# Patient Record
Sex: FEMALE | Race: WHITE | NOT HISPANIC OR LATINO | Employment: OTHER | URBAN - METROPOLITAN AREA
[De-identification: names, ages, dates, MRNs, and addresses within clinical notes are randomized per-mention and may not be internally consistent; named-entity substitution may affect disease eponyms.]

---

## 2017-01-16 DIAGNOSIS — G89.29 CHRONIC PAIN OF BOTH KNEES: Primary | ICD-10-CM

## 2017-01-16 DIAGNOSIS — M25.562 CHRONIC PAIN OF BOTH KNEES: Primary | ICD-10-CM

## 2017-01-16 DIAGNOSIS — M25.561 CHRONIC PAIN OF BOTH KNEES: Primary | ICD-10-CM

## 2017-01-16 RX ORDER — TRAMADOL HYDROCHLORIDE 50 MG/1
50 TABLET ORAL 2 TIMES DAILY PRN
Qty: 120 TABLET | Refills: 0 | Status: SHIPPED | OUTPATIENT
Start: 2017-01-16 | End: 2017-05-21

## 2017-01-16 NOTE — TELEPHONE ENCOUNTER
tramadol      Last Written Prescription Date:  9/6/16  Last Fill Quantity: 120,   # refills: 0  Last Office Visit with [facility] prescribing provider: 12/29/16  Future Office visit:       Routing refill request to provider for review/approval because:  Drug not on the [facility] refill protocol or controlled substance

## 2017-01-24 ENCOUNTER — TRANSFERRED RECORDS (OUTPATIENT)
Dept: HEALTH INFORMATION MANAGEMENT | Facility: CLINIC | Age: 82
End: 2017-01-24

## 2017-02-27 DIAGNOSIS — R05.9 COUGH: ICD-10-CM

## 2017-02-27 DIAGNOSIS — J45.30 MILD PERSISTENT ASTHMA WITHOUT COMPLICATION: Chronic | ICD-10-CM

## 2017-03-01 RX ORDER — IPRATROPIUM BROMIDE AND ALBUTEROL SULFATE 2.5; .5 MG/3ML; MG/3ML
1 SOLUTION RESPIRATORY (INHALATION) EVERY 6 HOURS PRN
Qty: 30 VIAL | Refills: 1 | Status: SHIPPED | OUTPATIENT
Start: 2017-03-01 | End: 2017-09-05

## 2017-03-01 NOTE — TELEPHONE ENCOUNTER
Routing refill request to provider for review/approval because:  ACT <20    Please advise, thanks.

## 2017-04-19 ENCOUNTER — DOCUMENTATION ONLY (OUTPATIENT)
Dept: LAB | Facility: CLINIC | Age: 82
End: 2017-04-19

## 2017-04-19 DIAGNOSIS — I10 HTN, GOAL BELOW 140/90: ICD-10-CM

## 2017-04-19 DIAGNOSIS — E21.3 HYPERPARATHYROIDISM (H): Chronic | ICD-10-CM

## 2017-04-19 DIAGNOSIS — E78.5 HYPERLIPIDEMIA LDL GOAL <130: Primary | Chronic | ICD-10-CM

## 2017-04-20 DIAGNOSIS — R05.9 COUGH: ICD-10-CM

## 2017-04-20 DIAGNOSIS — J45.30 MILD PERSISTENT ASTHMA WITHOUT COMPLICATION: Chronic | ICD-10-CM

## 2017-04-20 DIAGNOSIS — I10 ESSENTIAL HYPERTENSION WITH GOAL BLOOD PRESSURE LESS THAN 140/90: ICD-10-CM

## 2017-04-20 RX ORDER — IPRATROPIUM BROMIDE AND ALBUTEROL SULFATE 2.5; .5 MG/3ML; MG/3ML
SOLUTION RESPIRATORY (INHALATION)
Qty: 90 ML | Refills: 0 | Status: SHIPPED | OUTPATIENT
Start: 2017-04-20 | End: 2017-07-25

## 2017-04-20 RX ORDER — LOSARTAN POTASSIUM 100 MG/1
TABLET ORAL
Qty: 90 TABLET | Refills: 0 | Status: SHIPPED | OUTPATIENT
Start: 2017-04-20 | End: 2017-05-02

## 2017-04-25 DIAGNOSIS — I10 HTN, GOAL BELOW 140/90: ICD-10-CM

## 2017-04-25 DIAGNOSIS — E78.5 HYPERLIPIDEMIA LDL GOAL <130: Chronic | ICD-10-CM

## 2017-04-25 DIAGNOSIS — E21.3 HYPERPARATHYROIDISM (H): Chronic | ICD-10-CM

## 2017-04-25 LAB — PTH-INTACT SERPL-MCNC: 77 PG/ML (ref 12–72)

## 2017-04-25 PROCEDURE — 36415 COLL VENOUS BLD VENIPUNCTURE: CPT | Performed by: INTERNAL MEDICINE

## 2017-04-25 PROCEDURE — 83970 ASSAY OF PARATHORMONE: CPT | Performed by: INTERNAL MEDICINE

## 2017-04-25 PROCEDURE — 82043 UR ALBUMIN QUANTITATIVE: CPT | Performed by: INTERNAL MEDICINE

## 2017-04-25 PROCEDURE — 80053 COMPREHEN METABOLIC PANEL: CPT | Performed by: INTERNAL MEDICINE

## 2017-04-26 LAB
ALBUMIN SERPL-MCNC: 4.2 G/DL (ref 3.4–5)
ALP SERPL-CCNC: 82 U/L (ref 40–150)
ALT SERPL W P-5'-P-CCNC: 27 U/L (ref 0–50)
ANION GAP SERPL CALCULATED.3IONS-SCNC: 11 MMOL/L (ref 3–14)
AST SERPL W P-5'-P-CCNC: 22 U/L (ref 0–45)
BILIRUB SERPL-MCNC: 0.5 MG/DL (ref 0.2–1.3)
BUN SERPL-MCNC: 19 MG/DL (ref 7–30)
CALCIUM SERPL-MCNC: 8.9 MG/DL (ref 8.5–10.1)
CHLORIDE SERPL-SCNC: 107 MMOL/L (ref 94–109)
CO2 SERPL-SCNC: 24 MMOL/L (ref 20–32)
CREAT SERPL-MCNC: 0.74 MG/DL (ref 0.52–1.04)
CREAT UR-MCNC: 125 MG/DL
GFR SERPL CREATININE-BSD FRML MDRD: 76 ML/MIN/1.7M2
GLUCOSE SERPL-MCNC: 90 MG/DL (ref 70–99)
MICROALBUMIN UR-MCNC: 6 MG/L
MICROALBUMIN/CREAT UR: 5.19 MG/G CR (ref 0–25)
POTASSIUM SERPL-SCNC: 4.2 MMOL/L (ref 3.4–5.3)
PROT SERPL-MCNC: 7.6 G/DL (ref 6.8–8.8)
SODIUM SERPL-SCNC: 142 MMOL/L (ref 133–144)

## 2017-05-02 ENCOUNTER — OFFICE VISIT (OUTPATIENT)
Dept: INTERNAL MEDICINE | Facility: CLINIC | Age: 82
End: 2017-05-02
Payer: COMMERCIAL

## 2017-05-02 VITALS
TEMPERATURE: 97.2 F | WEIGHT: 235.9 LBS | DIASTOLIC BLOOD PRESSURE: 68 MMHG | HEART RATE: 85 BPM | HEIGHT: 66 IN | SYSTOLIC BLOOD PRESSURE: 138 MMHG | OXYGEN SATURATION: 93 % | BODY MASS INDEX: 37.91 KG/M2

## 2017-05-02 DIAGNOSIS — I10 HTN, GOAL BELOW 140/90: ICD-10-CM

## 2017-05-02 DIAGNOSIS — D12.6 ADENOMATOUS COLON POLYP: ICD-10-CM

## 2017-05-02 DIAGNOSIS — E78.5 HYPERLIPIDEMIA LDL GOAL <130: Chronic | ICD-10-CM

## 2017-05-02 DIAGNOSIS — Z00.00 ROUTINE GENERAL MEDICAL EXAMINATION AT A HEALTH CARE FACILITY: Primary | ICD-10-CM

## 2017-05-02 DIAGNOSIS — R60.0 BILATERAL LEG EDEMA: ICD-10-CM

## 2017-05-02 PROCEDURE — 99397 PER PM REEVAL EST PAT 65+ YR: CPT | Performed by: INTERNAL MEDICINE

## 2017-05-02 RX ORDER — LOSARTAN POTASSIUM 100 MG/1
100 TABLET ORAL DAILY
Qty: 90 TABLET | Refills: 0 | Status: SHIPPED | OUTPATIENT
Start: 2017-05-02 | End: 2017-10-17

## 2017-05-02 RX ORDER — PRAVASTATIN SODIUM 20 MG
20 TABLET ORAL DAILY
Qty: 90 TABLET | Refills: 1 | Status: SHIPPED | OUTPATIENT
Start: 2017-05-02 | End: 2017-12-01

## 2017-05-02 RX ORDER — FUROSEMIDE 40 MG
TABLET ORAL
Qty: 90 TABLET | Refills: 1 | Status: SHIPPED | OUTPATIENT
Start: 2017-05-02 | End: 2017-12-01

## 2017-05-02 RX ORDER — ATENOLOL 100 MG/1
100 TABLET ORAL DAILY
Qty: 90 TABLET | Refills: 1 | Status: SHIPPED | OUTPATIENT
Start: 2017-05-02 | End: 2017-12-01

## 2017-05-02 RX ORDER — TERAZOSIN 2 MG/1
2 CAPSULE ORAL AT BEDTIME
Qty: 30 CAPSULE | Refills: 3 | Status: SHIPPED | OUTPATIENT
Start: 2017-05-02 | End: 2017-05-24

## 2017-05-02 NOTE — MR AVS SNAPSHOT
After Visit Summary   5/2/2017    Marino Prajapati    MRN: [id number]           Patient Information     Date Of Birth          1935        Visit Information        Provider Department      5/2/2017 3:20 PM Vivian Blue MD [facility]        Today's Diagnoses     Adenomatous colon polyp    -  1    HTN, goal below 140/90        Essential hypertension with goal blood pressure less than 140/90        Hyperlipidemia LDL goal <130        Bilateral leg edema          Care Instructions    Plan:  1. Mammogram ( please call [phone] to schedule it)   2. Bone density scan   3. We need the pathology report of the colon polyps from 2012  4. Read and complete the [facility]   5. Continue same meds, same doses for now   6. Please make a lab appointment for fasting labs in 6 months  7. Please make an appointment few days after the labs to discuss about the results.       Plan:  1. Mammogram ( please call [phone] to schedule it)   2. Bone density scan   3. We need the pathology report of the colon polyps from 2012  4. Read and complete the [facility]   5. Continue same meds, same doses for now       Preventive Health Recommendations    Female Ages 65 +    Yearly exam:     See your health care provider every year in order to  o Review health changes.   o Discuss preventive care.    o Review your medicines if your doctor has prescribed any.      You no longer need a yearly Pap test unless you've had an abnormal Pap test in the past 10 years. If you have vaginal symptoms, such as bleeding or discharge, be sure to talk with your provider about a Pap test.      Every 1 to 2 years, have a mammogram.  If you are over 69, talk with your health care provider about whether or not you want to continue having screening mammograms.      Every 10 years, have a colonoscopy. Or, have a yearly FIT test (stool test). These exams will check for colon cancer.       Have a  cholesterol test every 5 years, or more often if your doctor advises it.       Have a diabetes test (fasting glucose) every three years. If you are at risk for diabetes, you should have this test more often.       At age 65, have a bone density scan (DEXA) to check for osteoporosis (brittle bone disease).    Shots:    Get a flu shot each year.    Get a tetanus shot every 10 years.    Talk to your doctor about your pneumonia vaccines. There are now two you should receive - Pneumovax (PPSV 23) and Prevnar (PCV 13).    Talk to your doctor about the shingles vaccine.    Talk to your doctor about the hepatitis B vaccine.    Nutrition:     Eat at least 5 servings of fruits and vegetables each day.      Eat whole-grain bread, whole-wheat pasta and brown rice instead of white grains and rice.      Talk to your provider about Calcium and Vitamin D.     Lifestyle    Exercise at least 150 minutes a week (30 minutes a day, 5 days a week). This will help you control your weight and prevent disease.      Limit alcohol to one drink per day.      No smoking.       Wear sunscreen to prevent skin cancer.       See your dentist twice a year for an exam and cleaning.      See your eye doctor every 1 to 2 years to screen for conditions such as glaucoma, macular degeneration and cataracts.        Follow-ups after your visit        Future tests that were ordered for you today     Open Future Orders        Priority Expected Expires Ordered    CBC with platelets Routine  5/2/2018 5/2/2017    Comprehensive metabolic panel Routine  5/2/2018 5/2/2017    Lipid panel reflex to direct LDL Routine  5/2/2018 5/2/2017    TSH with free T4 reflex Routine  5/2/2018 5/2/2017            Who to contact     If you have questions or need follow up information about today's clinic visit or your schedule please contact [facility] directly at [phone].  Normal or non-critical lab and imaging results will be communicated to you by Donnie  "letter or phone within 4 business days after the clinic has received the results. If you do not hear from us within 7 days, please contact the clinic through [employer] or phone. If you have a critical or abnormal lab result, we will notify you by phone as soon as possible.  Submit refill requests through [employer] or call your pharmacy and they will forward the refill request to us. Please allow 3 business days for your refill to be completed.          Additional Information About Your Visit        [employer] Information     [employer] lets you send messages to your doctor, view your test results, renew your prescriptions, schedule appointments and more. To sign up, go to [email or web address] . Click on \"Log in\" on the left side of the screen, which will take you to the Welcome page. Then click on \"Sign up Now\" on the right side of the page.     You will be asked to enter the access code listed below, as well as some personal information. Please follow the directions to create your username and password.     Your access code is: [email or web address]  Expires: 2017 12:24 PM     Your access code will  in 90 days. If you need help or a new code, please call your [facility] clinic or [phone].        Care EveryWhere ID     This is your Care EveryWhere ID. This could be used by other organizations to access your [facility] medical records  [id number]        Your Vitals Were     Pulse Temperature Height Pulse Oximetry Breastfeeding? BMI (Body Mass Index)    85 97.2  F (36.2  C) (Oral) 5' 6\" (1.676 m) 93% No 38.08 kg/m2       Blood Pressure from Last 3 Encounters:   17 138/68   16 130/60   16 112/54    Weight from Last 3 Encounters:   17 235 lb 14.4 oz (107 kg)   16 238 lb (108 kg)   16 229 lb 4.8 oz (104 kg)                 Today's Medication Changes          These changes are accurate as of: 17  3:53 PM.  If you have any questions, ask your nurse or doctor.               These " medicines have changed or have updated prescriptions.        Dose/Directions    losartan 100 MG tablet   Commonly known as:  COZAAR   This may have changed:  See the new instructions.   Used for:  Essential hypertension with goal blood pressure less than 140/90, HTN, goal below 140/90, Adenomatous colon polyp, Hyperlipidemia LDL goal <130, Bilateral leg edema   Changed by:  Vivian Blue MD        Dose:  100 mg   Take 1 tablet (100 mg) by mouth daily   Quantity:  90 tablet   Refills:  0            Where to get your medicines      These medications were sent to [email or web address] [address] [place] [address] - [address] AT [place] [address] & [address]  [address], [facility] [address]     Phone:  [phone]     atenolol 100 MG tablet    furosemide 40 MG tablet    losartan 100 MG tablet    pravastatin 20 MG tablet    terazosin 2 MG capsule                Primary Care Provider Office Phone # Fax #    Vivian Blue [phone]       [facility] [address] [place] [address]        Thank you!     Thank you for choosing [facility]  for your care. Our goal is always to provide you with excellent care. Hearing back from our patients is one way we can continue to improve our services. Please take a few minutes to complete the written survey that you may receive in the mail after your visit with us. Thank you!             Your Updated Medication List - Protect others around you: Learn how to safely use, store and throw away your medicines at www.disposemymeds.org.          This list is accurate as of: 5/2/17  3:53 PM.  Always use your most recent med list.                   Brand Name Dispense Instructions for use    albuterol 108 (90 BASE) MCG/ACT Inhaler    PROAIR HFA/PROVENTIL HFA/VENTOLIN HFA    1 Inhaler    Inhale 2 puffs into the lungs every 6 hours as needed for shortness of breath / dyspnea or wheezing        amLODIPine 10 MG tablet    NORVASC    90 tablet    Take 0.5 tablets (5 mg) by mouth daily       ascorbic acid 500 MG tablet    VITAMIN C    100 tablet    Take 500 mg by mouth daily       aspirin 81 MG tablet     30 tablet    Take 81 mg by mouth daily       atenolol 100 MG tablet    TENORMIN    90 tablet    Take 1 tablet (100 mg) by mouth daily       B-12 1000 MCG Tbcr     100 tablet    Take 1,000 mcg by mouth daily       Calcium-Magnesium-Zinc Tabs          cholecalciferol 1000 UNIT tablet    vitamin D    100 tablet    Take 2 tablets (2,000 Units) by mouth daily       clobetasol 0.05 % cream    TEMOVATE    30 g    Apply sparingly to affected area.  Do not apply to face.       CoQ10 30 MG Caps     30 capsule        Cranberry Extract 250 MG Tabs          desonide 0.05 % cream    DESOWEN    30 g    Apply  topically 2 times daily.       fluticasone-salmeterol 250-50 MCG/DOSE diskus inhaler    ADVAIR    3 Inhaler    Inhale 1 puff into the lungs 2 times daily       furosemide 40 MG tablet    LASIX    90 tablet    0.5 - 1 tab daily as instructed       GARLIC 1500 1500 MG Caps   Generic drug:  Garlic          * ipratropium - albuterol 0.5 mg/2.5 mg/3 mL 0.5-2.5 (3) MG/3ML neb solution    DUONEB    30 vial    Take 1 vial (3 mLs) by nebulization every 6 hours as needed for shortness of breath / dyspnea or wheezing       * ipratropium - albuterol 0.5 mg/2.5 mg/3 mL 0.5-2.5 (3) MG/3ML neb solution    DUONEB    90 mL    USE 1 VIAL IN NEBULIZER EVERY 6 HOURS AS NEEDED FOR SHORTNESS OF BREATH, DYSPNEA, OR WHEEZING       losartan 100 MG tablet    COZAAR    90 tablet    Take 1 tablet (100 mg) by mouth daily       MULTIVITAMIN & MINERAL PO      Take 1 tablet by mouth daily.       omeprazole 20 MG tablet     90 tablet    Take 1 tablet (20 mg) by mouth daily Take 30-60 minutes before a meal.       order for DME     1 each    Equipment being ordered: walker with wheels       * order for DME     1 Device    Equipment being ordered:  Nebulizer and neb supplies (tubing, etc)       * order for DME     1 each    Equipment being ordered: 1: Gradient Compression Wraps; 2: BLE knee high 20-30 mm Hg compression stockings; 3: BLE thigh high 20-30 mm Hg compression stockings; 4: Cast Boots       * order for DME     1 each    Equipment being ordered: 1: Custom/alternative BLE full leg velco/buckling compression garment       pravastatin 20 MG tablet    PRAVACHOL    90 tablet    Take 1 tablet (20 mg) by mouth daily       [place] Oil Caps          terazosin 2 MG capsule    HYTRIN    30 capsule    Take 1 capsule (2 mg) by mouth At Bedtime       traMADol 50 MG tablet    ULTRAM    120 tablet    Take 1 tablet (50 mg) by mouth 2 times daily as needed for pain       TYLENOL 325 MG tablet   Generic drug:  acetaminophen     250 tablet    Take 1-2 tablets (325-650 mg) by mouth every 6 hours as needed for mild pain       vitamin B complex with vitamin C Tabs tablet      Take 1 tablet by mouth daily       * Notice:  This list has 5 medication(s) that are the same as other medications prescribed for you. Read the directions carefully, and ask your doctor or other care provider to review them with you.

## 2017-05-02 NOTE — PATIENT INSTRUCTIONS
Plan:  1. Mammogram ( please call [phone] to schedule it)   2. Bone density scan   3. We need the pathology report of the colon polyps from 2012  4. Read and complete the [facility]   5. Continue same meds, same doses for now   6. Please make a lab appointment for fasting labs in 6 months  7. Please make an appointment few days after the labs to discuss about the results.       Plan:  1. Mammogram ( please call [phone] to schedule it)   2. Bone density scan   3. We need the pathology report of the colon polyps from 2012  4. Read and complete the [facility] CHoices   5. Continue same meds, same doses for now       Preventive Health Recommendations    Female Ages 65 +    Yearly exam:     See your health care provider every year in order to  o Review health changes.   o Discuss preventive care.    o Review your medicines if your doctor has prescribed any.      You no longer need a yearly Pap test unless you've had an abnormal Pap test in the past 10 years. If you have vaginal symptoms, such as bleeding or discharge, be sure to talk with your provider about a Pap test.      Every 1 to 2 years, have a mammogram.  If you are over 69, talk with your health care provider about whether or not you want to continue having screening mammograms.      Every 10 years, have a colonoscopy. Or, have a yearly FIT test (stool test). These exams will check for colon cancer.       Have a cholesterol test every 5 years, or more often if your doctor advises it.       Have a diabetes test (fasting glucose) every three years. If you are at risk for diabetes, you should have this test more often.       At age 65, have a bone density scan (DEXA) to check for osteoporosis (brittle bone disease).    Shots:    Get a flu shot each year.    Get a tetanus shot every 10 years.    Talk to your doctor about your pneumonia vaccines. There are now two you should receive - Pneumovax (PPSV 23) and Prevnar (PCV 13).    Talk to your doctor  about the shingles vaccine.    Talk to your doctor about the hepatitis B vaccine.    Nutrition:     Eat at least 5 servings of fruits and vegetables each day.      Eat whole-grain bread, whole-wheat pasta and brown rice instead of white grains and rice.      Talk to your provider about Calcium and Vitamin D.     Lifestyle    Exercise at least 150 minutes a week (30 minutes a day, 5 days a week). This will help you control your weight and prevent disease.      Limit alcohol to one drink per day.      No smoking.       Wear sunscreen to prevent skin cancer.       See your dentist twice a year for an exam and cleaning.      See your eye doctor every 1 to 2 years to screen for conditions such as glaucoma, macular degeneration and cataracts.

## 2017-05-02 NOTE — NURSING NOTE
"Chief Complaint   Patient presents with     Medicare Visit       Initial /68 (BP Location: Right arm, Patient Position: Chair, Cuff Size: Adult Large)  Pulse 85  Temp 97.2  F (36.2  C) (Oral)  Ht 5' 6\" (1.676 m)  Wt 235 lb 14.4 oz (107 kg)  SpO2 93%  Breastfeeding? No  BMI 38.08 kg/m2 Estimated body mass index is 38.08 kg/(m^2) as calculated from the following:    Height as of this encounter: 5' 6\" (1.676 m).    Weight as of this encounter: 235 lb 14.4 oz (107 kg).  Medication Reconciliation: complete   Ernesto Valencia MA    "

## 2017-05-02 NOTE — PROGRESS NOTES
Dr Mendes's note    Patient's instructions / PLAN:                                                        Plan:  1. Mammogram ( please call [phone] to schedule it)   2. Bone density scan   3. We need the pathology report of the colon polyps from 2012  4. Read and complete the [facility]oring [employer]   5. Continue same meds, same doses for now   6. Please make a lab appointment for fasting labs in 6 months  7. Please make an appointment few days after the labs to discuss about the results.       ASSESSMENT & PLAN:                                                      (Z00.00) Routine general medical examination at a health care facility  (primary encounter diagnosis)  Comment:   Plan:     (I10) HTN, goal below 140/90  Comment: Controlled    Plan: terazosin (HYTRIN) 2 MG capsule, atenolol         (TENORMIN) 100 MG tablet, losartan (COZAAR) 100        MG tablet, pravastatin (PRAVACHOL) 20 MG         tablet, furosemide (LASIX) 40 MG tablet, CBC         with platelets, Comprehensive metabolic panel,         Lipid panel reflex to direct LDL, TSH with free        T4 reflex            (D12.6) Adenomatous colon polyp  Comment:   Plan: terazosin (HYTRIN) 2 MG capsule, atenolol         (TENORMIN) 100 MG tablet, losartan (COZAAR) 100        MG tablet, pravastatin (PRAVACHOL) 20 MG         tablet, furosemide (LASIX) 40 MG tablet, CBC         with platelets, Comprehensive metabolic panel,         Lipid panel reflex to direct LDL, TSH with free        T4 reflex            (E78.5) Hyperlipidemia LDL goal <130  Comment: Controlled    Plan: terazosin (HYTRIN) 2 MG capsule, atenolol         (TENORMIN) 100 MG tablet, losartan (COZAAR) 100        MG tablet, pravastatin (PRAVACHOL) 20 MG         tablet, furosemide (LASIX) 40 MG tablet, CBC         with platelets, Comprehensive metabolic panel,         Lipid panel reflex to direct LDL, TSH with free        T4 reflex            (R60.0) Bilateral leg edema  Comment: Controlled    Plan:  terazosin (HYTRIN) 2 MG capsule, atenolol         (TENORMIN) 100 MG tablet, losartan (COZAAR) 100        MG tablet, pravastatin (PRAVACHOL) 20 MG         tablet, furosemide (LASIX) 40 MG tablet, CBC         with platelets, Comprehensive metabolic panel,         Lipid panel reflex to direct LDL, TSH with free        T4 reflex               Chief Complaint:                                                        Annual exam    SUBJECTIVE:                                                    History of present illness     Labs April 25 in acceptable range     Polyps in 2012, she doesn't know if she was told to repeat in 5 or 10 years.     We discussed at length today about health directives    ROS:   General: Negative for fever, chills, major weight changes, fatigue  Skin: Negative for rashes, abnormal spots  Eyes: Negative for blurred or double vision  ENT/mouth: Negative for sinuses discomfort, earache, sore throat  Respiratory: Negative for cough, wheezes, chronic lung disease  Cardiovascular: Negative for rest or exertional chest pain, shortness of breath, palpitations, leg edema,   Gastrointestinal: Negative for vomiting, abdominal pain, heartburn, blood in stool, diarrhea, constipation  Genitourinary: Negative for urinary frequency, blood in urine, history of kidney stones  Female: Negative for abnormal vaginal bleeding, vaginal discharge  Neuro: Negative for headaches, numbness, tingling, weakness in arms or legs, history of seizure, recent syncope  Psychiatry: Negative for depression, anxiety, suicidal thoughts  Endo: Negative for known thyroid disease, diabetes.  Hemato/Lymph: Negative for nodes, easy bleeding, history of DVT, blood transfusion  Musculoskeletal: Negative for joint swelling, back pain      PMHx: - reviewed  Past Medical History:   Diagnosis Date     Anemia      CARDIOVASCULAR SCREENING; LDL GOAL LESS THAN 160      Colon polyps 2010    needs colonoscopy 2013     DVT (deep venous thrombosis) (H)  2007    remote history of DVT while she was on BCP ,coumadin stopped after retroperitoneal/buttock hematoma in 10/11 . On ASA only     Gastro-oesophageal reflux disease      HTN, goal below 140/90      Hyperlipidemia LDL goal <130 1/22/2016     Kidney stone      Osteoarthritis     knees and hip     Osteoporosis      Postnasal drip      S/P total knee arthroplasty      Thrombosis of leg        PSHx: reviewed  Past Surgical History:   Procedure Laterality Date     ARTHROPLASTY HIP  8/1/2013    Procedure: ARTHROPLASTY HIP;  RIGHT TOTAL HIP ARTHROPLASTY;  Surgeon: Rufino Tong MD;  Location: [facility]     ARTHROPLASTY HIP Left 12/12/2014    Procedure: ARTHROPLASTY HIP;  Surgeon: Rufino Tong MD;  Location: [facility] OR     ARTHROPLASTY KNEE  10/22/2012    Procedure: ARTHROPLASTY KNEE;  Right Total knee Arthroplasty  ;  Surgeon: Jagdeep Virgen MD;  Location: [facility] OR     ARTHROPLASTY KNEE  5/23/2013    Procedure: ARTHROPLASTY KNEE;  LEFT TOTAL KNEE ARTHROPLASTY (SMITH & NEPHEW)^;  Surgeon: Rufino Tong MD;  Location: [facility] OR     C PREP FACE/ORAL PROST PALATAL LIFT       CHOLECYSTECTOMY       CYSTOSCOPY, RETROGRADES, INSERT STENT URETER(S), COMBINED  7/16/2012    Procedure: COMBINED CYSTOSCOPY, RETROGRADES, INSERT STENT URETER(S);  Cystoscopy, Right retrogrades, Right Stent Placement;  Surgeon: Mauricio Velazquez MD;  Location:  OR     LASER HOLMIUM LITHOTRIPSY URETER(S), INSERT STENT, COMBINED  8/8/2012    Procedure: COMBINED CYSTOSCOPY, URETEROSCOPY, LASER HOLMIUM LITHOTRIPSY URETER(S), INSERT STENT;  Cystoscopy, Stent Removal, Right Ureteroscopy with Holmium Laser, Stone removal ;  Surgeon: Mauricio Velazquez MD;  Location: [facility] OR     LIPOSUCTION (LOCATION)      tummy     STRIP VEIN          Soc Hx: No daily alcohol, no smoking  Social History     Social History     Marital status:      Spouse name: N/A     Number of children: N/A     Years of education: N/A     Occupational History     Not on  file.     Social History Main Topics     Smoking status: Former Smoker     Packs/day: 1.00     Years: 29.00     Types: Cigarettes     Quit date: 10/17/1966     Smokeless tobacco: Never Used     Alcohol use Yes      Comment: 4 drinks yearly     Drug use: No     Sexual activity: Yes     Partners: Male     Other Topics Concern     Not on file     Social History Narrative        Fam Hx: reviewed  Family History   Problem Relation Age of Onset     Breast Cancer Mother      Circulatory Father      Blood clots         Screening: reviewed      All: reviewed    Meds: reviewed  Current Outpatient Prescriptions   Medication Sig Dispense Refill     losartan (COZAAR) 100 MG tablet TAKE 1 TABLET(100 MG) BY MOUTH DAILY 90 tablet 0     ipratropium - albuterol 0.5 mg/2.5 mg/3 mL (DUONEB) 0.5-2.5 (3) MG/3ML neb solution USE 1 VIAL IN NEBULIZER EVERY 6 HOURS AS NEEDED FOR SHORTNESS OF BREATH, DYSPNEA, OR WHEEZING 90 mL 0     ipratropium - albuterol 0.5 mg/2.5 mg/3 mL (DUONEB) 0.5-2.5 (3) MG/3ML neb solution Take 1 vial (3 mLs) by nebulization every 6 hours as needed for shortness of breath / dyspnea or wheezing 30 vial 1     amLODIPine (NORVASC) 10 MG tablet Take 0.5 tablets (5 mg) by mouth daily 90 tablet 1     traMADol (ULTRAM) 50 MG tablet Take 1 tablet (50 mg) by mouth 2 times daily as needed for pain 120 tablet 0     terazosin (HYTRIN) 2 MG capsule Take 1 capsule (2 mg) by mouth At Bedtime 30 capsule 3     fluticasone-salmeterol (ADVAIR) 250-50 MCG/DOSE diskus inhaler Inhale 1 puff into the lungs 2 times daily 3 Inhaler 0     atenolol (TENORMIN) 100 MG tablet Take 1 tablet (100 mg) by mouth daily 90 tablet 1     pravastatin (PRAVACHOL) 20 MG tablet Take 1 tablet (20 mg) by mouth daily 90 tablet 1     furosemide (LASIX) 40 MG tablet 0.5 - 1 tab daily as instructed 90 tablet 1     albuterol (PROAIR HFA, PROVENTIL HFA, VENTOLIN HFA) 108 (90 BASE) MCG/ACT inhaler Inhale 2 puffs into the lungs every 6 hours as needed for shortness of  breath / dyspnea or wheezing 1 Inhaler 1     omeprazole 20 MG tablet Take 1 tablet (20 mg) by mouth daily Take 30-60 minutes before a meal. 90 tablet 0     order for DME Equipment being ordered: Nebulizer and neb supplies (tubing, etc) 1 Device 0     cholecalciferol (VITAMIN D) 1000 UNIT tablet Take 2 tablets (2,000 Units) by mouth daily 100 tablet 3     vitamin  B complex with vitamin C (VITAMIN  B COMPLEX) TABS Take 1 tablet by mouth daily  0     Cranberry Extract 250 MG TABS        Garlic (GARLIC 1500) 1500 MG CAPS        Nutritional Supplements (SALMON OIL) CAPS        Cyanocobalamin (B-12) 1000 MCG TBCR Take 1,000 mcg by mouth daily 100 tablet 1     Coenzyme Q10 (COQ10) 30 MG CAPS  30 capsule      Multiple Minerals (CALCIUM-MAGNESIUM-ZINC) TABS        acetaminophen (TYLENOL) 325 MG tablet Take 1-2 tablets (325-650 mg) by mouth every 6 hours as needed for mild pain 250 tablet 11     aspirin 81 MG tablet Take 81 mg by mouth daily  30 tablet      clobetasol (TEMOVATE) 0.05 % cream Apply sparingly to affected area.  Do not apply to face. 30 g 1     ascorbic acid (VITAMIN C) 500 MG tablet Take 500 mg by mouth daily  100 tablet 12     ORDER FOR DME Equipment being ordered: walker with wheels 1 each 0     Multiple Vitamins-Minerals (MULTIVITAMIN & MINERAL PO) Take 1 tablet by mouth daily.       order for DME Equipment being ordered: 1: Custom/alternative BLE full leg velco/buckling compression garment (Patient not taking: Reported on 5/2/2017) 1 each 0     order for DME Equipment being ordered: 1: Gradient Compression Wraps; 2: BLE knee high 20-30 mm Hg compression stockings; 3: BLE thigh high 20-30 mm Hg compression stockings; 4: Cast Boots (Patient not taking: Reported on 5/2/2017) 1 each 0     desonide (DESOWEN) 0.05 % cream Apply  topically 2 times daily. (Patient not taking: Reported on 5/2/2017) 30 g 0       OBJECTIVE:                                                    Physical Exam :  Blood pressure 138/68,  "pulse 85, temperature 97.2  F (36.2  C), temperature source Oral, height 5' 6\" (1.676 m), weight 235 lb 14.4 oz (107 kg), SpO2 93 %, not currently breastfeeding.     NAD, appears comfortable  Skin clear, no rashes  Neck: supple, no JVD, no thyroidmegaly  Lymph nodes non palpable in the cervical, supraclavicular axillaries, inguinal areas  Chest: clear to auscultation with good respiratory effort  Cardiac: S1S2, RRR, no mgr appreciated  Abdomen: soft, not tender, not distended, audible bowel sound, no hepatosplenomegaly, no palpable masses, no abdominal bruits  Extremities: no cyanosis, clubbing or edema.   Neuro: A, Ox3, no focal signs.  Breast exam in supine and erect position: they are symmetrical, no skin changes, no tenderness or nodes on palpation. Nipples are erect, no skin lesions, no discharge on pressure.    Pelvic exam: deferred, s/p menopause, no symptoms, no hx of abnormal pap         Vivian Mendes MD  Internal Medicine       SUBJECTIVE:                                                            Marino Prajapati is a 81 year old female who presents for Preventive Visit.      Are you in the first 12 months of your Medicare Part B coverage?  No    Healthy Habits:    Do you get at least three servings of calcium containing foods daily (dairy, green leafy vegetables, etc.)? yes    Amount of exercise or daily activities, outside of work: no    Problems taking medications regularly No    Medication side effects: No    Have you had an eye exam in the past two years? no    Do you see a dentist twice per year? yes    Do you have sleep apnea, excessive snoring or daytime drowsiness?no    COGNITIVE SCREEN  1) Repeat 3 items (Banana, Sunrise, Chair)    2) Clock draw: NORMAL  3) 3 item recall: Recalls 3 objects  Results: 3 items recalled: COGNITIVE IMPAIRMENT LESS LIKELY    Mini-CogTM Copyright S [employer]. Licensed by the author for use in [facility]; reprinted with permission (ivan@.[facility]). All rights " reserved.          Reviewed and updated as needed this visit by clinical staff         Reviewed and updated as needed this visit by Provider        Social History   Substance Use Topics     Smoking status: Former Smoker     Packs/day: 1.00     Years: 29.00     Types: Cigarettes     Quit date: 10/17/1966     Smokeless tobacco: Never Used     Alcohol use Yes      Comment: 4 drinks yearly       The patient does not drink >3 drinks per day nor >7 drinks per week.    Today's PHQ-2 Score:   PHQ-2 ( 1999 Pfizer) 9/27/2016 6/14/2016   Q1: Little interest or pleasure in doing things 0 0   Q2: Feeling down, depressed or hopeless 0 0   PHQ-2 Score 0 0       Do you feel safe in your environment - Yes    Do you have a Health Care Directive?: Yes: Patient states has Advance Directive and will bring in a copy to clinic.    Current providers sharing in care for this patient include:   Patient Care Team:  Vivian Blue MD as PCP - General (Internal Medicine)      Hearing impairment: Yes, Feel that people are mumbling or not speaking clearly.    Ability to successfully perform activities of daily living: Yes, no assistance needed     Fall risk:  Fallen 2 or more times in the past year?: No  Any fall with injury in the past year?: No    Home safety:  none identified      The following health maintenance items are reviewed in Epic and correct as of today:  Health Maintenance   Topic Date Due     PNEUMOCOCCAL (2 of 2 - PCV13) 05/10/2014     ASTHMA ACTION PLAN Q1 YR (NO INBASKET)  05/10/2017     ASTHMA CONTROL TEST Q6 MOS (NO INBASKET)  06/29/2017     ADVANCE DIRECTIVE PLANNING Q5 YRS (NO INBASKET)  08/03/2017     INFLUENZA VACCINE (SYSTEM ASSIGNED)  09/01/2017     FALL RISK ASSESSMENT  09/27/2017     JASON QUESTIONNAIRE 1 YEAR  09/27/2017     PHQ-9 Q1YR (NO INBASKET)  09/27/2017     MICROALBUMIN Q1 YEAR( NO INBASKET)  04/25/2018     TETANUS IMMUNIZATION (SYSTEM ASSIGNED)  01/29/2019     LIPID SCREEN Q5 YR FEMALE (SYSTEM  "ASSIGNED)  11/15/2021     DEXA SCAN SCREENING (SYSTEM ASSIGNED)  Completed         End of Life Planning:  Patient currently has an advanced directive: Yes.  Practitioner is supportive of decision.    COUNSELING:  Reviewed preventive health counseling, as reflected in patient instructions       Regular exercise       Healthy diet/nutrition        Estimated body mass index is 38.41 kg/(m^2) as calculated from the following:    Height as of 12/29/16: 5' 6\" (1.676 m).    Weight as of 12/29/16: 238 lb (108 kg).  Weight management plan: Discussed healthy diet and exercise guidelines and patient will follow up in 12 months in clinic to re-evaluate.   reports that she quit smoking about 50 years ago. Her smoking use included Cigarettes. She has a 29.00 pack-year smoking history. She has never used smokeless tobacco.      Appropriate preventive services were discussed with this patient, including applicable screening as appropriate for cardiovascular disease, diabetes, osteopenia/osteoporosis, and glaucoma.  As appropriate for age/gender, discussed screening for colorectal cancer, prostate cancer, breast cancer, and cervical cancer. Checklist reviewing preventive services available has been given to the patient.    Reviewed patients plan of care and provided an AVS. The Basic Care Plan (routine screening as documented in Health Maintenance) for Marino meets the Care Plan requirement. This Care Plan has been established and reviewed with the Patient.    Counseling Resources:  ATP IV Guidelines  Pooled Cohorts Equation Calculator  Breast Cancer Risk Calculator  FRAX Risk Assessment  ICSI Preventive Guidelines  Dietary Guidelines for Americans, 2010  USDA's MyPlate  ASA Prophylaxis  Lung CA Screening    Vivian Blue MD  [facility]  "

## 2017-05-03 ASSESSMENT — ASTHMA QUESTIONNAIRES: ACT_TOTALSCORE: 16

## 2017-05-21 DIAGNOSIS — G89.29 CHRONIC PAIN OF BOTH KNEES: ICD-10-CM

## 2017-05-21 DIAGNOSIS — M25.561 CHRONIC PAIN OF BOTH KNEES: ICD-10-CM

## 2017-05-21 DIAGNOSIS — I10 HTN, GOAL BELOW 140/90: ICD-10-CM

## 2017-05-21 DIAGNOSIS — R60.0 BILATERAL LEG EDEMA: ICD-10-CM

## 2017-05-21 DIAGNOSIS — E78.5 HYPERLIPIDEMIA LDL GOAL <130: Chronic | ICD-10-CM

## 2017-05-21 DIAGNOSIS — D12.6 ADENOMATOUS COLON POLYP: ICD-10-CM

## 2017-05-21 DIAGNOSIS — M25.562 CHRONIC PAIN OF BOTH KNEES: ICD-10-CM

## 2017-05-22 RX ORDER — TRAMADOL HYDROCHLORIDE 50 MG/1
TABLET ORAL
Qty: 120 TABLET | Refills: 0 | Status: SHIPPED | OUTPATIENT
Start: 2017-05-22 | End: 2017-09-05

## 2017-05-22 NOTE — TELEPHONE ENCOUNTER
Ultram      Last Written Prescription Date: 1/16/17  Last Fill Quantity: 120,  # refills: 0   Last Office Visit with G, [facility] or [facility] prescribing provider: 5/2/17    CSA on file

## 2017-05-24 RX ORDER — TERAZOSIN 2 MG/1
2 CAPSULE ORAL AT BEDTIME
Qty: 90 CAPSULE | Refills: 0 | Status: SHIPPED | OUTPATIENT
Start: 2017-05-24 | End: 2017-08-17

## 2017-05-25 ENCOUNTER — HOSPITAL ENCOUNTER (OUTPATIENT)
Dept: MAMMOGRAPHY | Facility: CLINIC | Age: 82
Discharge: HOME | End: 2017-05-25
Attending: INTERNAL MEDICINE | Admitting: INTERNAL MEDICINE
Payer: COMMERCIAL

## 2017-05-25 DIAGNOSIS — Z12.31 VISIT FOR SCREENING MAMMOGRAM: ICD-10-CM

## 2017-05-25 DIAGNOSIS — M81.0 OSTEOPOROSIS: Primary | ICD-10-CM

## 2017-05-25 PROCEDURE — 77063 BREAST TOMOSYNTHESIS BI: CPT

## 2017-05-25 PROCEDURE — G0202 SCR MAMMO BI INCL CAD: HCPCS

## 2017-05-31 ENCOUNTER — TELEPHONE (OUTPATIENT)
Dept: BONE DENSITY | Facility: CLINIC | Age: 82
End: 2017-05-31

## 2017-06-08 ENCOUNTER — TELEPHONE (OUTPATIENT)
Dept: INTERNAL MEDICINE | Facility: CLINIC | Age: 82
End: 2017-06-08

## 2017-06-08 DIAGNOSIS — J45.30 MILD PERSISTENT ASTHMA WITHOUT COMPLICATION: Chronic | ICD-10-CM

## 2017-06-08 DIAGNOSIS — R05.9 COUGH: ICD-10-CM

## 2017-06-08 RX ORDER — BUDESONIDE AND FORMOTEROL FUMARATE DIHYDRATE 160; 4.5 UG/1; UG/1
2 AEROSOL RESPIRATORY (INHALATION) 2 TIMES DAILY
Qty: 3 INHALER | Refills: 1 | Status: SHIPPED | OUTPATIENT
Start: 2017-06-08 | End: 2018-10-17

## 2017-06-08 NOTE — TELEPHONE ENCOUNTER
1.  Patient states her portion of Advair cost is $80 a month.  Asking if PCP would consider prescribing generic Symbicort as her insurance will cover it.   2.  Patient asking if PCP ever received information about colon polyp pathology.  We have record of colonoscopy but not pathology report.  States Dr. Mendes's nurse was going to try to track it down.  .BRANDEE Chowdary R.N.

## 2017-06-09 NOTE — TELEPHONE ENCOUNTER
Dr. Mendes - Did you ever receive the colon polyp pathology results from 2010 colonoscopy? Or have an update on where we are on getting records?    Please advise, thanks.

## 2017-06-09 NOTE — TELEPHONE ENCOUNTER
i don't see any records but it is in care everywhere not sure if what you need is there. Julia did send an JULIO to get records faxed on 5/4/17 also

## 2017-06-13 NOTE — TELEPHONE ENCOUNTER
Patient advised we do not have any pathology results from colon polyp removed during colonoscopy done at [facility].  BRANDEE Chowdary R.N.

## 2017-06-22 ENCOUNTER — RADIANT APPOINTMENT (OUTPATIENT)
Dept: BONE DENSITY | Facility: CLINIC | Age: 82
End: 2017-06-22
Payer: COMMERCIAL

## 2017-06-22 DIAGNOSIS — M81.0 OSTEOPOROSIS: ICD-10-CM

## 2017-06-22 PROCEDURE — 77081 DXA BONE DENSITY APPENDICULR: CPT | Performed by: INTERNAL MEDICINE

## 2017-06-22 PROCEDURE — 77080 DXA BONE DENSITY AXIAL: CPT | Mod: XU | Performed by: INTERNAL MEDICINE

## 2017-07-25 DIAGNOSIS — R05.9 COUGH: ICD-10-CM

## 2017-07-25 DIAGNOSIS — J45.30 MILD PERSISTENT ASTHMA WITHOUT COMPLICATION: Chronic | ICD-10-CM

## 2017-07-25 NOTE — TELEPHONE ENCOUNTER
Ipratripium/Albuterol       Last Written Prescription Date: 4/20/17  Last Fill Quantity: 90 mL, # refills: 0    Last Office Visit with G, P or [facility] prescribing provider:  5/2/17   Future Office Visit: none      Date of Last Asthma Action Plan Letter:   Asthma Action Plan Q1 Year    Topic Date Due     Asthma Action Plan - yearly  05/10/2017      Asthma Control Test:   ACT Total Scores 5/2/2017   ACT TOTAL SCORE -   ASTHMA ER VISITS -   ASTHMA HOSPITALIZATIONS -   ACT TOTAL SCORE (Goal Greater than or Equal to 20) 16   In the past 12 months, how many times did you visit the emergency room for your asthma without being admitted to the hospital? 0   In the past 12 months, how many times were you hospitalized overnight because of your asthma? 0       Date of Last Spirometry Test:   No results found for this or any previous visit.          2

## 2017-07-26 RX ORDER — IPRATROPIUM BROMIDE AND ALBUTEROL SULFATE 2.5; .5 MG/3ML; MG/3ML
SOLUTION RESPIRATORY (INHALATION)
Qty: 90 ML | Refills: 0 | Status: SHIPPED | OUTPATIENT
Start: 2017-07-26 | End: 2017-12-01

## 2017-07-26 NOTE — TELEPHONE ENCOUNTER
Prescription approved per [facility] Refill Protocol. BRANDEE Chowdary R.N.

## 2017-08-17 DIAGNOSIS — D12.6 ADENOMATOUS COLON POLYP: ICD-10-CM

## 2017-08-17 DIAGNOSIS — E78.5 HYPERLIPIDEMIA LDL GOAL <130: Chronic | ICD-10-CM

## 2017-08-17 DIAGNOSIS — R60.0 BILATERAL LEG EDEMA: ICD-10-CM

## 2017-08-17 DIAGNOSIS — I10 HTN, GOAL BELOW 140/90: ICD-10-CM

## 2017-08-17 NOTE — TELEPHONE ENCOUNTER
terazosin      Last Written Prescription Date: 5/24/17  Last Fill Quantity: 90, # refills: 0    Last Office Visit with G, P or [facility] prescribing provider:  5/2/17   Future Office Visit:        BP Readings from Last 3 Encounters:   05/02/17 138/68   12/29/16 130/60   09/27/16 112/54

## 2017-08-18 RX ORDER — TERAZOSIN 2 MG/1
2 CAPSULE ORAL AT BEDTIME
Qty: 90 CAPSULE | Refills: 0 | Status: SHIPPED | OUTPATIENT
Start: 2017-08-18 | End: 2017-12-13

## 2017-08-27 ENCOUNTER — HEALTH MAINTENANCE LETTER (OUTPATIENT)
Age: 82
End: 2017-08-27

## 2017-08-29 ENCOUNTER — TELEPHONE (OUTPATIENT)
Dept: INTERNAL MEDICINE | Facility: CLINIC | Age: 82
End: 2017-08-29

## 2017-08-29 DIAGNOSIS — I10 HTN, GOAL BELOW 140/90: ICD-10-CM

## 2017-08-29 NOTE — TELEPHONE ENCOUNTER
Received fax from [email or web address] on [address] requesting alternate med for Atenolol as it is still on long term backorder.  BRANDEE Chowdary R.N.

## 2017-08-30 RX ORDER — METOPROLOL SUCCINATE 100 MG/1
100 TABLET, EXTENDED RELEASE ORAL DAILY
Qty: 90 TABLET | Refills: 0 | Status: SHIPPED | OUTPATIENT
Start: 2017-08-30 | End: 2017-11-24

## 2017-09-05 DIAGNOSIS — J45.30 MILD PERSISTENT ASTHMA WITHOUT COMPLICATION: Chronic | ICD-10-CM

## 2017-09-05 DIAGNOSIS — G89.29 CHRONIC PAIN OF BOTH KNEES: ICD-10-CM

## 2017-09-05 DIAGNOSIS — R05.9 COUGH: ICD-10-CM

## 2017-09-05 DIAGNOSIS — M25.561 CHRONIC PAIN OF BOTH KNEES: ICD-10-CM

## 2017-09-05 DIAGNOSIS — M25.562 CHRONIC PAIN OF BOTH KNEES: ICD-10-CM

## 2017-09-05 RX ORDER — IPRATROPIUM BROMIDE AND ALBUTEROL SULFATE 2.5; .5 MG/3ML; MG/3ML
1 SOLUTION RESPIRATORY (INHALATION) EVERY 6 HOURS PRN
Qty: 30 VIAL | Refills: 1 | Status: SHIPPED | OUTPATIENT
Start: 2017-09-05 | End: 2017-10-24

## 2017-09-05 RX ORDER — TRAMADOL HYDROCHLORIDE 50 MG/1
TABLET ORAL
Qty: 120 TABLET | Refills: 0 | Status: SHIPPED | OUTPATIENT
Start: 2017-09-05 | End: 2017-12-31

## 2017-09-05 NOTE — TELEPHONE ENCOUNTER
Tramadol Rx Faxed to [facility] in [place].  Attempted to contact patient, no answer, left voice message to call back.

## 2017-09-05 NOTE — TELEPHONE ENCOUNTER
Ipratropi/Alb       Last Written Prescription Date: 7/26/16  Last Fill Quantity: 90ml, # refills: 0    Last Office Visit with G, P or [facility] prescribing provider:  5/2/17   Future Office Visit:       Date of Last Asthma Action Plan Letter:   Asthma Action Plan Q1 Year    Topic Date Due     Asthma Action Plan - yearly  05/10/2017      Asthma Control Test:   ACT Total Scores 5/2/2017   ACT TOTAL SCORE -   ASTHMA ER VISITS -   ASTHMA HOSPITALIZATIONS -   ACT TOTAL SCORE (Goal Greater than or Equal to 20) 16   In the past 12 months, how many times did you visit the emergency room for your asthma without being admitted to the hospital? 0   In the past 12 months, how many times were you hospitalized overnight because of your asthma? 0       Date of Last Spirometry Test:   No results found for this or any previous visit.

## 2017-10-17 DIAGNOSIS — D12.6 ADENOMATOUS COLON POLYP: ICD-10-CM

## 2017-10-17 DIAGNOSIS — E78.5 HYPERLIPIDEMIA LDL GOAL <130: Chronic | ICD-10-CM

## 2017-10-17 DIAGNOSIS — R60.0 BILATERAL LEG EDEMA: ICD-10-CM

## 2017-10-17 DIAGNOSIS — I10 HTN, GOAL BELOW 140/90: ICD-10-CM

## 2017-10-17 NOTE — TELEPHONE ENCOUNTER
Losartan      Last Written Prescription Date: 5/2/17  Last Fill Quantity: 90, # refills: 0  Last Office Visit with G, P or [facility] prescribing provider: 5/2/17  Next 5 appointments (look out 90 days)     Oct 19, 2017  3:00 PM CDT   SHORT with Vivian Blue MD   [facility] ([facility]    [address]  [facility] [id number] [phone]                   Potassium   Date Value Ref Range Status   04/25/2017 4.2 3.4 - 5.3 mmol/L Final     Creatinine   Date Value Ref Range Status   04/25/2017 0.74 0.52 - 1.04 mg/dL Final     BP Readings from Last 3 Encounters:   05/02/17 138/68   12/29/16 130/60   09/27/16 112/54

## 2017-10-19 ENCOUNTER — RADIANT APPOINTMENT (OUTPATIENT)
Dept: GENERAL RADIOLOGY | Facility: CLINIC | Age: 82
End: 2017-10-19
Attending: INTERNAL MEDICINE
Payer: COMMERCIAL

## 2017-10-19 ENCOUNTER — OFFICE VISIT (OUTPATIENT)
Dept: INTERNAL MEDICINE | Facility: CLINIC | Age: 82
End: 2017-10-19
Payer: COMMERCIAL

## 2017-10-19 VITALS
BODY MASS INDEX: 38.54 KG/M2 | SYSTOLIC BLOOD PRESSURE: 118 MMHG | HEART RATE: 95 BPM | OXYGEN SATURATION: 98 % | DIASTOLIC BLOOD PRESSURE: 56 MMHG | TEMPERATURE: 97.9 F | WEIGHT: 238.8 LBS

## 2017-10-19 DIAGNOSIS — J44.9 CHRONIC OBSTRUCTIVE PULMONARY DISEASE, UNSPECIFIED COPD TYPE (H): Primary | ICD-10-CM

## 2017-10-19 DIAGNOSIS — E78.5 HYPERLIPIDEMIA LDL GOAL <130: Chronic | ICD-10-CM

## 2017-10-19 DIAGNOSIS — J44.9 CHRONIC OBSTRUCTIVE PULMONARY DISEASE, UNSPECIFIED COPD TYPE (H): ICD-10-CM

## 2017-10-19 DIAGNOSIS — J45.30 MILD PERSISTENT ASTHMA WITHOUT COMPLICATION: Chronic | ICD-10-CM

## 2017-10-19 DIAGNOSIS — E21.3 HYPERPARATHYROIDISM (H): Chronic | ICD-10-CM

## 2017-10-19 DIAGNOSIS — I10 HTN, GOAL BELOW 140/90: ICD-10-CM

## 2017-10-19 DIAGNOSIS — Z12.11 SPECIAL SCREENING FOR MALIGNANT NEOPLASMS, COLON: ICD-10-CM

## 2017-10-19 DIAGNOSIS — Z23 NEED FOR PROPHYLACTIC VACCINATION AND INOCULATION AGAINST INFLUENZA: ICD-10-CM

## 2017-10-19 DIAGNOSIS — M81.0 OSTEOPOROSIS, UNSPECIFIED OSTEOPOROSIS TYPE, UNSPECIFIED PATHOLOGICAL FRACTURE PRESENCE: ICD-10-CM

## 2017-10-19 PROCEDURE — G0008 ADMIN INFLUENZA VIRUS VAC: HCPCS | Performed by: INTERNAL MEDICINE

## 2017-10-19 PROCEDURE — 99207 C PAF COMPLETED  NO CHARGE: CPT | Performed by: INTERNAL MEDICINE

## 2017-10-19 PROCEDURE — 99214 OFFICE O/P EST MOD 30 MIN: CPT | Mod: 25 | Performed by: INTERNAL MEDICINE

## 2017-10-19 PROCEDURE — 90662 IIV NO PRSV INCREASED AG IM: CPT | Performed by: INTERNAL MEDICINE

## 2017-10-19 PROCEDURE — 71020 XR CHEST 2 VW: CPT

## 2017-10-19 RX ORDER — DOXYCYCLINE HYCLATE 100 MG
100 TABLET ORAL 2 TIMES DAILY
Qty: 20 TABLET | Refills: 0 | Status: SHIPPED | OUTPATIENT
Start: 2017-10-19 | End: 2017-12-01

## 2017-10-19 RX ORDER — LOSARTAN POTASSIUM 100 MG/1
100 TABLET ORAL DAILY
Qty: 90 TABLET | Refills: 0 | Status: SHIPPED | OUTPATIENT
Start: 2017-10-19 | End: 2018-01-17

## 2017-10-19 ASSESSMENT — ANXIETY QUESTIONNAIRES
5. BEING SO RESTLESS THAT IT IS HARD TO SIT STILL: NOT AT ALL
7. FEELING AFRAID AS IF SOMETHING AWFUL MIGHT HAPPEN: NOT AT ALL
IF YOU CHECKED OFF ANY PROBLEMS ON THIS QUESTIONNAIRE, HOW DIFFICULT HAVE THESE PROBLEMS MADE IT FOR YOU TO DO YOUR WORK, TAKE CARE OF THINGS AT HOME, OR GET ALONG WITH OTHER PEOPLE: NOT DIFFICULT AT ALL
6. BECOMING EASILY ANNOYED OR IRRITABLE: SEVERAL DAYS
GAD7 TOTAL SCORE: 1
2. NOT BEING ABLE TO STOP OR CONTROL WORRYING: NOT AT ALL
3. WORRYING TOO MUCH ABOUT DIFFERENT THINGS: NOT AT ALL
1. FEELING NERVOUS, ANXIOUS, OR ON EDGE: NOT AT ALL

## 2017-10-19 ASSESSMENT — PATIENT HEALTH QUESTIONNAIRE - PHQ9
5. POOR APPETITE OR OVEREATING: NOT AT ALL
SUM OF ALL RESPONSES TO PHQ QUESTIONS 1-9: 7

## 2017-10-19 NOTE — MR AVS SNAPSHOT
After Visit Summary   10/19/2017    Marino Prajapati    MRN: [id number]           Patient Information     Date Of Birth          1935        Visit Information        Provider Department      10/19/2017 3:00 PM [place] Blue MD [facility]        Today's Diagnoses     COPD, (H)    -  1      Care Instructions    Plan:  1. Chest XRay   2. Doxycycline 100 mg twice a day   3. Resume the Symbicort  4. Make an appointment with the lung specialist - [facility] [phone]   5. Fasting labs and appointment in November          Follow-ups after your visit        Additional Services     PULMONARY MEDICINE REFERRAL       Your provider has referred you to: N: [facility] [phone]   [email or web address]    Please be aware that coverage of these services is subject to the terms and limitations of your health insurance plan.  Call member services at your health plan with any benefit or coverage questions.      Please bring the following with you to your appointment:    (1) Any X-Rays, CTs or MRIs which have been performed.  Contact the facility where they were done to arrange for  prior to your scheduled appointment.    (2) List of current medications   (3) This referral request   (4) Any documents/labs given to you for this referral                  Future tests that were ordered for you today     Open Future Orders        Priority Expected Expires Ordered    XR Chest 2 Views Routine 10/19/2017 10/19/2018 10/19/2017            Who to contact     If you have questions or need follow up information about today's clinic visit or your schedule please contact [facility] directly at [phone].  Normal or non-critical lab and imaging results will be communicated to you by MyChart, letter or phone within 4 business days after the clinic has received the results. If you do not hear from us within 7 days,  "please contact the clinic through iRx Reminder or phone. If you have a critical or abnormal lab result, we will notify you by phone as soon as possible.  Submit refill requests through iRx Reminder or call your pharmacy and they will forward the refill request to us. Please allow 3 business days for your refill to be completed.          Additional Information About Your Visit        [employer] Information     iRx Reminder lets you send messages to your doctor, view your test results, renew your prescriptions, schedule appointments and more. To sign up, go to [email or web address] . Click on \"Log in\" on the left side of the screen, which will take you to the Welcome page. Then click on \"Sign up Now\" on the right side of the page.     You will be asked to enter the access code listed below, as well as some personal information. Please follow the directions to create your username and password.     Your access code is: [email or web address]  Expires: 2018  3:46 PM     Your access code will  in 90 days. If you need help or a new code, please call your [facility] clinic or [phone].        Care EveryWhere ID     This is your Care EveryWhere ID. This could be used by other organizations to access your [facility] medical records  [id number]        Your Vitals Were     Pulse Temperature Pulse Oximetry Breastfeeding? BMI (Body Mass Index)       95 97.9  F (36.6  C) (Oral) 98% No 38.54 kg/m2        Blood Pressure from Last 3 Encounters:   10/19/17 118/56   17 138/68   16 130/60    Weight from Last 3 Encounters:   10/19/17 238 lb 12.8 oz (108.3 kg)   17 235 lb 14.4 oz (107 kg)   16 238 lb (108 kg)              We Performed the Following     PAF COMPLETED     PULMONARY MEDICINE REFERRAL          Today's Medication Changes          These changes are accurate as of: 10/19/17  3:46 PM.  If you have any questions, ask your nurse or doctor.               Start taking these medicines.        Dose/Directions    doxycycline " 100 MG tablet   Commonly known as:  VIBRA-TABS   Used for:  Chronic obstructive pulmonary disease, unspecified COPD type (H)   Started by:  [place] Blue MD        Dose:  100 mg   Take 1 tablet (100 mg) by mouth 2 times daily   Quantity:  20 tablet   Refills:  0            Where to get your medicines      These medications were sent to [facility] [id number] [place] [address] - [address] AT [facility] [address] [facility] [address]  [address], [facility] [address]     Phone:  [phone]     doxycycline 100 MG tablet                Primary Care Provider Office Phone # Fax #    [place] Blue [phone]       [address]  [facility] [address]        Equal Access to Services     [facility] HospitalDAYNA : Hadii godfrey tom hadasho Sochiquita, waaxda luqadaha, qaybta kaalmada adeegyada, clovis alfaro . So [facility] [phone].    ATENCIÓN: Si habla español, tiene a hare disposición servicios gratuitos de asistencia lingüística. Llame al [phone].    We comply with applicable federal civil rights laws and [place] laws. We do not discriminate on the basis of race, color, national origin, age, disability, sex, sexual orientation, or gender identity.            Thank you!     Thank you for choosing [facility]  for your care. Our goal is always to provide you with excellent care. Hearing back from our patients is one way we can continue to improve our services. Please take a few minutes to complete the written survey that you may receive in the mail after your visit with us. Thank you!             Your Updated Medication List - Protect others around you: Learn how to safely use, store and throw away your medicines at www.disposemymeds.org.          This list is accurate as of: 10/19/17  3:46 PM.  Always use your most recent med list.                   Brand Name Dispense Instructions for use Diagnosis    albuterol 108 (90  BASE) MCG/ACT Inhaler    PROAIR HFA/PROVENTIL HFA/VENTOLIN HFA    1 Inhaler    Inhale 2 puffs into the lungs every 6 hours as needed for shortness of breath / dyspnea or wheezing        amLODIPine 10 MG tablet    NORVASC    90 tablet    Take 0.5 tablets (5 mg) by mouth daily    Essential hypertension with goal blood pressure less than 140/90       ascorbic acid 500 MG tablet    VITAMIN C    100 tablet    Take 500 mg by mouth daily        aspirin 81 MG tablet     30 tablet    Take 81 mg by mouth daily        atenolol 100 MG tablet    TENORMIN    90 tablet    Take 1 tablet (100 mg) by mouth daily    HTN, goal below 140/90, Adenomatous colon polyp, Hyperlipidemia LDL goal <130, Bilateral leg edema       B-12 1000 MCG Tbcr     100 tablet    Take 1,000 mcg by mouth daily        budesonide-formoterol 160-4.5 MCG/ACT Inhaler    SYMBICORT    3 Inhaler    Inhale 2 puffs into the lungs 2 times daily    Cough, Mild persistent asthma without complication       Calcium-Magnesium-Zinc Tabs           cholecalciferol 1000 UNIT tablet    vitamin D    100 tablet    Take 2 tablets (2,000 Units) by mouth daily    Cough, Mild persistent asthma without complication       clobetasol 0.05 % cream    TEMOVATE    30 g    Apply sparingly to affected area.  Do not apply to face.    Itching       CoQ10 30 MG Caps     30 capsule         Cranberry Extract 250 MG Tabs           desonide 0.05 % cream    DESOWEN    30 g    Apply  topically 2 times daily.    Eczema       doxycycline 100 MG tablet    VIBRA-TABS    20 tablet    Take 1 tablet (100 mg) by mouth 2 times daily    Chronic obstructive pulmonary disease, unspecified COPD type (H)       fluticasone-salmeterol 250-50 MCG/DOSE diskus inhaler    ADVAIR    3 Inhaler    Inhale 1 puff into the lungs 2 times daily    Cough, Mild persistent asthma without complication       furosemide 40 MG tablet    LASIX    90 tablet    0.5 - 1 tab daily as instructed    Bilateral leg edema, HTN, goal below 140/90,  Adenomatous colon polyp, Hyperlipidemia LDL goal <130       GARLIC 1500 1500 MG Caps   Generic drug:  Garlic           * ipratropium - albuterol 0.5 mg/2.5 mg/3 mL 0.5-2.5 (3) MG/3ML neb solution    DUONEB    90 mL    USE 1 VIAL IN NEBULIZER EVERY 6 HOURS AS NEEDED FOR SHORTNESS OF BREATH, DYSPNEA, OR WHEEZING    Cough, Mild persistent asthma without complication       * ipratropium - albuterol 0.5 mg/2.5 mg/3 mL 0.5-2.5 (3) MG/3ML neb solution    DUONEB    30 vial    Take 1 vial (3 mLs) by nebulization every 6 hours as needed for shortness of breath / dyspnea or wheezing    Cough, Mild persistent asthma without complication       losartan 100 MG tablet    COZAAR    90 tablet    Take 1 tablet (100 mg) by mouth daily    HTN, goal below 140/90, Adenomatous colon polyp, Hyperlipidemia LDL goal <130, Bilateral leg edema       metoprolol 100 MG 24 hr tablet    TOPROL-XL    90 tablet    Take 1 tablet (100 mg) by mouth daily    HTN, goal below 140/90       MULTIVITAMIN & MINERAL PO      Take 1 tablet by mouth daily.        omeprazole 20 MG tablet     90 tablet    Take 1 tablet (20 mg) by mouth daily Take 30-60 minutes before a meal.    Nausea       order for DME     1 each    Equipment being ordered: walker with wheels    Osteoarthritis       * order for DME     1 Device    Equipment being ordered: Nebulizer and neb supplies (tubing, etc)    Cough, Mild persistent asthma without complication       * order for DME     1 each    Equipment being ordered: 1: Gradient Compression Wraps; 2: BLE knee high 20-30 mm Hg compression stockings; 3: BLE thigh high 20-30 mm Hg compression stockings; 4: Cast Boots    Bilateral leg edema       * order for DME     1 each    Equipment being ordered: 1: Custom/alternative BLE full leg velco/buckling compression garment    Lymphedema       pravastatin 20 MG tablet    PRAVACHOL    90 tablet    Take 1 tablet (20 mg) by mouth daily    Hyperlipidemia LDL goal <130, HTN, goal below 140/90,  Adenomatous colon polyp, Bilateral leg edema       [place] Oil Caps           terazosin 2 MG capsule    HYTRIN    90 capsule    Take 1 capsule (2 mg) by mouth At Bedtime    HTN, goal below 140/90, Adenomatous colon polyp, Hyperlipidemia LDL goal <130, Bilateral leg edema       traMADol 50 MG tablet    ULTRAM    120 tablet    TAKE 1 TABLET BY MOUTH TWICE DAILY AS NEEDED FOR PAIN    Chronic pain of both knees       TYLENOL 325 MG tablet   Generic drug:  acetaminophen     250 tablet    Take 1-2 tablets (325-650 mg) by mouth every 6 hours as needed for mild pain        vitamin B complex with vitamin C Tabs tablet      Take 1 tablet by mouth daily        * Notice:  This list has 5 medication(s) that are the same as other medications prescribed for you. Read the directions carefully, and ask your doctor or other care provider to review them with you.

## 2017-10-19 NOTE — PROGRESS NOTES
Dr Mendes's note      Patient's instructions / PLAN:                                                        Plan:  1. Chest XRay   2. Doxycycline 100 mg twice a day   3. Resume the Symbicort  4. Make an appointment with the lung specialist - [place] [facility] [phone]   5. Fasting labs and appointment in November      ASSESSMENT & PLAN:                                                    (J44.9) COPD, (H)  (primary encounter diagnosis)  Comment:   Plan: PAF COMPLETED, PULMONARY MEDICINE REFERRAL, XR         Chest 2 Views, doxycycline (VIBRA-TABS) 100 MG         tablet            (J45.30) Asthma,   Comment:   Plan: Asthma Action Plan (AAP)            (Z23) Need for prophylactic vaccination and inoculation against influenza  Comment:   Plan: FLU VACCINE, INCREASED ANTIGEN, PRESV FREE, AGE        65+ [16158], Vaccine Administration, Initial         [11423]            (Z12.11) Special screening for malignant neoplasms, colon  Comment:   Plan: Fecal colorectal cancer screen FIT              (E78.5) Hyperlipidemia LDL goal <130  Comment:   Plan: Continue same meds, same doses for now     (M81.0) Osteoporosis, unspecified osteoporosis type, unspecified pathological fracture presence  Comment: better numbers   Plan: Continue same meds, same doses for now     (I10) HTN, goal below 140/90  Comment: Controlled    Plan: Continue same meds, same doses for now     (E21.3) Hyperparathyroidism (H)  Comment:   Plan:         Chief complaint:                                                      Cough  Follow up chronic medical problems        SUBJECTIVE:   Marino Prajapati is a 82 year old female who presents to clinic today for the following health issues:      Cough  -- uses nebs twice a day  -- coughs for months. With sputum - white  -- ex smoker  Quit 1996  -- she has been seen at [place] few years ago for the cough few years ago. She was told she has remnants of asthma. She used to work at [employer] as well with  smoking around.   -- a lot of mucus - from post throat and chest   -- sometimes wheezes  -- nebs help but make her shaky   -- she stopped taking the SYmbicort because she felt over medicated She used to take Advair before the Symbicort   -- nasal and chest congestion, some phlegm.           Review of Systems:                                                      ROS: negative for fever, chills, cough, wheezes, chest pain, shortness of breath, vomiting, abdominal pain, leg swelling pos for cough, as above     A 10-point review of systems was obtained.  Those pertinent are above and in the in the Subjective section.  The rest of the systems are negative.           OBJECTIVE:             Physical exam:  Blood pressure 118/56, pulse 95, temperature 97.9  F (36.6  C), temperature source Oral, weight 238 lb 12.8 oz (108.3 kg), SpO2 98 %, not currently breastfeeding.   NAD, appears comfortable  Skin: no rashes   HEENT: PERRLA, EOMI, pink conjunctiva, anicteric sclerae, bilateral tympanic membranes are clinically normal, oropharynx is normal color  Neck: supple, no JVD, No thyroidmegaly. Lymph nodes nonpalpable cervical and supraclavicular.  Chest: clear to auscultation bilaterally, good respiratory effort  Heart: S1 S2, RRR, no mgr appreciated  Abdomen: soft, not tender,   Extremities: +1 chr edema,  Neurologic: A, Ox3, no focal signs appreciated    PMHx: reviewed  Past Medical History:   Diagnosis Date     Anemia      CARDIOVASCULAR SCREENING; LDL GOAL LESS THAN 160      Colon polyps 2010    needs colonoscopy 2013     DVT (deep venous thrombosis) (H) 2007    remote history of DVT while she was on BCP ,coumadin stopped after retroperitoneal/buttock hematoma in 10/11 . On ASA only     Gastro-oesophageal reflux disease      HTN, goal below 140/90      Hyperlipidemia LDL goal <130 1/22/2016     Kidney stone      Osteoarthritis     knees and hip     Osteoporosis      Postnasal drip      S/P total knee arthroplasty       Thrombosis of leg       PSHx: reviewed  Past Surgical History:   Procedure Laterality Date     ARTHROPLASTY HIP  8/1/2013    Procedure: ARTHROPLASTY HIP;  RIGHT TOTAL HIP ARTHROPLASTY;  Surgeon: Rufino Tong MD;  Location: [facility] OR     ARTHROPLASTY HIP Left 12/12/2014    Procedure: ARTHROPLASTY HIP;  Surgeon: Rufino Tong MD;  Location: [facility] OR     ARTHROPLASTY KNEE  10/22/2012    Procedure: ARTHROPLASTY KNEE;  Right Total knee Arthroplasty  ;  Surgeon: Jagdeep Virgen MD;  Location: [facility] OR     ARTHROPLASTY KNEE  5/23/2013    Procedure: ARTHROPLASTY KNEE;  LEFT TOTAL KNEE ARTHROPLASTY (SMITH & NEPHEW)^;  Surgeon: Rufino Tong MD;  Location:  OR     C PREP FACE/ORAL PROST PALATAL LIFT       CHOLECYSTECTOMY       CYSTOSCOPY, RETROGRADES, INSERT STENT URETER(S), COMBINED  7/16/2012    Procedure: COMBINED CYSTOSCOPY, RETROGRADES, INSERT STENT URETER(S);  Cystoscopy, Right retrogrades, Right Stent Placement;  Surgeon: Mauricio Velazquez MD;  Location:  OR     LASER HOLMIUM LITHOTRIPSY URETER(S), INSERT STENT, COMBINED  8/8/2012    Procedure: COMBINED CYSTOSCOPY, URETEROSCOPY, LASER HOLMIUM LITHOTRIPSY URETER(S), INSERT STENT;  Cystoscopy, Stent Removal, Right Ureteroscopy with Holmium Laser, Stone removal ;  Surgeon: Mauricio Velazquez MD;  Location: [facility] OR     LIPOSUCTION (LOCATION)      tummy     STRIP VEIN          Meds: reviewed  Current Outpatient Prescriptions   Medication Sig Dispense Refill     losartan (COZAAR) 100 MG tablet Take 1 tablet (100 mg) by mouth daily 90 tablet 0     traMADol (ULTRAM) 50 MG tablet TAKE 1 TABLET BY MOUTH TWICE DAILY AS NEEDED FOR PAIN 120 tablet 0     ipratropium - albuterol 0.5 mg/2.5 mg/3 mL (DUONEB) 0.5-2.5 (3) MG/3ML neb solution Take 1 vial (3 mLs) by nebulization every 6 hours as needed for shortness of breath / dyspnea or wheezing 30 vial 1     metoprolol (TOPROL-XL) 100 MG 24 hr tablet Take 1 tablet (100 mg) by mouth daily 90 tablet 0     terazosin  (HYTRIN) 2 MG capsule Take 1 capsule (2 mg) by mouth At Bedtime 90 capsule 0     ipratropium - albuterol 0.5 mg/2.5 mg/3 mL (DUONEB) 0.5-2.5 (3) MG/3ML neb solution USE 1 VIAL IN NEBULIZER EVERY 6 HOURS AS NEEDED FOR SHORTNESS OF BREATH, DYSPNEA, OR WHEEZING 90 mL 0     atenolol (TENORMIN) 100 MG tablet Take 1 tablet (100 mg) by mouth daily 90 tablet 1     pravastatin (PRAVACHOL) 20 MG tablet Take 1 tablet (20 mg) by mouth daily 90 tablet 1     furosemide (LASIX) 40 MG tablet 0.5 - 1 tab daily as instructed 90 tablet 1     amLODIPine (NORVASC) 10 MG tablet Take 0.5 tablets (5 mg) by mouth daily 90 tablet 1     albuterol (PROAIR HFA, PROVENTIL HFA, VENTOLIN HFA) 108 (90 BASE) MCG/ACT inhaler Inhale 2 puffs into the lungs every 6 hours as needed for shortness of breath / dyspnea or wheezing 1 Inhaler 1     order for DME Equipment being ordered: 1: Custom/alternative BLE full leg velco/buckling compression garment 1 each 0     omeprazole 20 MG tablet Take 1 tablet (20 mg) by mouth daily Take 30-60 minutes before a meal. 90 tablet 0     order for DME Equipment being ordered: 1: Gradient Compression Wraps; 2: BLE knee high 20-30 mm Hg compression stockings; 3: BLE thigh high 20-30 mm Hg compression stockings; 4: Cast Boots 1 each 0     order for DME Equipment being ordered: Nebulizer and neb supplies (tubing, etc) 1 Device 0     cholecalciferol (VITAMIN D) 1000 UNIT tablet Take 2 tablets (2,000 Units) by mouth daily 100 tablet 3     vitamin  B complex with vitamin C (VITAMIN  B COMPLEX) TABS Take 1 tablet by mouth daily  0     Cranberry Extract 250 MG TABS        Garlic (GARLIC 1500) 1500 MG CAPS        Nutritional Supplements (SALMON OIL) CAPS        Cyanocobalamin (B-12) 1000 MCG TBCR Take 1,000 mcg by mouth daily 100 tablet 1     Coenzyme Q10 (COQ10) 30 MG CAPS  30 capsule      Multiple Minerals (CALCIUM-MAGNESIUM-ZINC) TABS        aspirin 81 MG tablet Take 81 mg by mouth daily  30 tablet      clobetasol (TEMOVATE)  0.05 % cream Apply sparingly to affected area.  Do not apply to face. 30 g 1     ascorbic acid (VITAMIN C) 500 MG tablet Take 500 mg by mouth daily  100 tablet 12     ORDER FOR DME Equipment being ordered: walker with wheels 1 each 0     Multiple Vitamins-Minerals (MULTIVITAMIN & MINERAL PO) Take 1 tablet by mouth daily.       budesonide-formoterol (SYMBICORT) 160-4.5 MCG/ACT Inhaler Inhale 2 puffs into the lungs 2 times daily (Patient not taking: Reported on 10/19/2017) 3 Inhaler 1     fluticasone-salmeterol (ADVAIR) 250-50 MCG/DOSE diskus inhaler Inhale 1 puff into the lungs 2 times daily (Patient not taking: Reported on 10/19/2017) 3 Inhaler 0     acetaminophen (TYLENOL) 325 MG tablet Take 1-2 tablets (325-650 mg) by mouth every 6 hours as needed for mild pain 250 tablet 11     desonide (DESOWEN) 0.05 % cream Apply  topically 2 times daily. (Patient not taking: Reported on 10/19/2017) 30 g 0       Soc Hx: reviewed  Fam Hx: reviewed          [place] Mendes MD  Internal Medicine

## 2017-10-19 NOTE — NURSING NOTE
"Chief Complaint   Patient presents with     URI     nasal and chest congestion, some phlegm. Talk about bladder control-leaks       Initial /56 (BP Location: Right arm, Patient Position: Sitting, Cuff Size: Adult Large)  Pulse 95  Temp 97.9  F (36.6  C) (Oral)  Wt 238 lb 12.8 oz (108.3 kg)  SpO2 98%  Breastfeeding? No  BMI 38.54 kg/m2 Estimated body mass index is 38.54 kg/(m^2) as calculated from the following:    Height as of 5/2/17: 5' 6\" (1.676 m).    Weight as of this encounter: 238 lb 12.8 oz (108.3 kg).  Medication Reconciliation: complete     Melanie Meade CMA      "

## 2017-10-19 NOTE — PATIENT INSTRUCTIONS
Plan:  1. Chest XRay   2. Doxycycline 100 mg twice a day   3. Resume the Symbicort  4. Make an appointment with the lung specialist - [place] [facility] [phone]   5. Fasting labs and appointment in November

## 2017-10-19 NOTE — LETTER
My Asthma Action Plan  Name: Marino Prajapati   YOB: 1935  Date: 10/19/2017   My doctor: Vivian Blue MD   My clinic: [facility]        My Control Medicine: Fluticasone + salmeterol (Advair) -  Diskus 250/50 mcg daily  My Rescue Medicine: Albuterol (Proair/Ventolin/Proventil) inhaler PRN  Albuterol/ipratroprium  (Duoneb) nebulizer solution Symbicort 160-4.5   My Asthma Severity: intermittent  Avoid your asthma triggers: Patient is unaware of triggers               GREEN ZONE   Good Control    I feel good    No cough or wheeze    Can work, sleep and play without asthma symptoms       Take your asthma control medicine every day.     1. If exercise triggers your asthma, take your rescue medication    15 minutes before exercise or sports, and    During exercise if you have asthma symptoms  2. Spacer to use with inhaler: If you have a spacer, make sure to use it with your inhaler             YELLOW ZONE Getting Worse  I have ANY of these:    I do not feel good    Cough or wheeze    Chest feels tight    Wake up at night   1. Keep taking your Green Zone medications  2. Start taking your rescue medicine:    every 20 minutes for up to 1 hour. Then every 4 hours for 24-48 hours.  3. If you stay in the Yellow Zone for more than 12-24 hours, contact your doctor.  4. If you do not return to the Green Zone in 12-24 hours or you get worse, start taking your oral steroid medicine if prescribed by your provider.           RED ZONE Medical Alert - Get Help  I have ANY of these:    I feel awful    Medicine is not helping    Breathing getting harder    Trouble walking or talking    Nose opens wide to breathe       1. Take your rescue medicine NOW  2. If your provider has prescribed an oral steroid medicine, start taking it NOW  3. Call your doctor NOW  4. If you are still in the Red Zone after 20 minutes and you have not reached your doctor:    Take your rescue medicine again  and    Call 911 or go to the emergency room right away    See your regular doctor within 2 weeks of an Emergency Room or Urgent Care visit for follow-up treatment.        Electronically signed by: Julia Mayfield, October 19, 2017    Annual Reminders:  Meet with Asthma Educator,  Flu Shot in the Fall, consider Pneumonia Vaccination for patients with asthma (aged 19 and older).    Pharmacy: [facility] [address] [place] [address] [id number] AT [facility] [address]                    Asthma Triggers  How To Control Things That Make Your Asthma Worse    Triggers are things that make your asthma worse.  Look at the list below to help you find your triggers and what you can do about them.  You can help prevent asthma flare-ups by staying away from your triggers.      Trigger                                                          What you can do   Cigarette Smoke  Tobacco smoke can make asthma worse. Do not allow smoking in your home, car or around you.  Be sure no one smokes at a child s day care or school.  If you smoke, ask your health care provider for ways to help you quit.  Ask family members to quit too.  Ask your health care provider for a referral to Quit Plan to help you quit smoking, or call 6-532-048-PLAN.     Colds, Flu, Bronchitis  These are common triggers of asthma. Wash your hands often.  Don t touch your eyes, nose or mouth.  Get a flu shot every year.     Dust Mites  These are tiny bugs that live in cloth or carpet. They are too small to see. Wash sheets and blankets in hot water every week.   Encase pillows and mattress in dust mite proof covers.  Avoid having carpet if you can. If you have carpet, vacuum weekly.   Use a dust mask and HEPA vacuum.   Pollen and Outdoor Mold  Some people are allergic to trees, grass, or weed pollen, or molds. Try to keep your windows closed.  Limit time out doors when pollen count is high.   Ask you health care provider about taking  medicine during allergy season.     Animal Dander  Some people are allergic to skin flakes, urine or saliva from pets with fur or feathers. Keep pets with fur or feathers out of your home.    If you can t keep the pet outdoors, then keep the pet out of your bedroom.  Keep the bedroom door closed.  Keep pets off cloth furniture and away from stuffed toys.     Mice, Rats, and Cockroaches  Some people are allergic to the waste from these pests.   Cover food and garbage.  Clean up spills and food crumbs.  Store grease in the refrigerator.   Keep food out of the bedroom.   Indoor Mold  This can be a trigger if your home has high moisture. Fix leaking faucets, pipes, or other sources of water.   Clean moldy surfaces.  Dehumidify basement if it is damp and smelly.   Smoke, Strong Odors, and Sprays  These can reduce air quality. Stay away from strong odors and sprays, such as perfume, powder, hair spray, paints, smoke incense, paint, cleaning products, candles and new carpet.   Exercise or Sports  Some people with asthma have this trigger. Be active!  Ask your doctor about taking medicine before sports or exercise to prevent symptoms.    Warm up for 5-10 minutes before and after sports or exercise.     Other Triggers of Asthma  Cold air:  Cover your nose and mouth with a scarf.  Sometimes laughing or crying can be a trigger.  Some medicines and food can trigger asthma.

## 2017-10-19 NOTE — NURSING NOTE
"FIT ordered today. Colonoscopy was discussed with pt, but she does not feel like she is healthy enough for that right now. She would like to wait, but she is not sure if she will even do this in the future. States she feels \"like the prep will kill me\". She is ok with completing FIT.   "

## 2017-10-19 NOTE — PROGRESS NOTES

## 2017-10-20 ASSESSMENT — ASTHMA QUESTIONNAIRES: ACT_TOTALSCORE: 11

## 2017-10-20 ASSESSMENT — ANXIETY QUESTIONNAIRES: GAD7 TOTAL SCORE: 1

## 2017-10-24 DIAGNOSIS — R05.9 COUGH: ICD-10-CM

## 2017-10-24 DIAGNOSIS — J45.30 MILD PERSISTENT ASTHMA WITHOUT COMPLICATION: Chronic | ICD-10-CM

## 2017-10-26 RX ORDER — IPRATROPIUM BROMIDE AND ALBUTEROL SULFATE 2.5; .5 MG/3ML; MG/3ML
1 SOLUTION RESPIRATORY (INHALATION) EVERY 6 HOURS PRN
Qty: 30 VIAL | Refills: 0 | Status: SHIPPED | OUTPATIENT
Start: 2017-10-26 | End: 2017-11-16

## 2017-10-31 ENCOUNTER — DOCUMENTATION ONLY (OUTPATIENT)
Dept: INTERNAL MEDICINE | Facility: CLINIC | Age: 82
End: 2017-10-31

## 2017-11-06 ENCOUNTER — TELEPHONE (OUTPATIENT)
Dept: INTERNAL MEDICINE | Facility: CLINIC | Age: 82
End: 2017-11-06

## 2017-11-06 DIAGNOSIS — M17.0 PRIMARY OSTEOARTHRITIS OF BOTH KNEES: Primary | ICD-10-CM

## 2017-11-06 NOTE — TELEPHONE ENCOUNTER
Patient needing new walker, asking that order be mailed out to her.  Loaded order to sign.  BRANDEE Chowdary R.N.

## 2017-11-09 PROCEDURE — 82274 ASSAY TEST FOR BLOOD FECAL: CPT | Performed by: INTERNAL MEDICINE

## 2017-11-11 LAB — HEMOCCULT STL QL IA: NEGATIVE

## 2017-11-13 DIAGNOSIS — Z12.11 SPECIAL SCREENING FOR MALIGNANT NEOPLASMS, COLON: ICD-10-CM

## 2017-11-16 DIAGNOSIS — R05.9 COUGH: ICD-10-CM

## 2017-11-16 DIAGNOSIS — J45.30 MILD PERSISTENT ASTHMA WITHOUT COMPLICATION: Chronic | ICD-10-CM

## 2017-11-16 RX ORDER — IPRATROPIUM BROMIDE AND ALBUTEROL SULFATE 2.5; .5 MG/3ML; MG/3ML
1 SOLUTION RESPIRATORY (INHALATION) EVERY 6 HOURS PRN
Qty: 100 VIAL | Refills: 1 | Status: SHIPPED | OUTPATIENT
Start: 2017-11-16 | End: 2018-12-21

## 2017-11-16 NOTE — TELEPHONE ENCOUNTER
Pt calls asking for refill of neb solution.  She is asking for more than 30 because she says this is only lasting a week.    She was advised that she is due for an appt, but we could ask Dr Mendes for more after she makes an appt.    She scheduled for Dec 1, and is having labs on 11/28/17, so needs to have these ordered.

## 2017-11-24 DIAGNOSIS — I10 HTN, GOAL BELOW 140/90: ICD-10-CM

## 2017-11-24 RX ORDER — METOPROLOL SUCCINATE 100 MG/1
100 TABLET, EXTENDED RELEASE ORAL DAILY
Qty: 90 TABLET | Refills: 0 | Status: SHIPPED | OUTPATIENT
Start: 2017-11-24 | End: 2018-02-28

## 2017-11-24 NOTE — TELEPHONE ENCOUNTER
Metoprolol 100 mg      Last Written Prescription Date: 8/30/17  Last Fill Quantity: 90, # refills: 0    Last Office Visit with [facility], [facility] or [facility] prescribing provider:  10/19/17   Future Office Visit:    Next 5 appointments (look out 90 days)     Dec 01, 2017 12:20 PM CST   SHORT with Vivian Blue MD   [facility] ([facility])    [address]  [facility] [id number] [phone]                    BP Readings from Last 3 Encounters:   10/19/17 118/56   05/02/17 138/68   12/29/16 130/60

## 2017-11-28 DIAGNOSIS — R60.0 BILATERAL LEG EDEMA: ICD-10-CM

## 2017-11-28 DIAGNOSIS — D12.6 ADENOMATOUS COLON POLYP: ICD-10-CM

## 2017-11-28 DIAGNOSIS — I10 HTN, GOAL BELOW 140/90: ICD-10-CM

## 2017-11-28 DIAGNOSIS — E78.5 HYPERLIPIDEMIA LDL GOAL <130: Chronic | ICD-10-CM

## 2017-11-28 LAB
ERYTHROCYTE [DISTWIDTH] IN BLOOD BY AUTOMATED COUNT: 15.2 % (ref 10–15)
HCT VFR BLD AUTO: 40.4 % (ref 35–47)
HGB BLD-MCNC: 13.6 G/DL (ref 11.7–15.7)
MCH RBC QN AUTO: 31.5 PG (ref 26.5–33)
MCHC RBC AUTO-ENTMCNC: 33.7 G/DL (ref 31.5–36.5)
MCV RBC AUTO: 94 FL (ref 78–100)
PLATELET # BLD AUTO: 208 10E9/L (ref 150–450)
RBC # BLD AUTO: 4.32 10E12/L (ref 3.8–5.2)
WBC # BLD AUTO: 6.2 10E9/L (ref 4–11)

## 2017-11-28 PROCEDURE — 36415 COLL VENOUS BLD VENIPUNCTURE: CPT | Performed by: INTERNAL MEDICINE

## 2017-11-28 PROCEDURE — 80053 COMPREHEN METABOLIC PANEL: CPT | Performed by: INTERNAL MEDICINE

## 2017-11-28 PROCEDURE — 84439 ASSAY OF FREE THYROXINE: CPT | Performed by: INTERNAL MEDICINE

## 2017-11-28 PROCEDURE — 85027 COMPLETE CBC AUTOMATED: CPT | Performed by: INTERNAL MEDICINE

## 2017-11-28 PROCEDURE — 80061 LIPID PANEL: CPT | Performed by: INTERNAL MEDICINE

## 2017-11-28 PROCEDURE — 84443 ASSAY THYROID STIM HORMONE: CPT | Performed by: INTERNAL MEDICINE

## 2017-11-29 LAB
ALBUMIN SERPL-MCNC: 3.8 G/DL (ref 3.4–5)
ALP SERPL-CCNC: 78 U/L (ref 40–150)
ALT SERPL W P-5'-P-CCNC: 29 U/L (ref 0–50)
ANION GAP SERPL CALCULATED.3IONS-SCNC: 8 MMOL/L (ref 3–14)
AST SERPL W P-5'-P-CCNC: 33 U/L (ref 0–45)
BILIRUB SERPL-MCNC: 0.6 MG/DL (ref 0.2–1.3)
BUN SERPL-MCNC: 18 MG/DL (ref 7–30)
CALCIUM SERPL-MCNC: 8.8 MG/DL (ref 8.5–10.1)
CHLORIDE SERPL-SCNC: 108 MMOL/L (ref 94–109)
CHOLEST SERPL-MCNC: 173 MG/DL
CO2 SERPL-SCNC: 26 MMOL/L (ref 20–32)
CREAT SERPL-MCNC: 0.73 MG/DL (ref 0.52–1.04)
GFR SERPL CREATININE-BSD FRML MDRD: 77 ML/MIN/1.7M2
GLUCOSE SERPL-MCNC: 87 MG/DL (ref 70–99)
HDLC SERPL-MCNC: 72 MG/DL
LDLC SERPL CALC-MCNC: 88 MG/DL
NONHDLC SERPL-MCNC: 101 MG/DL
POTASSIUM SERPL-SCNC: 4 MMOL/L (ref 3.4–5.3)
PROT SERPL-MCNC: 7.2 G/DL (ref 6.8–8.8)
SODIUM SERPL-SCNC: 142 MMOL/L (ref 133–144)
T4 FREE SERPL-MCNC: 1.09 NG/DL (ref 0.76–1.46)
TRIGL SERPL-MCNC: 67 MG/DL
TSH SERPL DL<=0.005 MIU/L-ACNC: 4.08 MU/L (ref 0.4–4)

## 2017-12-01 ENCOUNTER — OFFICE VISIT (OUTPATIENT)
Dept: INTERNAL MEDICINE | Facility: CLINIC | Age: 82
End: 2017-12-01
Payer: COMMERCIAL

## 2017-12-01 VITALS
SYSTOLIC BLOOD PRESSURE: 132 MMHG | TEMPERATURE: 98.2 F | WEIGHT: 239 LBS | BODY MASS INDEX: 38.41 KG/M2 | HEIGHT: 66 IN | HEART RATE: 81 BPM | DIASTOLIC BLOOD PRESSURE: 60 MMHG | OXYGEN SATURATION: 92 %

## 2017-12-01 DIAGNOSIS — E78.5 HYPERLIPIDEMIA LDL GOAL <130: Chronic | ICD-10-CM

## 2017-12-01 DIAGNOSIS — R09.82 POSTNASAL DRIP: ICD-10-CM

## 2017-12-01 DIAGNOSIS — I10 HTN, GOAL BELOW 140/90: ICD-10-CM

## 2017-12-01 DIAGNOSIS — R60.0 BILATERAL LEG EDEMA: ICD-10-CM

## 2017-12-01 DIAGNOSIS — J45.30 MILD PERSISTENT ASTHMA WITHOUT COMPLICATION: Primary | Chronic | ICD-10-CM

## 2017-12-01 PROCEDURE — 99214 OFFICE O/P EST MOD 30 MIN: CPT | Performed by: INTERNAL MEDICINE

## 2017-12-01 RX ORDER — FUROSEMIDE 40 MG
TABLET ORAL
Qty: 90 TABLET | Refills: 1 | Status: SHIPPED | OUTPATIENT
Start: 2017-12-01 | End: 2018-07-03

## 2017-12-01 RX ORDER — FLUTICASONE PROPIONATE 50 MCG
1-2 SPRAY, SUSPENSION (ML) NASAL DAILY
Qty: 1 BOTTLE | Refills: 11 | Status: SHIPPED | OUTPATIENT
Start: 2017-12-01 | End: 2018-12-26

## 2017-12-01 RX ORDER — TIOTROPIUM BROMIDE 18 UG/1
CAPSULE ORAL; RESPIRATORY (INHALATION)
Qty: 30 CAPSULE | Refills: 1 | Status: SHIPPED | OUTPATIENT
Start: 2017-12-01 | End: 2018-03-12

## 2017-12-01 RX ORDER — AMLODIPINE BESYLATE 10 MG/1
5 TABLET ORAL DAILY
Qty: 90 TABLET | Refills: 1 | Status: SHIPPED | OUTPATIENT
Start: 2017-12-01 | End: 2018-11-09

## 2017-12-01 RX ORDER — ATENOLOL 100 MG/1
100 TABLET ORAL DAILY
Qty: 90 TABLET | Refills: 1 | Status: SHIPPED | OUTPATIENT
Start: 2017-12-01 | End: 2018-07-19

## 2017-12-01 RX ORDER — PRAVASTATIN SODIUM 20 MG
20 TABLET ORAL DAILY
Qty: 90 TABLET | Refills: 1 | Status: SHIPPED | OUTPATIENT
Start: 2017-12-01 | End: 2018-06-28

## 2017-12-01 NOTE — PROGRESS NOTES
Dr Mendes's note      Patient's instructions / PLAN:                                                        Plan:  1. Spiriva daily   2. Flonase nasal spray daily   3. Make an appointment with the lung specialist - [facility] [phone]   4. Continue the other meds, same doses for now.  5. Please make a lab appointment for non fasting labs in 6 months  6.Please make an appointment few days after the labs to discuss about the results.       ASSESSMENT & PLAN:                                                      (J45.30) Asthma,   (primary encounter diagnosis)  Comment: Not controlled   Plan: tiotropium (SPIRIVA HANDIHALER) 18 MCG capsule        as above     (I10) HTN, goal below 140/90  Comment: Controlled    Plan: atenolol (TENORMIN) 100 MG tablet, pravastatin         (PRAVACHOL) 20 MG tablet, amLODIPine (NORVASC)         10 MG tablet, furosemide (LASIX) 40 MG tablet,         Comprehensive metabolic panel            (E78.5) Hyperlipidemia LDL goal <130  Comment: Controlled   LDL Cholesterol Calculated   Date Value Ref Range Status   11/28/2017 88 <100 mg/dL Final     Comment:     Desirable:       <100 mg/dl   ]   Plan: atenolol (TENORMIN) 100 MG tablet, pravastatin         (PRAVACHOL) 20 MG tablet, furosemide (LASIX) 40        MG tablet, Comprehensive metabolic panel            (R60.0) Bilateral leg edema  Comment: stable   Plan: atenolol (TENORMIN) 100 MG tablet, pravastatin         (PRAVACHOL) 20 MG tablet, furosemide (LASIX) 40        MG tablet            (R09.82) Postnasal drip  Comment:   Plan: fluticasone (FLONASE) 50 MCG/ACT spray               Chief complaint:                                                      Follow up chronic medical problems      SUBJECTIVE:   Marino Prajapati is a 82 year old female who presents to clinic today for the following health issues:      COPD  -- She felt the better with the Doxy. She still has a lingering cough. She thinks it is sec thick,  "sticky postnasal drainage.   -- She didn't call for appointment with [facility].   -- CXR: IMPRESSION: Mild streaky bibasilar lung opacities, right greater than  left, likely reflect atelectasis or scarring. No pleural effusions or  pneumothorax. Stable heart and mediastinum. Lateral view demonstrates  wedging of a midthoracic vertebral body, unchanged.    Labs 11/28 - reviewed     HTN  BP at home 118           LOV: Plan:  1. Chest XRay   2. Doxycycline 100 mg twice a day   3. Resume the Symbicort  4. Make an appointment with the lung specialist - [facility] [phone]   5. Fasting labs and appointment in November       Review of Systems:                                                      ROS: negative for fever, chills, cough, wheezes, chest pain, shortness of breath, vomiting, abdominal pain,  pos for cough, as above Pos for chr leg edema    A 10-point review of systems was obtained.  Those pertinent are above and in the in the Subjective section.  The rest of the systems are negative.           OBJECTIVE:             Physical exam:  Blood pressure 132/60, pulse 81, temperature 98.2  F (36.8  C), temperature source Oral, height 5' 6\" (1.676 m), weight 239 lb (108.4 kg), SpO2 92 %, not currently breastfeeding.   NAD, appears comfortable  Skin: no rashes   HEENT: PERRLA, EOMI, pink conjunctiva, anicteric sclerae, bilateral tympanic membranes are clinically normal, oropharynx is normal color  Neck: supple, no JVD,  No thyroidmegaly. Lymph nodes nonpalpable cervical and supraclavicular.  Chest: clear to auscultation bilaterally, good respiratory effort  Heart: S1 S2, RRR, no mgr appreciated  Abdomen: soft, not tender,   Extremities: chr feet ankle edema with thick skin  Neurologic: A, Ox3, no focal signs appreciated    PMHx: reviewed  Past Medical History:   Diagnosis Date     Anemia      CARDIOVASCULAR SCREENING; LDL GOAL LESS THAN 160      Colon polyps 2010    needs colonoscopy 2013     " DVT (deep venous thrombosis) (H) 2007    remote history of DVT while she was on BCP ,coumadin stopped after retroperitoneal/buttock hematoma in 10/11 . On ASA only     Gastro-oesophageal reflux disease      HTN, goal below 140/90      Hyperlipidemia LDL goal <130 1/22/2016     Kidney stone      Osteoarthritis     knees and hip     Osteoporosis      Postnasal drip      S/P total knee arthroplasty      Thrombosis of leg       PSHx: reviewed  Past Surgical History:   Procedure Laterality Date     ARTHROPLASTY HIP  8/1/2013    Procedure: ARTHROPLASTY HIP;  RIGHT TOTAL HIP ARTHROPLASTY;  Surgeon: Rufino Tong MD;  Location: [facility] OR     ARTHROPLASTY HIP Left 12/12/2014    Procedure: ARTHROPLASTY HIP;  Surgeon: Rufino Tong MD;  Location: [facility] OR     ARTHROPLASTY KNEE  10/22/2012    Procedure: ARTHROPLASTY KNEE;  Right Total knee Arthroplasty  ;  Surgeon: Jagdeep Virgen MD;  Location: [facility] OR     ARTHROPLASTY KNEE  5/23/2013    Procedure: ARTHROPLASTY KNEE;  LEFT TOTAL KNEE ARTHROPLASTY (SMITH & NEPHEW)^;  Surgeon: Rufino Tong MD;  Location:  OR     C PREP FACE/ORAL PROST PALATAL LIFT       CHOLECYSTECTOMY       CYSTOSCOPY, RETROGRADES, INSERT STENT URETER(S), COMBINED  7/16/2012    Procedure: COMBINED CYSTOSCOPY, RETROGRADES, INSERT STENT URETER(S);  Cystoscopy, Right retrogrades, Right Stent Placement;  Surgeon: Mauricio Velazquez MD;  Location:  OR     LASER HOLMIUM LITHOTRIPSY URETER(S), INSERT STENT, COMBINED  8/8/2012    Procedure: COMBINED CYSTOSCOPY, URETEROSCOPY, LASER HOLMIUM LITHOTRIPSY URETER(S), INSERT STENT;  Cystoscopy, Stent Removal, Right Ureteroscopy with Holmium Laser, Stone removal ;  Surgeon: Mauricio Velazquez MD;  Location:  OR     LIPOSUCTION (LOCATION)      tummy     STRIP VEIN          Meds: reviewed  Current Outpatient Prescriptions   Medication Sig Dispense Refill     metoprolol (TOPROL-XL) 100 MG 24 hr tablet Take 1 tablet (100 mg) by mouth daily 90 tablet 0      ipratropium - albuterol 0.5 mg/2.5 mg/3 mL (DUONEB) 0.5-2.5 (3) MG/3ML neb solution Take 1 vial (3 mLs) by nebulization every 6 hours as needed for shortness of breath / dyspnea or wheezing 100 vial 1     order for DME Equipment being ordered: 4 wheeled walker with hand brakes and seat 1 each 0     losartan (COZAAR) 100 MG tablet Take 1 tablet (100 mg) by mouth daily 90 tablet 0     traMADol (ULTRAM) 50 MG tablet TAKE 1 TABLET BY MOUTH TWICE DAILY AS NEEDED FOR PAIN 120 tablet 0     terazosin (HYTRIN) 2 MG capsule Take 1 capsule (2 mg) by mouth At Bedtime 90 capsule 0     [DISCONTINUED] ipratropium - albuterol 0.5 mg/2.5 mg/3 mL (DUONEB) 0.5-2.5 (3) MG/3ML neb solution USE 1 VIAL IN NEBULIZER EVERY 6 HOURS AS NEEDED FOR SHORTNESS OF BREATH, DYSPNEA, OR WHEEZING 90 mL 0     budesonide-formoterol (SYMBICORT) 160-4.5 MCG/ACT Inhaler Inhale 2 puffs into the lungs 2 times daily 3 Inhaler 1     atenolol (TENORMIN) 100 MG tablet Take 1 tablet (100 mg) by mouth daily 90 tablet 1     pravastatin (PRAVACHOL) 20 MG tablet Take 1 tablet (20 mg) by mouth daily 90 tablet 1     furosemide (LASIX) 40 MG tablet 0.5 - 1 tab daily as instructed 90 tablet 1     amLODIPine (NORVASC) 10 MG tablet Take 0.5 tablets (5 mg) by mouth daily 90 tablet 1     fluticasone-salmeterol (ADVAIR) 250-50 MCG/DOSE diskus inhaler Inhale 1 puff into the lungs 2 times daily 3 Inhaler 0     albuterol (PROAIR HFA, PROVENTIL HFA, VENTOLIN HFA) 108 (90 BASE) MCG/ACT inhaler Inhale 2 puffs into the lungs every 6 hours as needed for shortness of breath / dyspnea or wheezing 1 Inhaler 1     order for DME Equipment being ordered: 1: Custom/alternative BLE full leg velco/buckling compression garment 1 each 0     omeprazole 20 MG tablet Take 1 tablet (20 mg) by mouth daily Take 30-60 minutes before a meal. 90 tablet 0     order for DME Equipment being ordered: 1: Gradient Compression Wraps; 2: BLE knee high 20-30 mm Hg compression stockings; 3: BLE thigh high  20-30 mm Hg compression stockings; 4: Cast Boots 1 each 0     order for DME Equipment being ordered: Nebulizer and neb supplies (tubing, etc) 1 Device 0     cholecalciferol (VITAMIN D) 1000 UNIT tablet Take 2 tablets (2,000 Units) by mouth daily 100 tablet 3     vitamin  B complex with vitamin C (VITAMIN  B COMPLEX) TABS Take 1 tablet by mouth daily  0     Cranberry Extract 250 MG TABS        Garlic (GARLIC 1500) 1500 MG CAPS        Nutritional Supplements (SALMON OIL) CAPS        Cyanocobalamin (B-12) 1000 MCG TBCR Take 1,000 mcg by mouth daily 100 tablet 1     Coenzyme Q10 (COQ10) 30 MG CAPS  30 capsule      Multiple Minerals (CALCIUM-MAGNESIUM-ZINC) TABS        acetaminophen (TYLENOL) 325 MG tablet Take 1-2 tablets (325-650 mg) by mouth every 6 hours as needed for mild pain 250 tablet 11     aspirin 81 MG tablet Take 81 mg by mouth daily  30 tablet      desonide (DESOWEN) 0.05 % cream Apply  topically 2 times daily. 30 g 0     clobetasol (TEMOVATE) 0.05 % cream Apply sparingly to affected area.  Do not apply to face. 30 g 1     ascorbic acid (VITAMIN C) 500 MG tablet Take 500 mg by mouth daily  100 tablet 12     Multiple Vitamins-Minerals (MULTIVITAMIN & MINERAL PO) Take 1 tablet by mouth daily.         Soc Hx: reviewed  Fam Hx: reviewed          Vivian Mendes MD  Internal Medicine

## 2017-12-01 NOTE — NURSING NOTE
"Chief Complaint   Patient presents with     RECHECK     Results       Initial /60 (BP Location: Right arm, Patient Position: Chair, Cuff Size: Adult Large)  Pulse 81  Temp 98.2  F (36.8  C) (Oral)  Ht 5' 6\" (1.676 m)  Wt 239 lb (108.4 kg)  SpO2 92%  Breastfeeding? No  BMI 38.58 kg/m2 Estimated body mass index is 38.58 kg/(m^2) as calculated from the following:    Height as of this encounter: 5' 6\" (1.676 m).    Weight as of this encounter: 239 lb (108.4 kg).  Medication Reconciliation: complete   Julia Mayfield CMA      "

## 2017-12-01 NOTE — MR AVS SNAPSHOT
"              After Visit Summary   12/1/2017    Marino Prajapati    MRN: [id number]           Patient Information     Date Of Birth          1935        Visit Information        Provider Department      12/1/2017 12:20 PM Vivian Blue MD [facility]        Today's Diagnoses     Asthma,     -  1    HTN, goal below 140/90        Hyperlipidemia LDL goal <130        Bilateral leg edema        Essential hypertension with goal blood pressure less than 140/90        Postnasal drip          Care Instructions      Plan:  1. Spiriva daily   2. Flonase nasal spray daily   3. Make an appointment with the lung specialist - [place] [facility] [phone]   4. Continue the other meds, same doses for now.  5. Please make a lab appointment for non fasting labs    6.Please make an appointment few days after the labs to discuss about the results.           Follow-ups after your visit        Who to contact     If you have questions or need follow up information about today's clinic visit or your schedule please contact [facility] directly at [phone].  Normal or non-critical lab and imaging results will be communicated to you by MyChart, letter or phone within 4 business days after the clinic has received the results. If you do not hear from us within 7 days, please contact the clinic through KnowNowhart or phone. If you have a critical or abnormal lab result, we will notify you by phone as soon as possible.  Submit refill requests through Dinetouch or call your pharmacy and they will forward the refill request to us. Please allow 3 business days for your refill to be completed.          Additional Information About Your Visit        KnowNowhart Information     Dinetouch lets you send messages to your doctor, view your test results, renew your prescriptions, schedule appointments and more. To sign up, go to [email or web address] . Click on \"Log in\" on the left side " "of the screen, which will take you to the Welcome page. Then click on \"Sign up Now\" on the right side of the page.     You will be asked to enter the access code listed below, as well as some personal information. Please follow the directions to create your username and password.     Your access code is: [email or web address]  Expires: 2018  2:46 PM     Your access code will  in 90 days. If you need help or a new code, please call your [place] clinic or [phone].        Care EveryWhere ID     This is your Care EveryWhere ID. This could be used by other organizations to access your [place] medical records  [id number]        Your Vitals Were     Pulse Temperature Height Pulse Oximetry Breastfeeding? BMI (Body Mass Index)    81 98.2  F (36.8  C) (Oral) 5' 6\" (1.676 m) 92% No 38.58 kg/m2       Blood Pressure from Last 3 Encounters:   17 132/60   10/19/17 118/56   17 138/68    Weight from Last 3 Encounters:   17 239 lb (108.4 kg)   10/19/17 238 lb 12.8 oz (108.3 kg)   17 235 lb 14.4 oz (107 kg)              Today, you had the following     No orders found for display         Today's Medication Changes          These changes are accurate as of: 17  1:08 PM.  If you have any questions, ask your nurse or doctor.               Start taking these medicines.        Dose/Directions    fluticasone 50 MCG/ACT spray   Commonly known as:  FLONASE   Used for:  Postnasal drip   Started by:  Vivian Blue MD        Dose:  1-2 spray   Spray 1-2 sprays into both nostrils daily   Quantity:  1 Bottle   Refills:  11       tiotropium 18 MCG capsule   Commonly known as:  SPIRIVA HANDIHALER   Used for:  Mild persistent asthma without complication   Started by:  Vivian Blue MD        Inhale contents of one capsule daily.   Quantity:  30 capsule   Refills:  1         Stop taking these medicines if you haven't already. Please contact your care team if you have questions. "     fluticasone-salmeterol 250-50 MCG/DOSE diskus inhaler   Commonly known as:  ADVAIR   Stopped by:  Vivian Blue MD                Where to get your medicines      These medications were sent to [id number] - [place], [place] - [address] AT [facility] [address]  [address], [facility] [address]     Phone:  [phone]     amLODIPine 10 MG tablet    atenolol 100 MG tablet    fluticasone 50 MCG/ACT spray    furosemide 40 MG tablet    pravastatin 20 MG tablet    tiotropium 18 MCG capsule                Primary Care Provider Office Phone # Fax #    Vivian Blue [phone]       [address]  [facility] [address]        Equal Access to Services     [facility] Center[facility] : Hadii godfrey tom hadasho Soomaali, waaxda luqadaha, qaybta kaalmada adeegyada, clovis alfaro . So [facility] [phone].    ATENCIÓN: Si habla español, tiene a hare disposición servicios gratuitos de asistencia lingüística. [facility] [phone].    We comply with applicable federal civil rights laws and [place] laws. We do not discriminate on the basis of race, color, national origin, age, disability, sex, sexual orientation, or gender identity.            Thank you!     Thank you for choosing [facility]  for your care. Our goal is always to provide you with excellent care. Hearing back from our patients is one way we can continue to improve our services. Please take a few minutes to complete the written survey that you may receive in the mail after your visit with us. Thank you!             Your Updated Medication List - Protect others around you: Learn how to safely use, store and throw away your medicines at www.disposemymeds.org.          This list is accurate as of: 12/1/17  1:08 PM.  Always use your most recent med list.                   Brand Name Dispense Instructions for use Diagnosis    albuterol 108 (90  BASE) MCG/ACT Inhaler    PROAIR HFA/PROVENTIL HFA/VENTOLIN HFA    1 Inhaler    Inhale 2 puffs into the lungs every 6 hours as needed for shortness of breath / dyspnea or wheezing        amLODIPine 10 MG tablet    NORVASC    90 tablet    Take 0.5 tablets (5 mg) by mouth daily    Essential hypertension with goal blood pressure less than 140/90       ascorbic acid 500 MG tablet    VITAMIN C    100 tablet    Take 500 mg by mouth daily        aspirin 81 MG tablet     30 tablet    Take 81 mg by mouth daily        atenolol 100 MG tablet    TENORMIN    90 tablet    Take 1 tablet (100 mg) by mouth daily    HTN, goal below 140/90, Hyperlipidemia LDL goal <130, Bilateral leg edema       B-12 1000 MCG Tbcr     100 tablet    Take 1,000 mcg by mouth daily        budesonide-formoterol 160-4.5 MCG/ACT Inhaler    SYMBICORT    3 Inhaler    Inhale 2 puffs into the lungs 2 times daily    Cough, Mild persistent asthma without complication       Calcium-Magnesium-Zinc Tabs           cholecalciferol 1000 UNIT tablet    vitamin D3    100 tablet    Take 2 tablets (2,000 Units) by mouth daily    Cough, Mild persistent asthma without complication       clobetasol 0.05 % cream    TEMOVATE    30 g    Apply sparingly to affected area.  Do not apply to face.    Itching       CoQ10 30 MG Caps     30 capsule         Cranberry Extract 250 MG Tabs           desonide 0.05 % cream    DESOWEN    30 g    Apply  topically 2 times daily.    Eczema       fluticasone 50 MCG/ACT spray    FLONASE    1 Bottle    Spray 1-2 sprays into both nostrils daily    Postnasal drip       furosemide 40 MG tablet    LASIX    90 tablet    0.5 - 1 tab daily as instructed    Bilateral leg edema, HTN, goal below 140/90, Hyperlipidemia LDL goal <130       GARLIC 1500 1500 MG Caps   Generic drug:  Garlic           ipratropium - albuterol 0.5 mg/2.5 mg/3 mL 0.5-2.5 (3) MG/3ML neb solution    DUONEB    100 vial    Take 1 vial (3 mLs) by nebulization every 6 hours as needed for  shortness of breath / dyspnea or wheezing    Cough, Mild persistent asthma without complication       losartan 100 MG tablet    COZAAR    90 tablet    Take 1 tablet (100 mg) by mouth daily    HTN, goal below 140/90, Adenomatous colon polyp, Hyperlipidemia LDL goal <130, Bilateral leg edema       metoprolol 100 MG 24 hr tablet    TOPROL-XL    90 tablet    Take 1 tablet (100 mg) by mouth daily    HTN, goal below 140/90       MULTIVITAMIN & MINERAL PO      Take 1 tablet by mouth daily.        omeprazole 20 MG tablet     90 tablet    Take 1 tablet (20 mg) by mouth daily Take 30-60 minutes before a meal.    Nausea       * order for DME     1 Device    Equipment being ordered: Nebulizer and neb supplies (tubing, etc)    Cough, Mild persistent asthma without complication       * order for DME     1 each    Equipment being ordered: 1: Gradient Compression Wraps; 2: BLE knee high 20-30 mm Hg compression stockings; 3: BLE thigh high 20-30 mm Hg compression stockings; 4: Cast Boots    Bilateral leg edema       * order for DME     1 each    Equipment being ordered: 1: Custom/alternative BLE full leg velco/buckling compression garment    Lymphedema       * order for DME     1 each    Equipment being ordered: 4 wheeled walker with hand brakes and seat    Primary osteoarthritis of both knees       pravastatin 20 MG tablet    PRAVACHOL    90 tablet    Take 1 tablet (20 mg) by mouth daily    Hyperlipidemia LDL goal <130, HTN, goal below 140/90, Bilateral leg edema       Stanley Oil Caps           terazosin 2 MG capsule    HYTRIN    90 capsule    Take 1 capsule (2 mg) by mouth At Bedtime    HTN, goal below 140/90, Adenomatous colon polyp, Hyperlipidemia LDL goal <130, Bilateral leg edema       tiotropium 18 MCG capsule    SPIRIVA HANDIHALER    30 capsule    Inhale contents of one capsule daily.    Mild persistent asthma without complication       traMADol 50 MG tablet    ULTRAM    120 tablet    TAKE 1 TABLET BY MOUTH TWICE DAILY AS  NEEDED FOR PAIN    Chronic pain of both knees       TYLENOL 325 MG tablet   Generic drug:  acetaminophen     250 tablet    Take 1-2 tablets (325-650 mg) by mouth every 6 hours as needed for mild pain        vitamin B complex with vitamin C Tabs tablet      Take 1 tablet by mouth daily        * Notice:  This list has 4 medication(s) that are the same as other medications prescribed for you. Read the directions carefully, and ask your doctor or other care provider to review them with you.

## 2017-12-01 NOTE — PATIENT INSTRUCTIONS
Plan:  1. Spiriva daily   2. Flonase nasal spray daily   3. Make an appointment with the lung specialist - [facility] [phone]   4. Continue the other meds, same doses for now.  5. Please make a lab appointment for non fasting labs    6.Please make an appointment few days after the labs to discuss about the results.

## 2017-12-13 DIAGNOSIS — R60.0 BILATERAL LEG EDEMA: ICD-10-CM

## 2017-12-13 DIAGNOSIS — I10 HTN, GOAL BELOW 140/90: ICD-10-CM

## 2017-12-13 DIAGNOSIS — D12.6 ADENOMATOUS COLON POLYP: ICD-10-CM

## 2017-12-13 DIAGNOSIS — E78.5 HYPERLIPIDEMIA LDL GOAL <130: Chronic | ICD-10-CM

## 2017-12-15 ENCOUNTER — TELEPHONE (OUTPATIENT)
Dept: INTERNAL MEDICINE | Facility: CLINIC | Age: 82
End: 2017-12-15

## 2017-12-15 RX ORDER — TERAZOSIN 2 MG/1
2 CAPSULE ORAL AT BEDTIME
Qty: 90 CAPSULE | Refills: 1 | Status: SHIPPED | OUTPATIENT
Start: 2017-12-15 | End: 2018-06-04

## 2017-12-15 NOTE — TELEPHONE ENCOUNTER
Pt calling.  Has an appt in Jan 2018 with pulmonologist and they are asking for most recent copy of CXR results.    Fax # [phone].    Faxed and pt informed.  
- resolved
- tramadol for pain  - pt states she has outpatient rheumatologist who was trying to put her on biologics  - outpatient follow up  - will decrease to home prednisone dose 10mg
- resolved
- tramadol for pain  - pt states she has outpatient rheumatologist who was trying to put her on biologics  - outpatient follow up  - will decrease to home prednisone dose 10mg
- resolved
- tramadol for pain  - pt states she has outpatient rheumatologist who was trying to put her on biologics  - outpatient follow up  - will decrease to home prednisone dose 10mg  - IVELISSE recommended
- tramadol for pain  - pt states she has outpatient rheumatologist who was trying to put her on biologics  - outpatient follow up  - will decrease to home prednisone dose 10mg

## 2017-12-31 DIAGNOSIS — M25.562 CHRONIC PAIN OF BOTH KNEES: ICD-10-CM

## 2017-12-31 DIAGNOSIS — M25.561 CHRONIC PAIN OF BOTH KNEES: ICD-10-CM

## 2017-12-31 DIAGNOSIS — G89.29 CHRONIC PAIN OF BOTH KNEES: ICD-10-CM

## 2018-01-04 RX ORDER — TRAMADOL HYDROCHLORIDE 50 MG/1
TABLET ORAL
Qty: 120 TABLET | Refills: 0 | Status: SHIPPED | OUTPATIENT
Start: 2018-01-04 | End: 2018-05-01

## 2018-01-04 NOTE — TELEPHONE ENCOUNTER
Tramadol      Last Written Prescription Date:  9-5-17  Last Fill Quantity: 120,   # refills: 0  Last Office Visit: 12-1-17  Future Office visit:       Routing refill request to provider for review/approval because:  Drug not on the [facility], P or  Health refill protocol or controlled substance    Signed CSA form in chart.    [place]:  reviewed- no concerns    [place]  [email or web address]    Please advise, thanks.

## 2018-01-05 DIAGNOSIS — M25.562 CHRONIC PAIN OF BOTH KNEES: ICD-10-CM

## 2018-01-05 DIAGNOSIS — M25.561 CHRONIC PAIN OF BOTH KNEES: ICD-10-CM

## 2018-01-05 DIAGNOSIS — G89.29 CHRONIC PAIN OF BOTH KNEES: ICD-10-CM

## 2018-01-09 RX ORDER — TRAMADOL HYDROCHLORIDE 50 MG/1
TABLET ORAL
Qty: 120 TABLET | Refills: 0 | OUTPATIENT
Start: 2018-01-09

## 2018-01-09 NOTE — TELEPHONE ENCOUNTER
Patient called stating pharmacy had never received Tramadol rx faxed to them on 1/4/18.  Called [facility] and gave verbal approval for Tramadol.  This request will be removed as it is no longer needed.  BRANDEE Chowdary R.N.

## 2018-01-16 ENCOUNTER — TRANSFERRED RECORDS (OUTPATIENT)
Dept: HEALTH INFORMATION MANAGEMENT | Facility: CLINIC | Age: 83
End: 2018-01-16

## 2018-01-17 DIAGNOSIS — D12.6 ADENOMATOUS COLON POLYP: ICD-10-CM

## 2018-01-17 DIAGNOSIS — R60.0 BILATERAL LEG EDEMA: ICD-10-CM

## 2018-01-17 DIAGNOSIS — E78.5 HYPERLIPIDEMIA LDL GOAL <130: Chronic | ICD-10-CM

## 2018-01-17 DIAGNOSIS — I10 HTN, GOAL BELOW 140/90: ICD-10-CM

## 2018-01-24 RX ORDER — LOSARTAN POTASSIUM 100 MG/1
100 TABLET ORAL DAILY
Qty: 90 TABLET | Refills: 2 | Status: SHIPPED | OUTPATIENT
Start: 2018-01-24 | End: 2018-11-09

## 2018-01-24 NOTE — TELEPHONE ENCOUNTER
"Requested Prescriptions   Pending Prescriptions Disp Refills     losartan (COZAAR) 100 MG tablet 90 tablet 0     Sig: Take 1 tablet (100 mg) by mouth daily    Angiotensin-II Receptors Passed    1/17/2018  9:16 AM       Passed - Blood pressure under 140/90 in past 12 months.    BP Readings from Last 3 Encounters:   12/01/17 132/60   10/19/17 118/56   05/02/17 138/68                Passed - Recent or future visit with authorizing provider's specialty    Patient had office visit in the last year or has a visit in the next 30 days with authorizing provider.  See \"Patient Info\" tab in inbasket, or \"Choose Columns\" in Meds & Orders section of the refill encounter.            Passed - Patient is age 18 or older       Passed - No active pregnancy on record       Passed - Normal serum creatinine on file in past 12 months    Recent Labs   Lab Test  11/28/17   1231   CR  0.73            Passed - Normal serum potassium on file in past 12 months    Recent Labs   Lab Test  11/28/17   1231   POTASSIUM  4.0                   Passed - No positive pregnancy test in past 12 months        Prescription approved per [facility] Refill Protocol. BRANDEE Chowdary R.N.        "

## 2018-02-28 DIAGNOSIS — I10 HTN, GOAL BELOW 140/90: ICD-10-CM

## 2018-03-02 RX ORDER — METOPROLOL SUCCINATE 100 MG/1
100 TABLET, EXTENDED RELEASE ORAL DAILY
Qty: 90 TABLET | Refills: 1 | Status: SHIPPED | OUTPATIENT
Start: 2018-03-02 | End: 2018-11-09

## 2018-03-02 NOTE — TELEPHONE ENCOUNTER
"Requested Prescriptions   Pending Prescriptions Disp Refills     metoprolol succinate (TOPROL-XL) 100 MG 24 hr tablet 90 tablet 0     Sig: Take 1 tablet (100 mg) by mouth daily    Beta-Blockers Protocol Passed    2/28/2018 11:11 AM       Passed - Blood pressure under 140/90 in past 12 months    BP Readings from Last 3 Encounters:   12/01/17 132/60   10/19/17 118/56   05/02/17 138/68                Passed - Patient is age 6 or older       Passed - Recent or future visit with authorizing provider's specialty    Patient had office visit in the last year or has a visit in the next 30 days with authorizing provider.  See \"Patient Info\" tab in inbasket, or \"Choose Columns\" in Meds & Orders section of the refill encounter.             Prescription approved per [facility] Refill Protocol.    "

## 2018-03-12 DIAGNOSIS — J45.30 MILD PERSISTENT ASTHMA WITHOUT COMPLICATION: Chronic | ICD-10-CM

## 2018-03-13 RX ORDER — TIOTROPIUM BROMIDE 18 UG/1
CAPSULE ORAL; RESPIRATORY (INHALATION)
Qty: 30 CAPSULE | Refills: 1 | Status: SHIPPED | OUTPATIENT
Start: 2018-03-13 | End: 2018-07-03

## 2018-03-13 NOTE — TELEPHONE ENCOUNTER
"Requested Prescriptions   Pending Prescriptions Disp Refills     tiotropium (SPIRIVA HANDIHALER) 18 MCG capsule 30 capsule 1     Sig: Inhale contents of one capsule daily.    Asthma Maintenance Inhalers - Anticholinergics Failed    3/12/2018  1:46 PM       Failed - Asthma control assessment score within normal limits in last 6 months    Please review ACT score.          Passed - Patient is age 12 years or older       Passed - Recent (6 mo) or future (30 days) visit within the authorizing provider's specialty    Patient had office visit in the last 6 months or has a visit in the next 30 days with authorizing provider or within the authorizing provider's specialty.  See \"Patient Info\" tab in inbasket, or \"Choose Columns\" in Meds & Orders section of the refill encounter.              "

## 2018-05-01 DIAGNOSIS — M25.562 CHRONIC PAIN OF BOTH KNEES: ICD-10-CM

## 2018-05-01 DIAGNOSIS — G89.29 CHRONIC PAIN OF BOTH KNEES: ICD-10-CM

## 2018-05-01 DIAGNOSIS — M25.561 CHRONIC PAIN OF BOTH KNEES: ICD-10-CM

## 2018-05-01 NOTE — TELEPHONE ENCOUNTER
tramadol      Last Written Prescription Date:  1/4/18  Last Fill Quantity: 120,   # refills: 0  Last Office Visit: 12/1/17  Future Office visit:       Routing refill request to provider for review/approval because:  Drug not on the [facility], P or [facility] refill protocol or controlled substance

## 2018-05-04 RX ORDER — TRAMADOL HYDROCHLORIDE 50 MG/1
TABLET ORAL
Qty: 120 TABLET | Refills: 0 | Status: SHIPPED | OUTPATIENT
Start: 2018-05-04 | End: 2018-09-06

## 2018-06-04 DIAGNOSIS — I10 HTN, GOAL BELOW 140/90: ICD-10-CM

## 2018-06-04 DIAGNOSIS — D12.6 ADENOMATOUS COLON POLYP: ICD-10-CM

## 2018-06-04 DIAGNOSIS — R60.0 BILATERAL LEG EDEMA: ICD-10-CM

## 2018-06-04 DIAGNOSIS — E78.5 HYPERLIPIDEMIA LDL GOAL <130: Chronic | ICD-10-CM

## 2018-06-08 RX ORDER — TERAZOSIN 2 MG/1
2 CAPSULE ORAL AT BEDTIME
Qty: 90 CAPSULE | Refills: 0 | Status: SHIPPED | OUTPATIENT
Start: 2018-06-08 | End: 2018-09-11

## 2018-06-08 NOTE — TELEPHONE ENCOUNTER
"Pt due for June appt       Requested Prescriptions   Pending Prescriptions Disp Refills     terazosin (HYTRIN) 2 MG capsule 90 capsule 1     Sig: Take 1 capsule (2 mg) by mouth At Bedtime    Alpha Blockers Passed    6/4/2018 11:17 AM       Passed - Blood pressure under 140/90 in past 12 months    BP Readings from Last 3 Encounters:   12/01/17 132/60   10/19/17 118/56   05/02/17 138/68                Passed - Recent (12 mo) or future (30 days) visit within the authorizing provider's specialty    Patient had office visit in the last 12 months or has a visit in the next 30 days with authorizing provider or within the authorizing provider's specialty.  See \"Patient Info\" tab in inbasket, or \"Choose Columns\" in Meds & Orders section of the refill encounter.           Passed - Patient does not have Tadalafil, Vardenafil, or Sildenafil on their medication list       Passed - Patient is 18 years of age or older       Passed - No active pregnancy on record       Passed - No positive pregnancy test in past 12 months          "

## 2018-06-28 DIAGNOSIS — I10 HTN, GOAL BELOW 140/90: ICD-10-CM

## 2018-06-28 DIAGNOSIS — E78.5 HYPERLIPIDEMIA LDL GOAL <130: Chronic | ICD-10-CM

## 2018-06-29 NOTE — TELEPHONE ENCOUNTER
"Requested Prescriptions   Pending Prescriptions Disp Refills     pravastatin (PRAVACHOL) 20 MG tablet  Last Written Prescription Date:  12/1/17  Last Fill Quantity: 90,  # refills: 1   Last office visit: 12/1/2017 with prescribing provider:  Cinthya   Future Office Visit:     90 tablet 1     Sig: Take 1 tablet (20 mg) by mouth daily    Statins Protocol Passed    6/28/2018  7:20 PM       Passed - LDL on file in past 12 months    Recent Labs   Lab Test  11/28/17   1231   LDL  88            Passed - No abnormal creatine kinase in past 12 months    No lab results found.            Passed - Recent (12 mo) or future (30 days) visit within the authorizing provider's specialty    Patient had office visit in the last 12 months or has a visit in the next 30 days with authorizing provider or within the authorizing provider's specialty.  See \"Patient Info\" tab in inbasket, or \"Choose Columns\" in Meds & Orders section of the refill encounter.           Passed - Patient is age 18 or older       Passed - No active pregnancy on record       Passed - No positive pregnancy test in past 12 months          "

## 2018-07-03 DIAGNOSIS — J45.30 MILD PERSISTENT ASTHMA WITHOUT COMPLICATION: Chronic | ICD-10-CM

## 2018-07-03 DIAGNOSIS — I10 HTN, GOAL BELOW 140/90: ICD-10-CM

## 2018-07-03 DIAGNOSIS — R60.0 BILATERAL LEG EDEMA: ICD-10-CM

## 2018-07-03 NOTE — TELEPHONE ENCOUNTER
"Requested Prescriptions   Pending Prescriptions Disp Refills     furosemide (LASIX) 40 MG tablet  Last Written Prescription Date:  17  Last Fill Quantity: 90,  # refills: 1   Last office visit: 2017 with prescribing provider:  Cinthya   Future Office Visit: none     90 tablet 1     Si.5 - 1 tab daily as instructed    Diuretics (Including Combos) Protocol Passed    7/3/2018 10:36 AM       Passed - Blood pressure under 140/90 in past 12 months    BP Readings from Last 3 Encounters:   17 132/60   10/19/17 118/56   17 138/68                Passed - Recent (12 mo) or future (30 days) visit within the authorizing provider's specialty    Patient had office visit in the last 12 months or has a visit in the next 30 days with authorizing provider or within the authorizing provider's specialty.  See \"Patient Info\" tab in inbasket, or \"Choose Columns\" in Meds & Orders section of the refill encounter.           Passed - Patient is age 18 or older       Passed - No active pregancy on record       Passed - Normal serum creatinine on file in past 12 months    Recent Labs   Lab Test  17   1231   CR  0.73             Passed - Normal serum potassium on file in past 12 months    Recent Labs   Lab Test  17   1231   POTASSIUM  4.0                   Passed - Normal serum sodium on file in past 12 months    Recent Labs   Lab Test  17   1231   NA  142             Passed - No positive pregnancy test in past 12 months        tiotropium (SPIRIVA HANDIHALER) 18 MCG capsule  Last Written Prescription Date:  3/13/18  Last Fill Quantity: 30,  # refills: 1   Last office visit: 2017 with prescribing provider:  17   Future Office Visit: none     30 capsule 1     Sig: Inhale contents of one capsule daily.    Asthma Maintenance Inhalers - Anticholinergics Failed    7/3/2018 10:36 AM       Failed - Asthma control assessment score within normal limits in last 6 months    Please review ACT score.       " "   Failed - Recent (6 mo) or future (30 days) visit within the authorizing provider's specialty    Patient had office visit in the last 6 months or has a visit in the next 30 days with authorizing provider or within the authorizing provider's specialty.  See \"Patient Info\" tab in inbasket, or \"Choose Columns\" in Meds & Orders section of the refill encounter.           Passed - Patient is age 12 years or older          "

## 2018-07-05 RX ORDER — PRAVASTATIN SODIUM 20 MG
20 TABLET ORAL DAILY
Qty: 90 TABLET | Refills: 0 | Status: SHIPPED | OUTPATIENT
Start: 2018-07-05 | End: 2018-07-19

## 2018-07-05 NOTE — TELEPHONE ENCOUNTER
Pt was due for labs and OV in June 2018.    Will refill x 1 after appts scheduled.    Pt informed and med refilled x 1.

## 2018-07-07 RX ORDER — FUROSEMIDE 40 MG
TABLET ORAL
Qty: 90 TABLET | Refills: 0 | Status: SHIPPED | OUTPATIENT
Start: 2018-07-07 | End: 2018-10-16

## 2018-07-07 RX ORDER — TIOTROPIUM BROMIDE 18 UG/1
CAPSULE ORAL; RESPIRATORY (INHALATION)
Qty: 90 CAPSULE | Refills: 0 | Status: SHIPPED | OUTPATIENT
Start: 2018-07-07 | End: 2018-12-21

## 2018-07-16 DIAGNOSIS — I10 HTN, GOAL BELOW 140/90: ICD-10-CM

## 2018-07-16 DIAGNOSIS — E78.5 HYPERLIPIDEMIA LDL GOAL <130: Chronic | ICD-10-CM

## 2018-07-16 PROCEDURE — 36415 COLL VENOUS BLD VENIPUNCTURE: CPT | Performed by: INTERNAL MEDICINE

## 2018-07-16 PROCEDURE — 80053 COMPREHEN METABOLIC PANEL: CPT | Performed by: INTERNAL MEDICINE

## 2018-07-17 LAB
ALBUMIN SERPL-MCNC: 4 G/DL (ref 3.4–5)
ALP SERPL-CCNC: 89 U/L (ref 40–150)
ALT SERPL W P-5'-P-CCNC: 27 U/L (ref 0–50)
ANION GAP SERPL CALCULATED.3IONS-SCNC: 9 MMOL/L (ref 3–14)
AST SERPL W P-5'-P-CCNC: 25 U/L (ref 0–45)
BILIRUB SERPL-MCNC: 0.8 MG/DL (ref 0.2–1.3)
BUN SERPL-MCNC: 15 MG/DL (ref 7–30)
CALCIUM SERPL-MCNC: 8.7 MG/DL (ref 8.5–10.1)
CHLORIDE SERPL-SCNC: 108 MMOL/L (ref 94–109)
CO2 SERPL-SCNC: 25 MMOL/L (ref 20–32)
CREAT SERPL-MCNC: 0.73 MG/DL (ref 0.52–1.04)
GFR SERPL CREATININE-BSD FRML MDRD: 76 ML/MIN/1.7M2
GLUCOSE SERPL-MCNC: 87 MG/DL (ref 70–99)
POTASSIUM SERPL-SCNC: 4 MMOL/L (ref 3.4–5.3)
PROT SERPL-MCNC: 7.4 G/DL (ref 6.8–8.8)
SODIUM SERPL-SCNC: 142 MMOL/L (ref 133–144)

## 2018-07-19 ENCOUNTER — OFFICE VISIT (OUTPATIENT)
Dept: INTERNAL MEDICINE | Facility: CLINIC | Age: 83
End: 2018-07-19
Payer: COMMERCIAL

## 2018-07-19 VITALS
DIASTOLIC BLOOD PRESSURE: 71 MMHG | HEART RATE: 73 BPM | HEIGHT: 66 IN | SYSTOLIC BLOOD PRESSURE: 140 MMHG | WEIGHT: 242.4 LBS | TEMPERATURE: 99 F | BODY MASS INDEX: 38.96 KG/M2 | OXYGEN SATURATION: 93 %

## 2018-07-19 DIAGNOSIS — I10 HTN, GOAL BELOW 140/90: ICD-10-CM

## 2018-07-19 DIAGNOSIS — I89.0 LYMPHEDEMA OF BOTH LOWER EXTREMITIES: ICD-10-CM

## 2018-07-19 DIAGNOSIS — E21.3 HYPERPARATHYROIDISM (H): Chronic | ICD-10-CM

## 2018-07-19 DIAGNOSIS — Z96.60 HISTORY OF ARTHROPLASTY: ICD-10-CM

## 2018-07-19 DIAGNOSIS — E78.5 HYPERLIPIDEMIA LDL GOAL <130: Chronic | ICD-10-CM

## 2018-07-19 DIAGNOSIS — J45.30 MILD PERSISTENT ASTHMA WITHOUT COMPLICATION: Primary | Chronic | ICD-10-CM

## 2018-07-19 PROCEDURE — 99214 OFFICE O/P EST MOD 30 MIN: CPT | Performed by: INTERNAL MEDICINE

## 2018-07-19 RX ORDER — PRAVASTATIN SODIUM 20 MG
20 TABLET ORAL DAILY
Qty: 90 TABLET | Refills: 0 | Status: SHIPPED | OUTPATIENT
Start: 2018-07-19 | End: 2018-11-09

## 2018-07-19 RX ORDER — ALBUTEROL SULFATE 90 UG/1
2 AEROSOL, METERED RESPIRATORY (INHALATION) EVERY 6 HOURS PRN
Qty: 1 INHALER | Refills: 3 | Status: SHIPPED | OUTPATIENT
Start: 2018-07-19 | End: 2019-10-18

## 2018-07-19 RX ORDER — GUAIFENESIN 600 MG/1
600 TABLET, EXTENDED RELEASE ORAL 2 TIMES DAILY PRN
Qty: 60 TABLET | Refills: 1 | Status: SHIPPED | OUTPATIENT
Start: 2018-07-19 | End: 2019-04-09

## 2018-07-19 RX ORDER — ATENOLOL 100 MG/1
100 TABLET ORAL DAILY
Qty: 90 TABLET | Refills: 1 | Status: SHIPPED | OUTPATIENT
Start: 2018-07-19 | End: 2018-11-09

## 2018-07-19 NOTE — MR AVS SNAPSHOT
After Visit Summary   7/19/2018    Marino Prajapati    MRN: [id number]           Patient Information     Date Of Birth          1935        Visit Information        Provider Department      7/19/2018 4:00 PM Vivian Blue MD [facility]        Today's Diagnoses     Asthma,     -  1    HTN, goal below 140/90        Hyperlipidemia LDL goal <130          Care Instructions    Plan:  1. Mucinex 600 mg twice a day   2. Boil water and breath the warm vapors 2-3 times a day to try to open up the sinuses.   3. Continue same meds, same doses for now   4. Please make a lab appointment for fasting labs in November 5. Please make an appointment few days after the labs to discuss about the results.   6.The following vaccines are recommended for you.   Medicare covers better if you have these vaccines at the pharmacy:  -- Prevnar 13 ( pneumonia vaccine)  -- Tetanus vaccine with whooping cough  -- Shingerix vaccine - the newest vaccine for shingles           Follow-ups after your visit        Future tests that were ordered for you today     Open Future Orders        Priority Expected Expires Ordered    Albumin Random Urine Quantitative with Creat Ratio Routine  7/9/2019 7/19/2018    Lipid panel reflex to direct LDL Fasting Routine  7/19/2019 7/19/2018    Comprehensive metabolic panel Routine  7/19/2019 7/19/2018    TSH with free T4 reflex Routine  7/19/2019 7/19/2018    CBC with platelets differential Routine  7/19/2019 7/19/2018            Who to contact     If you have questions or need follow up information about today's clinic visit or your schedule please contact [facility] directly at [phone].  Normal or non-critical lab and imaging results will be communicated to you by MyChart, letter or phone within 4 business days after the clinic has received the results. If you do not hear from us within 7 days, please contact the clinic through [employer] or  "phone. If you have a critical or abnormal lab result, we will notify you by phone as soon as possible.  Submit refill requests through [employer] or call your pharmacy and they will forward the refill request to us. Please allow 3 business days for your refill to be completed.          Additional Information About Your Visit        Care EveryWhere ID     This is your Care EveryWhere ID. This could be used by other organizations to access your [facility] medical records  [id number]        Your Vitals Were     Pulse Temperature Height Pulse Oximetry Breastfeeding? BMI (Body Mass Index)    73 99  F (37.2  C) (Oral) 5' 6\" (1.676 m) 93% No 39.12 kg/m2       Blood Pressure from Last 3 Encounters:   07/19/18 140/71   12/01/17 132/60   10/19/17 118/56    Weight from Last 3 Encounters:   07/19/18 242 lb 6.4 oz (110 kg)   12/01/17 239 lb (108.4 kg)   10/19/17 238 lb 12.8 oz (108.3 kg)                 Today's Medication Changes          These changes are accurate as of 7/19/18  4:49 PM.  If you have any questions, ask your nurse or doctor.               Start taking these medicines.        Dose/Directions    guaiFENesin 600 MG 12 hr tablet   Commonly known as:  MUCINEX   Used for:  Mild persistent asthma without complication   Started by:  Vivian Blue MD        Dose:  600 mg   Take 1 tablet (600 mg) by mouth 2 times daily as needed for congestion   Quantity:  60 tablet   Refills:  1         These medicines have changed or have updated prescriptions.        Dose/Directions    * albuterol 108 (90 Base) MCG/ACT Inhaler   Commonly known as:  PROAIR HFA/PROVENTIL HFA/VENTOLIN HFA   This may have changed:  Another medication with the same name was added. Make sure you understand how and when to take each.   Changed by:  Vivian Blue MD        Dose:  2 puff   Inhale 2 puffs into the lungs every 6 hours as needed for shortness of breath / dyspnea or wheezing   Quantity:  1 Inhaler   Refills:  1    "    * albuterol 108 (90 Base) MCG/ACT Inhaler   Commonly known as:  PROAIR HFA/PROVENTIL HFA/VENTOLIN HFA   This may have changed:  You were already taking a medication with the same name, and this prescription was added. Make sure you understand how and when to take each.   Used for:  Mild persistent asthma without complication   Changed by:  Vivian Blue MD        Dose:  2 puff   Inhale 2 puffs into the lungs every 6 hours as needed for shortness of breath / dyspnea or wheezing   Quantity:  1 Inhaler   Refills:  3       * Notice:  This list has 2 medication(s) that are the same as other medications prescribed for you. Read the directions carefully, and ask your doctor or other care provider to review them with you.         Where to get your medicines      These medications were sent to [facility] [address] [place] [address] [address] AT [place] [address] [address]  [address], [facility] [address]     Phone:  [phone]     albuterol 108 (90 Base) MCG/ACT Inhaler    atenolol 100 MG tablet    guaiFENesin 600 MG 12 hr tablet    pravastatin 20 MG tablet                Primary Care Provider Office Phone # Fax #    Vivian Blue [phone]       [address] [facility] [address]        Equal Access to Services     KEVIN DEMARCO : Hadii aad ku hadasho Soomaali, waaxda luqadaha, qaybta kaalmada adeegyada, waxay thiernoin hayender valverde. So [facility] [phone].    ATENCIÓN: Si habla español, tiene a hare disposición servicios gratuitos de asistencia lingüística. Pantera al [phone].    We comply with applicable federal civil rights laws and [place] laws. We do not discriminate on the basis of race, color, national origin, age, disability, sex, sexual orientation, or gender identity.            Thank you!     Thank you for choosing [facility]  for your care. Our goal is always to provide you with  excellent care. Hearing back from our patients is one way we can continue to improve our services. Please take a few minutes to complete the written survey that you may receive in the mail after your visit with us. Thank you!             Your Updated Medication List - Protect others around you: Learn how to safely use, store and throw away your medicines at www.disposemymeds.org.          This list is accurate as of 7/19/18  4:49 PM.  Always use your most recent med list.                   Brand Name Dispense Instructions for use Diagnosis    * albuterol 108 (90 Base) MCG/ACT Inhaler    PROAIR HFA/PROVENTIL HFA/VENTOLIN HFA    1 Inhaler    Inhale 2 puffs into the lungs every 6 hours as needed for shortness of breath / dyspnea or wheezing        * albuterol 108 (90 Base) MCG/ACT Inhaler    PROAIR HFA/PROVENTIL HFA/VENTOLIN HFA    1 Inhaler    Inhale 2 puffs into the lungs every 6 hours as needed for shortness of breath / dyspnea or wheezing    Mild persistent asthma without complication       amLODIPine 10 MG tablet    NORVASC    90 tablet    Take 0.5 tablets (5 mg) by mouth daily    HTN, goal below 140/90       ascorbic acid 500 MG tablet    VITAMIN C    100 tablet    Take 500 mg by mouth daily        aspirin 81 MG tablet     30 tablet    Take 81 mg by mouth daily        atenolol 100 MG tablet    TENORMIN    90 tablet    Take 1 tablet (100 mg) by mouth daily    HTN, goal below 140/90       B-12 1000 MCG Tbcr     100 tablet    Take 1,000 mcg by mouth daily        budesonide-formoterol 160-4.5 MCG/ACT Inhaler    SYMBICORT    3 Inhaler    Inhale 2 puffs into the lungs 2 times daily    Cough, Mild persistent asthma without complication       Calcium-Magnesium-Zinc Tabs           cholecalciferol 1000 UNIT tablet    vitamin D3    100 tablet    Take 2 tablets (2,000 Units) by mouth daily    Cough, Mild persistent asthma without complication       clobetasol 0.05 % cream    TEMOVATE    30 g    Apply sparingly to affected  area.  Do not apply to face.    Itching       CoQ10 30 MG Caps     30 capsule         Cranberry Extract 250 MG Tabs           desonide 0.05 % cream    DESOWEN    30 g    Apply  topically 2 times daily.    Eczema       fluticasone 50 MCG/ACT spray    FLONASE    1 Bottle    Spray 1-2 sprays into both nostrils daily    Postnasal drip       furosemide 40 MG tablet    LASIX    90 tablet    0.5 - 1 tab daily as instructed    Bilateral leg edema, HTN, goal below 140/90       GARLIC 1500 1500 MG Caps   Generic drug:  Garlic           guaiFENesin 600 MG 12 hr tablet    MUCINEX    60 tablet    Take 1 tablet (600 mg) by mouth 2 times daily as needed for congestion    Mild persistent asthma without complication       ipratropium - albuterol 0.5 mg/2.5 mg/3 mL 0.5-2.5 (3) MG/3ML neb solution    DUONEB    100 vial    Take 1 vial (3 mLs) by nebulization every 6 hours as needed for shortness of breath / dyspnea or wheezing    Cough, Mild persistent asthma without complication       losartan 100 MG tablet    COZAAR    90 tablet    Take 1 tablet (100 mg) by mouth daily    HTN, goal below 140/90, Adenomatous colon polyp, Hyperlipidemia LDL goal <130, Bilateral leg edema       metoprolol succinate 100 MG 24 hr tablet    TOPROL-XL    90 tablet    Take 1 tablet (100 mg) by mouth daily    HTN, goal below 140/90       MULTIVITAMIN & MINERAL PO      Take 1 tablet by mouth daily.        omeprazole 20 MG tablet     90 tablet    Take 1 tablet (20 mg) by mouth daily Take 30-60 minutes before a meal.    Nausea       * order for DME     1 Device    Equipment being ordered: Nebulizer and neb supplies (tubing, etc)    Cough, Mild persistent asthma without complication       * order for DME     1 each    Equipment being ordered: 1: Gradient Compression Wraps; 2: BLE knee high 20-30 mm Hg compression stockings; 3: BLE thigh high 20-30 mm Hg compression stockings; 4: Cast Boots    Bilateral leg edema       * order for DME     1 each    Equipment being  ordered: 1: Custom/alternative BLE full leg velco/buckling compression garment    Lymphedema       * order for DME     1 each    Equipment being ordered: 4 wheeled walker with hand brakes and seat    Primary osteoarthritis of both knees       pravastatin 20 MG tablet    PRAVACHOL    90 tablet    Take 1 tablet (20 mg) by mouth daily    Hyperlipidemia LDL goal <130, HTN, goal below 140/90       Wingina Oil Caps           terazosin 2 MG capsule    HYTRIN    90 capsule    Take 1 capsule (2 mg) by mouth At Bedtime    HTN, goal below 140/90, Adenomatous colon polyp, Hyperlipidemia LDL goal <130, Bilateral leg edema       tiotropium 18 MCG capsule    SPIRIVA HANDIHALER    90 capsule    Inhale contents of one capsule daily.    Mild persistent asthma without complication       traMADol 50 MG tablet    ULTRAM    120 tablet    TAKE 1 TABLET BY MOUTH TWICE DAILY AS NEEDED FOR PAIN    Chronic pain of both knees       TYLENOL 325 MG tablet   Generic drug:  acetaminophen     250 tablet    Take 1-2 tablets (325-650 mg) by mouth every 6 hours as needed for mild pain        vitamin B complex with vitamin C Tabs tablet      Take 1 tablet by mouth daily        * Notice:  This list has 6 medication(s) that are the same as other medications prescribed for you. Read the directions carefully, and ask your doctor or other care provider to review them with you.

## 2018-07-19 NOTE — PATIENT INSTRUCTIONS
Plan:  1. Mucinex 600 mg twice a day   2. Boil water and breath the warm vapors 2-3 times a day to try to open up the sinuses.   3. Continue same meds, same doses for now   4. Please make a lab appointment for fasting labs in November  5. Please make an appointment few days after the labs to discuss about the results.   6.The following vaccines are recommended for you.   Medicare covers better if you have these vaccines at the pharmacy:  -- Prevnar 13 ( pneumonia vaccine)  -- Tetanus vaccine with whooping cough  -- Shingerix vaccine - the newest vaccine for shingles

## 2018-07-20 ASSESSMENT — ASTHMA QUESTIONNAIRES: ACT_TOTALSCORE: 15

## 2018-07-22 PROBLEM — I89.0 LYMPHEDEMA OF BOTH LOWER EXTREMITIES: Status: ACTIVE | Noted: 2018-07-22

## 2018-07-22 PROBLEM — Z96.60 HISTORY OF ARTHROPLASTY: Status: ACTIVE | Noted: 2018-07-22

## 2018-08-10 ENCOUNTER — TELEPHONE (OUTPATIENT)
Dept: INTERNAL MEDICINE | Facility: CLINIC | Age: 83
End: 2018-08-10

## 2018-08-10 NOTE — TELEPHONE ENCOUNTER
Call received from patient stating that when she was in for last office visit she did not receive any information about the labs she had done prior to appointment. Reviewed labs with patient and explained that the majority of her labs are not due until November so only minimal labs were done before last appointment.

## 2018-09-06 DIAGNOSIS — M25.561 CHRONIC PAIN OF BOTH KNEES: ICD-10-CM

## 2018-09-06 DIAGNOSIS — G89.29 CHRONIC PAIN OF BOTH KNEES: ICD-10-CM

## 2018-09-06 DIAGNOSIS — M25.562 CHRONIC PAIN OF BOTH KNEES: ICD-10-CM

## 2018-09-07 NOTE — TELEPHONE ENCOUNTER
Requested Prescriptions   Pending Prescriptions Disp Refills     traMADol (ULTRAM) 50 MG tablet [Pharmacy Med Name: TRAMADOL 50MG TABLETS] 120 tablet 0     Sig: TAKE 1 TABLET BY MOUTH TWICE DAILY AS NEEDED FOR PAIN    There is no refill protocol information for this order      Last Written Prescription Date:  05/04/2018  Last Fill Quantity: 120,  # refills: 0   Last office visit: 7/19/2018 with prescribing provider:     Future Office Visit:

## 2018-09-08 RX ORDER — TRAMADOL HYDROCHLORIDE 50 MG/1
TABLET ORAL
Qty: 120 TABLET | Refills: 0 | Status: SHIPPED | OUTPATIENT
Start: 2018-09-08 | End: 2018-12-12

## 2018-09-08 NOTE — TELEPHONE ENCOUNTER
Routing refill request to provider for review/approval because:  Drug not on the [facility] refill protocol   CSA on file dated 5/16/16  [place]  - unable to verify, provider has not granted access to this user

## 2018-09-11 DIAGNOSIS — E78.5 HYPERLIPIDEMIA LDL GOAL <130: Chronic | ICD-10-CM

## 2018-09-11 DIAGNOSIS — I10 HTN, GOAL BELOW 140/90: ICD-10-CM

## 2018-09-11 DIAGNOSIS — R60.0 BILATERAL LEG EDEMA: ICD-10-CM

## 2018-09-11 DIAGNOSIS — D12.6 ADENOMATOUS COLON POLYP: ICD-10-CM

## 2018-09-11 NOTE — LETTER
[facility]  [address], [address]  [place], [place]  [address]                                            TEL:[phone]  FAX:[phone]      Marino Prajapati  [address] [address]      September 13, 2018    Dear Marino     We have received a refill request from your pharmacy, however, we were only able to provide a one time fill because you will be due for a fasting lab appointment and an appointment to see Dr. Mendes in November 2018. Please call [phone] to schedule an appointment before you are due for your next refill.      Thank you,     GABRIELA Lyles

## 2018-09-11 NOTE — TELEPHONE ENCOUNTER
"Requested Prescriptions   Pending Prescriptions Disp Refills     terazosin (HYTRIN) 2 MG capsule  Last Written Prescription Date:  6/8/18  Last Fill Quantity: 90,  # refills: 0   Last office visit: 7/19/2018 with prescribing provider: Cinthya   Future Office Visit:     90 capsule 0     Sig: Take 1 capsule (2 mg) by mouth At Bedtime    Alpha Blockers Failed    9/11/2018  5:30 PM       Failed - Blood pressure under 140/90 in past 12 months    BP Readings from Last 3 Encounters:   07/19/18 140/71   12/01/17 132/60   10/19/17 118/56                Passed - Recent (12 mo) or future (30 days) visit within the authorizing provider's specialty    Patient had office visit in the last 12 months or has a visit in the next 30 days with authorizing provider or within the authorizing provider's specialty.  See \"Patient Info\" tab in inbasket, or \"Choose Columns\" in Meds & Orders section of the refill encounter.           Passed - Patient does not have Tadalafil, Vardenafil, or Sildenafil on their medication list       Passed - Patient is 18 years of age or older       Passed - No active pregnancy on record       Passed - No positive pregnancy test in past 12 months          "

## 2018-09-13 RX ORDER — TERAZOSIN 2 MG/1
2 CAPSULE ORAL AT BEDTIME
Qty: 90 CAPSULE | Refills: 0 | Status: SHIPPED | OUTPATIENT
Start: 2018-09-13 | End: 2018-11-09

## 2018-09-13 NOTE — TELEPHONE ENCOUNTER
Medication is being filled for 1 time refill only due to:  due for labs and appt in Nov 2018.     Letter mailed.

## 2018-10-15 ENCOUNTER — TELEPHONE (OUTPATIENT)
Dept: INTERNAL MEDICINE | Facility: CLINIC | Age: 83
End: 2018-10-15

## 2018-10-15 DIAGNOSIS — J45.30 MILD PERSISTENT ASTHMA WITHOUT COMPLICATION: Primary | Chronic | ICD-10-CM

## 2018-10-15 NOTE — LETTER
My Asthma Action Plan  Name: Marino Prajapati   YOB: 1935  Date: 10/15/2018   My doctor: Vivian Blue MD   My clinic: [facility]        My Control Medicine: Budesonide + formoterol (Symbicort) -  160/4.5 mcg two puffs two times per day  Tiotropium (Spiriva) -  Handihaler 18 mcg one time daily  My Rescue Medicine: Albuterol (Proair/Ventolin/Proventil) inhaler two puffs every 6 hours as needed  Albuterol/ipratroprium  (Duoneb) nebulizer solution every 4 hours as needed   My Asthma Severity: intermittent  Avoid your asthma triggers: Patient is unaware of triggers  None            GREEN ZONE   Good Control    I feel good    No cough or wheeze    Can work, sleep and play without asthma symptoms       Take your asthma control medicine every day.     1. If exercise triggers your asthma, take your rescue medication    15 minutes before exercise or sports, and    During exercise if you have asthma symptoms  2. Spacer to use with inhaler: If you have a spacer, make sure to use it with your inhaler             YELLOW ZONE Getting Worse  I have ANY of these:    I do not feel good    Cough or wheeze    Chest feels tight    Wake up at night   1. Keep taking your Green Zone medications  2. Start taking your rescue medicine:    every 20 minutes for up to 1 hour. Then every 4 hours for 24-48 hours.  3. If you stay in the Yellow Zone for more than 12-24 hours, contact your doctor.  4. If you do not return to the Green Zone in 12-24 hours or you get worse, start taking your oral steroid medicine if prescribed by your provider.           RED ZONE Medical Alert - Get Help  I have ANY of these:    I feel awful    Medicine is not helping    Breathing getting harder    Trouble walking or talking    Nose opens wide to breathe       1. Take your rescue medicine NOW  2. If your provider has prescribed an oral steroid medicine, start taking it NOW  3. Call your doctor NOW  4. If you are still  in the Red Zone after 20 minutes and you have not reached your doctor:    Take your rescue medicine again and    Call 911 or go to the emergency room right away    See your regular doctor within 2 weeks of an Emergency Room or Urgent Care visit for follow-up treatment.          Annual Reminders:  Meet with Asthma Educator,  Flu Shot in the Fall, consider Pneumonia Vaccination for patients with asthma (aged 19 and older).    Pharmacy: [facility] [address] [place] [address] [address] AT Replaced by [facility] [address]                      Asthma Triggers  How To Control Things That Make Your Asthma Worse    Triggers are things that make your asthma worse.  Look at the list below to help you find your triggers and what you can do about them.  You can help prevent asthma flare-ups by staying away from your triggers.      Trigger                                                          What you can do   Cigarette Smoke  Tobacco smoke can make asthma worse. Do not allow smoking in your home, car or around you.  Be sure no one smokes at a child s day care or school.  If you smoke, ask your health care provider for ways to help you quit.  Ask family members to quit too.  Ask your health care provider for a referral to Quit Plan to help you quit smoking, or call 3-750-937-PLAN.     Colds, Flu, Bronchitis  These are common triggers of asthma. Wash your hands often.  Don t touch your eyes, nose or mouth.  Get a flu shot every year.     Dust Mites  These are tiny bugs that live in cloth or carpet. They are too small to see. Wash sheets and blankets in hot water every week.   Encase pillows and mattress in dust mite proof covers.  Avoid having carpet if you can. If you have carpet, vacuum weekly.   Use a dust mask and HEPA vacuum.   Pollen and Outdoor Mold  Some people are allergic to trees, grass, or weed pollen, or molds. Try to keep your windows closed.  Limit time out doors when pollen count is high.   Ask  you health care provider about taking medicine during allergy season.     Animal Dander  Some people are allergic to skin flakes, urine or saliva from pets with fur or feathers. Keep pets with fur or feathers out of your home.    If you can t keep the pet outdoors, then keep the pet out of your bedroom.  Keep the bedroom door closed.  Keep pets off cloth furniture and away from stuffed toys.     Mice, Rats, and Cockroaches  Some people are allergic to the waste from these pests.   Cover food and garbage.  Clean up spills and food crumbs.  Store grease in the refrigerator.   Keep food out of the bedroom.   Indoor Mold  This can be a trigger if your home has high moisture. Fix leaking faucets, pipes, or other sources of water.   Clean moldy surfaces.  Dehumidify basement if it is damp and smelly.   Smoke, Strong Odors, and Sprays  These can reduce air quality. Stay away from strong odors and sprays, such as perfume, powder, hair spray, paints, smoke incense, paint, cleaning products, candles and new carpet.   Exercise or Sports  Some people with asthma have this trigger. Be active!  Ask your doctor about taking medicine before sports or exercise to prevent symptoms.    Warm up for 5-10 minutes before and after sports or exercise.     Other Triggers of Asthma  Cold air:  Cover your nose and mouth with a scarf.  Sometimes laughing or crying can be a trigger.  Some medicines and food can trigger asthma.

## 2018-10-15 NOTE — TELEPHONE ENCOUNTER
Panel Management Review      Patient has the following on her problem list:     Asthma review     ACT Total Scores 7/19/2018   ACT TOTAL SCORE -   ASTHMA ER VISITS -   ASTHMA HOSPITALIZATIONS -   ACT TOTAL SCORE (Goal Greater than or Equal to 20) 15   In the past 12 months, how many times did you visit the emergency room for your asthma without being admitted to the hospital? 0   In the past 12 months, how many times were you hospitalized overnight because of your asthma? 0      1. Is Asthma diagnosis on the Problem List? Yes    2. Is Asthma listed on Health Maintenance? Yes    3. Patient is due for:  SIMEON    Hypertension   Last three blood pressure readings:  BP Readings from Last 3 Encounters:   07/19/18 140/71   12/01/17 132/60   10/19/17 118/56     Blood pressure: Passed    HTN Guidelines:  Age 18-59 BP range:  Less than 140/90  Age 60-85 with Diabetes:  Less than 140/90  Age 60-85 without Diabetes:  less than 150/90      Composite cancer screening  Chart review shows that this patient is due/due soon for the following None  Summary:    Patient is due/failing the following:   Failing for asthma action plan. She also needs to have PHQ and JASON, but she is failing these as part of the chronic pain care package. She already has future appointment scheduled for 11/9/18, she can do those forms at that time.     Action needed:   Needs to have AAP completed.     Type of outreach:    AAP completed.     Questions for provider review:    None                                                                                                                                    Julia Mayfield [facility]       Chart not routed.

## 2018-10-16 DIAGNOSIS — I10 HTN, GOAL BELOW 140/90: ICD-10-CM

## 2018-10-16 DIAGNOSIS — R60.0 BILATERAL LEG EDEMA: ICD-10-CM

## 2018-10-17 DIAGNOSIS — J45.30 MILD PERSISTENT ASTHMA WITHOUT COMPLICATION: Chronic | ICD-10-CM

## 2018-10-17 DIAGNOSIS — R05.9 COUGH: ICD-10-CM

## 2018-10-17 NOTE — TELEPHONE ENCOUNTER
"Requested Prescriptions   Pending Prescriptions Disp Refills     budesonide-formoterol (SYMBICORT) 160-4.5 MCG/ACT Inhaler  Last Written Prescription Date:  06/08/17  Last Fill Quantity: 3,  # refills: 1   Last office visit: 7/19/2018 with prescribing provider:  07/19/18   Future Office Visit:   Next 5 appointments (look out 90 days)     Nov 09, 2018  2:40 PM CST   SHORT with Vivian Blue MD   [facility]    [address]  [facility] [id number] [phone]                  3 Inhaler 1     Sig: Inhale 2 puffs into the lungs 2 times daily    Inhaled Steroids Protocol Failed    10/17/2018  3:37 PM       Failed - Asthma control assessment score within normal limits in last 6 months    Please review ACT score.          Passed - Patient is age 12 or older       Passed - Recent (6 mo) or future (30 days) visit within the authorizing provider's specialty    Patient had office visit in the last 6 months or has a visit in the next 30 days with authorizing provider or within the authorizing provider's specialty.  See \"Patient Info\" tab in inbasket, or \"Choose Columns\" in Meds & Orders section of the refill encounter.              "

## 2018-10-19 RX ORDER — BUDESONIDE AND FORMOTEROL FUMARATE DIHYDRATE 160; 4.5 UG/1; UG/1
2 AEROSOL RESPIRATORY (INHALATION) 2 TIMES DAILY
Qty: 3 INHALER | Refills: 1 | Status: SHIPPED | OUTPATIENT
Start: 2018-10-19 | End: 2018-12-27

## 2018-10-19 NOTE — TELEPHONE ENCOUNTER
ACT Total Scores 5/2/2017 10/19/2017 7/19/2018   ACT TOTAL SCORE - - -   ASTHMA ER VISITS - - -   ASTHMA HOSPITALIZATIONS - - -   ACT TOTAL SCORE (Goal Greater than or Equal to 20) 16 11 15   In the past 12 months, how many times did you visit the emergency room for your asthma without being admitted to the hospital? 0 0 0   In the past 12 months, how many times were you hospitalized overnight because of your asthma? 0 0 0     Prescription approved per [facility] Refill Protocol.

## 2018-10-25 RX ORDER — FUROSEMIDE 40 MG
TABLET ORAL
Qty: 90 TABLET | Refills: 0 | Status: SHIPPED | OUTPATIENT
Start: 2018-10-25 | End: 2019-01-25

## 2018-10-25 NOTE — TELEPHONE ENCOUNTER
"Requested Prescriptions   Pending Prescriptions Disp Refills     furosemide (LASIX) 40 MG tablet 90 tablet 0     Si.5 - 1 tab daily as instructed    Diuretics (Including Combos) Protocol Failed    10/16/2018  5:06 PM       Failed - Blood pressure under 140/90 in past 12 months    BP Readings from Last 3 Encounters:   18 140/71   17 132/60   10/19/17 118/56                Passed - Recent (12 mo) or future (30 days) visit within the authorizing provider's specialty    Patient had office visit in the last 12 months or has a visit in the next 30 days with authorizing provider or within the authorizing provider's specialty.  See \"Patient Info\" tab in inbasket, or \"Choose Columns\" in Meds & Orders section of the refill encounter.             Passed - Patient is age 18 or older       Passed - No active pregancy on record       Passed - Normal serum creatinine on file in past 12 months    Recent Labs   Lab Test  18   1209   CR  0.73             Passed - Normal serum potassium on file in past 12 months    Recent Labs   Lab Test  18   1209   POTASSIUM  4.0                   Passed - Normal serum sodium on file in past 12 months    Recent Labs   Lab Test  18   1209   NA  142             Passed - No positive pregnancy test in past 12 months        Next 5 appointments (look out 90 days)     2018  2:40 PM CST   SHORT with Vivian Blue MD   [facility] ([facility])    [address]  [facility] [id number] [phone]                Last office visit 18.  Prescription approved per G Refill Protocol.  Franca Watters RN    "

## 2018-11-02 DIAGNOSIS — I10 HTN, GOAL BELOW 140/90: ICD-10-CM

## 2018-11-02 DIAGNOSIS — E78.5 HYPERLIPIDEMIA LDL GOAL <130: Chronic | ICD-10-CM

## 2018-11-02 LAB
BASOPHILS # BLD AUTO: 0 10E9/L (ref 0–0.2)
BASOPHILS NFR BLD AUTO: 0.3 %
DIFFERENTIAL METHOD BLD: ABNORMAL
EOSINOPHIL # BLD AUTO: 0.2 10E9/L (ref 0–0.7)
EOSINOPHIL NFR BLD AUTO: 2.3 %
ERYTHROCYTE [DISTWIDTH] IN BLOOD BY AUTOMATED COUNT: 16.4 % (ref 10–15)
HCT VFR BLD AUTO: 39.2 % (ref 35–47)
HGB BLD-MCNC: 14.3 G/DL (ref 11.7–15.7)
LYMPHOCYTES # BLD AUTO: 2.8 10E9/L (ref 0.8–5.3)
LYMPHOCYTES NFR BLD AUTO: 40.3 %
MCH RBC QN AUTO: 35 PG (ref 26.5–33)
MCHC RBC AUTO-ENTMCNC: 36.5 G/DL (ref 31.5–36.5)
MCV RBC AUTO: 96 FL (ref 78–100)
MONOCYTES # BLD AUTO: 0.7 10E9/L (ref 0–1.3)
MONOCYTES NFR BLD AUTO: 10.2 %
NEUTROPHILS # BLD AUTO: 3.3 10E9/L (ref 1.6–8.3)
NEUTROPHILS NFR BLD AUTO: 46.9 %
PLATELET # BLD AUTO: 244 10E9/L (ref 150–450)
RBC # BLD AUTO: 4.09 10E12/L (ref 3.8–5.2)
WBC # BLD AUTO: 7 10E9/L (ref 4–11)

## 2018-11-02 PROCEDURE — 80061 LIPID PANEL: CPT | Performed by: INTERNAL MEDICINE

## 2018-11-02 PROCEDURE — 80053 COMPREHEN METABOLIC PANEL: CPT | Performed by: INTERNAL MEDICINE

## 2018-11-02 PROCEDURE — 82043 UR ALBUMIN QUANTITATIVE: CPT | Performed by: INTERNAL MEDICINE

## 2018-11-02 PROCEDURE — 85025 COMPLETE CBC W/AUTO DIFF WBC: CPT | Performed by: INTERNAL MEDICINE

## 2018-11-02 PROCEDURE — 84443 ASSAY THYROID STIM HORMONE: CPT | Performed by: INTERNAL MEDICINE

## 2018-11-02 PROCEDURE — 84439 ASSAY OF FREE THYROXINE: CPT | Performed by: INTERNAL MEDICINE

## 2018-11-02 PROCEDURE — 36415 COLL VENOUS BLD VENIPUNCTURE: CPT | Performed by: INTERNAL MEDICINE

## 2018-11-03 LAB
ALBUMIN SERPL-MCNC: 4.2 G/DL (ref 3.4–5)
ALP SERPL-CCNC: 82 U/L (ref 40–150)
ALT SERPL W P-5'-P-CCNC: 29 U/L (ref 0–50)
ANION GAP SERPL CALCULATED.3IONS-SCNC: 13 MMOL/L (ref 3–14)
AST SERPL W P-5'-P-CCNC: 28 U/L (ref 0–45)
BILIRUB SERPL-MCNC: 0.6 MG/DL (ref 0.2–1.3)
BUN SERPL-MCNC: 20 MG/DL (ref 7–30)
CALCIUM SERPL-MCNC: 9.5 MG/DL (ref 8.5–10.1)
CHLORIDE SERPL-SCNC: 105 MMOL/L (ref 94–109)
CHOLEST SERPL-MCNC: 184 MG/DL
CO2 SERPL-SCNC: 23 MMOL/L (ref 20–32)
CREAT SERPL-MCNC: 0.9 MG/DL (ref 0.52–1.04)
CREAT UR-MCNC: 115 MG/DL
GFR SERPL CREATININE-BSD FRML MDRD: 60 ML/MIN/1.7M2
GLUCOSE SERPL-MCNC: 79 MG/DL (ref 70–99)
HDLC SERPL-MCNC: 71 MG/DL
LDLC SERPL CALC-MCNC: 99 MG/DL
MICROALBUMIN UR-MCNC: 37 MG/L
MICROALBUMIN/CREAT UR: 31.91 MG/G CR (ref 0–25)
NONHDLC SERPL-MCNC: 113 MG/DL
POTASSIUM SERPL-SCNC: 4.1 MMOL/L (ref 3.4–5.3)
PROT SERPL-MCNC: 7.9 G/DL (ref 6.8–8.8)
SODIUM SERPL-SCNC: 141 MMOL/L (ref 133–144)
T4 FREE SERPL-MCNC: 1.12 NG/DL (ref 0.76–1.46)
TRIGL SERPL-MCNC: 72 MG/DL
TSH SERPL DL<=0.005 MIU/L-ACNC: 4.72 MU/L (ref 0.4–4)

## 2018-11-09 ENCOUNTER — OFFICE VISIT (OUTPATIENT)
Dept: INTERNAL MEDICINE | Facility: CLINIC | Age: 83
End: 2018-11-09
Payer: COMMERCIAL

## 2018-11-09 VITALS
WEIGHT: 246 LBS | DIASTOLIC BLOOD PRESSURE: 64 MMHG | SYSTOLIC BLOOD PRESSURE: 114 MMHG | OXYGEN SATURATION: 95 % | TEMPERATURE: 97.8 F | HEART RATE: 66 BPM | RESPIRATION RATE: 20 BRPM | BODY MASS INDEX: 39.53 KG/M2 | HEIGHT: 66 IN

## 2018-11-09 DIAGNOSIS — E78.5 HYPERLIPIDEMIA LDL GOAL <130: Chronic | ICD-10-CM

## 2018-11-09 DIAGNOSIS — E21.3 HYPERPARATHYROIDISM (H): Primary | Chronic | ICD-10-CM

## 2018-11-09 DIAGNOSIS — R60.0 BILATERAL LEG EDEMA: ICD-10-CM

## 2018-11-09 DIAGNOSIS — E03.8 SUBCLINICAL HYPOTHYROIDISM: ICD-10-CM

## 2018-11-09 DIAGNOSIS — J44.9 CHRONIC OBSTRUCTIVE PULMONARY DISEASE, UNSPECIFIED COPD TYPE (H): ICD-10-CM

## 2018-11-09 DIAGNOSIS — I10 HTN, GOAL BELOW 140/90: ICD-10-CM

## 2018-11-09 PROCEDURE — 99214 OFFICE O/P EST MOD 30 MIN: CPT | Performed by: INTERNAL MEDICINE

## 2018-11-09 RX ORDER — ATENOLOL 100 MG/1
100 TABLET ORAL DAILY
Qty: 90 TABLET | Refills: 1 | Status: SHIPPED | OUTPATIENT
Start: 2018-11-09 | End: 2019-02-25

## 2018-11-09 RX ORDER — TERAZOSIN 2 MG/1
2 CAPSULE ORAL AT BEDTIME
Qty: 90 CAPSULE | Refills: 1 | Status: SHIPPED | OUTPATIENT
Start: 2018-11-09 | End: 2019-03-21

## 2018-11-09 RX ORDER — LOSARTAN POTASSIUM 100 MG/1
100 TABLET ORAL DAILY
Qty: 90 TABLET | Refills: 1 | Status: SHIPPED | OUTPATIENT
Start: 2018-11-09 | End: 2019-01-25

## 2018-11-09 RX ORDER — PRAVASTATIN SODIUM 20 MG
20 TABLET ORAL DAILY
Qty: 90 TABLET | Refills: 1 | Status: SHIPPED | OUTPATIENT
Start: 2018-11-09 | End: 2019-01-25

## 2018-11-09 RX ORDER — AMLODIPINE BESYLATE 10 MG/1
5 TABLET ORAL DAILY
Qty: 90 TABLET | Refills: 1 | Status: SHIPPED | OUTPATIENT
Start: 2018-11-09 | End: 2019-03-25

## 2018-11-09 ASSESSMENT — ANXIETY QUESTIONNAIRES
5. BEING SO RESTLESS THAT IT IS HARD TO SIT STILL: NOT AT ALL
3. WORRYING TOO MUCH ABOUT DIFFERENT THINGS: NOT AT ALL
1. FEELING NERVOUS, ANXIOUS, OR ON EDGE: SEVERAL DAYS
6. BECOMING EASILY ANNOYED OR IRRITABLE: SEVERAL DAYS
7. FEELING AFRAID AS IF SOMETHING AWFUL MIGHT HAPPEN: NOT AT ALL
2. NOT BEING ABLE TO STOP OR CONTROL WORRYING: NOT AT ALL
GAD7 TOTAL SCORE: 2
IF YOU CHECKED OFF ANY PROBLEMS ON THIS QUESTIONNAIRE, HOW DIFFICULT HAVE THESE PROBLEMS MADE IT FOR YOU TO DO YOUR WORK, TAKE CARE OF THINGS AT HOME, OR GET ALONG WITH OTHER PEOPLE: NOT DIFFICULT AT ALL

## 2018-11-09 ASSESSMENT — PATIENT HEALTH QUESTIONNAIRE - PHQ9
SUM OF ALL RESPONSES TO PHQ QUESTIONS 1-9: 5
5. POOR APPETITE OR OVEREATING: NOT AT ALL

## 2018-11-09 NOTE — PROGRESS NOTES
Dr Mendes's note      Patient's instructions / PLAN:                                                        Plan:  1. Continue same meds, same doses for now   2. Please make a lab appointment for non fasting labs  May  3. Please make an appointment few days after the labs to discuss about the results.   4. I took away the Metoprolol from the med list      ASSESSMENT & PLAN:                                                      (E21.3) Hyperparathyroidism (H)  (primary encounter diagnosis)  Comment:   Plan: Parathyroid Hormone Intact, Vitamin D         Deficiency            (J44.9) Chronic obstructive pulmonary disease, unspecified COPD type (H)  Comment: stable   Plan: Continue same meds, same doses for now     (I10) HTN, goal below 140/90  Comment: Controlled    Plan: amLODIPine (NORVASC) 10 MG tablet, losartan         (COZAAR) 100 MG tablet, pravastatin (PRAVACHOL)        20 MG tablet, terazosin (HYTRIN) 2 MG capsule,         atenolol (TENORMIN) 100 MG tablet,         Comprehensive metabolic panel            (E78.5) Hyperlipidemia LDL goal <130  Comment:   Plan: amLODIPine (NORVASC) 10 MG tablet, losartan         (COZAAR) 100 MG tablet, pravastatin (PRAVACHOL)        20 MG tablet, terazosin (HYTRIN) 2 MG capsule,         atenolol (TENORMIN) 100 MG tablet,         Comprehensive metabolic panel            (R60.0) Bilateral leg edema  Comment:   Plan: amLODIPine (NORVASC) 10 MG tablet, losartan         (COZAAR) 100 MG tablet, pravastatin (PRAVACHOL)        20 MG tablet, terazosin (HYTRIN) 2 MG capsule,         atenolol (TENORMIN) 100 MG tablet,         Comprehensive metabolic panel            (E03.9) Subclinical hypothyroidism  Comment:   Plan: TSH with free T4 reflex               Chief complaint:                                                      Follow up chronic medical problems      SUBJECTIVE:   Marino Prajapati is a 83 year old female who presents to clinic today for the following health  "issues:    Subclinical Hypothyroidism   -- talked about and we will not start meds    COPD  -- she sees dr Moreau and dr Oquendo   -- chr cough  --Compliant with inhalers and nebulizer    HTN  -- Meds: she doesn't know exactly what she takes: she thinks she might take Atenolol and Metoprolol.  I prescribed metoprolol when there was a shortage of atenolol.     Hyperlipidemia Follow-Up      Rate your low fat/cholesterol diet?: fair    Taking statin?  Yes, no muscle aches from statin    Other lipid medications/supplements?:  [place] caps    Hypertension Follow-up      Outpatient blood pressures are being checked at home.  Results are good.    Low Salt Diet: not monitoring      Amount of exercise or physical activity: None    Problems taking medications regularly: No    Medication side effects: none    Diet: regular (no restrictions)    LOV: Plan:  1. Mucinex 600 mg twice a day   2. Boil water and breath the warm vapors 2-3 times a day to try to open up the sinuses.   3. Continue same meds, same doses for now   4. Please make a lab appointment for fasting labs in November 5. Please make an appointment few days after the labs to discuss about the results.   6.The following vaccines are recommended for you.   Medicare covers better if you have these vaccines at the pharmacy:  -- Prevnar 13 ( pneumonia vaccine)  -- Tetanus vaccine   -- Shingerix vaccine - the newest vaccine for shingles       Review of Systems:                                                      ROS: negative for fever, chills, cough, wheezes, chest pain,  vomiting, abdominal pain, leg swelling  pos for chr SOB sec COPD        OBJECTIVE:           Blood pressure 114/64, pulse 66, temperature 97.8  F (36.6  C), temperature source Oral, resp. rate 20, height 5' 6\" (1.676 m), weight 246 lb (111.6 kg), SpO2 95 %, not currently breastfeeding.       NAD, appears comfortable  Skin: no rashes   HEENT: PERRLA, EOMI, pink conjunctiva, anicteric sclerae, bilateral " tympanic membranes are clinically normal, oropharynx is normal color  Neck: supple, no JVD,  No thyroidmegaly. Lymph nodes nonpalpable cervical and supraclavicular.  Chest: clear to auscultation bilaterally, good respiratory effort  Heart: S1 S2, RRR, no mgr appreciated  Abdomen: soft, not tender,   Extremities: chr lymphedema   Neurologic: A, Ox3, no focal signs appreciated    PMHx: reviewed  Past Medical History:   Diagnosis Date     Anemia      CARDIOVASCULAR SCREENING; LDL GOAL LESS THAN 160      Colon polyps 2010    needs colonoscopy 2013     DVT (deep venous thrombosis) (H) 2007    remote history of DVT while she was on BCP ,coumadin stopped after retroperitoneal/buttock hematoma in 10/11 . On ASA only     Gastro-oesophageal reflux disease      HTN, goal below 140/90      Hyperlipidemia LDL goal <130 1/22/2016     Kidney stone      Osteoarthritis     knees and hip     Osteoporosis      Postnasal drip      S/P total knee arthroplasty      Thrombosis of leg       PSHx: reviewed  Past Surgical History:   Procedure Laterality Date     ARTHROPLASTY HIP  8/1/2013    Procedure: ARTHROPLASTY HIP;  RIGHT TOTAL HIP ARTHROPLASTY;  Surgeon: Rufino Tong MD;  Location:  OR     ARTHROPLASTY HIP Left 12/12/2014    Procedure: ARTHROPLASTY HIP;  Surgeon: Rufino Tong MD;  Location:  OR     ARTHROPLASTY KNEE  10/22/2012    Procedure: ARTHROPLASTY KNEE;  Right Total knee Arthroplasty  ;  Surgeon: Jagdeep Virgen MD;  Location:  OR     ARTHROPLASTY KNEE  5/23/2013    Procedure: ARTHROPLASTY KNEE;  LEFT TOTAL KNEE ARTHROPLASTY (SMITH & NEPHEW)^;  Surgeon: Rufino Tong MD;  Location:  OR     C PREP FACE/ORAL PROST PALATAL LIFT       CHOLECYSTECTOMY       CYSTOSCOPY, RETROGRADES, INSERT STENT URETER(S), COMBINED  7/16/2012    Procedure: COMBINED CYSTOSCOPY, RETROGRADES, INSERT STENT URETER(S);  Cystoscopy, Right retrogrades, Right Stent Placement;  Surgeon: Mauricio Velazquez MD;  Location:  OR      LASER HOLMIUM LITHOTRIPSY URETER(S), INSERT STENT, COMBINED  8/8/2012    Procedure: COMBINED CYSTOSCOPY, URETEROSCOPY, LASER HOLMIUM LITHOTRIPSY URETER(S), INSERT STENT;  Cystoscopy, Stent Removal, Right Ureteroscopy with Holmium Laser, Stone removal ;  Surgeon: Mauricio Velazquez MD;  Location: [facility] OR     LIPOSUCTION (LOCATION)      tummy     STRIP VEIN          Meds: reviewed  Current Outpatient Prescriptions   Medication Sig Dispense Refill     acetaminophen (TYLENOL) 325 MG tablet Take 1-2 tablets (325-650 mg) by mouth every 6 hours as needed for mild pain 250 tablet 11     albuterol (PROAIR HFA/PROVENTIL HFA/VENTOLIN HFA) 108 (90 Base) MCG/ACT Inhaler Inhale 2 puffs into the lungs every 6 hours as needed for shortness of breath / dyspnea or wheezing 1 Inhaler 3     amLODIPine (NORVASC) 10 MG tablet Take 0.5 tablets (5 mg) by mouth daily 90 tablet 1     aspirin 81 MG tablet Take 81 mg by mouth daily  30 tablet      atenolol (TENORMIN) 100 MG tablet Take 1 tablet (100 mg) by mouth daily 90 tablet 1     budesonide-formoterol (SYMBICORT) 160-4.5 MCG/ACT Inhaler Inhale 2 puffs into the lungs 2 times daily 3 Inhaler 1     cholecalciferol (VITAMIN D) 1000 UNIT tablet Take 2 tablets (2,000 Units) by mouth daily 100 tablet 3     Coenzyme Q10 (COQ10) 30 MG CAPS  30 capsule      Cranberry Extract 250 MG TABS        Cyanocobalamin (B-12) 1000 MCG TBCR Take 1,000 mcg by mouth daily 100 tablet 1     fluticasone (FLONASE) 50 MCG/ACT spray Spray 1-2 sprays into both nostrils daily 1 Bottle 11     furosemide (LASIX) 40 MG tablet 0.5 - 1 tab daily as instructed 90 tablet 0     Garlic (GARLIC 1500) 1500 MG CAPS        ipratropium - albuterol 0.5 mg/2.5 mg/3 mL (DUONEB) 0.5-2.5 (3) MG/3ML neb solution Take 1 vial (3 mLs) by nebulization every 6 hours as needed for shortness of breath / dyspnea or wheezing 100 vial 1     losartan (COZAAR) 100 MG tablet Take 1 tablet (100 mg) by mouth daily 90 tablet 2     metoprolol  succinate (TOPROL-XL) 100 MG 24 hr tablet Take 1 tablet (100 mg) by mouth daily 90 tablet 1     Multiple Minerals (CALCIUM-MAGNESIUM-ZINC) TABS        Multiple Vitamins-Minerals (MULTIVITAMIN & MINERAL PO) Take 1 tablet by mouth daily.       Nutritional Supplements ([place] OIL) CAPS        pravastatin (PRAVACHOL) 20 MG tablet Take 1 tablet (20 mg) by mouth daily 90 tablet 0     terazosin (HYTRIN) 2 MG capsule Take 1 capsule (2 mg) by mouth At Bedtime 90 capsule 0     tiotropium (SPIRIVA HANDIHALER) 18 MCG capsule Inhale contents of one capsule daily. 90 capsule 0     traMADol (ULTRAM) 50 MG tablet TAKE 1 TABLET BY MOUTH TWICE DAILY AS NEEDED FOR PAIN 120 tablet 0     vitamin  B complex with vitamin C (VITAMIN  B COMPLEX) TABS Take 1 tablet by mouth daily  0     ascorbic acid (VITAMIN C) 500 MG tablet Take 500 mg by mouth daily  100 tablet 12     desonide (DESOWEN) 0.05 % cream Apply  topically 2 times daily. 30 g 0     guaiFENesin (MUCINEX) 600 MG 12 hr tablet Take 1 tablet (600 mg) by mouth 2 times daily as needed for congestion 60 tablet 1     omeprazole 20 MG tablet Take 1 tablet (20 mg) by mouth daily Take 30-60 minutes before a meal. 90 tablet 0     order for DME Equipment being ordered: 4 wheeled walker with hand brakes and seat 1 each 0     order for DME Equipment being ordered: 1: Custom/alternative BLE full leg velco/buckling compression garment 1 each 0     order for DME Equipment being ordered: 1: Gradient Compression Wraps; 2: BLE knee high 20-30 mm Hg compression stockings; 3: BLE thigh high 20-30 mm Hg compression stockings; 4: Cast Boots 1 each 0     order for DME Equipment being ordered: Nebulizer and neb supplies (tubing, etc) 1 Device 0     [DISCONTINUED] albuterol (PROAIR HFA, PROVENTIL HFA, VENTOLIN HFA) 108 (90 BASE) MCG/ACT inhaler Inhale 2 puffs into the lungs every 6 hours as needed for shortness of breath / dyspnea or wheezing 1 Inhaler 1       Soc Hx: reviewed  Fam Hx:  reviewed          Vivian Mendes MD  Internal Medicine

## 2018-11-09 NOTE — PATIENT INSTRUCTIONS
Plan:  1. Continue same meds, same doses for now   2. Please make a lab appointment for non fasting labs  May  3. Please make an appointment few days after the labs to discuss about the results.   4. I took away the Metoprolol from the med list

## 2018-11-09 NOTE — MR AVS SNAPSHOT
After Visit Summary   11/9/2018    Marino Prajapati    MRN: [id number]           Patient Information     Date Of Birth          1935        Visit Information        Provider Department      11/9/2018 2:40 PM Vivian Blue MD [facility]        Today's Diagnoses     Hyperparathyroidism (H)    -  1    HTN, goal below 140/90        Hyperlipidemia LDL goal <130        Bilateral leg edema        Chronic obstructive pulmonary disease, unspecified COPD type (H)          Care Instructions    Plan:  1. Continue same meds, same doses for now   2. Please make a lab appointment for non fasting labs    3. Please make an appointment few days after the labs to discuss about the results.   4. I took away the Metoprolol from the med list          Follow-ups after your visit        Future tests that were ordered for you today     Open Future Orders        Priority Expected Expires Ordered    Comprehensive metabolic panel Routine  11/9/2019 11/9/2018            Who to contact     If you have questions or need follow up information about today's clinic visit or your schedule please contact [facility] directly at [phone].  Normal or non-critical lab and imaging results will be communicated to you by MyChart, letter or phone within 4 business days after the clinic has received the results. If you do not hear from us within 7 days, please contact the clinic through MyChart or phone. If you have a critical or abnormal lab result, we will notify you by phone as soon as possible.  Submit refill requests through [employer] or call your pharmacy and they will forward the refill request to us. Please allow 3 business days for your refill to be completed.          Additional Information About Your Visit        Care EveryWhere ID     This is your Care EveryWhere ID. This could be used by other organizations to access your [place] medical records  [id number]        Your  "Vitals Were     Pulse Temperature Respirations Height Pulse Oximetry BMI (Body Mass Index)    66 97.8  F (36.6  C) (Oral) 20 5' 6\" (1.676 m) 95% 39.71 kg/m2       Blood Pressure from Last 3 Encounters:   11/09/18 114/64   07/19/18 140/71   12/01/17 132/60    Weight from Last 3 Encounters:   11/09/18 246 lb (111.6 kg)   07/19/18 242 lb 6.4 oz (110 kg)   12/01/17 239 lb (108.4 kg)                 Where to get your medicines      These medications were sent to [facility] [id number] [place] [address] - [address] AT [place] [address] [address]  [address], [facility] [address]     Phone:  [phone]     amLODIPine 10 MG tablet    atenolol 100 MG tablet    losartan 100 MG tablet    pravastatin 20 MG tablet    terazosin 2 MG capsule          Primary Care Provider Office Phone # Fax #    Vivian Blue [phone]       [address] [facility] [address]        Equal Access to Services     YNES DEMARCO AH: Hadii aad ku hadasho Soomaali, waaxda luqadaha, qaybta kaalmada adeegyada, waxay idiin hayaan adeeg kharash la'aan ah. So [facility] [phone].    ATENCIÓN: Si habla español, tiene a hare disposición servicios gratuitos de asistencia lingüística. Pantera al [phone].    We comply with applicable federal civil rights laws and [place] laws. We do not discriminate on the basis of race, color, national origin, age, disability, sex, sexual orientation, or gender identity.            Thank you!     Thank you for choosing [facility]  for your care. Our goal is always to provide you with excellent care. Hearing back from our patients is one way we can continue to improve our services. Please take a few minutes to complete the written survey that you may receive in the mail after your visit with us. Thank you!             Your Updated Medication List - Protect others around you: Learn how to safely use, store and throw away your medicines at " www.disposemymeds.org.          This list is accurate as of 11/9/18  4:06 PM.  Always use your most recent med list.                   Brand Name Dispense Instructions for use Diagnosis    albuterol 108 (90 Base) MCG/ACT inhaler    PROAIR HFA/PROVENTIL HFA/VENTOLIN HFA    1 Inhaler    Inhale 2 puffs into the lungs every 6 hours as needed for shortness of breath / dyspnea or wheezing    Mild persistent asthma without complication       amLODIPine 10 MG tablet    NORVASC    90 tablet    Take 0.5 tablets (5 mg) by mouth daily    HTN, goal below 140/90, Hyperlipidemia LDL goal <130, Bilateral leg edema       ascorbic acid 500 MG tablet    VITAMIN C    100 tablet    Take 500 mg by mouth daily        aspirin 81 MG tablet     30 tablet    Take 81 mg by mouth daily        atenolol 100 MG tablet    TENORMIN    90 tablet    Take 1 tablet (100 mg) by mouth daily    HTN, goal below 140/90, Hyperlipidemia LDL goal <130, Bilateral leg edema       B-12 1000 MCG Tbcr     100 tablet    Take 1,000 mcg by mouth daily        budesonide-formoterol 160-4.5 MCG/ACT Inhaler    SYMBICORT    3 Inhaler    Inhale 2 puffs into the lungs 2 times daily    Cough, Mild persistent asthma without complication       Calcium-Magnesium-Zinc Tabs           cholecalciferol 1000 UNIT tablet    vitamin D3    100 tablet    Take 2 tablets (2,000 Units) by mouth daily    Cough, Mild persistent asthma without complication       CoQ10 30 MG Caps     30 capsule         Cranberry Extract 250 MG Tabs           desonide 0.05 % cream    DESOWEN    30 g    Apply  topically 2 times daily.    Eczema       fluticasone 50 MCG/ACT spray    FLONASE    1 Bottle    Spray 1-2 sprays into both nostrils daily    Postnasal drip       furosemide 40 MG tablet    LASIX    90 tablet    0.5 - 1 tab daily as instructed    Bilateral leg edema, HTN, goal below 140/90       GARLIC 1500 1500 MG Caps   Generic drug:  Garlic           guaiFENesin 600 MG 12 hr tablet    MUCINEX    60 tablet     Take 1 tablet (600 mg) by mouth 2 times daily as needed for congestion    Mild persistent asthma without complication       ipratropium - albuterol 0.5 mg/2.5 mg/3 mL 0.5-2.5 (3) MG/3ML neb solution    DUONEB    100 vial    Take 1 vial (3 mLs) by nebulization every 6 hours as needed for shortness of breath / dyspnea or wheezing    Cough, Mild persistent asthma without complication       losartan 100 MG tablet    COZAAR    90 tablet    Take 1 tablet (100 mg) by mouth daily    HTN, goal below 140/90, Hyperlipidemia LDL goal <130, Bilateral leg edema       MULTIVITAMIN & MINERAL PO      Take 1 tablet by mouth daily.        omeprazole 20 MG tablet     90 tablet    Take 1 tablet (20 mg) by mouth daily Take 30-60 minutes before a meal.    Nausea       * order for DME     1 Device    Equipment being ordered: Nebulizer and neb supplies (tubing, etc)    Cough, Mild persistent asthma without complication       * order for DME     1 each    Equipment being ordered: 1: Gradient Compression Wraps; 2: BLE knee high 20-30 mm Hg compression stockings; 3: BLE thigh high 20-30 mm Hg compression stockings; 4: Cast Boots    Bilateral leg edema       * order for DME     1 each    Equipment being ordered: 1: Custom/alternative BLE full leg velco/buckling compression garment    Lymphedema       * order for DME     1 each    Equipment being ordered: 4 wheeled walker with hand brakes and seat    Primary osteoarthritis of both knees       pravastatin 20 MG tablet    PRAVACHOL    90 tablet    Take 1 tablet (20 mg) by mouth daily    Hyperlipidemia LDL goal <130, HTN, goal below 140/90, Bilateral leg edema       [place] Oil Caps           terazosin 2 MG capsule    HYTRIN    90 capsule    Take 1 capsule (2 mg) by mouth At Bedtime    HTN, goal below 140/90, Hyperlipidemia LDL goal <130, Bilateral leg edema       tiotropium 18 MCG capsule    SPIRIVA HANDIHALER    90 capsule    Inhale contents of one capsule daily.    Mild persistent asthma  without complication       traMADol 50 MG tablet    ULTRAM    120 tablet    TAKE 1 TABLET BY MOUTH TWICE DAILY AS NEEDED FOR PAIN    Chronic pain of both knees       TYLENOL 325 MG tablet   Generic drug:  acetaminophen     250 tablet    Take 1-2 tablets (325-650 mg) by mouth every 6 hours as needed for mild pain        vitamin B complex with vitamin C Tabs tablet      Take 1 tablet by mouth daily        * Notice:  This list has 4 medication(s) that are the same as other medications prescribed for you. Read the directions carefully, and ask your doctor or other care provider to review them with you.

## 2018-11-10 ASSESSMENT — ASTHMA QUESTIONNAIRES: ACT_TOTALSCORE: 19

## 2018-11-10 ASSESSMENT — ANXIETY QUESTIONNAIRES: GAD7 TOTAL SCORE: 2

## 2018-11-15 ENCOUNTER — TELEPHONE (OUTPATIENT)
Dept: INTERNAL MEDICINE | Facility: CLINIC | Age: 83
End: 2018-11-15

## 2018-11-15 DIAGNOSIS — E03.9 HYPOTHYROIDISM, UNSPECIFIED TYPE: Primary | ICD-10-CM

## 2018-11-15 RX ORDER — LEVOTHYROXINE SODIUM 50 UG/1
50 TABLET ORAL DAILY
Qty: 90 TABLET | Refills: 0 | Status: SHIPPED | OUTPATIENT
Start: 2018-11-15 | End: 2019-04-09

## 2018-11-15 NOTE — TELEPHONE ENCOUNTER
Pt calls stating she would like to start Thyroid medication. This was discussed at last OV on 11/9.   She tires easily and is chilly all the time.     TSH   Date Value Ref Range Status   11/02/2018 4.72 (H) 0.40 - 4.00 mU/L Final   11/28/2017 4.08 (H) 0.40 - 4.00 mU/L Final   01/21/2016 3.74 0.40 - 4.00 mU/L Final   11/10/2011 3.48 mcU/mL Final     T4 Free   Date Value Ref Range Status   11/02/2018 1.12 0.76 - 1.46 ng/dL Final   11/28/2017 1.09 0.76 - 1.46 ng/dL Final

## 2018-11-16 NOTE — TELEPHONE ENCOUNTER
Rx sent  She needs nonfasting labs in January and a few days later an appointment with  Dr. Mendes

## 2018-11-28 NOTE — TELEPHONE ENCOUNTER
Patient calls to report she doesn't want to take medication for her thyroid and wants it reflected in her chart States she originally was going to but decided against it. States she already takes so much and with different instructions. States she's 83 years old and wants to just see what happens and will discuss with provider at visit in January.    Routing to provider for review.

## 2018-12-05 ENCOUNTER — TELEPHONE (OUTPATIENT)
Dept: INTERNAL MEDICINE | Facility: CLINIC | Age: 83
End: 2018-12-05

## 2018-12-05 NOTE — TELEPHONE ENCOUNTER
Yes, not watching her sodium alone could have made her swelling much worse. So she should lower her salt/sodium, elevate her legs for 7 days she should take her Furosemide twice a day. Does she have compression stockings?

## 2018-12-05 NOTE — TELEPHONE ENCOUNTER
Patient advised.  States she has been eating more salty foods recently, like crackers.  She will really try to watch her intake of salt, will work harder on elevating her feet and will take furosemide twice daily for the next 7 days.    She doesn't know what happened to her compression stockings but states they were the wrong size anyway.  Said not to bother ordering any more because she cannot even put on a regular stocking and her S.O. Has a bad back.  There is no one to help her put them on and off.  BRANDEE Chowdary R.N.

## 2018-12-05 NOTE — TELEPHONE ENCOUNTER
Pt states she always has Edema, but it is getting worse. Left leg is more than Right. Swelling is over the knee up to hip on left side.   No pain. Just tightness.    Has COPD hx, always is short of breath, not worse than her usual.     Takes Furosemide 40 mg daily.     Has Diagnosis of Lymphedema.     Has done Lymphedema therapy before, in 2016, but she states she has no one at home, to help wrap.    Advised to elevated a few times daily. She states she will start.     She doesn't watch her Salt/Sodium in her diet. She doesn't think this will help. Advised her that not watching her Sodium intake could make her swelling worse. She states she will try to do this.     Please advise.

## 2018-12-12 DIAGNOSIS — G89.29 CHRONIC PAIN OF BOTH KNEES: ICD-10-CM

## 2018-12-12 DIAGNOSIS — M25.562 CHRONIC PAIN OF BOTH KNEES: ICD-10-CM

## 2018-12-12 DIAGNOSIS — M25.561 CHRONIC PAIN OF BOTH KNEES: ICD-10-CM

## 2018-12-12 NOTE — TELEPHONE ENCOUNTER
Requested Prescriptions   Pending Prescriptions Disp Refills     traMADol (ULTRAM) 50 MG tablet [Pharmacy Med Name: TRAMADOL 50MG TABLETS] 120 tablet 0     Sig: TAKE 1 TABLET BY MOUTH TWICE DAILY AS NEEDED FOR PAIN    There is no refill protocol information for this order      Last Written Prescription Date:  09/08/2018  Last Fill Quantity: 120,  # refills: 0   Last office visit: 11/9/2018 with prescribing provider:     Future Office Visit:

## 2018-12-13 RX ORDER — TRAMADOL HYDROCHLORIDE 50 MG/1
TABLET ORAL
Qty: 120 TABLET | Refills: 0 | Status: SHIPPED | OUTPATIENT
Start: 2018-12-13 | End: 2019-02-25

## 2018-12-19 DIAGNOSIS — R05.9 COUGH: ICD-10-CM

## 2018-12-19 DIAGNOSIS — J45.30 MILD PERSISTENT ASTHMA WITHOUT COMPLICATION: Chronic | ICD-10-CM

## 2018-12-19 NOTE — TELEPHONE ENCOUNTER
"Requested Prescriptions   Pending Prescriptions Disp Refills     tiotropium (SPIRIVA HANDIHALER) 18 MCG inhaled capsule  Last Written Prescription Date:  7/7/18  Last Fill Quantity: 90,  # refills: 0   Last office visit: 11/9/2018 with prescribing provider:  Cinthya   Future Office Visit:     90 capsule 0     Sig: Inhale contents of one capsule daily.    Asthma Maintenance Inhalers - Anticholinergics Failed - 12/19/2018  2:36 PM       Failed - Asthma control assessment score within normal limits in last 6 months    Please review ACT score.          Passed - Patient is age 12 years or older       Passed - Recent (6 mo) or future (30 days) visit within the authorizing provider's specialty    Patient had office visit in the last 6 months or has a visit in the next 30 days with authorizing provider or within the authorizing provider's specialty.  See \"Patient Info\" tab in inbasket, or \"Choose Columns\" in Meds & Orders section of the refill encounter.              "

## 2018-12-19 NOTE — TELEPHONE ENCOUNTER
"Requested Prescriptions   Pending Prescriptions Disp Refills     ipratropium - albuterol 0.5 mg/2.5 mg/3 mL (DUONEB) 0.5-2.5 (3) MG/3ML neb solution  Last Written Prescription Date:  11/16/17  Last Fill Quantity: 100 vial,  # refills: 1   Last office visit: 11/9/2018 with prescribing provider:  Cinthya   Future Office Visit:     100 vial 1     Sig: Take 1 vial (3 mLs) by nebulization every 6 hours as needed for shortness of breath / dyspnea or wheezing    Short-Acting Beta Agonist Inhalers Protocol  Failed - 12/19/2018  1:16 PM       Failed - Asthma control assessment score within normal limits in last 6 months    Please review ACT score.          Passed - Patient is age 12 or older       Passed - Recent (6 mo) or future (30 days) visit within the authorizing provider's specialty    Patient had office visit in the last 6 months or has a visit in the next 30 days with authorizing provider or within the authorizing provider's specialty.  See \"Patient Info\" tab in inbasket, or \"Choose Columns\" in Meds & Orders section of the refill encounter.              "

## 2018-12-21 DIAGNOSIS — R09.82 POSTNASAL DRIP: ICD-10-CM

## 2018-12-21 RX ORDER — TIOTROPIUM BROMIDE 18 UG/1
CAPSULE ORAL; RESPIRATORY (INHALATION)
Qty: 90 CAPSULE | Refills: 0 | Status: SHIPPED | OUTPATIENT
Start: 2018-12-21 | End: 2018-12-27

## 2018-12-21 RX ORDER — IPRATROPIUM BROMIDE AND ALBUTEROL SULFATE 2.5; .5 MG/3ML; MG/3ML
1 SOLUTION RESPIRATORY (INHALATION) EVERY 6 HOURS PRN
Qty: 100 VIAL | Refills: 1 | Status: SHIPPED | OUTPATIENT
Start: 2018-12-21 | End: 2020-03-16

## 2018-12-24 ENCOUNTER — TELEPHONE (OUTPATIENT)
Dept: INTERNAL MEDICINE | Facility: CLINIC | Age: 83
End: 2018-12-24

## 2018-12-24 DIAGNOSIS — J45.30 MILD PERSISTENT ASTHMA WITHOUT COMPLICATION: Chronic | ICD-10-CM

## 2018-12-24 DIAGNOSIS — R05.9 COUGH: ICD-10-CM

## 2018-12-24 NOTE — TELEPHONE ENCOUNTER
Reason for Call:  Other prescription    Detailed comments: Patient says that there were a total of three prescriptions that were sent to [facility]. They picked up the Albuterol, but would like the Spiriva and the other medication sent to [facility] since it will be cheaper. [facility]. Could not identify the 3rd prescription. Would you be able to resend these prescriptions to [facility]. Please contact patient for the 3rd prescription. Thank you.    Phone Number Patient can be reached at: Home number on file [phone] (home)    Best Time: Anytime.    Can we leave a detailed message on this number? YES    Call taken on 12/24/2018 at 3:18 PM by Davide Andino

## 2018-12-26 RX ORDER — FLUTICASONE PROPIONATE 50 MCG
1-2 SPRAY, SUSPENSION (ML) NASAL DAILY
Qty: 1 BOTTLE | Refills: 3 | Status: SHIPPED | OUTPATIENT
Start: 2018-12-26 | End: 2019-01-10

## 2018-12-26 NOTE — TELEPHONE ENCOUNTER
"Requested Prescriptions   Pending Prescriptions Disp Refills     fluticasone (FLONASE) 50 MCG/ACT nasal spray  Last Written Prescription Date:  12/1/17  Last Fill Quantity: 1,  # refills: 11   Last office visit: 11/9/2018 with prescribing provider:  Cinthya   Future Office Visit:     1 Bottle 11     Sig: Spray 1-2 sprays into both nostrils daily    Inhaled Steroids Protocol Failed - 12/21/2018  7:48 AM       Failed - Asthma control assessment score within normal limits in last 6 months    Please review ACT score.          Passed - Patient is age 12 or older       Passed - Recent (6 mo) or future (30 days) visit within the authorizing provider's specialty    Patient had office visit in the last 6 months or has a visit in the next 30 days with authorizing provider or within the authorizing provider's specialty.  See \"Patient Info\" tab in inbasket, or \"Choose Columns\" in Meds & Orders section of the refill encounter.              "
Prescription approved per [facility] Refill Protocol.  Anum Mccarthy RN    
-continue protonix 40 units daily   - please resume po dose if patient able to swallow

## 2018-12-27 RX ORDER — BUDESONIDE AND FORMOTEROL FUMARATE DIHYDRATE 160; 4.5 UG/1; UG/1
2 AEROSOL RESPIRATORY (INHALATION) 2 TIMES DAILY
Qty: 3 INHALER | Refills: 1 | Status: SHIPPED | OUTPATIENT
Start: 2018-12-27 | End: 2019-12-05

## 2018-12-27 RX ORDER — TIOTROPIUM BROMIDE 18 UG/1
CAPSULE ORAL; RESPIRATORY (INHALATION)
Qty: 90 CAPSULE | Refills: 0 | Status: SHIPPED | OUTPATIENT
Start: 2018-12-27 | End: 2019-12-05

## 2019-01-10 ENCOUNTER — TELEPHONE (OUTPATIENT)
Dept: INTERNAL MEDICINE | Facility: CLINIC | Age: 84
End: 2019-01-10

## 2019-01-10 DIAGNOSIS — R09.82 POSTNASAL DRIP: ICD-10-CM

## 2019-01-10 RX ORDER — FLUTICASONE PROPIONATE 50 MCG
1-2 SPRAY, SUSPENSION (ML) NASAL DAILY
Qty: 3 BOTTLE | Refills: 0 | Status: SHIPPED | OUTPATIENT
Start: 2019-01-10 | End: 2020-03-06

## 2019-01-10 NOTE — TELEPHONE ENCOUNTER
"Requested Prescriptions   Pending Prescriptions Disp Refills     fluticasone (FLONASE) 50 MCG/ACT nasal spray 1 Bottle 3     Sig: Spray 1-2 sprays into both nostrils daily    Inhaled Steroids Protocol Failed - 1/10/2019  5:38 PM       Failed - Asthma control assessment score within normal limits in last 6 months    Please review ACT score.          Passed - Patient is age 12 or older       Passed - Medication is active on med list       Passed - Recent (6 mo) or future (30 days) visit within the authorizing provider's specialty    Patient had office visit in the last 6 months or has a visit in the next 30 days with authorizing provider or within the authorizing provider's specialty.  See \"Patient Info\" tab in inbasket, or \"Choose Columns\" in Meds & Orders section of the refill encounter.            Prescription approved per [facility] Refill Protocol. BRANDEE Chowdary R.N.        "

## 2019-01-10 NOTE — TELEPHONE ENCOUNTER
Marino would like [facility] for her FLUTICASONE (Flonase) on last time before she switches pharmacies. If there are any questions or problems, please call her back at [phone]. A detailed message can be left.

## 2019-01-23 ENCOUNTER — TELEPHONE (OUTPATIENT)
Dept: INTERNAL MEDICINE | Facility: CLINIC | Age: 84
End: 2019-01-23

## 2019-01-23 DIAGNOSIS — E78.5 HYPERLIPIDEMIA LDL GOAL <130: Chronic | ICD-10-CM

## 2019-01-23 DIAGNOSIS — I10 HTN, GOAL BELOW 140/90: ICD-10-CM

## 2019-01-23 DIAGNOSIS — R60.0 BILATERAL LEG EDEMA: ICD-10-CM

## 2019-01-23 NOTE — TELEPHONE ENCOUNTER
Reason for Call:  Medication or medication refill:    Do you use a [facility]?  Name of the pharmacy and phone number for the current request:  [facility] [phone]    Name of the medication requested: pravastatin 20mg, Furosimide 40mg and Losartin 100mg    Other request: This is a new pharmacy change    Can we leave a detailed message on this number? YES    Phone number patient can be reached at: Home number on file [phone] (home)    Best Time: any    Call taken on 1/23/2019 at 12:53 PM by Viridiana Anderson

## 2019-01-25 RX ORDER — FUROSEMIDE 40 MG
TABLET ORAL
Qty: 90 TABLET | Refills: 0 | Status: SHIPPED | OUTPATIENT
Start: 2019-01-25 | End: 2019-04-09

## 2019-01-25 RX ORDER — LOSARTAN POTASSIUM 100 MG/1
100 TABLET ORAL DAILY
Qty: 90 TABLET | Refills: 1 | Status: SHIPPED | OUTPATIENT
Start: 2019-01-25 | End: 2019-09-12

## 2019-01-25 RX ORDER — PRAVASTATIN SODIUM 20 MG
20 TABLET ORAL DAILY
Qty: 90 TABLET | Refills: 1 | Status: SHIPPED | OUTPATIENT
Start: 2019-01-25 | End: 2019-08-08

## 2019-01-25 NOTE — TELEPHONE ENCOUNTER
"Pharmacy Change    Requested Prescriptions   Pending Prescriptions Disp Refills     pravastatin (PRAVACHOL) 20 MG tablet 90 tablet 1     Sig: Take 1 tablet (20 mg) by mouth daily    Statins Protocol Passed - 1/25/2019 10:39 AM       Passed - LDL on file in past 12 months    Recent Labs   Lab Test 11/02/18  1215   LDL 99            Passed - No abnormal creatine kinase in past 12 months    No lab results found.            Passed - Recent (12 mo) or future (30 days) visit within the authorizing provider's specialty    Patient had office visit in the last 12 months or has a visit in the next 30 days with authorizing provider or within the authorizing provider's specialty.  See \"Patient Info\" tab in inbasket, or \"Choose Columns\" in Meds & Orders section of the refill encounter.             Passed - Medication is active on med list       Passed - Patient is age 18 or older       Passed - No active pregnancy on record       Passed - No positive pregnancy test in past 12 months        losartan (COZAAR) 100 MG tablet 90 tablet 1     Sig: Take 1 tablet (100 mg) by mouth daily    Angiotensin-II Receptors Passed - 1/25/2019 10:39 AM       Passed - Blood pressure under 140/90 in past 12 months    BP Readings from Last 3 Encounters:   11/09/18 114/64   07/19/18 140/71   12/01/17 132/60                Passed - Recent (12 mo) or future (30 days) visit within the authorizing provider's specialty    Patient had office visit in the last 12 months or has a visit in the next 30 days with authorizing provider or within the authorizing provider's specialty.  See \"Patient Info\" tab in inbasket, or \"Choose Columns\" in Meds & Orders section of the refill encounter.             Passed - Medication is active on med list       Passed - Patient is age 18 or older       Passed - No active pregnancy on record       Passed - Normal serum creatinine on file in past 12 months    Recent Labs   Lab Test 11/02/18  1215   CR 0.90            Passed - " "Normal serum potassium on file in past 12 months    Recent Labs   Lab Test 18  1215   POTASSIUM 4.1                   Passed - No positive pregnancy test in past 12 months        furosemide (LASIX) 40 MG tablet 90 tablet 0     Si.5 - 1 tab daily as instructed    Diuretics (Including Combos) Protocol Passed - 2019 10:39 AM       Passed - Blood pressure under 140/90 in past 12 months    BP Readings from Last 3 Encounters:   18 114/64   18 140/71   17 132/60                Passed - Recent (12 mo) or future (30 days) visit within the authorizing provider's specialty    Patient had office visit in the last 12 months or has a visit in the next 30 days with authorizing provider or within the authorizing provider's specialty.  See \"Patient Info\" tab in inbasket, or \"Choose Columns\" in Meds & Orders section of the refill encounter.             Passed - Medication is active on med list       Passed - Patient is age 18 or older       Passed - No active pregancy on record       Passed - Normal serum creatinine on file in past 12 months    Recent Labs   Lab Test 18  1215   CR 0.90             Passed - Normal serum potassium on file in past 12 months    Recent Labs   Lab Test 18  1215   POTASSIUM 4.1                   Passed - Normal serum sodium on file in past 12 months    Recent Labs   Lab Test 18  1215                Passed - No positive pregnancy test in past 12 months          "

## 2019-02-22 ENCOUNTER — TELEPHONE (OUTPATIENT)
Dept: INTERNAL MEDICINE | Facility: CLINIC | Age: 84
End: 2019-02-22

## 2019-02-22 DIAGNOSIS — E78.5 HYPERLIPIDEMIA LDL GOAL <130: Chronic | ICD-10-CM

## 2019-02-22 DIAGNOSIS — R60.0 BILATERAL LEG EDEMA: ICD-10-CM

## 2019-02-22 DIAGNOSIS — I10 HTN, GOAL BELOW 140/90: ICD-10-CM

## 2019-02-22 NOTE — TELEPHONE ENCOUNTER
Reason for Call:  Other prescription    Detailed comments: Pt calling to inform [facility] their fax is [phone]    Phone Number Patient can be reached at: Home number on file [phone] (home)    Best Time: anytime    Can we leave a detailed message on this number? YES    Call taken on 2/22/2019 at 3:43 PM by Kirsten Madrigal

## 2019-02-25 ENCOUNTER — TELEPHONE (OUTPATIENT)
Dept: INTERNAL MEDICINE | Facility: CLINIC | Age: 84
End: 2019-02-25

## 2019-02-25 DIAGNOSIS — G89.29 CHRONIC PAIN OF BOTH KNEES: ICD-10-CM

## 2019-02-25 DIAGNOSIS — M25.562 CHRONIC PAIN OF BOTH KNEES: ICD-10-CM

## 2019-02-25 DIAGNOSIS — M25.561 CHRONIC PAIN OF BOTH KNEES: ICD-10-CM

## 2019-02-25 RX ORDER — ATENOLOL 100 MG/1
100 TABLET ORAL DAILY
Qty: 90 TABLET | Refills: 1 | Status: SHIPPED | OUTPATIENT
Start: 2019-02-25 | End: 2019-09-11

## 2019-02-25 NOTE — TELEPHONE ENCOUNTER
"Requested Prescriptions   Pending Prescriptions Disp Refills     atenolol (TENORMIN) 100 MG tablet 90 tablet 1     Sig: Take 1 tablet (100 mg) by mouth daily    Beta-Blockers Protocol Passed - 2/25/2019  2:48 PM       Passed - Blood pressure under 140/90 in past 12 months    BP Readings from Last 3 Encounters:   11/09/18 114/64   07/19/18 140/71   12/01/17 132/60                Passed - Patient is age 6 or older       Passed - Recent (12 mo) or future (30 days) visit within the authorizing provider's specialty    Patient had office visit in the last 12 months or has a visit in the next 30 days with authorizing provider or within the authorizing provider's specialty.  See \"Patient Info\" tab in inbasket, or \"Choose Columns\" in Meds & Orders section of the refill encounter.             Passed - Medication is active on med list        Prescription approved per [facility] Refill Protocol. BRANDEE Chowdary R.N.        "

## 2019-02-25 NOTE — TELEPHONE ENCOUNTER
Reason for Call:  Medication or medication refill:    Do you use a [facility]?  Name of the pharmacy and phone number for the current request:  [facility] [phone]    Name of the medication requested: Tramadol 50MG tablet    Other request: none    Can we leave a detailed message on this number? YES    Phone number patient can be reached at: Home number on file [phone] (home)    Best Time: any    Call taken on 2/25/2019 at 11:40 AM by Julianne Epps

## 2019-02-25 NOTE — TELEPHONE ENCOUNTER
Rx goes to [facility]    Controlled Substance Refill Request for Tramadol 50 mg  Problem List Complete:  No     PROVIDER TO CONSIDER COMPLETION OF PROBLEM LIST AND OVERVIEW/CONTROLLED SUBSTANCE AGREEMENT    Last Written Prescription Date:  12/13/18  Last Fill Quantity: 120,   # refills: 0    THE MOST RECENT OFFICE VISIT MUST BE WITHIN THE PAST 3 MONTHS. AT LEAST ONE FACE TO FACE VISIT MUST OCCUR EVERY 6 MONTHS. ADDITIONAL VISITS CAN BE VIRTUAL.  (THIS STATEMENT SHOULD BE DELETED.)    Last Office Visit with [facility] primary care provider: 11/9/18    Future Office visit:     Controlled substance agreement:   Encounter-Level CSA - 05/10/2016:    Controlled Substance Agreement - Scan on 5/16/2016 10:48 AM: [place] CONTROLLED SUBSTANCE AGREEMENT, 5/10/16 (below)       Patient-Level CSA:    There are no patient-level csa.         Last Urine Drug Screen: No results found for: CDAUT, No results found for: COMDAT, No results found for: THC13, PCP13, COC13, MAMP13, OPI13, AMP13, BZO13, TCA13, MTD13, BAR13, OXY13, PPX13, BUP13     Processing:  Fax Rx to [facility]     [email or web address]       checked in past 3 months?  Yes 2/25/19 no concerns

## 2019-02-26 RX ORDER — TRAMADOL HYDROCHLORIDE 50 MG/1
TABLET ORAL
Qty: 120 TABLET | Refills: 0 | Status: ON HOLD | OUTPATIENT
Start: 2019-02-26 | End: 2019-04-12

## 2019-03-20 DIAGNOSIS — I10 HTN, GOAL BELOW 140/90: ICD-10-CM

## 2019-03-20 DIAGNOSIS — E78.5 HYPERLIPIDEMIA LDL GOAL <130: Chronic | ICD-10-CM

## 2019-03-20 DIAGNOSIS — R60.0 BILATERAL LEG EDEMA: ICD-10-CM

## 2019-03-20 NOTE — TELEPHONE ENCOUNTER
"Requested Prescriptions   Pending Prescriptions Disp Refills     terazosin (HYTRIN) 2 MG capsule  Last Written Prescription Date:  11/9/18  Last Fill Quantity: 90,  # refills: 1   Last office visit: 11/9/2018 with prescribing provider:  Cinthya   Future Office Visit:     90 capsule 1     Sig: Take 1 capsule (2 mg) by mouth At Bedtime    Alpha Blockers Passed - 3/20/2019  2:59 PM       Passed - Blood pressure under 140/90 in past 12 months    BP Readings from Last 3 Encounters:   11/09/18 114/64   07/19/18 140/71   12/01/17 132/60                Passed - Recent (12 mo) or future (30 days) visit within the authorizing provider's specialty    Patient had office visit in the last 12 months or has a visit in the next 30 days with authorizing provider or within the authorizing provider's specialty.  See \"Patient Info\" tab in inbasket, or \"Choose Columns\" in Meds & Orders section of the refill encounter.             Passed - Patient does not have Tadalafil, Vardenafil, or Sildenafil on their medication list       Passed - Medication is active on med list       Passed - Patient is 18 years of age or older       Passed - No active pregnancy on record       Passed - No positive pregnancy test in past 12 months          "

## 2019-03-21 RX ORDER — TERAZOSIN 2 MG/1
2 CAPSULE ORAL AT BEDTIME
Qty: 90 CAPSULE | Refills: 1 | Status: SHIPPED | OUTPATIENT
Start: 2019-03-21 | End: 2019-10-25

## 2019-03-21 NOTE — TELEPHONE ENCOUNTER
Prescription approved per [facility] Refill Protocol. BRANDEE Chowdary R.N.

## 2019-03-25 DIAGNOSIS — I10 HTN, GOAL BELOW 140/90: ICD-10-CM

## 2019-03-25 DIAGNOSIS — E78.5 HYPERLIPIDEMIA LDL GOAL <130: Chronic | ICD-10-CM

## 2019-03-25 DIAGNOSIS — R60.0 BILATERAL LEG EDEMA: ICD-10-CM

## 2019-03-25 RX ORDER — AMLODIPINE BESYLATE 10 MG/1
5 TABLET ORAL DAILY
Qty: 90 TABLET | Refills: 1 | Status: SHIPPED | OUTPATIENT
Start: 2019-03-25 | End: 2019-12-05

## 2019-03-25 NOTE — TELEPHONE ENCOUNTER
"Patient needs a two week supply sent to the local pharmacy [facility] in [place] as well because she is out of her amlodipine.    Requested Prescriptions   Pending Prescriptions Disp Refills     amLODIPine (NORVASC) 10 MG tablet.  Last Written Prescription Date:  11/9/18  Last Fill Quantity: 90,  # refills: 1   Last office visit: 11/9/2018 with prescribing provider:  Cinthya   Future Office Visit:       90 tablet 1     Sig: Take 0.5 tablets (5 mg) by mouth daily    Calcium Channel Blockers Protocol  Passed - 3/25/2019  2:13 PM       Passed - Blood pressure under 140/90 in past 12 months    BP Readings from Last 3 Encounters:   11/09/18 114/64   07/19/18 140/71   12/01/17 132/60                Passed - Recent (12 mo) or future (30 days) visit within the authorizing provider's specialty    Patient had office visit in the last 12 months or has a visit in the next 30 days with authorizing provider or within the authorizing provider's specialty.  See \"Patient Info\" tab in inbasket, or \"Choose Columns\" in Meds & Orders section of the refill encounter.             Passed - Medication is active on med list       Passed - Patient is age 18 or older       Passed - No active pregnancy on record       Passed - Normal serum creatinine on file in past 12 months    Recent Labs   Lab Test 11/02/18  1215   CR 0.90            Passed - No positive pregnancy test in past 12 months               "

## 2019-03-25 NOTE — TELEPHONE ENCOUNTER
Prescription approved per [facility] Refill Protocol. BRANDEE Chowdary R.N.

## 2019-04-09 ENCOUNTER — HOSPITAL ENCOUNTER (INPATIENT)
Facility: CLINIC | Age: 84
LOS: 3 days | Discharge: NURSING, REHAB OR LONG-TERM CARE | DRG: 175 | End: 2019-04-12
Attending: EMERGENCY MEDICINE | Admitting: INTERNAL MEDICINE
Payer: COMMERCIAL

## 2019-04-09 ENCOUNTER — APPOINTMENT (OUTPATIENT)
Dept: GENERAL RADIOLOGY | Facility: CLINIC | Age: 84
DRG: 175 | End: 2019-04-09
Attending: EMERGENCY MEDICINE
Payer: COMMERCIAL

## 2019-04-09 ENCOUNTER — APPOINTMENT (OUTPATIENT)
Dept: ULTRASOUND IMAGING | Facility: CLINIC | Age: 84
DRG: 175 | End: 2019-04-09
Attending: EMERGENCY MEDICINE
Payer: COMMERCIAL

## 2019-04-09 ENCOUNTER — APPOINTMENT (OUTPATIENT)
Dept: PHYSICAL THERAPY | Facility: CLINIC | Age: 84
DRG: 175 | End: 2019-04-09
Attending: INTERNAL MEDICINE
Payer: COMMERCIAL

## 2019-04-09 ENCOUNTER — APPOINTMENT (OUTPATIENT)
Dept: CT IMAGING | Facility: CLINIC | Age: 84
DRG: 175 | End: 2019-04-09
Attending: EMERGENCY MEDICINE
Payer: COMMERCIAL

## 2019-04-09 ENCOUNTER — APPOINTMENT (OUTPATIENT)
Dept: OCCUPATIONAL THERAPY | Facility: CLINIC | Age: 84
DRG: 175 | End: 2019-04-09
Attending: INTERNAL MEDICINE
Payer: COMMERCIAL

## 2019-04-09 DIAGNOSIS — K59.04 CHRONIC IDIOPATHIC CONSTIPATION: Primary | ICD-10-CM

## 2019-04-09 DIAGNOSIS — M25.561 CHRONIC PAIN OF BOTH KNEES: ICD-10-CM

## 2019-04-09 DIAGNOSIS — G89.29 CHRONIC PAIN OF BOTH KNEES: ICD-10-CM

## 2019-04-09 DIAGNOSIS — I26.99 OTHER ACUTE PULMONARY EMBOLISM WITHOUT ACUTE COR PULMONALE (H): ICD-10-CM

## 2019-04-09 DIAGNOSIS — J96.01 ACUTE RESPIRATORY FAILURE WITH HYPOXIA (H): ICD-10-CM

## 2019-04-09 DIAGNOSIS — M25.562 CHRONIC PAIN OF BOTH KNEES: ICD-10-CM

## 2019-04-09 LAB
ALBUMIN SERPL-MCNC: 3.6 G/DL (ref 3.4–5)
ALBUMIN UR-MCNC: 20 MG/DL
ALP SERPL-CCNC: 84 U/L (ref 40–150)
ALT SERPL W P-5'-P-CCNC: 22 U/L (ref 0–50)
ANION GAP SERPL CALCULATED.3IONS-SCNC: 6 MMOL/L (ref 3–14)
APPEARANCE UR: CLEAR
AST SERPL W P-5'-P-CCNC: 20 U/L (ref 0–45)
BASOPHILS # BLD AUTO: 0.1 10E9/L (ref 0–0.2)
BASOPHILS NFR BLD AUTO: 0.5 %
BILIRUB SERPL-MCNC: 0.8 MG/DL (ref 0.2–1.3)
BILIRUB UR QL STRIP: NEGATIVE
BUN SERPL-MCNC: 17 MG/DL (ref 7–30)
CALCIUM SERPL-MCNC: 8.8 MG/DL (ref 8.5–10.1)
CHLORIDE SERPL-SCNC: 104 MMOL/L (ref 94–109)
CO2 SERPL-SCNC: 29 MMOL/L (ref 20–32)
COLOR UR AUTO: YELLOW
CREAT SERPL-MCNC: 0.82 MG/DL (ref 0.52–1.04)
DIFFERENTIAL METHOD BLD: ABNORMAL
EOSINOPHIL # BLD AUTO: 0.2 10E9/L (ref 0–0.7)
EOSINOPHIL NFR BLD AUTO: 1.6 %
ERYTHROCYTE [DISTWIDTH] IN BLOOD BY AUTOMATED COUNT: 14.5 % (ref 10–15)
GFR SERPL CREATININE-BSD FRML MDRD: 67 ML/MIN/{1.73_M2}
GLUCOSE SERPL-MCNC: 96 MG/DL (ref 70–99)
GLUCOSE UR STRIP-MCNC: NEGATIVE MG/DL
HCT VFR BLD AUTO: 41.4 % (ref 35–47)
HGB BLD-MCNC: 13.7 G/DL (ref 11.7–15.7)
HGB UR QL STRIP: ABNORMAL
HYALINE CASTS #/AREA URNS LPF: 1 /LPF (ref 0–2)
IMM GRANULOCYTES # BLD: 0 10E9/L (ref 0–0.4)
IMM GRANULOCYTES NFR BLD: 0.4 %
INTERPRETATION ECG - MUSE: NORMAL
KETONES UR STRIP-MCNC: 20 MG/DL
LEUKOCYTE ESTERASE UR QL STRIP: NEGATIVE
LMWH PPP CHRO-ACNC: 0.81 IU/ML
LMWH PPP CHRO-ACNC: 1.11 IU/ML
LMWH PPP CHRO-ACNC: <0.1 IU/ML
LYMPHOCYTES # BLD AUTO: 3 10E9/L (ref 0.8–5.3)
LYMPHOCYTES NFR BLD AUTO: 28.3 %
MCH RBC QN AUTO: 29.9 PG (ref 26.5–33)
MCHC RBC AUTO-ENTMCNC: 33.1 G/DL (ref 31.5–36.5)
MCV RBC AUTO: 90 FL (ref 78–100)
MONOCYTES # BLD AUTO: 1.6 10E9/L (ref 0–1.3)
MONOCYTES NFR BLD AUTO: 15.3 %
MUCOUS THREADS #/AREA URNS LPF: PRESENT /LPF
NEUTROPHILS # BLD AUTO: 5.7 10E9/L (ref 1.6–8.3)
NEUTROPHILS NFR BLD AUTO: 53.9 %
NITRATE UR QL: NEGATIVE
NRBC # BLD AUTO: 0 10*3/UL
NRBC BLD AUTO-RTO: 0 /100
NT-PROBNP SERPL-MCNC: 273 PG/ML (ref 0–1800)
PH UR STRIP: 5.5 PH (ref 5–7)
PLATELET # BLD AUTO: 256 10E9/L (ref 150–450)
POTASSIUM SERPL-SCNC: 3.2 MMOL/L (ref 3.4–5.3)
PROT SERPL-MCNC: 7.2 G/DL (ref 6.8–8.8)
RADIOLOGIST FLAGS: ABNORMAL
RBC # BLD AUTO: 4.58 10E12/L (ref 3.8–5.2)
RBC #/AREA URNS AUTO: 2 /HPF (ref 0–2)
SODIUM SERPL-SCNC: 139 MMOL/L (ref 133–144)
SOURCE: ABNORMAL
SP GR UR STRIP: 1.03 (ref 1–1.03)
TROPONIN I SERPL-MCNC: <0.015 UG/L (ref 0–0.04)
UROBILINOGEN UR STRIP-MCNC: NORMAL MG/DL (ref 0–2)
WBC # BLD AUTO: 10.5 10E9/L (ref 4–11)
WBC #/AREA URNS AUTO: 2 /HPF (ref 0–5)

## 2019-04-09 PROCEDURE — 25000125 ZZHC RX 250: Performed by: EMERGENCY MEDICINE

## 2019-04-09 PROCEDURE — 85520 HEPARIN ASSAY: CPT | Performed by: INTERNAL MEDICINE

## 2019-04-09 PROCEDURE — 71260 CT THORAX DX C+: CPT

## 2019-04-09 PROCEDURE — 93970 EXTREMITY STUDY: CPT

## 2019-04-09 PROCEDURE — 97162 PT EVAL MOD COMPLEX 30 MIN: CPT | Mod: GP | Performed by: PHYSICAL THERAPIST

## 2019-04-09 PROCEDURE — 36415 COLL VENOUS BLD VENIPUNCTURE: CPT | Performed by: INTERNAL MEDICINE

## 2019-04-09 PROCEDURE — 25000128 H RX IP 250 OP 636: Performed by: INTERNAL MEDICINE

## 2019-04-09 PROCEDURE — 96376 TX/PRO/DX INJ SAME DRUG ADON: CPT

## 2019-04-09 PROCEDURE — 94640 AIRWAY INHALATION TREATMENT: CPT

## 2019-04-09 PROCEDURE — 25000128 H RX IP 250 OP 636: Performed by: EMERGENCY MEDICINE

## 2019-04-09 PROCEDURE — 80053 COMPREHEN METABOLIC PANEL: CPT | Performed by: EMERGENCY MEDICINE

## 2019-04-09 PROCEDURE — 97530 THERAPEUTIC ACTIVITIES: CPT | Mod: GO | Performed by: OCCUPATIONAL THERAPIST

## 2019-04-09 PROCEDURE — 71045 X-RAY EXAM CHEST 1 VIEW: CPT

## 2019-04-09 PROCEDURE — G0463 HOSPITAL OUTPT CLINIC VISIT: HCPCS

## 2019-04-09 PROCEDURE — 96365 THER/PROPH/DIAG IV INF INIT: CPT | Mod: 59

## 2019-04-09 PROCEDURE — 97530 THERAPEUTIC ACTIVITIES: CPT | Mod: GP | Performed by: PHYSICAL THERAPIST

## 2019-04-09 PROCEDURE — 83880 ASSAY OF NATRIURETIC PEPTIDE: CPT | Performed by: EMERGENCY MEDICINE

## 2019-04-09 PROCEDURE — 81001 URINALYSIS AUTO W/SCOPE: CPT | Performed by: EMERGENCY MEDICINE

## 2019-04-09 PROCEDURE — 97166 OT EVAL MOD COMPLEX 45 MIN: CPT | Mod: GO | Performed by: OCCUPATIONAL THERAPIST

## 2019-04-09 PROCEDURE — 84484 ASSAY OF TROPONIN QUANT: CPT | Performed by: EMERGENCY MEDICINE

## 2019-04-09 PROCEDURE — 99285 EMERGENCY DEPT VISIT HI MDM: CPT | Mod: 25

## 2019-04-09 PROCEDURE — 93005 ELECTROCARDIOGRAM TRACING: CPT

## 2019-04-09 PROCEDURE — 25000132 ZZH RX MED GY IP 250 OP 250 PS 637: Performed by: EMERGENCY MEDICINE

## 2019-04-09 PROCEDURE — 99223 1ST HOSP IP/OBS HIGH 75: CPT | Mod: AI | Performed by: INTERNAL MEDICINE

## 2019-04-09 PROCEDURE — 85025 COMPLETE CBC W/AUTO DIFF WBC: CPT | Performed by: EMERGENCY MEDICINE

## 2019-04-09 PROCEDURE — 25000132 ZZH RX MED GY IP 250 OP 250 PS 637: Performed by: INTERNAL MEDICINE

## 2019-04-09 PROCEDURE — 12000000 ZZH R&B MED SURG/OB

## 2019-04-09 RX ORDER — ASPIRIN 81 MG/1
81 TABLET ORAL DAILY
Status: DISCONTINUED | OUTPATIENT
Start: 2019-04-09 | End: 2019-04-10

## 2019-04-09 RX ORDER — LOSARTAN POTASSIUM 100 MG/1
100 TABLET ORAL DAILY
Status: DISCONTINUED | OUTPATIENT
Start: 2019-04-09 | End: 2019-04-12 | Stop reason: HOSPADM

## 2019-04-09 RX ORDER — ONDANSETRON 2 MG/ML
4 INJECTION INTRAMUSCULAR; INTRAVENOUS EVERY 6 HOURS PRN
Status: DISCONTINUED | OUTPATIENT
Start: 2019-04-09 | End: 2019-04-12 | Stop reason: HOSPADM

## 2019-04-09 RX ORDER — LEVOTHYROXINE SODIUM 50 UG/1
50 TABLET ORAL DAILY
COMMUNITY
End: 2019-08-08

## 2019-04-09 RX ORDER — POTASSIUM CL/LIDO/0.9 % NACL 10MEQ/0.1L
10 INTRAVENOUS SOLUTION, PIGGYBACK (ML) INTRAVENOUS
Status: DISCONTINUED | OUTPATIENT
Start: 2019-04-09 | End: 2019-04-12 | Stop reason: HOSPADM

## 2019-04-09 RX ORDER — MAGNESIUM SULFATE HEPTAHYDRATE 40 MG/ML
4 INJECTION, SOLUTION INTRAVENOUS EVERY 4 HOURS PRN
Status: DISCONTINUED | OUTPATIENT
Start: 2019-04-09 | End: 2019-04-12 | Stop reason: HOSPADM

## 2019-04-09 RX ORDER — IPRATROPIUM BROMIDE AND ALBUTEROL SULFATE 2.5; .5 MG/3ML; MG/3ML
3 SOLUTION RESPIRATORY (INHALATION) ONCE
Status: COMPLETED | OUTPATIENT
Start: 2019-04-09 | End: 2019-04-09

## 2019-04-09 RX ORDER — POTASSIUM CHLORIDE 29.8 MG/ML
20 INJECTION INTRAVENOUS
Status: DISCONTINUED | OUTPATIENT
Start: 2019-04-09 | End: 2019-04-12 | Stop reason: HOSPADM

## 2019-04-09 RX ORDER — ATENOLOL 50 MG/1
100 TABLET ORAL DAILY
Status: DISCONTINUED | OUTPATIENT
Start: 2019-04-09 | End: 2019-04-12 | Stop reason: HOSPADM

## 2019-04-09 RX ORDER — POTASSIUM CHLORIDE 1.5 G/1.58G
20 POWDER, FOR SOLUTION ORAL ONCE
Status: COMPLETED | OUTPATIENT
Start: 2019-04-09 | End: 2019-04-09

## 2019-04-09 RX ORDER — OXYCODONE HYDROCHLORIDE 5 MG/1
5 TABLET ORAL ONCE
Status: COMPLETED | OUTPATIENT
Start: 2019-04-09 | End: 2019-04-09

## 2019-04-09 RX ORDER — AMLODIPINE BESYLATE 5 MG/1
5 TABLET ORAL DAILY
Status: DISCONTINUED | OUTPATIENT
Start: 2019-04-09 | End: 2019-04-12 | Stop reason: HOSPADM

## 2019-04-09 RX ORDER — ACETAMINOPHEN 325 MG/1
325-650 TABLET ORAL EVERY 6 HOURS PRN
Status: DISCONTINUED | OUTPATIENT
Start: 2019-04-09 | End: 2019-04-12 | Stop reason: HOSPADM

## 2019-04-09 RX ORDER — BUDESONIDE AND FORMOTEROL FUMARATE DIHYDRATE 160; 4.5 UG/1; UG/1
2 AEROSOL RESPIRATORY (INHALATION) 2 TIMES DAILY
Status: DISCONTINUED | OUTPATIENT
Start: 2019-04-09 | End: 2019-04-09 | Stop reason: CLARIF

## 2019-04-09 RX ORDER — PRAVASTATIN SODIUM 20 MG
20 TABLET ORAL DAILY
Status: DISCONTINUED | OUTPATIENT
Start: 2019-04-09 | End: 2019-04-12 | Stop reason: HOSPADM

## 2019-04-09 RX ORDER — TERAZOSIN 1 MG/1
2 CAPSULE ORAL AT BEDTIME
Status: DISCONTINUED | OUTPATIENT
Start: 2019-04-09 | End: 2019-04-12 | Stop reason: HOSPADM

## 2019-04-09 RX ORDER — POTASSIUM CHLORIDE 1500 MG/1
20-40 TABLET, EXTENDED RELEASE ORAL
Status: DISCONTINUED | OUTPATIENT
Start: 2019-04-09 | End: 2019-04-12 | Stop reason: HOSPADM

## 2019-04-09 RX ORDER — TIOTROPIUM BROMIDE 18 UG/1
18 CAPSULE ORAL; RESPIRATORY (INHALATION) DAILY
Status: DISCONTINUED | OUTPATIENT
Start: 2019-04-09 | End: 2019-04-09 | Stop reason: CLARIF

## 2019-04-09 RX ORDER — TRAMADOL HYDROCHLORIDE 50 MG/1
50 TABLET ORAL 3 TIMES DAILY PRN
Status: DISCONTINUED | OUTPATIENT
Start: 2019-04-09 | End: 2019-04-12 | Stop reason: HOSPADM

## 2019-04-09 RX ORDER — NYSTATIN 100000 U/G
OINTMENT TOPICAL 2 TIMES DAILY
Status: DISCONTINUED | OUTPATIENT
Start: 2019-04-09 | End: 2019-04-12 | Stop reason: HOSPADM

## 2019-04-09 RX ORDER — POTASSIUM CHLORIDE 7.45 MG/ML
10 INJECTION INTRAVENOUS
Status: DISCONTINUED | OUTPATIENT
Start: 2019-04-09 | End: 2019-04-12 | Stop reason: HOSPADM

## 2019-04-09 RX ORDER — POTASSIUM CHLORIDE 1.5 G/1.58G
20-40 POWDER, FOR SOLUTION ORAL
Status: DISCONTINUED | OUTPATIENT
Start: 2019-04-09 | End: 2019-04-12 | Stop reason: HOSPADM

## 2019-04-09 RX ORDER — GUAIFENESIN 600 MG/1
600 TABLET, EXTENDED RELEASE ORAL 2 TIMES DAILY PRN
Status: DISCONTINUED | OUTPATIENT
Start: 2019-04-09 | End: 2019-04-09

## 2019-04-09 RX ORDER — FLUTICASONE PROPIONATE 50 MCG
1-2 SPRAY, SUSPENSION (ML) NASAL DAILY
Status: DISCONTINUED | OUTPATIENT
Start: 2019-04-09 | End: 2019-04-12 | Stop reason: HOSPADM

## 2019-04-09 RX ORDER — NALOXONE HYDROCHLORIDE 0.4 MG/ML
.1-.4 INJECTION, SOLUTION INTRAMUSCULAR; INTRAVENOUS; SUBCUTANEOUS
Status: DISCONTINUED | OUTPATIENT
Start: 2019-04-09 | End: 2019-04-12 | Stop reason: HOSPADM

## 2019-04-09 RX ORDER — LEVOTHYROXINE SODIUM 50 UG/1
50 TABLET ORAL DAILY
Status: DISCONTINUED | OUTPATIENT
Start: 2019-04-09 | End: 2019-04-09

## 2019-04-09 RX ORDER — FUROSEMIDE 40 MG
20 TABLET ORAL DAILY PRN
COMMUNITY
End: 2019-12-05 | Stop reason: DRUGHIGH

## 2019-04-09 RX ORDER — FUROSEMIDE 20 MG
20 TABLET ORAL DAILY
Status: DISCONTINUED | OUTPATIENT
Start: 2019-04-09 | End: 2019-04-09

## 2019-04-09 RX ORDER — ALBUTEROL SULFATE 90 UG/1
2 AEROSOL, METERED RESPIRATORY (INHALATION) EVERY 6 HOURS PRN
Status: DISCONTINUED | OUTPATIENT
Start: 2019-04-09 | End: 2019-04-12 | Stop reason: HOSPADM

## 2019-04-09 RX ORDER — IPRATROPIUM BROMIDE AND ALBUTEROL SULFATE 2.5; .5 MG/3ML; MG/3ML
1 SOLUTION RESPIRATORY (INHALATION) EVERY 6 HOURS PRN
Status: DISCONTINUED | OUTPATIENT
Start: 2019-04-09 | End: 2019-04-12 | Stop reason: HOSPADM

## 2019-04-09 RX ORDER — BUDESONIDE AND FORMOTEROL FUMARATE DIHYDRATE 160; 4.5 UG/1; UG/1
2 AEROSOL RESPIRATORY (INHALATION)
Status: DISCONTINUED | OUTPATIENT
Start: 2019-04-09 | End: 2019-04-12 | Stop reason: HOSPADM

## 2019-04-09 RX ORDER — ONDANSETRON 4 MG/1
4 TABLET, ORALLY DISINTEGRATING ORAL EVERY 6 HOURS PRN
Status: DISCONTINUED | OUTPATIENT
Start: 2019-04-09 | End: 2019-04-12 | Stop reason: HOSPADM

## 2019-04-09 RX ORDER — IOPAMIDOL 755 MG/ML
500 INJECTION, SOLUTION INTRAVASCULAR ONCE
Status: COMPLETED | OUTPATIENT
Start: 2019-04-09 | End: 2019-04-09

## 2019-04-09 RX ADMIN — ASPIRIN 81 MG: 81 TABLET, COATED ORAL at 11:44

## 2019-04-09 RX ADMIN — TRAMADOL HYDROCHLORIDE 50 MG: 50 TABLET, COATED ORAL at 16:11

## 2019-04-09 RX ADMIN — OMEPRAZOLE 20 MG: 20 CAPSULE, DELAYED RELEASE ORAL at 11:43

## 2019-04-09 RX ADMIN — AMLODIPINE BESYLATE 5 MG: 5 TABLET ORAL at 11:43

## 2019-04-09 RX ADMIN — POTASSIUM CHLORIDE 20 MEQ: 1.5 POWDER, FOR SOLUTION ORAL at 04:27

## 2019-04-09 RX ADMIN — NYSTATIN: 100000 OINTMENT TOPICAL at 13:49

## 2019-04-09 RX ADMIN — UMECLIDINIUM 1 PUFF: 62.5 AEROSOL, POWDER ORAL at 13:49

## 2019-04-09 RX ADMIN — MICONAZOLE NITRATE: 2 POWDER TOPICAL at 17:47

## 2019-04-09 RX ADMIN — LOSARTAN POTASSIUM 100 MG: 100 TABLET ORAL at 11:43

## 2019-04-09 RX ADMIN — Medication 4800 UNITS: at 15:41

## 2019-04-09 RX ADMIN — HEPARIN SODIUM 1450 UNITS/HR: 10000 INJECTION, SOLUTION INTRAVENOUS at 07:17

## 2019-04-09 RX ADMIN — PRAVASTATIN SODIUM 20 MG: 20 TABLET ORAL at 11:44

## 2019-04-09 RX ADMIN — FLUTICASONE PROPIONATE 2 SPRAY: 50 SPRAY, METERED NASAL at 11:50

## 2019-04-09 RX ADMIN — HEPARIN SODIUM 1850 UNITS/HR: 10000 INJECTION, SOLUTION INTRAVENOUS at 17:48

## 2019-04-09 RX ADMIN — Medication 6400 UNITS: at 07:16

## 2019-04-09 RX ADMIN — MICONAZOLE NITRATE: 2 POWDER TOPICAL at 11:51

## 2019-04-09 RX ADMIN — OXYCODONE HYDROCHLORIDE 5 MG: 5 TABLET ORAL at 04:46

## 2019-04-09 RX ADMIN — TERAZOSIN HYDROCHLORIDE 2 MG: 1 CAPSULE ORAL at 21:39

## 2019-04-09 RX ADMIN — IOPAMIDOL 79 ML: 755 INJECTION, SOLUTION INTRAVENOUS at 05:35

## 2019-04-09 RX ADMIN — SODIUM CHLORIDE 90 ML: 9 INJECTION, SOLUTION INTRAVENOUS at 05:35

## 2019-04-09 RX ADMIN — ATENOLOL 100 MG: 50 TABLET ORAL at 11:43

## 2019-04-09 RX ADMIN — BUDESONIDE AND FORMOTEROL FUMARATE DIHYDRATE 2 PUFF: 160; 4.5 AEROSOL RESPIRATORY (INHALATION) at 13:54

## 2019-04-09 RX ADMIN — NYSTATIN: 100000 OINTMENT TOPICAL at 21:40

## 2019-04-09 RX ADMIN — VITAMIN D, TAB 1000IU (100/BT) 2000 UNITS: 25 TAB at 11:44

## 2019-04-09 RX ADMIN — IPRATROPIUM BROMIDE AND ALBUTEROL SULFATE 3 ML: .5; 3 SOLUTION RESPIRATORY (INHALATION) at 04:45

## 2019-04-09 ASSESSMENT — ACTIVITIES OF DAILY LIVING (ADL)
WHICH_OF_THE_ABOVE_FUNCTIONAL_RISKS_HAD_A_RECENT_ONSET_OR_CHANGE?: AMBULATION;TRANSFERRING;TOILETING;BATHING;DRESSING
AMBULATION: 1-->ASSISTIVE EQUIPMENT
ADLS_ACUITY_SCORE: 28
COGNITION: 0 - NO COGNITION ISSUES REPORTED
TOILETING: 1-->ASSISTIVE EQUIPMENT
ADLS_ACUITY_SCORE: 24
RETIRED_EATING: 0-->INDEPENDENT
DRESS: 0-->INDEPENDENT
SWALLOWING: 0-->SWALLOWS FOODS/LIQUIDS WITHOUT DIFFICULTY
TRANSFERRING: 1-->ASSISTIVE EQUIPMENT
FALL_HISTORY_WITHIN_LAST_SIX_MONTHS: NO
RETIRED_COMMUNICATION: 0-->UNDERSTANDS/COMMUNICATES WITHOUT DIFFICULTY
ADLS_ACUITY_SCORE: 17
BATHING: 0-->INDEPENDENT

## 2019-04-09 ASSESSMENT — ENCOUNTER SYMPTOMS
WEAKNESS: 1
SHORTNESS OF BREATH: 1
COUGH: 1
DYSURIA: 0
BLOOD IN STOOL: 0
HEMATURIA: 0
DIARRHEA: 0
DIFFICULTY URINATING: 0
FEVER: 0
APPETITE CHANGE: 0

## 2019-04-09 NOTE — ED NOTES
Patient states that her swelling in legs have hindered her walking/standing ability to the point where she is afraid to stand on them. Hence patient has not bathed self for several weeks. Patient is having trouble performing daily cares and states she may need some help once she is discharged. Patient does state she is able to eat and drink regularly. Wears pads in her undergarment due to leakage.

## 2019-04-09 NOTE — PLAN OF CARE
OT order received.  Evaluation completed, treatment initiated.  Patient is an 83 year old female admitted with progressive weakness, found to have acute PE.  She lives with her SO in a house with 3 steps to enter, main floor living.  She reports being independent with dressing, toilet transfer and managing tami-cares, using her walker for transfers.  She has felt too weak to get into shower recently so has been sponge-bathing.  She reports being independent with ambulation in her house using her walker, and seldom leaves the house.  She reports her SO can assist as needed.    Discharge Planner OT   Patient plan for discharge: not stated  Current status: patient attempting to sit EOB with therapist when her breakfast tray arrived, and she declined further activity.  MaxA to partially transfer to EOB, MaxA of 2 to return to supine and boost back to HOB; patient limited by UB pain, and back pain.    Barriers to return to prior living situation: pain, weakness, falls risk, level of assist needed for ADLs and transfers  Recommendations for discharge: TCU  Rationale for recommendations: patient would benefit from continued therapy to increase strength, safety, and independence with ADLs prior to discharge to home, and to reduce caregiver burden.       Entered by: SHAMIR OJEDA 04/09/2019 11:32 AM

## 2019-04-09 NOTE — H&P
[facility]    Hospitalist History and Physical    Name: Marino Prajapati    MRN: [id number]  YOB: 1935    Age: 83 year old  Date of Admission:  4/9/2019  Date of Service (when I saw the patient): 04/09/19    Assessment & Plan   Marino Prajapati is a 83 year old female with past medical history significant for hypertension, COPD, prior DVT, chronic lymphedema presented to the emergency room with progressive weakness, inability to care for herself at home.  She was found to be hypoxic in the emergency room.  Evaluation including CT angios showed acute pulmonary embolism.     Acute respiratory failure with hypoxia on presentation   --Secondary to pulmonary embolism  --Has history of COPD.  No evidence of exacerbation   --Supplemental O2    Acute pulmonary embolism  --Presented with hypoxia room air sats were 82%  --Sedentary lifestyle with chronic lymphedema.  Has history of prior DVT  --Bilateral lower extremity ultrasound done in the emergency room negative for DVT  --Started on IV heparin  --Discussed anticoagulation with patient  --Pharmacy to dose heparin.  Can be switched to norval anticoagulants    COPD  --Resumed all inhaled home meds  --Monitor sats    Failure to thrive  --Multifactorial, limited activity due to worsening lymphedema, worsening arthritis, COPD  --Progressive weakness and immobilization due to worsening lymphedema and COPD  --Per report she has been so weak that she could not care for herself and has not showered for few weeks.  --She cannot provide cares to herself  --Has decubiti ulcer  --Social work consult  --PT and OT evaluation    Decubiti ulcer  --Wound care    Chronic lymphedema  --Patient is on low-dose of Lasix we will continue  --She has been advised to use stockings which she cannot do at home.  She has not been compliant with use of stockings     Candidiasis  --Order topical nystatin    Hypertension  --Resume prior to admission meds amlodipine and  losartan    Hyperlipidemia  --Continue on statins    GERD  --Continue on PPI    Hypokalemia  --We will replace per protocol        DVT Prophylaxis: On heparin for pulmonary embolism  Code Status: DNR / DNI    Disposition: Admitted for more than 2 midnight stay    Primary Care Physician   Vivian Blue    Chief Complaint   Profound worsening weakness for years worse in the last few days    History is obtained from the patient    History of Present Illness   Marino Prajapati is a 83 year old female with past medical history significant for hypertension, hyperlipidemia, prior history of DVT, osteoarthritis, chronic lymphedema, sedentary lifestyle comes to the emergency room with worsening weakness.  Patient has had progressive weakness over the years however was worse for the last few months and particularly worse in the last few days.  Per patient she has been so weak that she has not been able to care for herself has not showered for several weeks now.  She complains of weakness so bad that she can barely move her legs.  Has trouble walking due to severe lymphedema.  Complains of weakness pain and walks an inch at a time.  Came to the ER with generalized weakness.  The emergency room patient was found to be hypoxic with room air sats of 82%.  She denies chest pain however has chronic cough, complains of shortness of breath, worsening lymphedema.  No fever no chills no orthopnea no PND.  No chest pain.  Shortness of breath with minimal exertion.  Review of all the other symptoms are negative.    Evaluation in the emergency room including CT chest for PE was positive.  Bilateral lower extremity ultrasound study was negative for DVTs.    Patient is being admitted for acute pulmonary embolism and failure to thrive.    Past Medical History    Past Medical History:   Diagnosis Date     Anemia      CARDIOVASCULAR SCREENING; LDL GOAL LESS THAN 160      Colon polyps 2010    needs colonoscopy 2013     DVT (deep  venous thrombosis) (H) 2007    remote history of DVT while she was on BCP ,coumadin stopped after retroperitoneal/buttock hematoma in 10/11 . On ASA only     Gastro-oesophageal reflux disease      HTN, goal below 140/90      Hyperlipidemia LDL goal <130 1/22/2016     Kidney stone      Osteoarthritis     knees and hip     Osteoporosis      Postnasal drip      S/P total knee arthroplasty      Thrombosis of leg          Past Surgical History   Past Surgical History:   Procedure Laterality Date     ARTHROPLASTY HIP  8/1/2013    Procedure: ARTHROPLASTY HIP;  RIGHT TOTAL HIP ARTHROPLASTY;  Surgeon: Rufino Tong MD;  Location: [facility] OR     ARTHROPLASTY HIP Left 12/12/2014    Procedure: ARTHROPLASTY HIP;  Surgeon: Rufino Tong MD;  Location: [facility] OR     ARTHROPLASTY KNEE  10/22/2012    Procedure: ARTHROPLASTY KNEE;  Right Total knee Arthroplasty  ;  Surgeon: Jagdeep Virgen MD;  Location: [facility] OR     ARTHROPLASTY KNEE  5/23/2013    Procedure: ARTHROPLASTY KNEE;  LEFT TOTAL KNEE ARTHROPLASTY (SMITH & NEPHEW)^;  Surgeon: Rufino Tong MD;  Location:  OR     C PREP FACE/ORAL PROST PALATAL LIFT       CHOLECYSTECTOMY       CYSTOSCOPY, RETROGRADES, INSERT STENT URETER(S), COMBINED  7/16/2012    Procedure: COMBINED CYSTOSCOPY, RETROGRADES, INSERT STENT URETER(S);  Cystoscopy, Right retrogrades, Right Stent Placement;  Surgeon: Mauricio Velazquez MD;  Location:  OR     LASER HOLMIUM LITHOTRIPSY URETER(S), INSERT STENT, COMBINED  8/8/2012    Procedure: COMBINED CYSTOSCOPY, URETEROSCOPY, LASER HOLMIUM LITHOTRIPSY URETER(S), INSERT STENT;  Cystoscopy, Stent Removal, Right Ureteroscopy with Holmium Laser, Stone removal ;  Surgeon: Mauricio Velazquez MD;  Location: [facility] OR     LIPOSUCTION (LOCATION)      tummy     STRIP VEIN         Prior to Admission Medications   Prior to Admission Medications   Prescriptions Last Dose Informant Patient Reported? Taking?   Coenzyme Q10 (COQ10) 30 MG CAPS   Yes No   Cranberry  Extract 250 MG TABS   Yes No   Cyanocobalamin (B-12) 1000 MCG TBCR   Yes No   Sig: Take 1,000 mcg by mouth daily   Garlic (GARLIC 1500) 1500 MG CAPS   Yes No   Multiple Minerals (CALCIUM-MAGNESIUM-ZINC) TABS   Yes No   Multiple Vitamins-Minerals (MULTIVITAMIN & MINERAL PO)  Self Yes No   Sig: Take 1 tablet by mouth daily.   Nutritional Supplements (SALMON OIL) CAPS   Yes No   acetaminophen (TYLENOL) 325 MG tablet   Yes No   Sig: Take 1-2 tablets (325-650 mg) by mouth every 6 hours as needed for mild pain   albuterol (PROAIR HFA/PROVENTIL HFA/VENTOLIN HFA) 108 (90 Base) MCG/ACT Inhaler 2019 at Unknown time  No Yes   Sig: Inhale 2 puffs into the lungs every 6 hours as needed for shortness of breath / dyspnea or wheezing   amLODIPine (NORVASC) 10 MG tablet 2019 at Unknown time  No Yes   Sig: Take 0.5 tablets (5 mg) by mouth daily   ascorbic acid (VITAMIN C) 500 MG tablet  Self Yes No   Sig: Take 500 mg by mouth daily    aspirin 81 MG tablet 2019 at Unknown time  Yes Yes   Sig: Take 81 mg by mouth daily    atenolol (TENORMIN) 100 MG tablet 2019 at Unknown time  No Yes   Sig: Take 1 tablet (100 mg) by mouth daily   budesonide-formoterol (SYMBICORT) 160-4.5 MCG/ACT Inhaler 2019 at Unknown time  No Yes   Sig: Inhale 2 puffs into the lungs 2 times daily   cholecalciferol (VITAMIN D) 1000 UNIT tablet   Yes No   Sig: Take 2 tablets (2,000 Units) by mouth daily   desonide (DESOWEN) 0.05 % cream Unknown at Unknown time  No No   Sig: Apply  topically 2 times daily.   fluticasone (FLONASE) 50 MCG/ACT nasal spray 2019 at Unknown time  No Yes   Sig: Spray 1-2 sprays into both nostrils daily   furosemide (LASIX) 40 MG tablet 2019 at Unknown time  No Yes   Si.5 - 1 tab daily as instructed   guaiFENesin (MUCINEX) 600 MG 12 hr tablet More than a month at Unknown time  No No   Sig: Take 1 tablet (600 mg) by mouth 2 times daily as needed for congestion   ipratropium - albuterol 0.5 mg/2.5 mg/3 mL (DUONEB)  0.5-2.5 (3) MG/3ML neb solution 4/8/2019 at Unknown time  No Yes   Sig: Take 1 vial (3 mLs) by nebulization every 6 hours as needed for shortness of breath / dyspnea or wheezing   levothyroxine (SYNTHROID/LEVOTHROID) 50 MCG tablet 4/8/2019 at Unknown time  No Yes   Sig: Take 1 tablet (50 mcg) by mouth daily   losartan (COZAAR) 100 MG tablet 4/8/2019 at Unknown time  No Yes   Sig: Take 1 tablet (100 mg) by mouth daily   omeprazole 20 MG tablet 4/8/2019 at Unknown time  No Yes   Sig: Take 1 tablet (20 mg) by mouth daily Take 30-60 minutes before a meal.   order for DME   No No   Sig: Equipment being ordered: Nebulizer and neb supplies (tubing, etc)   order for DME   No No   Sig: Equipment being ordered: 1: Gradient Compression Wraps; 2: BLE knee high 20-30 mm Hg compression stockings; 3: BLE thigh high 20-30 mm Hg compression stockings; 4: Cast Boots   order for DME   No No   Sig: Equipment being ordered: 1: Custom/alternative BLE full leg velco/buckling compression garment   order for DME   No No   Sig: Equipment being ordered: 4 wheeled walker with hand brakes and seat   pravastatin (PRAVACHOL) 20 MG tablet 4/8/2019 at Unknown time  No Yes   Sig: Take 1 tablet (20 mg) by mouth daily   terazosin (HYTRIN) 2 MG capsule 4/8/2019 at Unknown time  No Yes   Sig: Take 1 capsule (2 mg) by mouth At Bedtime   tiotropium (SPIRIVA HANDIHALER) 18 MCG inhaled capsule 4/8/2019 at Unknown time  No Yes   Sig: Inhale contents of one capsule daily.   traMADol (ULTRAM) 50 MG tablet 4/8/2019 at 1800  No Yes   Sig: TAKE 1 TABLET BY MOUTH TWICE DAILY AS NEEDED FOR PAIN   vitamin  B complex with vitamin C (VITAMIN  B COMPLEX) TABS   Yes No   Sig: Take 1 tablet by mouth daily      Facility-Administered Medications: None     Allergies   No Known Allergies    Social History   Social History     Tobacco Use     Smoking status: Former Smoker     Packs/day: 1.00     Years: 29.00     Pack years: 29.00     Types: Cigarettes     Last attempt to  quit: 10/17/1966     Years since quittin.5     Smokeless tobacco: Never Used   Substance Use Topics     Alcohol use: Yes     Comment: 4 drinks yearly     Social History     Social History Narrative     Not on file     Ex-smoker, significant smoking exposure 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  No use of alcohol.  Lives with her partner.  Retired    Family History   Dad had diabetes mellitus, mom had breast cancer    Review of Systems   A Comprehensive greater than 10 system review of systems was carried out.  Pertinent positives and negatives are noted above.  Otherwise negative for contributory information.    Physical Exam   Temp: 98.1  F (36.7  C) Temp src: Oral BP: 128/76 Pulse: 73 Heart Rate: 76 Resp: 18 SpO2: 94 % O2 Device: Nasal cannula Oxygen Delivery: 2 LPM  Vital Signs with Ranges  Temp:  [98.1  F (36.7  C)] 98.1  F (36.7  C)  Pulse:  [71-73] 73  Heart Rate:  [69-76] 76  Resp:  [18-26] 18  BP: (125-153)/(64-77) 128/76  SpO2:  [88 %-94 %] 94 %  245 lbs 0 oz    GEN:  Alert, oriented x 3, morbidly obese, poor hygiene   HEENT:  Normocephalic/atraumatic, no scleral icterus, no nasal discharge, mouth dry  CV:  Regular rate and rhythm systolic murmur.  S1 + S2 noted, no S3 or S4.  LUNGS: Poor inspiratory effort few bibasilar crackles, no wheezing.  Symmetric chest rise on inhalation noted.  ABD:  Active bowel sounds, soft, distended but not tender no rebound/guarding/rigidity.  EXT: Chronic bilateral lymphedema +3 , erythematous   sKIN:  Dry to touch, lower extremity bilaterally erythematous warm and dry.  NEURO:  Symmetric muscle strength,   No new focal deficits appreciated.  Did not ambulate patient.      Data   Data reviewed today:  I personally reviewed the EKG tracing showing Normal sinus rhythm with T wave inverted in lead III and nonspecific ST-T changes.    No results for input(s): PH, PHV, PO2, PO2V, SAT, PCO2, PCO2V, HCO3, HCO3V in the last 168 hours.  Recent Labs   Lab  04/09/19  0254   WBC 10.5   HGB 13.7   HCT 41.4   MCV 90        Recent Labs   Lab 04/09/19  0254      POTASSIUM 3.2*   CHLORIDE 104   CO2 29   ANIONGAP 6   GLC 96   BUN 17   CR 0.82   GFRESTIMATED 67   GFRESTBLACK 77   SANDRA 8.8     No results for input(s): CULT in the last 168 hours.  Recent Labs   Lab 04/09/19  0254   AST 20   ALT 22   ALKPHOS 84   BILITOTAL 0.8     No results for input(s): INR in the last 168 hours.  No results for input(s): LACT in the last 168 hours.  Recent Labs   Lab 04/09/19  0254   TROPI <0.015     Recent Labs   Lab 04/09/19  0345   COLOR Yellow   APPEARANCE Clear   URINEGLC Negative   URINEBILI Negative   URINEKETONE 20*   SG 1.031   UBLD Trace*   URINEPH 5.5   PROTEIN 20*   NITRITE Negative   LEUKEST Negative   RBCU 2   WBCU 2       Recent Results (from the past 24 hour(s))   US Lower Extremity Venous Duplex Bilateral    Narrative    ULTRASOUND VENOUS BILATERAL LOWER EXTREMITIES WITH DOPPLER  4/9/2019  4:20 AM     HISTORY: Bilateral leg swelling.    COMPARISON: None.    TECHNIQUE: Spectral waveform and color Doppler evaluation were  performed.    FINDINGS: Normal compressibility of bilateral common femoral, femoral,  popliteal, posterior tibial, peroneal and greater saphenous veins.  Unremarkable Doppler waveform evaluation of bilateral common femoral,  femoral and popliteal veins.      Impression    IMPRESSION: No evidence of thrombus in the major veins of bilateral  lower extremities.    JACKIE PUGH MD   XR Chest 1 View    Narrative    CHEST SINGLE VIEW  4/9/2019 4:21 AM     HISTORY: Shortness of breath. Leg swelling.    COMPARISON: 10/19/2017.    FINDINGS: A band-like opacity at the right lung base likely represents  scarring or atelectasis. The lungs are otherwise clear. Mild  cardiomegaly. Atherosclerotic calcification in the thoracic aorta.  Severe degenerative changes in bilateral shoulder joints.      Impression    IMPRESSION: No convincing evidence of active  cardiopulmonary disease.    JACKIE PUGH MD

## 2019-04-09 NOTE — PROGRESS NOTES
[facility]  WO Nurse Inpatient Adult Pressure Injury (PI) Wound Assessment     Assessment of PI(s) on pt's:   Right Buttock    Data:   Patient History:      per MD note(s):  83 year old female with past medical history significant for hypertension, COPD, prior DVT, chronic lymphedema presented to the emergency room with progressive weakness, inability to care for herself at home.  She was found to be hypoxic in the emergency room.  Evaluation including CT angios showed acute pulmonary embolism.             Mattress:  Standard , Atmos Air mattress  Current pressure relieving devices:  Pillows    Moisture Management:  Incontinence Protocol and Diaper    Catheter secured? Not applicable    Current Diet / Nutrition:     Orders Placed This Encounter      Combination Diet Low Saturated Fat Na <2400mg Diet, No Caffeine Diet        Labs:   Recent Labs   Lab Test 04/09/19  0254  10/19/11  0655   ALBUMIN 3.6   < >  --    HGB 13.7   < > 9.0*   INR  --   --  1.17*   WBC 10.5   < > 8.7    < > = values in this interval not displayed.                                                                                                                          Pressure Injury Assessment  (location #1):   Right Buttocks/IT  Wound History:   Unknown. See above    Specific Dimensions (length x width x depth, in cm) :       Right IT has 2 wounds: superior: dark dry scab 1x1cm intact and firm    Inferior: 1x1x0.5cm moist pink center with side edges appearing like open crack and macerated    Periwound Skin: erosion of epidermis and blanchable erythema,      Drainage:  none      Odor: none    Pain:  absent ,          Intervention:     Patient's chart evaluated.      Dusty Interventions:  Current Dusty Interventions and Care Plan reviewed and updated, appropriate at this time.    Wound was assessed.    Wound Care: was done: Visual inspection; Per POC,    Orders  Written    Supplies  gathered and placed at the  bedside    Discussed plan of care with Nurse           Assessment:     Pressure Injury located on right IT: Stage 2 present on admission    Status: wound  initial assessment, Stable wound suspect due to moisture and shear that split the skin open    Bilateral 2nd toes plantar have dry thick firm stable callous/ ulcer. Feet are dry with cracked heels.     Pt is at high risk for breakdown due to moisture and low steve         Plan:     Nursing to notify the Provider(s) and re-consult the WOC Nurse if wound(s) deteriorate(s)or if the wound care plan needs reevaluation.    Plan for wound care to wound located on right buttocks: BID    1. Cleanse with Kimi and wipe, no rinse    2. Dust the open moist skin with Stoma powder    3. Apply layer of Critic aid paste     4. Turn side to side, keep HOB < 30 degrees unless eating, consider air mattress; Float heels off pillows, Sween to heels    WOC Nurse will return: weekly

## 2019-04-09 NOTE — ED PROVIDER NOTES
History     Chief Complaint:  Generalized Weakness, Leg Swelling    HPI   Marino Prajapati is a 83 year old female with a history of bilateral lower extremity lymphedema who presents to the emergency department via EMS today for evaluation of generalized weakness and increased leg swelling.  She describes severe and progressively worsening weakness over the course of several weeks.  Patient has a hx of COPD and has consequent chronic cough.  Cough is minimally productive and unchanged from baseline.  No hemoptysis  No fevers.  Patient has continued to use her inhalers.  She does not use home O2. Patient was at 89% O2 upon arrival and was placed on 3L oxygen with improvement of O2 sats to the mid 90s.  Patient states her bilateral lymphedema in her lower extremities has increased swelling. She endorses that she had not been able to get around the house easily, even with her walker.  No falls. In the last few weeks, the patient states she cannot move her feet and it is causing her to not be able to care for herself. She endorses that she has not had a shower in the past 3 weeks because she is afraid of falling. Additionally, patient reports increased shortness of breath. Patient is eating and drinking well. Patient denies chest pain, bloody stool, diarrhea, fever, difficulty urinating, dysuria, hematuria, or recent falls.    Allergies:  No Known Drug Allergies    Medications:    Albuterol  Amlodipine  Aspirin  Tenormin  Symbicort  DesOwen  Flonase  Furosemide  Levothyroxine  Omeprazole  Cozaar  Pravastatin  Ultram  Hytrin    Past Medical History:    DVT  GERD  Hypertension   Hyperlipidemia  Kidney stone  Osteoarthritis  Osteoporosis  Postnasal drip  Lymphedema bilateral lower extremities    Past Surgical History:    Arthroplasty hip bilateral  Arthroplasty knee bilateral  Cholecystectomy  Laser holmium lithotripsy ureter, insert stent  Liposuction abdomen  Strip vein    Family History:    Mother: breast  cancer  Father: circulatory    Social History:  The patient was accompanied to the ED by herself.  Smoking Status: Former smoker  Smokeless Tobacco: Never Used  Alcohol Use: Positive  Drug Use: Negative  PCP: Vivian Blue  Marital Status:       Review of Systems   Constitutional: Negative for appetite change and fever.   Respiratory: Positive for cough and shortness of breath.    Cardiovascular: Positive for leg swelling (bilateral lower extremities). Negative for chest pain.   Gastrointestinal: Negative for blood in stool and diarrhea.   Genitourinary: Negative for difficulty urinating, dysuria and hematuria.   Neurological: Positive for weakness.   All other systems reviewed and are negative.        Physical Exam     Patient Vitals for the past 24 hrs:   BP Temp Temp src Pulse Heart Rate Resp SpO2 Weight   04/09/19 0545 -- -- -- -- -- 18 94 % --   04/09/19 0530 -- -- -- -- -- -- 93 % --   04/09/19 0500 128/76 -- -- -- 76 20 93 % --   04/09/19 0445 125/77 -- -- 73 73 26 91 % --   04/09/19 0330 147/64 -- -- 71 69 19 94 % --   04/09/19 0243 153/70 98.1  F (36.7  C) Oral 71 -- 22 (!) 88 % 111.1 kg (245 lb)       Physical Exam    Gen: alert  HEENT: PERRL, oropharynx clear  Neck: normal ROM  CV: RRR, no murmurs, 2+ distal pulses in all 4 extremities  Chest: no tenderness over the chest wall  Pulm: breath sounds equal, lungs clear. No wheeze, coarse cough (chronic per patient)  Abd: Soft, nontender  Back: no evidence of injury  MSK: Massive lower extremity edema to knees and slight edema into thighs, no calf tenderness  Skin: no rash. symmetric chronic appearing bilateral redness at ankle, no warmth.  Neuro: alert, appropriate conversation and interaction    Emergency Department Course     ECG:  ECG taken at 0328, ECG read at 0334  Normal sinus rhythm  Nonspecific ST abnormality  Abnormal ECG  Rate 68 bpm. MT interval 156 ms. QRS duration 84 ms. QT/QTc 422/448 ms. P-R-T axes 30 -5  23.    Imaging:  Radiology findings were communicated with the patient who voiced understanding of the findings.    CT Chest Pulmonary Embolism w Contrast   1. An acute pulmonary embolus is present in a segmental pulmonary  artery in the right middle lobe.  2. No other convincing evidence of active pulmonary disease.  [Critical Result: Pulmonary embolism]  Finding was identified on 4/9/2019 5:52 AM.   Dr. Cai was contacted by me on 4/9/2019 6:01 AM and verbalized  understanding of the critical result.  JACKIE PUGH MD  Reading per radiology    XR Chest 1 View  No convincing evidence of active cardiopulmonary disease.  JACKIE PUGH MD  Reading per radiology    US Lower Extremity Venous Duplex Bilateral  No evidence of thrombus in the major veins of bilateral  lower extremities.  JACKIE PUGH MD  Reading per radiology    Laboratory:  Laboratory findings were communicated with the patient who voiced understanding of the findings.    UA with microscopic: ketones 20(A), blood trace(A), protein albumin 20(A), mucous present (A), o/w WNL  Troponin (Collected 0254): <0.015  BNP: 273  CBC: WBC 10.5, HGB 13.7,   CMP: potassium 3.2(L) o/w WNL (Creatinine 0.82)    Interventions:  0427 potassium chloride 20 meq PO  0445 duoneb 3 ml nebulization  0446 Roxicodone 5 mg PO    Emergency Department Course:    0237 Nursing notes and vitals reviewed.    0240 I performed an exam of the patient as documented above.     0254 IV was inserted and blood was drawn for laboratory testing, results above.    0345 The patient provided a urine sample here in the emergency department. This was sent for laboratory testing, findings above.    0352 The patient was sent for a US lower extremity venous duplex bilateral while in the emergency department, results above.     0415 The patient was sent for a chest XR while in the emergency department, results above.     0440 Patient rechecked and updated. Patient is coughing, no  wheezing.    0515 The patient was sent for a CT chest pulmonary embolism while in the emergency department, results above.     0609 I spoke with Dr. Saravia of the hospitalist service from [facility] regarding patient's presentation, findings, and plan of care.    I personally reviewed the lab and image results with the patient and answered all related questions prior to admission.    Impression & Plan      Medical Decision Making:  Marino Prajapati is a 83 year old female who presents to the emergency department today for evaluation of severe progressive generalized weakness.  The cause of her weakness is likely multifactorial.  Today, she is noted be hypoxic.  No wheezing to suggest new COPD exacerbation.  EKG showed sinus rhythm without evidence of ischemia.  Troponin and BNP negative.  Patient reports good p.o. intake as well as urine output.  Chest x-ray was clear without evidence of pneumonia.  No new or change in cough or fever.  CT pulmonary echogram was obtained which showed small right segmental pulmonary emboli.  This is likely causing some degree of her hypoxia on top of her chronic underlying COPD.  Patient also appears quite deconditioned.  Lymphedema makes it quite difficult for patient to move her legs.  Weakness is likely multifactorial.  We will admit to the hospitalist service for treatment of pulmonary emboli and further evaluation of lymphedema and functional status.    Diagnosis:    ICD-10-CM    1. Acute respiratory failure with hypoxia (H) J96.01    2. Other acute pulmonary embolism without acute cor pulmonale (H) I26.99      Disposition:   The patient is admitted into the care of Dr. Saravia.    Scribe Disclosure:  Susanne PELAYO, am serving as a scribe at 2:39 AM on 4/9/2019 to document services personally performed by Humaira Cai MD based on my observations and the provider's statements to me.    [facility] EMERGENCY DEPARTMENT       Humaira Cai  MD  04/09/19 0734

## 2019-04-09 NOTE — ED NOTES
[facility]  ED Nurse Handoff Report    Marino Prajapati is a 83 year old female   ED Chief complaint: Leg Swelling and Generalized Weakness  . ED Diagnosis:   Final diagnoses:   Acute respiratory failure with hypoxia (H)   Other acute pulmonary embolism without acute cor pulmonale (H)     Allergies: No Known Allergies    Code Status: Full Code  Activity level - Baseline/Home:  Patient has not been able to perform ADLS for 2 weeks. Uses walker to get to toilet but been too weak. . Activity Level - Current:   Stand with Assist of 2. Lift room needed: Yes. Bariatric: No   Needed: No   Isolation: No. Infection: Not Applicable.     Vital Signs:   Vitals:    04/09/19 0445 04/09/19 0500 04/09/19 0530 04/09/19 0545   BP: 125/77 128/76     Pulse: 73      Resp: 26 20  18   Temp:       TempSrc:       SpO2: 91% 93% 93% 94%   Weight:           Cardiac Rhythm:  ,   Cardiac  Cardiac Rhythm: Normal sinus rhythm  Heart Block Type: Bundle Branch Block  Pain level: 0-10 Pain Scale: 2  Patient confused: No. Patient Falls Risk: Yes.   Elimination Status: Incontinent/padded, wears pads in underwear at home   Patient Report - Initial Complaint: SOB, increased weakness, decreased mobility. Focused Assessment: See Physician note   Tests Performed: UA, Labs, Xray, CT. Abnormal Results: See CT, LABS results.   Treatments provided: Potassium oral, Pain pill, Heparin  Family Comments: No family presence  OBS brochure/video discussed/provided to patient:  No  ED Medications:   Medications   potassium chloride (KLOR-CON) Packet 20 mEq (20 mEq Oral Given 4/9/19 1836)   ipratropium - albuterol 0.5 mg/2.5 mg/3 mL (DUONEB) neb solution 3 mL (3 mLs Nebulization Given 4/9/19 2759)   oxyCODONE (ROXICODONE) tablet 5 mg (5 mg Oral Given 4/9/19 6296)   iopamidol (ISOVUE-370) solution 500 mL (79 mLs Intravenous Given 4/9/19 2645)   0.9% sodium chloride BOLUS (0 mLs Intravenous Stopped 4/9/19 0536)     Drips infusing:  Yes  For the  majority of the shift, the patient's behavior Green. Interventions performed were None.     Severe Sepsis OR Septic Shock Diagnosis Present: No      ED Nurse Name/Phone Number: Mallory Polanco,   6:16 AM  RECEIVING UNIT ED HANDOFF REVIEW    Above ED Nurse Handoff Report was reviewed: Yes  Reviewed by: Viridiana Cardenas on April 9, 2019 at 7:34 AM

## 2019-04-09 NOTE — PROGRESS NOTES
04/09/19 1453   Quick Adds   Type of Visit Initial PT Evaluation   Living Environment   Lives With significant other   Living Arrangements house   Home Accessibility stairs to enter home   Number of Stairs, Main Entrance 3   Transportation Anticipated family or friend will provide   Living Environment Comment Pt lives with SO, and seldom leaves the house; pt does not use the basement, all needs met on main level   Self-Care   Usual Activity Tolerance fair   Current Activity Tolerance poor   Regular Exercise No   Equipment Currently Used at Home walker, rolling   Activity/Exercise/Self-Care Comment Pt reports walking with walker in the house; she seldom leaves the house; has had increased difficulty with ambulation in past few weeks and days    Functional Level Prior   Ambulation 1-->assistive equipment   Transferring 1-->assistive equipment   Toileting 1-->assistive equipment   Bathing 0-->independent   Communication 0-->understands/communicates without difficulty   Swallowing 0-->swallows foods/liquids without difficulty   Cognition 0 - no cognition issues reported   Fall history within last six months no   Which of the above functional risks had a recent onset or change? ambulation;transferring;toileting   Prior Functional Level Comment pt reports previous indep with ADLs, including toilet transfer and managing incontinence pads; she has felt too weak to use shower for past few weeks, so has been sponge bathing.   General Information   Onset of Illness/Injury or Date of Surgery - Date 04/09/19   Referring Physician Rani   Patient/Family Goals Statement to get better   Pertinent History of Current Problem (include personal factors and/or comorbidities that impact the POC) Marino Prajapati is a 83 year old female with past medical history significant for hypertension, hyperlipidemia, prior history of DVT, osteoarthritis, chronic lymphedema, sedentary lifestyle comes to the emergency room with worsening  weakness.  Patient has had progressive weakness over the years however was worse for the last few months and particularly worse in the last few days.  Per patient she has been so weak that she has not been able to care for herself has not showered for several weeks now.  She complains of weakness so bad that she can barely move her legs.  Has trouble walking due to severe lymphedema.  Complains of weakness pain and walks an inch at a time.  Came to the ER with generalized weakness.     Precautions/Limitations fall precautions;oxygen therapy device and L/min  (3L)   Weight-Bearing Status - LLE full weight-bearing   Weight-Bearing Status - RLE full weight-bearing   General Observations pt sleeping upon arrival, agreeable to try PT   General Info Comments Noted pts R IV infiltrated, nsg notified. Noted pt to have yeast rash under breasts and tami area.   Cognitive Status Examination   Level of Consciousness lethargic/somnolent   Follows Commands and Answers Questions 75% of the time;able to follow single-step instructions   Pain Assessment   Patient Currently in Pain Yes, see Vital Sign flowsheet  (to the touch, LE's)   Integumentary/Edema   Integumentary/Edema Comments BLE chronic lymphedema, noted LLE to be slightly red and warm   Posture    Posture Forward head position;Kyphosis;Protracted shoulders   Range of Motion (ROM)   ROM Comment generalized stiffness, inability to fully asses as pt c/o pain with touch.   Strength   Strength Comments LLE grade 2+/5, RLE 2/5. significant core/trunk weakness, Max A x2  bed mobility, pt having significantly difficulty with all mobility   Bed Mobility   Bed Mobility Comments Max A x2 supine>sit   Transfer Skills   Transfer Comments [place] Steady and Max A x2   Gait   Gait Comments unable   Balance   Balance Comments sitting balance with SBA   Sensory Examination   Sensory Perception Comments Pt very sensative to light touch on BLE's   General Therapy Interventions   Planned Therapy  "Interventions bed mobility training;gait training;strengthening;transfer training;progressive activity/exercise   Clinical Impression   Criteria for Skilled Therapeutic Intervention yes, treatment indicated   PT Diagnosis significant weakness, and significant decline in functional mobility   Influenced by the following impairments pain, fatigue, sensativity to light tough BLE's, lymphedema BLE's, obesity, global weakness   Functional limitations due to impairments Max A x2 for basic bed mobility, mechanical lift for transfers, inability to ambulate   Clinical Presentation Evolving/Changing   Clinical Presentation Rationale per clinical observation, has a PE, on 02, pain levels fluctuate   Clinical Decision Making (Complexity) Moderate complexity   Therapy Frequency` 5 times/week   Predicted Duration of Therapy Intervention (days/wks) 4 days   Anticipated Discharge Disposition Transitional Care Facility   Risk & Benefits of therapy have been explained Yes   Patient, Family & other staff in agreement with plan of care Yes   Clinical Impression Comments Pt appears to have FTT, and would likely benefit from a more supervised setting, such as [facility] as she is unable to care for herself.    [facility] [employer] \"6 Clicks\"   2016, Trustees of [facility], under license to [employer].  All rights reserved.   6 Clicks Short Forms Basic Mobility Inpatient Short Form   [facility] AM-PAC  \"6 Clicks\" V.2 Basic Mobility Inpatient Short Form   1. Turning from your back to your side while in a flat bed without using bedrails? 2 - A Lot   2. Moving from lying on your back to sitting on the side of a flat bed without using bedrails? 2 - A Lot   3. Moving to and from a bed to a chair (including a wheelchair)? 1 - Total   4. Standing up from a chair using your arms (e.g., wheelchair, or bedside chair)? 2 - A Lot   5. To walk in hospital room? 1 - Total   6. Climbing 3-5 steps with a railing? 1 - Total   Basic " Mobility Raw Score (Score out of 24.Lower scores equate to lower levels of function) 9   Total Evaluation Time   Total Evaluation Time (Minutes) 30

## 2019-04-09 NOTE — PROGRESS NOTES
04/09/19 1113   Quick Adds   Type of Visit Initial Occupational Therapy Evaluation   Living Environment   Lives With significant other   Living Arrangements house   Home Accessibility stairs to enter home   Number of Stairs, Main Entrance 3   Transportation Anticipated family or friend will provide   Living Environment Comment Pt lives with SO, and seldom leaves the house; pt does not use the basement, all needs met on main level   Self-Care   Usual Activity Tolerance moderate   Current Activity Tolerance poor   Regular Exercise No   Equipment Currently Used at Home walker, rolling   Activity/Exercise/Self-Care Comment Pt reports walking with walker in the house; she seldom leaves the house; has had increased difficulty with ambulation in past few weeks and days   Functional Level   Ambulation 1-->assistive equipment   Transferring 1-->assistive equipment   Toileting 1-->assistive equipment   Bathing 0-->independent   Dressing 0-->independent   Eating 0-->independent   Communication 0-->understands/communicates without difficulty   Swallowing 0-->swallows foods/liquids without difficulty   Cognition 0 - no cognition issues reported   Fall history within last six months no   Which of the above functional risks had a recent onset or change? ambulation;transferring;toileting;bathing;dressing   Prior Functional Level Comment pt reports indep with ADLs, including toilet transfer and managing incontinence pads; she has felt too weak to use shower for past few weeks, so has been sponge bathing.   General Information   Onset of Illness/Injury or Date of Surgery - Date 04/09/19   Referring Physician Ida Ortiz   Patient/Family Goals Statement Find out what's wrong with me and get stronger   Additional Occupational Profile Info/Pertinent History of Current Problem Pt admitted with past medical history significant for hypertension, COPD, prior DVT, chronic lymphedema presented to the emergency room with progressive  weakness, inability to care for herself at home.  She was found to be hypoxic in the emergency room.  Evaluation including CT angios showed acute pulmonary embolism.    Precautions/Limitations fall precautions   Weight-Bearing Status - LUE full weight-bearing   Weight-Bearing Status - RUE full weight-bearing   Weight-Bearing Status - LLE full weight-bearing   Weight-Bearing Status - RLE full weight-bearing   Heart Disease Risk Factors High blood pressure;Lack of physical activity;Overweight;Medical history;Age   General Observations Pt supine upon arrival; pt agreeable to OT session   General Info Comments Activity order: up with assist prn    Cognitive Status Examination   Orientation person;place   Level of Consciousness alert   Follows Commands (Cognition) WNL   Memory intact   Pain Assessment   Patient Currently in Pain Yes, see Vital Sign flowsheet   Integumentary/Edema   Integumentary/Edema Comments BLE edema   Range of Motion (ROM)   ROM Comment Pt demonstrated functional BUE AROM during bed mobility, though limited by chest pain currently   Strength   Strength Comments Not formally assessed, however pt demonstrated functional BUE strength during bed mobility   Coordination   Upper Extremity Coordination No deficits were identified   Mobility   Bed Mobility Bed mobility skill: Supine to sit   Bed Mobility Skill: Supine to Sit   Level of [place]: Supine/Sit maximum assist (25% patients effort)   Physical Assist/Nonphysical Assist: Supine/Sit 1 person assist   Assistive Device: Supine/Sit bedrail   Transfer Skill: Toilet Transfer   Toilet Transfer Skill Comments Pt reports indep with toilet transfer and clothing management, using walker for tranfser, at baseline   Tub/Shower Transfer   Tub/Shower Transfer Comments Pt reports that with increasing weakness, has not used shower for several weeks.  Has been sponge bathing.   Lower Body Dressing   Level of [place]: Dress Lower Body maximum assist (25%  "patients effort)   Eating/Self Feeding   Level of [place]: Eating contact guard   Physical Assist/Nonphysical Assist: Eating set-up required   Instrumental Activities of Daily Living (IADL)   IADL Comments SO can complete as needed   Activities of Daily Living Analysis   Impairments Contributing to Impaired Activities of Daily Living pain;ROM decreased;strength decreased   General Therapy Interventions   Planned Therapy Interventions ADL retraining;transfer training;strengthening   Clinical Impression   Criteria for Skilled Therapeutic Interventions Met yes, treatment indicated   OT Diagnosis impaired ADLs   Influenced by the following impairments weakness, pain, decreased ROM   Assessment of Occupational Performance 3-5 Performance Deficits   Identified Performance Deficits dressing, bathing, toileting   Clinical Decision Making (Complexity) Moderate complexity   Therapy Frequency daily   Predicted Duration of Therapy Intervention (days/wks) 3 days   Anticipated Discharge Disposition Transitional Care Facility   Risks and Benefits of Treatment have been explained. Yes   Patient, Family & other staff in agreement with plan of care Yes   [facility] AM-PAC  \"6 Clicks\" Daily Activity Inpatient Short Form   1. Putting on and taking off regular lower body clothing? 1 - Total   2. Bathing (including washing, rinsing, drying)? 2 - A Lot   3. Toileting, which includes using toilet, bedpan or urinal? 2 - A Lot   4. Putting on and taking off regular upper body clothing? 2 - A Lot   5. Taking care of personal grooming such as brushing teeth? 2 - A Lot   6. Eating meals? 3 - A Little   Daily Activity Raw Score (Score out of 24.Lower scores equate to lower levels of function) 12   Total Evaluation Time   Total Evaluation Time (Minutes) 8     "

## 2019-04-09 NOTE — PLAN OF CARE
Discharge Planner PT   PT: PT evaluation completion , treatment initiated for Marino Prajapati who is a 83 year old female with past medical history significant for hypertension, COPD, prior DVT, chronic lymphedema presented to the emergency room with progressive weakness, inability to care for herself at home.  She was found to be hypoxic in the emergency room.  Evaluation including CT angios showed acute pulmonary embolism.   Pt reports walking with walker in the house; she seldom leaves the house; has had increased difficulty with ambulation in past few weeks and days. Pt lives with SO in a house with 3 stairs to enter, and does not use the basement, all needs met on main level. Pt has been so weak she has not been able to care for herself.  .  Current status: Sleepy, lethargic, BLE lymphedema, RLE red and warm, red rash under breasts and tami area. BLE less than gravity strength, skin on LE's very sensitive to the touch. Bed mobility Max A x2, transfers Max A x2 and Angeles steady, pt cannot ambulate. Up to wc via [place] Steady, nursing should use lift system.   Barriers to return to prior living situation: Stairs to enter, cannot shower or care for herself, cannot ambulate. Heavy A x 2 people and mechanical lift for transfers.  Recommendations for discharge: TCU. Pt may need a more permanent living situation that provides assist for proper bathing, hygiene , and assist, such as long term.   Rationale for recommendations: Pt appears to be FTT with limited mobility and self care in the home. Recommend on going PT while hospitalized and at TCU setting following for strengthening and  progressing safe functional mobility to reduce fall risk and care giver burden, and impart as much indep with functional mobility as deemed safe.       Entered by: Dione Ziegler 04/09/2019 3:58 PM

## 2019-04-09 NOTE — PHARMACY-ADMISSION MEDICATION HISTORY
Admission medication history interview status for this patient is complete. See [facility] admission navigator for allergy information, prior to admission medications and immunization status.     Medication history interview source(s):Patient  Medication history resources (including written lists, pill bottles, clinic record): fill history  Primary pharmacy:[facility]    Changes made to PTA medication list: none    Actions taken by pharmacist (provider contacted, etc):None     Additional medication history information: patient reports that she has stopped taking her levothyroxine medication (last filled at pharmacy Nov 2018), but cannot state whether her provider told her to or reason why she has done so - please review    Medication reconciliation/reorder completed by provider prior to medication history? No    Do you take OTC medications (eg tylenol, ibuprofen, fish oil, eye/ear drops, etc)? YES      Prior to Admission medications    Medication Sig Last Dose Taking? Auth Provider   albuterol (PROAIR HFA/PROVENTIL HFA/VENTOLIN HFA) 108 (90 Base) MCG/ACT Inhaler Inhale 2 puffs into the lungs every 6 hours as needed for shortness of breath / dyspnea or wheezing 4/8/2019 at x 1 Yes Vivian Blue MD   amLODIPine (NORVASC) 10 MG tablet Take 0.5 tablets (5 mg) by mouth daily 4/8/2019 at am Yes Vivian Blue MD   ascorbic acid (VITAMIN C) 500 MG tablet Take 500 mg by mouth daily  4/8/2019 at am Yes Vivian Blue MD   aspirin 81 MG tablet Take 81 mg by mouth daily  4/8/2019 at am Yes Vivian Blue MD   atenolol (TENORMIN) 100 MG tablet Take 1 tablet (100 mg) by mouth daily 4/8/2019 at am Yes Vivian Blue MD   budesonide-formoterol (SYMBICORT) 160-4.5 MCG/ACT Inhaler Inhale 2 puffs into the lungs 2 times daily 4/8/2019 at x 1 Yes Vivian Blue MD   cholecalciferol (VITAMIN D) 1000 UNIT tablet Take 2 tablets (2,000  Units) by mouth daily 4/8/2019 at am Yes Vivian Blue MD   Coenzyme Q10 (COQ10) 30 MG CAPS Take 1 capsule by mouth daily  4/8/2019 at am Yes Vivian Blue MD   Cranberry Extract 250 MG TABS Take 1 tablet by mouth daily  4/8/2019 at am Yes Vivian Blue MD   Cyanocobalamin (B-12) 1000 MCG TBCR Take 1,000 mcg by mouth daily 4/8/2019 at am Yes Vivian Blue MD   furosemide (LASIX) 40 MG tablet Take 20-40 mg by mouth daily as needed Past Week at Unknown time Yes Unknown, Entered By History   Garlic (GARLIC 1500) 1500 MG CAPS Take 1,500 mg by mouth daily  4/8/2019 at am Yes Vivian Blue MD   ipratropium - albuterol 0.5 mg/2.5 mg/3 mL (DUONEB) 0.5-2.5 (3) MG/3ML neb solution Take 1 vial (3 mLs) by nebulization every 6 hours as needed for shortness of breath / dyspnea or wheezing 4/8/2019 at Unknown time Yes Vivian Blue MD   levothyroxine (SYNTHROID/LEVOTHROID) 50 MCG tablet Take 50 mcg by mouth daily 4/8/2019 at am Yes Unknown, Entered By History   losartan (COZAAR) 100 MG tablet Take 1 tablet (100 mg) by mouth daily 4/8/2019 at am Yes Vivian Blue MD   Multiple Minerals (CALCIUM-MAGNESIUM-ZINC) TABS Take 1 tablet by mouth daily  4/8/2019 at am Yes Vivian Blue MD   Multiple Vitamins-Minerals (MULTIVITAMIN & MINERAL PO) Take 1 tablet by mouth daily. 4/8/2019 at am Yes Unknown, Entered By History   Nutritional Supplements (SALMON OIL) CAPS Take 1 capsule by mouth daily  4/8/2019 at am Yes Vivian Blue MD   omeprazole 20 MG tablet Take 1 tablet (20 mg) by mouth daily Take 30-60 minutes before a meal. 4/8/2019 at Unknown time Yes Vivian Blue MD   pravastatin (PRAVACHOL) 20 MG tablet Take 1 tablet (20 mg) by mouth daily 4/8/2019 at am Yes Vivian Blue MD   terazosin (HYTRIN) 2 MG capsule Take 1 capsule (2 mg) by mouth At  Bedtime 4/8/2019 at hs Yes Vivian Blue MD   tiotropium (SPIRIVA HANDIHALER) 18 MCG inhaled capsule Inhale contents of one capsule daily. 4/8/2019 at am Yes Vivian Blue MD   traMADol (ULTRAM) 50 MG tablet TAKE 1 TABLET BY MOUTH TWICE DAILY AS NEEDED FOR PAIN Past Week Yes Vivian Blue MD   vitamin  B complex with vitamin C (VITAMIN  B COMPLEX) TABS Take 1 tablet by mouth daily 4/8/2019 at am Yes Vivian Blue MD   acetaminophen (TYLENOL) 325 MG tablet Take 1-2 tablets (325-650 mg) by mouth every 6 hours as needed for mild pain More than a month at Unknown time  Vivian Blue MD   fluticasone (FLONASE) 50 MCG/ACT nasal spray Spray 1-2 sprays into both nostrils daily More than a month at Unknown time  Vivian Blue MD   order for DME Equipment being ordered: 4 wheeled walker with hand brakes and seat   Vivian Blue MD   order for DME Equipment being ordered: 1: Custom/alternative BLE full leg velco/buckling compression garment   Vivian Blue MD   order for DME Equipment being ordered: 1: Gradient Compression Wraps; 2: BLE knee high 20-30 mm Hg compression stockings; 3: BLE thigh high 20-30 mm Hg compression stockings; 4: Cast Boots   Vivian Blue MD   order for DME Equipment being ordered: Nebulizer and neb supplies (tubing, etc)   Vivian Blue MD

## 2019-04-09 NOTE — PLAN OF CARE
Pt A&Ox4, compliant with tx and cares. Pt up with Ax2 with Lift at this time. PT work with pt to use [place] Steady but recommends still using lift at this time.  VSS. IV infusing with Hep 18.5 ml/hr to LUE. Pt denies SOB, lightheadedness, dizziness. On 3 lpm nc- sating at 94%. Pain to rt buttock relieved with PRN Ultram. PT/OT/WOC/SW Following.Tele SR.  Pt anticipates on discharge back home when applicable. Continue with POC.

## 2019-04-09 NOTE — PHARMACY
Anticoagulation coverage check.  Patient has Medicare D through [facility].    Xarelto/Eliquis  April:  Upon receipt of RX, [facility] can provide 1 month free.  May-June: $40/mo  May-Dec: $109/mo (coverage gap)    Enoxaparin 120mg x 10 syringes: $175    Jantoven (warfarin)  $5/mo      -JASON Swan, Pharmacy Technician/Liaison, [facility] [phone]

## 2019-04-10 ENCOUNTER — APPOINTMENT (OUTPATIENT)
Dept: OCCUPATIONAL THERAPY | Facility: CLINIC | Age: 84
DRG: 175 | End: 2019-04-10
Payer: COMMERCIAL

## 2019-04-10 LAB
ANION GAP SERPL CALCULATED.3IONS-SCNC: 4 MMOL/L (ref 3–14)
BASOPHILS # BLD AUTO: 0.1 10E9/L (ref 0–0.2)
BASOPHILS NFR BLD AUTO: 0.7 %
BUN SERPL-MCNC: 10 MG/DL (ref 7–30)
CALCIUM SERPL-MCNC: 8.3 MG/DL (ref 8.5–10.1)
CHLORIDE SERPL-SCNC: 107 MMOL/L (ref 94–109)
CO2 SERPL-SCNC: 28 MMOL/L (ref 20–32)
CREAT SERPL-MCNC: 0.63 MG/DL (ref 0.52–1.04)
DIFFERENTIAL METHOD BLD: ABNORMAL
EOSINOPHIL # BLD AUTO: 0.2 10E9/L (ref 0–0.7)
EOSINOPHIL NFR BLD AUTO: 2 %
ERYTHROCYTE [DISTWIDTH] IN BLOOD BY AUTOMATED COUNT: 14.2 % (ref 10–15)
GFR SERPL CREATININE-BSD FRML MDRD: 84 ML/MIN/{1.73_M2}
GLUCOSE SERPL-MCNC: 95 MG/DL (ref 70–99)
HCT VFR BLD AUTO: 38.5 % (ref 35–47)
HGB BLD-MCNC: 12.9 G/DL (ref 11.7–15.7)
IMM GRANULOCYTES # BLD: 0 10E9/L (ref 0–0.4)
IMM GRANULOCYTES NFR BLD: 0.4 %
LMWH PPP CHRO-ACNC: 0.74 IU/ML
LYMPHOCYTES # BLD AUTO: 2.8 10E9/L (ref 0.8–5.3)
LYMPHOCYTES NFR BLD AUTO: 30.5 %
MCH RBC QN AUTO: 30 PG (ref 26.5–33)
MCHC RBC AUTO-ENTMCNC: 33.5 G/DL (ref 31.5–36.5)
MCV RBC AUTO: 90 FL (ref 78–100)
MONOCYTES # BLD AUTO: 1.4 10E9/L (ref 0–1.3)
MONOCYTES NFR BLD AUTO: 15 %
NEUTROPHILS # BLD AUTO: 4.7 10E9/L (ref 1.6–8.3)
NEUTROPHILS NFR BLD AUTO: 51.4 %
NRBC # BLD AUTO: 0 10*3/UL
NRBC BLD AUTO-RTO: 0 /100
PLATELET # BLD AUTO: 208 10E9/L (ref 150–450)
POTASSIUM SERPL-SCNC: 3.9 MMOL/L (ref 3.4–5.3)
RBC # BLD AUTO: 4.3 10E12/L (ref 3.8–5.2)
SODIUM SERPL-SCNC: 139 MMOL/L (ref 133–144)
WBC # BLD AUTO: 9.1 10E9/L (ref 4–11)

## 2019-04-10 PROCEDURE — 99207 ZZC CDG-MDM COMPONENT: MEETS LOW - DOWN CODED: CPT | Performed by: INTERNAL MEDICINE

## 2019-04-10 PROCEDURE — 97535 SELF CARE MNGMENT TRAINING: CPT | Mod: GO | Performed by: STUDENT IN AN ORGANIZED HEALTH CARE EDUCATION/TRAINING PROGRAM

## 2019-04-10 PROCEDURE — 80048 BASIC METABOLIC PNL TOTAL CA: CPT | Performed by: INTERNAL MEDICINE

## 2019-04-10 PROCEDURE — 12000000 ZZH R&B MED SURG/OB

## 2019-04-10 PROCEDURE — 85025 COMPLETE CBC W/AUTO DIFF WBC: CPT | Performed by: INTERNAL MEDICINE

## 2019-04-10 PROCEDURE — 25000132 ZZH RX MED GY IP 250 OP 250 PS 637: Performed by: INTERNAL MEDICINE

## 2019-04-10 PROCEDURE — 97530 THERAPEUTIC ACTIVITIES: CPT | Mod: GO | Performed by: STUDENT IN AN ORGANIZED HEALTH CARE EDUCATION/TRAINING PROGRAM

## 2019-04-10 PROCEDURE — 99232 SBSQ HOSP IP/OBS MODERATE 35: CPT | Performed by: INTERNAL MEDICINE

## 2019-04-10 PROCEDURE — 40000275 ZZH STATISTIC RCP TIME EA 10 MIN

## 2019-04-10 PROCEDURE — 36415 COLL VENOUS BLD VENIPUNCTURE: CPT | Performed by: INTERNAL MEDICINE

## 2019-04-10 PROCEDURE — 94640 AIRWAY INHALATION TREATMENT: CPT

## 2019-04-10 PROCEDURE — 25000128 H RX IP 250 OP 636: Performed by: INTERNAL MEDICINE

## 2019-04-10 PROCEDURE — 85520 HEPARIN ASSAY: CPT | Performed by: INTERNAL MEDICINE

## 2019-04-10 RX ORDER — POLYETHYLENE GLYCOL 3350 17 G/17G
17 POWDER, FOR SOLUTION ORAL 2 TIMES DAILY PRN
Status: DISCONTINUED | OUTPATIENT
Start: 2019-04-10 | End: 2019-04-12 | Stop reason: HOSPADM

## 2019-04-10 RX ORDER — ALBUTEROL SULFATE 90 UG/1
1-2 AEROSOL, METERED RESPIRATORY (INHALATION) EVERY 4 HOURS PRN
Status: DISCONTINUED | OUTPATIENT
Start: 2019-04-10 | End: 2019-04-12 | Stop reason: HOSPADM

## 2019-04-10 RX ORDER — LANOLIN ALCOHOL/MO/W.PET/CERES
1000 CREAM (GRAM) TOPICAL DAILY
Status: DISCONTINUED | OUTPATIENT
Start: 2019-04-10 | End: 2019-04-12 | Stop reason: HOSPADM

## 2019-04-10 RX ORDER — MULTIPLE VITAMINS W/ MINERALS TAB 9MG-400MCG
1 TAB ORAL DAILY
Status: DISCONTINUED | OUTPATIENT
Start: 2019-04-10 | End: 2019-04-12 | Stop reason: HOSPADM

## 2019-04-10 RX ORDER — SENNOSIDES 8.6 MG
1 TABLET ORAL 2 TIMES DAILY
Status: DISCONTINUED | OUTPATIENT
Start: 2019-04-10 | End: 2019-04-12 | Stop reason: HOSPADM

## 2019-04-10 RX ORDER — LEVOTHYROXINE SODIUM 50 UG/1
50 TABLET ORAL DAILY
Status: DISCONTINUED | OUTPATIENT
Start: 2019-04-10 | End: 2019-04-12 | Stop reason: HOSPADM

## 2019-04-10 RX ADMIN — MICONAZOLE NITRATE: 2 POWDER TOPICAL at 20:39

## 2019-04-10 RX ADMIN — HEPARIN SODIUM 1700 UNITS/HR: 10000 INJECTION, SOLUTION INTRAVENOUS at 00:24

## 2019-04-10 RX ADMIN — TERAZOSIN HYDROCHLORIDE 2 MG: 1 CAPSULE ORAL at 22:46

## 2019-04-10 RX ADMIN — ACETAMINOPHEN 650 MG: 325 TABLET, FILM COATED ORAL at 12:24

## 2019-04-10 RX ADMIN — TRAMADOL HYDROCHLORIDE 50 MG: 50 TABLET, COATED ORAL at 20:36

## 2019-04-10 RX ADMIN — BUDESONIDE AND FORMOTEROL FUMARATE DIHYDRATE 2 PUFF: 160; 4.5 AEROSOL RESPIRATORY (INHALATION) at 20:19

## 2019-04-10 RX ADMIN — HEPARIN SODIUM 1600 UNITS/HR: 10000 INJECTION, SOLUTION INTRAVENOUS at 09:32

## 2019-04-10 RX ADMIN — VITAMIN D, TAB 1000IU (100/BT) 2000 UNITS: 25 TAB at 09:17

## 2019-04-10 RX ADMIN — NYSTATIN: 100000 OINTMENT TOPICAL at 20:40

## 2019-04-10 RX ADMIN — LEVOTHYROXINE SODIUM 50 MCG: 50 TABLET ORAL at 10:56

## 2019-04-10 RX ADMIN — APIXABAN 10 MG: 5 TABLET, FILM COATED ORAL at 20:31

## 2019-04-10 RX ADMIN — Medication 1 MG: at 22:46

## 2019-04-10 RX ADMIN — SENNOSIDES 1 TABLET: 8.6 TABLET, FILM COATED ORAL at 20:31

## 2019-04-10 RX ADMIN — CYANOCOBALAMIN TAB 1000 MCG 1000 MCG: 1000 TAB at 10:56

## 2019-04-10 RX ADMIN — AMLODIPINE BESYLATE 5 MG: 5 TABLET ORAL at 09:17

## 2019-04-10 RX ADMIN — PRAVASTATIN SODIUM 20 MG: 20 TABLET ORAL at 09:18

## 2019-04-10 RX ADMIN — HEPARIN SODIUM 1600 UNITS/HR: 10000 INJECTION, SOLUTION INTRAVENOUS at 10:56

## 2019-04-10 RX ADMIN — ATENOLOL 100 MG: 50 TABLET ORAL at 09:17

## 2019-04-10 RX ADMIN — OMEPRAZOLE 20 MG: 20 CAPSULE, DELAYED RELEASE ORAL at 09:18

## 2019-04-10 RX ADMIN — POTASSIUM CHLORIDE 40 MEQ: 1500 TABLET, EXTENDED RELEASE ORAL at 00:30

## 2019-04-10 RX ADMIN — ASPIRIN 81 MG: 81 TABLET, COATED ORAL at 09:18

## 2019-04-10 RX ADMIN — LOSARTAN POTASSIUM 100 MG: 100 TABLET ORAL at 09:18

## 2019-04-10 RX ADMIN — MULTIPLE VITAMINS W/ MINERALS TAB 1 TABLET: TAB at 10:56

## 2019-04-10 RX ADMIN — POTASSIUM CHLORIDE 20 MEQ: 1500 TABLET, EXTENDED RELEASE ORAL at 02:39

## 2019-04-10 RX ADMIN — FLUTICASONE PROPIONATE 2 SPRAY: 50 SPRAY, METERED NASAL at 09:18

## 2019-04-10 RX ADMIN — TRAMADOL HYDROCHLORIDE 50 MG: 50 TABLET, COATED ORAL at 00:35

## 2019-04-10 RX ADMIN — SENNOSIDES 1 TABLET: 8.6 TABLET, FILM COATED ORAL at 10:56

## 2019-04-10 RX ADMIN — TRAMADOL HYDROCHLORIDE 50 MG: 50 TABLET, COATED ORAL at 09:18

## 2019-04-10 ASSESSMENT — ACTIVITIES OF DAILY LIVING (ADL)
ADLS_ACUITY_SCORE: 26
ADLS_ACUITY_SCORE: 23
ADLS_ACUITY_SCORE: 26
ADLS_ACUITY_SCORE: 23
ADLS_ACUITY_SCORE: 28
ADLS_ACUITY_SCORE: 23

## 2019-04-10 NOTE — PLAN OF CARE
Discharge Planner OT   Patient plan for discharge: unknown  Current status: pt transferred bed>chair with robin steady, Min A of 2 for sit>stand; stand>sit in chair with Mod A of 2 as pt with poor control with descent; pt demonstrated cognitive deficits,will continue to assess; once seated in chair pt completed 3 g/h tasks with set-up assist and assist to manage O2 tubing  Barriers to return to prior living situation: pain, weakness, falls risk, level of assist needed for ADLs and transfers  Recommendations for discharge: TCU  Rationale for recommendations: patient would benefit from continued therapy to increase strength, safety, and independence with ADLs, transfers, and mobility prior to discharge to home       Entered by: Charisse Shaffer 04/10/2019 12:19 PM

## 2019-04-10 NOTE — PLAN OF CARE
Heparin d/c'd and Eliquis will start tonight. Wound care to right buttock area. Up in chair with robin steady but required sling lift due to lowness of chair. VSS Oxygen 93 on 2L n/c. Will continue to try and wean. Purewick in place in bed.

## 2019-04-10 NOTE — PROGRESS NOTES
[facility]    Hospitalist Progress Note      Assessment & Plan   Marino Prajapati is a 83 year old female who was admitted on 4/9/2019.    Summary of Stay:   Marino Prajapati is a 83 year old female with past medical history significant for hypertension, COPD, prior DVT, chronic lymphedema presented to the emergency room with progressive weakness, inability to care for herself at home.  She was found to be hypoxic in the emergency room.  Evaluation including CT angios showed acute pulmonary embolism.     She is overall feeling better.  Shortness of breath is better.  Denies any chest pain.    She does feel generally very weak.  Difficulty getting out of bed and walking.  Physical therapy consult pending.    Plan:    Acute pulmonary embolism.  -Normal BNP and troponin, no right heart strain suspected.  - Currently on IV heparin.  We will transition to oral apixaban starting tonight.  Discontinue IV heparin 3 hours before oral apixaban.  -Receiving high risk medicine in form of heparin followed by apixaban.    Acute hypoxic respiratory failure.  -Due to PE.  Slowly wean down the oxygen.  May also have underlying sleep apnea.    COPD  -Stable.  On home medications.    Generalized weakness and deconditioning.  -Will benefit from PT and OT evaluation.    Hypertension  - Resumed home medications.  Monitor.  On atenolol amlodipine and losartan.    Decub ulcers.  Present at admission.  -Wound care consult.    Dyslipidemia  -Statin as before.      DVT Prophylaxis: Heparin IV  Code Status: DNR/DNI  Expected discharge: Probably next 1-2 days.  Will need plan from physical therapy regarding the fall risk as she will be on oral anticoagulation.    Luis Enrique Batista MD  Text Page (7am - 6pm, M-F)    Interval History   Patient was evaluated with nursing staff. Overnight issues discussed.  Patient is feeling better.  Denies any chest pain.  Her main issue is her generalized weakness and risk of fall.  Also complaining of  constipation.    -Data reviewed today: Labs and medications.    Physical Exam   Temp: 97.7  F (36.5  C) Temp src: Oral BP: 150/54   Heart Rate: 61 Resp: 20 SpO2: 93 % O2 Device: Nasal cannula Oxygen Delivery: 2 LPM  Vitals:    04/09/19 0243   Weight: 111.1 kg (245 lb)     Vital Signs with Ranges  Temp:  [97.1  F (36.2  C)-98.6  F (37  C)] 97.7  F (36.5  C)  Heart Rate:  [61-78] 61  Resp:  [20-24] 20  BP: (149-167)/(54-68) 150/54  SpO2:  [90 %-96 %] 93 %  I/O last 3 completed shifts:  In: 250 [P.O.:250]  Out: 625 [Urine:625]    Constitutional: Awake, alert, cooperative, no apparent distress  HEENT: Trachea midline, sclera is clear   Respiratory: Clear to auscultation bilaterally, no crackles or wheezing  Cardiovascular: Regular rate and rhythm, normal S1 and S2, and no murmur noted  GI: Normal bowel sounds, soft, non-distended, non-tender  Skin/Integumen: No rashes, no cyanosis, edema present  Extremities: Bilateral lower extremity edema which has a nonpitting component suggestive of lymphedema.    Medications     HEParin 1,600 Units/hr (04/10/19 1056)     - MEDICATION INSTRUCTIONS -         amLODIPine  5 mg Oral Daily     apixaban ANTICOAGULANT  10 mg Oral BID    Followed by     [START ON 4/17/2019] apixaban ANTICOAGULANT  5 mg Oral BID     atenolol  100 mg Oral Daily     budesonide-formoterol  2 puff Inhalation 2 times daily     cyanocobalamin  1,000 mcg Oral Daily     fluticasone  1-2 spray Both Nostrils Daily     levothyroxine  50 mcg Oral Daily     losartan  100 mg Oral Daily     multivitamin w/minerals  1 tablet Oral Daily     nystatin   Topical BID     omeprazole  20 mg Oral Daily     pravastatin  20 mg Oral Daily     sennosides  1 tablet Oral BID     terazosin  2 mg Oral At Bedtime     umeclidinium  1 puff Inhalation Daily     vitamin D3  2,000 Units Oral Daily       Data   Recent Labs   Lab 04/10/19  0823 04/09/19  0254   WBC 9.1 10.5   HGB 12.9 13.7   MCV 90 90    256    139   POTASSIUM 3.9  3.2*   CHLORIDE 107 104   CO2 28 29   BUN 10 17   CR 0.63 0.82   ANIONGAP 4 6   SANDRA 8.3* 8.8   GLC 95 96   ALBUMIN  --  3.6   PROTTOTAL  --  7.2   BILITOTAL  --  0.8   ALKPHOS  --  84   ALT  --  22   AST  --  20   TROPI  --  <0.015       No results found for this or any previous visit (from the past 24 hour(s)).

## 2019-04-10 NOTE — PROGRESS NOTES
Lab called with critical Hep10a of 1.11. Talked with pharmacist and said to stop hep. Pharmacist will order stat hep10a lab draw.

## 2019-04-10 NOTE — PLAN OF CARE
VSS O2 3L NC stat in low 90's. Hep drip at 17ml. Magaly. Tele SR. Pt was living at home. PT OT recommend TCU. SW is consulted. Will continue to monitor.

## 2019-04-11 ENCOUNTER — APPOINTMENT (OUTPATIENT)
Dept: PHYSICAL THERAPY | Facility: CLINIC | Age: 84
DRG: 175 | End: 2019-04-11
Payer: COMMERCIAL

## 2019-04-11 ENCOUNTER — APPOINTMENT (OUTPATIENT)
Dept: OCCUPATIONAL THERAPY | Facility: CLINIC | Age: 84
DRG: 175 | End: 2019-04-11
Payer: COMMERCIAL

## 2019-04-11 PROCEDURE — 94640 AIRWAY INHALATION TREATMENT: CPT

## 2019-04-11 PROCEDURE — 40000275 ZZH STATISTIC RCP TIME EA 10 MIN

## 2019-04-11 PROCEDURE — 25000132 ZZH RX MED GY IP 250 OP 250 PS 637: Performed by: INTERNAL MEDICINE

## 2019-04-11 PROCEDURE — 12000000 ZZH R&B MED SURG/OB

## 2019-04-11 PROCEDURE — 99232 SBSQ HOSP IP/OBS MODERATE 35: CPT | Performed by: INTERNAL MEDICINE

## 2019-04-11 PROCEDURE — 97110 THERAPEUTIC EXERCISES: CPT | Mod: GO | Performed by: OCCUPATIONAL THERAPIST

## 2019-04-11 PROCEDURE — 94640 AIRWAY INHALATION TREATMENT: CPT | Mod: 76

## 2019-04-11 PROCEDURE — 97530 THERAPEUTIC ACTIVITIES: CPT | Mod: GP | Performed by: PHYSICAL THERAPIST

## 2019-04-11 RX ADMIN — CYANOCOBALAMIN TAB 1000 MCG 1000 MCG: 1000 TAB at 10:27

## 2019-04-11 RX ADMIN — UMECLIDINIUM 1 PUFF: 62.5 AEROSOL, POWDER ORAL at 07:45

## 2019-04-11 RX ADMIN — ATENOLOL 100 MG: 50 TABLET ORAL at 10:27

## 2019-04-11 RX ADMIN — ACETAMINOPHEN 650 MG: 325 TABLET, FILM COATED ORAL at 12:54

## 2019-04-11 RX ADMIN — TRAMADOL HYDROCHLORIDE 50 MG: 50 TABLET, COATED ORAL at 08:11

## 2019-04-11 RX ADMIN — LOSARTAN POTASSIUM 100 MG: 100 TABLET ORAL at 10:27

## 2019-04-11 RX ADMIN — SENNOSIDES 1 TABLET: 8.6 TABLET, FILM COATED ORAL at 10:27

## 2019-04-11 RX ADMIN — APIXABAN 10 MG: 5 TABLET, FILM COATED ORAL at 21:14

## 2019-04-11 RX ADMIN — AMLODIPINE BESYLATE 5 MG: 5 TABLET ORAL at 10:27

## 2019-04-11 RX ADMIN — TRAMADOL HYDROCHLORIDE 50 MG: 50 TABLET, COATED ORAL at 17:53

## 2019-04-11 RX ADMIN — BUDESONIDE AND FORMOTEROL FUMARATE DIHYDRATE 2 PUFF: 160; 4.5 AEROSOL RESPIRATORY (INHALATION) at 19:12

## 2019-04-11 RX ADMIN — FLUTICASONE PROPIONATE 2 SPRAY: 50 SPRAY, METERED NASAL at 10:24

## 2019-04-11 RX ADMIN — VITAMIN D, TAB 1000IU (100/BT) 2000 UNITS: 25 TAB at 10:26

## 2019-04-11 RX ADMIN — OMEPRAZOLE 20 MG: 20 CAPSULE, DELAYED RELEASE ORAL at 10:28

## 2019-04-11 RX ADMIN — MICONAZOLE NITRATE: 2 POWDER TOPICAL at 21:15

## 2019-04-11 RX ADMIN — APIXABAN 10 MG: 5 TABLET, FILM COATED ORAL at 10:26

## 2019-04-11 RX ADMIN — MULTIPLE VITAMINS W/ MINERALS TAB 1 TABLET: TAB at 10:27

## 2019-04-11 RX ADMIN — TERAZOSIN HYDROCHLORIDE 2 MG: 1 CAPSULE ORAL at 21:14

## 2019-04-11 RX ADMIN — PRAVASTATIN SODIUM 20 MG: 20 TABLET ORAL at 10:29

## 2019-04-11 RX ADMIN — SENNOSIDES 1 TABLET: 8.6 TABLET, FILM COATED ORAL at 21:14

## 2019-04-11 RX ADMIN — NYSTATIN: 100000 OINTMENT TOPICAL at 21:16

## 2019-04-11 RX ADMIN — MICONAZOLE NITRATE: 2 POWDER TOPICAL at 10:29

## 2019-04-11 RX ADMIN — BUDESONIDE AND FORMOTEROL FUMARATE DIHYDRATE 2 PUFF: 160; 4.5 AEROSOL RESPIRATORY (INHALATION) at 07:45

## 2019-04-11 RX ADMIN — NYSTATIN: 100000 OINTMENT TOPICAL at 10:29

## 2019-04-11 RX ADMIN — LEVOTHYROXINE SODIUM 50 MCG: 50 TABLET ORAL at 08:11

## 2019-04-11 ASSESSMENT — ACTIVITIES OF DAILY LIVING (ADL)
ADLS_ACUITY_SCORE: 26

## 2019-04-11 NOTE — PLAN OF CARE
On Eliquis for PE. Up with walker and gait belt with 2 max assist and then used robin steady this evening. Less appetite today. Plan to go to [facility]-knows she doesn't have the strength to return home at this time. Legs more wrinkly and better after soak and moisturizing yesterday.

## 2019-04-11 NOTE — PLAN OF CARE
Discharge Planner PT   Patient plan for discharge: Pt is discussing TCU w/ SSW  Current status: LBP, 2 L 02 w/ activity sats 91%. Mod Ax2 bed mobility w/ HOB fully elevated. Mod ax1, Max Ax1 sit<>stand with a few paces to bedside commode with multiple cues and assist to maneuver the walker. Stood 1 min for hygiene and commode>recliner swap out. Pt exhausted, coughing up phlem.  Barriers to return to prior living situation:Pt cannot care for herself, she is a heavy A of 2 currently.  Recommendations for discharge: TCU  Rationale for recommendations: Pt is weak, she is [place] Steady for nursing,and a heavy assist of 2 for transfers for PT, she has not ambulated more thatn a couple steps. Continued PT here and at next level of care to get pt as indep as safely possible. Pt may need increased care permanently, [facility] may need to be an option.        Entered by: Dione Ziegler 04/11/2019 8:48 AM

## 2019-04-11 NOTE — PROGRESS NOTES
[facility]    Hospitalist Progress Note      Assessment & Plan   Marino Prajapati is a 83 year old female who was admitted on 4/9/2019.    Summary of Stay:   Marino Prajapati is a 83 year old female with past medical history significant for hypertension, COPD, prior DVT, chronic lymphedema presented to the emergency room with progressive weakness, inability to care for herself at home.  She was found to be hypoxic in the emergency room.  Evaluation including CT angios showed acute pulmonary embolism.      Overall stable.  Shortness of breath is better.  Denies any chest pain.     Plan:     Acute pulmonary embolism.  -Normal BNP and troponin, no right heart strain suspected.  -Initially treated with IV heparin.  No transition to oral apixaban which she has been tolerating well  -Receiving high risk medicine in form of  apixaban.  Monitor for any bleeding.     Acute hypoxic respiratory failure.  -Due to PE.  Slowly wean down the oxygen.  May also have underlying sleep apnea.     COPD  -Stable.  On home medications.     Generalized weakness and deconditioning.  -PT and OT evaluation.  Will likely need TCU placement for further rehab.  Continues to be weak and unstable.     Hypertension  - Monitor.  On atenolol amlodipine and losartan.     Decub ulcers.  Present at admission.  -Wound care consult.     Dyslipidemia  -Statin as before.        DVT Prophylaxis: Heparin IV  Code Status: DNR/DNI  Expected discharge:  Likely to a TCU when bed is available.        Luis Enrique Batista MD  [employer] Page (7am - 6pm, M-F)    Interval History   Patient was evaluated with nursing staff. Overnight issues discussed.  Denies any chest pain.  Shortness of breath is better.  Continues to complain generalized weakness.  Has high fall risk.      -Data reviewed today: Labs and medications.    Physical Exam   Temp: 97.1  F (36.2  C) Temp src: Oral BP: 150/59 Pulse: 70 Heart Rate: 71 Resp: 20 SpO2: 94 % O2 Device: Nasal cannula Oxygen  Delivery: 2 LPM  Vitals:    04/09/19 0243 04/10/19 1158   Weight: 111.1 kg (245 lb) 103.7 kg (228 lb 11.2 oz)     Vital Signs with Ranges  Temp:  [97.1  F (36.2  C)-98.4  F (36.9  C)] 97.1  F (36.2  C)  Pulse:  [70] 70  Heart Rate:  [66-71] 71  Resp:  [20] 20  BP: (150-154)/(57-60) 150/59  SpO2:  [93 %-94 %] 94 %  I/O last 3 completed shifts:  In: 830 [P.O.:830]  Out: 630 [Urine:630]    Constitutional: Awake, alert, cooperative, no apparent distress  HEENT: Trachea midline, sclera is clear   Respiratory: Clear to auscultation bilaterally, no crackles or wheezing  Cardiovascular: Regular rate and rhythm, normal S1 and S2, and no murmur noted  GI: Normal bowel sounds, soft, non-distended, non-tender  Skin/Integumen: No rashes, no cyanosis, no edema  Psych: appropriate affect, no agitation   Extremities: No pitting edema     Medications     - MEDICATION INSTRUCTIONS -         amLODIPine  5 mg Oral Daily     apixaban ANTICOAGULANT  10 mg Oral BID    Followed by     [START ON 4/17/2019] apixaban ANTICOAGULANT  5 mg Oral BID     atenolol  100 mg Oral Daily     budesonide-formoterol  2 puff Inhalation 2 times daily     cyanocobalamin  1,000 mcg Oral Daily     fluticasone  1-2 spray Both Nostrils Daily     levothyroxine  50 mcg Oral Daily     losartan  100 mg Oral Daily     multivitamin w/minerals  1 tablet Oral Daily     nystatin   Topical BID     omeprazole  20 mg Oral Daily     pravastatin  20 mg Oral Daily     sennosides  1 tablet Oral BID     terazosin  2 mg Oral At Bedtime     umeclidinium  1 puff Inhalation Daily     vitamin D3  2,000 Units Oral Daily       Data   Recent Labs   Lab 04/10/19  0823 04/09/19  0254   WBC 9.1 10.5   HGB 12.9 13.7   MCV 90 90    256    139   POTASSIUM 3.9 3.2*   CHLORIDE 107 104   CO2 28 29   BUN 10 17   CR 0.63 0.82   ANIONGAP 4 6   SANDRA 8.3* 8.8   GLC 95 96   ALBUMIN  --  3.6   PROTTOTAL  --  7.2   BILITOTAL  --  0.8   ALKPHOS  --  84   ALT  --  22   AST  --  20   TROPI  --   <0.015       No results found for this or any previous visit (from the past 24 hour(s)).

## 2019-04-11 NOTE — PROGRESS NOTES
Your information has been submitted on April 11th, 2019 at 02:54:56 PM CDT. The confirmation number is [id number]

## 2019-04-11 NOTE — PLAN OF CARE
Discharge Planner OT   Patient plan for discharge: TCU  Current status: pt declined any OOB as she had just returned to bed. Agreeable to UE exs in bed. Needed active assist for proper form for some shoulder ex's.   Barriers to return to prior living situation: decreased I with mobility, decreased strength, fall risk  Recommendations for discharge: TCU  Rationale for recommendations: pt is below baseline, has had a slow decline at home and unable to care for herself. Pt would benefit from daily therapy in order to maximize Ind prior to return home.        Entered by: Abigail Ruiz 04/11/2019 12:20 PM

## 2019-04-11 NOTE — PROGRESS NOTES
**Brief sw note.    Sw attempted to meet with the pt to discuss discharge planning.  PT had just finished working with the pt and she was tired.  She asked sw to return later because she did not feel like talking or thinking at this time.  Pt did tell sw that [facility] is [facility] and discuss other options at a later time.    Sw sent the referral to [facility].  Sw will attempt to see the pt later today to complete the initial assessment and consult.

## 2019-04-11 NOTE — PLAN OF CARE
VSS. Tramadol for back pain with relief. Repositioned q2hrs. Purewick. Wound care completed to buttock. Tele SR. Will continue to monitor.

## 2019-04-11 NOTE — CONSULTS
Care Transition Initial Assessment -      Met with: Patient    Active Problems:    Pulmonary embolism (H)       DATA  Lives With: significant other - Josue  Living Arrangements: house - 3 stairs to get into home  Quality of Family Relationships: non-existent - Pt states that she does not have any family.  Her SO's kids help.  Description of Support System: Supportive, Involved  Who is your support system?: Significant Other, Other (specify)(Significant other's children)  Pt states that her SO - Josue tries to help her out as much as he can, but she says that he cannot do everything he thinks he can to help her.  She said Josue's kids are very supportive and help them out a lot.    Support Assessment: Adequate family and caregiver support, Adequate social supports. Pt states that she has enough support.    Identified issues/concerns regarding health management: Pt was admitted for a PE.     Resources List: Skilled Nursing Facility     Quality of Family Relationships: non-existent  Transportation Anticipated: other (see comments)(HE wheelchair) - 1415 on 4/12    ASSESSMENT  Cognitive Status:  awake, alert and oriented  Concerns to be addressed:  Pt states that Josue tries to help her a lot, but she said that sometimes he cannot provide her the help that she needs.  She stated that she is mostly homebound, but independent within the home.  They just ordered a lift chair for her at home to assist with her mobility.  She said that she uses a walker to get around.    Pt's discharge recommendation is TCU.  The pt requested a referral be sent to [facility] for a shared room at discharge.    [facility] has a bed available for the pt tomorrow.  It is a private room, but because the pt requested a shared room, the private room fee will be waived.  Pt stated that she will need HE wheelchair transport at discharge.  Pt is aware and acknowledges the costs.     PLAN  Financial costs for the patient includes HE wheelchair  transport.  Sw will continue with discharge planning and will be available as needed until discharge.  HE wheelchair transport is arranged for 4/12 at 1415.

## 2019-04-12 VITALS
TEMPERATURE: 98.1 F | BODY MASS INDEX: 36.72 KG/M2 | OXYGEN SATURATION: 93 % | RESPIRATION RATE: 24 BRPM | SYSTOLIC BLOOD PRESSURE: 136 MMHG | DIASTOLIC BLOOD PRESSURE: 63 MMHG | WEIGHT: 227.5 LBS | HEART RATE: 72 BPM

## 2019-04-12 PROCEDURE — 25000132 ZZH RX MED GY IP 250 OP 250 PS 637: Performed by: INTERNAL MEDICINE

## 2019-04-12 PROCEDURE — 25000125 ZZHC RX 250: Performed by: INTERNAL MEDICINE

## 2019-04-12 PROCEDURE — 99239 HOSP IP/OBS DSCHRG MGMT >30: CPT | Performed by: INTERNAL MEDICINE

## 2019-04-12 PROCEDURE — 94640 AIRWAY INHALATION TREATMENT: CPT | Mod: 76

## 2019-04-12 PROCEDURE — 40000275 ZZH STATISTIC RCP TIME EA 10 MIN

## 2019-04-12 PROCEDURE — 94640 AIRWAY INHALATION TREATMENT: CPT

## 2019-04-12 RX ORDER — TRAMADOL HYDROCHLORIDE 50 MG/1
TABLET ORAL
Qty: 16 TABLET | Refills: 0 | Status: SHIPPED | OUTPATIENT
Start: 2019-04-12 | End: 2019-05-29

## 2019-04-12 RX ORDER — SENNOSIDES 8.6 MG
1 TABLET ORAL 2 TIMES DAILY
DISCHARGE
Start: 2019-04-12 | End: 2020-03-06

## 2019-04-12 RX ADMIN — CYANOCOBALAMIN TAB 1000 MCG 1000 MCG: 1000 TAB at 07:40

## 2019-04-12 RX ADMIN — MULTIPLE VITAMINS W/ MINERALS TAB 1 TABLET: TAB at 07:40

## 2019-04-12 RX ADMIN — ACETAMINOPHEN 650 MG: 325 TABLET, FILM COATED ORAL at 11:42

## 2019-04-12 RX ADMIN — LOSARTAN POTASSIUM 100 MG: 100 TABLET ORAL at 07:40

## 2019-04-12 RX ADMIN — OMEPRAZOLE 20 MG: 20 CAPSULE, DELAYED RELEASE ORAL at 07:40

## 2019-04-12 RX ADMIN — TRAMADOL HYDROCHLORIDE 50 MG: 50 TABLET, COATED ORAL at 03:07

## 2019-04-12 RX ADMIN — UMECLIDINIUM 1 PUFF: 62.5 AEROSOL, POWDER ORAL at 09:08

## 2019-04-12 RX ADMIN — FLUTICASONE PROPIONATE 2 SPRAY: 50 SPRAY, METERED NASAL at 07:46

## 2019-04-12 RX ADMIN — BUDESONIDE AND FORMOTEROL FUMARATE DIHYDRATE 2 PUFF: 160; 4.5 AEROSOL RESPIRATORY (INHALATION) at 09:06

## 2019-04-12 RX ADMIN — APIXABAN 10 MG: 5 TABLET, FILM COATED ORAL at 07:40

## 2019-04-12 RX ADMIN — PRAVASTATIN SODIUM 20 MG: 20 TABLET ORAL at 07:39

## 2019-04-12 RX ADMIN — TRAMADOL HYDROCHLORIDE 50 MG: 50 TABLET, COATED ORAL at 08:08

## 2019-04-12 RX ADMIN — ATENOLOL 100 MG: 50 TABLET ORAL at 07:39

## 2019-04-12 RX ADMIN — AMLODIPINE BESYLATE 5 MG: 5 TABLET ORAL at 07:39

## 2019-04-12 RX ADMIN — LEVOTHYROXINE SODIUM 50 MCG: 50 TABLET ORAL at 07:41

## 2019-04-12 RX ADMIN — IPRATROPIUM BROMIDE AND ALBUTEROL SULFATE 3 ML: .5; 3 SOLUTION RESPIRATORY (INHALATION) at 13:39

## 2019-04-12 RX ADMIN — VITAMIN D, TAB 1000IU (100/BT) 2000 UNITS: 25 TAB at 07:41

## 2019-04-12 RX ADMIN — NYSTATIN: 100000 OINTMENT TOPICAL at 11:08

## 2019-04-12 RX ADMIN — SENNOSIDES 1 TABLET: 8.6 TABLET, FILM COATED ORAL at 07:40

## 2019-04-12 ASSESSMENT — ACTIVITIES OF DAILY LIVING (ADL)
ADLS_ACUITY_SCORE: 26
ADLS_ACUITY_SCORE: 22

## 2019-04-12 NOTE — CONSULTS
"CLINICAL NUTRITION SERVICES  -  ASSESSMENT NOTE      MALNUTRITION:  % Weight Loss: Unable to determine with diuresis and patient inability to describe more recent wt trends  % Intake:  Decreased intake does not meet criteria --> <50% x 3 days  Subcutaneous Fat Loss:  Orbital region mild depletion and Upper arm region moderate depletion  Muscle Loss:  Temporal region mild depletion, Clavicle bone region mild depletion, Acromion bone region moderate depletion and Posterior calf region moderate depletion  Fluid Retention:  None noted    Malnutrition Diagnosis: Non-Severe malnutrition  In Context of:  Acute illness or injury  Chronic illness or disease        REASON FOR ASSESSMENT  Marino Prajapati is a 83 year old female seen by Registered Dietitian for RN Consult - \"pressure ulcers, DM, poor appetite\".        NUTRITION HISTORY  - Information obtained from patient and chart.  - Patient with a h/o COPD, FTT, pressure injury, chronic lymphedema.    - Patient reports regular diet at home.    - Meals BID is her baseline.  - She denies a decrease in appetite/oral intakes PTA, though does reference taste changes \"because of the nebs\".  She is unable to describe if this has caused a change in her eating habits (skipping of meals or decrease in portions).   - She does describe a specific patterns to meals.  She states she has a SO who makes meals and she does her grocery shopping.   - She does not use oral supplements in the home setting.  - NKFA.      CURRENT NUTRITION ORDERS  Diet Order:     Cardiac/No caffeine    Current Intake/Tolerance:  25-50% meal consumption since admit.  Variable oral intakes and meal ordering since admit (x3 days).  Patient reports she was not eating as she did not like her diet restrictions.  Also describes decreased appetite.      NUTRITION FOCUSED PHYSICAL ASSESSMENT (NFPA)  Completed:  Yes Full assessment         Observed:    Muscle wasting (refer to documentation in Malnutrition section) and " "Subcutaneous fat loss (refer to documentation in Malnutrition section)    Obtained from Chart/Interdisciplinary Team:  +3 BLE edema, chronic lymphedema   WOCN assess 4/09: stage 2 to R IT    ANTHROPOMETRICS  Height: 5' 6\"  Weight: 103.2 kg (227#)  Body mass index is 36.72 kg/m .  Weight Status:  Obesity Grade II BMI 35-39.9  IBW: 59.1 kg (130#)  % IBW: 175%  Weight History:  Wt Readings from Last 10 Encounters:   04/12/19 103.2 kg (227 lb 8 oz)   11/09/18 111.6 kg (246 lb)   07/19/18 110 kg (242 lb 6.4 oz)   12/01/17 108.4 kg (239 lb)   10/19/17 108.3 kg (238 lb 12.8 oz)   05/02/17 107 kg (235 lb 14.4 oz)   12/29/16 108 kg (238 lb)   09/27/16 104 kg (229 lb 4.8 oz)   06/14/16 105 kg (231 lb 8 oz)   05/10/16 106.2 kg (234 lb 1.6 oz)   - Diuresed this admit and was 245# upon admit.  Unclear if this was a reported wt.  Patient does not weigh herself regularly in the home setting and is therefore unaware of recent wt trends.     LABS  Labs reviewed including use of diuretic this admit    MEDICATIONS  Medications reviewed including daily MVI/M      ASSESSED NUTRITION NEEDS PER APPROVED PRACTICE GUIDELINES:    Dosing Weight 70.1 kg - adjusted weight   Estimated Energy Needs: 6109-2339 kcals (25-30 Kcal/Kg)  Justification: minimum maintenance with obesity and wound healing  Estimated Protein Needs: + grams protein (1.3-1.5+ g pro/Kg)  Justification: wound healing and preservation of lean body mass  Estimated Fluid Needs: per MD  Justification: per MD    MALNUTRITION:  % Weight Loss: Unable to determine with diuresis and patient inability to describe more recent wt trends  % Intake:  Decreased intake does not meet criteria --> <50% x 3 days  Subcutaneous Fat Loss:  Orbital region mild depletion and Upper arm region moderate depletion  Muscle Loss:  Temporal region mild depletion, Clavicle bone region mild depletion, Acromion bone region moderate depletion and Posterior calf region moderate depletion  Fluid Retention:  " None noted    Malnutrition Diagnosis: Non-Severe malnutrition  In Context of:  Acute illness or injury  Chronic illness or disease    NUTRITION DIAGNOSIS:  Inadequate oral intake related to suspect chronic decreased appetite or social impacts, previous diet restrictions as evidenced by meeting <50% needs x 3 days since admit, unable to determine wt trends with diuresis and fat/muscle loss, coding of malnutrition.        NUTRITION INTERVENTIONS  Recommendations / Nutrition Prescription  Liberalize to regular diet order.      Patient denied oral supplements.  She would like to focus on protein sources from food, see education below.      Continue daily MVI/M.  Hold-off on full pressure injury pending oral intake trends and also discharging to TCU today.      Implementation  Nutrition education: Provided education on foods which contain protein.  Encouraged 1-2 sources at each meal and encouraged meals TID until PI healed.    Collaboration and Referral of Nutrition care: Discussed POC with team during rounds.      Nutrition Goals  Patient to consume at least 50-75% of meals TID to show improvement in PO intakes.    Patient to order/consume at least 1-2 protein sources with each meal.      MONITORING AND EVALUATION:  Progress towards goals will be monitored and evaluated per protocol and Practice Guidelines          Kathryn Ho RD, LD  Clinical Dietitian  3rd floor/ICU: [phone]  All other floors: [phone]  Weekend/holiday: [phone]

## 2019-04-12 NOTE — PLAN OF CARE
OT: Per chart review pt is transferring to TCU today, transport set in place, will defer further OT services to next level of care    Occupational Therapy Discharge Summary    Reason for therapy discharge:    Discharged to transitional care facility.    Progress towards therapy goal(s). See goals on Care Plan in [facility] electronic health record for goal details.  Goals not met.  Barriers to achieving goals:   discharge from facility.    Therapy recommendation(s):    Continued therapy is recommended.  Rationale/Recommendations:  TCU recommended as pt below baseline for strength, safety and independence with basic ADLs, transfers and mobility.

## 2019-04-12 NOTE — DISCHARGE SUMMARY
[facility]    Discharge Summary  Hospitalist    Date of Admission:  4/9/2019  Date of Discharge:  4/12/2019  Discharging Provider: Luis Enrique Batista MD  Date of Service (when I saw the patient): 04/12/19    Discharge Diagnoses   Acute pulmonary embolism.  No evidence of right heart dysfunction.  Acute hypoxic respiratory failure secondary to PE.  COPD.  Generalized weakness with deconditioning and fall risk.  Hypertension.  Decubitus ulcers present at admission likely related to her poor mobility.  Skin candidiasis.  Dyslipidemia.  Gastroesophageal reflux disease.      History of Present Illness     Marino Prajapati is a 83 year old female with past medical history significant for hypertension, COPD, prior DVT, chronic lymphedema presented to the emergency room with progressive weakness, inability to care for herself at home.  She was found to be hypoxic in the emergency room.  Evaluation including CT showed acute pulmonary embolism.     Hospital Course     For her pulmonary embolism she was treated with IV heparin in the beginning.  She tolerated that well.  She did not show any signs of right heart dysfunction.  Afterwards, she was transitioned to oral anticoagulant in form of oral apixaban.  IV heparin has been discontinued.  She has been tolerating the oral apixaban.    She noted to have significant weakness which appears to be a progressive weakness in form of deconditioning.  Started many weeks to months ago.  According to the patient, she has been having difficulty getting out of bed herself and the situation is to a point that she decided to come to the emergency room.  The sedentary lifestyle and her immobility likely led to her pulmonary embolism.    She would need a robust and aggressive physical therapy program.  For that reason she is being transitioned to a TCU for further rehab.  She agrees with the plan.  She hopes to go home after getting stronger at the TCU.    Otherwise her home  medications are being continued as before.  Her aspirin was stopped as no history of coronary artery disease/heart attack in the past.    I personally evaluated and examined the patient on the day of discharge.    Luis Enrique Batista MD      Pending Results   These results will be followed up by PCP  Unresulted Labs Ordered in the Past 30 Days of this Admission     No orders found from 2/8/2019 to 4/10/2019.               Primary Care Physician   Vivian Blue        Discharge Disposition   Discharged to TCU  Condition at discharge: Stable    Consultations This Hospital Stay   PHARMACY TO DOSE HEPARIN  PHYSICAL THERAPY ADULT IP CONSULT  OCCUPATIONAL THERAPY ADULT IP CONSULT  PHARMACY TO DOSE HEPARIN  SOCIAL WORK IP CONSULT  PHARMACY TO DOSE HEPARIN  WOUND OSTOMY CONTINENCE NURSE  IP CONSULT  PHARMACY IP CONSULT  PHARMACY DISCHARGE EDUCATION BY PHARMACIST  NUTRITION SERVICES ADULT IP CONSULT  PHYSICAL THERAPY ADULT IP CONSULT  OCCUPATIONAL THERAPY ADULT IP CONSULT    Time Spent on this Encounter   Discharge time: greater than 30 minutes.    Discharge Orders      General info for SNF    Length of Stay Estimate: Short Term Care: Estimated # of Days <30  Condition at Discharge: Stable  Level of care:skilled   Rehabilitation Potential: Excellent  Admission H&P remains valid and up-to-date: Yes  Recent Chemotherapy: N/A  Use Nursing Home Standing Orders: Yes     Mantoux instructions    Give two-step Mantoux (PPD) Per Facility Policy Yes     Reason for your hospital stay    Pulmonary embolism     Activity - Up ad greta     Follow Up and recommended labs and tests    Follow up with Nursing home physician.     DNR/DNI     Physical Therapy Adult Consult    Evaluate and treat as clinically indicated.    Reason:  Weakness, deconditioning     Occupational Therapy Adult Consult    Evaluate and treat as clinically indicated.    Reason:  Weakness, deconditioning     Oxygen - Nasal cannula    1 Lpm by nasal cannula to keep O2  sats 89% or greater.     Fall precautions     Advance Diet as Tolerated    Follow this diet upon discharge: Orders Placed This Encounter      Combination Diet Low Saturated Fat Na <2400mg Diet, No Caffeine Diet     Discharge Medications   Current Discharge Medication List      START taking these medications    Details   !! apixaban ANTICOAGULANT (ELIQUIS) 5 MG tablet Take 2 tablets (10 mg) by mouth 2 times daily    Comments: Last dose of 10mg BID dosin19 AM. Then start 5mg BID dosing  Associated Diagnoses: Other acute pulmonary embolism without acute cor pulmonale (H)      !! apixaban ANTICOAGULANT (ELIQUIS) 5 MG tablet Take 1 tablet (5 mg) by mouth 2 times daily    Comments: Start on 19, 9PM  Associated Diagnoses: Other acute pulmonary embolism without acute cor pulmonale (H)      sennosides (SENOKOT) 8.6 MG tablet Take 1 tablet by mouth 2 times daily    Associated Diagnoses: Chronic idiopathic constipation       !! - Potential duplicate medications found. Please discuss with provider.      CONTINUE these medications which have CHANGED    Details   traMADol (ULTRAM) 50 MG tablet TAKE 1 TABLET BY MOUTH TWICE DAILY AS NEEDED FOR PAIN  Qty: 16 tablet, Refills: 0    Associated Diagnoses: Chronic pain of both knees         CONTINUE these medications which have NOT CHANGED    Details   albuterol (PROAIR HFA/PROVENTIL HFA/VENTOLIN HFA) 108 (90 Base) MCG/ACT Inhaler Inhale 2 puffs into the lungs every 6 hours as needed for shortness of breath / dyspnea or wheezing  Qty: 1 Inhaler, Refills: 3    Associated Diagnoses: Mild persistent asthma without complication      amLODIPine (NORVASC) 10 MG tablet Take 0.5 tablets (5 mg) by mouth daily  Qty: 90 tablet, Refills: 1    Associated Diagnoses: HTN, goal below 140/90; Hyperlipidemia LDL goal <130; Bilateral leg edema      ascorbic acid (VITAMIN C) 500 MG tablet Take 500 mg by mouth daily   Qty: 100 tablet, Refills: 12      atenolol (TENORMIN) 100 MG tablet Take 1  tablet (100 mg) by mouth daily  Qty: 90 tablet, Refills: 1    Associated Diagnoses: HTN, goal below 140/90; Hyperlipidemia LDL goal <130; Bilateral leg edema      budesonide-formoterol (SYMBICORT) 160-4.5 MCG/ACT Inhaler Inhale 2 puffs into the lungs 2 times daily  Qty: 3 Inhaler, Refills: 1    Associated Diagnoses: Cough; Mild persistent asthma without complication      cholecalciferol (VITAMIN D) 1000 UNIT tablet Take 2 tablets (2,000 Units) by mouth daily  Qty: 100 tablet, Refills: 3    Associated Diagnoses: Cough; Mild persistent asthma without complication      Coenzyme Q10 (COQ10) 30 MG CAPS Take 1 capsule by mouth daily   Qty: 30 capsule      Cranberry Extract 250 MG TABS Take 1 tablet by mouth daily       Cyanocobalamin (B-12) 1000 MCG TBCR Take 1,000 mcg by mouth daily  Qty: 100 tablet, Refills: 1      furosemide (LASIX) 40 MG tablet Take 20-40 mg by mouth daily as needed      Garlic (GARLIC 1500) 1500 MG CAPS Take 1,500 mg by mouth daily       ipratropium - albuterol 0.5 mg/2.5 mg/3 mL (DUONEB) 0.5-2.5 (3) MG/3ML neb solution Take 1 vial (3 mLs) by nebulization every 6 hours as needed for shortness of breath / dyspnea or wheezing  Qty: 100 vial, Refills: 1    Associated Diagnoses: Cough; Mild persistent asthma without complication      levothyroxine (SYNTHROID/LEVOTHROID) 50 MCG tablet Take 50 mcg by mouth daily      losartan (COZAAR) 100 MG tablet Take 1 tablet (100 mg) by mouth daily  Qty: 90 tablet, Refills: 1    Associated Diagnoses: HTN, goal below 140/90; Hyperlipidemia LDL goal <130; Bilateral leg edema      Multiple Minerals (CALCIUM-MAGNESIUM-ZINC) TABS Take 1 tablet by mouth daily       Multiple Vitamins-Minerals (MULTIVITAMIN & MINERAL PO) Take 1 tablet by mouth daily.      Nutritional Supplements (SALMON OIL) CAPS Take 1 capsule by mouth daily       omeprazole 20 MG tablet Take 1 tablet (20 mg) by mouth daily Take 30-60 minutes before a meal.  Qty: 90 tablet, Refills: 0    Comments: Pt due for  Aug appt/labs. Needs to call our office.  Associated Diagnoses: Nausea      pravastatin (PRAVACHOL) 20 MG tablet Take 1 tablet (20 mg) by mouth daily  Qty: 90 tablet, Refills: 1    Associated Diagnoses: Hyperlipidemia LDL goal <130; HTN, goal below 140/90; Bilateral leg edema      terazosin (HYTRIN) 2 MG capsule Take 1 capsule (2 mg) by mouth At Bedtime  Qty: 90 capsule, Refills: 1    Associated Diagnoses: HTN, goal below 140/90; Hyperlipidemia LDL goal <130; Bilateral leg edema      tiotropium (SPIRIVA HANDIHALER) 18 MCG inhaled capsule Inhale contents of one capsule daily.  Qty: 90 capsule, Refills: 0    Associated Diagnoses: Mild persistent asthma without complication      vitamin  B complex with vitamin C (VITAMIN  B COMPLEX) TABS Take 1 tablet by mouth daily  Refills: 0      acetaminophen (TYLENOL) 325 MG tablet Take 1-2 tablets (325-650 mg) by mouth every 6 hours as needed for mild pain  Qty: 250 tablet, Refills: 11      fluticasone (FLONASE) 50 MCG/ACT nasal spray Spray 1-2 sprays into both nostrils daily  Qty: 3 Bottle, Refills: 0    Associated Diagnoses: Postnasal drip      !! order for DME Equipment being ordered: 4 wheeled walker with hand brakes and seat  Qty: 1 each, Refills: 0    Associated Diagnoses: Primary osteoarthritis of both knees      !! order for DME Equipment being ordered: 1: Custom/alternative BLE full leg velco/buckling compression garment  Qty: 1 each, Refills: 0    Associated Diagnoses: Lymphedema      !! order for DME Equipment being ordered: 1: Gradient Compression Wraps; 2: BLE knee high 20-30 mm Hg compression stockings; 3: BLE thigh high 20-30 mm Hg compression stockings; 4: Cast Boots  Qty: 1 each, Refills: 0    Associated Diagnoses: Bilateral leg edema      !! order for DME Equipment being ordered: Nebulizer and neb supplies (tubing, etc)  Qty: 1 Device, Refills: 0    Associated Diagnoses: Cough; Mild persistent asthma without complication       !! - Potential duplicate  medications found. Please discuss with provider.      STOP taking these medications       aspirin 81 MG tablet Comments:   Reason for Stopping:             Allergies   No Known Allergies  Data   Most Recent 3 CBC's:  Recent Labs   Lab Test 04/10/19  0823 04/09/19  0254 11/02/18  1215   WBC 9.1 10.5 7.0   HGB 12.9 13.7 14.3   MCV 90 90 96    256 244      Most Recent 3 BMP's:  Recent Labs   Lab Test 04/10/19  0823 04/09/19  0254 11/02/18  1215    139 141   POTASSIUM 3.9 3.2* 4.1   CHLORIDE 107 104 105   CO2 28 29 23   BUN 10 17 20   CR 0.63 0.82 0.90   ANIONGAP 4 6 13   SANDRA 8.3* 8.8 9.5   GLC 95 96 79     Most Recent 2 LFT's:  Recent Labs   Lab Test 04/09/19  0254 11/02/18  1215   AST 20 28   ALT 22 29   ALKPHOS 84 82   BILITOTAL 0.8 0.6     Most Recent INR's and Anticoagulation Dosing History:  Anticoagulation Dose History     Recent Dosing and Labs Latest Ref Rng & Units 3/12/2006 9/12/2010 10/17/2011 10/18/2011 10/19/2011    INR 0.86 - 1.14 2.63(H) 1.97(H) 2.98(H) 2.10(H) 1.17(H)        Most Recent 3 Troponin's:  Recent Labs   Lab Test 04/09/19  0254 08/29/12  1408 08/29/12  1035   TROPI <0.015 <0.012 <0.012     Most Recent Cholesterol Panel:  Recent Labs   Lab Test 11/02/18  1215   CHOL 184   LDL 99   HDL 71   TRIG 72     Most Recent 6 Bacteria Isolates From Any Culture (See EPIC Reports for Culture Details):  Recent Labs   Lab Test 07/30/12  1532 07/17/12  1230 07/15/12  1625   CULT 10 to 50,000 colonies/mL Multiple species present, probable perineal  contamination. No growth 10 to 50,000 colonies/mL Multiple species present, probable perineal  contamination.     Most Recent TSH, T4 and A1c Labs:  Recent Labs   Lab Test 11/02/18  1215   TSH 4.72*   T4 1.12

## 2019-04-12 NOTE — PLAN OF CARE
On Eliquis for PE. Up with sera steady. PRN tramadol for back pain. Purewick. Wound care per order to buttock. Plan to discontinue to [facility]15 today. Will continue to monitor

## 2019-04-12 NOTE — PHARMACY - DISCHARGE MEDICATION RECONCILIATION AND EDUCATION
Discharge medication review for this patient is complete.   Patient was not counseled or given any education materials as discharged to LTC, TCU facility, Memory Care Facility, etc.  See EPIC for allergy information, prior to admission medications and immunization status.   Pharmacist assisted with medication reconciliation of discharge medications with PTA medications.    MD was contacted with any questions/concerns:None    Additional medication history information:None    Discharge Medication List     Review of your medicines      START taking      Dose / Directions   * apixaban ANTICOAGULANT 5 MG tablet  Commonly known as:  ELIQUIS  Used for:  Other acute pulmonary embolism without acute cor pulmonale (H)      Dose:  10 mg  Take 2 tablets (10 mg) by mouth 2 times daily  Refills:  0     * apixaban ANTICOAGULANT 5 MG tablet  Commonly known as:  ELIQUIS  Used for:  Other acute pulmonary embolism without acute cor pulmonale (H)      Dose:  5 mg  Start taking on:  4/17/2019  Take 1 tablet (5 mg) by mouth 2 times daily  Refills:  0     sennosides 8.6 MG tablet  Commonly known as:  SENOKOT      Dose:  1 tablet  Take 1 tablet by mouth 2 times daily  Refills:  0         * This list has 2 medication(s) that are the same as other medications prescribed for you. Read the directions carefully, and ask your doctor or other care provider to review them with you.            CONTINUE these medicines which have NOT CHANGED      Dose / Directions   albuterol 108 (90 Base) MCG/ACT inhaler  Commonly known as:  PROAIR HFA/PROVENTIL HFA/VENTOLIN HFA  Used for:  Asthma,       Dose:  2 puff  Inhale 2 puffs into the lungs every 6 hours as needed for shortness of breath / dyspnea or wheezing  Quantity:  1 Inhaler  Refills:  3     amLODIPine 10 MG tablet  Commonly known as:  NORVASC  Used for:  HTN, goal below 140/90, Hyperlipidemia LDL goal <130, Bilateral leg edema      Dose:  5 mg  Take 0.5 tablets (5 mg) by mouth daily  Quantity:  90  tablet  Refills:  1     atenolol 100 MG tablet  Commonly known as:  TENORMIN  Used for:  HTN, goal below 140/90, Hyperlipidemia LDL goal <130, Bilateral leg edema      Dose:  100 mg  Take 1 tablet (100 mg) by mouth daily  Quantity:  90 tablet  Refills:  1     B-12 1000 MCG Tbcr      Dose:  1000 mcg  Take 1,000 mcg by mouth daily  Quantity:  100 tablet  Refills:  1     budesonide-formoterol 160-4.5 MCG/ACT Inhaler  Commonly known as:  SYMBICORT  Used for:  Cough, Asthma,       Dose:  2 puff  Inhale 2 puffs into the lungs 2 times daily  Quantity:  3 Inhaler  Refills:  1     Calcium-Magnesium-Zinc Tabs      Dose:  1 tablet  Take 1 tablet by mouth daily  Refills:  0     CoQ10 30 MG Caps      Dose:  1 capsule  Take 1 capsule by mouth daily  Quantity:  30 capsule  Refills:  0     Cranberry Extract 250 MG Tabs      Dose:  1 tablet  Take 1 tablet by mouth daily  Refills:  0     fluticasone 50 MCG/ACT nasal spray  Commonly known as:  FLONASE  Used for:  Postnasal drip      Dose:  1-2 spray  Spray 1-2 sprays into both nostrils daily  Quantity:  3 Bottle  Refills:  0     furosemide 40 MG tablet  Commonly known as:  LASIX      Dose:  20-40 mg  Take 20-40 mg by mouth daily as needed  Refills:  0     GARLIC 1500 1500 MG Caps  Generic drug:  Garlic      Dose:  1500 mg  Take 1,500 mg by mouth daily  Refills:  0     ipratropium - albuterol 0.5 mg/2.5 mg/3 mL 0.5-2.5 (3) MG/3ML neb solution  Commonly known as:  DUONEB  Used for:  Cough, Asthma,       Dose:  1 vial  Take 1 vial (3 mLs) by nebulization every 6 hours as needed for shortness of breath / dyspnea or wheezing  Quantity:  100 vial  Refills:  1     levothyroxine 50 MCG tablet  Commonly known as:  SYNTHROID/LEVOTHROID      Dose:  50 mcg  Take 50 mcg by mouth daily  Refills:  0     losartan 100 MG tablet  Commonly known as:  COZAAR  Used for:  HTN, goal below 140/90, Hyperlipidemia LDL goal <130, Bilateral leg edema      Dose:  100 mg  Take 1 tablet (100 mg) by mouth  daily  Quantity:  90 tablet  Refills:  1     MULTIVITAMIN & MINERAL PO      Dose:  1 tablet  Take 1 tablet by mouth daily.  Refills:  0     omeprazole 20 MG tablet  Used for:  Nausea      Dose:  20 mg  Take 1 tablet (20 mg) by mouth daily Take 30-60 minutes before a meal.  Quantity:  90 tablet  Refills:  0     * order for DME  Used for:  Cough, Asthma,       Equipment being ordered: Nebulizer and neb supplies (tubing, etc)  Quantity:  1 Device  Refills:  0     * order for DME  Used for:  Bilateral leg edema      Equipment being ordered: 1: Gradient Compression Wraps; 2: BLE knee high 20-30 mm Hg compression stockings; 3: BLE thigh high 20-30 mm Hg compression stockings; 4: Cast Boots  Quantity:  1 each  Refills:  0     * order for DME  Used for:  Lymphedema      Equipment being ordered: 1: Custom/alternative BLE full leg velco/buckling compression garment  Quantity:  1 each  Refills:  0     * order for DME  Used for:  Primary osteoarthritis of both knees      Equipment being ordered: 4 wheeled walker with hand brakes and seat  Quantity:  1 each  Refills:  0     pravastatin 20 MG tablet  Commonly known as:  PRAVACHOL  Used for:  Hyperlipidemia LDL goal <130, HTN, goal below 140/90, Bilateral leg edema      Dose:  20 mg  Take 1 tablet (20 mg) by mouth daily  Quantity:  90 tablet  Refills:  1     [place] Oil Caps      Dose:  1 capsule  Take 1 capsule by mouth daily  Refills:  0     terazosin 2 MG capsule  Commonly known as:  HYTRIN  Used for:  HTN, goal below 140/90, Hyperlipidemia LDL goal <130, Bilateral leg edema      Dose:  2 mg  Take 1 capsule (2 mg) by mouth At Bedtime  Quantity:  90 capsule  Refills:  1     tiotropium 18 MCG inhaled capsule  Commonly known as:  SPIRIVA HANDIHALER  Used for:  Asthma,       Inhale contents of one capsule daily.  Quantity:  90 capsule  Refills:  0     traMADol 50 MG tablet  Commonly known as:  ULTRAM  Used for:  Chronic pain of both knees      TAKE 1 TABLET BY MOUTH TWICE DAILY AS  NEEDED FOR PAIN  Quantity:  16 tablet  Refills:  0     TYLENOL 325 MG tablet  Generic drug:  acetaminophen      Dose:  325-650 mg  Take 1-2 tablets (325-650 mg) by mouth every 6 hours as needed for mild pain  Quantity:  250 tablet  Refills:  11     vitamin B complex with vitamin C tablet      Dose:  1 tablet  Take 1 tablet by mouth daily  Refills:  0     vitamin C 500 MG tablet  Commonly known as:  ASCORBIC ACID      Dose:  500 mg  Take 500 mg by mouth daily  Quantity:  100 tablet  Refills:  12     vitamin D3 1000 units (25 mcg) tablet  Commonly known as:  CHOLECALCIFEROL  Used for:  Cough, Asthma,       Dose:  2000 Units  Take 2 tablets (2,000 Units) by mouth daily  Quantity:  100 tablet  Refills:  3         * This list has 4 medication(s) that are the same as other medications prescribed for you. Read the directions carefully, and ask your doctor or other care provider to review them with you.            STOP taking    aspirin 81 MG tablet  Commonly known as:  ASA              Where to get your medicines      Some of these will need a paper prescription and others can be bought over the counter. Ask your nurse if you have questions.    Bring a paper prescription for each of these medications    traMADol 50 MG tablet

## 2019-04-12 NOTE — PLAN OF CARE
Physical Therapy Discharge Summary     Reason for therapy discharge:    Discharged to transitional care facility.     Progress towards therapy goal(s). See goals on Care Plan in [facility] electronic health record for goal details.  Goals not met.  Barriers to achieving goals:   limited tolerance for therapy.     Therapy recommendation(s):    Continued therapy is recommended.  Rationale/Recommendations:  Pt is below baseline with functional mobility and strength and would benefit from continued PT to progress skills.

## 2019-04-12 NOTE — PROGRESS NOTES
"[facility]  PRIMARY CARE PROVIDER AND CLINIC:  [facility]iv[facility] Blue MD[facility] [address] [facility] [facility] [address]  Chief Complaint   Patient presents with     Hospital F/U     [facility] Medical Record Number:  [id number]  Place of Service where encounter took place:  [facility]  ([facility]) [[id number]]    HPI:    HPI information obtained from: facility chart records, facility staff, patient report and [facility] chart review.     Brief Summary of Hospital Course:   Marino Prajapati  is a 83 year old (1935) female with a medical history of COPD, HTN, lymphedema and hx of DVT. She presented to [facility] on 4/9/19 with progressive weakness and inability to care for herself at home. She was found to have a pulmonary embolism and was admitted for further evaluation and treatment. She was treated with heparin which was transitioned to apixaban for discharge. She was admitted to this facility on 4/12/19 for rehab, medical management and nursing care.    Updates on Status Since Skilled nursing Admission:   Ms. Prajapati was visited today while still in bed. She reports that she currently lives in a house with her partner. She noted weakness that began two weeks prior to hospitalization, and by the time she went to the ED, was requiring assistance to transfer out of the chair. Prior to current illness, she was ambulating independently with walker and able to care for herself. She has COPD with chronic phlegm production, she has used mucinex in the past but unsure if it has helped. She denies shortness of breath today but has oxygen on which is new for her. Denies fever, body aches or chills. No chest pain with inspiration. Appetite is poor. \"problems with the bladder\" and wearing a brief at the TCU. Having regular bowel movements. Complaints of low back pain which is chronic, and hurts while sitting at the edge of the bed.     CODE STATUS/ADVANCE DIRECTIVES DISCUSSION:   DNR / " DNI  Patient's living condition: lives with partner  ALLERGIES: Patient has no known allergies.  PAST MEDICAL HISTORY:  has a past medical history of Anemia, CARDIOVASCULAR SCREENING; LDL GOAL LESS THAN 160, Colon polyps (2010), DVT (deep venous thrombosis) (H) (2007), Gastro-oesophageal reflux disease, HTN, goal below 140/90, Hyperlipidemia LDL goal <130 (1/22/2016), Kidney stone, Osteoarthritis, Osteoporosis, Postnasal drip, S/P total knee arthroplasty, and Thrombosis of leg. She also has no past medical history of Chronic infection, Malignant hyperthermia, or Sleep apnea.  PAST SURGICAL HISTORY:   has a past surgical history that includes Cholecystectomy; Laser holmium lithotripsy ureter(s), insert stent, combined (8/8/2012); Liposuction (location); PREP FACE/ORAL PROST PALATAL LIFT; Strip vein; Cystoscopy, retrogrades, insert stent ureter(s), combined (7/16/2012); Arthroplasty knee (10/22/2012); Arthroplasty knee (5/23/2013); Arthroplasty hip (8/1/2013); and Arthroplasty hip (Left, 12/12/2014).  FAMILY HISTORY: family history includes Breast Cancer in her mother; Circulatory in her father.  SOCIAL HISTORY:   reports that she quit smoking about 52 years ago. Her smoking use included cigarettes. She has a 29.00 pack-year smoking history. She has never used smokeless tobacco. She reports that she drinks alcohol. She reports that she does not use drugs.    Post Discharge Medication Reconciliation Status: discharge medications reconciled and changed, per note/orders (see AVS)    Current Outpatient Medications   Medication Sig Dispense Refill     acetaminophen (TYLENOL) 325 MG tablet Take 1-2 tablets (325-650 mg) by mouth every 6 hours as needed for mild pain 250 tablet 11     albuterol (PROAIR HFA/PROVENTIL HFA/VENTOLIN HFA) 108 (90 Base) MCG/ACT Inhaler Inhale 2 puffs into the lungs every 6 hours as needed for shortness of breath / dyspnea or wheezing 1 Inhaler 3     amLODIPine (NORVASC) 10 MG tablet Take 0.5  tablets (5 mg) by mouth daily 90 tablet 1     apixaban ANTICOAGULANT (ELIQUIS) 5 MG tablet Take 2 tablets (10 mg) by mouth 2 times daily       [START ON 4/17/2019] apixaban ANTICOAGULANT (ELIQUIS) 5 MG tablet Take 1 tablet (5 mg) by mouth 2 times daily       ascorbic acid (VITAMIN C) 500 MG tablet Take 500 mg by mouth daily  100 tablet 12     atenolol (TENORMIN) 100 MG tablet Take 1 tablet (100 mg) by mouth daily 90 tablet 1     budesonide-formoterol (SYMBICORT) 160-4.5 MCG/ACT Inhaler Inhale 2 puffs into the lungs 2 times daily 3 Inhaler 1     cholecalciferol (VITAMIN D) 1000 UNIT tablet Take 2 tablets (2,000 Units) by mouth daily 100 tablet 3     Coenzyme Q10 (COQ10) 30 MG CAPS Take 1 capsule by mouth daily  30 capsule      Cranberry Extract 250 MG TABS Take 1 tablet by mouth daily        Cyanocobalamin (B-12) 1000 MCG TBCR Take 1,000 mcg by mouth daily 100 tablet 1     fluticasone (FLONASE) 50 MCG/ACT nasal spray Spray 1-2 sprays into both nostrils daily 3 Bottle 0     furosemide (LASIX) 40 MG tablet Take 20 mg by mouth daily as needed        Garlic (GARLIC 1500) 1500 MG CAPS Take 1,500 mg by mouth daily        ipratropium - albuterol 0.5 mg/2.5 mg/3 mL (DUONEB) 0.5-2.5 (3) MG/3ML neb solution Take 1 vial (3 mLs) by nebulization every 6 hours as needed for shortness of breath / dyspnea or wheezing 100 vial 1     levothyroxine (SYNTHROID/LEVOTHROID) 50 MCG tablet Take 50 mcg by mouth daily       losartan (COZAAR) 100 MG tablet Take 1 tablet (100 mg) by mouth daily 90 tablet 1     Multiple Minerals (CALCIUM-MAGNESIUM-ZINC) TABS Take 1 tablet by mouth daily        Multiple Vitamins-Minerals (MULTIVITAMIN & MINERAL PO) Take 1 tablet by mouth daily.       Nutritional Supplements (SALMON OIL) CAPS Take 1 capsule by mouth daily        omeprazole 20 MG tablet Take 1 tablet (20 mg) by mouth daily Take 30-60 minutes before a meal. 90 tablet 0     pravastatin (PRAVACHOL) 20 MG tablet Take 1 tablet (20 mg) by mouth daily 90  "tablet 1     sennosides (SENOKOT) 8.6 MG tablet Take 1 tablet by mouth 2 times daily       terazosin (HYTRIN) 2 MG capsule Take 1 capsule (2 mg) by mouth At Bedtime 90 capsule 1     tiotropium (SPIRIVA HANDIHALER) 18 MCG inhaled capsule Inhale contents of one capsule daily. 90 capsule 0     traMADol (ULTRAM) 50 MG tablet TAKE 1 TABLET BY MOUTH TWICE DAILY AS NEEDED FOR PAIN 16 tablet 0     vitamin  B complex with vitamin C (VITAMIN  B COMPLEX) TABS Take 1 tablet by mouth daily  0     order for DME Equipment being ordered: 4 wheeled walker with hand brakes and seat 1 each 0     order for DME Equipment being ordered: 1: Custom/alternative BLE full leg velco/buckling compression garment 1 each 0     order for DME Equipment being ordered: 1: Gradient Compression Wraps; 2: BLE knee high 20-30 mm Hg compression stockings; 3: BLE thigh high 20-30 mm Hg compression stockings; 4: Cast Boots 1 each 0     order for DME Equipment being ordered: Nebulizer and neb supplies (tubing, etc) 1 Device 0     ROS:  4 point ROS including Respiratory, CV, GI and , other than that noted in the HPI,  is negative    Vitals:  /74   Pulse 77   Temp 97.5  F (36.4  C)   Resp 18   Ht 1.689 m (5' 6.5\")   Wt 99.3 kg (219 lb)   SpO2 93%   BMI 34.82 kg/m    Exam:  GENERAL APPEARANCE:  Alert, in no distress, pleasant, cooperative, oriented x 4  EYES: no discharge or mattering on lids or lashes noted  ENT:  moist mucous membranes, hearing acuity intact  NECK: supple, symmetrical  RESP: no respiratory distress, Lung sounds clear, diminished in bases, patient is on 2 liters per nasal cannula  CV:  rate and rhythm regular, no murmur. Edema none in bilateral lower extremities. VASCULAR: warm extremities without open areas.  ABDOMEN: normal bowel sounds, soft, nontender.  M/S:   Gait and station: unsafe without the assistance of 2, no tenderness or swelling of the joints; able to move all extremities   SKIN:  Inspection and palpation of skin " and subcutaneous tissue: skin warm, dry and intact without rashes  NEURO: no facial asymmetry, no speech deficits and able to follow directions, moves all extremities symmetrically  PSYCH:  insight and judgement intact, memory intact, affect and mood normal    Lab/Diagnostic data:  CBC RESULTS:   Recent Labs   Lab Test 04/10/19  0823 04/09/19  0254   WBC 9.1 10.5   RBC 4.30 4.58   HGB 12.9 13.7   HCT 38.5 41.4   MCV 90 90   MCH 30.0 29.9   MCHC 33.5 33.1   RDW 14.2 14.5    256       Last Basic Metabolic Panel:  Recent Labs   Lab Test 04/10/19  0823 04/09/19  0254    139   POTASSIUM 3.9 3.2*   CHLORIDE 107 104   SANDRA 8.3* 8.8   CO2 28 29   BUN 10 17   CR 0.63 0.82   GLC 95 96       Liver Function Studies -   Recent Labs   Lab Test 04/09/19 0254 11/02/18  1215   PROTTOTAL 7.2 7.9   ALBUMIN 3.6 4.2   BILITOTAL 0.8 0.6   ALKPHOS 84 82   AST 20 28   ALT 22 29       TSH   Date Value Ref Range Status   11/02/2018 4.72 (H) 0.40 - 4.00 mU/L Final   11/28/2017 4.08 (H) 0.40 - 4.00 mU/L Final     ASSESSMENT/PLAN:  Pulmonary embolism  Secondary to decreased mobility at home. Doppler negative for DVT. Now on oxygen which is new. Saturations 92-95%.   --continue with apixaban 10 mg BID until 4/17/19  --start apixaban 5 mg BID on 4/18/19.    COPD  No s/sx of exacerbation or pna on exam. Has phlegm in throat while lying flat but clears when sitting up.   --start mucinex 600 mg PO q12h  --continue with spiriva daily  --budesonide-formoterol 2 puffs BID  --duonebs q6h PRN  --chest xray if develops fever or hypoxia    Hypertension / Hyperlipidemia  Systolic BP ranges of 138-155 since admission to TCU yesterday. Will allow to settle in prior to adjusting medications. If persistently >150's systolic, will increase amlodipine. HR in the 70's.   --continue with amlodipine to 5 mg daily  --losartan 100 mg daily  --atenolol 100 mg daily  --terazosin 2 mg at HS--having frequent urination during the night so may want to  discontinue in the future if blood pressure allows during the stay.  --pravastatin 20 mg daily   --Continue to monitor blood pressure and heart rate, adjust medications as needed.  --BMP on 4/15.    Lymphedema  Chronic, controlled.  --continue with furosemide 20 mg daily.   --TEDS on in the a.m off at HS.    Low back pain  Chronic. Takes tramadol PRN at baseline.   --schedule tylenol 650 mg QID  --tramadol 50 mg BID PRN  --Icy hot to lower back BID    GERD  --continue with omeprazole 20 mg daily    Hypothyroid  TSH 4.72 which is at goal given age  --continue with levothyroxine 50 mcg per day.     Constipation  Having regular bowel movements.  --continue with senna 1 tablet BID    Generalized weakness  Physical deconditioning  Secondary to pulmonary embolism, recent hospitalization and underlying chronic medical conditions. Requiring heavy assistance of 2 to stand up from the bed today.   --ongoing PT/OT for strengthening.       Electronically signed by:  CITNHIA Mann CNP

## 2019-04-12 NOTE — PROGRESS NOTES
Discharge Planner   Discharge Plans in progress: Pt will discharge today to [facility] via HE wheelchair at 1415.  Pt is aware and acknowledges the costs of transport.  Pt requested a shared room, but only a private room is available at this time.  The private room fee will be waived until a shared room is available.  Facility was updated of discharge time and orders will be sent once they are completed.  The pt will have a O2 transport tank for transport.     04/12/19 0902   Final Resources   Resources List Skilled Nursing Facility   Skilled Nursing Facility [facility] [phone], Fax: [phone]   PAS Number [id number]     Barriers to discharge plan: None  Follow up plan: Sw will continue to be available as needed until discharge.       Entered by: Lindsey Gracia 04/12/2019 9:03 AM

## 2019-04-12 NOTE — PLAN OF CARE
VSS on RA, 1L NC for comfort prn. Pt c\o back pain Tramadol and Tylenol given, see MAR. [place] for transfers, turn and reposition q2. Pt discharged to TCU via HE wheelchair transport.

## 2019-04-13 ENCOUNTER — NURSING HOME VISIT (OUTPATIENT)
Dept: GERIATRICS | Facility: CLINIC | Age: 84
End: 2019-04-13
Payer: COMMERCIAL

## 2019-04-13 VITALS
DIASTOLIC BLOOD PRESSURE: 74 MMHG | HEART RATE: 77 BPM | BODY MASS INDEX: 34.37 KG/M2 | WEIGHT: 219 LBS | TEMPERATURE: 97.5 F | RESPIRATION RATE: 18 BRPM | SYSTOLIC BLOOD PRESSURE: 150 MMHG | HEIGHT: 67 IN | OXYGEN SATURATION: 93 %

## 2019-04-13 DIAGNOSIS — E78.5 HYPERLIPIDEMIA, UNSPECIFIED HYPERLIPIDEMIA TYPE: ICD-10-CM

## 2019-04-13 DIAGNOSIS — G89.29 CHRONIC BILATERAL LOW BACK PAIN WITHOUT SCIATICA: ICD-10-CM

## 2019-04-13 DIAGNOSIS — K59.01 SLOW TRANSIT CONSTIPATION: ICD-10-CM

## 2019-04-13 DIAGNOSIS — I10 BENIGN ESSENTIAL HYPERTENSION: ICD-10-CM

## 2019-04-13 DIAGNOSIS — I89.0 LYMPHEDEMA OF BOTH LOWER EXTREMITIES: ICD-10-CM

## 2019-04-13 DIAGNOSIS — I26.99 OTHER ACUTE PULMONARY EMBOLISM WITHOUT ACUTE COR PULMONALE (H): Primary | ICD-10-CM

## 2019-04-13 DIAGNOSIS — K21.9 GASTROESOPHAGEAL REFLUX DISEASE, ESOPHAGITIS PRESENCE NOT SPECIFIED: ICD-10-CM

## 2019-04-13 DIAGNOSIS — M62.81 GENERALIZED MUSCLE WEAKNESS: ICD-10-CM

## 2019-04-13 DIAGNOSIS — M54.50 CHRONIC BILATERAL LOW BACK PAIN WITHOUT SCIATICA: ICD-10-CM

## 2019-04-13 DIAGNOSIS — R53.81 PHYSICAL DECONDITIONING: ICD-10-CM

## 2019-04-13 DIAGNOSIS — E03.9 HYPOTHYROIDISM, UNSPECIFIED TYPE: ICD-10-CM

## 2019-04-13 DIAGNOSIS — J44.9 CHRONIC OBSTRUCTIVE PULMONARY DISEASE, UNSPECIFIED COPD TYPE (H): ICD-10-CM

## 2019-04-13 PROCEDURE — 99310 SBSQ NF CARE HIGH MDM 45: CPT | Performed by: NURSE PRACTITIONER

## 2019-04-13 ASSESSMENT — MIFFLIN-ST. JEOR: SCORE: 1483.07

## 2019-04-14 ENCOUNTER — TELEPHONE (OUTPATIENT)
Dept: GERIATRICS | Facility: CLINIC | Age: 84
End: 2019-04-14

## 2019-04-14 NOTE — TELEPHONE ENCOUNTER
Patient's  despite Norvasc given. Other VSS. Asked staff to give Clonidine 0.1 mg PO now. Staff to update provider if not effective.     Electronically signed by CINTHIA Franklin, GNP

## 2019-04-15 ENCOUNTER — HOSPITAL LABORATORY (OUTPATIENT)
Dept: OTHER | Facility: CLINIC | Age: 84
End: 2019-04-15

## 2019-04-15 DIAGNOSIS — R52 PAIN: Primary | ICD-10-CM

## 2019-04-15 LAB
ANION GAP SERPL CALCULATED.3IONS-SCNC: 9 MMOL/L (ref 3–14)
BUN SERPL-MCNC: 11 MG/DL (ref 7–30)
CALCIUM SERPL-MCNC: 9.3 MG/DL (ref 8.5–10.1)
CHLORIDE SERPL-SCNC: 105 MMOL/L (ref 94–109)
CO2 SERPL-SCNC: 25 MMOL/L (ref 20–32)
CREAT SERPL-MCNC: 0.6 MG/DL (ref 0.52–1.04)
ERYTHROCYTE [DISTWIDTH] IN BLOOD BY AUTOMATED COUNT: 14.4 % (ref 10–15)
GFR SERPL CREATININE-BSD FRML MDRD: 85 ML/MIN/{1.73_M2}
GLUCOSE SERPL-MCNC: 89 MG/DL (ref 70–99)
HCT VFR BLD AUTO: 43 % (ref 35–47)
HGB BLD-MCNC: 14.2 G/DL (ref 11.7–15.7)
MCH RBC QN AUTO: 29.1 PG (ref 26.5–33)
MCHC RBC AUTO-ENTMCNC: 33 G/DL (ref 31.5–36.5)
MCV RBC AUTO: 88 FL (ref 78–100)
PLATELET # BLD AUTO: 274 10E9/L (ref 150–450)
POTASSIUM SERPL-SCNC: 3.2 MMOL/L (ref 3.4–5.3)
RBC # BLD AUTO: 4.88 10E12/L (ref 3.8–5.2)
SODIUM SERPL-SCNC: 139 MMOL/L (ref 133–144)
WBC # BLD AUTO: 9.1 10E9/L (ref 4–11)

## 2019-04-16 ENCOUNTER — NURSING HOME VISIT (OUTPATIENT)
Dept: GERIATRICS | Facility: CLINIC | Age: 84
End: 2019-04-16
Payer: COMMERCIAL

## 2019-04-16 ENCOUNTER — TRANSFERRED RECORDS (OUTPATIENT)
Dept: HEALTH INFORMATION MANAGEMENT | Facility: CLINIC | Age: 84
End: 2019-04-16

## 2019-04-16 ENCOUNTER — HOSPITAL LABORATORY (OUTPATIENT)
Dept: OTHER | Facility: CLINIC | Age: 84
End: 2019-04-16

## 2019-04-16 VITALS
OXYGEN SATURATION: 97 % | TEMPERATURE: 97.2 F | HEIGHT: 67 IN | RESPIRATION RATE: 12 BRPM | WEIGHT: 219 LBS | HEART RATE: 71 BPM | SYSTOLIC BLOOD PRESSURE: 148 MMHG | DIASTOLIC BLOOD PRESSURE: 74 MMHG | BODY MASS INDEX: 34.37 KG/M2

## 2019-04-16 DIAGNOSIS — E78.5 HYPERLIPIDEMIA, UNSPECIFIED HYPERLIPIDEMIA TYPE: ICD-10-CM

## 2019-04-16 DIAGNOSIS — I10 BENIGN ESSENTIAL HYPERTENSION: ICD-10-CM

## 2019-04-16 DIAGNOSIS — I89.0 LYMPHEDEMA OF BOTH LOWER EXTREMITIES: ICD-10-CM

## 2019-04-16 DIAGNOSIS — E03.9 HYPOTHYROIDISM, UNSPECIFIED TYPE: ICD-10-CM

## 2019-04-16 DIAGNOSIS — M54.50 CHRONIC BILATERAL LOW BACK PAIN WITHOUT SCIATICA: ICD-10-CM

## 2019-04-16 DIAGNOSIS — I26.99 OTHER ACUTE PULMONARY EMBOLISM WITHOUT ACUTE COR PULMONALE (H): Primary | ICD-10-CM

## 2019-04-16 DIAGNOSIS — M62.81 GENERALIZED MUSCLE WEAKNESS: ICD-10-CM

## 2019-04-16 DIAGNOSIS — G89.29 CHRONIC BILATERAL LOW BACK PAIN WITHOUT SCIATICA: ICD-10-CM

## 2019-04-16 DIAGNOSIS — K21.9 GASTROESOPHAGEAL REFLUX DISEASE, ESOPHAGITIS PRESENCE NOT SPECIFIED: ICD-10-CM

## 2019-04-16 DIAGNOSIS — K59.01 SLOW TRANSIT CONSTIPATION: ICD-10-CM

## 2019-04-16 DIAGNOSIS — J44.9 CHRONIC OBSTRUCTIVE PULMONARY DISEASE, UNSPECIFIED COPD TYPE (H): ICD-10-CM

## 2019-04-16 DIAGNOSIS — R53.81 PHYSICAL DECONDITIONING: ICD-10-CM

## 2019-04-16 LAB — POTASSIUM SERPL-SCNC: 3.4 MMOL/L (ref 3.4–5.3)

## 2019-04-16 PROCEDURE — 99309 SBSQ NF CARE MODERATE MDM 30: CPT | Performed by: NURSE PRACTITIONER

## 2019-04-16 ASSESSMENT — MIFFLIN-ST. JEOR: SCORE: 1483.07

## 2019-04-16 NOTE — PROGRESS NOTES
"[place] [facility]  [place] [facility] Record Number:  [id number]  Place of Service where encounter took place:  [facility]  (S) [[id number]]  Chief Complaint   Patient presents with     RECHECK       HPI:    Marino Prajapati  is a 83 year old (1935), who is being seen today for an episodic care visit.  HPI information obtained from: facility chart records, facility staff, patient report and [facility] chart review. Today's concern is:    Patient with medical history of COPD, HTN, lymphedema and hx of DVT admitted to acute rehab following hospitalization for new PE and COPD exacerbation. Is new on apixaban. Continues on O2- was not O2 dependent at home prior to hospitalization. Reports chronic productive cough with worsening coughing of late. Reports producing mod amount of thick creamy sputum. Denies fever, chills. Reports overall breathing is slowly improving to baseline. Denies chest pain, dizziness. Reports chronic \"spasms\" to right leg and reports frustration with incontinent product d/t excessive \"bunching up\". Reports chronic swelling to bilateral LE and states currently legs much less swollen than usual. States dose not wear compression at home d/t issues with putting them on and has not intention of doing so following discharge.         Past Medical and Surgical History reviewed in Epic today.    MEDICATIONS:  Current Outpatient Medications   Medication Sig Dispense Refill     acetaminophen (TYLENOL) 325 MG tablet Take 650 mg by mouth 4 times daily AND Give 650 mg by mouth every 12 hours as needed for back pain 250 tablet 11     albuterol (PROAIR HFA/PROVENTIL HFA/VENTOLIN HFA) 108 (90 Base) MCG/ACT Inhaler Inhale 2 puffs into the lungs every 6 hours as needed for shortness of breath / dyspnea or wheezing 1 Inhaler 3     amLODIPine (NORVASC) 10 MG tablet Take 0.5 tablets (5 mg) by mouth daily 90 tablet 1     apixaban ANTICOAGULANT (ELIQUIS) 5 MG tablet Take 2 tablets (10 mg) by " mouth 2 times daily       ascorbic acid (VITAMIN C) 500 MG tablet Take 500 mg by mouth daily  100 tablet 12     atenolol (TENORMIN) 100 MG tablet Take 1 tablet (100 mg) by mouth daily 90 tablet 1     budesonide-formoterol (SYMBICORT) 160-4.5 MCG/ACT Inhaler Inhale 2 puffs into the lungs 2 times daily 3 Inhaler 1     cholecalciferol (VITAMIN D) 1000 UNIT tablet Take 2 tablets (2,000 Units) by mouth daily 100 tablet 3     Coenzyme Q10 (COQ10) 30 MG CAPS Take 1 capsule by mouth daily  30 capsule      Cranberry Extract 250 MG TABS Take 1 tablet by mouth daily        Cyanocobalamin (B-12) 1000 MCG TBCR Take 1,000 mcg by mouth daily 100 tablet 1     FEXOFENADINE HCL PO Take 600 mg by mouth 2 times daily       fluticasone (FLONASE) 50 MCG/ACT nasal spray Spray 1-2 sprays into both nostrils daily 3 Bottle 0     furosemide (LASIX) 40 MG tablet Take 20 mg by mouth daily as needed        Garlic (GARLIC 1500) 1500 MG CAPS Take 1,500 mg by mouth daily        ipratropium - albuterol 0.5 mg/2.5 mg/3 mL (DUONEB) 0.5-2.5 (3) MG/3ML neb solution Take 1 vial (3 mLs) by nebulization every 6 hours as needed for shortness of breath / dyspnea or wheezing 100 vial 1     levothyroxine (SYNTHROID/LEVOTHROID) 50 MCG tablet Take 50 mcg by mouth daily       losartan (COZAAR) 100 MG tablet Take 1 tablet (100 mg) by mouth daily 90 tablet 1     menthol (ICY HOT) 5 % PTCH Apply 1 patch topically 2 times daily       Multiple Minerals (CALCIUM-MAGNESIUM-ZINC) TABS Take 1 tablet by mouth daily        Multiple Vitamins-Minerals (MULTIVITAMIN & MINERAL PO) Take 1 tablet by mouth daily.       Nutritional Supplements (SALMON OIL) CAPS Take 1 capsule by mouth daily        omeprazole 20 MG tablet Take 1 tablet (20 mg) by mouth daily Take 30-60 minutes before a meal. 90 tablet 0     order for DME Equipment being ordered: 4 wheeled walker with hand brakes and seat 1 each 0     order for DME Equipment being ordered: 1: Custom/alternative BLE full leg  "velco/buckling compression garment 1 each 0     order for DME Equipment being ordered: 1: Gradient Compression Wraps; 2: BLE knee high 20-30 mm Hg compression stockings; 3: BLE thigh high 20-30 mm Hg compression stockings; 4: Cast Boots 1 each 0     order for DME Equipment being ordered: Nebulizer and neb supplies (tubing, etc) 1 Device 0     pravastatin (PRAVACHOL) 20 MG tablet Take 1 tablet (20 mg) by mouth daily 90 tablet 1     sennosides (SENOKOT) 8.6 MG tablet Take 1 tablet by mouth 2 times daily       terazosin (HYTRIN) 2 MG capsule Take 1 capsule (2 mg) by mouth At Bedtime 90 capsule 1     tiotropium (SPIRIVA HANDIHALER) 18 MCG inhaled capsule Inhale contents of one capsule daily. 90 capsule 0     traMADol (ULTRAM) 50 MG tablet TAKE 1 TABLET BY MOUTH TWICE DAILY AS NEEDED FOR PAIN 16 tablet 0     vitamin  B complex with vitamin C (VITAMIN  B COMPLEX) TABS Take 1 tablet by mouth daily  0     [START ON 4/17/2019] apixaban ANTICOAGULANT (ELIQUIS) 5 MG tablet Take 1 tablet (5 mg) by mouth 2 times daily (Patient not taking: Reported on 4/16/2019)           REVIEW OF SYSTEMS:  4 point ROS including Respiratory, CV, GI and , other than that noted in the HPI,  is negative    Objective:  /74   Pulse 71   Temp 97.2  F (36.2  C)   Resp 12   Ht 1.689 m (5' 6.5\")   Wt 99.3 kg (219 lb)   SpO2 97%   BMI 34.82 kg/m    Exam:    Resp: Effort WNL, LSCTA - congested cough- O2- 2LNC  CV: S1-2, no S3-4, no murmurs noted- trace bilateral LE,   Abd- soft, nontender, BS +  Musc- GUARDADO  Skin- puckered thick LE skin, changes from chronic LE edema  Psych- alert, calm, pleasant      Labs:   Recent labs in [facility] reviewed by me today.     ASSESSMENT/PLAN:  Other acute pulmonary embolism without acute cor pulmonale (H)  - doppler negative DVT inpatient. Continues on O2, New on Apixaban  - wean O2- keep sats > 90%  - continue on Apixaban  - observe for s/s bleeding  - pulmonary toilet/mobilize    Chronic obstructive pulmonary " disease, unspecified COPD type (H)  - chronic bronchitis picture. Has chronic productive cough, slight worse than usual currently  - wean O2 as above  - continue on mucinex BID  - enc po fluids  - continue on Spiriva, Budesonide-formoterol, Duonebs  - VS per unit protocol    Benign essential hypertension  Hyperlidemia  - slight elevated in setting of acute illness, care transitions, rehab  - continue on current regimen with amlodipine, losartan, atenolol, terazosin  - continue statin  - follow VS per unit protocol  - adjust meds prn      Lymphedema of both lower extremities  - chronic, currently better than baseline. Adamantly declines compression  - follow weights/clinically    Chronic bilateral low back pain without sciatica  - chronic, stable  - continue on acetaminophen ad prn tramadol  - Icy hot added at admit      Gastroesophageal reflux disease, esophagitis presence not specified  - chronic, no acute s/s   - continue on omeprazole    Hypothyroidism, unspecified type  - chronic, stable  - continue on levothryoxine    Generalized muscle weakness  Physical deconditioning  - 2/2 above  - lives with partner (20 + years)  - PT/OT  - ongoing discharge planning, SW follow and care conferences per unit protocol        Orders written by provider at facility        Electronically signed by:  CINTHIA Patel CNP

## 2019-04-16 NOTE — LETTER
"    4/16/2019        RE: Marino Prajapati  [address] [facility] [address]        [place] [facility]  [place] [facility] Record Number:  [id number]  Place of Service where encounter took place:  [facility]  ([facility]) [[id number]]  Chief Complaint   Patient presents with     RECHECK       HPI:    Marino Prajapati  is a 83 year old (1935), who is being seen today for an episodic care visit.  HPI information obtained from: facility chart records, facility staff, patient report and [facility] chart review. Today's concern is:    Patient with medical history of COPD, HTN, lymphedema and hx of DVT admitted to acute rehab following hospitalization for new PE and COPD exacerbation. Is new on apixaban. Continues on O2- was not O2 dependent at home prior to hospitalization. Reports chronic productive cough with worsening coughing of late. Reports producing mod amount of thick creamy sputum. Denies fever, chills. Reports overall breathing is slowly improving to baseline. Denies chest pain, dizziness. Reports chronic \"spasms\" to right leg and reports frustration with incontinent product d/t excessive \"bunching up\". Reports chronic swelling to bilateral LE and states currently legs much less swollen than usual. States dose not wear compression at home d/t issues with putting them on and has not intention of doing so following discharge.         Past Medical and Surgical History reviewed in Epic today.    MEDICATIONS:  Current Outpatient Medications   Medication Sig Dispense Refill     acetaminophen (TYLENOL) 325 MG tablet Take 650 mg by mouth 4 times daily AND Give 650 mg by mouth every 12 hours as needed for back pain 250 tablet 11     albuterol (PROAIR HFA/PROVENTIL HFA/VENTOLIN HFA) 108 (90 Base) MCG/ACT Inhaler Inhale 2 puffs into the lungs every 6 hours as needed for shortness of breath / dyspnea or wheezing 1 Inhaler 3     amLODIPine (NORVASC) 10 MG tablet Take 0.5 tablets (5 mg) by " mouth daily 90 tablet 1     apixaban ANTICOAGULANT (ELIQUIS) 5 MG tablet Take 2 tablets (10 mg) by mouth 2 times daily       ascorbic acid (VITAMIN C) 500 MG tablet Take 500 mg by mouth daily  100 tablet 12     atenolol (TENORMIN) 100 MG tablet Take 1 tablet (100 mg) by mouth daily 90 tablet 1     budesonide-formoterol (SYMBICORT) 160-4.5 MCG/ACT Inhaler Inhale 2 puffs into the lungs 2 times daily 3 Inhaler 1     cholecalciferol (VITAMIN D) 1000 UNIT tablet Take 2 tablets (2,000 Units) by mouth daily 100 tablet 3     Coenzyme Q10 (COQ10) 30 MG CAPS Take 1 capsule by mouth daily  30 capsule      Cranberry Extract 250 MG TABS Take 1 tablet by mouth daily        Cyanocobalamin (B-12) 1000 MCG TBCR Take 1,000 mcg by mouth daily 100 tablet 1     FEXOFENADINE HCL PO Take 600 mg by mouth 2 times daily       fluticasone (FLONASE) 50 MCG/ACT nasal spray Spray 1-2 sprays into both nostrils daily 3 Bottle 0     furosemide (LASIX) 40 MG tablet Take 20 mg by mouth daily as needed        Garlic (GARLIC 1500) 1500 MG CAPS Take 1,500 mg by mouth daily        ipratropium - albuterol 0.5 mg/2.5 mg/3 mL (DUONEB) 0.5-2.5 (3) MG/3ML neb solution Take 1 vial (3 mLs) by nebulization every 6 hours as needed for shortness of breath / dyspnea or wheezing 100 vial 1     levothyroxine (SYNTHROID/LEVOTHROID) 50 MCG tablet Take 50 mcg by mouth daily       losartan (COZAAR) 100 MG tablet Take 1 tablet (100 mg) by mouth daily 90 tablet 1     menthol (ICY HOT) 5 % PTCH Apply 1 patch topically 2 times daily       Multiple Minerals (CALCIUM-MAGNESIUM-ZINC) TABS Take 1 tablet by mouth daily        Multiple Vitamins-Minerals (MULTIVITAMIN & MINERAL PO) Take 1 tablet by mouth daily.       Nutritional Supplements (SALMON OIL) CAPS Take 1 capsule by mouth daily        omeprazole 20 MG tablet Take 1 tablet (20 mg) by mouth daily Take 30-60 minutes before a meal. 90 tablet 0     order for DME Equipment being ordered: 4 wheeled walker with hand brakes and  "seat 1 each 0     order for DME Equipment being ordered: 1: Custom/alternative BLE full leg velco/buckling compression garment 1 each 0     order for DME Equipment being ordered: 1: Gradient Compression Wraps; 2: BLE knee high 20-30 mm Hg compression stockings; 3: BLE thigh high 20-30 mm Hg compression stockings; 4: Cast Boots 1 each 0     order for DME Equipment being ordered: Nebulizer and neb supplies (tubing, etc) 1 Device 0     pravastatin (PRAVACHOL) 20 MG tablet Take 1 tablet (20 mg) by mouth daily 90 tablet 1     sennosides (SENOKOT) 8.6 MG tablet Take 1 tablet by mouth 2 times daily       terazosin (HYTRIN) 2 MG capsule Take 1 capsule (2 mg) by mouth At Bedtime 90 capsule 1     tiotropium (SPIRIVA HANDIHALER) 18 MCG inhaled capsule Inhale contents of one capsule daily. 90 capsule 0     traMADol (ULTRAM) 50 MG tablet TAKE 1 TABLET BY MOUTH TWICE DAILY AS NEEDED FOR PAIN 16 tablet 0     vitamin  B complex with vitamin C (VITAMIN  B COMPLEX) TABS Take 1 tablet by mouth daily  0     [START ON 4/17/2019] apixaban ANTICOAGULANT (ELIQUIS) 5 MG tablet Take 1 tablet (5 mg) by mouth 2 times daily (Patient not taking: Reported on 4/16/2019)           REVIEW OF SYSTEMS:  4 point ROS including Respiratory, CV, GI and , other than that noted in the HPI,  is negative    Objective:  /74   Pulse 71   Temp 97.2  F (36.2  C)   Resp 12   Ht 1.689 m (5' 6.5\")   Wt 99.3 kg (219 lb)   SpO2 97%   BMI 34.82 kg/m     Exam:    Resp: Effort WNL, LSCTA - congested cough- O2- 2LNC  CV: S1-2, no S3-4, no murmurs noted- trace bilateral LE,   Abd- soft, nontender, BS +  Musc- GUARDADO  Skin- puckered thick LE skin, changes from chronic LE edema  Psych- alert, calm, pleasant      Labs:   Recent labs in [facility] reviewed by me today.     ASSESSMENT/PLAN:  Other acute pulmonary embolism without acute cor pulmonale (H)  - doppler negative DVT inpatient. Continues on O2, New on Apixaban  - wean O2- keep sats > 90%  - continue on " Apixaban  - observe for s/s bleeding  - pulmonary toilet/mobilize    Chronic obstructive pulmonary disease, unspecified COPD type (H)  - chronic bronchitis picture. Has chronic productive cough, slight worse than usual currently  - wean O2 as above  - continue on mucinex BID  - enc po fluids  - continue on Spiriva, Budesonide-formoterol, Duonebs  - VS per unit protocol    Benign essential hypertension  Hyperlidemia  - slight elevated in setting of acute illness, care transitions, rehab  - continue on current regimen with amlodipine, losartan, atenolol, terazosin  - continue statin  - follow VS per unit protocol  - adjust meds prn      Lymphedema of both lower extremities  - chronic, currently better than baseline. Adamantly declines compression  - follow weights/clinically    Chronic bilateral low back pain without sciatica  - chronic, stable  - continue on acetaminophen ad prn tramadol  - Icy hot added at admit      Gastroesophageal reflux disease, esophagitis presence not specified  - chronic, no acute s/s   - continue on omeprazole    Hypothyroidism, unspecified type  - chronic, stable  - continue on levothryoxine    Generalized muscle weakness  Physical deconditioning  - 2/2 above  - lives with partner (20 + years)  - PT/OT  - ongoing discharge planning, SW follow and care conferences per unit protocol        Orders written by provider at facility        Electronically signed by:  CINTHIA Patel CNP               Sincerely,        CINTHIA Patel CNP

## 2019-04-17 VITALS
WEIGHT: 219 LBS | DIASTOLIC BLOOD PRESSURE: 79 MMHG | HEIGHT: 67 IN | HEART RATE: 80 BPM | RESPIRATION RATE: 16 BRPM | TEMPERATURE: 97.9 F | SYSTOLIC BLOOD PRESSURE: 161 MMHG | BODY MASS INDEX: 34.37 KG/M2 | OXYGEN SATURATION: 93 %

## 2019-04-17 ASSESSMENT — MIFFLIN-ST. JEOR: SCORE: 1483.07

## 2019-04-18 ENCOUNTER — NURSING HOME VISIT (OUTPATIENT)
Dept: GERIATRICS | Facility: CLINIC | Age: 84
End: 2019-04-18
Payer: COMMERCIAL

## 2019-04-18 DIAGNOSIS — E78.5 HYPERLIPIDEMIA, UNSPECIFIED HYPERLIPIDEMIA TYPE: ICD-10-CM

## 2019-04-18 DIAGNOSIS — I26.99 OTHER ACUTE PULMONARY EMBOLISM WITHOUT ACUTE COR PULMONALE (H): Primary | ICD-10-CM

## 2019-04-18 DIAGNOSIS — J44.9 CHRONIC OBSTRUCTIVE PULMONARY DISEASE, UNSPECIFIED COPD TYPE (H): ICD-10-CM

## 2019-04-18 DIAGNOSIS — I10 BENIGN ESSENTIAL HYPERTENSION: ICD-10-CM

## 2019-04-18 DIAGNOSIS — I89.0 LYMPHEDEMA OF BOTH LOWER EXTREMITIES: ICD-10-CM

## 2019-04-18 DIAGNOSIS — E03.9 HYPOTHYROIDISM, UNSPECIFIED TYPE: ICD-10-CM

## 2019-04-18 DIAGNOSIS — R53.81 PHYSICAL DECONDITIONING: ICD-10-CM

## 2019-04-18 PROCEDURE — 99207 ZZC CDG-CODE CATEGORY CHANGED: CPT | Performed by: INTERNAL MEDICINE

## 2019-04-18 PROCEDURE — 99305 1ST NF CARE MODERATE MDM 35: CPT | Performed by: INTERNAL MEDICINE

## 2019-04-18 NOTE — PROGRESS NOTES
PRIMARY CARE PROVIDER AND CLINIC RESPONSIBLE:  Vivian Blue, [address] [facility] / [facility] [address]        ADMISSION HISTORY AND PHYSICAL EXAMINATION     Chief Complaint   Patient presents with     Hospital F/U         HISTORY OF PRESENT ILLNESS:  83 year old female, (1935), admitted to the [facility] for continuation of medical care and rehab.    Pt admitted Count includes the [facility] 4/9 to 4/12 for acute hypoxic resp failure found to have PE.    Pt states her breathing is better. No chest pain/fever/chills/cough.    Please see Iveth Roblero's CNP admit noted dated 4/13 for details of admission, past medical history, family history, allergies, medication list, social history and other details pertinent with this admission. Hospital admission and dc summary reviewed.      Past Medical History:   Diagnosis Date     Anemia      CARDIOVASCULAR SCREENING; LDL GOAL LESS THAN 160      Colon polyps 2010    needs colonoscopy 2013     DVT (deep venous thrombosis) (H) 2007    remote history of DVT while she was on BCP ,coumadin stopped after retroperitoneal/buttock hematoma in 10/11 . On ASA only     Gastro-oesophageal reflux disease      HTN, goal below 140/90      Hyperlipidemia LDL goal <130 1/22/2016     Kidney stone      Osteoarthritis     knees and hip     Osteoporosis      Postnasal drip      S/P total knee arthroplasty      Thrombosis of leg        Past Surgical History:   Procedure Laterality Date     ARTHROPLASTY HIP  8/1/2013    Procedure: ARTHROPLASTY HIP;  RIGHT TOTAL HIP ARTHROPLASTY;  Surgeon: Rufino Tong MD;  Location: [facility]     ARTHROPLASTY HIP Left 12/12/2014    Procedure: ARTHROPLASTY HIP;  Surgeon: Rufino Tong MD;  Location: [facility] OR     ARTHROPLASTY KNEE  10/22/2012    Procedure: ARTHROPLASTY KNEE;  Right Total knee Arthroplasty  ;  Surgeon: Jagdeep Virgen MD;  Location: [facility]     ARTHROPLASTY KNEE  5/23/2013    Procedure: ARTHROPLASTY KNEE;  LEFT TOTAL KNEE ARTHROPLASTY (SMITH &  GURU)^;  Surgeon: Rufino Tong MD;  Location: [facility] OR     C PREP FACE/ORAL PROST PALATAL LIFT       CHOLECYSTECTOMY       CYSTOSCOPY, RETROGRADES, INSERT STENT URETER(S), COMBINED  7/16/2012    Procedure: COMBINED CYSTOSCOPY, RETROGRADES, INSERT STENT URETER(S);  Cystoscopy, Right retrogrades, Right Stent Placement;  Surgeon: Mauricio Velazquez MD;  Location: [facility] OR     LASER HOLMIUM LITHOTRIPSY URETER(S), INSERT STENT, COMBINED  8/8/2012    Procedure: COMBINED CYSTOSCOPY, URETEROSCOPY, LASER HOLMIUM LITHOTRIPSY URETER(S), INSERT STENT;  Cystoscopy, Stent Removal, Right Ureteroscopy with Holmium Laser, Stone removal ;  Surgeon: Mauricio Velazquez MD;  Location: [facility] OR     LIPOSUCTION (LOCATION)      tummy     STRIP VEIN         Current Outpatient Medications   Medication Sig     acetaminophen (TYLENOL) 325 MG tablet Take 650 mg by mouth 4 times daily AND Give 650 mg by mouth every 12 hours as needed for back pain     albuterol (PROAIR HFA/PROVENTIL HFA/VENTOLIN HFA) 108 (90 Base) MCG/ACT Inhaler Inhale 2 puffs into the lungs every 6 hours as needed for shortness of breath / dyspnea or wheezing     amLODIPine (NORVASC) 10 MG tablet Take 0.5 tablets (5 mg) by mouth daily     apixaban ANTICOAGULANT (ELIQUIS) 5 MG tablet Take 2 tablets (10 mg) by mouth 2 times daily     apixaban ANTICOAGULANT (ELIQUIS) 5 MG tablet Take 1 tablet (5 mg) by mouth 2 times daily     ascorbic acid (VITAMIN C) 500 MG tablet Take 500 mg by mouth daily      atenolol (TENORMIN) 100 MG tablet Take 1 tablet (100 mg) by mouth daily     budesonide-formoterol (SYMBICORT) 160-4.5 MCG/ACT Inhaler Inhale 2 puffs into the lungs 2 times daily     cholecalciferol (VITAMIN D) 1000 UNIT tablet Take 2 tablets (2,000 Units) by mouth daily     Coenzyme Q10 (COQ10) 30 MG CAPS Take 1 capsule by mouth daily      Cranberry Extract 250 MG TABS Take 1 tablet by mouth daily      Cyanocobalamin (B-12) 1000 MCG TBCR Take 1,000 mcg by mouth daily      FEXOFENADINE HCL PO Take 600 mg by mouth 2 times daily     fluticasone (FLONASE) 50 MCG/ACT nasal spray Spray 1-2 sprays into both nostrils daily     furosemide (LASIX) 40 MG tablet Take 20 mg by mouth daily as needed      Garlic (GARLIC 1500) 1500 MG CAPS Take 1,500 mg by mouth daily      ipratropium - albuterol 0.5 mg/2.5 mg/3 mL (DUONEB) 0.5-2.5 (3) MG/3ML neb solution Take 1 vial (3 mLs) by nebulization every 6 hours as needed for shortness of breath / dyspnea or wheezing     levothyroxine (SYNTHROID/LEVOTHROID) 50 MCG tablet Take 50 mcg by mouth daily     losartan (COZAAR) 100 MG tablet Take 1 tablet (100 mg) by mouth daily     menthol (ICY HOT) 5 % PTCH Apply 1 patch topically 2 times daily     Multiple Minerals (CALCIUM-MAGNESIUM-ZINC) TABS Take 1 tablet by mouth daily      Multiple Vitamins-Minerals (MULTIVITAMIN & MINERAL PO) Take 1 tablet by mouth daily.     Nutritional Supplements (SALMON OIL) CAPS Take 1 capsule by mouth daily      omeprazole 20 MG tablet Take 1 tablet (20 mg) by mouth daily Take 30-60 minutes before a meal.     order for DME Equipment being ordered: 4 wheeled walker with hand brakes and seat     order for DME Equipment being ordered: 1: Custom/alternative BLE full leg velco/buckling compression garment     order for DME Equipment being ordered: 1: Gradient Compression Wraps; 2: BLE knee high 20-30 mm Hg compression stockings; 3: BLE thigh high 20-30 mm Hg compression stockings; 4: Cast Boots     order for DME Equipment being ordered: Nebulizer and neb supplies (tubing, etc)     pravastatin (PRAVACHOL) 20 MG tablet Take 1 tablet (20 mg) by mouth daily     sennosides (SENOKOT) 8.6 MG tablet Take 1 tablet by mouth 2 times daily     terazosin (HYTRIN) 2 MG capsule Take 1 capsule (2 mg) by mouth At Bedtime     tiotropium (SPIRIVA HANDIHALER) 18 MCG inhaled capsule Inhale contents of one capsule daily.     traMADol (ULTRAM) 50 MG tablet TAKE 1 TABLET BY MOUTH TWICE DAILY AS NEEDED FOR PAIN      vitamin  B complex with vitamin C (VITAMIN  B COMPLEX) TABS Take 1 tablet by mouth daily     No current facility-administered medications for this visit.        No Known Allergies    Social History     Socioeconomic History     Marital status:      Spouse name: Not on file     Number of children: Not on file     Years of education: Not on file     Highest education level: Not on file   Occupational History     Not on file   Social Needs     Financial resource strain: Not on file     Food insecurity:     Worry: Not on file     Inability: Not on file     Transportation needs:     Medical: Not on file     Non-medical: Not on file   Tobacco Use     Smoking status: Former Smoker     Packs/day: 1.00     Years: 29.00     Pack years: 29.00     Types: Cigarettes     Last attempt to quit: 10/17/1966     Years since quittin.5     Smokeless tobacco: Never Used   Substance and Sexual Activity     Alcohol use: Yes     Comment: 4 drinks yearly     Drug use: No     Sexual activity: Yes     Partners: Male   Lifestyle     Physical activity:     Days per week: Not on file     Minutes per session: Not on file     Stress: Not on file   Relationships     Social connections:     Talks on phone: Not on file     Gets together: Not on file     Attends [employer] service: Not on file     Active member of club or organization: Not on file     Attends meetings of clubs or organizations: Not on file     Relationship status: Not on file     Intimate partner violence:     Fear of current or ex partner: Not on file     Emotionally abused: Not on file     Physically abused: Not on file     Forced sexual activity: Not on file   Other Topics Concern     Parent/sibling w/ CABG, MI or angioplasty before 65F 55M? Not Asked   Social History Narrative     Not on file          Information reviewed:  Medications, vital signs, orders, nursing notes, problem list, hospital information.     ROS: All 10 point review of system completed, those  "pertinent positive, please see H&P, the remaining ROS is negative.    /79   Pulse 80   Temp 97.9  F (36.6  C)   Resp 16   Ht 1.689 m (5' 6.5\")   Wt 99.3 kg (219 lb)   SpO2 93%   BMI 34.82 kg/m      PHYSICAL EXAMINATION:   GENERAL:  No acute distress. Sitting on a WC.  SKIN:  Dry and warm.  There is no rash, lesions, ulcers or juandice at area of skin examined.  HEENT:  Head without trauma.  Pupils round, reactive. Exam of conjunctiva and lids are normal. Sclera without icterus. There is no oral thrush.  NECK:  Supple.  There is no cervical adenopathy, no thyromegaly. No jugular venous distension.  CHEST: No reproducible chest tenderness.   LUNGS:  Normal respiratory effort. Lungs are Clear on ascultation.  HEART:  Regular rate and rhythm.  No murmur, gallops or rubs auscultated.  ABDOMEN:  Soft, bowel sounds positive.  There is no tenderness or guarding.   EXTREMITIES: lymphedema of LE's.  NEUROLOGIC:  Alert and oriented x3.      Lab/Diagnostic data:  Reviewed    Lab Results   Component Value Date    WBC 9.1 04/15/2019     Lab Results   Component Value Date    RBC 4.88 04/15/2019     Lab Results   Component Value Date    HGB 14.2 04/15/2019     Lab Results   Component Value Date    HCT 43.0 04/15/2019     Lab Results   Component Value Date    MCV 88 04/15/2019     Lab Results   Component Value Date    MCH 29.1 04/15/2019     Lab Results   Component Value Date    MCHC 33.0 04/15/2019     Lab Results   Component Value Date    RDW 14.4 04/15/2019     Lab Results   Component Value Date     04/15/2019       Last Comprehensive Metabolic Panel:  Sodium   Date Value Ref Range Status   04/15/2019 139 133 - 144 mmol/L Final     Potassium   Date Value Ref Range Status   04/16/2019 3.4 3.4 - 5.3 mmol/L Final     Chloride   Date Value Ref Range Status   04/15/2019 105 94 - 109 mmol/L Final     Carbon Dioxide   Date Value Ref Range Status   04/15/2019 25 20 - 32 mmol/L Final     Anion Gap   Date Value Ref " Range Status   04/15/2019 9 3 - 14 mmol/L Final     Glucose   Date Value Ref Range Status   04/15/2019 89 70 - 99 mg/dL Final     Urea Nitrogen   Date Value Ref Range Status   04/15/2019 11 7 - 30 mg/dL Final     Creatinine   Date Value Ref Range Status   04/15/2019 0.60 0.52 - 1.04 mg/dL Final     GFR Estimate   Date Value Ref Range Status   04/15/2019 85 >60 mL/min/[1.73_m2] Final     Comment:     Non  GFR Calc  Starting 12/18/2018, serum creatinine based estimated GFR (eGFR) will be   calculated using the Chronic Kidney Disease Epidemiology Collaboration   (CKD-EPI) equation.       Calcium   Date Value Ref Range Status   04/15/2019 9.3 8.5 - 10.1 mg/dL Final       ASSESSMENT / PLAN:     Other acute pulmonary embolism without acute cor pulmonale (H)  - CT chest showed acute PE in segmental pulmonary artery in R middle lobe.  - Dopplers showed no DVT.  - On eliquis.    Chronic obstructive pulmonary disease, unspecified COPD type (H)  - Has moderate COPD at baseline per PFT's 1/2018.  - On duonebs, spiriva and symbicort.    Benign essential hypertension  - on norvasc, hytrin, atenolol, lasix and losartan.    Hyperlipidemia, unspecified hyperlipidemia type  - on pravachol.    Lymphedema of both lower extremities  - Lymphedema wraps.    Hypothyroidism, unspecified type  - on synthroid.    Physical deconditioning  -Plan: PT/OT, fall precautions. Care conference with patient and family for the progress of rehab and disposition issues will be discussed as planned. Rehab evaluation and other evaluations including CPT are at rehab logs, to be reviewed separately.  Fall risk assessment as well as cognitive evaluation will be formed during rehab stay if indicated.      Other problems with same care. Primary care doctor and other specialists to address those chronic problems in next clinic appointment to be scheduled upon discharge from the TCU.    Total time spent with patient visit was 41 min including  patient visit, review of past records, 1/2 time on patients counseling and coordinating care.        Wiliam Rajput MD

## 2019-04-18 NOTE — LETTER
4/18/2019        RE: Marino Prajapati  [id number] [facility] [id number]          PRIMARY CARE PROVIDER AND CLINIC RESPONSIBLE:  Vivian Blue, [address] [facility] [id number]        ADMISSION HISTORY AND PHYSICAL EXAMINATION     Chief Complaint   Patient presents with     Hospital F/U         HISTORY OF PRESENT ILLNESS:  83 year old female, (1935), admitted to the [facility] for continuation of medical care and rehab.    Pt admitted [facility] 4/9 to 4/12 for acute hypoxic resp failure found to have PE.    Pt states her breathing is better. No chest pain/fever/chills/cough.    Please see Iveth Roblero's CNP admit noted dated 4/13 for details of admission, past medical history, family history, allergies, medication list, social history and other details pertinent with this admission. Hospital admission and dc summary reviewed.      Past Medical History:   Diagnosis Date     Anemia      CARDIOVASCULAR SCREENING; LDL GOAL LESS THAN 160      Colon polyps 2010    needs colonoscopy 2013     DVT (deep venous thrombosis) (H) 2007    remote history of DVT while she was on BCP ,coumadin stopped after retroperitoneal/buttock hematoma in 10/11 . On ASA only     Gastro-oesophageal reflux disease      HTN, goal below 140/90      Hyperlipidemia LDL goal <130 1/22/2016     Kidney stone      Osteoarthritis     knees and hip     Osteoporosis      Postnasal drip      S/P total knee arthroplasty      Thrombosis of leg        Past Surgical History:   Procedure Laterality Date     ARTHROPLASTY HIP  8/1/2013    Procedure: ARTHROPLASTY HIP;  RIGHT TOTAL HIP ARTHROPLASTY;  Surgeon: Rufino Tong MD;  Location:  OR     ARTHROPLASTY HIP Left 12/12/2014    Procedure: ARTHROPLASTY HIP;  Surgeon: Rufino Tong MD;  Location:  OR     ARTHROPLASTY KNEE  10/22/2012    Procedure: ARTHROPLASTY KNEE;  Right Total knee Arthroplasty  ;  Surgeon: Jagdeep Virgen MD;  Location:  OR      ARTHROPLASTY KNEE  5/23/2013    Procedure: ARTHROPLASTY KNEE;  LEFT TOTAL KNEE ARTHROPLASTY (SMITH & NEPHEW)^;  Surgeon: Rufino Tong MD;  Location: [facility] OR     C PREP FACE/ORAL PROST PALATAL LIFT       CHOLECYSTECTOMY       CYSTOSCOPY, RETROGRADES, INSERT STENT URETER(S), COMBINED  7/16/2012    Procedure: COMBINED CYSTOSCOPY, RETROGRADES, INSERT STENT URETER(S);  Cystoscopy, Right retrogrades, Right Stent Placement;  Surgeon: Mauricio Velazquez MD;  Location: [facility] OR     LASER HOLMIUM LITHOTRIPSY URETER(S), INSERT STENT, COMBINED  8/8/2012    Procedure: COMBINED CYSTOSCOPY, URETEROSCOPY, LASER HOLMIUM LITHOTRIPSY URETER(S), INSERT STENT;  Cystoscopy, Stent Removal, Right Ureteroscopy with Holmium Laser, Stone removal ;  Surgeon: Mauricio Velazquez MD;  Location: [facility] OR     LIPOSUCTION (LOCATION)      tummy     STRIP VEIN         Current Outpatient Medications   Medication Sig     acetaminophen (TYLENOL) 325 MG tablet Take 650 mg by mouth 4 times daily AND Give 650 mg by mouth every 12 hours as needed for back pain     albuterol (PROAIR HFA/PROVENTIL HFA/VENTOLIN HFA) 108 (90 Base) MCG/ACT Inhaler Inhale 2 puffs into the lungs every 6 hours as needed for shortness of breath / dyspnea or wheezing     amLODIPine (NORVASC) 10 MG tablet Take 0.5 tablets (5 mg) by mouth daily     apixaban ANTICOAGULANT (ELIQUIS) 5 MG tablet Take 2 tablets (10 mg) by mouth 2 times daily     apixaban ANTICOAGULANT (ELIQUIS) 5 MG tablet Take 1 tablet (5 mg) by mouth 2 times daily     ascorbic acid (VITAMIN C) 500 MG tablet Take 500 mg by mouth daily      atenolol (TENORMIN) 100 MG tablet Take 1 tablet (100 mg) by mouth daily     budesonide-formoterol (SYMBICORT) 160-4.5 MCG/ACT Inhaler Inhale 2 puffs into the lungs 2 times daily     cholecalciferol (VITAMIN D) 1000 UNIT tablet Take 2 tablets (2,000 Units) by mouth daily     Coenzyme Q10 (COQ10) 30 MG CAPS Take 1 capsule by mouth daily      Cranberry Extract 250 MG TABS Take 1  tablet by mouth daily      Cyanocobalamin (B-12) 1000 MCG TBCR Take 1,000 mcg by mouth daily     FEXOFENADINE HCL PO Take 600 mg by mouth 2 times daily     fluticasone (FLONASE) 50 MCG/ACT nasal spray Spray 1-2 sprays into both nostrils daily     furosemide (LASIX) 40 MG tablet Take 20 mg by mouth daily as needed      Garlic (GARLIC 1500) 1500 MG CAPS Take 1,500 mg by mouth daily      ipratropium - albuterol 0.5 mg/2.5 mg/3 mL (DUONEB) 0.5-2.5 (3) MG/3ML neb solution Take 1 vial (3 mLs) by nebulization every 6 hours as needed for shortness of breath / dyspnea or wheezing     levothyroxine (SYNTHROID/LEVOTHROID) 50 MCG tablet Take 50 mcg by mouth daily     losartan (COZAAR) 100 MG tablet Take 1 tablet (100 mg) by mouth daily     menthol (ICY HOT) 5 % PTCH Apply 1 patch topically 2 times daily     Multiple Minerals (CALCIUM-MAGNESIUM-ZINC) TABS Take 1 tablet by mouth daily      Multiple Vitamins-Minerals (MULTIVITAMIN & MINERAL PO) Take 1 tablet by mouth daily.     Nutritional Supplements (SALMON OIL) CAPS Take 1 capsule by mouth daily      omeprazole 20 MG tablet Take 1 tablet (20 mg) by mouth daily Take 30-60 minutes before a meal.     order for DME Equipment being ordered: 4 wheeled walker with hand brakes and seat     order for DME Equipment being ordered: 1: Custom/alternative BLE full leg velco/buckling compression garment     order for DME Equipment being ordered: 1: Gradient Compression Wraps; 2: BLE knee high 20-30 mm Hg compression stockings; 3: BLE thigh high 20-30 mm Hg compression stockings; 4: Cast Boots     order for DME Equipment being ordered: Nebulizer and neb supplies (tubing, etc)     pravastatin (PRAVACHOL) 20 MG tablet Take 1 tablet (20 mg) by mouth daily     sennosides (SENOKOT) 8.6 MG tablet Take 1 tablet by mouth 2 times daily     terazosin (HYTRIN) 2 MG capsule Take 1 capsule (2 mg) by mouth At Bedtime     tiotropium (SPIRIVA HANDIHALER) 18 MCG inhaled capsule Inhale contents of one capsule  daily.     traMADol (ULTRAM) 50 MG tablet TAKE 1 TABLET BY MOUTH TWICE DAILY AS NEEDED FOR PAIN     vitamin  B complex with vitamin C (VITAMIN  B COMPLEX) TABS Take 1 tablet by mouth daily     No current facility-administered medications for this visit.        No Known Allergies    Social History     Socioeconomic History     Marital status:      Spouse name: Not on file     Number of children: Not on file     Years of education: Not on file     Highest education level: Not on file   Occupational History     Not on file   Social Needs     Financial resource strain: Not on file     Food insecurity:     Worry: Not on file     Inability: Not on file     Transportation needs:     Medical: Not on file     Non-medical: Not on file   Tobacco Use     Smoking status: Former Smoker     Packs/day: 1.00     Years: 29.00     Pack years: 29.00     Types: Cigarettes     Last attempt to quit: 10/17/1966     Years since quittin.5     Smokeless tobacco: Never Used   Substance and Sexual Activity     Alcohol use: Yes     Comment: 4 drinks yearly     Drug use: No     Sexual activity: Yes     Partners: Male   Lifestyle     Physical activity:     Days per week: Not on file     Minutes per session: Not on file     Stress: Not on file   Relationships     Social connections:     Talks on phone: Not on file     Gets together: Not on file     Attends [employer] service: Not on file     Active member of club or organization: Not on file     Attends meetings of clubs or organizations: Not on file     Relationship status: Not on file     Intimate partner violence:     Fear of current or ex partner: Not on file     Emotionally abused: Not on file     Physically abused: Not on file     Forced sexual activity: Not on file   Other Topics Concern     Parent/sibling w/ CABG, MI or angioplasty before 65F 55M? Not Asked   Social History Narrative     Not on file          Information reviewed:  Medications, vital signs, orders, nursing notes,  "problem list, hospital information.     ROS: All 10 point review of system completed, those pertinent positive, please see H&P, the remaining ROS is negative.    /79   Pulse 80   Temp 97.9  F (36.6  C)   Resp 16   Ht 1.689 m (5' 6.5\")   Wt 99.3 kg (219 lb)   SpO2 93%   BMI 34.82 kg/m       PHYSICAL EXAMINATION:   GENERAL:  No acute distress. Sitting on a WC.  SKIN:  Dry and warm.  There is no rash, lesions, ulcers or juandice at area of skin examined.  HEENT:  Head without trauma.  Pupils round, reactive. Exam of conjunctiva and lids are normal. Sclera without icterus. There is no oral thrush.  NECK:  Supple.  There is no cervical adenopathy, no thyromegaly. No jugular venous distension.  CHEST: No reproducible chest tenderness.   LUNGS:  Normal respiratory effort. Lungs are Clear on ascultation.  HEART:  Regular rate and rhythm.  No murmur, gallops or rubs auscultated.  ABDOMEN:  Soft, bowel sounds positive.  There is no tenderness or guarding.   EXTREMITIES: lymphedema of LE's.  NEUROLOGIC:  Alert and oriented x3.      Lab/Diagnostic data:  Reviewed    Lab Results   Component Value Date    WBC 9.1 04/15/2019     Lab Results   Component Value Date    RBC 4.88 04/15/2019     Lab Results   Component Value Date    HGB 14.2 04/15/2019     Lab Results   Component Value Date    HCT 43.0 04/15/2019     Lab Results   Component Value Date    MCV 88 04/15/2019     Lab Results   Component Value Date    MCH 29.1 04/15/2019     Lab Results   Component Value Date    MCHC 33.0 04/15/2019     Lab Results   Component Value Date    RDW 14.4 04/15/2019     Lab Results   Component Value Date     04/15/2019       Last Comprehensive Metabolic Panel:  Sodium   Date Value Ref Range Status   04/15/2019 139 133 - 144 mmol/L Final     Potassium   Date Value Ref Range Status   04/16/2019 3.4 3.4 - 5.3 mmol/L Final     Chloride   Date Value Ref Range Status   04/15/2019 105 94 - 109 mmol/L Final     Carbon Dioxide   Date " Value Ref Range Status   04/15/2019 25 20 - 32 mmol/L Final     Anion Gap   Date Value Ref Range Status   04/15/2019 9 3 - 14 mmol/L Final     Glucose   Date Value Ref Range Status   04/15/2019 89 70 - 99 mg/dL Final     Urea Nitrogen   Date Value Ref Range Status   04/15/2019 11 7 - 30 mg/dL Final     Creatinine   Date Value Ref Range Status   04/15/2019 0.60 0.52 - 1.04 mg/dL Final     GFR Estimate   Date Value Ref Range Status   04/15/2019 85 >60 mL/min/[1.73_m2] Final     Comment:     Non  GFR Calc  Starting 12/18/2018, serum creatinine based estimated GFR (eGFR) will be   calculated using the Chronic Kidney Disease Epidemiology Collaboration   (CKD-EPI) equation.       Calcium   Date Value Ref Range Status   04/15/2019 9.3 8.5 - 10.1 mg/dL Final       ASSESSMENT / PLAN:     Other acute pulmonary embolism without acute cor pulmonale (H)  - CT chest showed acute PE in segmental pulmonary artery in R middle lobe.  - Dopplers showed no DVT.  - On eliquis.    Chronic obstructive pulmonary disease, unspecified COPD type (H)  - Has moderate COPD at baseline per PFT's 1/2018.  - On duonebs, spiriva and symbicort.    Benign essential hypertension  - on norvasc, hytrin, atenolol, lasix and losartan.    Hyperlipidemia, unspecified hyperlipidemia type  - on pravachol.    Lymphedema of both lower extremities  - Lymphedema wraps.    Hypothyroidism, unspecified type  - on synthroid.    Physical deconditioning  -Plan: PT/OT, fall precautions. Care conference with patient and family for the progress of rehab and disposition issues will be discussed as planned. Rehab evaluation and other evaluations including CPT are at rehab logs, to be reviewed separately.  Fall risk assessment as well as cognitive evaluation will be formed during rehab stay if indicated.      Other problems with same care. Primary care doctor and other specialists to address those chronic problems in next clinic appointment to be scheduled  upon discharge from the TCU.    Total time spent with patient visit was 41 min including patient visit, review of past records, 1/2 time on patients counseling and coordinating care.        Wiliam Rajput MD      Sincerely,        Wiliam Rajput MD

## 2019-04-21 RX ORDER — TRAMADOL HYDROCHLORIDE 50 MG/1
50 TABLET ORAL 2 TIMES DAILY PRN
Qty: 10 TABLET | Refills: 0 | Status: SHIPPED | OUTPATIENT
Start: 2019-04-21 | End: 2019-04-26

## 2019-04-22 DIAGNOSIS — R52 PAIN: Primary | ICD-10-CM

## 2019-04-26 ENCOUNTER — NURSING HOME VISIT (OUTPATIENT)
Dept: GERIATRICS | Facility: CLINIC | Age: 84
End: 2019-04-26
Payer: COMMERCIAL

## 2019-04-26 VITALS
BODY MASS INDEX: 33.62 KG/M2 | RESPIRATION RATE: 18 BRPM | SYSTOLIC BLOOD PRESSURE: 162 MMHG | WEIGHT: 214.2 LBS | OXYGEN SATURATION: 92 % | HEART RATE: 76 BPM | DIASTOLIC BLOOD PRESSURE: 92 MMHG | TEMPERATURE: 97.6 F | HEIGHT: 67 IN

## 2019-04-26 DIAGNOSIS — E78.5 HYPERLIPIDEMIA, UNSPECIFIED HYPERLIPIDEMIA TYPE: ICD-10-CM

## 2019-04-26 DIAGNOSIS — J44.9 CHRONIC OBSTRUCTIVE PULMONARY DISEASE, UNSPECIFIED COPD TYPE (H): ICD-10-CM

## 2019-04-26 DIAGNOSIS — M62.81 GENERALIZED MUSCLE WEAKNESS: ICD-10-CM

## 2019-04-26 DIAGNOSIS — E03.9 HYPOTHYROIDISM, UNSPECIFIED TYPE: ICD-10-CM

## 2019-04-26 DIAGNOSIS — I89.0 LYMPHEDEMA OF BOTH LOWER EXTREMITIES: ICD-10-CM

## 2019-04-26 DIAGNOSIS — K21.9 GASTROESOPHAGEAL REFLUX DISEASE, ESOPHAGITIS PRESENCE NOT SPECIFIED: ICD-10-CM

## 2019-04-26 DIAGNOSIS — G89.29 CHRONIC BILATERAL LOW BACK PAIN WITHOUT SCIATICA: ICD-10-CM

## 2019-04-26 DIAGNOSIS — M54.50 CHRONIC BILATERAL LOW BACK PAIN WITHOUT SCIATICA: ICD-10-CM

## 2019-04-26 DIAGNOSIS — I26.99 OTHER ACUTE PULMONARY EMBOLISM WITHOUT ACUTE COR PULMONALE (H): Primary | ICD-10-CM

## 2019-04-26 DIAGNOSIS — R53.81 PHYSICAL DECONDITIONING: ICD-10-CM

## 2019-04-26 DIAGNOSIS — I10 BENIGN ESSENTIAL HYPERTENSION: ICD-10-CM

## 2019-04-26 PROCEDURE — 99309 SBSQ NF CARE MODERATE MDM 30: CPT | Performed by: NURSE PRACTITIONER

## 2019-04-26 ASSESSMENT — MIFFLIN-ST. JEOR: SCORE: 1461.29

## 2019-04-26 NOTE — PROGRESS NOTES
[facility]  [facility] Medical Record Number:  [id number]  Place of Service where encounter took place:  [facility]  (S) [[id number]]  Chief Complaint   Patient presents with     RECHECK       HPI:    Marino Prajapati  is a 83 year old (1935), who is being seen today for an episodic care visit.  HPI information obtained from: facility chart records, facility staff, patient report and [facility] chart review. Today's concern is:    Patient with medical history of COPD, HTN, lymphedema and h/o DVT admitted to acute rehab following hospitalization for new PE and COPD exacerbation. Is new on Apixaban. Has made good functional gains in rehab. Reports breathing has improved and has been weaned off O2 with sats > 90% on RA. Denies chest pain, dizziness. Reports mild low back pain, states this si chronic and acetaminophen is generally effective at helping to relieve it. Reports appetite is good. States LE edema less. Reports is sleeping fair. Looking forward to discharge next week.    Past Medical and Surgical History reviewed in Epic today.    MEDICATIONS:  Current Outpatient Medications   Medication Sig Dispense Refill     acetaminophen (TYLENOL) 325 MG tablet Take 650 mg by mouth 4 times daily AND Give 650 mg by mouth every 12 hours as needed for back pain 250 tablet 11     albuterol (PROAIR HFA/PROVENTIL HFA/VENTOLIN HFA) 108 (90 Base) MCG/ACT Inhaler Inhale 2 puffs into the lungs every 6 hours as needed for shortness of breath / dyspnea or wheezing 1 Inhaler 3     amLODIPine (NORVASC) 10 MG tablet Take 0.5 tablets (5 mg) by mouth daily 90 tablet 1     apixaban ANTICOAGULANT (ELIQUIS) 5 MG tablet Take 2 tablets (10 mg) by mouth 2 times daily       ascorbic acid (VITAMIN C) 500 MG tablet Take 500 mg by mouth daily  100 tablet 12     atenolol (TENORMIN) 100 MG tablet Take 1 tablet (100 mg) by mouth daily 90 tablet 1     budesonide-formoterol (SYMBICORT) 160-4.5 MCG/ACT Inhaler Inhale  2 puffs into the lungs 2 times daily 3 Inhaler 1     Calcium-Magnesium-Zinc 333-133-5 MG TABS per tablet Take 1 tablet by mouth daily       cholecalciferol (VITAMIN D) 1000 UNIT tablet Take 2 tablets (2,000 Units) by mouth daily 100 tablet 3     Coenzyme Q10 (COQ10) 30 MG CAPS Take 1 capsule by mouth daily  30 capsule      Cranberry Extract 250 MG TABS Take 1 tablet by mouth daily        Cyanocobalamin (B-12) 1000 MCG TBCR Take 1,000 mcg by mouth daily 100 tablet 1     FEXOFENADINE HCL PO Take 600 mg by mouth 2 times daily       fluticasone (FLONASE) 50 MCG/ACT nasal spray Spray 1-2 sprays into both nostrils daily 3 Bottle 0     furosemide (LASIX) 40 MG tablet Take 20 mg by mouth daily as needed        Garlic (GARLIC 1500) 1500 MG CAPS Take 1,500 mg by mouth daily        ipratropium - albuterol 0.5 mg/2.5 mg/3 mL (DUONEB) 0.5-2.5 (3) MG/3ML neb solution Take 1 vial (3 mLs) by nebulization every 6 hours as needed for shortness of breath / dyspnea or wheezing 100 vial 1     levothyroxine (SYNTHROID/LEVOTHROID) 50 MCG tablet Take 50 mcg by mouth daily       losartan (COZAAR) 100 MG tablet Take 1 tablet (100 mg) by mouth daily 90 tablet 1     menthol (ICY HOT) 5 % PTCH Apply 1 patch topically 2 times daily       Multiple Vitamins-Minerals (MULTIVITAMIN & MINERAL PO) Take 1 tablet by mouth daily.       omeprazole 20 MG tablet Take 1 tablet (20 mg) by mouth daily Take 30-60 minutes before a meal. 90 tablet 0     order for DME Equipment being ordered: 4 wheeled walker with hand brakes and seat 1 each 0     order for DME Equipment being ordered: 1: Custom/alternative BLE full leg velco/buckling compression garment 1 each 0     order for DME Equipment being ordered: 1: Gradient Compression Wraps; 2: BLE knee high 20-30 mm Hg compression stockings; 3: BLE thigh high 20-30 mm Hg compression stockings; 4: Cast Boots 1 each 0     order for DME Equipment being ordered: Nebulizer and neb supplies (tubing, etc) 1 Device 0      "pravastatin (PRAVACHOL) 20 MG tablet Take 1 tablet (20 mg) by mouth daily 90 tablet 1     sennosides (SENOKOT) 8.6 MG tablet Take 1 tablet by mouth 2 times daily       terazosin (HYTRIN) 2 MG capsule Take 1 capsule (2 mg) by mouth At Bedtime 90 capsule 1     tiotropium (SPIRIVA HANDIHALER) 18 MCG inhaled capsule Inhale contents of one capsule daily. 90 capsule 0     traMADol (ULTRAM) 50 MG tablet TAKE 1 TABLET BY MOUTH TWICE DAILY AS NEEDED FOR PAIN 16 tablet 0     vitamin  B complex with vitamin C (VITAMIN  B COMPLEX) TABS Take 1 tablet by mouth daily  0     apixaban ANTICOAGULANT (ELIQUIS) 5 MG tablet Take 1 tablet (5 mg) by mouth 2 times daily (Patient not taking: Reported on 4/26/2019)       Multiple Minerals (CALCIUM-MAGNESIUM-ZINC) TABS Take 1 tablet by mouth daily        Nutritional Supplements (SALMON OIL) CAPS Take 1 capsule by mouth daily        traMADol (ULTRAM) 50 MG tablet Take 1 tablet (50 mg) by mouth 2 times daily as needed for severe pain (Patient not taking: Reported on 4/26/2019) 10 tablet 0         REVIEW OF SYSTEMS:  4 point ROS including Respiratory, CV, GI and , other than that noted in the HPI,  is negative    Objective:  BP (!) 162/92   Pulse 76   Temp 97.6  F (36.4  C)   Resp 18   Ht 1.689 m (5' 6.5\")   Wt 97.2 kg (214 lb 3.2 oz)   SpO2 92%   BMI 34.05 kg/m    Exam:    Resp: Effort WNL, LSCTA - occasional congested cough (chronic)  CV: VS as above- 1++ LE edema slight > right, compression socks on  Abd- soft, nontender, BS +  Musc- GUARDADO- w/c, shoe lift left  Psych- alert, calm, pleasant        Labs:   Recent labs in [email or web address] reviewed by me today.     ASSESSMENT/PLAN:  Other acute pulmonary embolism without acute cor pulmonale (H)  - doppler negative DVT inpatient. O2 weaned off, New on Apixaban  - wean O2- keep sats > 90%  - continue on Apixaban  - observe for s/s bleeding  - pulmonary toilet/mobilize     Chronic obstructive pulmonary disease, unspecified COPD type (H)  - chronic " bronchitis picture. Has chronic productive cough much improved though since admit  - weaned O2 as above  - continues on mucinex BID  - enc po fluids  - continue on Spiriva, Budesonide-formoterol, Duonebs  - VS per unit protocol     Benign essential hypertension  Hyperlidemia  - elevated and have trended up in TCU...  in setting of acute illness, care transitions, rehab (stressors) - is maxed out on current antihypertensives 2/2 chronic edema not certain going up on amlodipine would be wise. Weights are inconsistent but appear stable and clinically edema is down.   - continue on current regimen with amlodipine, losartan, atenolol, terazosin- will discuss with patient making adjustment vs f/w PCP after discharge.   - continue statin  - follow VS per unit protocol        Lymphedema of both lower extremities  - chronic, currently better than baseline.   - TEDS  - follow weights/clinically     Chronic bilateral low back pain without sciatica  - chronic, stable  - continue on acetaminophen ad prn tramadol  - Icy hot added at admit        Gastroesophageal reflux disease, esophagitis presence not specified  - chronic, no acute s/s   - continue on omeprazole     Hypothyroidism, unspecified type  - chronic, stable  - continue on levothryoxine     Generalized muscle weakness  Physical deconditioning  - 2/2 above  - lives with partner (20 + years)  - PT/OT  - ongoing discharge planning, SW follow and care conferences per unit protocol    Orders written by provider at facility        Electronically signed by:  CINTHIA Patel CNP

## 2019-04-26 NOTE — LETTER
4/26/2019        RE: Marino Prajapati  [address] [address] [facility] [address]        [facility]  [facility] Record Number:  [id number]  Place of Service where encounter took place:  [facility]) [[id number]]  Chief Complaint   Patient presents with     RECHECK       HPI:    Marino Prajapati  is a 83 year old (1935), who is being seen today for an episodic care visit.  HPI information obtained from: facility chart records, facility staff, patient report and [facility] chart review. Today's concern is:    Patient with medical history of COPD, HTN, lymphedema and h/o DVT admitted to acute rehab following hospitalization for new PE and COPD exacerbation. Is new on Apixaban. Has made good functional gains in rehab. Reports breathing has improved and has been weaned off O2 with sats > 90% on RA. Denies chest pain, dizziness. Reports mild low back pain, states this si chronic and acetaminophen is generally effective at helping to relieve it. Reports appetite is good. States LE edema less. Reports is sleeping fair. Looking forward to discharge next week.    Past Medical and Surgical History reviewed in Epic today.    MEDICATIONS:  Current Outpatient Medications   Medication Sig Dispense Refill     acetaminophen (TYLENOL) 325 MG tablet Take 650 mg by mouth 4 times daily AND Give 650 mg by mouth every 12 hours as needed for back pain 250 tablet 11     albuterol (PROAIR HFA/PROVENTIL HFA/VENTOLIN HFA) 108 (90 Base) MCG/ACT Inhaler Inhale 2 puffs into the lungs every 6 hours as needed for shortness of breath / dyspnea or wheezing 1 Inhaler 3     amLODIPine (NORVASC) 10 MG tablet Take 0.5 tablets (5 mg) by mouth daily 90 tablet 1     apixaban ANTICOAGULANT (ELIQUIS) 5 MG tablet Take 2 tablets (10 mg) by mouth 2 times daily       ascorbic acid (VITAMIN C) 500 MG tablet Take 500 mg by mouth daily  100 tablet 12     atenolol (TENORMIN) 100 MG tablet Take 1 tablet (100 mg)  by mouth daily 90 tablet 1     budesonide-formoterol (SYMBICORT) 160-4.5 MCG/ACT Inhaler Inhale 2 puffs into the lungs 2 times daily 3 Inhaler 1     Calcium-Magnesium-Zinc 333-133-5 MG TABS per tablet Take 1 tablet by mouth daily       cholecalciferol (VITAMIN D) 1000 UNIT tablet Take 2 tablets (2,000 Units) by mouth daily 100 tablet 3     Coenzyme Q10 (COQ10) 30 MG CAPS Take 1 capsule by mouth daily  30 capsule      Cranberry Extract 250 MG TABS Take 1 tablet by mouth daily        Cyanocobalamin (B-12) 1000 MCG TBCR Take 1,000 mcg by mouth daily 100 tablet 1     FEXOFENADINE HCL PO Take 600 mg by mouth 2 times daily       fluticasone (FLONASE) 50 MCG/ACT nasal spray Spray 1-2 sprays into both nostrils daily 3 Bottle 0     furosemide (LASIX) 40 MG tablet Take 20 mg by mouth daily as needed        Garlic (GARLIC 1500) 1500 MG CAPS Take 1,500 mg by mouth daily        ipratropium - albuterol 0.5 mg/2.5 mg/3 mL (DUONEB) 0.5-2.5 (3) MG/3ML neb solution Take 1 vial (3 mLs) by nebulization every 6 hours as needed for shortness of breath / dyspnea or wheezing 100 vial 1     levothyroxine (SYNTHROID/LEVOTHROID) 50 MCG tablet Take 50 mcg by mouth daily       losartan (COZAAR) 100 MG tablet Take 1 tablet (100 mg) by mouth daily 90 tablet 1     menthol (ICY HOT) 5 % PTCH Apply 1 patch topically 2 times daily       Multiple Vitamins-Minerals (MULTIVITAMIN & MINERAL PO) Take 1 tablet by mouth daily.       omeprazole 20 MG tablet Take 1 tablet (20 mg) by mouth daily Take 30-60 minutes before a meal. 90 tablet 0     order for DME Equipment being ordered: 4 wheeled walker with hand brakes and seat 1 each 0     order for DME Equipment being ordered: 1: Custom/alternative BLE full leg velco/buckling compression garment 1 each 0     order for DME Equipment being ordered: 1: Gradient Compression Wraps; 2: BLE knee high 20-30 mm Hg compression stockings; 3: BLE thigh high 20-30 mm Hg compression stockings; 4: Cast Boots 1 each 0      "order for DME Equipment being ordered: Nebulizer and neb supplies (tubing, etc) 1 Device 0     pravastatin (PRAVACHOL) 20 MG tablet Take 1 tablet (20 mg) by mouth daily 90 tablet 1     sennosides (SENOKOT) 8.6 MG tablet Take 1 tablet by mouth 2 times daily       terazosin (HYTRIN) 2 MG capsule Take 1 capsule (2 mg) by mouth At Bedtime 90 capsule 1     tiotropium (SPIRIVA HANDIHALER) 18 MCG inhaled capsule Inhale contents of one capsule daily. 90 capsule 0     traMADol (ULTRAM) 50 MG tablet TAKE 1 TABLET BY MOUTH TWICE DAILY AS NEEDED FOR PAIN 16 tablet 0     vitamin  B complex with vitamin C (VITAMIN  B COMPLEX) TABS Take 1 tablet by mouth daily  0     apixaban ANTICOAGULANT (ELIQUIS) 5 MG tablet Take 1 tablet (5 mg) by mouth 2 times daily (Patient not taking: Reported on 4/26/2019)       Multiple Minerals (CALCIUM-MAGNESIUM-ZINC) TABS Take 1 tablet by mouth daily        Nutritional Supplements (SALMON OIL) CAPS Take 1 capsule by mouth daily        traMADol (ULTRAM) 50 MG tablet Take 1 tablet (50 mg) by mouth 2 times daily as needed for severe pain (Patient not taking: Reported on 4/26/2019) 10 tablet 0         REVIEW OF SYSTEMS:  4 point ROS including Respiratory, CV, GI and , other than that noted in the HPI,  is negative    Objective:  BP (!) 162/92   Pulse 76   Temp 97.6  F (36.4  C)   Resp 18   Ht 1.689 m (5' 6.5\")   Wt 97.2 kg (214 lb 3.2 oz)   SpO2 92%   BMI 34.05 kg/m     Exam:    Resp: Effort WNL, LSCTA - occasional congested cough (chronic)  CV: VS as above- 1++ LE edema slight > right, compression socks on  Abd- soft, nontender, BS +  Musc- GUARDADO- w/c, shoe lift left  Psych- alert, calm, pleasant        Labs:   Recent labs in [employer] reviewed by me today.     ASSESSMENT/PLAN:  Other acute pulmonary embolism without acute cor pulmonale (H)  - doppler negative DVT inpatient. O2 weaned off, New on Apixaban  - wean O2- keep sats > 90%  - continue on Apixaban  - observe for s/s bleeding  - pulmonary " toilet/mobilize     Chronic obstructive pulmonary disease, unspecified COPD type (H)  - chronic bronchitis picture. Has chronic productive cough much improved though since admit  - weaned O2 as above  - continues on mucinex BID  - enc po fluids  - continue on Spiriva, Budesonide-formoterol, Duonebs  - VS per unit protocol     Benign essential hypertension  Hyperlidemia  - elevated and have trended up in TCU...  in setting of acute illness, care transitions, rehab (stressors) - is maxed out on current antihypertensives 2/2 chronic edema not certain going up on amlodipine would be wise. Weights are inconsistent but appear stable and clinically edema is down.   - continue on current regimen with amlodipine, losartan, atenolol, terazosin- will discuss with patient making adjustment vs f/w PCP after discharge.   - continue statin  - follow VS per unit protocol        Lymphedema of both lower extremities  - chronic, currently better than baseline.   - TEDS  - follow weights/clinically     Chronic bilateral low back pain without sciatica  - chronic, stable  - continue on acetaminophen ad prn tramadol  - Icy hot added at admit        Gastroesophageal reflux disease, esophagitis presence not specified  - chronic, no acute s/s   - continue on omeprazole     Hypothyroidism, unspecified type  - chronic, stable  - continue on levothryoxine     Generalized muscle weakness  Physical deconditioning  - 2/2 above  - lives with partner (20 + years)  - PT/OT  - ongoing discharge planning, SW follow and care conferences per unit protocol    Orders written by provider at facility        Electronically signed by:  CINTHIA Patel CNP               Sincerely,        CINTHIA Patel CNP

## 2019-04-30 ENCOUNTER — DISCHARGE SUMMARY NURSING HOME (OUTPATIENT)
Dept: GERIATRICS | Facility: CLINIC | Age: 84
End: 2019-04-30
Payer: COMMERCIAL

## 2019-04-30 VITALS
WEIGHT: 216 LBS | HEART RATE: 82 BPM | RESPIRATION RATE: 20 BRPM | SYSTOLIC BLOOD PRESSURE: 152 MMHG | HEIGHT: 67 IN | DIASTOLIC BLOOD PRESSURE: 63 MMHG | TEMPERATURE: 97.7 F | BODY MASS INDEX: 33.9 KG/M2 | OXYGEN SATURATION: 92 %

## 2019-04-30 DIAGNOSIS — M54.50 CHRONIC BILATERAL LOW BACK PAIN WITHOUT SCIATICA: ICD-10-CM

## 2019-04-30 DIAGNOSIS — M62.81 GENERALIZED MUSCLE WEAKNESS: ICD-10-CM

## 2019-04-30 DIAGNOSIS — J44.9 CHRONIC OBSTRUCTIVE PULMONARY DISEASE, UNSPECIFIED COPD TYPE (H): ICD-10-CM

## 2019-04-30 DIAGNOSIS — I26.99 OTHER ACUTE PULMONARY EMBOLISM WITHOUT ACUTE COR PULMONALE (H): Primary | ICD-10-CM

## 2019-04-30 DIAGNOSIS — I10 BENIGN ESSENTIAL HYPERTENSION: ICD-10-CM

## 2019-04-30 DIAGNOSIS — K21.9 GASTROESOPHAGEAL REFLUX DISEASE, ESOPHAGITIS PRESENCE NOT SPECIFIED: ICD-10-CM

## 2019-04-30 DIAGNOSIS — I89.0 LYMPHEDEMA OF BOTH LOWER EXTREMITIES: ICD-10-CM

## 2019-04-30 DIAGNOSIS — G89.29 CHRONIC BILATERAL LOW BACK PAIN WITHOUT SCIATICA: ICD-10-CM

## 2019-04-30 DIAGNOSIS — R53.81 PHYSICAL DECONDITIONING: ICD-10-CM

## 2019-04-30 PROCEDURE — 99316 NF DSCHRG MGMT 30 MIN+: CPT | Performed by: NURSE PRACTITIONER

## 2019-04-30 RX ORDER — TRAMADOL HYDROCHLORIDE 50 MG/1
50 TABLET ORAL EVERY 4 HOURS PRN
Refills: 0 | OUTPATIENT
Start: 2019-04-30 | End: 2019-05-03

## 2019-04-30 ASSESSMENT — MIFFLIN-ST. JEOR: SCORE: 1469.46

## 2019-04-30 NOTE — PROGRESS NOTES
[facility] DISCHARGE SUMMARY  PATIENT'S NAME: Marino Prajapati  YOB: 1935  MEDICAL RECORD NUMBER:  [id number]  Place of Service where encounter took place:  [facility]  (S) [[id number]]    PRIMARY CARE PROVIDER AND CLINIC RESPONSIBLE AFTER TRANSFER:   Vivian Blue MD, [address] [facility] [address]    Home with significant other     Transferring providers: Charisse Arshad NP, Wiliam Rajput MD  Recent Hospitalization/ED:  [facility] stay 4/9/2019   to 4/12/2019.  Date of SNF Admission: April / 12 / 2019  Date of SNF (anticipated) Discharge: May / 01 / 2019  Discharged to: previous independent home  Cognitive Scores: SLUMS: 21/30  Physical Function: Wheelchair dependent and ambulates 40 feet with walker, moderate assist with ADLs  DME: none      CODE STATUS/ADVANCE DIRECTIVES DISCUSSION:  DNR / DNI   ALLERGIES: Patient has no known allergies.    DISCHARGE DIAGNOSIS/NURSING FACILITY COURSE:   Patient hospitalized with new PE and COPD exacerbation. She was started on apixaban. She came to TCU on oxygen and has since been weaned off. She has chronic low back pain for which she takes tylenol.     Chronic obstructive pulmonary disease, unspecified COPD type (H)  Continue with mucinex and Spiriva, Budesonide-formoterol, Duonebs    Benign essential hypertension  Blood pressures while in TCU were trending up but stable. She remains on amlodipine, atenolol, cozaar. She has PRN clonidine.    Lymphedema of both lower extremities  Continue with elevation and OSMANY stockings    Chronic bilateral low back pain without sciatica  Continue with tylenol and tramadol, physical script sent with patient at discontinue from TCU    Gastroesophageal reflux disease, esophagitis presence not specified  Continue with omeprazole and monitoring    Generalized muscle weakness  Physical deconditioning  Will follow up with outpatient therapies  PT/OT      Past Medical History:  has a past medical history of Anemia, CARDIOVASCULAR SCREENING; LDL GOAL LESS THAN 160, Colon polyps (2010), DVT (deep venous thrombosis) (H) (2007), Gastro-oesophageal reflux disease, HTN, goal below 140/90, Hyperlipidemia LDL goal <130 (1/22/2016), Kidney stone, Osteoarthritis, Osteoporosis, Postnasal drip, S/P total knee arthroplasty, and Thrombosis of leg. She also has no past medical history of Chronic infection, Malignant hyperthermia, or Sleep apnea.    Discharge Medications:  Current Outpatient Medications   Medication Sig Dispense Refill     acetaminophen (TYLENOL) 325 MG tablet Take 650 mg by mouth 4 times daily AND Give 650 mg by mouth every 12 hours as needed for back pain 250 tablet 11     albuterol (PROAIR HFA/PROVENTIL HFA/VENTOLIN HFA) 108 (90 Base) MCG/ACT Inhaler Inhale 2 puffs into the lungs every 6 hours as needed for shortness of breath / dyspnea or wheezing 1 Inhaler 3     amLODIPine (NORVASC) 10 MG tablet Take 0.5 tablets (5 mg) by mouth daily 90 tablet 1     apixaban ANTICOAGULANT (ELIQUIS) 5 MG tablet Take 2 tablets (10 mg) by mouth 2 times daily       ascorbic acid (VITAMIN C) 500 MG tablet Take 500 mg by mouth daily  100 tablet 12     atenolol (TENORMIN) 100 MG tablet Take 1 tablet (100 mg) by mouth daily 90 tablet 1     budesonide-formoterol (SYMBICORT) 160-4.5 MCG/ACT Inhaler Inhale 2 puffs into the lungs 2 times daily 3 Inhaler 1     Calcium-Magnesium-Zinc 333-133-5 MG TABS per tablet Take 1 tablet by mouth daily       cholecalciferol (VITAMIN D) 1000 UNIT tablet Take 2 tablets (2,000 Units) by mouth daily 100 tablet 3     Coenzyme Q10 (COQ10) 30 MG CAPS Take 1 capsule by mouth daily  30 capsule      Cranberry Extract 250 MG TABS Take 1 tablet by mouth daily        Cyanocobalamin (B-12) 1000 MCG TBCR Take 1,000 mcg by mouth daily 100 tablet 1     FEXOFENADINE HCL PO Take 600 mg by mouth 2 times daily       fluticasone (FLONASE) 50 MCG/ACT nasal spray  Spray 1-2 sprays into both nostrils daily 3 Bottle 0     furosemide (LASIX) 40 MG tablet Take 20 mg by mouth daily as needed        Garlic (GARLIC 1500) 1500 MG CAPS Take 1,500 mg by mouth daily        ipratropium - albuterol 0.5 mg/2.5 mg/3 mL (DUONEB) 0.5-2.5 (3) MG/3ML neb solution Take 1 vial (3 mLs) by nebulization every 6 hours as needed for shortness of breath / dyspnea or wheezing 100 vial 1     levothyroxine (SYNTHROID/LEVOTHROID) 50 MCG tablet Take 50 mcg by mouth daily       losartan (COZAAR) 100 MG tablet Take 1 tablet (100 mg) by mouth daily 90 tablet 1     menthol (ICY HOT) 5 % PTCH Apply 1 patch topically 2 times daily       Multiple Vitamins-Minerals (MULTIVITAMIN & MINERAL PO) Take 1 tablet by mouth daily.       Nutritional Supplements (SALMON OIL) CAPS Take 1 capsule by mouth daily        omeprazole 20 MG tablet Take 1 tablet (20 mg) by mouth daily Take 30-60 minutes before a meal. 90 tablet 0     order for DME Equipment being ordered: 4 wheeled walker with hand brakes and seat 1 each 0     order for DME Equipment being ordered: 1: Custom/alternative BLE full leg velco/buckling compression garment 1 each 0     order for DME Equipment being ordered: 1: Gradient Compression Wraps; 2: BLE knee high 20-30 mm Hg compression stockings; 3: BLE thigh high 20-30 mm Hg compression stockings; 4: Cast Boots 1 each 0     order for DME Equipment being ordered: Nebulizer and neb supplies (tubing, etc) 1 Device 0     pravastatin (PRAVACHOL) 20 MG tablet Take 1 tablet (20 mg) by mouth daily 90 tablet 1     sennosides (SENOKOT) 8.6 MG tablet Take 1 tablet by mouth 2 times daily       terazosin (HYTRIN) 2 MG capsule Take 1 capsule (2 mg) by mouth At Bedtime 90 capsule 1     tiotropium (SPIRIVA HANDIHALER) 18 MCG inhaled capsule Inhale contents of one capsule daily. 90 capsule 0     traMADol (ULTRAM) 50 MG tablet Take 1 tablet (50 mg) by mouth 2 times daily as needed for severe pain 20 tablet 0     vitamin  B complex  "with vitamin C (VITAMIN  B COMPLEX) TABS Take 1 tablet by mouth daily  0     Multiple Minerals (CALCIUM-MAGNESIUM-ZINC) TABS Take 1 tablet by mouth daily        traMADol (ULTRAM) 50 MG tablet TAKE 1 TABLET BY MOUTH TWICE DAILY AS NEEDED FOR PAIN (Patient not taking: Reported on 4/30/2019) 16 tablet 0     Medication Changes/Rationale:     No changes while in TCU.    Controlled medications sent with patient:   Script for tramadol medication for 14 tabs and 0 refills given to patient at discharge to have them fill at their out patient pharmacy     ROS:   10 point ROS of systems including Constitutional, Eyes, Respiratory, Cardiovascular, Gastroenterology, Genitourinary, Integumentary, Musculoskeletal, Psychiatric were all negative except for pertinent positives noted in my HPI.    Physical Exam:   Vitals: /63   Pulse 82   Temp 97.7  F (36.5  C)   Resp 20   Ht 1.689 m (5' 6.5\")   Wt 98 kg (216 lb)   SpO2 92%   BMI 34.34 kg/m    BMI= Body mass index is 34.34 kg/m .  GENERAL APPEARANCE:  Alert, in no distress, cooperative  ENT:  Mouth and posterior oropharynx normal, moist mucous membranes, normal hearing acuity  EYES:  EOM, conjunctivae, lids, pupils and irises normal  RESP:  respiratory effort and palpation of chest normal, lungs clear to auscultation , diminished breath sounds bilateral bases, nonproductive cough  CV:  Palpation and auscultation of heart done , regular rate and rhythm, no murmur, rub, or gallop, peripheral edema 1+ in bilateral lower extremities  ABDOMEN:  normal bowel sounds, soft, nontender, no hepatosplenomegaly or other masses  SKIN:  Inspection of skin and subcutaneous tissue baseline, Palpation of skin and subcutaneous tissue baseline  NEURO:   purposeful movements in all 4 extremities  PSYCH:  oriented X 3, memory impaired , affect and mood normal     SNF labs: Recent labs in [facility] reviewed by me today.     DISCHARGE PLAN:    Follow up labs: No labs orders/due    Medical Follow Up:   "    Follow up with primary care provider in 7-10 days     MTM referral needed and placed by this provider: Padmini    Current [facility] scheduled appointments: none    Discharge Services: Out Patient:  physical therapy and occupational therapy    Discharge Instructions Verbalized to Patient at Discharge:     None      TOTAL DISCHARGE TIME:   Greater than 30 minutes  Electronically signed by:  Charisse Arshad NP

## 2019-04-30 NOTE — LETTER
4/30/2019        RE: Marino Prajapati  [address] [facility] [address]        [place] [facility] DISCHARGE SUMMARY  PATIENT'S NAME: Marino Prajapati  YOB: 1935  MEDICAL RECORD NUMBER:  [id number]  Place of Service where encounter took place:  [facility]  ([facility]) [[id number]]    PRIMARY CARE PROVIDER AND CLINIC RESPONSIBLE AFTER TRANSFER:   Vivian Blue MD, [facility] [address] / [facility] [address]    Home with significant other     Transferring providers: Charisse Arshad NP, Wiliam Rajput MD  Recent Hospitalization/ED:  [facility] stay 4/9/2019   to 4/12/2019.  Date of SNF Admission: April / 12 / 2019  Date of SNF (anticipated) Discharge: May / 01 / 2019  Discharged to: previous independent home  Cognitive Scores: SLUMS: 21/30  Physical Function: Wheelchair dependent and ambulates 40 feet with walker, moderate assist with ADLs  DME: none      CODE STATUS/ADVANCE DIRECTIVES DISCUSSION:  DNR / DNI   ALLERGIES: Patient has no known allergies.    DISCHARGE DIAGNOSIS/NURSING FACILITY COURSE:   Patient hospitalized with new PE and COPD exacerbation. She was started on apixaban. She came to TCU on oxygen and has since been weaned off. She has chronic low back pain for which she takes tylenol.     Chronic obstructive pulmonary disease, unspecified COPD type (H)  Continue with mucinex and Spiriva, Budesonide-formoterol, Duonebs    Benign essential hypertension  Blood pressures while in TCU were trending up but stable. She remains on amlodipine, atenolol, cozaar. She has PRN clonidine.    Lymphedema of both lower extremities  Continue with elevation and OSMANY stockings    Chronic bilateral low back pain without sciatica  Continue with tylenol and tramadol, physical script sent with patient at discontinue from TCU    Gastroesophageal reflux disease, esophagitis presence not specified  Continue with omeprazole and  monitoring    Generalized muscle weakness  Physical deconditioning  Will follow up with outpatient therapies PT/OT      Past Medical History:  has a past medical history of Anemia, CARDIOVASCULAR SCREENING; LDL GOAL LESS THAN 160, Colon polyps (2010), DVT (deep venous thrombosis) (H) (2007), Gastro-oesophageal reflux disease, HTN, goal below 140/90, Hyperlipidemia LDL goal <130 (1/22/2016), Kidney stone, Osteoarthritis, Osteoporosis, Postnasal drip, S/P total knee arthroplasty, and Thrombosis of leg. She also has no past medical history of Chronic infection, Malignant hyperthermia, or Sleep apnea.    Discharge Medications:  Current Outpatient Medications   Medication Sig Dispense Refill     acetaminophen (TYLENOL) 325 MG tablet Take 650 mg by mouth 4 times daily AND Give 650 mg by mouth every 12 hours as needed for back pain 250 tablet 11     albuterol (PROAIR HFA/PROVENTIL HFA/VENTOLIN HFA) 108 (90 Base) MCG/ACT Inhaler Inhale 2 puffs into the lungs every 6 hours as needed for shortness of breath / dyspnea or wheezing 1 Inhaler 3     amLODIPine (NORVASC) 10 MG tablet Take 0.5 tablets (5 mg) by mouth daily 90 tablet 1     apixaban ANTICOAGULANT (ELIQUIS) 5 MG tablet Take 2 tablets (10 mg) by mouth 2 times daily       ascorbic acid (VITAMIN C) 500 MG tablet Take 500 mg by mouth daily  100 tablet 12     atenolol (TENORMIN) 100 MG tablet Take 1 tablet (100 mg) by mouth daily 90 tablet 1     budesonide-formoterol (SYMBICORT) 160-4.5 MCG/ACT Inhaler Inhale 2 puffs into the lungs 2 times daily 3 Inhaler 1     Calcium-Magnesium-Zinc 333-133-5 MG TABS per tablet Take 1 tablet by mouth daily       cholecalciferol (VITAMIN D) 1000 UNIT tablet Take 2 tablets (2,000 Units) by mouth daily 100 tablet 3     Coenzyme Q10 (COQ10) 30 MG CAPS Take 1 capsule by mouth daily  30 capsule      Cranberry Extract 250 MG TABS Take 1 tablet by mouth daily        Cyanocobalamin (B-12) 1000 MCG TBCR Take 1,000 mcg by mouth daily 100 tablet 1      FEXOFENADINE HCL PO Take 600 mg by mouth 2 times daily       fluticasone (FLONASE) 50 MCG/ACT nasal spray Spray 1-2 sprays into both nostrils daily 3 Bottle 0     furosemide (LASIX) 40 MG tablet Take 20 mg by mouth daily as needed        Garlic (GARLIC 1500) 1500 MG CAPS Take 1,500 mg by mouth daily        ipratropium - albuterol 0.5 mg/2.5 mg/3 mL (DUONEB) 0.5-2.5 (3) MG/3ML neb solution Take 1 vial (3 mLs) by nebulization every 6 hours as needed for shortness of breath / dyspnea or wheezing 100 vial 1     levothyroxine (SYNTHROID/LEVOTHROID) 50 MCG tablet Take 50 mcg by mouth daily       losartan (COZAAR) 100 MG tablet Take 1 tablet (100 mg) by mouth daily 90 tablet 1     menthol (ICY HOT) 5 % PTCH Apply 1 patch topically 2 times daily       Multiple Vitamins-Minerals (MULTIVITAMIN & MINERAL PO) Take 1 tablet by mouth daily.       Nutritional Supplements (SALMON OIL) CAPS Take 1 capsule by mouth daily        omeprazole 20 MG tablet Take 1 tablet (20 mg) by mouth daily Take 30-60 minutes before a meal. 90 tablet 0     order for DME Equipment being ordered: 4 wheeled walker with hand brakes and seat 1 each 0     order for DME Equipment being ordered: 1: Custom/alternative BLE full leg velco/buckling compression garment 1 each 0     order for DME Equipment being ordered: 1: Gradient Compression Wraps; 2: BLE knee high 20-30 mm Hg compression stockings; 3: BLE thigh high 20-30 mm Hg compression stockings; 4: Cast Boots 1 each 0     order for DME Equipment being ordered: Nebulizer and neb supplies (tubing, etc) 1 Device 0     pravastatin (PRAVACHOL) 20 MG tablet Take 1 tablet (20 mg) by mouth daily 90 tablet 1     sennosides (SENOKOT) 8.6 MG tablet Take 1 tablet by mouth 2 times daily       terazosin (HYTRIN) 2 MG capsule Take 1 capsule (2 mg) by mouth At Bedtime 90 capsule 1     tiotropium (SPIRIVA HANDIHALER) 18 MCG inhaled capsule Inhale contents of one capsule daily. 90 capsule 0     traMADol (ULTRAM) 50 MG tablet  "Take 1 tablet (50 mg) by mouth 2 times daily as needed for severe pain 20 tablet 0     vitamin  B complex with vitamin C (VITAMIN  B COMPLEX) TABS Take 1 tablet by mouth daily  0     Multiple Minerals (CALCIUM-MAGNESIUM-ZINC) TABS Take 1 tablet by mouth daily        traMADol (ULTRAM) 50 MG tablet TAKE 1 TABLET BY MOUTH TWICE DAILY AS NEEDED FOR PAIN (Patient not taking: Reported on 4/30/2019) 16 tablet 0     Medication Changes/Rationale:     No changes while in TCU.    Controlled medications sent with patient:   Script for tramadol medication for 14 tabs and 0 refills given to patient at discharge to have them fill at their out patient pharmacy     ROS:   10 point ROS of systems including Constitutional, Eyes, Respiratory, Cardiovascular, Gastroenterology, Genitourinary, Integumentary, Musculoskeletal, Psychiatric were all negative except for pertinent positives noted in my HPI.    Physical Exam:   Vitals: /63   Pulse 82   Temp 97.7  F (36.5  C)   Resp 20   Ht 1.689 m (5' 6.5\")   Wt 98 kg (216 lb)   SpO2 92%   BMI 34.34 kg/m     BMI= Body mass index is 34.34 kg/m .  GENERAL APPEARANCE:  Alert, in no distress, cooperative  ENT:  Mouth and posterior oropharynx normal, moist mucous membranes, normal hearing acuity  EYES:  EOM, conjunctivae, lids, pupils and irises normal  RESP:  respiratory effort and palpation of chest normal, lungs clear to auscultation , diminished breath sounds bilateral bases, nonproductive cough  CV:  Palpation and auscultation of heart done , regular rate and rhythm, no murmur, rub, or gallop, peripheral edema 1+ in bilateral lower extremities  ABDOMEN:  normal bowel sounds, soft, nontender, no hepatosplenomegaly or other masses  SKIN:  Inspection of skin and subcutaneous tissue baseline, Palpation of skin and subcutaneous tissue baseline  NEURO:   purposeful movements in all 4 extremities  PSYCH:  oriented X 3, memory impaired , affect and mood normal     SNF labs: Recent labs in " EPIC reviewed by me today.     DISCHARGE PLAN:    Follow up labs: No labs orders/due    Medical Follow Up:      Follow up with primary care provider in 7-10 days     MTM referral needed and placed by this provider: No    Current [place] scheduled appointments: none    Discharge Services: Out Patient:  physical therapy and occupational therapy    Discharge Instructions Verbalized to Patient at Discharge:     None      TOTAL DISCHARGE TIME:   Greater than 30 minutes  Electronically signed by:  Charisse Arshad NP                 Sincerely,        Charisse Arshad NP

## 2019-05-02 DIAGNOSIS — Z76.89 HEALTH CARE HOME: Primary | ICD-10-CM

## 2019-05-06 ENCOUNTER — PATIENT OUTREACH (OUTPATIENT)
Dept: CARE COORDINATION | Facility: CLINIC | Age: 84
End: 2019-05-06

## 2019-05-06 NOTE — LETTER
[facility]  Complex Care Plan  About Me:    Patient Name:  Marino Prajapati    YOB: 1935  Age:         83 year old   [place] MRN:    [id number] Telephone Information:  Home Phone [phone]   Mobile [phone]       Address:  [address]  [facility] [address] Email address:  No e-mail address on record      Emergency Contact(s)    Name Relationship Lgl Grd Work Phone Home Phone Mobile Phone   1. SOFY GONZALEZ* Significant ot*  [phone]           Primary language:  English     needed? No   [place] [facility]:  [phone] op. 1  Other communication barriers: None  Preferred Method of Communication:  Mail  Current living arrangement: I live in a private home with family  Mobility Status/ Medical Equipment: Independent w/Device    Health Maintenance  Health Maintenance Reviewed: Not assessed    My Access Plan  Medical Emergency 911   Primary Clinic Line [facility] - [phone]   24 Hour Appointment Line [phone] or  [phone] (toll-free)   24 Hour Nurse Line [phone] (toll-free)   Preferred Urgent Care [facility], [phone]   Preferred Hospital [facility]  [phone]   Preferred Pharmacy [facility] [address] [place] [address] [address] AT [facility] [address] [facility]     Behavioral Health Crisis Line The National Suicide Prevention Lifeline at 1-264.764.3051 or 911       My Care Team Members  Patient Care Team       Relationship Specialty Notifications Start End    Vivian Blue MD PCP - General Internal Medicine  7/30/12     Phone: [phone] Pager: [phone] Fax: [phone]        [address] [facility] [address]    Vivian Blue MD Assigned PCP   10/4/12     Phone: [phone] Pager: [phone] Fax: [phone]        [address] [facility]  [address]    Néstor Mei LGSW Lead Care Coordinator   5/7/19             My Care Plans  Self Management and Treatment Plan  Goals and (Comments)  Goals        General    Functional (pt-stated)     Notes - Note edited  5/7/2019  3:26 PM by Néstor Mei LGSW    1. Goal Statement: I want to be strong enough to leave my home.  Measure of Success: Ability to attend appointment with PCP.   Supportive Steps to Achieve: Continue HEP, schedule appointment and transportation, attend appointment.  Barriers: Unknown how long it will take for Pt's strength to be regained.  Strengths: Motivated to reach goal, adherent to exercise program.  Date to Achieve By: 7.1.2019  Patient expressed understanding of goal: Pt reports understanding and denies any additional questions or concerns at this times. DEBBIE CC engaged in AIDET communication during encounter.                 Action Plans on File:   Asthma                Advance Care Plans/Directives Type:   Type Advanced Care Plans/Directives: DNR/DNI    My Medical and Care Information  Problem List   Patient Active Problem List   Diagnosis     Kidney stone     CARDIOVASCULAR SCREENING; LDL GOAL LESS THAN 130     Osteoarthritis     Constipation     Personal history of DVT (deep vein thrombosis)     Postnasal drip     HTN, goal below 140/90     Colon polyps     Osteoporosis     Asthma,      Hyperparathyroidism (H)     Hyperlipidemia LDL goal <130     bilat Knees and Hips arthroplasty     Lymphedema of both lower extremities     Chronic obstructive pulmonary disease, unspecified COPD type (H)     Pulmonary embolism (H)          Care Coordination Start Date: 5/6/2019   Frequency of Care Coordination: monthly   Form Last Updated: 05/07/2019

## 2019-05-06 NOTE — PROGRESS NOTES
Clinic Care Coordination Contact  [facility]/Voicemail    Referral Source: Care Team  Clinical Data: Care Coordinator Outreach  Referral received stating that Pt discharged from [facility] on 5.1.2019 to home with outpatient PT/OT.    Outreach attempted x 1.  Left message on voicemail with call back information and requested return call.  Plan: Care Coordinator will mail out care coordination introduction letter with care coordinator contact information and explanation of care coordination services. Care Coordinator will try to reach patient again in 3-5 business days.    Néstor Epps [facility]  Clinic Care Coordinator  Ph. [phone]  [email or web address]
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
## 2019-05-07 NOTE — PROGRESS NOTES
Clinic Care Coordination Contact    Clinic Care Coordination Contact  OUTREACH    Referral Information:  Referral Source: Care Team    Primary Diagnosis: COPD    Chief Complaint   Patient presents with     Clinic Care Coordination - Initial     TCU discharge to Home      Universal Utilization: Appropriate per chart review. No noted concerns.  Difficulty keeping appointments: No  No-Show Concerns: No  No PCP office visit in Past Year: No  Utilization    Last refreshed: 5/6/2019 11:59 AM:  Hospital Admissions 1           Last refreshed: 5/6/2019 11:59 AM:  ED Visits 0           Last refreshed: 5/6/2019 11:59 AM:  No Show Count (past year) 0              Current as of: 5/6/2019 11:59 AM            Clinical Concerns:  Current Medical Concerns: Patient was recently discharged from [facility] on 5.1.2019.     Current Behavioral Concerns: Pt reports not setting up home care nor outpatient therapy. Pt reports not having a positive experience with home care in the past. Pt is unable to physically leave her home at this time due to weakness.     Education Provided to patient: Role of [facility] CC.   Health Maintenance Reviewed: Not assessed    Medication Management:  Independently managed.     Functional Status:  Mobility Status: Independent w/Device  Fallen 2 or more times in the past year?: No  Any fall with injury in the past year?: No    Living Situation:  Current living arrangement: I live in a private home with family  Type of residence: Private home - [facility]    Diet/Exercise/Sleep:  Exercise: Yes  Days per week of moderate to strenuous exercise (like a brisk walk): 7  On average, minutes per day of exercise at this level: 20  How intense was your typical exercise? : Light (like stretching or slow walking)  Exercise Minutes per Week: 140  Inadequate activity/exercise: No  Significant changes in sleep pattern: No    Patient would like to increase her mobility to be able to leave her home. She is adhering to the home  exercise program that she was sent home with.     Transportation:  Transportation concerns: No  Transportation means: Regular car  Patient no longer drives herself.     Psychosocial:  Informal Support system: Family    Patient notes some concerns about the amount of therapy she received at the TCU. She is glad that she was able to discharge and return home.      Financial/Insurance:   Financial/Insurance concerns: No     Resources and Interventions:  None at this time.   Equipment Currently Used at Home: walker, rolling    Advance Care Plan/Directive  Advanced Care Plans/Directives on file:: Yes  Type Advanced Care Plans/Directives: DNR/DNI  Advanced Care Plan/Directive Status: Considering Options     Goals:   Goals        General    Functional (pt-stated)     Notes - Note edited  5/7/2019  3:26 PM by Néstor Mei LGSW    1. Goal Statement: I want to be strong enough to leave my home.  Measure of Success: Ability to attend appointment with PCP.   Supportive Steps to Achieve: Continue HEP, schedule appointment and transportation, attend appointment.  Barriers: Unknown how long it will take for Pt's strength to be regained.  Strengths: Motivated to reach goal, adherent to exercise program.  Date to Achieve By: 7.1.2019  Patient expressed understanding of goal: Pt reports understanding and denies any additional questions or concerns at this times. DEB[facility]E CC engaged in AIDET communication during encounter.              1. As of today's date 5/7/2019 goal is met at 0 - 25%.   Goal Status:  Showing progress    Outreach Frequency: monthly    Plan: Patient will continue to her HEP. SW CC will follow-up in 3-4 weeks. Introduction to CC and Complex Care Plan mailed to Patient on this date.     JAIMIE Johnson  Clinic Care Coordinator  Ph. [phone]  [email or web address]

## 2019-05-10 ENCOUNTER — TELEPHONE (OUTPATIENT)
Dept: INTERNAL MEDICINE | Facility: CLINIC | Age: 84
End: 2019-05-10

## 2019-05-10 NOTE — TELEPHONE ENCOUNTER
patient calling to say her leg edema has gotten much worse since she came home from the TCU on 5/1/19. She hasnt come into the office as it it very hard for her to walk.  Has OFFICE VISIT scheduled 5/13/19.

## 2019-05-10 NOTE — TELEPHONE ENCOUNTER
Call to patient. States she had LE edema when she was in the hospital but states her legs were nearly normal at the time of discharge. States since discharge the edema had increased some but states in the past 24 hours the edema seems to have increased even more. States the swelling is worst in both her thighs and there are bulges behind her knees. Reports that both thighs are equally swollen. Denies any pain or redness. Reports some edema in lower legs also. States she also has edema in her abdomen area. Patient does report constipation. States she has difficulty walking but is able to walk around her home with a walker minimally. Patient does report slight shortness of breath but not any different than normal. Advised OK to keep appointment on Monday but advised if it seems that the edema is continuing to significantly increase, if she develops any pain or redness in an area in her leg or has an increase in shortness of breath she should be seen in the ED prior to Monday. Patient agrees. Routed to primary care provider as MONCHO.

## 2019-05-11 ENCOUNTER — APPOINTMENT (OUTPATIENT)
Dept: ULTRASOUND IMAGING | Facility: CLINIC | Age: 84
End: 2019-05-11
Attending: INTERNAL MEDICINE
Payer: COMMERCIAL

## 2019-05-11 ENCOUNTER — HOSPITAL ENCOUNTER (OUTPATIENT)
Facility: CLINIC | Age: 84
Setting detail: OBSERVATION
Discharge: HOME | End: 2019-05-13
Attending: EMERGENCY MEDICINE | Admitting: INTERNAL MEDICINE
Payer: COMMERCIAL

## 2019-05-11 ENCOUNTER — APPOINTMENT (OUTPATIENT)
Dept: GENERAL RADIOLOGY | Facility: CLINIC | Age: 84
End: 2019-05-11
Attending: EMERGENCY MEDICINE
Payer: COMMERCIAL

## 2019-05-11 ENCOUNTER — APPOINTMENT (OUTPATIENT)
Dept: CARDIOLOGY | Facility: CLINIC | Age: 84
End: 2019-05-11
Attending: INTERNAL MEDICINE
Payer: COMMERCIAL

## 2019-05-11 DIAGNOSIS — M79.651 PAIN OF RIGHT THIGH: ICD-10-CM

## 2019-05-11 DIAGNOSIS — J96.01 ACUTE RESPIRATORY FAILURE WITH HYPOXIA (H): ICD-10-CM

## 2019-05-11 DIAGNOSIS — R60.0 BILATERAL LOWER EXTREMITY EDEMA: ICD-10-CM

## 2019-05-11 DIAGNOSIS — I26.99 OTHER ACUTE PULMONARY EMBOLISM WITHOUT ACUTE COR PULMONALE (H): ICD-10-CM

## 2019-05-11 DIAGNOSIS — J44.1 COPD EXACERBATION (H): Primary | ICD-10-CM

## 2019-05-11 DIAGNOSIS — D72.829 LEUKOCYTOSIS, UNSPECIFIED TYPE: ICD-10-CM

## 2019-05-11 PROBLEM — R09.02 HYPOXIA: Status: ACTIVE | Noted: 2019-05-11

## 2019-05-11 LAB
ALBUMIN UR-MCNC: NEGATIVE MG/DL
ANION GAP SERPL CALCULATED.3IONS-SCNC: 10 MMOL/L (ref 3–14)
APPEARANCE UR: CLEAR
BASOPHILS # BLD AUTO: 0 10E9/L (ref 0–0.2)
BASOPHILS NFR BLD AUTO: 0.1 %
BILIRUB UR QL STRIP: NEGATIVE
BUN SERPL-MCNC: 19 MG/DL (ref 7–30)
CA-I BLD-MCNC: 4.7 MG/DL (ref 4.4–5.2)
CALCIUM SERPL-MCNC: 8.5 MG/DL (ref 8.5–10.1)
CHLORIDE SERPL-SCNC: 104 MMOL/L (ref 94–109)
CO2 SERPL-SCNC: 28 MMOL/L (ref 20–32)
COLOR UR AUTO: YELLOW
CREAT SERPL-MCNC: 0.74 MG/DL (ref 0.52–1.04)
DIFFERENTIAL METHOD BLD: ABNORMAL
EOSINOPHIL # BLD AUTO: 0.1 10E9/L (ref 0–0.7)
EOSINOPHIL NFR BLD AUTO: 0.6 %
ERYTHROCYTE [DISTWIDTH] IN BLOOD BY AUTOMATED COUNT: 14.3 % (ref 10–15)
GFR SERPL CREATININE-BSD FRML MDRD: 76 ML/MIN/{1.73_M2}
GLUCOSE SERPL-MCNC: 108 MG/DL (ref 70–99)
GLUCOSE UR STRIP-MCNC: NEGATIVE MG/DL
HCT VFR BLD AUTO: 39.9 % (ref 35–47)
HGB BLD-MCNC: 12.9 G/DL (ref 11.7–15.7)
HGB UR QL STRIP: NEGATIVE
IMM GRANULOCYTES # BLD: 0.1 10E9/L (ref 0–0.4)
IMM GRANULOCYTES NFR BLD: 0.5 %
INTERPRETATION ECG - MUSE: NORMAL
KETONES UR STRIP-MCNC: NEGATIVE MG/DL
LACTATE BLD-SCNC: 1 MMOL/L (ref 0.7–2)
LEUKOCYTE ESTERASE UR QL STRIP: NEGATIVE
LYMPHOCYTES # BLD AUTO: 2.3 10E9/L (ref 0.8–5.3)
LYMPHOCYTES NFR BLD AUTO: 14 %
MAGNESIUM SERPL-MCNC: 1.8 MG/DL (ref 1.6–2.3)
MCH RBC QN AUTO: 29.6 PG (ref 26.5–33)
MCHC RBC AUTO-ENTMCNC: 32.3 G/DL (ref 31.5–36.5)
MCV RBC AUTO: 92 FL (ref 78–100)
MONOCYTES # BLD AUTO: 1.5 10E9/L (ref 0–1.3)
MONOCYTES NFR BLD AUTO: 9.3 %
MUCOUS THREADS #/AREA URNS LPF: PRESENT /LPF
NEUTROPHILS # BLD AUTO: 12.1 10E9/L (ref 1.6–8.3)
NEUTROPHILS NFR BLD AUTO: 75.5 %
NITRATE UR QL: NEGATIVE
NRBC # BLD AUTO: 0 10*3/UL
NRBC BLD AUTO-RTO: 0 /100
NT-PROBNP SERPL-MCNC: 144 PG/ML (ref 0–1800)
PH UR STRIP: 5.5 PH (ref 5–7)
PLATELET # BLD AUTO: 235 10E9/L (ref 150–450)
POTASSIUM SERPL-SCNC: 3.4 MMOL/L (ref 3.4–5.3)
POTASSIUM SERPL-SCNC: 3.4 MMOL/L (ref 3.4–5.3)
RBC # BLD AUTO: 4.36 10E12/L (ref 3.8–5.2)
RBC #/AREA URNS AUTO: <1 /HPF (ref 0–2)
SODIUM SERPL-SCNC: 142 MMOL/L (ref 133–144)
SOURCE: ABNORMAL
SP GR UR STRIP: 1.02 (ref 1–1.03)
TROPONIN I SERPL-MCNC: <0.015 UG/L (ref 0–0.04)
UROBILINOGEN UR STRIP-MCNC: NORMAL MG/DL (ref 0–2)
WBC # BLD AUTO: 16.1 10E9/L (ref 4–11)
WBC #/AREA URNS AUTO: 1 /HPF (ref 0–5)

## 2019-05-11 PROCEDURE — 36415 COLL VENOUS BLD VENIPUNCTURE: CPT | Performed by: EMERGENCY MEDICINE

## 2019-05-11 PROCEDURE — G0378 HOSPITAL OBSERVATION PER HR: HCPCS

## 2019-05-11 PROCEDURE — 25500064 ZZH RX 255 OP 636: Performed by: INTERNAL MEDICINE

## 2019-05-11 PROCEDURE — 94640 AIRWAY INHALATION TREATMENT: CPT

## 2019-05-11 PROCEDURE — 83735 ASSAY OF MAGNESIUM: CPT | Performed by: INTERNAL MEDICINE

## 2019-05-11 PROCEDURE — 81001 URINALYSIS AUTO W/SCOPE: CPT | Performed by: INTERNAL MEDICINE

## 2019-05-11 PROCEDURE — 93005 ELECTROCARDIOGRAM TRACING: CPT

## 2019-05-11 PROCEDURE — 83880 ASSAY OF NATRIURETIC PEPTIDE: CPT | Performed by: EMERGENCY MEDICINE

## 2019-05-11 PROCEDURE — 25000132 ZZH RX MED GY IP 250 OP 250 PS 637: Performed by: INTERNAL MEDICINE

## 2019-05-11 PROCEDURE — 84132 ASSAY OF SERUM POTASSIUM: CPT | Mod: 91 | Performed by: INTERNAL MEDICINE

## 2019-05-11 PROCEDURE — 96374 THER/PROPH/DIAG INJ IV PUSH: CPT

## 2019-05-11 PROCEDURE — 71046 X-RAY EXAM CHEST 2 VIEWS: CPT

## 2019-05-11 PROCEDURE — 25000125 ZZHC RX 250: Performed by: INTERNAL MEDICINE

## 2019-05-11 PROCEDURE — 93970 EXTREMITY STUDY: CPT

## 2019-05-11 PROCEDURE — 25000132 ZZH RX MED GY IP 250 OP 250 PS 637: Performed by: EMERGENCY MEDICINE

## 2019-05-11 PROCEDURE — 99285 EMERGENCY DEPT VISIT HI MDM: CPT | Mod: 25

## 2019-05-11 PROCEDURE — 96372 THER/PROPH/DIAG INJ SC/IM: CPT

## 2019-05-11 PROCEDURE — 25000128 H RX IP 250 OP 636: Performed by: EMERGENCY MEDICINE

## 2019-05-11 PROCEDURE — 25000131 ZZH RX MED GY IP 250 OP 636 PS 637: Performed by: INTERNAL MEDICINE

## 2019-05-11 PROCEDURE — 83605 ASSAY OF LACTIC ACID: CPT | Performed by: INTERNAL MEDICINE

## 2019-05-11 PROCEDURE — 40000264 ECHOCARDIOGRAM COMPLETE

## 2019-05-11 PROCEDURE — 36415 COLL VENOUS BLD VENIPUNCTURE: CPT | Performed by: INTERNAL MEDICINE

## 2019-05-11 PROCEDURE — 82330 ASSAY OF CALCIUM: CPT | Performed by: INTERNAL MEDICINE

## 2019-05-11 PROCEDURE — 84484 ASSAY OF TROPONIN QUANT: CPT | Performed by: EMERGENCY MEDICINE

## 2019-05-11 PROCEDURE — 80048 BASIC METABOLIC PNL TOTAL CA: CPT | Performed by: EMERGENCY MEDICINE

## 2019-05-11 PROCEDURE — 25000125 ZZHC RX 250: Performed by: EMERGENCY MEDICINE

## 2019-05-11 PROCEDURE — 85025 COMPLETE CBC W/AUTO DIFF WBC: CPT | Performed by: EMERGENCY MEDICINE

## 2019-05-11 PROCEDURE — 93306 TTE W/DOPPLER COMPLETE: CPT | Mod: 26 | Performed by: INTERNAL MEDICINE

## 2019-05-11 RX ORDER — FUROSEMIDE 10 MG/ML
40 INJECTION INTRAMUSCULAR; INTRAVENOUS ONCE
Status: COMPLETED | OUTPATIENT
Start: 2019-05-11 | End: 2019-05-11

## 2019-05-11 RX ORDER — LOSARTAN POTASSIUM 100 MG/1
100 TABLET ORAL DAILY
Status: DISCONTINUED | OUTPATIENT
Start: 2019-05-11 | End: 2019-05-13 | Stop reason: HOSPADM

## 2019-05-11 RX ORDER — PREDNISONE 20 MG/1
40 TABLET ORAL DAILY
Status: DISCONTINUED | OUTPATIENT
Start: 2019-05-11 | End: 2019-05-13 | Stop reason: HOSPADM

## 2019-05-11 RX ORDER — IPRATROPIUM BROMIDE AND ALBUTEROL SULFATE 2.5; .5 MG/3ML; MG/3ML
1 SOLUTION RESPIRATORY (INHALATION) EVERY 6 HOURS PRN
Status: DISCONTINUED | OUTPATIENT
Start: 2019-05-11 | End: 2019-05-13 | Stop reason: HOSPADM

## 2019-05-11 RX ORDER — PROCHLORPERAZINE MALEATE 5 MG
5 TABLET ORAL EVERY 6 HOURS PRN
Status: DISCONTINUED | OUTPATIENT
Start: 2019-05-11 | End: 2019-05-13 | Stop reason: HOSPADM

## 2019-05-11 RX ORDER — ONDANSETRON 4 MG/1
4 TABLET, ORALLY DISINTEGRATING ORAL EVERY 6 HOURS PRN
Status: DISCONTINUED | OUTPATIENT
Start: 2019-05-11 | End: 2019-05-13 | Stop reason: HOSPADM

## 2019-05-11 RX ORDER — FLUTICASONE PROPIONATE 50 MCG
1-2 SPRAY, SUSPENSION (ML) NASAL DAILY
Status: DISCONTINUED | OUTPATIENT
Start: 2019-05-11 | End: 2019-05-13 | Stop reason: HOSPADM

## 2019-05-11 RX ORDER — AMOXICILLIN 250 MG
1 CAPSULE ORAL 2 TIMES DAILY PRN
Status: DISCONTINUED | OUTPATIENT
Start: 2019-05-11 | End: 2019-05-13 | Stop reason: HOSPADM

## 2019-05-11 RX ORDER — FUROSEMIDE 40 MG
40 TABLET ORAL DAILY
Status: DISCONTINUED | OUTPATIENT
Start: 2019-05-11 | End: 2019-05-12

## 2019-05-11 RX ORDER — LEVOTHYROXINE SODIUM 50 UG/1
50 TABLET ORAL DAILY
Status: DISCONTINUED | OUTPATIENT
Start: 2019-05-11 | End: 2019-05-13 | Stop reason: HOSPADM

## 2019-05-11 RX ORDER — POTASSIUM CHLORIDE 7.45 MG/ML
10 INJECTION INTRAVENOUS
Status: DISCONTINUED | OUTPATIENT
Start: 2019-05-11 | End: 2019-05-13 | Stop reason: HOSPADM

## 2019-05-11 RX ORDER — POTASSIUM CHLORIDE 1.5 G/1.58G
20-40 POWDER, FOR SOLUTION ORAL
Status: DISCONTINUED | OUTPATIENT
Start: 2019-05-11 | End: 2019-05-13 | Stop reason: HOSPADM

## 2019-05-11 RX ORDER — PRAVASTATIN SODIUM 20 MG
20 TABLET ORAL DAILY
Status: DISCONTINUED | OUTPATIENT
Start: 2019-05-11 | End: 2019-05-13 | Stop reason: HOSPADM

## 2019-05-11 RX ORDER — AMLODIPINE BESYLATE 5 MG/1
5 TABLET ORAL DAILY
Status: DISCONTINUED | OUTPATIENT
Start: 2019-05-11 | End: 2019-05-13 | Stop reason: HOSPADM

## 2019-05-11 RX ORDER — PROCHLORPERAZINE 25 MG
12.5 SUPPOSITORY, RECTAL RECTAL EVERY 12 HOURS PRN
Status: DISCONTINUED | OUTPATIENT
Start: 2019-05-11 | End: 2019-05-13 | Stop reason: HOSPADM

## 2019-05-11 RX ORDER — IPRATROPIUM BROMIDE AND ALBUTEROL SULFATE 2.5; .5 MG/3ML; MG/3ML
3 SOLUTION RESPIRATORY (INHALATION) EVERY 4 HOURS PRN
Status: DISCONTINUED | OUTPATIENT
Start: 2019-05-11 | End: 2019-05-11

## 2019-05-11 RX ORDER — POTASSIUM CHLORIDE 29.8 MG/ML
20 INJECTION INTRAVENOUS
Status: DISCONTINUED | OUTPATIENT
Start: 2019-05-11 | End: 2019-05-13 | Stop reason: HOSPADM

## 2019-05-11 RX ORDER — ASCORBIC ACID 500 MG
500 TABLET ORAL DAILY
Status: DISCONTINUED | OUTPATIENT
Start: 2019-05-11 | End: 2019-05-11

## 2019-05-11 RX ORDER — POTASSIUM CHLORIDE 1500 MG/1
20-40 TABLET, EXTENDED RELEASE ORAL
Status: DISCONTINUED | OUTPATIENT
Start: 2019-05-11 | End: 2019-05-13 | Stop reason: HOSPADM

## 2019-05-11 RX ORDER — ONDANSETRON 2 MG/ML
4 INJECTION INTRAMUSCULAR; INTRAVENOUS EVERY 6 HOURS PRN
Status: DISCONTINUED | OUTPATIENT
Start: 2019-05-11 | End: 2019-05-13 | Stop reason: HOSPADM

## 2019-05-11 RX ORDER — TRAMADOL HYDROCHLORIDE 50 MG/1
50 TABLET ORAL ONCE
Status: COMPLETED | OUTPATIENT
Start: 2019-05-11 | End: 2019-05-11

## 2019-05-11 RX ORDER — POTASSIUM CL/LIDO/0.9 % NACL 10MEQ/0.1L
10 INTRAVENOUS SOLUTION, PIGGYBACK (ML) INTRAVENOUS
Status: DISCONTINUED | OUTPATIENT
Start: 2019-05-11 | End: 2019-05-13 | Stop reason: HOSPADM

## 2019-05-11 RX ORDER — MAGNESIUM SULFATE HEPTAHYDRATE 40 MG/ML
4 INJECTION, SOLUTION INTRAVENOUS EVERY 4 HOURS PRN
Status: DISCONTINUED | OUTPATIENT
Start: 2019-05-11 | End: 2019-05-13 | Stop reason: HOSPADM

## 2019-05-11 RX ORDER — ACETAMINOPHEN 650 MG/1
650 SUPPOSITORY RECTAL EVERY 4 HOURS PRN
Status: DISCONTINUED | OUTPATIENT
Start: 2019-05-11 | End: 2019-05-13 | Stop reason: HOSPADM

## 2019-05-11 RX ORDER — IPRATROPIUM BROMIDE AND ALBUTEROL SULFATE 2.5; .5 MG/3ML; MG/3ML
3 SOLUTION RESPIRATORY (INHALATION) ONCE
Status: COMPLETED | OUTPATIENT
Start: 2019-05-11 | End: 2019-05-11

## 2019-05-11 RX ORDER — FUROSEMIDE 40 MG
40 TABLET ORAL DAILY
Status: DISCONTINUED | OUTPATIENT
Start: 2019-05-11 | End: 2019-05-11

## 2019-05-11 RX ORDER — NALOXONE HYDROCHLORIDE 0.4 MG/ML
.1-.4 INJECTION, SOLUTION INTRAMUSCULAR; INTRAVENOUS; SUBCUTANEOUS
Status: DISCONTINUED | OUTPATIENT
Start: 2019-05-11 | End: 2019-05-13 | Stop reason: HOSPADM

## 2019-05-11 RX ORDER — TERAZOSIN 1 MG/1
2 CAPSULE ORAL AT BEDTIME
Status: DISCONTINUED | OUTPATIENT
Start: 2019-05-11 | End: 2019-05-13 | Stop reason: HOSPADM

## 2019-05-11 RX ORDER — TIOTROPIUM BROMIDE 18 UG/1
18 CAPSULE ORAL; RESPIRATORY (INHALATION) DAILY
Status: DISCONTINUED | OUTPATIENT
Start: 2019-05-11 | End: 2019-05-13 | Stop reason: HOSPADM

## 2019-05-11 RX ORDER — AMOXICILLIN 250 MG
2 CAPSULE ORAL 2 TIMES DAILY PRN
Status: DISCONTINUED | OUTPATIENT
Start: 2019-05-11 | End: 2019-05-13 | Stop reason: HOSPADM

## 2019-05-11 RX ORDER — POLYETHYLENE GLYCOL 3350 17 G/17G
17 POWDER, FOR SOLUTION ORAL DAILY PRN
Status: DISCONTINUED | OUTPATIENT
Start: 2019-05-11 | End: 2019-05-13 | Stop reason: HOSPADM

## 2019-05-11 RX ORDER — BUDESONIDE AND FORMOTEROL FUMARATE DIHYDRATE 160; 4.5 UG/1; UG/1
2 AEROSOL RESPIRATORY (INHALATION) 2 TIMES DAILY
Status: DISCONTINUED | OUTPATIENT
Start: 2019-05-11 | End: 2019-05-13 | Stop reason: HOSPADM

## 2019-05-11 RX ORDER — ACETAMINOPHEN 325 MG/1
650 TABLET ORAL EVERY 4 HOURS PRN
Status: DISCONTINUED | OUTPATIENT
Start: 2019-05-11 | End: 2019-05-13 | Stop reason: HOSPADM

## 2019-05-11 RX ORDER — ATENOLOL 50 MG/1
100 TABLET ORAL DAILY
Status: DISCONTINUED | OUTPATIENT
Start: 2019-05-12 | End: 2019-05-13 | Stop reason: HOSPADM

## 2019-05-11 RX ADMIN — TERAZOSIN HYDROCHLORIDE 2 MG: 1 CAPSULE ORAL at 20:55

## 2019-05-11 RX ADMIN — IPRATROPIUM BROMIDE AND ALBUTEROL SULFATE 3 ML: .5; 3 SOLUTION RESPIRATORY (INHALATION) at 03:05

## 2019-05-11 RX ADMIN — FUROSEMIDE 40 MG: 10 INJECTION, SOLUTION INTRAVENOUS at 04:36

## 2019-05-11 RX ADMIN — ACETAMINOPHEN 650 MG: 325 TABLET, FILM COATED ORAL at 06:27

## 2019-05-11 RX ADMIN — PREDNISONE 40 MG: 20 TABLET ORAL at 10:04

## 2019-05-11 RX ADMIN — VITAMIN D, TAB 1000IU (100/BT) 2000 UNITS: 25 TAB at 11:16

## 2019-05-11 RX ADMIN — APIXABAN 5 MG: 5 TABLET, FILM COATED ORAL at 20:47

## 2019-05-11 RX ADMIN — MICONAZOLE NITRATE: 2 POWDER TOPICAL at 12:51

## 2019-05-11 RX ADMIN — TRAMADOL HYDROCHLORIDE 50 MG: 50 TABLET, COATED ORAL at 04:41

## 2019-05-11 RX ADMIN — HUMAN ALBUMIN MICROSPHERES AND PERFLUTREN 3 ML: 10; .22 INJECTION, SOLUTION INTRAVENOUS at 10:46

## 2019-05-11 RX ADMIN — FLUTICASONE PROPIONATE 2 SPRAY: 50 SPRAY, METERED NASAL at 12:51

## 2019-05-11 RX ADMIN — LEVOTHYROXINE SODIUM 50 MCG: 50 TABLET ORAL at 11:40

## 2019-05-11 RX ADMIN — POTASSIUM CHLORIDE 20 MEQ: 1.5 POWDER, FOR SOLUTION ORAL at 20:46

## 2019-05-11 RX ADMIN — AMLODIPINE BESYLATE 5 MG: 5 TABLET ORAL at 11:15

## 2019-05-11 RX ADMIN — PRAVASTATIN SODIUM 20 MG: 20 TABLET ORAL at 11:16

## 2019-05-11 RX ADMIN — POTASSIUM CHLORIDE 20 MEQ: 1.5 POWDER, FOR SOLUTION ORAL at 09:58

## 2019-05-11 RX ADMIN — ACETAMINOPHEN 650 MG: 325 TABLET, FILM COATED ORAL at 11:14

## 2019-05-11 RX ADMIN — TRAMADOL HYDROCHLORIDE 25 MG: 50 TABLET ORAL at 20:55

## 2019-05-11 RX ADMIN — LOSARTAN POTASSIUM 100 MG: 100 TABLET ORAL at 11:15

## 2019-05-11 RX ADMIN — Medication 2 G: at 20:47

## 2019-05-11 RX ADMIN — ENOXAPARIN SODIUM 100 MG: 100 INJECTION SUBCUTANEOUS at 05:26

## 2019-05-11 RX ADMIN — ACETAMINOPHEN 650 MG: 325 TABLET, FILM COATED ORAL at 15:27

## 2019-05-11 RX ADMIN — FUROSEMIDE 40 MG: 40 TABLET ORAL at 09:58

## 2019-05-11 ASSESSMENT — ENCOUNTER SYMPTOMS
ABDOMINAL PAIN: 0
FEVER: 0
NAUSEA: 0

## 2019-05-11 NOTE — ED NOTES
"Patient has redness and bilateral swelling to lower legs, patient appears to be unbathed with one single maxi pad in place saturated with urine. Patient has foul body odor but states she \"is able to bath herself\". Patient's mouth very dry with cracked lips. Buttock/coccyx area red prior to arrival/patient states she is often wet with urine.   "

## 2019-05-11 NOTE — PROGRESS NOTES
Patient admitted earlier this morning.  She is already feeling significantly better after IV Lasix in the ER.  SOB improved but still present.  Plan for TTE today.  Agree with plan outlined in H&P by Dr. Walker.    Miranda Pat MD     Addendum: LLE US was positive for DVT.  The patient does not believe that she was taking Eliquis after she discharged from TCU.  She was supposed to be on this for a PE.  This is NOT a failure of the medication as she was not taking it.  Resume Eliquis this evening.

## 2019-05-11 NOTE — H&P
Admitted:     05/11/2019      CHIEF COMPLAINT:  Right leg pain and shortness of breath.      HISTORY OF PRESENT ILLNESS:  Marino Prajapati is an 83-year-old female with a significant past medical history including hypertension, hyperlipidemia, colonic polyp, anemia, recent history of pulmonary embolism diagnosed on 04/09/2019, admitted to this hospital and discharged on 4/12/2019 on Eliquis to transitional care.  The patient was discharged home 10 days ago from [facility].  She stated she was not sure if she is taking Eliquis after she was discharged from the transitional care unit.      The patient was relatively at her baseline state of health until 2 days ago, when she noticed progressively worsening swelling of her lower extremities, both right and left. The patient started to have right leg pain yesterday, more around her thigh.  She lives with her significant other and takes her medication by herself> She did not notice any new medication after she was discharged from the transitional care unit, and concern is if she was not taking her Eliquis.  The patient denied any significant shortness of breath at home but stated today she felt mild shortness of breath.  In the emergency room here, she was hypoxic, oxygen saturation 89%, and placed on oxygen and given a dose of IV Lasix.  The patient has underlying chronic bronchitis and cough, with some underlying shortness of breath with exertion and stated this is not new for her, unchanged.  The patient denied any fever or any chills.  She denied any chest pain, no palpitation, no dizziness, no hemoptysis.      PAST MEDICAL HISTORY:   1.  PE diagnosed on last admission.   2.  Previous history of DVT.   3.  Gastroesophageal reflux disease.   4.  Hyperlipidemia.   5.  Nephrolithiasis.   6.  Osteoporosis.   7.  Osteoarthritis.   8.  COPD.   9.  History of decubitus ulcer.   10.  Skin candidiasis.      PAST SURGICAL HISTORY:  Includes right total hip  arthroplasty, bilateral total knee arthroplasty, cholecystectomy, ureteral lithotripsy, liposuction, vein stripping.      FAMILY HISTORY:  Reviewed and includes breast cancer for mother and blood clot in her father.      SOCIAL HISTORY:  The patient resides with significant other for 20 years.  She was recently discharged from [facility], who sent her home for the last 10 days.  She quit smoking in 1966.  She rarely drinks alcohol.      REVIEW OF SYSTEMS:  Ten points reviewed; all are negative except those mentioned in history of present illness.      HOME MEDICATIONS:  Needs to be reviewed by the pharmacy prior to reconciliation,includes.   Prior to Admission Medications   Prescriptions Last Dose Informant Patient Reported? Taking?   Coenzyme Q10 (COQ10) 30 MG CAPS unknown  Yes Yes   Sig: Take 1 capsule by mouth daily as needed    Cranberry Extract 250 MG TABS unknown  Yes No   Sig: Take 1 tablet by mouth daily as needed    Multiple Vitamins-Minerals (MULTIVITAMIN & MINERAL PO) Past days Self Yes Yes   Sig: Take 1 tablet by mouth daily.   Nutritional Supplements (SALMON OIL) CAPS Past days  Yes Yes   Sig: Take 1 capsule by mouth daily    acetaminophen (TYLENOL) 325 MG tablet unknown  Yes Yes   Sig: Take 650 mg by mouth 4 times daily AND Give 650 mg by mouth every 12 hours as needed for back pain   albuterol (PROAIR HFA/PROVENTIL HFA/VENTOLIN HFA) 108 (90 Base) MCG/ACT Inhaler 5/10/2019 at Unknown time  No Yes   Sig: Inhale 2 puffs into the lungs every 6 hours as needed for shortness of breath / dyspnea or wheezing   amLODIPine (NORVASC) 10 MG tablet 5/10/2019 at Unknown time  No Yes   Sig: Take 0.5 tablets (5 mg) by mouth daily   atenolol (TENORMIN) 100 MG tablet Unknown at Unknown time  No No   Sig: Take 1 tablet (100 mg) by mouth daily   budesonide-formoterol (SYMBICORT) 160-4.5 MCG/ACT Inhaler 5/10/2019 at Unknown time  No Yes   Sig: Inhale 2 puffs into the lungs 2 times daily   cholecalciferol (VITAMIN D) 1000 UNIT  tablet Past days  Yes Yes   Sig: Take 2 tablets (2,000 Units) by mouth daily   fluticasone (FLONASE) 50 MCG/ACT nasal spray 5/10/2019 at Unknown time  No Yes   Sig: Spray 1-2 sprays into both nostrils daily   furosemide (LASIX) 40 MG tablet 5/10/2019 at Unknown time  Yes Yes   Sig: Take 20 mg by mouth daily as needed    ipratropium - albuterol 0.5 mg/2.5 mg/3 mL (DUONEB) 0.5-2.5 (3) MG/3ML neb solution 5/10/2019 at Unknown time  No Yes   Sig: Take 1 vial (3 mLs) by nebulization every 6 hours as needed for shortness of breath / dyspnea or wheezing   levothyroxine (SYNTHROID/LEVOTHROID) 50 MCG tablet More than a month at Never used time  Yes No   Sig: Take 50 mcg by mouth daily   losartan (COZAAR) 100 MG tablet 5/10/2019 at Unknown time  No Yes   Sig: Take 1 tablet (100 mg) by mouth daily   menthol (ICY HOT) 5 % PTCH unknown  Yes Yes   Sig: Apply 1 patch topically 2 times daily as needed    omeprazole 20 MG tablet 5/10/2019 at Unknown time  No Yes   Sig: Take 1 tablet (20 mg) by mouth daily Take 30-60 minutes before a meal.   order for DME   No No   Sig: Equipment being ordered: Nebulizer and neb supplies (tubing, etc)   order for DME   No No   Sig: Equipment being ordered: 1: Gradient Compression Wraps; 2: BLE knee high 20-30 mm Hg compression stockings; 3: BLE thigh high 20-30 mm Hg compression stockings; 4: Cast Boots   order for DME   No No   Sig: Equipment being ordered: 1: Custom/alternative BLE full leg velco/buckling compression garment   order for DME   No No   Sig: Equipment being ordered: 4 wheeled walker with hand brakes and seat   pravastatin (PRAVACHOL) 20 MG tablet 5/10/2019 at Unknown time  No Yes   Sig: Take 1 tablet (20 mg) by mouth daily   sennosides (SENOKOT) 8.6 MG tablet Unknown at Unknown time  No No   Sig: Take 1 tablet by mouth 2 times daily   terazosin (HYTRIN) 2 MG capsule 5/10/2019 at Unknown time  No Yes   Sig: Take 1 capsule (2 mg) by mouth At Bedtime   tiotropium (SPIRIVA HANDIHALER) 18  MCG inhaled capsule 5/10/2019 at Unknown time  No Yes   Sig: Inhale contents of one capsule daily.   traMADol (ULTRAM) 50 MG tablet 5/10/2019 at Unknown time  No Yes   Sig: TAKE 1 TABLET BY MOUTH TWICE DAILY AS NEEDED FOR PAIN   traMADol (ULTRAM) 50 MG tablet   No No   Sig: Take 1 tablet (50 mg) by mouth 2 times daily as needed for severe pain      Facility-Administered Medications: None      ALLERGIES:  NO KNOWN DRUG ALLERGIES.      PHYSICAL EXAMINATION:   GENERAL:  The patient is awake, alert, oriented, not in distress, intermittently coughing.   VITAL SIGNS:  Blood pressure 128/54, pulse rate 89, temperature 98.0, oxygen saturation 92%.   HEENT:  Pink, nonicteric.  Extraocular muscle movement intact.   NECK:  Supple, no JVD, no thyromegaly.   CHEST:  Good air entry.  Anteriorly, there are basal decreased breath sounds.  No wheezing or crackles.   CARDIOVASCULAR:  S1 and S2 were heard, no gallop or murmur.   ABDOMEN:  Obese, soft, nontender, nondistended.  Surgical scar noted with right upper quadrant small hernia which is incisional.   EXTREMITIES:  Bilateral lower extremity swelling with hyperemia.  Slightly tender.  Does not seem to be cellulitis, but pitting edema, more on the right side.  There is no swelling or tenderness of the right thigh and normal range of motion.   PSYCHIATRIC:  Normal mood and affect, keeps eye contact, responds to questions appropriately.   NEUROLOGIC:  No focal neurologic deficit.  Cranial nerves grossly intact.  moves all her extremities.      DIAGNOSTIC TESTS OF INTEREST:  EKG showed a normal sinus rhythm at 92 beats per minute.  QTc is 457.  No ST-T wave change.  Chest x-ray showed persistent bibasilar atelectasis or scarring.      LABS:  Reviewed.  Electrolytes:  Sodium 142, potassium 3.4, BUN 19, creatinine 0.7, calcium 8.5, , troponin 0.015, glucose 108.  WBC 16.1, hemoglobin 12, platelet 235.  Urinalysis pending.      ASSESSMENT:  Marino Prajapati is an 83-year-old female  with recent history of a PE diagnosed on 2019, discharged to transitional care unit and was discharged back home about 10 days ago, not sure if she was taking her Eliquis, which needs to be verified and reviewed in the morning.  This time, came in with right thigh pain and some shortness of breath and being admitted to the hospital after observation for further evaluation and management.   1.  Bilateral lower extremity swelling with right thigh pain, rule out deep venous thrombosis.   2.  Recent history of pulmonary embolism.   3.  Acute hypoxic respiratory failure.   4.  Leukocytosis.   5.  Chronic obstructive pulmonary disease with mild exacerbation.   6.  Hypertension.   7.  Mild hypokalemia.      PLAN:  The patient is being admitted under observation for monitoring.  Her medication needs to be reviewed by Pharmacy.  Asking Dr. Aguilar from emergency room to give this patient Lovenox 1 mg per kg x1 and will continue on Lovenox until we sort out if this patient was taking her anticoagulation at home.  In the meantime, we will continue most of her home medications and bronchodilators.  We will start her on some steroids.  She got a dose of IV Lasix in the emergency room.  We will start her on oral 40 mg daily.  At home, the patient is on 20 mg as needed.  Titrate down oxygen and wean her off.  Currently, she is on 2 liters of oxygen.  PT and OT will evaluate the patient.  I expect this patient to be discharged in less than 2 days.  I discussed with the patient at length.  All her questions and concerns were addressed.      CODE STATUS:  In the event of cardiorespiratory arrest, she is DO NOT INTUBATE, DO NOT RESUSCITATE.         CRISS VASQUES MD             D: 2019   T: 2019   MT: JOSEPHINE      Name:     FER MILES   MRN:      [id number]        Account:      [id number]   :      1935        Admitted:     2019                   Document: [id number]

## 2019-05-11 NOTE — LETTER
Transition Communication Hand-off for Care Transitions to Next Level of Care Provider    Name: Marino Prajapati  : 1935  MRN #: [id number]  Primary Care Provider: Vivian Blue  Primary Care MD Name: Dr. Blue  Primary Clinic: [address] [facility] [address]  Primary Care Clinic Name: [facility]  Reason for Hospitalization:  Acute respiratory failure with hypoxia (H) [J96.01]  Admit Date/Time: 2019  2:17 AM  Discharge Date: 19  Payor Source: Payor: [facility] / Plan: [facility] MEDICARE / Product Type: HMO /     Readmission Assessment Measure (JANA) Risk Score/category: average           Reason for Communication Hand-off Referral: Admission diagnoses: COPD    Discharge Plan: home with SO       Concern for non-adherence with plan of care:   Y/N no  Discharge Needs Assessment:  Needs      Most Recent Value   Equipment Currently Used at Home  walker, rolling [ordered a wide double fww, has a std fww/ 4ww]          Already enrolled in Tele-monitoring program and name of program:  none  Follow-up specialty is recommended: No    Follow-up plan:  No future appointments.    Any outstanding tests or procedures:              Key Recommendations:  pt admitted with Acute PE hypoxia copd exacerbation. Pt uses inhalers and nebulizer's at home to help manage her copd. Pt states she has been dealing with a cough for quite a while. She was recently at [facility] for rehab and was discharged on . She states she did not have a very good experience there. COPD action plan was reviewed and given to the pt. Pt uses a walker with mobility and her significant other of 20 years drives her to doctor appointments.  Please follow up with her for new medication management in the setting of her PE.    Twila Arroyo    AVS/Discharge Summary is the source of truth; this is a helpful guide for improved communication of patient story

## 2019-05-11 NOTE — ED PROVIDER NOTES
"  History     Chief Complaint:  Right leg pain    HPI:   The history is provided by the patient.      Marino Prajapati is an 83 year old female with history of PE diagnosed 4/9/19 for which she was discharged to TCU on Eliquis, COPD, and HTN who presents with right thigh pain. After this recent admission, patient was discharged from [facility] 10 days ago. She was doing well at home where she resides with her long time significant other until 2 days ago. She states she then developed increased bilateral lower extremity swelling (\"they were half the size they are now\") and ultimately became concerned when she developed aching pain in the lateral aspect of her right thigh. She has had no trauma or fall. She took one of her oxycodone prior to arrival which improved, but did not resolve this pain. She has had no chest pain or fever. She feels mildly more short of breath as compared to baseline. She has a cough that is ongoing for quite some time and does not feel this is changed.     Allergies:  No known drug allergies      Medications:    Albuterol   Amlodipine    Eliquis  Atenolol   Symbicort inhaler   Fexofenadine   Flonase  Lasix  Levothyroxine   Losartan   Pravastatin   Sennosides  Terazosin   Spiriva HandiHaler   Tramadol     Past Medical History:    Anemia  Colon polyps  DVT  PE  GERD  Hypertension  Hyperlipidemia  Kidney stone  Osteoarthritis  Osteoporosis  COPD    Past Surgical History:    Right total hip arthroplasty   Bilateral total knee arthroplasty  Cholecystectomy   Ureter lithotripsy, inset stent   Liposuction, tummy   Strip vein    Family History:    Breast cancer: mother  Blood clots: father     Social History:  PCP: Vivian Blue   Resides with partner of 20 years, Josue.   Smoking status: former smoker, quit 1966 (29 pack years)  Alcohol use: positive, very rare use       Review of Systems   Constitutional: Negative for fever.   Respiratory: Positive for cough (unchanged) and " shortness of breath.    Cardiovascular: Positive for leg swelling. Negative for chest pain.   Gastrointestinal: Negative for abdominal pain and nausea.   Musculoskeletal:        Right lateral thigh pain   All other systems reviewed and are negative.    Physical Exam     Patient Vitals for the past 24 hrs:   BP Temp Temp src Pulse Heart Rate Resp SpO2 Weight   05/11/19 0530 -- -- -- -- 89 22 92 % --   05/11/19 0515 128/54 -- -- 93 92 24 -- --   05/11/19 0415 133/54 -- -- 93 94 22 91 % --   05/11/19 0300 135/58 -- -- 89 -- -- 91 % --   05/11/19 0233 -- -- -- -- -- -- -- 96.6 kg (213 lb)   05/11/19 0221 154/64 98.2  F (36.8  C) Oral -- 92 22 (!) 88 % --        Physical Exam  General: Well-developed and well-nourished. Well appearing elderly  woman. Cooperative. Frequent cough.  Head:  Atraumatic.  Eyes:  Conjunctivae, lids, and sclerae are normal.  ENT:    Normal nose. Moist mucous membranes.  Neck:  Supple. Normal range of motion.  CV:  Regular rate and rhythm. Normal heart sounds with no murmurs, rubs, or gallops detected.  Resp:  No respiratory distress. Coarse breath sounds throughout without diminished breath sounds or shaye wheezing.   GI:  Soft. Non-distended. Non-tender.    MS:  Normal ROM. 1+ bilateral lower extremity edema, symmetric. Mild tenderness to palpation over the lateral right thigh without skin changes, focal mass/edema, or bony tenderness.  Skin:  Warm. Non-diaphoretic. No pallor. Dependent erythema symmetric bilaterally at ankles.  Neuro: Awake. A&Ox3. Normal strength.  Psych:  Normal mood and affect. Normal speech.  Vitals reviewed.    Emergency Department Course     EKG  Indication: leg pain and swelling  Time: 0240  Rate 92 bpm. AZ interval 138. QRS duration 84. QT/QTc 370/457.   Normal sinus rhythm   No acute ST changes.  No change as compared to prior, dated 4/9/19.     Imaging:  Radiographic findings were communicated with the patient who voiced understanding of the findings.    XR  "Chest 2 Views  Impression: Persistent bibasilar atelectasis or scarring.  As read by Radiology.     Laboratory:  CBC: WBC 16.1, o/w WNL (HGB 12.9, )   BMP: glucose 108, o/w WNL (Creatinine 0.74)  BNP: 144  Troponin I: <0.015  UA: Pending     Interventions:  0305: Duoneb, 3 mL, nebulization      0436: Lasix 40 mg, IV  0441: Tramadol 50 mg, PO  0526: Lovenox 100 mg, subcutaneous     Emergency Department Course:  The patient arrived in the emergency department via EMS.   Past medical records, nursing notes, and vitals reviewed.  0216: I performed an exam of the patient and obtained history, as documented above.   IV started and blood drawn for laboratory testing. Results are as above.      0230: I spoke with the patient's partner over the phone, and he confirms that the patient's discharge paperwork from [facility] has Eliquis listed.     0404: I rechecked the patient. Explained findings to the patient. The patient felt her breathing was somewhat improved with breathing treatment, however she was hypoxic on 2L of oxygen and was increased to 3L. She is amenable to admission.    I discussed the case with Dr. Carlton who accepts patient to a cardiac telemetry bed for further monitoring, evaluation, and treatment.      0457: I spoke with Dr. Walker, who has been in to see the patient. Patient is quite sure she has not been on Eliquis since discharge from [facility]. Possibly she did not pick this up at the pharmacy.    Findings and plan explained to the patient who consents to admission.     Impression & Plan      Medical Decision Making:  Marino is an 83-year-old female recently diagnosed with PE and started on Eliquis who was discharged from rehab about 10 days ago who is presenting with right thigh pain.  She reports her legs were \"half the size as this\" 2 days ago and she developed lateral right thigh pain tonight which prompted her concern.  She does report shortness of breath as well as cough though she states this " cough is chronic.  Patient is hypoxic on room air to 88% with improvement on 2 L to 89% and ultimately requiring 3 L of oxygen.  She is in no respiratory distress.  EKG is reassuring without acute ST changes or arrhythmias.  Her exam reveals no abnormalities in the area of aching pain on her lateral right thigh.  However, she does have at least 1+ lower extremity edema to the lower legs.  There is dependent, symmetric erythema not consistent with cellulitis.  The edema is symmetric as well, making DVT unlikely particularly if she has been on Eliquis as [facility] discharge paperwork suggests.  Lungs are coarse without wheezing.  Laboratory studies are generally reassuring without anemia, kidney injury, or significant electrolyte disturbances.  Troponin is undetectable and she has had no chest pain, making ACS highly unlikely.  She does have a leukocytosis to 16.1 of uncertain clinical significance as her cough appears to be chronic and chest x-ray does not reveal evidence of pneumonia.  Although she does appear somewhat volume overloaded on exam with lower extremity edema, she does not have evidence of pulmonary edema and BNP is normal.  Patient was given a DuoNeb which she thinks may have helped her cough though she continues to cough pretty frequently on repeat examination.  She has no wheezing and this does not appear to be a COPD exacerbation and steroids are not indicated in my opinion.  I suspect patient's shortness of breath and hypoxia are related to volume overloaded status given her primary complaint of increased leg swelling over the last 2 days.  She has no recent echocardiogram to evaluate her systolic or diastolic function though she does have a prescription for Lasix.  This is written as needed and I am uncertain how often she is using this.  I have initiated diuresis with 40 mg of IV Lasix and discussed with patient the need for admission for further monitoring, work-up, and treatment.  All of her  questions were answered and she verbalized understanding and is amenable.  I discussed patient's case with Dr. Walker, hospitalist, who accepts admission.  After discussion with patient further she is quite convinced she has not been on Eliquis for the last 10 days since discharge from [facility] despite it being on her medication list which I went over with her significant other by phone.  Dr. Walkre is recommending 1 mg/kg of Lovenox to cover her as she reports she has not been using Eliquis and possibly never picked this medication up from pharmacy.  This was administered in the ER. He also requests urinalysis which was ordered but we did not obtain this in the emergency department and is pending upon transfer.  Patient was transferred to the floor in stable condition with supplemental oxygen.    Diagnosis:    ICD-10-CM    1. Acute respiratory failure with hypoxia (H) J96.01    2. Bilateral lower extremity edema R60.0    3. Pain of right thigh M79.651    4. Leukocytosis, unspecified type D72.829        Disposition:  Admitted to hospital, cardiac telemetry bed    I, Megan Beh, ewelina serving as a scribe at 2:16 AM on 5/11/2019 to document services personally performed by Mercy Aguilar MD based on my observations and the provider's statements to me.      Megan Beh  5/11/2019   [facility] EMERGENCY DEPARTMENT       Mercy Aguilar MD  05/12/19 2002

## 2019-05-11 NOTE — PHARMACY-ADMISSION MEDICATION HISTORY
Admission medication history interview status for this patient is complete. See [facility] admission navigator for allergy information, prior to admission medications and immunization status.     Medication history interview source(s):Patient and Family  Medication history resources (including written lists, pill bottles, clinic record):[facility] list  Primary pharmacy:    Changes made to [facility] medication list:  Added: none  Deleted: Vitamin C, , Calc/Mg/Zin Tablet, B12, Allegra, Garlic Vit B complex per spouse info, verified with patient.  Changed: CoQ 10, Icy Patch and Cranberry extract to prn    Actions taken by pharmacist (provider contacted, etc):Spoke with partner on phone about OTC and supplement use, as directed by thepatient.     Additional medication history information:[facility] and [facility] have no record of filling Eliquis for the patient, and she has no recllection of using it, but has a Rx for it. [facility], but unable to confirm if ever filled there or not at this time.    Medication reconciliation/reorder completed by provider prior to medication history? Yes    For patients on insulin therapy: NO    Lantus/levemir/NPH/Mix 70/30 dose: _____ in AM/PM or twice daily   Sliding scale Novolog Y/N   If Yes, do you have a baseline novolog pre-meal dose: ______units with meals   Patients eat three meals a day: Y/N   Any Barriers to therapy: cost of medications/comfortable with giving injections (if applicable)/ comfortable and confident with current diabetes regimen     Prior to Admission medications    Medication Sig Last Dose Taking? Auth Provider   acetaminophen (TYLENOL) 325 MG tablet Take 650 mg by mouth 4 times daily AND Give 650 mg by mouth every 12 hours as needed for back pain unknown Yes Vivian Blue MD   albuterol (PROAIR HFA/PROVENTIL HFA/VENTOLIN HFA) 108 (90 Base) MCG/ACT Inhaler Inhale 2 puffs into the lungs every 6 hours as needed for shortness of breath / dyspnea or  wheezing 5/10/2019 at Unknown time Yes Vivian Blue MD   amLODIPine (NORVASC) 10 MG tablet Take 0.5 tablets (5 mg) by mouth daily 5/10/2019 at Unknown time Yes Vivian Blue MD   apixaban ANTICOAGULANT (ELIQUIS) 5 MG tablet Take 2 tablets (10 mg) by mouth 2 times daily unknown at Unable to confirm use Yes Luis Enrique Batista MD   budesonide-formoterol (SYMBICORT) 160-4.5 MCG/ACT Inhaler Inhale 2 puffs into the lungs 2 times daily 5/10/2019 at Unknown time Yes Vivian Blue MD   cholecalciferol (VITAMIN D) 1000 UNIT tablet Take 2 tablets (2,000 Units) by mouth daily Past days Yes Vivian Blue MD   Coenzyme Q10 (COQ10) 30 MG CAPS Take 1 capsule by mouth daily as needed  unknown Yes Vivian Blue MD   fluticasone (FLONASE) 50 MCG/ACT nasal spray Spray 1-2 sprays into both nostrils daily 5/10/2019 at Unknown time Yes Vivian Blue MD   furosemide (LASIX) 40 MG tablet Take 20 mg by mouth daily as needed  5/10/2019 at Unknown time Yes Unknown, Entered By History   ipratropium - albuterol 0.5 mg/2.5 mg/3 mL (DUONEB) 0.5-2.5 (3) MG/3ML neb solution Take 1 vial (3 mLs) by nebulization every 6 hours as needed for shortness of breath / dyspnea or wheezing 5/10/2019 at Unknown time Yes Vivian Blue MD   losartan (COZAAR) 100 MG tablet Take 1 tablet (100 mg) by mouth daily 5/10/2019 at Unknown time Yes Vivian Blue MD   menthol (ICY HOT) 5 % PTCH Apply 1 patch topically 2 times daily as needed  unknown Yes Reported, Patient   Multiple Vitamins-Minerals (MULTIVITAMIN & MINERAL PO) Take 1 tablet by mouth daily. Past days Yes Unknown, Entered By History   Nutritional Supplements (SALMON OIL) CAPS Take 1 capsule by mouth daily  Past days Yes Vivian Blue MD   omeprazole 20 MG tablet Take 1 tablet (20 mg) by mouth daily Take 30-60 minutes before a meal. 5/10/2019 at Unknown  time Yes Vivian Blue MD   pravastatin (PRAVACHOL) 20 MG tablet Take 1 tablet (20 mg) by mouth daily 5/10/2019 at Unknown time Yes Vivian Blue MD   terazosin (HYTRIN) 2 MG capsule Take 1 capsule (2 mg) by mouth At Bedtime 5/10/2019 at Unknown time Yes Vivian Blue MD   tiotropium (SPIRIVA HANDIHALER) 18 MCG inhaled capsule Inhale contents of one capsule daily. 5/10/2019 at Unknown time Yes Vivian Blue MD   traMADol (ULTRAM) 50 MG tablet TAKE 1 TABLET BY MOUTH TWICE DAILY AS NEEDED FOR PAIN 5/10/2019 at Unknown time Yes Luis Enrique Batista MD   atenolol (TENORMIN) 100 MG tablet Take 1 tablet (100 mg) by mouth daily Unknown at Unknown time  Vivian Blue MD   Cranberry Extract 250 MG TABS Take 1 tablet by mouth daily as needed  unknown  Vivian Blue MD   levothyroxine (SYNTHROID/LEVOTHROID) 50 MCG tablet Take 50 mcg by mouth daily More than a month at Never used time  Unknown, Entered By History   order for DME Equipment being ordered: 4 wheeled walker with hand brakes and seat   Vivian Blue MD   order for DME Equipment being ordered: 1: Custom/alternative BLE full leg velco/buckling compression garment   Vivian Blue MD   order for DME Equipment being ordered: 1: Gradient Compression Wraps; 2: BLE knee high 20-30 mm Hg compression stockings; 3: BLE thigh high 20-30 mm Hg compression stockings; 4: Cast Boots   Vivian Blue MD   order for DME Equipment being ordered: Nebulizer and neb supplies (tubing, etc)   Vivian Blue MD   sennosides (SENOKOT) 8.6 MG tablet Take 1 tablet by mouth 2 times daily Unknown at Unknown time  Luis Enrique Batista MD

## 2019-05-11 NOTE — PLAN OF CARE
Admitted to unit overnight shift from ED after increased swelling and pain in bilateral LE SOB and respiratory failure. Hx of PE- on elequis, DVT and COPD. Patient lives at home with her partner. Pt has productive cough, mild SOB, course Ls, incontinent-wears pad, has purewick in place with good output. Sepsis protocol trigger, lactic=1. Regular diet. A-2 with walker. Skin has blanchable redness under breasts, bilateral Le, groin and buttock. Scab on buttock that patients states is a mostly healed pressure injury. +1 edema to bilateral LE .Pt complained of pain rated 7/10. PRN tylenol given.

## 2019-05-11 NOTE — PLAN OF CARE
PRIMARY DIAGNOSIS: DVT  OUTPATIENT/OBSERVATION GOALS TO BE MET BEFORE DISCHARGE:  1. Anticoagulant treatment initiated: No; Eloquis    2. Diagnostic testing complete (if applicable): Yes    3. Adequate home care or support arranged for LMWH administration: No    4. Return to near baseline physical activity: No    5. Pain Status: Improved-controlled with oral pain medications.    Discharge Planner Nurse   Safe discharge environment identified: No  Barriers to discharge: Yes       Entered by: Tiffanie Epps 05/11/2019 3:34 PM     Please review provider order for any additional goals.   Nurse to notify provider when observation goals have been met and patient is ready for discharge.    VSS. O2 sat >93% on 3 LPM. Tele SR. Productive cough, LS crackles. Pt reports chronic lower back pain X1 prn tylenol given. Incontinent of urine, purewick in place with no skin irritation. BLE red. Groin and under breasts red antifungal powder applied X1. K 3.4, replaced and recheck tomorrow AM. Left calf positive for DVT, beginning eloquis tonight. X2 person assist with walker and gait belt.

## 2019-05-11 NOTE — ED NOTES
Attempted to straight cath patient for UA due to incontinence. Catheter was NOT long enough thus failed attempt. While this writer was going for another cath, patient requested Purewick be replaced and patient immediately urinated 400 ml. Physician notified.

## 2019-05-11 NOTE — PLAN OF CARE
PRIMARY DIAGNOSIS: DVT  OUTPATIENT/OBSERVATION GOALS TO BE MET BEFORE DISCHARGE:  1. Anticoagulant treatment initiated: No; Eloquis     2. Diagnostic testing complete (if applicable): Yes     3. Adequate home care or support arranged for LMWH administration: No     4. Return to near baseline physical activity: No     5. Pain Status: Improved-controlled with oral pain medications.     Discharge Planner Nurse   Safe discharge environment identified: No  Barriers to discharge: Yes       Entered by: Tiffanie Epps 05/11/2019 3:34 PM  Please review provider order for any additional goals.   Nurse to notify provider when observation goals have been met and patient is ready for discharge.

## 2019-05-11 NOTE — ED NOTES
[facility]  ED Nurse Handoff Report    Marino Prajapati is a 83 year old female   ED Chief complaint: Shortness of Breath and Leg Swelling  . ED Diagnosis:   Final diagnoses:   Acute respiratory failure with hypoxia (H)     Allergies: No Known Allergies    Code Status: DNR / DNI  Activity level - Baseline/Home:  Independent with walker. Activity Level - Current:   Stand with Assist of 2 and walker. Lift room needed: Yes. Bariatric: No   Needed: No   Isolation: No. Infection: Not Applicable.     Vital Signs:   Vitals:    05/11/19 0221 05/11/19 0233 05/11/19 0300 05/11/19 0415   BP: 154/64  135/58 133/54   Pulse:   89 93   Resp: 22   22   Temp: 98.2  F (36.8  C)      TempSrc: Oral      SpO2: (!) 88%  91% 91%   Weight:  96.6 kg (213 lb)         Cardiac Rhythm:  ,      Pain level:    Patient confused: No. Patient Falls Risk: Yes.   Elimination Status: Wears a pad, is incontinent frequently. Purewick catheter in place attached to suction  Patient Report - Initial Complaint: Right lateral leg pain, SOB, congested cough. Focused Assessment: See Physician Note   Tests Performed: Xray, Labs, EKG. Abnormal Results: See Results.   Treatments provided: Medication (Lasix)  Family Comments: Not present  OBS brochure/video discussed/provided to patient:  Yes  ED Medications:   Medications   furosemide (LASIX) injection 40 mg (has no administration in time range)   ipratropium - albuterol 0.5 mg/2.5 mg/3 mL (DUONEB) neb solution 3 mL (3 mLs Nebulization Given 5/11/19 0305)     Drips infusing:  No  For the majority of the shift, the patient's behavior Green. Interventions performed were None.     Severe Sepsis OR Septic Shock Diagnosis Present: No      ED Nurse Name/Phone Number: Mallory Polanco,   4:30 AM  RECEIVING UNIT ED HANDOFF REVIEW    Above ED Nurse Handoff Report was reviewed: Yes  Reviewed by: Susanne Montgomery on May 11, 2019 at 5:02 AM

## 2019-05-11 NOTE — ED TRIAGE NOTES
Patient presents to ED via EMS from home due to swelling to lower extremities. Increased SOB. Productive cough     Hx PE

## 2019-05-12 LAB
ANION GAP SERPL CALCULATED.3IONS-SCNC: 6 MMOL/L (ref 3–14)
BUN SERPL-MCNC: 17 MG/DL (ref 7–30)
CALCIUM SERPL-MCNC: 8.2 MG/DL (ref 8.5–10.1)
CHLORIDE SERPL-SCNC: 105 MMOL/L (ref 94–109)
CO2 SERPL-SCNC: 29 MMOL/L (ref 20–32)
CREAT SERPL-MCNC: 0.7 MG/DL (ref 0.52–1.04)
ERYTHROCYTE [DISTWIDTH] IN BLOOD BY AUTOMATED COUNT: 14.7 % (ref 10–15)
GFR SERPL CREATININE-BSD FRML MDRD: 81 ML/MIN/{1.73_M2}
GLUCOSE SERPL-MCNC: 100 MG/DL (ref 70–99)
HCT VFR BLD AUTO: 35.3 % (ref 35–47)
HGB BLD-MCNC: 12.3 G/DL (ref 11.7–15.7)
LACTATE BLD-SCNC: 0.7 MMOL/L (ref 0.7–2)
MAGNESIUM SERPL-MCNC: 2.3 MG/DL (ref 1.6–2.3)
MCH RBC QN AUTO: 33.2 PG (ref 26.5–33)
MCHC RBC AUTO-ENTMCNC: 34.8 G/DL (ref 31.5–36.5)
MCV RBC AUTO: 95 FL (ref 78–100)
PLATELET # BLD AUTO: 256 10E9/L (ref 150–450)
POTASSIUM SERPL-SCNC: 3.3 MMOL/L (ref 3.4–5.3)
POTASSIUM SERPL-SCNC: 3.9 MMOL/L (ref 3.4–5.3)
RBC # BLD AUTO: 3.71 10E12/L (ref 3.8–5.2)
SODIUM SERPL-SCNC: 140 MMOL/L (ref 133–144)
WBC # BLD AUTO: 12.7 10E9/L (ref 4–11)

## 2019-05-12 PROCEDURE — 99207 ZZC CDG-CODE CATEGORY CHANGED: CPT | Performed by: INTERNAL MEDICINE

## 2019-05-12 PROCEDURE — 36415 COLL VENOUS BLD VENIPUNCTURE: CPT | Performed by: INTERNAL MEDICINE

## 2019-05-12 PROCEDURE — 94799 UNLISTED PULMONARY SVC/PX: CPT

## 2019-05-12 PROCEDURE — 83605 ASSAY OF LACTIC ACID: CPT | Performed by: INTERNAL MEDICINE

## 2019-05-12 PROCEDURE — 84132 ASSAY OF SERUM POTASSIUM: CPT | Performed by: INTERNAL MEDICINE

## 2019-05-12 PROCEDURE — 25000125 ZZHC RX 250: Performed by: INTERNAL MEDICINE

## 2019-05-12 PROCEDURE — 83735 ASSAY OF MAGNESIUM: CPT | Performed by: INTERNAL MEDICINE

## 2019-05-12 PROCEDURE — 40000275 ZZH STATISTIC RCP TIME EA 10 MIN

## 2019-05-12 PROCEDURE — 25000132 ZZH RX MED GY IP 250 OP 250 PS 637: Performed by: INTERNAL MEDICINE

## 2019-05-12 PROCEDURE — 85027 COMPLETE CBC AUTOMATED: CPT | Performed by: INTERNAL MEDICINE

## 2019-05-12 PROCEDURE — 80048 BASIC METABOLIC PNL TOTAL CA: CPT | Performed by: INTERNAL MEDICINE

## 2019-05-12 PROCEDURE — 99225 ZZC SUBSEQUENT OBSERVATION CARE,LEVEL II: CPT | Performed by: INTERNAL MEDICINE

## 2019-05-12 PROCEDURE — 94640 AIRWAY INHALATION TREATMENT: CPT

## 2019-05-12 PROCEDURE — 25000131 ZZH RX MED GY IP 250 OP 636 PS 637: Performed by: INTERNAL MEDICINE

## 2019-05-12 PROCEDURE — 94640 AIRWAY INHALATION TREATMENT: CPT | Mod: 76

## 2019-05-12 PROCEDURE — G0378 HOSPITAL OBSERVATION PER HR: HCPCS

## 2019-05-12 RX ORDER — FUROSEMIDE 20 MG
20 TABLET ORAL DAILY
Status: DISCONTINUED | OUTPATIENT
Start: 2019-05-13 | End: 2019-05-13 | Stop reason: HOSPADM

## 2019-05-12 RX ADMIN — APIXABAN 5 MG: 5 TABLET, FILM COATED ORAL at 08:39

## 2019-05-12 RX ADMIN — PREDNISONE 40 MG: 20 TABLET ORAL at 08:40

## 2019-05-12 RX ADMIN — PRAVASTATIN SODIUM 20 MG: 20 TABLET ORAL at 08:39

## 2019-05-12 RX ADMIN — TERAZOSIN HYDROCHLORIDE 2 MG: 1 CAPSULE ORAL at 21:46

## 2019-05-12 RX ADMIN — AMLODIPINE BESYLATE 5 MG: 5 TABLET ORAL at 08:39

## 2019-05-12 RX ADMIN — FUROSEMIDE 40 MG: 40 TABLET ORAL at 08:39

## 2019-05-12 RX ADMIN — IPRATROPIUM BROMIDE AND ALBUTEROL SULFATE 3 ML: .5; 3 SOLUTION RESPIRATORY (INHALATION) at 19:46

## 2019-05-12 RX ADMIN — APIXABAN 5 MG: 5 TABLET, FILM COATED ORAL at 21:11

## 2019-05-12 RX ADMIN — TRAMADOL HYDROCHLORIDE 25 MG: 50 TABLET ORAL at 07:45

## 2019-05-12 RX ADMIN — FLUTICASONE PROPIONATE 2 SPRAY: 50 SPRAY, METERED NASAL at 08:44

## 2019-05-12 RX ADMIN — BUDESONIDE AND FORMOTEROL FUMARATE DIHYDRATE 2 PUFF: 160; 4.5 AEROSOL RESPIRATORY (INHALATION) at 19:49

## 2019-05-12 RX ADMIN — ACETAMINOPHEN 650 MG: 325 TABLET, FILM COATED ORAL at 16:23

## 2019-05-12 RX ADMIN — BUDESONIDE AND FORMOTEROL FUMARATE DIHYDRATE 2 PUFF: 160; 4.5 AEROSOL RESPIRATORY (INHALATION) at 08:36

## 2019-05-12 RX ADMIN — ATENOLOL 100 MG: 50 TABLET ORAL at 08:40

## 2019-05-12 RX ADMIN — POTASSIUM CHLORIDE 20 MEQ: 1500 TABLET, EXTENDED RELEASE ORAL at 11:24

## 2019-05-12 RX ADMIN — LOSARTAN POTASSIUM 100 MG: 100 TABLET ORAL at 08:39

## 2019-05-12 RX ADMIN — VITAMIN D, TAB 1000IU (100/BT) 2000 UNITS: 25 TAB at 08:40

## 2019-05-12 RX ADMIN — LEVOTHYROXINE SODIUM 50 MCG: 50 TABLET ORAL at 06:01

## 2019-05-12 RX ADMIN — IPRATROPIUM BROMIDE AND ALBUTEROL SULFATE 3 ML: .5; 3 SOLUTION RESPIRATORY (INHALATION) at 03:42

## 2019-05-12 RX ADMIN — ACETAMINOPHEN 650 MG: 325 TABLET, FILM COATED ORAL at 06:03

## 2019-05-12 RX ADMIN — TIOTROPIUM BROMIDE 18 MCG: 18 CAPSULE ORAL; RESPIRATORY (INHALATION) at 08:36

## 2019-05-12 RX ADMIN — IPRATROPIUM BROMIDE AND ALBUTEROL SULFATE 3 ML: .5; 3 SOLUTION RESPIRATORY (INHALATION) at 14:18

## 2019-05-12 RX ADMIN — POTASSIUM CHLORIDE 40 MEQ: 1.5 POWDER, FOR SOLUTION ORAL at 08:41

## 2019-05-12 ASSESSMENT — ENCOUNTER SYMPTOMS
SHORTNESS OF BREATH: 1
COUGH: 1

## 2019-05-12 NOTE — PLAN OF CARE
Pt slept well through the night. VSS, pt c/o neck and back pain, ultram given with improvement. Pt is on 2L NC stating in the mid 90's. RA baseline, plan is to wean off oxygen. Purwick is in place with good output. Powder applied to reddened areas in groin and under breasts. Repo Q 2 hours. Mag and K replaced. Rechecks scheduled for AM. Tele SR. BLE edema. On PO 40mg Lasix. DVT in left leg. On 5mg Elequis. Pt got PRN neb. Improved breathing. Plan is to stay a couple more days.

## 2019-05-12 NOTE — PLAN OF CARE
PRIMARY DIAGNOSIS: DVT  OUTPATIENT/OBSERVATION GOALS TO BE MET BEFORE DISCHARGE:  1. Anticoagulant treatment initiated: Yes; Eliquis    2. Diagnostic testing complete (if applicable): Yes    3. Adequate home care or support arranged for LMWH administration: No    4. Return to near baseline physical activity: Awaiting PT eval     5. Pain Status: Improved-controlled with oral pain medications.    Discharge Planner Nurse   Safe discharge environment identified: No  Barriers to discharge: Yes       Entered by: Pablo Leslie 05/12/2019 6:57 PM     Please review provider order for any additional goals.   Nurse to notify provider when observation goals have been met and patient is ready for discharge.    Pt A&Ox4, VSS, weaned to 1L O2 via NC. Tolerating regular diet. Up with Ax2 walker and gait belt. PT eval tomorrow, Tele: SR. VASQUEZ\lise home vs TCU pending PT.

## 2019-05-12 NOTE — PROGRESS NOTES
"PRIMARY DIAGNOSIS: \"GENERIC\" NURSING  OUTPATIENT/OBSERVATION GOALS TO BE MET BEFORE DISCHARGE:  1. ADLs back to baseline: No    2. Activity and level of assistance: Up with maximum assistance. Consider SW and/or PT evaluation.     3. Pain status: Improved-controlled with oral pain medications.    4. Return to near baseline physical activity: Yes     Discharge Planner Nurse   Safe discharge environment identified: Yes  Barriers to discharge: Yes       Entered by: Bharti Green 05/12/2019 4:27 AM     Please review provider order for any additional goals.   Nurse to notify provider when observation goals have been met and patient is ready for discharge.  "

## 2019-05-12 NOTE — PLAN OF CARE
PRIMARY DIAGNOSIS: DVT  OUTPATIENT/OBSERVATION GOALS TO BE MET BEFORE DISCHARGE:  1. Anticoagulant treatment initiated: Yes; Eliquis    2. Diagnostic testing complete (if applicable): Yes    3. Adequate home care or support arranged for LMWH administration: No    4. Return to near baseline physical activity: No    5. Pain Status: Improved-controlled with oral pain medications.    Discharge Planner Nurse   Safe discharge environment identified: No  Barriers to discharge: Yes, waiting on PT recommendation for discharge       Entered by: Tiffanie Epps 05/12/2019 12:13 PM     Please review provider order for any additional goals.   Nurse to notify provider when observation goals have been met and patient is ready for discharge.

## 2019-05-12 NOTE — PROGRESS NOTES
"PRIMARY DIAGNOSIS: \"GENERIC\" NURSING  OUTPATIENT/OBSERVATION GOALS TO BE MET BEFORE DISCHARGE:  1. ADLs back to baseline: No    2. Activity and level of assistance: Up with maximum assistance. Consider SW and/or PT evaluation.     3. Pain status: Improved-controlled with oral pain medications.    4. Return to near baseline physical activity: No     Discharge Planner Nurse   Safe discharge environment identified: Yes  Barriers to discharge: Yes       Entered by: Bharti Green 05/12/2019 12:08 AM     Please review provider order for any additional goals.   Nurse to notify provider when observation goals have been met and patient is ready for discharge.  "

## 2019-05-12 NOTE — PROGRESS NOTES
[facility]  Hospitalist Progress Note  Miranda Pat MD 05/12/19  Text Page  Pager: [phone] (7am-6pm)    Reason for Stay (Diagnosis): Leg pain, cough         Assessment and Plan:      Summary of Stay: Marino Prajapati is a 83 year old female with past medical history of recently diagnosed PE (on Eliquis), COPD, HTN, HLD and GERD who was hospitalized 4/12-4/19 for acute PE and hypoxic respiratory failure and discharged to TCU who was admitted on 5/11/2019 after being home for 10 days after discharge from TCU with right leg pain and shortness of breath.  She was found to have LLE DVT (in the setting of NOT taking her Eliquis) and hypoxic respiratory failure.  Remains quite weak.    Problem List/Assessment and Plan:     1. Acute Hypoxic Respiratory Failure: Patient reports longstanding chronic cough.  She was recently hospitalized for acute PE (see below) and had hypoxic respiratory failure related to that.  She again has hypoxia. She had CXR on admission which showed persistent bibasilar atelectasis or scarring.  I suspect that her hypoxia is multifactorial from recent PE as well as COPD exacerbation, see below.  Discussed with patient that she may need oxygen upon discharge.  - Treatment for PE and COPD as below    2. Recent PE with new LLE DVT: Diagnose with PE during recent admission.  Had been on heparin drip but then transitioned to Eliquis.  Unfortunately she was not taking Eliquis for the past 10 days after she was discharged home from TCU.  She does have a LLE DVT but this is NOT a failure of anticoagulation given she was off Eliquis.  Eliquis has been resumed.  - Continue Eliquis    3. Acute COPD Exacerbation: Has worsening cough and intermittent wheezing.  Treating for exacerbation with Prednisone.  Continuing PTA inhaler regimen and Duonebs also available PRN.    4. LE Edema: Has LE edema and PTA on Lasix 20 mg every day PRN (leg swelling).  Initially concern for CHF exacerbation but  CXR without edema and TTE shows EF of 60-65% with normal diastolic function.  She states after initial dose of IV Lasix in the ER her LE edema got significantly better and her leg pain nearly resolved.    - Lasix 20 mg QD    5. Leukocytosis: WBC was elevated to 16.1 on admission.  CXR without infiltrate.  UA not consistent with infection.  WBC improving to 12.7 today without antibiotics.  Do not suspect bacterial infection, no indication for antibiotics.    6. Diffuse Weakness: Patient is diffusely weak.  Recently finished TCU stay.  PT consulted.  Discussed possible rehab and patient does not want to do this again.  Explained that she needs to ambulate and be able to do this independently to go home.  Await PT evaluation.    7. HTN: Continued on PTA Atenolol, Amlodipine and as above Lasix daily at this time.    Diet: Regular Diet Adult    DVT Prophylaxis: Eliquis  Kinney Catheter: not present  Code Status: DNR/DNI      Disposition Plan   Expected discharge: Tomorrow, recommended to TBD based on PT assessment once safe disposition plan/ TCU bed available.  Entered: Miranda Pat MD 05/12/2019, 9:34 AM       The patient's care was discussed with the Bedside Nurse and Patient.    Miranda Pat MD  Hospitalist Service  [facility]          Interval History (Subjective):      Patient was seen with her bedside nurse this morning.  She states she feels her cough is better than admission and feels much less SOB.  She did have a nebulizer treatment last night which helped her cough.  She is eating well.  No nausea, emesis or abdominal pain.  Denies CP.  She does feel weak and we discussed the need for PT which she is agreeable to.  She really does not want to go to TCU again.                  Physical Exam:      Last Vital Signs:  /52 (BP Location: Right arm)   Pulse 93   Temp 97.6  F (36.4  C) (Oral)   Resp 20   Wt 99.2 kg (218 lb 12.8 oz)   SpO2 93%   BMI 34.79 kg/m      Vitals:    05/11/19  0233 05/12/19 0528   Weight: 96.6 kg (213 lb) 99.2 kg (218 lb 12.8 oz)       General: Alert, awake, no acute distress.  HEENT: Normocephalic and atraumatic, eyes anicteric and without scleral injection, EOMI, face symmetric, MMM.  Cardiac: RRR, normal S1, S2. No m/g/r, no LE edema.  Pulmonary: Normal chest rise, normal work of breathing.  Lungs CTAB without crackles or wheezing.  Abdomen: soft, non-tender, non-distended.  Normoactive bowel sounds, no guarding or rebound tenderness.  Extremities: no deformities.  Warm, well perfused.  Skin: no rashes or lesions.  Warm and Dry.  Neuro: No focal deficits.  Speech clear.  Coordination and strength grossly normal.  Psych: Alert and oriented x3. Appropriate affect.         Medications:      All current medications were reviewed with changes reflected in problem list.         Data:      All new lab and imaging data was reviewed.   Labs:  Recent Labs   Lab 05/12/19  0651 05/11/19 1941 05/11/19 0314     --  142   POTASSIUM 3.3* 3.4 3.4   CHLORIDE 105  --  104   CO2 29  --  28   ANIONGAP 6  --  10   *  --  108*   BUN 17  --  19   CR 0.70  --  0.74   GFRESTIMATED 81  --  76   GFRESTBLACK >90  --  88   SANDRA 8.2*  --  8.5     Recent Labs   Lab 05/12/19  0651 05/11/19  0314   WBC 12.7* 16.1*   HGB 12.3 12.9   HCT 35.3 39.9   MCV 95 92    235      Imaging:   No results found for this or any previous visit (from the past 24 hour(s)).    Miranda Pat MD.

## 2019-05-12 NOTE — PROGRESS NOTES
X-cover    Called for 11 beat run of VTach at 1624 and 17 run of PSVT at 1912-tele reviewed and I agree with assessment of rhythm.  Check electrolytes.  Of note echo earlier today showing normal EF, c LVH    Check K/Mag/ICa    K/Mag replacement protocols already in place.

## 2019-05-12 NOTE — PLAN OF CARE
PRIMARY DIAGNOSIS: DVT  OUTPATIENT/OBSERVATION GOALS TO BE MET BEFORE DISCHARGE:  1. Anticoagulant treatment initiated: Yes; Eliquis    2. Diagnostic testing complete (if applicable): Yes    3. Adequate home care or support arranged for LMWH administration: No    4. Return to near baseline physical activity: No    5. Pain Status: Improved-controlled with oral pain medications.    Discharge Planner Nurse   Safe discharge environment identified: No  Barriers to discharge: Yes, waiting on PT recommendation for safe discharge        Entered by: Tiffanie Epps 05/12/2019 1:56 PM     Please review provider order for any additional goals.   Nurse to notify provider when observation goals have been met and patient is ready for discharge.    VSS. Wean O2 from 3 LPM: 1.5 LPM O2 94%. Pt reported pain X1 tramadol given. Tele SR. No longer using purewick, pt able to call when needing to use bathroom. Pt coughing and wheezing, respiratory paged 1405 for prn neb tx. Skin blanchable red in groin and under breasts. X2 assist with walker and gait belt. X2 walks to door and back. Potassium 3.3, replaced recheck 1530. Plan to discharge home or TCU pending PT recommendation tomorrow.

## 2019-05-13 ENCOUNTER — APPOINTMENT (OUTPATIENT)
Dept: PHYSICAL THERAPY | Facility: CLINIC | Age: 84
End: 2019-05-13
Attending: INTERNAL MEDICINE
Payer: COMMERCIAL

## 2019-05-13 ENCOUNTER — TELEPHONE (OUTPATIENT)
Dept: INTERNAL MEDICINE | Facility: CLINIC | Age: 84
End: 2019-05-13

## 2019-05-13 VITALS
RESPIRATION RATE: 20 BRPM | OXYGEN SATURATION: 94 % | HEART RATE: 93 BPM | BODY MASS INDEX: 34.42 KG/M2 | TEMPERATURE: 97.1 F | DIASTOLIC BLOOD PRESSURE: 59 MMHG | SYSTOLIC BLOOD PRESSURE: 129 MMHG | WEIGHT: 216.5 LBS

## 2019-05-13 LAB
ANION GAP SERPL CALCULATED.3IONS-SCNC: 3 MMOL/L (ref 3–14)
BUN SERPL-MCNC: 16 MG/DL (ref 7–30)
CALCIUM SERPL-MCNC: 8.4 MG/DL (ref 8.5–10.1)
CHLORIDE SERPL-SCNC: 106 MMOL/L (ref 94–109)
CO2 SERPL-SCNC: 31 MMOL/L (ref 20–32)
CREAT SERPL-MCNC: 0.66 MG/DL (ref 0.52–1.04)
ERYTHROCYTE [DISTWIDTH] IN BLOOD BY AUTOMATED COUNT: 14.3 % (ref 10–15)
GFR SERPL CREATININE-BSD FRML MDRD: 82 ML/MIN/{1.73_M2}
GLUCOSE SERPL-MCNC: 90 MG/DL (ref 70–99)
HCT VFR BLD AUTO: 40.3 % (ref 35–47)
HGB BLD-MCNC: 13.1 G/DL (ref 11.7–15.7)
MCH RBC QN AUTO: 30.1 PG (ref 26.5–33)
MCHC RBC AUTO-ENTMCNC: 32.5 G/DL (ref 31.5–36.5)
MCV RBC AUTO: 93 FL (ref 78–100)
PLATELET # BLD AUTO: 265 10E9/L (ref 150–450)
POTASSIUM SERPL-SCNC: 3.5 MMOL/L (ref 3.4–5.3)
RBC # BLD AUTO: 4.35 10E12/L (ref 3.8–5.2)
SODIUM SERPL-SCNC: 140 MMOL/L (ref 133–144)
WBC # BLD AUTO: 12.6 10E9/L (ref 4–11)

## 2019-05-13 PROCEDURE — G0378 HOSPITAL OBSERVATION PER HR: HCPCS

## 2019-05-13 PROCEDURE — 85027 COMPLETE CBC AUTOMATED: CPT | Performed by: INTERNAL MEDICINE

## 2019-05-13 PROCEDURE — 36415 COLL VENOUS BLD VENIPUNCTURE: CPT | Performed by: INTERNAL MEDICINE

## 2019-05-13 PROCEDURE — 99217 ZZC OBSERVATION CARE DISCHARGE: CPT | Performed by: INTERNAL MEDICINE

## 2019-05-13 PROCEDURE — 25000132 ZZH RX MED GY IP 250 OP 250 PS 637: Performed by: INTERNAL MEDICINE

## 2019-05-13 PROCEDURE — 80048 BASIC METABOLIC PNL TOTAL CA: CPT | Performed by: INTERNAL MEDICINE

## 2019-05-13 PROCEDURE — 97530 THERAPEUTIC ACTIVITIES: CPT | Mod: GP | Performed by: PHYSICAL THERAPIST

## 2019-05-13 PROCEDURE — 97161 PT EVAL LOW COMPLEX 20 MIN: CPT | Mod: GP | Performed by: PHYSICAL THERAPIST

## 2019-05-13 PROCEDURE — 25000131 ZZH RX MED GY IP 250 OP 636 PS 637: Performed by: INTERNAL MEDICINE

## 2019-05-13 PROCEDURE — 97116 GAIT TRAINING THERAPY: CPT | Mod: GP,XU | Performed by: PHYSICAL THERAPIST

## 2019-05-13 RX ORDER — PREDNISONE 20 MG/1
20 TABLET ORAL DAILY
Qty: 3 TABLET | Refills: 0 | Status: SHIPPED | OUTPATIENT
Start: 2019-05-14 | End: 2019-05-17

## 2019-05-13 RX ADMIN — MICONAZOLE NITRATE: 2 POWDER TOPICAL at 09:29

## 2019-05-13 RX ADMIN — POTASSIUM CHLORIDE 20 MEQ: 1500 TABLET, EXTENDED RELEASE ORAL at 11:07

## 2019-05-13 RX ADMIN — LOSARTAN POTASSIUM 100 MG: 100 TABLET ORAL at 09:18

## 2019-05-13 RX ADMIN — BUDESONIDE AND FORMOTEROL FUMARATE DIHYDRATE 2 PUFF: 160; 4.5 AEROSOL RESPIRATORY (INHALATION) at 08:11

## 2019-05-13 RX ADMIN — TRAMADOL HYDROCHLORIDE 25 MG: 50 TABLET ORAL at 00:33

## 2019-05-13 RX ADMIN — ATENOLOL 100 MG: 50 TABLET ORAL at 09:18

## 2019-05-13 RX ADMIN — APIXABAN 5 MG: 5 TABLET, FILM COATED ORAL at 13:36

## 2019-05-13 RX ADMIN — AMLODIPINE BESYLATE 5 MG: 5 TABLET ORAL at 09:18

## 2019-05-13 RX ADMIN — PREDNISONE 40 MG: 20 TABLET ORAL at 09:18

## 2019-05-13 RX ADMIN — MICONAZOLE NITRATE: 2 POWDER TOPICAL at 14:05

## 2019-05-13 RX ADMIN — FUROSEMIDE 20 MG: 20 TABLET ORAL at 09:18

## 2019-05-13 RX ADMIN — VITAMIN D, TAB 1000IU (100/BT) 2000 UNITS: 25 TAB at 09:17

## 2019-05-13 RX ADMIN — LEVOTHYROXINE SODIUM 50 MCG: 50 TABLET ORAL at 06:33

## 2019-05-13 RX ADMIN — TRAMADOL HYDROCHLORIDE 25 MG: 50 TABLET ORAL at 14:07

## 2019-05-13 RX ADMIN — PRAVASTATIN SODIUM 20 MG: 20 TABLET ORAL at 09:18

## 2019-05-13 RX ADMIN — FLUTICASONE PROPIONATE 2 SPRAY: 50 SPRAY, METERED NASAL at 07:49

## 2019-05-13 RX ADMIN — APIXABAN 5 MG: 5 TABLET, FILM COATED ORAL at 09:18

## 2019-05-13 RX ADMIN — TIOTROPIUM BROMIDE 18 MCG: 18 CAPSULE ORAL; RESPIRATORY (INHALATION) at 08:11

## 2019-05-13 RX ADMIN — ACETAMINOPHEN 650 MG: 325 TABLET, FILM COATED ORAL at 07:50

## 2019-05-13 NOTE — PROGRESS NOTES
PRIMARY DIAGNOSIS: DVT  OUTPATIENT/OBSERVATION GOALS TO BE MET BEFORE DISCHARGE:  1. Anticoagulant treatment initiated: Yes; Eliquis    2. Diagnostic testing complete (if applicable): Yes    3. Adequate home care or support arranged for LMWH administration: Yes    4. Return to near baseline physical activity: Yes    5. Pain Status: Improved-controlled with oral pain medications.    Discharge Planner Nurse   Safe discharge environment identified: Yes  Barriers to discharge: Yes       Entered by: Jasmin Batista 05/13/2019 2:50 PM     Please review provider order for any additional goals.   Nurse to notify provider when observation goals have been met and patient is ready for discharge.    A&Ox4. VSS. Oxygen weaned off this shift. Up - Ax2 with a gaitbelt and walker. Blanchable redness to coccyx - barrier cream applied. Discharge teaching completed. Pt awaiting discharge medications to be filled by discharge pharmacy.

## 2019-05-13 NOTE — PROGRESS NOTES
05/13/19 0959   Quick Adds   Quick Adds Certification   Type of Visit Initial PT Evaluation   Living Environment   Lives With significant other   Living Arrangements house   Living Environment Comment Pt has 2 UVALDO w/o rail, no stairs indoors that are needed,    Self-Care   Usual Activity Tolerance fair   Current Activity Tolerance fair   Regular Exercise Other (see comments)  (just got out of TCU, has HEP/therabands)   Equipment Currently Used at Home walker, rolling  (ordered a wide double fww, has a std fww/ 4ww)   Activity/Exercise/Self-Care Comment Indep ADL w/ fww, spouse A w/ finances. Pt takes own pills, showers indep in standing, position.    Functional Level Prior   Ambulation 1-->assistive equipment   Transferring 0-->independent   Toileting 0-->independent   Bathing 0-->independent   Communication 0-->understands/communicates without difficulty   Swallowing 0-->swallows foods/liquids without difficulty   Cognition 0 - no cognition issues reported   Fall history within last six months no   Prior Functional Level Comment mainly ambulates inddors room to room at baseline   General Information   Onset of Illness/Injury or Date of Surgery - Date 05/11/19   Referring Physician Adilia   Patient/Family Goals Statement Home, today   Pertinent History of Current Problem (include personal factors and/or comorbidities that impact the POC) Pt admitted with hypoxia, BLE edema, R/O DVT. Of note, pt here 4/9-4/12, then to [facility] for about 20 days per pt, home 10 days prior to admit. Per pt report, communication was poor surrounding home meds, and Eliquis was at the drug store pharmacy, but pt did was not aware, and did not pick it up. 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   Integumentary/Edema   Integumentary/Edema Comments BLE edema appears resolved    Posture    Posture Forward head position;Protracted shoulders;Kyphosis   Range of Motion (ROM)   ROM Comment WFL   Strength   Strength Comments Grossly grade 4-/5 BLE's   Bed Mobility   Bed Mobility Comments Min A w/ BLE PRN    Transfer Skills   Transfer Comments CGA x1   Gait   Gait Comments 20' for eval w/ fww, forward at torso, see daily doc for details. CGA x1   Balance   Balance Comments Needs UE support for navigation   General Therapy Interventions   Planned Therapy Interventions gait training;home program guidelines   Intervention Comments assess mobility on RA   Clinical Impression   Criteria for Skilled Therapeutic Intervention yes, treatment indicated   PT Diagnosis assess for home safety with mobility and o2 needs   Influenced by the following impairments low act tolerance in general, limited gait tolerance, leg length descrepency, posture deficits   Functional limitations due to impairments limited act and gait tolerance   Clinical Presentation Stable/Uncomplicated   Clinical Presentation Rationale per clinical observation   Clinical Decision Making (Complexity) Low complexity   Therapy Frequency` daily   Predicted Duration of Therapy Intervention (days/wks) today   Anticipated Equipment Needs at Discharge   (pt just ordered a wide fww from [employer])   Anticipated Discharge Disposition Home  (spouse A as needed,.)   Risk & Benefits of therapy have been explained Yes   Patient, Family & other staff in agreement with plan of care Yes   Clinical Impression Comments one time eval and treat   Therapy Certification   Start of care date 05/13/19  "  Certification date from 05/13/19   Certification date to 05/13/19   Medical Diagnosis hypoxia/ L LE DVT   Certification I certify the need for these services furnished under this plan of treatment and while under my care.  (Physician co-signature of this document indicates review and certification of the therapy plan).    [facility] TM \"6 Clicks\"   2016, Trustees of [facility], under license to [employer].  All rights reserved.   6 Clicks Short Forms Basic Mobility Inpatient Short Form   [facility]  \"6 Clicks\" V.2 Basic Mobility Inpatient Short Form   1. Turning from your back to your side while in a flat bed without using bedrails? 4 - None   2. Moving from lying on your back to sitting on the side of a flat bed without using bedrails? 3 - A Little   3. Moving to and from a bed to a chair (including a wheelchair)? 3 - A Little   4. Standing up from a chair using your arms (e.g., wheelchair, or bedside chair)? 4 - None   5. To walk in hospital room? 4 - None   6. Climbing 3-5 steps with a railing? 3 - A Little   Basic Mobility Raw Score (Score out of 24.Lower scores equate to lower levels of function) 21   Total Evaluation Time   Total Evaluation Time (Minutes) 20     "

## 2019-05-13 NOTE — PLAN OF CARE
PRIMARY DIAGNOSIS: DVT  OUTPATIENT/OBSERVATION GOALS TO BE MET BEFORE DISCHARGE:  1. Anticoagulant treatment initiated: Yes; Eliquis    2. Diagnostic testing complete (if applicable): Yes    3. Adequate home care or support arranged for LMWH administration: No    4. Return to near baseline physical activity: waiting PT eval    5. Pain Status: Improved-controlled with oral pain medications.    Discharge Planner Nurse   Safe discharge environment identified: No  Barriers to discharge: Yes       Entered by: Digna Rivera 05/12/2019 8:38 PM     Please review provider order for any additional goals.   Nurse to notify provider when observation goals have been met and patient is ready for discharge.

## 2019-05-13 NOTE — PLAN OF CARE
Tele-SR.  VSS, 2L O2.  Tramadol x1 for lower back pain.  K replaced yesterday will be rechecked in am.  Pt has a infrequent cough.  Neb tx prn.  Baseline for pt is no O2.  Pt needs evaluate pt.  Discharge TBD based on PT assessment, safe disposition plan/ TCU bed available.  Continue plan of care.

## 2019-05-13 NOTE — PROGRESS NOTES
Patient has been assessed for Home Oxygen needs.  Oxygen readings:   *   RA - at rest  Pulse oximetry SPO2 91 %  *   RA - during activity/with exercise SPO2 92-93 %  *   O2 at  Na  liters/minute (at rest) ...SPO2 NA %  *   O2 at  NA liters/minute (during activity/with exercise) ...SPO2 NA %

## 2019-05-13 NOTE — CONSULTS
Care Transition Initial Assessment - RN        Met with: Patient.  DATA   Active Problems:    Hypoxia       Cognitive Status: awake, alert and oriented.  Primary Care Clinic Name: [facility]  Primary Care MD Name: Dr. Blue  Contact information and PCP information verified: Yes  Lives With: significant other   Living Arrangements: house  Quality of Family Relationships: supportive, involved  Description of Support System: Supportive, Involved   Who is your support system?: Significant Other   Support Assessment: Adequate family and caregiver support   Insurance concerns: No Insurance issues identified  ASSESSMENT  Patient currently receives the following services:  none        Identified issues/concerns regarding health management: pt admitted with Acute PE hypoxia copd exacerbation. Pt uses inhalers and nebulizer's at home to help manage her copd. Pt states she has been dealing with a cough for quite a while. She was recently at [facility] for rehab and was discharged on 5/1. She states she did not have a very good experience there. COPD action plan was reviewed and given to the pt. Pt uses a walker with mobility and her significant other of 20 years drives her to doctor appointments.    PLAN    Patient anticipates discharging to home .        Patient anticipates needs for home equipment: No  Plan/Disposition: Home   Appointments: Pt will schedule at discharge to coordinate with significant other for transportation.      Care  (CTS) will continue to follow as needed.    Twila Arroyo RN, BSN CTS  Care Coordinator  [phone]

## 2019-05-13 NOTE — PLAN OF CARE
Discharge Planner PT  PT: PT evaluation completion , treatment completed for a one time session for OBS  pt admitted with BLE edema and pain, SOB. Eval  completed, but not entered in EPic at time of this writing. Pt was home approx 10 days fro TCU setting where she had been recovering from a PE. Per pt, there was a communication error with new home med of Eliquis, and she did not  prescription, therefore did not take it. Pt admitted 5/11 , confirmed LLE DVT, edema significantly reduced. At baseline, pt lives w/ SO in house with 2 UVALDO, and single living living. Uses a RW, household distances only. Indep ADL's, A w/ finances. Indep med management.     Patient plan for discharge: Home w/ SO assist. Pt refused TCU  Current status: on 1 L 02, Resting sats 93%, edema significantly improved. Noted Leg length discrepancy with LLE long ( plan for othotic shoe lift soon). Transfers sit<>stand with safety awareness, CGA. Ambulates with forward flexed posture 60' with slow, shuffling gait per baseline ( kyphotic), heavy reliance on UE's. Pt reports min A PRN w/ LE's getting into bed, S/O can assist. Pt on RA for therapy and gait, activity sats 91%. RA at end of session, nurse to spot check.   Barriers to return to prior living situation: none, pt has SO, and son who can be present to assist her into the home. Pt recently ordered a wide fww for increased stability, education provided on proper height/fit.   Recommendations for discharge: Home w/ SO. Pt has HEP from TCU setting w/ theraband  Once home given PLOF and current functional status. With ongoing medical management as needed, anticipate pt has met mobility goals to safely discharge home.    Physical Therapy Discharge Summary    Reason for therapy discharge:    All goals and outcomes met, no further needs identified.    Progress towards therapy goal(s). See goals on Care Plan in [facility] electronic health record for goal details.  Goals met    Therapy  recommendation(s):    Continue home exercise program.             Entered by: Dione Ziegler 05/13/2019 10:45 AM

## 2019-05-13 NOTE — PLAN OF CARE
[facility]      OUTPATIENT PHYSICAL THERAPY EVALUATION  PLAN OF TREATMENT FOR OUTPATIENT REHABILITATION  (COMPLETE FOR INITIAL CLAIMS ONLY)  Patient's Last Name, First Name, M.I.  YOB: 1935  PrajapatiMarino SANTIAGO                        Provider's Name  [facility] Medical Record No.  [id number]                               Onset Date:  05/11/19   Start of Care Date:  05/13/19      Type:     _X_PT   ___OT   ___SLP Medical Diagnosis:  hypoxia/ L LE DVT                        PT Diagnosis:  assess for home safety with mobility and o2 needs   Visits from SOC:  1   _________________________________________________________________________________  Plan of Treatment/Functional Goals    Planned Interventions:  ,    gait training, home program guidelines, assess mobility on RA     Goals: See Physical Therapy Goals on Care Plan in [employer] electronic health record.    Therapy Frequency: daily  Predicted Duration of Therapy Intervention: today  _________________________________________________________________________________    I CERTIFY THE NEED FOR THESE SERVICES FURNISHED UNDER        THIS PLAN OF TREATMENT AND WHILE UNDER MY CARE     (Physician co-signature of this document indicates review and certification of the therapy plan).              Certification date from: 05/13/19, Certification date to: 05/13/19    Referring Physician: Adilia            Initial Assessment        See Physical Therapy evaluation dated 05/13/19 in Epic electronic health record.

## 2019-05-13 NOTE — PHARMACY - DISCHARGE MEDICATION RECONCILIATION AND EDUCATION
Marino Prajapati     1935      female    [id number]                                [id number]    Allergy: Patient has no known allergies.    RX: Discharge Medication Consult by Pharmacist  EDUCATION:   Discharge Medication List   Prajapati Marino JACK   Home Medication Instructions CTA:[id number]    Printed on:05/13/19 1402   Medication Information                      acetaminophen (TYLENOL) 325 MG tablet  Take 650 mg by mouth 4 times daily AND Give 650 mg by mouth every 12 hours as needed for back pain             albuterol (PROAIR HFA/PROVENTIL HFA/VENTOLIN HFA) 108 (90 Base) MCG/ACT Inhaler  Inhale 2 puffs into the lungs every 6 hours as needed for shortness of breath / dyspnea or wheezing             amLODIPine (NORVASC) 10 MG tablet  Take 0.5 tablets (5 mg) by mouth daily             apixaban ANTICOAGULANT (ELIQUIS) 5 MG tablet  Take 2 tablets (10 mg) by mouth 2 times daily For 6 days then 5 mg (one tab) twice daily after that.  Future refills will need to come from clinic.             atenolol (TENORMIN) 100 MG tablet  Take 1 tablet (100 mg) by mouth daily             budesonide-formoterol (SYMBICORT) 160-4.5 MCG/ACT Inhaler  Inhale 2 puffs into the lungs 2 times daily             cholecalciferol (VITAMIN D) 1000 UNIT tablet  Take 2 tablets (2,000 Units) by mouth daily             Coenzyme Q10 (COQ10) 30 MG CAPS  Take 1 capsule by mouth daily as needed              Cranberry Extract 250 MG TABS  Take 1 tablet by mouth daily as needed              fluticasone (FLONASE) 50 MCG/ACT nasal spray  Spray 1-2 sprays into both nostrils daily             furosemide (LASIX) 40 MG tablet  Take 20 mg by mouth daily as needed              ipratropium - albuterol 0.5 mg/2.5 mg/3 mL (DUONEB) 0.5-2.5 (3) MG/3ML neb solution  Take 1 vial (3 mLs) by nebulization every 6 hours as needed for shortness of breath / dyspnea or wheezing             levothyroxine (SYNTHROID/LEVOTHROID) 50 MCG tablet  Take 50 mcg by mouth daily              losartan (COZAAR) 100 MG tablet  Take 1 tablet (100 mg) by mouth daily             menthol (ICY HOT) 5 % PTCH  Apply 1 patch topically 2 times daily as needed              Multiple Vitamins-Minerals (MULTIVITAMIN & MINERAL PO)  Take 1 tablet by mouth daily.             Nutritional Supplements (SALMON OIL) CAPS  Take 1 capsule by mouth daily              omeprazole 20 MG tablet  Take 1 tablet (20 mg) by mouth daily Take 30-60 minutes before a meal.             order for DME  Equipment being ordered: Nebulizer and neb supplies (tubing, etc)             order for DME  Equipment being ordered: 1: Gradient Compression Wraps; 2: BLE knee high 20-30 mm Hg compression stockings; 3: BLE thigh high 20-30 mm Hg compression stockings; 4: Cast Boots             order for DME  Equipment being ordered: 1: Custom/alternative BLE full leg velco/buckling compression garment             order for DME  Equipment being ordered: 4 wheeled walker with hand brakes and seat             pravastatin (PRAVACHOL) 20 MG tablet  Take 1 tablet (20 mg) by mouth daily             predniSONE (DELTASONE) 20 MG tablet  Take 20 mg by mouth daily for 3 days.             sennosides (SENOKOT) 8.6 MG tablet  Take 1 tablet by mouth 2 times daily             terazosin (HYTRIN) 2 MG capsule  Take 1 capsule (2 mg) by mouth At Bedtime             tiotropium (SPIRIVA HANDIHALER) 18 MCG inhaled capsule  Inhale contents of one capsule daily.             traMADol (ULTRAM) 50 MG tablet  TAKE 1 TABLET BY MOUTH TWICE DAILY AS NEEDED FOR PAIN                 Patient was informed to STOP taking the following HOME medications:   none    Patient was informed to start taking the following NEW medications:   Apixaban, tramadol    Patient was educated on the following for each discharge medication:  Rationale for therapy  Duration of treatment  Dosing and or monitoring drug levels  Common side effects  Importance of compliance  Drug/food interactions  Missed doses  Self  monitoring parameters    OUTCOMES:  Patient verbalized understanding. Patient is aware to take apixaban 10 mg (2 tablets) twice daily for 6 days, then 5 mg (1 tablet) twice daily.   Left written materials and instructions (Clinical notes from [facility]) - apixaban.   IMPORTANT FOLLOW UP NOTES:   Follow up with primary care provider within 7 days for hospital follow-up.

## 2019-05-13 NOTE — PROGRESS NOTES
PRIMARY DIAGNOSIS: DVT  OUTPATIENT/OBSERVATION GOALS TO BE MET BEFORE DISCHARGE:  1. Anticoagulant treatment initiated: Yes; Eliquis    2. Diagnostic testing complete (if applicable): Yes    3. Adequate home care or support arranged for LMWH administration: Yes    4. Return to near baseline physical activity: PT eval to be completed    5. Pain Status: Improved-controlled with oral pain medications.    Discharge Planner Nurse   Safe discharge environment identified: No  Barriers to discharge: Yes       Entered by: Jasmin Batista 05/13/2019 10:20 AM     Please review provider order for any additional goals.   Nurse to notify provider when observation goals have been met and patient is ready for discharge.

## 2019-05-13 NOTE — PLAN OF CARE
PRIMARY DIAGNOSIS: DVT  OUTPATIENT/OBSERVATION GOALS TO BE MET BEFORE DISCHARGE:  1. Anticoagulant treatment initiated: Yes; Eliquis    2. Diagnostic testing complete (if applicable): Yes    3. Adequate home care or support arranged for LMWH administration: No    4. Return to near baseline physical activity: waiting for PT eval    5. Pain Status: Improved-controlled with oral pain medications.    Discharge Planner Nurse   Safe discharge environment identified: No  Barriers to discharge: Yes       Entered by: Digna Rivera 05/13/2019 3:48 AM     Please review provider order for any additional goals.   Nurse to notify provider when observation goals have been met and patient is ready for discharge.

## 2019-05-13 NOTE — DISCHARGE SUMMARY
[facility]  Discharge Summary  Name: Marino Prajapati    MRN: [id number]  YOB: 1935    Age: 83 year old  Date of Discharge:  5/13/2019  Date of Admission: 5/11/2019  Primary Care Provider: Vivian Blue  Discharge Physician:  Edmar Engle MD  Discharging Service:  Hospitalist      Hospital Course/Discharge Diagnoses:  Marino Prajapati is a 83 year old female with past medical history of recently diagnosed PE (on Eliquis), COPD, HTN, HLD and GERD who was hospitalized 4/12-4/19 for acute PE and hypoxic respiratory failure and discharged to TCU who was admitted on 5/11/2019 after being home for 10 days after discharge from TCU with right leg pain and shortness of breath.  She was found to have LLE DVT (in the setting of NOT taking her Eliquis) and hypoxic respiratory failure.  Remains weak but now mobilizing better and requests discharge home rather than TCU.  She declined my offer for home services.  She is being re-started on eliquis at DVT/PE treatment doses as she has been off of this medication for over 10 days (left [facility] May 1, admitted here May 11).     Problem List/Assessment and Plan:      1. Acute Hypoxic Respiratory Failure, resolved (currently on room air): Patient reports longstanding chronic cough.  She was recently hospitalized for acute PE (see below) and had hypoxic respiratory failure related to that.  She again had hypoxia upon presentation. She had CXR on admission which showed persistent bibasilar atelectasis or scarring.  I suspect that her hypoxia is multifactorial from recent PE as well as COPD exacerbation, see below.    - Treatment for PE and COPD as below  - now on room air.     2. Recent PE with new LLE DVT: Diagnose with PE during recent admission.  Had been on heparin drip but then transitioned to Eliquis.  Unfortunately she was not taking Eliquis for the past 10 days after she was discharged home from TCU.  She does have a LLE DVT  but this is NOT a failure of anticoagulation given she was off Eliquis.  Eliquis has been resumed.  - Continue Eliquis, 10 mg BID x 6 days more then 5 mg BID after that.  Follow up in clinic.     3. Acute COPD Exacerbation: Has worsening cough and intermittent wheezing.  Treating for exacerbation with Prednisone.  Continuing PTA inhaler regimen and Duonebs also available PRN.  Will continue prednisone 3 days more.     4. LE Edema: Has LE edema and PTA on Lasix 20 mg every day PRN (leg swelling).  Initially concern for CHF exacerbation but CXR without edema and TTE shows EF of 60-65% with normal diastolic function.  She states after initial dose of IV Lasix in the ER her LE edema got significantly better and her leg pain nearly resolved.    - Lasix 20 mg QD     5. Leukocytosis: WBC was elevated to 16.1 on admission.  CXR without infiltrate.  UA not consistent with infection.  WBC improving to 12.7 today without antibiotics.  Do not suspect bacterial infection, no indication for antibiotics.  Prednisone likely contributing to elevated wbc.     6. Diffuse Weakness: improving.    Recently finished TCU stay.  PT consulted.  Discussed possible rehab and patient does not want to do this again.  Explained that she needs to ambulate and be able to do this independently to go home. She declined my offer for home services.  Physical therapy did see her today and deemed her safe for discharge home.  Oxygen saturations were 91% at the end of the session.     7. HTN: resume PTA Atenolol, Amlodipine and as above Lasix daily at this time.         Discharge Disposition:  Discharged to home     Allergies:  No Known Allergies     Discharge Medications:        Review of your medicines      CONTINUE these medicines which may have CHANGED, or have new prescriptions. If we are uncertain of the size of tablets/capsules you have at home, strength may be listed as something that might have changed.      Dose / Directions   apixaban  ANTICOAGULANT 5 MG tablet  Commonly known as:  ELIQUIS  This may have changed:  additional instructions  Used for:  Other acute pulmonary embolism without acute cor pulmonale (H)      Dose:  10 mg  Take 2 tablets (10 mg) by mouth 2 times daily For 6 days then 5 mg (one tab) twice daily after that.  Future refills will need to come from clinic.  Quantity:  74 tablet  Refills:  0     traMADol 50 MG tablet  Commonly known as:  ULTRAM  This may have changed:  Another medication with the same name was removed. Continue taking this medication, and follow the directions you see here.  Used for:  Chronic pain of both knees      TAKE 1 TABLET BY MOUTH TWICE DAILY AS NEEDED FOR PAIN  Quantity:  16 tablet  Refills:  0        CONTINUE these medicines which have NOT CHANGED      Dose / Directions   albuterol 108 (90 Base) MCG/ACT inhaler  Commonly known as:  PROAIR HFA/PROVENTIL HFA/VENTOLIN HFA  Used for:  Asthma,       Dose:  2 puff  Inhale 2 puffs into the lungs every 6 hours as needed for shortness of breath / dyspnea or wheezing  Quantity:  1 Inhaler  Refills:  3     amLODIPine 10 MG tablet  Commonly known as:  NORVASC  Used for:  HTN, goal below 140/90, Hyperlipidemia LDL goal <130, Bilateral leg edema      Dose:  5 mg  Take 0.5 tablets (5 mg) by mouth daily  Quantity:  90 tablet  Refills:  1     atenolol 100 MG tablet  Commonly known as:  TENORMIN  Used for:  HTN, goal below 140/90, Hyperlipidemia LDL goal <130, Bilateral leg edema      Dose:  100 mg  Take 1 tablet (100 mg) by mouth daily  Quantity:  90 tablet  Refills:  1     budesonide-formoterol 160-4.5 MCG/ACT Inhaler  Commonly known as:  SYMBICORT  Used for:  Cough, Asthma,       Dose:  2 puff  Inhale 2 puffs into the lungs 2 times daily  Quantity:  3 Inhaler  Refills:  1     CoQ10 30 MG Caps      Dose:  1 capsule  Take 1 capsule by mouth daily as needed  Quantity:  30 capsule  Refills:  0     Cranberry Extract 250 MG Tabs      Dose:  1 tablet  Take 1 tablet by mouth  daily as needed  Refills:  0     fluticasone 50 MCG/ACT nasal spray  Commonly known as:  FLONASE  Used for:  Postnasal drip      Dose:  1-2 spray  Spray 1-2 sprays into both nostrils daily  Quantity:  3 Bottle  Refills:  0     furosemide 40 MG tablet  Commonly known as:  LASIX      Dose:  20 mg  Take 20 mg by mouth daily as needed  Refills:  0     ipratropium - albuterol 0.5 mg/2.5 mg/3 mL 0.5-2.5 (3) MG/3ML neb solution  Commonly known as:  DUONEB  Used for:  Cough, Asthma,       Dose:  1 vial  Take 1 vial (3 mLs) by nebulization every 6 hours as needed for shortness of breath / dyspnea or wheezing  Quantity:  100 vial  Refills:  1     levothyroxine 50 MCG tablet  Commonly known as:  SYNTHROID/LEVOTHROID      Dose:  50 mcg  Take 50 mcg by mouth daily  Refills:  0     losartan 100 MG tablet  Commonly known as:  COZAAR  Used for:  HTN, goal below 140/90, Hyperlipidemia LDL goal <130, Bilateral leg edema      Dose:  100 mg  Take 1 tablet (100 mg) by mouth daily  Quantity:  90 tablet  Refills:  1     menthol 5 % Ptch  Commonly known as:  ICY HOT      Dose:  1 patch  Apply 1 patch topically 2 times daily as needed  Refills:  0     MULTIVITAMIN & MINERAL PO      Dose:  1 tablet  Take 1 tablet by mouth daily.  Refills:  0     omeprazole 20 MG tablet  Used for:  Nausea      Dose:  20 mg  Take 1 tablet (20 mg) by mouth daily Take 30-60 minutes before a meal.  Quantity:  90 tablet  Refills:  0     * order for DME  Used for:  Cough, Asthma,       Equipment being ordered: Nebulizer and neb supplies (tubing, etc)  Quantity:  1 Device  Refills:  0     * order for DME  Used for:  Bilateral leg edema      Equipment being ordered: 1: Gradient Compression Wraps; 2: BLE knee high 20-30 mm Hg compression stockings; 3: BLE thigh high 20-30 mm Hg compression stockings; 4: Cast Boots  Quantity:  1 each  Refills:  0     * order for DME  Used for:  Lymphedema      Equipment being ordered: 1: Custom/alternative BLE full leg velco/buckling  compression garment  Quantity:  1 each  Refills:  0     * order for DME  Used for:  Primary osteoarthritis of both knees      Equipment being ordered: 4 wheeled walker with hand brakes and seat  Quantity:  1 each  Refills:  0     pravastatin 20 MG tablet  Commonly known as:  PRAVACHOL  Used for:  Hyperlipidemia LDL goal <130, HTN, goal below 140/90, Bilateral leg edema      Dose:  20 mg  Take 1 tablet (20 mg) by mouth daily  Quantity:  90 tablet  Refills:  1     [place] Oil Caps      Dose:  1 capsule  Take 1 capsule by mouth daily  Refills:  0     sennosides 8.6 MG tablet  Commonly known as:  SENOKOT  Used for:  Chronic idiopathic constipation      Dose:  1 tablet  Take 1 tablet by mouth 2 times daily  Refills:  0     terazosin 2 MG capsule  Commonly known as:  HYTRIN  Used for:  HTN, goal below 140/90, Hyperlipidemia LDL goal <130, Bilateral leg edema      Dose:  2 mg  Take 1 capsule (2 mg) by mouth At Bedtime  Quantity:  90 capsule  Refills:  1     tiotropium 18 MCG inhaled capsule  Commonly known as:  SPIRIVA HANDIHALER  Used for:  Asthma,       Inhale contents of one capsule daily.  Quantity:  90 capsule  Refills:  0     TYLENOL 325 MG tablet  Generic drug:  acetaminophen      Dose:  650 mg  Take 650 mg by mouth 4 times daily AND Give 650 mg by mouth every 12 hours as needed for back pain  Quantity:  250 tablet  Refills:  11     vitamin D3 1000 units (25 mcg) tablet  Commonly known as:  CHOLECALCIFEROL  Used for:  Cough, Asthma,       Dose:  2000 Units  Take 2 tablets (2,000 Units) by mouth daily  Quantity:  100 tablet  Refills:  3         * This list has 4 medication(s) that are the same as other medications prescribed for you. Read the directions carefully, and ask your doctor or other care provider to review them with you.               Where to get your medicines      Some of these will need a paper prescription and others can be bought over the counter. Ask your nurse if you have questions.    Bring a paper  "prescription for each of these medications    apixaban ANTICOAGULANT 5 MG tablet         Condition on Discharge:  Discharge condition: Stable       Code status on discharge: DNR / DNI     History of Illness:  See detailed admission note for full details.    Physical Exam:  Vital signs:  Temp: 97.1  F (36.2  C) Temp src: Oral BP: 129/59   Heart Rate: 61 Resp: 20 SpO2: 94 % O2 Device: Nasal cannula Oxygen Delivery: 1 LPM   Weight: 98.2 kg (216 lb 8 oz)  Estimated body mass index is 34.42 kg/m  as calculated from the following:    Height as of 4/30/19: 1.689 m (5' 6.5\").    Weight as of this encounter: 98.2 kg (216 lb 8 oz).    Wt Readings from Last 1 Encounters:   05/13/19 98.2 kg (216 lb 8 oz)     General: Alert, awake, no acute distress.  HEENT: NC/AT, eyes anicteric, external occular movements intact, face symmetric.    Cardiac: RRR, S1, S2.  No murmurs appreciated.  Pulmonary: Overall good air movement, faint end expiratory wheezes.  Intermittent dry cough.  Normal chest rise, normal work of breathing.    Abdomen: soft, non-tender, non-distended.  Bowel Sounds Present.  No guarding.  Extremities: Bilateral trace to 1+ edema.  Otherwise no deformities.  Warm, well perfused.  Skin: no rashes or lesions noted.  Warm and Dry.  Neuro: No focal deficits noted.  Speech clear.  Coordination and strength grossly normal.  Psych: Appropriate affect.    Procedures other than Imaging:  none     Imaging:  Results for orders placed or performed during the hospital encounter of 05/11/19   XR Chest 2 Views    Narrative    XR CHEST 2 VW  5/11/2019 3:49 AM     HISTORY: Cough, dyspnea, leg swelling.    COMPARISON: 4/9/2019.    FINDINGS: The heart is enlarged. There is no pulmonary edema. The  thoracic aorta is calcified and mildly tortuous. There is again  bibasilar atelectasis or scarring. The lungs are otherwise clear. No  pneumothorax or pleural effusion. Degenerative disease and prominent  kyphosis in the thoracic spine. " Degenerative disease in the shoulders.      Impression    IMPRESSION: Persistent bibasilar atelectasis or scarring.    BRAD MOSHER MD   US Lower Extremity Venous Duplex Bilateral    Narrative    US LOWER EXTREMITY VENOUS DUPLEX BILATERAL5/11/2019 9:25 AM    HISTORY: Lower extremity swelling, history of pulmonary embolism.  Patient has right thigh pain, and swelling of both legs, not sure if  she was on her oral AC for the last 10 days.    TECHNIQUE: Venous Doppler US.?Color flow and spectral Doppler with  waveform analysis performed.    FINDINGS: There is occlusive thrombus involving the distal portion of  the left posterior tibial vein. The left peroneal veins were not seen.  On the right, no evidence of lower extremity deep venous thrombosis.       Impression    IMPRESSION: Positive for left calf deep venous thrombosis.    CHARU KWAN MD          Recent Lab Results:  Recent Labs   Lab 05/13/19  0735 05/12/19  0651 05/11/19  0314   WBC 12.6* 12.7* 16.1*   HGB 13.1 12.3 12.9   HCT 40.3 35.3 39.9   MCV 93 95 92    256 235          Lab Results   Component Value Date     05/13/2019     05/12/2019     05/11/2019    Lab Results   Component Value Date    CHLORIDE 106 05/13/2019    CHLORIDE 105 05/12/2019    CHLORIDE 104 05/11/2019    Lab Results   Component Value Date    BUN 16 05/13/2019    BUN 17 05/12/2019    BUN 19 05/11/2019      Lab Results   Component Value Date    POTASSIUM 3.5 05/13/2019    POTASSIUM 3.9 05/12/2019    POTASSIUM 3.3 05/12/2019    Lab Results   Component Value Date    CO2 31 05/13/2019    CO2 29 05/12/2019    CO2 28 05/11/2019    Lab Results   Component Value Date    CR 0.66 05/13/2019    CR 0.70 05/12/2019    CR 0.74 05/11/2019             Pending Results:    Unresulted Labs Ordered in the Past 30 Days of this Admission     No orders found from 3/12/2019 to 5/12/2019.           Discharge Instructions and Follow-Up:   Discharge Procedure Orders   Reason for your  hospital stay   Order Comments: You were admitted for right leg DVT which likely developed due to not continuing your eliquis after leaving your rehab center.     Follow-up and recommended labs and tests    Order Comments: Follow up with primary care provider, Vivian Blue, within 7 days for hospital follow- up.  The following labs/tests are recommended: recheck oxygen level, discuss plan for refills of eliquis.     Activity   Order Comments: Your activity upon discharge: activity as tolerated     Order Specific Question Answer Comments   Is discharge order? Yes      Diet   Order Comments: Follow this diet upon discharge: Orders Placed This Encounter      Regular Diet Adult     Order Specific Question Answer Comments   Is discharge order? Yes        Total time spent in face to face contact with the patient and coordinating discharge was:  50 Minutes.

## 2019-05-14 NOTE — PROGRESS NOTES
Transition Communication Hand-off for Care Transitions to Next Level of Care Provider    Name: Marino Prajapati  : 1935  MRN #: [id number]  Primary Care Provider: Vivian Blue  Primary Care MD Name: Dr. Blue  Primary Clinic: [address] [facility] [address]  Primary Care Clinic Name: [facility]  Reason for Hospitalization:  Acute respiratory failure with hypoxia (H) [J96.01]  Admit Date/Time: 2019  2:17 AM  Discharge Date: 19  Payor Source: Payor: [facility] [facility] / Plan: [facility] [facility] MEDICARE / Product Type: HMO /     Readmission Assessment Measure (JANA) Risk Score/category: average           Reason for Communication Hand-off Referral: Admission diagnoses: COPD    Discharge Plan: home with SO       Concern for non-adherence with plan of care:   Y/N no  Discharge Needs Assessment:  Needs      Most Recent Value   Equipment Currently Used at Home  walker, rolling [ordered a wide double fww, has a std fww/ 4ww]          Already enrolled in Tele-monitoring program and name of program:  none  Follow-up specialty is recommended: No    Follow-up plan:  No future appointments.    Any outstanding tests or procedures:              Key Recommendations:  pt admitted with Acute PE hypoxia copd exacerbation. Pt uses inhalers and nebulizer's at home to help manage her copd. Pt states she has been dealing with a cough for quite a while. She was recently at [facility] for rehab and was discharged on . She states she did not have a very good experience there. COPD action plan was reviewed and given to the pt. Pt uses a walker with mobility and her significant other of 20 years drives her to doctor appointments.  Please follow up with her for new medication management in the setting of her PE.    Twila Arroyo    AVS/Discharge Summary is the source of truth; this is a helpful guide for improved communication of patient story

## 2019-05-15 ENCOUNTER — PATIENT OUTREACH (OUTPATIENT)
Dept: CARE COORDINATION | Facility: CLINIC | Age: 84
End: 2019-05-15

## 2019-05-15 NOTE — PROGRESS NOTES
Clinic Care Coordination Contact    Clinic Care Coordination Contact  OUTREACH    Primary Diagnosis: COPD    Chief Complaint   Patient presents with     Clinic Care Coordination - [facility]     Hospital discharge to home     Call placed to Patient. She reports that she is doing well since her discharge to home. She reports that she was started on new medications and has been managing them well. Pt endorses no questions nor concerns prior to her Friday clinic appointment. Pt feels able to physically leave home and attend her appointment.     Goals:   Goals        General    Functional (pt-stated)     Notes - Note edited  5/7/2019  3:26 PM by Néstor Mei LGSW    1. Goal Statement: I want to be strong enough to leave my home.  Measure of Success: Ability to attend appointment with PCP.   Supportive Steps to Achieve: Continue HEP, schedule appointment and transportation, attend appointment.  Barriers: Unknown how long it will take for Pt's strength to be regained.  Strengths: Motivated to reach goal, adherent to exercise program.  Date to Achieve By: 7.1.2019  Patient expressed understanding of goal: Pt reports understanding and denies any additional questions or concerns at this times. DEBBIE CC engaged in AIDET communication during encounter.              1. As of today's date 5/15/2019 goal is met at 26 - 50%.   Goal Status:  Showing progress    Outreach Frequency: monthly  Future Appointments              In 2 days Vivian lBue MD [place], [place]          Plan: [facility] CC will follow-up in 2-4 business days.    JAIMIE Johnson  Clinic Care Coordinator  Ph. [phone]  [email or web address]

## 2019-05-15 NOTE — PROGRESS NOTES
Clinic Care Coordination Contact  DEBBIE CC Follow-Up    Call placed to Patient in follow-up to Pt's recent hospital discharge. A family member answered and asked that SW CC call back later this morning.    Plan: DEBBIE CC will follow-up with Patient this morning.    Néstor Epps [facility]  Clinic Care Coordinator  Ph. [phone]  [email or web address]

## 2019-05-22 RX ORDER — FUROSEMIDE 40 MG
20 TABLET ORAL DAILY PRN
Status: CANCELLED | OUTPATIENT
Start: 2019-05-22

## 2019-05-29 DIAGNOSIS — M25.562 CHRONIC PAIN OF BOTH KNEES: ICD-10-CM

## 2019-05-29 DIAGNOSIS — R11.0 NAUSEA: ICD-10-CM

## 2019-05-29 DIAGNOSIS — M25.561 CHRONIC PAIN OF BOTH KNEES: ICD-10-CM

## 2019-05-29 DIAGNOSIS — I10 HTN, GOAL BELOW 140/90: ICD-10-CM

## 2019-05-29 DIAGNOSIS — R60.0 BILATERAL LEG EDEMA: ICD-10-CM

## 2019-05-29 DIAGNOSIS — G89.29 CHRONIC PAIN OF BOTH KNEES: ICD-10-CM

## 2019-05-29 NOTE — TELEPHONE ENCOUNTER
"Contacted patient and informed her she is due for labs and a follow-up appointment. Patient states she doesn't feel like she can get to the clinic right now - concerned about falling. She states she is working on her walking and will schedule an appointment when she feels stronger.    Patient also requesting refills for Omeprazole 20 mg and Tramadol. Patient states she restarted Omeprazole after hospitalization and would need a new prescription if Dr. Mendes wants her to continue taking this.     Requested Prescriptions   Pending Prescriptions Disp Refills     furosemide (LASIX) 40 MG tablet [Pharmacy Med Name: FUROSEMIDE TABS 40MG]  Furosemide listed as historic, but has been ordered in the past by Dr. Mendes.   Per chart review:   Last Written Prescription Date:  1/25/19  Last Fill Quantity: 90,  # refills: 0   Last office visit: 11/9/2018 with prescribing provider:  Dr. Mendes   Future Office Visit:    90 tablet 0     Sig: TAKE ONE-HALF (1/2) TO ONE TABLET DAILY AS INSTRUCTED       Diuretics (Including Combos) Protocol Passed - 5/29/2019 12:04 PM        Passed - Blood pressure under 140/90 in past 12 months     BP Readings from Last 3 Encounters:   05/13/19 129/59   04/30/19 152/63   04/26/19 (!) 162/92                 Passed - Recent (12 mo) or future (30 days) visit within the authorizing provider's specialty     Patient had office visit in the last 12 months or has a visit in the next 30 days with authorizing provider or within the authorizing provider's specialty.  See \"Patient Info\" tab in inbasket, or \"Choose Columns\" in Meds & Orders section of the refill encounter.              Passed - Medication is active on med list        Passed - Patient is age 18 or older        Passed - No active pregancy on record        Passed - Normal serum creatinine on file in past 12 months     Recent Labs   Lab Test 05/13/19  0735   CR 0.66              Passed - Normal serum potassium on file in past 12 months     Recent " Labs   Lab Test 05/13/19  0735   POTASSIUM 3.5                    Passed - Normal serum sodium on file in past 12 months     Recent Labs   Lab Test 05/13/19  0735                 Passed - No positive pregnancy test in past 12 months    Routing refill request to provider for review/approval because:  Patient needs to be seen because:  Due for appointment, but patient states she is unable to come in right now.              traMADol (ULTRAM) 50 MG tablet  Last Written Prescription Date:  4/12/19 (Dr Batista at hospital discharge)  Last Fill Quantity: 16,  # refills: 0   Dr. Mendes was previously ordering this for 120 tablets and last order from Dr. Mendes written 2/26/19. Order pended with previous quantity per patient request.    Last office visit: 11/9/2018 with prescribing provider:  Dr. Mendes   Future Office Visit:    16 tablet 0     Sig: TAKE 1 TABLET BY MOUTH TWICE DAILY AS NEEDED FOR PAIN       There is no refill protocol information for this order    Encounter-Level CSA - 05/10/2016:    Controlled Substance Agreement - Scan on 5/16/2016 10:48 AM: [place] CONTROLLED SUBSTANCE AGREEMENT, 5/10/16 (below)       Patient-Level CSA:    There are no patient-level csa.        RX [place] reviewed:  reviewed- recommend provider review, received Tramadol from other providers    [place]:  [email or web address]     Routing refill request to provider for review/approval because:  Drug not on the [facility] refill protocol              omeprazole 20 MG tablet  Last Written Prescription Date:  5/31/16  Last Fill Quantity: 90,  # refills: 0   Last office visit: 11/9/2018 with prescribing provider:  Dr. Mendes   Future Office Visit:    90 tablet 0     Sig: Take 1 tablet (20 mg) by mouth daily Take 30-60 minutes before a meal.       PPI Protocol Failed - 5/29/2019 12:04 PM        Failed - No diagnosis of osteoporosis on record        Passed - Not on Clopidogrel (unless Pantoprazole ordered)     "    Passed - Recent (12 mo) or future (30 days) visit within the authorizing provider's specialty     Patient had office visit in the last 12 months or has a visit in the next 30 days with authorizing provider or within the authorizing provider's specialty.  See \"Patient Info\" tab in inbasket, or \"Choose Columns\" in Meds & Orders section of the refill encounter.              Passed - Medication is active on med list        Passed - Patient is age 18 or older        Passed - No active pregnacy on record        Passed - No positive pregnancy test in past 12 months    Routing refill request to provider for review/approval because:  A break in medication  Patient has diagnosis of Osteoporosis               "

## 2019-05-29 NOTE — TELEPHONE ENCOUNTER
"Requested Prescriptions   Pending Prescriptions Disp Refills     furosemide (LASIX) 40 MG tablet [Pharmacy Med Name: FUROSEMIDE TABS 40MG] 90 tablet 0     Sig: TAKE ONE-HALF (1/2) TO ONE TABLET DAILY AS INSTRUCTED   Last Written Prescription Date:  historic  Last Fill Quantity: n/a,  # refills: n/a   Last office visit: 11/9/2018 with prescribing provider:     Future Office Visit:      Diuretics (Including Combos) Protocol Passed - 5/29/2019 10:25 AM        Passed - Blood pressure under 140/90 in past 12 months     BP Readings from Last 3 Encounters:   05/13/19 129/59   04/30/19 152/63   04/26/19 (!) 162/92                 Passed - Recent (12 mo) or future (30 days) visit within the authorizing provider's specialty     Patient had office visit in the last 12 months or has a visit in the next 30 days with authorizing provider or within the authorizing provider's specialty.  See \"Patient Info\" tab in inbasket, or \"Choose Columns\" in Meds & Orders section of the refill encounter.              Passed - Medication is active on med list        Passed - Patient is age 18 or older        Passed - No active pregancy on record        Passed - Normal serum creatinine on file in past 12 months     Recent Labs   Lab Test 05/13/19  0735   CR 0.66              Passed - Normal serum potassium on file in past 12 months     Recent Labs   Lab Test 05/13/19  0735   POTASSIUM 3.5                    Passed - Normal serum sodium on file in past 12 months     Recent Labs   Lab Test 05/13/19  0735                 Passed - No positive pregnancy test in past 12 months        "

## 2019-05-30 RX ORDER — NICOTINE POLACRILEX 4 MG/1
20 GUM, CHEWING ORAL DAILY
Qty: 90 TABLET | Refills: 0 | Status: SHIPPED | OUTPATIENT
Start: 2019-05-30 | End: 2019-12-05

## 2019-05-30 RX ORDER — FUROSEMIDE 40 MG
TABLET ORAL
Qty: 90 TABLET | Refills: 0 | Status: SHIPPED | OUTPATIENT
Start: 2019-05-30 | End: 2019-05-31

## 2019-05-30 RX ORDER — TRAMADOL HYDROCHLORIDE 50 MG/1
TABLET ORAL
Qty: 120 TABLET | Refills: 0 | Status: SHIPPED | OUTPATIENT
Start: 2019-05-30 | End: 2019-08-08

## 2019-05-31 ENCOUNTER — TELEPHONE (OUTPATIENT)
Dept: INTERNAL MEDICINE | Facility: CLINIC | Age: 84
End: 2019-05-31

## 2019-05-31 DIAGNOSIS — R60.0 BILATERAL LEG EDEMA: ICD-10-CM

## 2019-05-31 DIAGNOSIS — I10 HTN, GOAL BELOW 140/90: ICD-10-CM

## 2019-05-31 RX ORDER — FUROSEMIDE 40 MG
TABLET ORAL
Qty: 14 TABLET | Refills: 0 | Status: SHIPPED | OUTPATIENT
Start: 2019-05-31 | End: 2019-08-26

## 2019-05-31 NOTE — TELEPHONE ENCOUNTER
Patient is having furosemide (LASIX) 40 MG tablet filled but she will run out before [facility] will get her the meds. Can she have an emergency small fill to get her for two weeks? Patient has only two days left.    Ok to call and Lm [phone]

## 2019-06-03 NOTE — PROGRESS NOTES
Clinic Care Coordination Contact    Clinic Care Coordination Contact  SW CC Follow-Up    Per chart review, Patient has not yet presented to the clinic as previously planned. Pt's scheduled appointment on 5.17.2019 was cancelled.    Call placed to Patient and voicemail left requesting a call back.     Goals:   Goals        General    Functional (pt-stated)     Notes - Note edited  5/7/2019  3:26 PM by Néstor Mei LGSW    1. Goal Statement: I want to be strong enough to leave my home.  Measure of Success: Ability to attend appointment with PCP.   Supportive Steps to Achieve: Continue HEP, schedule appointment and transportation, attend appointment.  Barriers: Unknown how long it will take for Pt's strength to be regained.  Strengths: Motivated to reach goal, adherent to exercise program.  Date to Achieve By: 7.1.2019  Patient expressed understanding of goal: Pt reports understanding and denies any additional questions or concerns at this times. SW CC engaged in AIDET communication during encounter.              1. As of today's date 6/3/2019 goal is met at 0 - 25%.   Goal Status:  Ongoing      Plan: SW CC will await a call back from Patient. SW CC will follow-up in 3-5 business days with no call back.    JAIMIE Johnson  Clinic Care Coordinator  Ph. [phone]  [email or web address]

## 2019-06-06 ENCOUNTER — TELEPHONE (OUTPATIENT)
Dept: INTERNAL MEDICINE | Facility: CLINIC | Age: 84
End: 2019-06-06

## 2019-06-06 DIAGNOSIS — I26.99 OTHER ACUTE PULMONARY EMBOLISM WITHOUT ACUTE COR PULMONALE (H): ICD-10-CM

## 2019-06-06 NOTE — TELEPHONE ENCOUNTER
Called patient and left message to call the clinic back.  Please ask her what dose of Eliquis she is taking and if she needs a refill.

## 2019-06-06 NOTE — TELEPHONE ENCOUNTER
Requested Prescriptions   Pending Prescriptions Disp Refills     apixaban ANTICOAGULANT (ELIQUIS) 5 MG tablet  Last Written Prescription Date:  5/13/19 (Hospitalist)  Last Fill Quantity: 74,  # refills: 0   Last office visit: 11/9/2018 with prescribing provider:  Cinthya   Future Office Visit: 6/14/19  Next 5 appointments (look out 90 days)    Jun 14, 2019  3:00 PM CDT  [facility] [address]  [facility] [address]  [phone]        74 tablet 0     Sig: Take 2 tablets (10 mg) by mouth 2 times daily For 6 days then 5 mg (one tab) twice daily after that.  Future refills will need to come from clinic.       Direct Oral Anticoagulant Agents Failed - 6/6/2019  9:48 AM        Failed - Patient is 18-79 years of age        Passed - Normal Platelets on file in past 12 months     Recent Labs   Lab Test 05/13/19  0735                  Passed - Medication is active on med list        Passed - Serum creatinine less than or equal to 1.4 on file in past 12 mos     Recent Labs   Lab Test 05/13/19  0735   CR 0.66             Passed - Weight is greater than 60 kg for the past year     Wt Readings from Last 3 Encounters:   05/13/19 98.2 kg (216 lb 8 oz)   04/30/19 98 kg (216 lb)   04/26/19 97.2 kg (214 lb 3.2 oz)             Passed - No active pregnancy on record        Passed - No positive pregnancy test within past 12 months        Passed - Recent (6 mo) or future (30 days) visit within the authorizing provider's specialty

## 2019-06-06 NOTE — TELEPHONE ENCOUNTER
"Routing refill request to provider for review/approval because:  Prescribed per hospitalist 5-13-19.  Per discharge summary dictation:  \"Unfortunately she was not taking Eliquis for the past 10 days after she was discharged home from TCU.  She does have a LLE DVT but this is NOT a failure of anticoagulation given she was off Eliquis.  Eliquis has been resumed.  - Continue Eliquis, 10 mg BID x 6 days more then 5 mg BID after that.\"    Please advise, thanks.        "

## 2019-06-07 NOTE — TELEPHONE ENCOUNTER
Please verify that Eliquis 5 mg twice daily is appropriate dosing for patient. Per message below pharmacy is questioning dosage.

## 2019-06-07 NOTE — TELEPHONE ENCOUNTER
Kelley from [facility] called to verify the dosage of Eliquis (not a usual dosage for seniors). Please call back [phone] use ref # [id number].

## 2019-06-10 ENCOUNTER — TELEPHONE (OUTPATIENT)
Dept: INTERNAL MEDICINE | Facility: CLINIC | Age: 84
End: 2019-06-10

## 2019-06-12 NOTE — TELEPHONE ENCOUNTER
Called and spoke with Annabel at [facility] and she stated that the prescription was shipped on 6/10/19.

## 2019-06-12 NOTE — PROGRESS NOTES
Clinic Care Coordination Contact  [facility]/Voicemail       Clinical Data: Care Coordinator Outreach  Of note, Patient has an appointment scheduled on Friday at 1500. Pt has previously cancelled scheduled clinic appointments.  Outreach attempted x 1.  Left message on voicemail with call back information and requested return call.  Plan:  Care Coordinator will try to reach patient again in 3-5 business days.      Néstor Epps [facility]  Clinic Care Coordinator  Ph. [phone]  [email or web address]

## 2019-06-13 PROBLEM — I26.92 ACUTE SADDLE PULMONARY EMBOLISM WITHOUT ACUTE COR PULMONALE (H): Status: ACTIVE | Noted: 2019-06-13

## 2019-08-01 ENCOUNTER — PATIENT OUTREACH (OUTPATIENT)
Dept: CARE COORDINATION | Facility: CLINIC | Age: 84
End: 2019-08-01

## 2019-08-01 NOTE — PROGRESS NOTES
Community Health Worker called the patient for Clinic Care Coordination outreach follow up on goals and action steps.  Spoke with Marino    Discussed the following:  Strong enough to leave home    Marino informed me that they're planning to purchase a ramp so that it'll be easier for her to get out of her home.    She's been continuing her home exercise programs (HEP) almost daily.    Patient Reports:  Marino doesn't feel like she has any health problems.  She's been monitoring her blood sugar levels at home and taking medications as directed.  She did state that she's running low on a few of her prescriptions, but couldn't remember which ones they were at this time.    ____________________  Next Outreach: 8/05/19  Planned Outreach Frequency: Monthly  Preferred Phone Number: [phone]    Assessment Date: 05/06/2019  Care Plan Completion Date: 05/06/2019    ____________________  Goals       Functional (pt-stated)      1. Goal Statement: I want to be strong enough to leave my home.  Measure of Success: Ability to attend appointment with PCP.   Supportive Steps to Achieve: Continue HEP, schedule appointment and transportation, attend appointment.  Barriers: Unknown how long it will take for Pt's strength to be regained.  Strengths: Motivated to reach goal, adherent to exercise program.  Date to Achieve By: 7.1.2019  Patient expressed understanding of goal: Pt reports understanding and denies any additional questions or concerns at this times. DEBBIE CC engaged in AIDET communication during encounter.    As of today's date 8/1/2019 goal is met at 0 - 25%.   Goal Status:  Active  Patient plans to get a ramp to help her get out of the home.  Continues HEP's daily

## 2019-08-08 DIAGNOSIS — R60.0 BILATERAL LEG EDEMA: ICD-10-CM

## 2019-08-08 DIAGNOSIS — G89.29 CHRONIC PAIN OF BOTH KNEES: ICD-10-CM

## 2019-08-08 DIAGNOSIS — I10 HTN, GOAL BELOW 140/90: ICD-10-CM

## 2019-08-08 DIAGNOSIS — E03.9 HYPOTHYROIDISM, UNSPECIFIED TYPE: Primary | ICD-10-CM

## 2019-08-08 DIAGNOSIS — E78.5 HYPERLIPIDEMIA LDL GOAL <130: Chronic | ICD-10-CM

## 2019-08-08 DIAGNOSIS — M25.561 CHRONIC PAIN OF BOTH KNEES: ICD-10-CM

## 2019-08-08 DIAGNOSIS — M25.562 CHRONIC PAIN OF BOTH KNEES: ICD-10-CM

## 2019-08-08 RX ORDER — PRAVASTATIN SODIUM 20 MG
20 TABLET ORAL DAILY
Qty: 90 TABLET | Refills: 0 | Status: SHIPPED | OUTPATIENT
Start: 2019-08-08 | End: 2019-11-06

## 2019-08-08 RX ORDER — TRAMADOL HYDROCHLORIDE 50 MG/1
TABLET ORAL
Qty: 120 TABLET | Refills: 0 | Status: SHIPPED | OUTPATIENT
Start: 2019-08-08 | End: 2019-10-18

## 2019-08-08 RX ORDER — LEVOTHYROXINE SODIUM 50 UG/1
50 TABLET ORAL DAILY
Qty: 90 TABLET | Refills: 0 | Status: SHIPPED | OUTPATIENT
Start: 2019-08-08 | End: 2019-11-06

## 2019-08-08 NOTE — TELEPHONE ENCOUNTER
"Requested Prescriptions   Pending Prescriptions Disp Refills     levothyroxine (SYNTHROID/LEVOTHROID) 50 MCG tablet Last Written Prescription Date:  unknown  Last Fill Quantity: unknown,  # refills: 0   Last office visit: 11/9/2018 with prescribing provider:  11/9/2018  Future Office Visit:         Sig: Take 1 tablet (50 mcg) by mouth daily       Thyroid Protocol Failed - 8/8/2019 12:04 PM        Failed - Normal TSH on file in past 12 months     Recent Labs   Lab Test 11/02/18  1215   TSH 4.72*              Passed - Patient is 12 years or older        Passed - Recent (12 mo) or future (30 days) visit within the authorizing provider's specialty     Patient had office visit in the last 12 months or has a visit in the next 30 days with authorizing provider or within the authorizing provider's specialty.  See \"Patient Info\" tab in inbasket, or \"Choose Columns\" in Meds & Orders section of the refill encounter.              Passed - Medication is active on med list        Passed - No active pregnancy on record     If patient is pregnant or has had a positive pregnancy test, please check TSH.          Passed - No positive pregnancy test in past 12 months     If patient is pregnant or has had a positive pregnancy test, please check TSH.          pravastatin (PRAVACHOL) 20 MG tablet Last Written Prescription Date:  1/25/2019  Last Fill Quantity: 90 tablet,  # refills: 1   Last office visit: 11/9/2018 with prescribing provider:  11/9/2018   Future Office Visit:   90 tablet 1     Sig: Take 1 tablet (20 mg) by mouth daily       Statins Protocol Passed - 8/8/2019 12:04 PM        Passed - LDL on file in past 12 months     Recent Labs   Lab Test 11/02/18  1215   LDL 99             Passed - No abnormal creatine kinase in past 12 months     No lab results found.             Passed - Recent (12 mo) or future (30 days) visit within the authorizing provider's specialty     Patient had office visit in the last 12 months or has a visit " "in the next 30 days with authorizing provider or within the authorizing provider's specialty.  See \"Patient Info\" tab in inbasket, or \"Choose Columns\" in Meds & Orders section of the refill encounter.              Passed - Medication is active on med list        Passed - Patient is age 18 or older        Passed - No active pregnancy on record        Passed - No positive pregnancy test in past 12 months        traMADol (ULTRAM) 50 MG tablet Last Written Prescription Date:  5/30/2019  Last Fill Quantity: 120 tablet,  # refills: 0   Last office visit: 11/9/2018 with prescribing provider:  11/9/2018   Future Office Visit:   120 tablet 0     Sig: TAKE 1 TABLET BY MOUTH TWICE DAILY AS NEEDED FOR PAIN       There is no refill protocol information for this order        "

## 2019-08-26 DIAGNOSIS — I10 HTN, GOAL BELOW 140/90: ICD-10-CM

## 2019-08-26 DIAGNOSIS — R60.0 BILATERAL LEG EDEMA: ICD-10-CM

## 2019-08-27 NOTE — TELEPHONE ENCOUNTER
"Requested Prescriptions   Pending Prescriptions Disp Refills     furosemide (LASIX) 40 MG tablet [Pharmacy Med Name: FUROSEMIDE TABS  Last Written Prescription Date:  5/31/2019  Last Fill Quantity: 14,  # refills: 0   Last office visit: 11/9/2018 with prescribing provider:     Future Office Visit:   40MG] 90 tablet 4     Sig: TAKE ONE-HALF (1/2) TO ONE TABLET DAILY AS INSTRUCTED       Diuretics (Including Combos) Protocol Passed - 8/26/2019  1:04 PM        Passed - Blood pressure under 140/90 in past 12 months     BP Readings from Last 3 Encounters:   05/13/19 129/59   04/30/19 152/63   04/26/19 (!) 162/92                 Passed - Recent (12 mo) or future (30 days) visit within the authorizing provider's specialty     Patient had office visit in the last 12 months or has a visit in the next 30 days with authorizing provider or within the authorizing provider's specialty.  See \"Patient Info\" tab in inbasket, or \"Choose Columns\" in Meds & Orders section of the refill encounter.              Passed - Medication is active on med list        Passed - Patient is age 18 or older        Passed - No active pregancy on record        Passed - Normal serum creatinine on file in past 12 months     Recent Labs   Lab Test 05/13/19  0735   CR 0.66              Passed - Normal serum potassium on file in past 12 months     Recent Labs   Lab Test 05/13/19  0735   POTASSIUM 3.5                    Passed - Normal serum sodium on file in past 12 months     Recent Labs   Lab Test 05/13/19  0735                 Passed - No positive pregnancy test in past 12 months        "

## 2019-08-28 RX ORDER — FUROSEMIDE 40 MG
TABLET ORAL
Qty: 90 TABLET | Refills: 0 | Status: SHIPPED | OUTPATIENT
Start: 2019-08-28 | End: 2019-12-05

## 2019-08-28 NOTE — TELEPHONE ENCOUNTER
Routing refill request to provider for review/approval because:  Maeve given x1 and patient did not follow up, please advise    Per chart, patient was due to return in May

## 2019-09-05 ENCOUNTER — PATIENT OUTREACH (OUTPATIENT)
Dept: CARE COORDINATION | Facility: CLINIC | Age: 84
End: 2019-09-05

## 2019-09-05 NOTE — PROGRESS NOTES
Scheduled Follow Up Call: Attempt 1   Community Health Worker called and left a message for the patient. If the patient is returning my call, please transfer the patient to Paul at [phone].    Spoke to spouse, Marino was not available.     ____________________  Next Outreach: 09/18/19  Planned Outreach Frequency: Monthly  Preferred Phone Number: [phone]    Assessment Date: 05/06/2019  Care Plan Completion Date: 05/06/2019    ____________________  Goals       Functional (pt-stated)      1. Goal Statement: I want to be strong enough to leave my home.  Measure of Success: Ability to attend appointment with PCP.   Supportive Steps to Achieve: Continue HEP, schedule appointment and transportation, attend appointment.  Barriers: Unknown how long it will take for Pt's strength to be regained.  Strengths: Motivated to reach goal, adherent to exercise program.  Date to Achieve By: 7.1.2019  Patient expressed understanding of goal: Pt reports understanding and denies any additional questions or concerns at this times. DEBBIE CC engaged in AIDET communication during encounter.    As of today's date 8/1/2019 goal is met at 0 - 25%.   Goal Status:  Active  Patient plans to get a ramp to help her get out of the home.  Continues HEP's daily

## 2019-09-06 ENCOUNTER — PATIENT OUTREACH (OUTPATIENT)
Dept: CARE COORDINATION | Facility: CLINIC | Age: 84
End: 2019-09-06

## 2019-09-06 NOTE — PROGRESS NOTES
Community Health Worker called the patient for Clinic Care Coordination outreach follow up on goals and action steps.  Spoke to Marino and Josue    Discussed the following  Strong enough to leave the home    Ramp is no longer an option    Per Josue, it was too expensive and they just don't have enough room to have one.    Josue has been helping her in and out of the home, however, she still fears that she'll fall.    Marino still continues working on her exercise programs on her own.    No additional resources needed at this time.  Josue states that we can speak to him regarding her goals as well.    ______________________  Next Outreach: 09/10/19  Planned Outreach Frequency: Monthly  Preferred Phone Number: [phone]   Can also talk to Josue(spouse)     Assessment Date: 05/06/2019  Care Plan Completion Date: 05/06/2019  _____________________  Goals       Functional (pt-stated)      1. Goal Statement: I want to be strong enough to leave my home.  Measure of Success: Ability to attend appointment with PCP.   Supportive Steps to Achieve: Continue HEP, schedule appointment and transportation, attend appointment.  Barriers: Unknown how long it will take for Pt's strength to be regained.  Strengths: Motivated to reach goal, adherent to exercise program.  Date to Achieve By: 7.1.2019  Patient expressed understanding of goal: Pt reports understanding and denies any additional questions or concerns at this times. DEBBIE CC engaged in AIDET communication during encounter.    As of today's date 9/6/2019 goal is met at 26 - 50%.   Goal Status:  Ongoing  Continues to work on exercises

## 2019-09-11 DIAGNOSIS — R60.0 BILATERAL LEG EDEMA: ICD-10-CM

## 2019-09-11 DIAGNOSIS — I10 HTN, GOAL BELOW 140/90: ICD-10-CM

## 2019-09-11 DIAGNOSIS — E78.5 HYPERLIPIDEMIA LDL GOAL <130: Chronic | ICD-10-CM

## 2019-09-11 NOTE — TELEPHONE ENCOUNTER
"Requested Prescriptions   Pending Prescriptions Disp Refills     losartan (COZAAR) 100 MG tablet Last Written Prescription Date:  1/25/2019  Last Fill Quantity: 90 tablet,  # refills: 1   Last office visit: 11/9/2018 with prescribing provider:  11/9/2018   Future Office Visit:   90 tablet 1     Sig: Take 1 tablet (100 mg) by mouth daily       Angiotensin-II Receptors Passed - 9/11/2019 12:02 PM        Passed - Last blood pressure under 140/90 in past 12 months     BP Readings from Last 3 Encounters:   05/13/19 129/59   04/30/19 152/63   04/26/19 (!) 162/92                 Passed - Recent (12 mo) or future (30 days) visit within the authorizing provider's specialty     Patient had office visit in the last 12 months or has a visit in the next 30 days with authorizing provider or within the authorizing provider's specialty.  See \"Patient Info\" tab in inbasket, or \"Choose Columns\" in Meds & Orders section of the refill encounter.              Passed - Medication is active on med list        Passed - Patient is age 18 or older        Passed - No active pregnancy on record        Passed - Normal serum creatinine on file in past 12 months     Recent Labs   Lab Test 05/13/19  0735   CR 0.66             Passed - Normal serum potassium on file in past 12 months     Recent Labs   Lab Test 05/13/19  0735   POTASSIUM 3.5                    Passed - No positive pregnancy test in past 12 months        "

## 2019-09-11 NOTE — TELEPHONE ENCOUNTER
"Requested Prescriptions   Pending Prescriptions Disp Refills     losartan (COZAAR) 100 MG tablet [Pharmacy Med Name: LOSARTAN TABS 100MG] 90 tablet 4     Sig: TAKE 1 TABLET DAILY   Last Written Prescription Date:  01/25/2019  Last Fill Quantity: 90,  # refills: 01   Last office visit: 11/9/2018 with prescribing provider:     Future Office Visit:      Angiotensin-II Receptors Passed - 9/11/2019 12:05 PM        Passed - Last blood pressure under 140/90 in past 12 months     BP Readings from Last 3 Encounters:   05/13/19 129/59   04/30/19 152/63   04/26/19 (!) 162/92                 Passed - Recent (12 mo) or future (30 days) visit within the authorizing provider's specialty     Patient had office visit in the last 12 months or has a visit in the next 30 days with authorizing provider or within the authorizing provider's specialty.  See \"Patient Info\" tab in inbasket, or \"Choose Columns\" in Meds & Orders section of the refill encounter.              Passed - Medication is active on med list        Passed - Patient is age 18 or older        Passed - No active pregnancy on record        Passed - Normal serum creatinine on file in past 12 months     Recent Labs   Lab Test 05/13/19  0735   CR 0.66             Passed - Normal serum potassium on file in past 12 months     Recent Labs   Lab Test 05/13/19  0735   POTASSIUM 3.5                    Passed - No positive pregnancy test in past 12 months        atenolol (TENORMIN) 100 MG tablet [Pharmacy Med Name: ATENOLOL TABS 100MG] 90 tablet 4     Sig: TAKE 1 TABLET DAILY   Last Written Prescription Date:  02/25/2019  Last Fill Quantity: 90,  # refills: 01   Last office visit: 11/9/2018 with prescribing provider:     Future Office Visit:      Beta-Blockers Protocol Passed - 9/11/2019 12:05 PM        Passed - Blood pressure under 140/90 in past 12 months     BP Readings from Last 3 Encounters:   05/13/19 129/59   04/30/19 152/63   04/26/19 (!) 162/92                 Passed - " "Patient is age 6 or older        Passed - Recent (12 mo) or future (30 days) visit within the authorizing provider's specialty     Patient had office visit in the last 12 months or has a visit in the next 30 days with authorizing provider or within the authorizing provider's specialty.  See \"Patient Info\" tab in inbasket, or \"Choose Columns\" in Meds & Orders section of the refill encounter.              Passed - Medication is active on med list        "

## 2019-09-12 RX ORDER — LOSARTAN POTASSIUM 100 MG/1
100 TABLET ORAL DAILY
Qty: 90 TABLET | Refills: 0 | Status: SHIPPED | OUTPATIENT
Start: 2019-09-12 | End: 2019-12-05

## 2019-09-12 RX ORDER — LOSARTAN POTASSIUM 100 MG/1
TABLET ORAL
Qty: 90 TABLET | Refills: 0 | Status: SHIPPED | OUTPATIENT
Start: 2019-09-12 | End: 2020-03-06

## 2019-09-12 RX ORDER — ATENOLOL 100 MG/1
TABLET ORAL
Qty: 90 TABLET | Refills: 0 | Status: SHIPPED | OUTPATIENT
Start: 2019-09-12 | End: 2019-12-05

## 2019-09-12 NOTE — TELEPHONE ENCOUNTER
Prescription approved per [facility] Refill Protocol.  Franca Watters RN

## 2019-09-12 NOTE — TELEPHONE ENCOUNTER
Prescription approved per [facility] Refill Protocol.  Farnca Watters RN

## 2019-09-30 ENCOUNTER — TELEPHONE (OUTPATIENT)
Dept: INTERNAL MEDICINE | Facility: CLINIC | Age: 84
End: 2019-09-30

## 2019-09-30 DIAGNOSIS — M19.90 OSTEOARTHRITIS: ICD-10-CM

## 2019-09-30 DIAGNOSIS — R09.02 HYPOXIA: ICD-10-CM

## 2019-09-30 DIAGNOSIS — I89.0 LYMPHEDEMA OF BOTH LOWER EXTREMITIES: ICD-10-CM

## 2019-09-30 DIAGNOSIS — M81.0 OSTEOPOROSIS: ICD-10-CM

## 2019-09-30 DIAGNOSIS — J44.9 CHRONIC OBSTRUCTIVE PULMONARY DISEASE, UNSPECIFIED COPD TYPE (H): Primary | Chronic | ICD-10-CM

## 2019-09-30 NOTE — TELEPHONE ENCOUNTER
Patient calling and she said she is able to come in to be seen this week. Other wise she will not be able to come in for a while. Please advise if able to work in. Ok to call and gisele [phone]

## 2019-09-30 NOTE — TELEPHONE ENCOUNTER
States she needs to follow up from her May 2019 hospitalization--edema, BP, med renewals.  She hadn't been able to leave the house until now and will have a ride to clinic tomorrow morning or Thursday morning this week only.  BRANDEE Chowdary R.N.

## 2019-10-01 NOTE — TELEPHONE ENCOUNTER
Noted  Noted the schedule is also full.     Please make referral to  to look into transportation situation

## 2019-10-01 NOTE — TELEPHONE ENCOUNTER
Patient states it is not actually transportation she has a problem with, it is getting from house to car and from garage to house.  She can get rides but it is her mobility that keeps her from coming in to clinic, does not want a referral to .  She will see what she can do and will call back to get scheduled.    BRANDEE Chowdary R.N.

## 2019-10-05 DIAGNOSIS — E78.5 HYPERLIPIDEMIA LDL GOAL <130: Chronic | ICD-10-CM

## 2019-10-05 DIAGNOSIS — R60.0 BILATERAL LEG EDEMA: ICD-10-CM

## 2019-10-05 DIAGNOSIS — I10 HTN, GOAL BELOW 140/90: ICD-10-CM

## 2019-10-07 RX ORDER — LOSARTAN POTASSIUM 100 MG/1
TABLET ORAL
Qty: 30 TABLET | Refills: 2 | OUTPATIENT
Start: 2019-10-07

## 2019-10-07 NOTE — TELEPHONE ENCOUNTER
"Requested Prescriptions   Pending Prescriptions Disp Refills     losartan (COZAAR) 100 MG tablet [Pharmacy Med Name: LOSARTAN POTASSIUM 100 MG TAB] 30 tablet 2     Sig: TAKE 1 TABLET BY MOUTH EVERY DAY   Last Written Prescription Date:  09/12/2019  Last Fill Quantity: 90,  # refills: 0   Last office visit: 11/9/2018 with prescribing provider:     Future Office Visit:      Angiotensin-II Receptors Passed - 10/5/2019  9:34 AM        Passed - Last blood pressure under 140/90 in past 12 months     BP Readings from Last 3 Encounters:   05/13/19 129/59   04/30/19 152/63   04/26/19 (!) 162/92                 Passed - Recent (12 mo) or future (30 days) visit within the authorizing provider's specialty     Patient has had an office visit with the authorizing provider or a provider within the authorizing providers department within the previous 12 mos or has a future within next 30 days. See \"Patient Info\" tab in inbasket, or \"Choose Columns\" in Meds & Orders section of the refill encounter.              Passed - Medication is active on med list        Passed - Patient is age 18 or older        Passed - No active pregnancy on record        Passed - Normal serum creatinine on file in past 12 months     Recent Labs   Lab Test 05/13/19  0735   CR 0.66             Passed - Normal serum potassium on file in past 12 months     Recent Labs   Lab Test 05/13/19  0735   POTASSIUM 3.5                    Passed - No positive pregnancy test in past 12 months        "

## 2019-10-16 ENCOUNTER — PATIENT OUTREACH (OUTPATIENT)
Dept: CARE COORDINATION | Facility: CLINIC | Age: 84
End: 2019-10-16

## 2019-10-16 NOTE — PROGRESS NOTES
Community Health Worker called the patient for Clinic Care Coordination outreach follow up on goals and action steps.  Spoke to Marino    Discussed the following  Strong enough to leave the home    Still continues to struggle getting in and out of home.    She went in to see a foot doctor the other day, however, it was a real struggle to get her inside the home even with 2 people helping her get inside.    They had put in a handicap bar which has helped a little.    Decided to continue looking for a ramp as she believe it'll help a lot more.    Marino states that she's been trying to build up the courage to get up and down the stairs.    Patient reports  Last outreach when I spoke to Josue, the ramp was out of the pictire because there was no room for it and was too expensive.  Marino explained because they have concrete steps, theres no way to get the ramp put in.    Marino also requested information on having a nurse come out to the home for flu shot.    Plan  I will reach out to Néstor to see if she knows of any information on getting ramps installed. Possible financial assistance with this.  Also resources of getting a public health nurse to see patient at the home for flu shot.    ______________________  Next Outreach: 11/14/19  Planned Outreach Frequency: Monthly  Preferred Phone Number: [phone]   Can also talk to Josue(spouse)     Assessment Date: 05/06/2019  Care Plan Completion Date: 05/06/2019  _____________________  Goals       Functional (pt-stated)      1. Goal Statement: I want to be strong enough to leave my home.  Measure of Success: Ability to attend appointment with PCP.   Supportive Steps to Achieve: Continue HEP, schedule appointment and transportation, attend appointment.  Barriers: Unknown how long it will take for Pt's strength to be regained.  Strengths: Motivated to reach goal, adherent to exercise program.  Date to Achieve By: 7.1.2019  Patient expressed understanding of goal: Pt reports  understanding and denies any additional questions or concerns at this times. DEBBIE CC engaged in AIDET communication during encounter.    As of today's date 10/16/2019 goal is met at 26 - 50%.   Goal Status:  Ongoing  Continues to struggle with getting in and out of home. Saw foot doctor and had to have 2 people help her get back inside the home.    As of today's date 9/6/2019 goal is met at 26 - 50%.   Goal Status:  Ongoing  Continues to work on exercises

## 2019-10-17 DIAGNOSIS — M25.561 CHRONIC PAIN OF BOTH KNEES: ICD-10-CM

## 2019-10-17 DIAGNOSIS — G89.29 CHRONIC PAIN OF BOTH KNEES: ICD-10-CM

## 2019-10-17 DIAGNOSIS — M25.562 CHRONIC PAIN OF BOTH KNEES: ICD-10-CM

## 2019-10-17 DIAGNOSIS — J45.30 MILD PERSISTENT ASTHMA WITHOUT COMPLICATION: Chronic | ICD-10-CM

## 2019-10-17 NOTE — TELEPHONE ENCOUNTER
"Requested Prescriptions   Pending Prescriptions Disp Refills     albuterol (PROAIR HFA/PROVENTIL HFA/VENTOLIN HFA) 108 (90 Base) MCG/ACT inhaler Last Written Prescription Date:  7/19/2018  Last Fill Quantity: 1 inhaler,  # refills: 3  Last office visit: 11/9/2018 with prescribing provider:  11/9/2018   Future Office Visit:   1 Inhaler 3     Sig: Inhale 2 puffs into the lungs every 6 hours as needed for shortness of breath / dyspnea or wheezing       Asthma Maintenance Inhalers - Anticholinergics Failed - 10/17/2019 12:14 PM        Failed - Asthma control assessment score within normal limits in last 6 months     Please review ACT score.           Failed - Recent (6 mo) or future (30 days) visit within the authorizing provider's specialty     Patient had office visit in the last 6 months or has a visit in the next 30 days with authorizing provider or within the authorizing provider's specialty.  See \"Patient Info\" tab in inbasket, or \"Choose Columns\" in Meds & Orders section of the refill encounter.            Passed - Patient is age 12 years or older        Passed - Medication is active on med list        traMADol (ULTRAM) 50 MG tablet Last Written Prescription Date:  8/8/2019  Last Fill Quantity: 120 tablet,  # refills: 0   Last office visit: 11/9/2018 with prescribing provider:  11/9/2018  Future Office Visit:   120 tablet 0     Sig: TAKE 1 TABLET BY MOUTH TWICE DAILY AS NEEDED FOR PAIN       There is no refill protocol information for this order        "

## 2019-10-18 RX ORDER — TRAMADOL HYDROCHLORIDE 50 MG/1
TABLET ORAL
Qty: 120 TABLET | Refills: 0 | Status: SHIPPED | OUTPATIENT
Start: 2019-10-18 | End: 2020-01-07

## 2019-10-18 RX ORDER — ALBUTEROL SULFATE 90 UG/1
2 AEROSOL, METERED RESPIRATORY (INHALATION) EVERY 6 HOURS PRN
Qty: 1 INHALER | Refills: 3 | Status: SHIPPED | OUTPATIENT
Start: 2019-10-18 | End: 2020-06-08

## 2019-10-18 NOTE — TELEPHONE ENCOUNTER
[facility]  Routing refill request to provider for review/approval because:  ACT score is outdated & out of range    Controlled Substance Refill Request for Tramadol  Problem List Complete:  No     PROVIDER TO CONSIDER COMPLETION OF PROBLEM LIST AND OVERVIEW/CONTROLLED SUBSTANCE AGREEMENT    Last Written Prescription Date:  8/8/19  Last Fill Quantity: 120,   # refills: 0    THE MOST RECENT OFFICE VISIT MUST BE WITHIN THE PAST 3 MONTHS. AT LEAST ONE FACE TO FACE VISIT MUST OCCUR EVERY 6 MONTHS. ADDITIONAL VISITS CAN BE VIRTUAL.  (THIS STATEMENT SHOULD BE DELETED.)    Last Office Visit with [facility] primary care provider: 11/9/18    Future Office visit:     Controlled substance agreement:   Encounter-Level CSA - 05/10/2016:    Controlled Substance Agreement - Scan on 5/16/2016 10:48 AM[facility]10[facility]16     Patient-Level CSA:    There are no patient-level csa.         Last Urine Drug Screen: No results found for: CDAUT, No results found for: COMDAT, No results found for: THC13, PCP13, COC13, MAMP13, OPI13, AMP13, BZO13, TCA13, MTD13, BAR13, OXY13, PPX13, BUP13     Processing:  express scripts     RX [place] reviewed:  reviewed- recommend provider review  [place] profile:  [email or web address]

## 2019-10-22 ENCOUNTER — PATIENT OUTREACH (OUTPATIENT)
Dept: CARE COORDINATION | Facility: CLINIC | Age: 84
End: 2019-10-22

## 2019-10-22 DIAGNOSIS — J44.9 CHRONIC OBSTRUCTIVE PULMONARY DISEASE, UNSPECIFIED COPD TYPE (H): Primary | Chronic | ICD-10-CM

## 2019-10-22 NOTE — PROGRESS NOTES
Clinic Care Coordination Contact    Follow Up Progress Note      Assessment: Voicemail left for Patient with information about the [facility] Home Improvement Loan, [employer], and that the Community Paramedic is able to provide a flu shot at home.    Goals addressed this encounter:   Goals Addressed                 This Visit's Progress       Patient Stated      Functional (pt-stated)   On track     1. Goal Statement: I want to be strong enough to leave my home.  Measure of Success: Ability to attend appointment with PCP.   Supportive Steps to Achieve: Continue HEP, schedule appointment and transportation, attend appointment.  Barriers: Unknown how long it will take for Pt's strength to be regained.  Strengths: Motivated to reach goal, adherent to exercise program.  Date to Achieve By: 7.1.2019  Patient expressed understanding of goal: Pt reports understanding and denies any additional questions or concerns at this times. DEBBIE CC engaged in AIDET communication during encounter.    As of today's date 10/16/2019 goal is met at 26 - 50%.   Goal Status:  Ongoing  Continues to struggle with getting in and out of home. Saw foot doctor and had to have 2 people help her get back inside the home.    As of today's date 9/6/2019 goal is met at 26 - 50%.   Goal Status:  Ongoing  Continues to work on exercises    As of today's date 10/22/2019 goal is met at 26 - 50%.   Goal Status:  Ongoing                 Intervention/Education provided during outreach: Above resources were mailed to Patient on this date. Referral for the Community Paramedic was entered.     Outreach Frequency: monthly    Plan:   Patient will utilize the above resources and get a flu shot with the assistance of the community paramedic.  CHW will follow up in one month.    Néstor Epps, [facility]  Clinic Care Coordinator  Ph. [phone]  [email or web address]

## 2019-10-22 NOTE — LETTER
[place] CARE COORDINATION  [address] [place] [facility] [address]  October 22, 2019    Marino Prajapati  [address] [facility] [address]      Dear Marino,    I am a clinic care coordinator who works with Vivian Blue MD at the [facility]. Included with this letter is information about ramp companies, as well as the application for the [facility] Home Improvement Loan Program. I also wanted to let you know that I sent a referral to the Community Paramedic to meet with you at home and to provide your flu shot. This is a free service provided by the clinic.    Please feel free to contact me at [phone], with any questions or concerns. We at [facility] are focused on providing you with the highest-quality healthcare experience possible and that all starts with you.     Sincerely,       Néstor Epps, [facility]  Clinic Care Coordinator  Ph. [phone]  [email or web address]      Enclosed: I have enclosed helpful educational material. Please review and call me with any questions.

## 2019-10-24 ENCOUNTER — PATIENT OUTREACH (OUTPATIENT)
Dept: BEHAVIORAL HEALTH | Facility: CLINIC | Age: 84
End: 2019-10-24

## 2019-10-24 NOTE — PROGRESS NOTES
The Community Paramenic has reached out for the last 2 days for flu shot.  Will call back next week.    Esperanza RODRIGUEZ, NRP  Community Paramedic   [phone]   Jessica @[email or web address]

## 2019-10-25 ENCOUNTER — PATIENT OUTREACH (OUTPATIENT)
Dept: BEHAVIORAL HEALTH | Facility: CLINIC | Age: 84
End: 2019-10-25

## 2019-10-25 DIAGNOSIS — R60.0 BILATERAL LEG EDEMA: ICD-10-CM

## 2019-10-25 DIAGNOSIS — E78.5 HYPERLIPIDEMIA LDL GOAL <130: Chronic | ICD-10-CM

## 2019-10-25 DIAGNOSIS — I10 HTN, GOAL BELOW 140/90: ICD-10-CM

## 2019-10-25 NOTE — PROGRESS NOTES
The pt returned the Community Paramedics phone call and is declining the flu shot.  She stated they got some support bars in place and she will go to a pharmacy and get the shot.

## 2019-10-25 NOTE — TELEPHONE ENCOUNTER
"Requested Prescriptions   Pending Prescriptions Disp Refills     terazosin (HYTRIN) 2 MG capsule [Pharmacy Med Name: TERAZOSIN CAPS 2MG]  Last Written Prescription Date:  3/21/2019  Last Fill Quantity: 90,  # refills: 1   Last office visit: 11/9/2018 with prescribing provider:     Future Office Visit:   90 capsule 4     Sig: TAKE 1 CAPSULE AT BEDTIME       Alpha Blockers Passed - 10/25/2019  4:13 PM        Passed - Blood pressure under 140/90 in past 12 months     BP Readings from Last 3 Encounters:   05/13/19 129/59   04/30/19 152/63   04/26/19 (!) 162/92                 Passed - Recent (12 mo) or future (30 days) visit within the authorizing provider's specialty     Patient has had an office visit with the authorizing provider or a provider within the authorizing providers department within the previous 12 mos or has a future within next 30 days. See \"Patient Info\" tab in inbasket, or \"Choose Columns\" in Meds & Orders section of the refill encounter.              Passed - Patient does not have Tadalafil, Vardenafil, or Sildenafil on their medication list        Passed - Medication is active on med list        Passed - Patient is 18 years of age or older        Passed - No active pregnancy on record        Passed - No positive pregnancy test in past 12 months        "

## 2019-10-28 RX ORDER — TERAZOSIN 2 MG/1
CAPSULE ORAL
Qty: 90 CAPSULE | Refills: 0 | Status: SHIPPED | OUTPATIENT
Start: 2019-10-28 | End: 2019-12-05

## 2019-11-06 DIAGNOSIS — E03.9 HYPOTHYROIDISM, UNSPECIFIED TYPE: ICD-10-CM

## 2019-11-06 DIAGNOSIS — E78.5 HYPERLIPIDEMIA LDL GOAL <130: Chronic | ICD-10-CM

## 2019-11-06 DIAGNOSIS — I10 HTN, GOAL BELOW 140/90: ICD-10-CM

## 2019-11-06 DIAGNOSIS — R60.0 BILATERAL LEG EDEMA: ICD-10-CM

## 2019-11-06 NOTE — LETTER
[facility]  [address]  [facility] [address]  Phone: [phone]        November 7, 2019      Marino Prajapati                                                                                                                    [address] [facility] [address]            Dear Ms. Prajapati,    We are concerned about your health care.  We recently provided you with a medication refill.  Many medications require routine follow-up with your Doctor.      At this time we ask that: You schedule an appointment for your annual physical.    Your prescription: Has been refilled for 1 month so you may have time for the above noted follow-up.      Thank you,      [facility]

## 2019-11-06 NOTE — TELEPHONE ENCOUNTER
"Requested Prescriptions   Pending Prescriptions Disp Refills     pravastatin (PRAVACHOL) 20 MG tablet [Pharmacy Med Name: PRAVASTATIN TABS 20MG] 90 tablet 4     Sig: TAKE 1 TABLET DAILY   Last Written Prescription Date:  08/08/2019  Last Fill Quantity: 90,  # refills: 0   Last office visit: 11/9/2018 with prescribing provider:     Future Office Visit:      Statins Protocol Failed - 11/6/2019  2:04 PM        Failed - LDL on file in past 12 months     Recent Labs   Lab Test 11/02/18  1215   LDL 99             Passed - No abnormal creatine kinase in past 12 months     No lab results found.             Passed - Recent (12 mo) or future (30 days) visit within the authorizing provider's specialty     Patient has had an office visit with the authorizing provider or a provider within the authorizing providers department within the previous 12 mos or has a future within next 30 days. See \"Patient Info\" tab in inbasket, or \"Choose Columns\" in Meds & Orders section of the refill encounter.              Passed - Medication is active on med list        Passed - Patient is age 18 or older        Passed - No active pregnancy on record        Passed - No positive pregnancy test in past 12 months        levothyroxine (SYNTHROID/LEVOTHROID) 50 MCG tablet [Pharmacy Med Name: L-THYROXINE (SYNTHROID) TABS 50MCG] 90 tablet 4     Sig: TAKE 1 TABLET DAILY   Last Written Prescription Date:  08/08/2019  Last Fill Quantity: 90,  # refills: 0   Last office visit: 11/9/2018 with prescribing provider:     Future Office Visit:      Thyroid Protocol Failed - 11/6/2019  2:04 PM        Failed - Normal TSH on file in past 12 months     Recent Labs   Lab Test 11/02/18  1215   TSH 4.72*              Passed - Patient is 12 years or older        Passed - Recent (12 mo) or future (30 days) visit within the authorizing provider's specialty     Patient has had an office visit with the authorizing provider or a provider within the authorizing providers " "department within the previous 12 mos or has a future within next 30 days. See \"Patient Info\" tab in inbasket, or \"Choose Columns\" in Meds & Orders section of the refill encounter.              Passed - Medication is active on med list        Passed - No active pregnancy on record     If patient is pregnant or has had a positive pregnancy test, please check TSH.          Passed - No positive pregnancy test in past 12 months     If patient is pregnant or has had a positive pregnancy test, please check TSH.          "

## 2019-11-07 RX ORDER — PRAVASTATIN SODIUM 20 MG
TABLET ORAL
Qty: 90 TABLET | Refills: 0 | Status: SHIPPED | OUTPATIENT
Start: 2019-11-07 | End: 2019-12-05

## 2019-11-07 RX ORDER — LEVOTHYROXINE SODIUM 50 UG/1
TABLET ORAL
Qty: 90 TABLET | Refills: 0 | Status: SHIPPED | OUTPATIENT
Start: 2019-11-07 | End: 2019-12-05

## 2019-11-07 NOTE — TELEPHONE ENCOUNTER
Medication is being filled for 1 time refill only due to:  Patient needs to be seen as it has been 1 year since last office visit.  Due for fasting labs also.  Letter sent to patient and message to pharmacy.  BRANDEE Chowdary R.N.

## 2019-11-19 ENCOUNTER — PATIENT OUTREACH (OUTPATIENT)
Dept: CARE COORDINATION | Facility: CLINIC | Age: 84
End: 2019-11-19

## 2019-11-19 NOTE — PROGRESS NOTES
Scheduled Follow Up Call: Attempt 1   Community Health Worker called and left a message for the patient. If the patient is returning my call, please transfer the patient to [facility] at [phone].    Plan  1.  Per chart review, patient declined flu shot from community paramedic stating that she was going to pharmacy to have it done.  Did patient get her flu shot?  2.  Did patient receive information from JORGE Johnson, on [facility] [employer] and [employer] CIO?    ______________________  Next Outreach: 12/04/19  Planned Outreach Frequency: Monthly  Preferred Phone Number: [phone]   Can also talk to Josue(spouse)     Assessment Date: 05/06/2019  Care Plan Completion Date: 05/06/2019  _____________________  Goals       Functional (pt-stated)      1. Goal Statement: I want to be strong enough to leave my home.  Measure of Success: Ability to attend appointment with PCP.   Supportive Steps to Achieve: Continue HEP, schedule appointment and transportation, attend appointment.  Barriers: Unknown how long it will take for Pt's strength to be regained.  Strengths: Motivated to reach goal, adherent to exercise program.  Date to Achieve By: 7.1.2019  Patient expressed understanding of goal: Pt reports understanding and denies any additional questions or concerns at this times. DEBBIE CC engaged in AIDET communication during encounter.    As of today's date 10/16/2019 goal is met at 26 - 50%.   Goal Status:  Ongoing  Continues to struggle with getting in and out of home. Saw foot doctor and had to have 2 people help her get back inside the home.    As of today's date 9/6/2019 goal is met at 26 - 50%.   Goal Status:  Ongoing  Continues to work on exercises

## 2019-12-05 ENCOUNTER — OFFICE VISIT (OUTPATIENT)
Dept: INTERNAL MEDICINE | Facility: CLINIC | Age: 84
End: 2019-12-05
Payer: COMMERCIAL

## 2019-12-05 VITALS
HEIGHT: 67 IN | OXYGEN SATURATION: 92 % | RESPIRATION RATE: 16 BRPM | HEART RATE: 73 BPM | DIASTOLIC BLOOD PRESSURE: 76 MMHG | TEMPERATURE: 98 F | BODY MASS INDEX: 34.42 KG/M2 | SYSTOLIC BLOOD PRESSURE: 130 MMHG

## 2019-12-05 DIAGNOSIS — N39.46 MIXED STRESS AND URGE URINARY INCONTINENCE: ICD-10-CM

## 2019-12-05 DIAGNOSIS — Z00.00 ROUTINE GENERAL MEDICAL EXAMINATION AT A HEALTH CARE FACILITY: Primary | ICD-10-CM

## 2019-12-05 DIAGNOSIS — Z71.89 ADVANCED DIRECTIVES, COUNSELING/DISCUSSION: ICD-10-CM

## 2019-12-05 DIAGNOSIS — R21 RASH AND NONSPECIFIC SKIN ERUPTION: ICD-10-CM

## 2019-12-05 DIAGNOSIS — I89.0 LYMPHEDEMA OF BOTH LOWER EXTREMITIES: ICD-10-CM

## 2019-12-05 DIAGNOSIS — I10 HTN, GOAL BELOW 140/90: ICD-10-CM

## 2019-12-05 DIAGNOSIS — E21.3 HYPERPARATHYROIDISM (H): Chronic | ICD-10-CM

## 2019-12-05 DIAGNOSIS — J44.9 CHRONIC OBSTRUCTIVE PULMONARY DISEASE, UNSPECIFIED COPD TYPE (H): Chronic | ICD-10-CM

## 2019-12-05 DIAGNOSIS — R11.0 NAUSEA: ICD-10-CM

## 2019-12-05 DIAGNOSIS — E03.9 HYPOTHYROIDISM, UNSPECIFIED TYPE: ICD-10-CM

## 2019-12-05 DIAGNOSIS — E78.5 HYPERLIPIDEMIA LDL GOAL <130: Chronic | ICD-10-CM

## 2019-12-05 DIAGNOSIS — R60.0 BILATERAL LEG EDEMA: ICD-10-CM

## 2019-12-05 DIAGNOSIS — H61.23 BILATERAL IMPACTED CERUMEN: ICD-10-CM

## 2019-12-05 PROCEDURE — 99397 PER PM REEVAL EST PAT 65+ YR: CPT | Mod: 25 | Performed by: INTERNAL MEDICINE

## 2019-12-05 PROCEDURE — 90662 IIV NO PRSV INCREASED AG IM: CPT | Performed by: INTERNAL MEDICINE

## 2019-12-05 PROCEDURE — 99214 OFFICE O/P EST MOD 30 MIN: CPT | Mod: 25 | Performed by: INTERNAL MEDICINE

## 2019-12-05 PROCEDURE — 69210 REMOVE IMPACTED EAR WAX UNI: CPT | Performed by: INTERNAL MEDICINE

## 2019-12-05 PROCEDURE — G0008 ADMIN INFLUENZA VIRUS VAC: HCPCS | Performed by: INTERNAL MEDICINE

## 2019-12-05 PROCEDURE — 99207 C PAF COMPLETED  NO CHARGE: CPT | Mod: 25 | Performed by: INTERNAL MEDICINE

## 2019-12-05 RX ORDER — ATENOLOL 100 MG/1
100 TABLET ORAL DAILY
Qty: 90 TABLET | Refills: 0 | Status: SHIPPED | OUTPATIENT
Start: 2019-12-05 | End: 2020-02-13

## 2019-12-05 RX ORDER — BUDESONIDE AND FORMOTEROL FUMARATE DIHYDRATE 160; 4.5 UG/1; UG/1
2 AEROSOL RESPIRATORY (INHALATION) 2 TIMES DAILY
Qty: 3 INHALER | Refills: 1 | Status: SHIPPED | OUTPATIENT
Start: 2019-12-05 | End: 2020-06-08

## 2019-12-05 RX ORDER — TIOTROPIUM BROMIDE 18 UG/1
CAPSULE ORAL; RESPIRATORY (INHALATION)
Qty: 90 CAPSULE | Refills: 0 | Status: SHIPPED | OUTPATIENT
Start: 2019-12-05 | End: 2021-03-11 | Stop reason: ALTCHOICE

## 2019-12-05 RX ORDER — TERAZOSIN 2 MG/1
CAPSULE ORAL
Qty: 90 CAPSULE | Refills: 0 | Status: SHIPPED | OUTPATIENT
Start: 2019-12-05 | End: 2020-06-22

## 2019-12-05 RX ORDER — NICOTINE POLACRILEX 4 MG/1
20 GUM, CHEWING ORAL DAILY
Qty: 90 TABLET | Refills: 0 | Status: SHIPPED | OUTPATIENT
Start: 2019-12-05 | End: 2020-03-06

## 2019-12-05 RX ORDER — AMLODIPINE BESYLATE 10 MG/1
5 TABLET ORAL DAILY
Qty: 90 TABLET | Refills: 0 | Status: SHIPPED | OUTPATIENT
Start: 2019-12-05 | End: 2021-03-11 | Stop reason: DRUGHIGH

## 2019-12-05 RX ORDER — NYSTATIN 100000 [USP'U]/G
POWDER TOPICAL 2 TIMES DAILY PRN
Qty: 60 G | Refills: 3 | Status: SHIPPED | OUTPATIENT
Start: 2019-12-05 | End: 2021-03-11

## 2019-12-05 RX ORDER — PRAVASTATIN SODIUM 20 MG
20 TABLET ORAL DAILY
Qty: 90 TABLET | Refills: 0 | Status: SHIPPED | OUTPATIENT
Start: 2019-12-05 | End: 2020-02-11

## 2019-12-05 RX ORDER — LOSARTAN POTASSIUM 100 MG/1
100 TABLET ORAL DAILY
Qty: 90 TABLET | Refills: 0 | Status: SHIPPED | OUTPATIENT
Start: 2019-12-05 | End: 2020-05-31

## 2019-12-05 RX ORDER — LEVOTHYROXINE SODIUM 50 UG/1
50 TABLET ORAL DAILY
Qty: 90 TABLET | Refills: 0 | Status: SHIPPED | OUTPATIENT
Start: 2019-12-05 | End: 2020-02-11

## 2019-12-05 RX ORDER — FUROSEMIDE 40 MG
40 TABLET ORAL DAILY
Qty: 90 TABLET | Refills: 0 | Status: SHIPPED | OUTPATIENT
Start: 2019-12-05 | End: 2020-02-28

## 2019-12-05 ASSESSMENT — ANXIETY QUESTIONNAIRES
5. BEING SO RESTLESS THAT IT IS HARD TO SIT STILL: NOT AT ALL
GAD7 TOTAL SCORE: 1
2. NOT BEING ABLE TO STOP OR CONTROL WORRYING: NOT AT ALL
7. FEELING AFRAID AS IF SOMETHING AWFUL MIGHT HAPPEN: NOT AT ALL
1. FEELING NERVOUS, ANXIOUS, OR ON EDGE: NOT AT ALL
IF YOU CHECKED OFF ANY PROBLEMS ON THIS QUESTIONNAIRE, HOW DIFFICULT HAVE THESE PROBLEMS MADE IT FOR YOU TO DO YOUR WORK, TAKE CARE OF THINGS AT HOME, OR GET ALONG WITH OTHER PEOPLE: NOT DIFFICULT AT ALL
3. WORRYING TOO MUCH ABOUT DIFFERENT THINGS: NOT AT ALL
6. BECOMING EASILY ANNOYED OR IRRITABLE: SEVERAL DAYS

## 2019-12-05 ASSESSMENT — ACTIVITIES OF DAILY LIVING (ADL)
CURRENT_FUNCTION: MEDICATION ADMINISTRATION REQUIRES ASSISTANCE
CURRENT_FUNCTION: SHOPPING REQUIRES ASSISTANCE
CURRENT_FUNCTION: BATHING REQUIRES ASSISTANCE
CURRENT_FUNCTION: LAUNDRY REQUIRES ASSISTANCE
CURRENT_FUNCTION: TRANSPORTATION REQUIRES ASSISTANCE
CURRENT_FUNCTION: HOUSEWORK REQUIRES ASSISTANCE
CURRENT_FUNCTION: PREPARING MEALS REQUIRES ASSISTANCE

## 2019-12-05 ASSESSMENT — PATIENT HEALTH QUESTIONNAIRE - PHQ9
5. POOR APPETITE OR OVEREATING: NOT AT ALL
SUM OF ALL RESPONSES TO PHQ QUESTIONS 1-9: 3

## 2019-12-05 NOTE — PROGRESS NOTES
Dr Mendes's note    Patient's instructions / PLAN:                                                        Plan:  1. Please make a lab appointment for fasting labs    2. Nystatin powder twice a day under the breasts    3. Read the info about Health directives  4  follow up in January .      ASSESSMENT & PLAN:                                                      Mrs Pichardo is 83 y/o with multiple chronic medical problems.  She does not like to come for appointments, mostly because of mobility issues.  Her last office visit with me was more than a year ago. She was admitted in the hospital in May, we called and left message to come for hospital follow-up.  She is set up an appointment for May 17 which she canceled later.  She comes because no more refills were able to be given.  I explained her that I will try to do as much as I can during our office visit time today, but to address in depth her issues she needs to come more frequently    (Z00.00) Routine general medical examination at a health care facility  (primary encounter diagnosis)  Comment:   Plan: Comprehensive metabolic panel, CBC with         platelets, Lipid panel reflex to direct LDL         Fasting, Albumin Random Urine Quantitative with        Creat Ratio, TSH with free T4 reflex,         Parathyroid Hormone Intact, Vitamin D         Deficiency            (I10) HTN, goal below 140/90  Comment: Controlled    Plan: Comprehensive metabolic panel, Lipid panel         reflex to direct LDL Fasting, amLODIPine         (NORVASC) 10 MG tablet, furosemide (LASIX) 40         MG tablet, atenolol (TENORMIN) 100 MG tablet,         losartan (COZAAR) 100 MG tablet, pravastatin         (PRAVACHOL) 20 MG tablet, terazosin (HYTRIN) 2         MG capsule            (E21.3) Hyperparathyroidism (H)  Comment: stable   Plan: Parathyroid Hormone Intact, Vitamin D         Deficiency              (E78.5) Hyperlipidemia LDL goal <130  Comment: controlled  Plan: Comprehensive  metabolic panel, TSH with free T4        reflex, amLODIPine (NORVASC) 10 MG tablet,         atenolol (TENORMIN) 100 MG tablet, losartan         (COZAAR) 100 MG tablet, pravastatin (PRAVACHOL)        20 MG tablet, terazosin (HYTRIN) 2 MG capsule            (J44.9) COPD (H)  Comment: Not controlled   Plan: budesonide-formoterol (SYMBICORT) 160-4.5         MCG/ACT Inhaler, tiotropium (SPIRIVA         HANDIHALER) 18 MCG inhaled capsule            (I89.0) Lymphedema of both lower extremities  (R60.0) Bilateral leg edema  Comment: chronic  Plan: amLODIPine (NORVASC) 10 MG tablet, furosemide         (LASIX) 40 MG tablet, atenolol (TENORMIN) 100         MG tablet, losartan (COZAAR) 100 MG tablet,         pravastatin (PRAVACHOL) 20 MG tablet, terazosin        (HYTRIN) 2 MG capsule            (R11.0) Nausea  Comment: stable  Plan: omeprazole 20 MG tablet            (E03.9) Hypothyroidism, unspecified type  Comment: stable  Plan: levothyroxine (SYNTHROID/LEVOTHROID) 50 MCG         tablet            (H61.23) Bilateral impacted cerumen  Comment:   Plan: Cerumen removal    (Z71.89) Advanced directives, counseling/discussion  Comment: discussed with patient. She wants a DNR, form given today   Plan:     (N39.46) Mixed stress and urge urinary incontinence  Comment: stable  Plan: She wears pads    (R21) Rash and nonspecific skin eruption  Comment: Under both breasts more on the right  Plan: nystatin (MYCOSTATIN) 142818 UNIT/GM external         powder            Chief Complaint:                                                        Annual exam  Follow up chronic medical problems      SUBJECTIVE:                                                    History of present illness     We reviewed the chronic medical problems as above.   I reviewed the recent tests results in Epic     CODE STATUS  She wants to be DNR/DNI ( prior hospital admission reflect this). Health Drectives forms and POLST forms given to read at home and we will fill the  POST form next appointment       Preventative  --Requests influenza vaccine  --She does not have a living will. Discussed advanced directives. Patient states she wants a DNR    Difficulty hearing  --Decreased hearing from left ear  --Left ear has been popping  --Ears are itchy  --On exam she has bilateral impacted cerumen.  I tried to remove it, but it is very hard.  I asked the nurse to try to remove it by washing    Rash  --under breasts, more on the right  --Has difficulty keeping area under breasts dry    COPD  --Has random coughing that is productive  --Will also cough with prolonged talking  --Phlegm is thick  --Uses her inhalers as prescribed  --Noticed increased difficulties breathing when she ran out of Symbicort. Now has a new rx    Hypertension  --Pt confirms she is taking losartan and atenolol    Chronic lymphedema  --Takes one 40 mg tab of furosemide daily    Urine incontinence  --Wears pads      ROS:   General: Negative for fever, chills, major weight changes, positive for fatigue  Skin: Positive for skin rash as above  Eyes: Negative for blurred or double vision  ENT/mouth: Negative for sinuses discomfort, earache, sore throat, positive for decreased hearing  Respiratory: Negative for cough, wheezes, chronic lung disease  Cardiovascular: Negative for rest or exertional chest pain, shortness of breath, palpitations, leg edema, positive for chronic cough and shortness of breath  Gastrointestinal: Negative for vomiting, abdominal pain, heartburn, blood in stool, diarrhea, constipation  Genitourinary: Negative for urinary frequency, blood in urine, history of kidney stones, positive for urine incontinence  Female: Negative for abnormal vaginal bleeding, vaginal discharge  Neuro: Negative for headaches, numbness, tingling, weakness in arms or legs, history of seizure, recent syncope  Psychiatry: Negative for depression, anxiety, suicidal thoughts  Endo: Negative for known thyroid disease,  diabetes.  Hemato/Lymph: Negative for nodes, easy bleeding, history of DVT, blood transfusion  Musculoskeletal: Negative for joint swelling, back pain    This document serves as a record of the services and decisions personally performed and made by Vivian Mendes MD. It was created on their behalf by Shoshana Leo, a trained medical scribe. The creation of this document is based on the provider's statements to the medical scribe.  Shoshana Leo December 5, 2019 3:41 PM      PMHx: - reviewed  Past Medical History:   Diagnosis Date     Anemia      CARDIOVASCULAR SCREENING; LDL GOAL LESS THAN 160      Colon polyps 2010    needs colonoscopy 2013     DVT (deep venous thrombosis) (H) 2007    remote history of DVT while she was on BCP ,coumadin stopped after retroperitoneal/buttock hematoma in 10/11 . On ASA only     Gastro-oesophageal reflux disease      HTN, goal below 140/90      Hyperlipidemia LDL goal <130 1/22/2016     Kidney stone      Osteoarthritis     knees and hip     Osteoporosis      Postnasal drip      S/P total knee arthroplasty      Thrombosis of leg        PSHx: reviewed  Past Surgical History:   Procedure Laterality Date     ARTHROPLASTY HIP  8/1/2013    Procedure: ARTHROPLASTY HIP;  RIGHT TOTAL HIP ARTHROPLASTY;  Surgeon: Rufino Tong MD;  Location:  OR     ARTHROPLASTY HIP Left 12/12/2014    Procedure: ARTHROPLASTY HIP;  Surgeon: Rufino Tong MD;  Location: [facility] OR     ARTHROPLASTY KNEE  10/22/2012    Procedure: ARTHROPLASTY KNEE;  Right Total knee Arthroplasty  ;  Surgeon: Jagdeep Virgen MD;  Location: [facility] OR     ARTHROPLASTY KNEE  5/23/2013    Procedure: ARTHROPLASTY KNEE;  LEFT TOTAL KNEE ARTHROPLASTY (SMITH & NEPHEW)^;  Surgeon: Rufino Tong MD;  Location:  OR     C PREP FACE/ORAL PROST PALATAL LIFT       CHOLECYSTECTOMY       CYSTOSCOPY, RETROGRADES, INSERT STENT URETER(S), COMBINED  7/16/2012    Procedure: COMBINED CYSTOSCOPY, RETROGRADES, INSERT STENT URETER(S);   Cystoscopy, Right retrogrades, Right Stent Placement;  Surgeon: Mauricio Velazquez MD;  Location: [facility] OR     LASER HOLMIUM LITHOTRIPSY URETER(S), INSERT STENT, COMBINED  2012    Procedure: COMBINED CYSTOSCOPY, URETEROSCOPY, LASER HOLMIUM LITHOTRIPSY URETER(S), INSERT STENT;  Cystoscopy, Stent Removal, Right Ureteroscopy with Holmium Laser, Stone removal ;  Surgeon: Mauricio Velazquez MD;  Location: [facility] OR     LIPOSUCTION (LOCATION)      tummy     STRIP VEIN          Soc Hx: No daily alcohol, no smoking  Social History     Socioeconomic History     Marital status:      Spouse name: Not on file     Number of children: Not on file     Years of education: Not on file     Highest education level: Not on file   Occupational History     Not on file   Social Needs     Financial resource strain: Not on file     Food insecurity:     Worry: Not on file     Inability: Not on file     Transportation needs:     Medical: Not on file     Non-medical: Not on file   Tobacco Use     Smoking status: Former Smoker     Packs/day: 1.00     Years: 29.00     Pack years: 29.00     Types: Cigarettes     Last attempt to quit: 10/17/1966     Years since quittin.1     Smokeless tobacco: Never Used   Substance and Sexual Activity     Alcohol use: Yes     Comment: 4 drinks yearly     Drug use: No     Sexual activity: Yes     Partners: Male   Lifestyle     Physical activity:     Days per week: Not on file     Minutes per session: Not on file     Stress: Not on file   Relationships     Social connections:     Talks on phone: Not on file     Gets together: Not on file     Attends Buddhist service: Not on file     Active member of club or organization: Not on file     Attends meetings of clubs or organizations: Not on file     Relationship status: Not on file     Intimate partner violence:     Fear of current or ex partner: Not on file     Emotionally abused: Not on file     Physically abused: Not on file     Forced sexual  activity: Not on file   Other Topics Concern     Parent/sibling w/ CABG, MI or angioplasty before 65F 55M? Not Asked   Social History Narrative     Not on file        Fam Hx: reviewed  Family History   Problem Relation Age of Onset     Breast Cancer Mother      Circulatory Father         Blood clots         Screening: reviewed      All: reviewed    Meds: reviewed  Current Outpatient Medications   Medication Sig Dispense Refill     acetaminophen (TYLENOL) 325 MG tablet Take 650 mg by mouth 4 times daily AND Give 650 mg by mouth every 12 hours as needed for back pain 250 tablet 11     albuterol (PROAIR HFA/PROVENTIL HFA/VENTOLIN HFA) 108 (90 Base) MCG/ACT inhaler Inhale 2 puffs into the lungs every 6 hours as needed for shortness of breath / dyspnea or wheezing 1 Inhaler 3     amLODIPine (NORVASC) 10 MG tablet Take 0.5 tablets (5 mg) by mouth daily 90 tablet 1     atenolol (TENORMIN) 100 MG tablet TAKE 1 TABLET DAILY 90 tablet 0     budesonide-formoterol (SYMBICORT) 160-4.5 MCG/ACT Inhaler Inhale 2 puffs into the lungs 2 times daily 3 Inhaler 1     cholecalciferol (VITAMIN D) 1000 UNIT tablet Take 2 tablets (2,000 Units) by mouth daily 100 tablet 3     Coenzyme Q10 (COQ10) 30 MG CAPS Take 1 capsule by mouth daily as needed  30 capsule      Cranberry Extract 250 MG TABS Take 1 tablet by mouth daily as needed        fluticasone (FLONASE) 50 MCG/ACT nasal spray Spray 1-2 sprays into both nostrils daily 3 Bottle 0     furosemide (LASIX) 40 MG tablet TAKE ONE-HALF (1/2) TO ONE TABLET DAILY AS INSTRUCTED 90 tablet 0     furosemide (LASIX) 40 MG tablet Take 20 mg by mouth daily as needed        ipratropium - albuterol 0.5 mg/2.5 mg/3 mL (DUONEB) 0.5-2.5 (3) MG/3ML neb solution Take 1 vial (3 mLs) by nebulization every 6 hours as needed for shortness of breath / dyspnea or wheezing 100 vial 1     levothyroxine (SYNTHROID/LEVOTHROID) 50 MCG tablet TAKE 1 TABLET DAILY 90 tablet 0     losartan (COZAAR) 100 MG tablet Take 1  "tablet (100 mg) by mouth daily 90 tablet 0     losartan (COZAAR) 100 MG tablet TAKE 1 TABLET DAILY 90 tablet 0     menthol (ICY HOT) 5 % PTCH Apply 1 patch topically 2 times daily as needed        Multiple Vitamins-Minerals (MULTIVITAMIN & MINERAL PO) Take 1 tablet by mouth daily.       Nutritional Supplements (SALMON OIL) CAPS Take 1 capsule by mouth daily        omeprazole 20 MG tablet Take 1 tablet (20 mg) by mouth daily Take 30-60 minutes before a meal. 90 tablet 0     order for DME Equipment being ordered: 4 wheeled walker with hand brakes and seat 1 each 0     order for DME Equipment being ordered: 1: Custom/alternative BLE full leg velco/buckling compression garment 1 each 0     order for DME Equipment being ordered: 1: Gradient Compression Wraps; 2: BLE knee high 20-30 mm Hg compression stockings; 3: BLE thigh high 20-30 mm Hg compression stockings; 4: Cast Boots 1 each 0     order for DME Equipment being ordered: Nebulizer and neb supplies (tubing, etc) 1 Device 0     pravastatin (PRAVACHOL) 20 MG tablet TAKE 1 TABLET DAILY 90 tablet 0     sennosides (SENOKOT) 8.6 MG tablet Take 1 tablet by mouth 2 times daily       terazosin (HYTRIN) 2 MG capsule TAKE 1 CAPSULE AT BEDTIME 90 capsule 0     tiotropium (SPIRIVA HANDIHALER) 18 MCG inhaled capsule Inhale contents of one capsule daily. 90 capsule 0     traMADol (ULTRAM) 50 MG tablet TAKE 1 TABLET BY MOUTH TWICE DAILY AS NEEDED FOR PAIN 120 tablet 0       OBJECTIVE:                                                    Physical Exam :  Pulse 73, temperature 98  F (36.7  C), temperature source Oral, resp. rate 16, height 1.689 m (5' 6.5\"), SpO2 92 %, not currently breastfeeding.     NAD, appears comfortable, in wheelchair.  She is in the room with geriatric table, but she does not want to stand up from the wheelchair and moved to the table because she is afraid of falls.  Skin clear, no rashes  HEENT: PERRLA, EOMI, anicteric sclera, pink conjunctiva, external ears " "appear normal, bilateral tympanic membranes clinically normal, oropharynx normal color.  Neck: supple, no JVD,  no thyroidmegaly  Lymph nodes non palpable in the cervical, supraclavicular axillaries,  Chest: clear to auscultation with good respiratory effort  Cardiac: S1S2, RRR, no mgr appreciated  Abdomen: soft, not tender, not distended, audible bowel sound, no hepatosplenomegaly, no palpable masses, no abdominal bruits  Extremities: no cyanosis, clubbing. Chronic lymphedema  Neuro: A, Ox3, no focal signs.  Breast exam  they are symmetrical, no skin changes, no tenderness or nodes on palpation. Nipples are erect, no skin lesions, no discharge on pressure. Rash under right breast    Pelvic exam: deferred, s/p menopause, no symptoms, no hx of abnormal pap     The information in this document, created by the medical scribe for me, accurately reflects the services I personally performed and the decisions made by me. I have reviewed and approved this document for accuracy prior to leaving the patient care area.  December 5, 2019 4:07 PM    Vivian Mendes MD  Internal Medicine       SUBJECTIVE:   Marino Prajapati is a 84 year old female who presents for Preventive Visit.    Are you in the first 12 months of your Medicare coverage?  No    Healthy Habits:    In general, how would you rate your overall health?  Fair    Frequency of exercise:  None    Do you usually eat at least 4 servings of fruit and vegetables a day, include whole grains    & fiber and avoid regularly eating high fat or \"junk\" foods?  Yes    Taking medications regularly:  Yes    Barriers to taking medications:  Not applicable    Medication side effects:  Not applicable    Ability to successfully perform activities of daily living:  Transportation requires assistance, preparing meals requires assistance, shopping requires assistance, housework requires assistance, bathing requires assistance, laundry requires assistance and medication administration " requires assistance    Home Safety:  No safety concerns identified    Hearing impairment symptoms: left ear pops and feels blocked.    In the past 6 months, have you been bothered by leaking of urine? Yes    In general, how would you rate your overall mental or emotional health?  Fair      PHQ-2 Total Score:    Additional concerns today:  No    Do you feel safe in your environment? Yes    Have you ever done Advance Care Planning? (For example, a Health Directive, POLST, or a discussion with a medical provider or your loved ones about your wishes): No, advance care planning information given to patient to review.  Patient plans to discuss their wishes with loved ones or provider.      Fall risk  Fallen 2 or more times in the past year?: No  Any fall with injury in the past year?: No    Cognitive Screening   1) Repeat 3 items (Leader, Season, Table)    2) Clock draw: NORMAL  3) 3 item recall: Recalls 3 objects  Results: 3 items recalled: COGNITIVE IMPAIRMENT LESS LIKELY    Mini-CogTM Copyright S [employer]. Licensed by the author for use in [facility]; reprinted with permission (ivan@[facility]). All rights reserved.      Do you have sleep apnea, excessive snoring or daytime drowsiness?: no    Reviewed and updated as needed this visit by clinical staff  Tobacco  Allergies  Med Hx  Surg Hx  Fam Hx  Soc Hx        Reviewed and updated as needed this visit by Provider        Social History     Tobacco Use     Smoking status: Former Smoker     Packs/day: 1.00     Years: 29.00     Pack years: 29.00     Types: Cigarettes     Last attempt to quit: 10/17/1966     Years since quittin.1     Smokeless tobacco: Never Used   Substance Use Topics     Alcohol use: Yes     Comment: 4 drinks yearly     If you drink alcohol do you typically have >3 drinks per day or >7 drinks per week? No    Alcohol Use 2017   Prescreen: >3 drinks/day or >7 drinks/week? The patient does not drink >3 drinks per day nor >7 drinks per  week.     Hyperlipidemia Follow-Up      Are you regularly taking any medication or supplement to lower your cholesterol?   Yes- pravastatin    Are you having muscle aches or other side effects that you think could be caused by your cholesterol lowering medication?  Yes- possibly    Hypertension Follow-up      Do you check your blood pressure regularly outside of the clinic? No     Are you following a low salt diet? No    Are your blood pressures ever more than 140 on the top number (systolic) OR more   than 90 on the bottom number (diastolic), for example 140/90? No    Current providers sharing in care for this patient include:   Patient Care Team:  Vivian Blue MD as PCP - General (Internal Medicine)  Néstor Mei LGSW as Lead Care Coordinator  Bandar Noriega as Community Health Worker (Primary Care - CC)  Charisse Arshad NP as Assigned PCP    The following health maintenance items are reviewed in Epic and correct as of today:  Health Maintenance   Topic Date Due     URINE DRUG SCREEN  1935     COPD ACTION PLAN  1935     ZOSTER IMMUNIZATION (2 of 3) 04/06/2009     PNEUMOCOCCAL IMMUNIZATION 65+ LOW/MEDIUM RISK (2 of 2 - PCV13) 05/10/2014     MEDICARE ANNUAL WELLNESS VISIT  05/02/2018     DTAP/TDAP/TD IMMUNIZATION (3 - Td) 01/29/2019     ASTHMA CONTROL TEST  05/09/2019     INFLUENZA VACCINE (1) 09/01/2019     ASTHMA ACTION PLAN  10/15/2019     MICROALBUMIN  11/02/2019     JASON ASSESSMENT  11/09/2019     PHQ-9  11/09/2019     FALL RISK ASSESSMENT  05/06/2020     ADVANCE CARE PLANNING  10/19/2022     DEXA  Completed     SPIROMETRY  Completed     IPV IMMUNIZATION  Aged Out     MENINGITIS IMMUNIZATION  Aged Out     Labs reviewed in EPIC      Review of Systems        COUNSELING:  Reviewed preventive health counseling, as reflected in patient instructions       Regular exercise       Healthy diet/nutrition    Estimated body mass index is 34.42 kg/m  as calculated from the  "following:    Height as of this encounter: 1.689 m (5' 6.5\").    Weight as of 5/13/19: 98.2 kg (216 lb 8 oz).    Weight management plan: Discussed healthy diet and exercise guidelines     reports that she quit smoking about 53 years ago. Her smoking use included cigarettes. She has a 29.00 pack-year smoking history. She has never used smokeless tobacco.      Appropriate preventive services were discussed with this patient, including applicable screening as appropriate for cardiovascular disease, diabetes, osteopenia/osteoporosis, and glaucoma.  As appropriate for age/gender, discussed screening for colorectal cancer, prostate cancer, breast cancer, and cervical cancer. Checklist reviewing preventive services available has been given to the patient.    Reviewed patients plan of care and provided an AVS. The Basic Care Plan (routine screening as documented in Health Maintenance) for Marino meets the Care Plan requirement. This Care Plan has been established and reviewed with the Patient.    Counseling Resources:  ATP IV Guidelines  Pooled Cohorts Equation Calculator  Breast Cancer Risk Calculator  FRAX Risk Assessment  ICSI Preventive Guidelines  Dietary Guidelines for Americans, 2010  USDA's MyPlate  ASA Prophylaxis  Lung CA Screening    Vivian Blue MD  [facility]        "

## 2019-12-06 ENCOUNTER — PATIENT OUTREACH (OUTPATIENT)
Dept: CARE COORDINATION | Facility: CLINIC | Age: 84
End: 2019-12-06

## 2019-12-06 ASSESSMENT — ANXIETY QUESTIONNAIRES: GAD7 TOTAL SCORE: 1

## 2019-12-06 ASSESSMENT — ASTHMA QUESTIONNAIRES: ACT_TOTALSCORE: 13

## 2019-12-06 NOTE — PROGRESS NOTES
Scheduled Follow Up Call: Attempt 2   Community Health Worker called and left a message for the patient. If the patient is returning my call, please transfer the patient to Bandar at [phone].  I have called the patient 2 times over the past two weeks and have been unsuccessful in reaching him/her.    The patient has not returned any of my messages.                                                   I have mailed a letter to the patient requesting a return call and will continue attempting to reach out to the patient in one month.   I will also check the patient's chart for upcoming appointments, ER reports that may contain a new phone number, or any other recent activity.    ______________________  Next Outreach: 01/07/20  Planned Outreach Frequency: Monthly  Preferred Phone Number: [phone]   Can also talk to Josue(spouse)     Assessment Date: 05/06/2019  Care Plan Completion Date: 05/06/2019  _____________________  Goals       Functional (pt-stated)      1. Goal Statement: I want to be strong enough to leave my home.  Measure of Success: Ability to attend appointment with PCP.   Supportive Steps to Achieve: Continue HEP, schedule appointment and transportation, attend appointment.  Barriers: Unknown how long it will take for Pt's strength to be regained.  Strengths: Motivated to reach goal, adherent to exercise program.  Date to Achieve By: 7.1.2019  Patient expressed understanding of goal: Pt reports understanding and denies any additional questions or concerns at this times. DEBBIE CC engaged in AIDET communication during encounter.    As of today's date 10/16/2019 goal is met at 26 - 50%.   Goal Status:  Ongoing  Continues to struggle with getting in and out of home. Saw foot doctor and had to have 2 people help her get back inside the home.    As of today's date 9/6/2019 goal is met at 26 - 50%.   Goal Status:  Ongoing  Continues to work on exercises

## 2019-12-06 NOTE — LETTER
December 6, 2019      Marino Prajapati  [address] [address] [address]      Dear Marino,                                                                        You enrolled with the [facility] Care Coordination Services.  In order for us provide guidance we need to connect on a monthly bases.  We have tried calling you 2 times in the last month and have been unsuccessful in reaching you. Please call me at [phone] at your earliest convenience.  If you reach my voicemail, please leave a message with your daytime telephone number and a date and time that I can return your call.                                                                                               Sincerely,                                                                         Bandar Barraza, ТАТЬЯНА                                                                                          Clinic Care Coordination                                                  [facility] : [place], [place] and [place]                               Phone: [phone]

## 2019-12-07 PROBLEM — N39.46 MIXED STRESS AND URGE URINARY INCONTINENCE: Status: ACTIVE | Noted: 2019-12-07

## 2019-12-07 PROBLEM — R21 RASH AND NONSPECIFIC SKIN ERUPTION: Status: ACTIVE | Noted: 2019-12-07

## 2019-12-07 PROBLEM — E03.9 HYPOTHYROIDISM, UNSPECIFIED TYPE: Status: ACTIVE | Noted: 2019-12-07

## 2020-01-06 DIAGNOSIS — M25.561 CHRONIC PAIN OF BOTH KNEES: ICD-10-CM

## 2020-01-06 DIAGNOSIS — M25.562 CHRONIC PAIN OF BOTH KNEES: ICD-10-CM

## 2020-01-06 DIAGNOSIS — Z79.899 CONTROLLED SUBSTANCE AGREEMENT SIGNED: ICD-10-CM

## 2020-01-06 DIAGNOSIS — G89.29 CHRONIC PAIN OF BOTH KNEES: ICD-10-CM

## 2020-01-06 NOTE — TELEPHONE ENCOUNTER
Patient is calling and almost out of medicine. She needs time for the mail order to come so please expedite this if possible.    Controlled Substance Refill Request for tramadol  Problem List Complete:  No     PROVIDER TO CONSIDER COMPLETION OF PROBLEM LIST AND OVERVIEW/CONTROLLED SUBSTANCE AGREEMENT    Last Written Prescription Date:  10/18/19  Last Fill Quantity: 120,   # refills: 0      Last Office Visit with [facility] primary care provider: 12/5/19 Cinthya    Future Office visit:   Next 5 appointments (look out 90 days)    Jan 21, 2020  3:20 PM CST  SHORT with Vivian Blue MD  [facility] ([facility]) [address]  [facility] [address]  [phone]          Controlled substance agreement:   Encounter-Level CSA - 05/10/2016:    Controlled Substance Agreement - Scan on 5/16/2016 10:48 AM: [place] CONTROLLED SUBSTANCE AGREEMENT, 5/10/16     Patient-Level CSA:    There are no patient-level csa.         Last Urine Drug Screen: No results found for: CDAUT, No results found for: COMDAT, No results found for: THC13, PCP13, COC13, MAMP13, OPI13, AMP13, BZO13, TCA13, MTD13, BAR13, OXY13, PPX13, BUP13     Processing:  e scribe     [email or web address]       checked in past 3 months?  No, route to RN

## 2020-01-07 RX ORDER — TRAMADOL HYDROCHLORIDE 50 MG/1
TABLET ORAL
Qty: 120 TABLET | Refills: 0 | Status: SHIPPED | OUTPATIENT
Start: 2020-01-07 | End: 2020-02-28

## 2020-01-09 DIAGNOSIS — E21.3 HYPERPARATHYROIDISM (H): Chronic | ICD-10-CM

## 2020-01-09 DIAGNOSIS — I10 HTN, GOAL BELOW 140/90: ICD-10-CM

## 2020-01-09 DIAGNOSIS — E78.5 HYPERLIPIDEMIA LDL GOAL <130: Chronic | ICD-10-CM

## 2020-01-09 DIAGNOSIS — Z00.00 ROUTINE GENERAL MEDICAL EXAMINATION AT A HEALTH CARE FACILITY: ICD-10-CM

## 2020-01-09 LAB
ERYTHROCYTE [DISTWIDTH] IN BLOOD BY AUTOMATED COUNT: 16.2 % (ref 10–15)
HCT VFR BLD AUTO: 40.9 % (ref 35–47)
HGB BLD-MCNC: 13.8 G/DL (ref 11.7–15.7)
MCH RBC QN AUTO: 31.9 PG (ref 26.5–33)
MCHC RBC AUTO-ENTMCNC: 33.7 G/DL (ref 31.5–36.5)
MCV RBC AUTO: 95 FL (ref 78–100)
PLATELET # BLD AUTO: 258 10E9/L (ref 150–450)
PTH-INTACT SERPL-MCNC: 63 PG/ML (ref 18–80)
RBC # BLD AUTO: 4.33 10E12/L (ref 3.8–5.2)
WBC # BLD AUTO: 7.8 10E9/L (ref 4–11)

## 2020-01-09 PROCEDURE — 82306 VITAMIN D 25 HYDROXY: CPT | Performed by: INTERNAL MEDICINE

## 2020-01-09 PROCEDURE — 80061 LIPID PANEL: CPT | Performed by: INTERNAL MEDICINE

## 2020-01-09 PROCEDURE — 83970 ASSAY OF PARATHORMONE: CPT | Performed by: INTERNAL MEDICINE

## 2020-01-09 PROCEDURE — 85027 COMPLETE CBC AUTOMATED: CPT | Performed by: INTERNAL MEDICINE

## 2020-01-09 PROCEDURE — 80053 COMPREHEN METABOLIC PANEL: CPT | Performed by: INTERNAL MEDICINE

## 2020-01-09 PROCEDURE — 84443 ASSAY THYROID STIM HORMONE: CPT | Performed by: INTERNAL MEDICINE

## 2020-01-09 PROCEDURE — 84439 ASSAY OF FREE THYROXINE: CPT | Performed by: INTERNAL MEDICINE

## 2020-01-09 PROCEDURE — 36415 COLL VENOUS BLD VENIPUNCTURE: CPT | Performed by: INTERNAL MEDICINE

## 2020-01-10 DIAGNOSIS — Z00.00 ROUTINE GENERAL MEDICAL EXAMINATION AT A HEALTH CARE FACILITY: ICD-10-CM

## 2020-01-10 LAB
ALBUMIN SERPL-MCNC: 3.8 G/DL (ref 3.4–5)
ALP SERPL-CCNC: 102 U/L (ref 40–150)
ALT SERPL W P-5'-P-CCNC: 22 U/L (ref 0–50)
ANION GAP SERPL CALCULATED.3IONS-SCNC: 8 MMOL/L (ref 3–14)
AST SERPL W P-5'-P-CCNC: 19 U/L (ref 0–45)
BILIRUB SERPL-MCNC: 0.6 MG/DL (ref 0.2–1.3)
BUN SERPL-MCNC: 16 MG/DL (ref 7–30)
CALCIUM SERPL-MCNC: 9.3 MG/DL (ref 8.5–10.1)
CHLORIDE SERPL-SCNC: 107 MMOL/L (ref 94–109)
CHOLEST SERPL-MCNC: 182 MG/DL
CO2 SERPL-SCNC: 25 MMOL/L (ref 20–32)
CREAT SERPL-MCNC: 0.64 MG/DL (ref 0.52–1.04)
DEPRECATED CALCIDIOL+CALCIFEROL SERPL-MC: 33 UG/L (ref 20–75)
GFR SERPL CREATININE-BSD FRML MDRD: 83 ML/MIN/{1.73_M2}
GLUCOSE SERPL-MCNC: 86 MG/DL (ref 70–99)
HDLC SERPL-MCNC: 77 MG/DL
LDLC SERPL CALC-MCNC: 89 MG/DL
NONHDLC SERPL-MCNC: 105 MG/DL
POTASSIUM SERPL-SCNC: 3.8 MMOL/L (ref 3.4–5.3)
PROT SERPL-MCNC: 7.3 G/DL (ref 6.8–8.8)
SODIUM SERPL-SCNC: 140 MMOL/L (ref 133–144)
T4 FREE SERPL-MCNC: 1.13 NG/DL (ref 0.76–1.46)
TRIGL SERPL-MCNC: 79 MG/DL
TSH SERPL DL<=0.005 MIU/L-ACNC: 4.78 MU/L (ref 0.4–4)

## 2020-01-10 PROCEDURE — 82043 UR ALBUMIN QUANTITATIVE: CPT | Performed by: INTERNAL MEDICINE

## 2020-01-11 LAB
CREAT UR-MCNC: 152 MG/DL
MICROALBUMIN UR-MCNC: 11 MG/L
MICROALBUMIN/CREAT UR: 7.17 MG/G CR (ref 0–25)

## 2020-01-20 ENCOUNTER — DOCUMENTATION ONLY (OUTPATIENT)
Dept: OTHER | Facility: CLINIC | Age: 85
End: 2020-01-20

## 2020-01-20 PROBLEM — Z71.89 ADVANCED DIRECTIVES, COUNSELING/DISCUSSION: Status: RESOLVED | Noted: 2019-12-05 | Resolved: 2020-01-20

## 2020-01-27 ENCOUNTER — PATIENT OUTREACH (OUTPATIENT)
Dept: CARE COORDINATION | Facility: CLINIC | Age: 85
End: 2020-01-27

## 2020-01-27 NOTE — LETTER
January 27, 2020      Marino Prajapati  [address] [facility] [address]      Dear Marino,                                                                       You enrolled with the [facility] Care Coordination Services.  In order for us provide guidance we need to connect on a monthly bases. I have been trying to reach you for 2 months and have been unsuccessful.  At this time, you have been disenrolled from Clinic Care Coordination and you will no longer receive follow up calls from the Clinic Care Coordination team. If you would like access to services in the future, please discuss your needs with your Primary Care Provider.                                                                          Sincerely,                                                                         JYOTI Lew                                                                                          Clinic Care Coordination                                                  [facility] : [place], [place], [place] and [place]                               Phone: [phone]

## 2020-01-27 NOTE — PROGRESS NOTES
Scheduled Follow Up Call: Attempt 3   Community Health Worker called and left a message for the patient. If the patient is returning my call, please transfer the patient to Paul at [phone].   I have called the patient 3 times over the past six weeks, mailed an disenrollment letter today and have been unsuccessful in reaching him/her.      Patient has been disenrolled from Clinic Care coordination services, should they return my call or answer my letter, I will discuss clinic care coordination re-enrollment.

## 2020-02-09 DIAGNOSIS — E78.5 HYPERLIPIDEMIA LDL GOAL <130: Chronic | ICD-10-CM

## 2020-02-09 DIAGNOSIS — I10 HTN, GOAL BELOW 140/90: ICD-10-CM

## 2020-02-09 DIAGNOSIS — E03.9 HYPOTHYROIDISM, UNSPECIFIED TYPE: ICD-10-CM

## 2020-02-09 DIAGNOSIS — R60.0 BILATERAL LEG EDEMA: ICD-10-CM

## 2020-02-09 NOTE — LETTER
[facility]  [address]  [facility] [address]  Phone: [phone]        February 11, 2020      Marino Prajapati                                                                                                                                [id number] [facility] [address]            Dear Ms. Prajapati,    We have received a refill request from your pharmacy for your medication. Many medications require routine follow-up with your provider. A review of your chart indicates that you are due for an appointment with your primary care provider. We have sent a one time refill to your pharmacy to allow you time to schedule an appointment.     Please call [phone] to schedule an appointment.  We look forward to seeing you in the near future.      Thank you,     [facility]

## 2020-02-10 NOTE — TELEPHONE ENCOUNTER
"Requested Prescriptions   Pending Prescriptions Disp Refills     pravastatin (PRAVACHOL) 20 MG tablet [Pharmacy Med Name: PRAVASTATIN TABS 20MG] 90 tablet 4     Sig: TAKE 1 TABLET DAILY (ONE TIME ONLY, NEED TO BE SEEN BECAUSE IT HAS BEEN ONE YEAR SINCE LAST VISIT)   Last Written Prescription Date:  12/05/2019  Last Fill Quantity: 90,  # refills: 0   Last office visit: 12/5/2019 with prescribing provider:     Future Office Visit:      Statins Protocol Passed - 2/9/2020  7:31 AM        Passed - LDL on file in past 12 months     Recent Labs   Lab Test 01/09/20  1419   LDL 89             Passed - No abnormal creatine kinase in past 12 months     No lab results found.             Passed - Recent (12 mo) or future (30 days) visit within the authorizing provider's specialty     Patient has had an office visit with the authorizing provider or a provider within the authorizing providers department within the previous 12 mos or has a future within next 30 days. See \"Patient Info\" tab in inbasket, or \"Choose Columns\" in Meds & Orders section of the refill encounter.              Passed - Medication is active on med list        Passed - Patient is age 18 or older        Passed - No active pregnancy on record        Passed - No positive pregnancy test in past 12 months        levothyroxine (SYNTHROID/LEVOTHROID) 50 MCG tablet [Pharmacy Med Name: L-THYROXINE (SYNTHROID) TABS 50MCG] 90 tablet 4     Sig: TAKE 1 TABLET DAILY (ONE TIME REFILL ONLY, NEED TO BE SEEN BECAUSE IT HAS BEEN ONE YEAR SINCE LAST VISIT)   Last Written Prescription Date:  12/05/2019  Last Fill Quantity: 90,  # refills: 0   Last office visit: 12/5/2019 with prescribing provider:     Future Office Visit:      Thyroid Protocol Failed - 2/9/2020  7:31 AM        Failed - Normal TSH on file in past 12 months     Recent Labs   Lab Test 01/09/20  1419   TSH 4.78*              Passed - Patient is 12 years or older        Passed - Recent (12 mo) or future (30 days) visit " "within the authorizing provider's specialty     Patient has had an office visit with the authorizing provider or a provider within the authorizing providers department within the previous 12 mos or has a future within next 30 days. See \"Patient Info\" tab in inbasket, or \"Choose Columns\" in Meds & Orders section of the refill encounter.              Passed - Medication is active on med list        Passed - No active pregnancy on record     If patient is pregnant or has had a positive pregnancy test, please check TSH.          Passed - No positive pregnancy test in past 12 months     If patient is pregnant or has had a positive pregnancy test, please check TSH.          "

## 2020-02-11 RX ORDER — PRAVASTATIN SODIUM 20 MG
TABLET ORAL
Qty: 90 TABLET | Refills: 0 | Status: SHIPPED | OUTPATIENT
Start: 2020-02-11 | End: 2020-05-31

## 2020-02-11 RX ORDER — LEVOTHYROXINE SODIUM 50 UG/1
TABLET ORAL
Qty: 90 TABLET | Refills: 0 | Status: SHIPPED | OUTPATIENT
Start: 2020-02-11 | End: 2020-06-22

## 2020-02-11 NOTE — TELEPHONE ENCOUNTER
Pravastatin  Medication is being filled for 1 time refill only due to:  Patient needs to be seen because overdue for an appointment.  Per 12/5/19 office visit note, patient was advised to follow up in January. Patient canceled most recent appointment. No future appointments scheduled. Letter mailed.    Levothyroxine  Routing refill request to provider for review/approval because:  Labs out of range:  TSH

## 2020-02-13 DIAGNOSIS — E78.5 HYPERLIPIDEMIA LDL GOAL <130: Chronic | ICD-10-CM

## 2020-02-13 DIAGNOSIS — R60.0 BILATERAL LEG EDEMA: ICD-10-CM

## 2020-02-13 DIAGNOSIS — I10 HTN, GOAL BELOW 140/90: ICD-10-CM

## 2020-02-13 RX ORDER — ATENOLOL 100 MG/1
100 TABLET ORAL DAILY
Qty: 90 TABLET | Refills: 0 | Status: SHIPPED | OUTPATIENT
Start: 2020-02-13 | End: 2020-06-22

## 2020-02-13 NOTE — TELEPHONE ENCOUNTER
Medication is being filled for 1 time refill only due to:  Patient needs to be seen because due for appt.     Letter was sent to patient 2/11/20 informing to schedule appointment.

## 2020-02-13 NOTE — TELEPHONE ENCOUNTER
"Last Written Prescription Date:  12/05/2019  Last Fill Quantity: 90,  # refills: 0   Last office visit: 12/5/2019 with prescribing provider:  Vivian Mendes   Future Office Visit:    Requested Prescriptions   Pending Prescriptions Disp Refills     atenolol (TENORMIN) 100 MG tablet 90 tablet 0     Sig: Take 1 tablet (100 mg) by mouth daily       Beta-Blockers Protocol Passed - 2/13/2020  9:22 AM        Passed - Blood pressure under 140/90 in past 12 months     BP Readings from Last 3 Encounters:   12/05/19 130/76   05/13/19 129/59   04/30/19 152/63                 Passed - Patient is age 6 or older        Passed - Recent (12 mo) or future (30 days) visit within the authorizing provider's specialty     Patient has had an office visit with the authorizing provider or a provider within the authorizing providers department within the previous 12 mos or has a future within next 30 days. See \"Patient Info\" tab in inbasket, or \"Choose Columns\" in Meds & Orders section of the refill encounter.              Passed - Medication is active on med list          "

## 2020-02-26 DIAGNOSIS — R60.0 BILATERAL LEG EDEMA: ICD-10-CM

## 2020-02-26 DIAGNOSIS — M25.561 CHRONIC PAIN OF BOTH KNEES: ICD-10-CM

## 2020-02-26 DIAGNOSIS — M25.562 CHRONIC PAIN OF BOTH KNEES: ICD-10-CM

## 2020-02-26 DIAGNOSIS — I10 HTN, GOAL BELOW 140/90: ICD-10-CM

## 2020-02-26 DIAGNOSIS — G89.29 CHRONIC PAIN OF BOTH KNEES: ICD-10-CM

## 2020-02-26 NOTE — TELEPHONE ENCOUNTER
Patient requesting mail order prescription refills. Will run out prior to 03- appointment with Cinthya. Would appreciate a call back when done.

## 2020-02-28 RX ORDER — TRAMADOL HYDROCHLORIDE 50 MG/1
TABLET ORAL
Qty: 120 TABLET | Refills: 0 | Status: SHIPPED | OUTPATIENT
Start: 2020-02-28 | End: 2020-03-06

## 2020-02-28 RX ORDER — FUROSEMIDE 40 MG
40 TABLET ORAL DAILY
Qty: 90 TABLET | Refills: 0 | Status: SHIPPED | OUTPATIENT
Start: 2020-02-28 | End: 2020-06-22

## 2020-02-28 NOTE — TELEPHONE ENCOUNTER
"Requested Prescriptions   Pending Prescriptions Disp Refills     furosemide (LASIX) 40 MG tablet 90 tablet 0     Sig: Take 1 tablet (40 mg) by mouth daily       Diuretics (Including Combos) Protocol Passed - 2/26/2020 11:32 AM        Passed - Blood pressure under 140/90 in past 12 months     BP Readings from Last 3 Encounters:   12/05/19 130/76   05/13/19 129/59   04/30/19 152/63                 Passed - Recent (12 mo) or future (30 days) visit within the authorizing provider's specialty     Patient has had an office visit with the authorizing provider or a provider within the authorizing providers department within the previous 12 mos or has a future within next 30 days. See \"Patient Info\" tab in inbasket, or \"Choose Columns\" in Meds & Orders section of the refill encounter.              Passed - Medication is active on med list        Passed - Patient is age 18 or older        Passed - No active pregancy on record        Passed - Normal serum creatinine on file in past 12 months     Recent Labs   Lab Test 01/09/20  1419   CR 0.64              Passed - Normal serum potassium on file in past 12 months     Recent Labs   Lab Test 01/09/20  1419   POTASSIUM 3.8                    Passed - Normal serum sodium on file in past 12 months     Recent Labs   Lab Test 01/09/20  1419                 Passed - No positive pregnancy test in past 12 months       Last office visit 12-5-19    Next 5 appointments (look out 90 days)    Mar 10, 2020  5:20 PM CDT  [facility] [address]  [facility] [id number] [phone]          Furosemide refilled x 1 d/t future appointment scheduled.    Controlled Substance Refill Request for Tramadol  Problem List Complete:  No     PROVIDER TO CONSIDER COMPLETION OF PROBLEM LIST AND OVERVIEW/CONTROLLED SUBSTANCE AGREEMENT    Last Written Prescription Date:  1-7-20  Last Fill Quantity: " 120,   # refills: 0    THE MOST RECENT OFFICE VISIT MUST BE WITHIN THE PAST 3 MONTHS. AT LEAST ONE FACE TO FACE VISIT MUST OCCUR EVERY 6 MONTHS. ADDITIONAL VISITS CAN BE VIRTUAL.  (THIS STATEMENT SHOULD BE DELETED.)    [place] [facility] [place]: 12-5-19    Future Office visit:   Next 5 appointments (look out 90 days)    Mar 10, 2020  5:20 PM CDT  [facility] [address]  [facility] [address]  [phone]          Controlled substance agreement:   Encounter-Level CSA - 05/10/2016:    Controlled Substance Agreement - Scan on 5/16/2016 10:48 AM: [place] CONTROLLED SUBSTANCE AGREEMENT, 5/10/16     Patient-Level CSA:    There are no patient-level csa.         Last Urine Drug Screen: No results found for: CDAUT, No results found for: COMDAT, No results found for: THC13, PCP13, COC13, MAMP13, OPI13, AMP13, BZO13, TCA13, MTD13, BAR13, OXY13, PPX13, BUP13     Processing:  Rx to be electronically transmitted to pharmacy by provider      [email or web address]       checked in past 3 months?  Yes 1-6-20  No concerns.    Please advise, thanks.

## 2020-03-06 ENCOUNTER — APPOINTMENT (OUTPATIENT)
Dept: CT IMAGING | Facility: CLINIC | Age: 85
DRG: 193 | End: 2020-03-06
Attending: EMERGENCY MEDICINE
Payer: COMMERCIAL

## 2020-03-06 ENCOUNTER — HOSPITAL ENCOUNTER (INPATIENT)
Facility: CLINIC | Age: 85
LOS: 4 days | Discharge: NURSING, REHAB OR LONG-TERM CARE | DRG: 193 | End: 2020-03-10
Attending: EMERGENCY MEDICINE | Admitting: INTERNAL MEDICINE
Payer: COMMERCIAL

## 2020-03-06 ENCOUNTER — APPOINTMENT (OUTPATIENT)
Dept: GENERAL RADIOLOGY | Facility: CLINIC | Age: 85
DRG: 193 | End: 2020-03-06
Attending: EMERGENCY MEDICINE
Payer: COMMERCIAL

## 2020-03-06 DIAGNOSIS — J18.9 PNEUMONIA OF RIGHT LOWER LOBE DUE TO INFECTIOUS ORGANISM: ICD-10-CM

## 2020-03-06 DIAGNOSIS — J44.1 COPD EXACERBATION (H): ICD-10-CM

## 2020-03-06 DIAGNOSIS — J18.9 PNEUMONIA DUE TO INFECTIOUS ORGANISM, UNSPECIFIED LATERALITY, UNSPECIFIED PART OF LUNG: Primary | ICD-10-CM

## 2020-03-06 LAB
ALBUMIN SERPL-MCNC: 3.5 G/DL (ref 3.4–5)
ALBUMIN UR-MCNC: 70 MG/DL
ALP SERPL-CCNC: 100 U/L (ref 40–150)
ALT SERPL W P-5'-P-CCNC: 18 U/L (ref 0–50)
ANION GAP SERPL CALCULATED.3IONS-SCNC: 8 MMOL/L (ref 3–14)
APPEARANCE UR: CLEAR
AST SERPL W P-5'-P-CCNC: 24 U/L (ref 0–45)
BACTERIA #/AREA URNS HPF: ABNORMAL /HPF
BASOPHILS # BLD AUTO: 0 10E9/L (ref 0–0.2)
BASOPHILS NFR BLD AUTO: 0.3 %
BILIRUB DIRECT SERPL-MCNC: 0.3 MG/DL (ref 0–0.2)
BILIRUB SERPL-MCNC: 1.4 MG/DL (ref 0.2–1.3)
BILIRUB UR QL STRIP: NEGATIVE
BUN SERPL-MCNC: 12 MG/DL (ref 7–30)
CALCIUM SERPL-MCNC: 8.7 MG/DL (ref 8.5–10.1)
CHLORIDE SERPL-SCNC: 103 MMOL/L (ref 94–109)
CO2 SERPL-SCNC: 26 MMOL/L (ref 20–32)
COLOR UR AUTO: YELLOW
CREAT SERPL-MCNC: 0.62 MG/DL (ref 0.52–1.04)
CREAT SERPL-MCNC: 0.68 MG/DL (ref 0.52–1.04)
D DIMER PPP FEU-MCNC: 3.4 UG/ML FEU (ref 0–0.5)
DIFFERENTIAL METHOD BLD: ABNORMAL
EOSINOPHIL # BLD AUTO: 0 10E9/L (ref 0–0.7)
EOSINOPHIL NFR BLD AUTO: 0.2 %
ERYTHROCYTE [DISTWIDTH] IN BLOOD BY AUTOMATED COUNT: 14.8 % (ref 10–15)
FLUAV+FLUBV AG SPEC QL: NEGATIVE
FLUAV+FLUBV AG SPEC QL: NEGATIVE
GFR SERPL CREATININE-BSD FRML MDRD: 81 ML/MIN/{1.73_M2}
GFR SERPL CREATININE-BSD FRML MDRD: 84 ML/MIN/{1.73_M2}
GLUCOSE SERPL-MCNC: 105 MG/DL (ref 70–99)
GLUCOSE UR STRIP-MCNC: NEGATIVE MG/DL
HCT VFR BLD AUTO: 38.6 % (ref 35–47)
HGB BLD-MCNC: 13.5 G/DL (ref 11.7–15.7)
HGB UR QL STRIP: ABNORMAL
IMM GRANULOCYTES # BLD: 0.1 10E9/L (ref 0–0.4)
IMM GRANULOCYTES NFR BLD: 0.5 %
INTERPRETATION ECG - MUSE: NORMAL
KETONES UR STRIP-MCNC: 40 MG/DL
LACTATE BLD-SCNC: 2.2 MMOL/L (ref 0.7–2)
LEUKOCYTE ESTERASE UR QL STRIP: NEGATIVE
LYMPHOCYTES # BLD AUTO: 1.6 10E9/L (ref 0.8–5.3)
LYMPHOCYTES NFR BLD AUTO: 10.3 %
MAGNESIUM SERPL-MCNC: 2 MG/DL (ref 1.6–2.3)
MCH RBC QN AUTO: 33.1 PG (ref 26.5–33)
MCHC RBC AUTO-ENTMCNC: 35 G/DL (ref 31.5–36.5)
MCV RBC AUTO: 95 FL (ref 78–100)
MONOCYTES # BLD AUTO: 1.8 10E9/L (ref 0–1.3)
MONOCYTES NFR BLD AUTO: 11.5 %
MUCOUS THREADS #/AREA URNS LPF: PRESENT /LPF
NEUTROPHILS # BLD AUTO: 12 10E9/L (ref 1.6–8.3)
NEUTROPHILS NFR BLD AUTO: 77.2 %
NITRATE UR QL: POSITIVE
NRBC # BLD AUTO: 0 10*3/UL
NRBC BLD AUTO-RTO: 0 /100
NT-PROBNP SERPL-MCNC: 808 PG/ML (ref 0–1800)
PH UR STRIP: 6 PH (ref 5–7)
PHOSPHATE SERPL-MCNC: 2.4 MG/DL (ref 2.5–4.5)
PLATELET # BLD AUTO: 239 10E9/L (ref 150–450)
PLATELET # BLD AUTO: 249 10E9/L (ref 150–450)
POTASSIUM SERPL-SCNC: 3.4 MMOL/L (ref 3.4–5.3)
PROT SERPL-MCNC: 7.2 G/DL (ref 6.8–8.8)
RBC # BLD AUTO: 4.08 10E12/L (ref 3.8–5.2)
RBC #/AREA URNS AUTO: 9 /HPF (ref 0–2)
SODIUM SERPL-SCNC: 137 MMOL/L (ref 133–144)
SOURCE: ABNORMAL
SP GR UR STRIP: 1.03 (ref 1–1.03)
SPECIMEN SOURCE: NORMAL
SQUAMOUS #/AREA URNS AUTO: 1 /HPF (ref 0–1)
TROPONIN I SERPL-MCNC: <0.015 UG/L (ref 0–0.04)
UROBILINOGEN UR STRIP-MCNC: 2 MG/DL (ref 0–2)
WBC # BLD AUTO: 15.5 10E9/L (ref 4–11)
WBC #/AREA URNS AUTO: 8 /HPF (ref 0–5)

## 2020-03-06 PROCEDURE — 93005 ELECTROCARDIOGRAM TRACING: CPT

## 2020-03-06 PROCEDURE — 36415 COLL VENOUS BLD VENIPUNCTURE: CPT | Performed by: INTERNAL MEDICINE

## 2020-03-06 PROCEDURE — 87186 SC STD MICRODIL/AGAR DIL: CPT | Performed by: EMERGENCY MEDICINE

## 2020-03-06 PROCEDURE — 25000125 ZZHC RX 250: Performed by: INTERNAL MEDICINE

## 2020-03-06 PROCEDURE — 99285 EMERGENCY DEPT VISIT HI MDM: CPT | Mod: 25

## 2020-03-06 PROCEDURE — 25000128 H RX IP 250 OP 636: Performed by: EMERGENCY MEDICINE

## 2020-03-06 PROCEDURE — 96375 TX/PRO/DX INJ NEW DRUG ADDON: CPT

## 2020-03-06 PROCEDURE — 83735 ASSAY OF MAGNESIUM: CPT | Performed by: EMERGENCY MEDICINE

## 2020-03-06 PROCEDURE — 71275 CT ANGIOGRAPHY CHEST: CPT

## 2020-03-06 PROCEDURE — 40000275 ZZH STATISTIC RCP TIME EA 10 MIN

## 2020-03-06 PROCEDURE — 85049 AUTOMATED PLATELET COUNT: CPT | Performed by: INTERNAL MEDICINE

## 2020-03-06 PROCEDURE — 84484 ASSAY OF TROPONIN QUANT: CPT | Performed by: EMERGENCY MEDICINE

## 2020-03-06 PROCEDURE — 87088 URINE BACTERIA CULTURE: CPT | Performed by: EMERGENCY MEDICINE

## 2020-03-06 PROCEDURE — 40000274 ZZH STATISTIC RCP CONSULT EA 30 MIN

## 2020-03-06 PROCEDURE — 81001 URINALYSIS AUTO W/SCOPE: CPT | Performed by: EMERGENCY MEDICINE

## 2020-03-06 PROCEDURE — 25000132 ZZH RX MED GY IP 250 OP 250 PS 637: Performed by: EMERGENCY MEDICINE

## 2020-03-06 PROCEDURE — 25000125 ZZHC RX 250: Performed by: EMERGENCY MEDICINE

## 2020-03-06 PROCEDURE — 25000128 H RX IP 250 OP 636: Performed by: INTERNAL MEDICINE

## 2020-03-06 PROCEDURE — 94640 AIRWAY INHALATION TREATMENT: CPT

## 2020-03-06 PROCEDURE — 96365 THER/PROPH/DIAG IV INF INIT: CPT | Mod: 59

## 2020-03-06 PROCEDURE — 12000000 ZZH R&B MED SURG/OB

## 2020-03-06 PROCEDURE — 71046 X-RAY EXAM CHEST 2 VIEWS: CPT

## 2020-03-06 PROCEDURE — 85025 COMPLETE CBC W/AUTO DIFF WBC: CPT | Performed by: EMERGENCY MEDICINE

## 2020-03-06 PROCEDURE — 80076 HEPATIC FUNCTION PANEL: CPT | Performed by: EMERGENCY MEDICINE

## 2020-03-06 PROCEDURE — 25000132 ZZH RX MED GY IP 250 OP 250 PS 637: Performed by: INTERNAL MEDICINE

## 2020-03-06 PROCEDURE — 85379 FIBRIN DEGRADATION QUANT: CPT | Performed by: EMERGENCY MEDICINE

## 2020-03-06 PROCEDURE — 87804 INFLUENZA ASSAY W/OPTIC: CPT | Performed by: EMERGENCY MEDICINE

## 2020-03-06 PROCEDURE — 94640 AIRWAY INHALATION TREATMENT: CPT | Mod: 76

## 2020-03-06 PROCEDURE — 84100 ASSAY OF PHOSPHORUS: CPT | Performed by: EMERGENCY MEDICINE

## 2020-03-06 PROCEDURE — 99223 1ST HOSP IP/OBS HIGH 75: CPT | Mod: AI | Performed by: INTERNAL MEDICINE

## 2020-03-06 PROCEDURE — 83880 ASSAY OF NATRIURETIC PEPTIDE: CPT | Performed by: EMERGENCY MEDICINE

## 2020-03-06 PROCEDURE — 87086 URINE CULTURE/COLONY COUNT: CPT | Performed by: EMERGENCY MEDICINE

## 2020-03-06 PROCEDURE — 83605 ASSAY OF LACTIC ACID: CPT | Performed by: INTERNAL MEDICINE

## 2020-03-06 PROCEDURE — 82565 ASSAY OF CREATININE: CPT | Performed by: INTERNAL MEDICINE

## 2020-03-06 PROCEDURE — 80048 BASIC METABOLIC PNL TOTAL CA: CPT | Performed by: EMERGENCY MEDICINE

## 2020-03-06 RX ORDER — FLUTICASONE PROPIONATE 50 MCG
1-2 SPRAY, SUSPENSION (ML) NASAL DAILY PRN
COMMUNITY
End: 2020-03-23

## 2020-03-06 RX ORDER — PRAVASTATIN SODIUM 20 MG
20 TABLET ORAL DAILY
Status: DISCONTINUED | OUTPATIENT
Start: 2020-03-06 | End: 2020-03-10 | Stop reason: HOSPADM

## 2020-03-06 RX ORDER — NALOXONE HYDROCHLORIDE 0.4 MG/ML
.1-.4 INJECTION, SOLUTION INTRAMUSCULAR; INTRAVENOUS; SUBCUTANEOUS
Status: DISCONTINUED | OUTPATIENT
Start: 2020-03-06 | End: 2020-03-10 | Stop reason: HOSPADM

## 2020-03-06 RX ORDER — POTASSIUM CHLORIDE 29.8 MG/ML
20 INJECTION INTRAVENOUS
Status: DISCONTINUED | OUTPATIENT
Start: 2020-03-06 | End: 2020-03-10 | Stop reason: HOSPADM

## 2020-03-06 RX ORDER — POTASSIUM CL/LIDO/0.9 % NACL 10MEQ/0.1L
10 INTRAVENOUS SOLUTION, PIGGYBACK (ML) INTRAVENOUS
Status: DISCONTINUED | OUTPATIENT
Start: 2020-03-06 | End: 2020-03-10 | Stop reason: HOSPADM

## 2020-03-06 RX ORDER — METHYLPREDNISOLONE SODIUM SUCCINATE 125 MG/2ML
125 INJECTION, POWDER, LYOPHILIZED, FOR SOLUTION INTRAMUSCULAR; INTRAVENOUS ONCE
Status: COMPLETED | OUTPATIENT
Start: 2020-03-06 | End: 2020-03-06

## 2020-03-06 RX ORDER — TRAMADOL HYDROCHLORIDE 50 MG/1
50 TABLET ORAL 2 TIMES DAILY
Status: ON HOLD | COMMUNITY
End: 2020-03-10

## 2020-03-06 RX ORDER — CEFTRIAXONE 2 G/1
2 INJECTION, POWDER, FOR SOLUTION INTRAMUSCULAR; INTRAVENOUS EVERY 24 HOURS
Status: DISCONTINUED | OUTPATIENT
Start: 2020-03-07 | End: 2020-03-10 | Stop reason: HOSPADM

## 2020-03-06 RX ORDER — ACETAMINOPHEN 325 MG/1
650 TABLET ORAL EVERY 4 HOURS PRN
Status: DISCONTINUED | OUTPATIENT
Start: 2020-03-06 | End: 2020-03-10 | Stop reason: HOSPADM

## 2020-03-06 RX ORDER — LEVOTHYROXINE SODIUM 100 UG/1
100 TABLET ORAL DAILY
Status: DISCONTINUED | OUTPATIENT
Start: 2020-03-06 | End: 2020-03-06

## 2020-03-06 RX ORDER — IOPAMIDOL 755 MG/ML
500 INJECTION, SOLUTION INTRAVASCULAR ONCE
Status: COMPLETED | OUTPATIENT
Start: 2020-03-06 | End: 2020-03-06

## 2020-03-06 RX ORDER — LEVOTHYROXINE SODIUM 50 UG/1
50 TABLET ORAL DAILY
Status: DISCONTINUED | OUTPATIENT
Start: 2020-03-06 | End: 2020-03-10 | Stop reason: HOSPADM

## 2020-03-06 RX ORDER — TRAMADOL HYDROCHLORIDE 50 MG/1
50 TABLET ORAL 2 TIMES DAILY
Status: DISCONTINUED | OUTPATIENT
Start: 2020-03-06 | End: 2020-03-10 | Stop reason: HOSPADM

## 2020-03-06 RX ORDER — CEFTRIAXONE 2 G/1
2 INJECTION, POWDER, FOR SOLUTION INTRAMUSCULAR; INTRAVENOUS ONCE
Status: COMPLETED | OUTPATIENT
Start: 2020-03-06 | End: 2020-03-06

## 2020-03-06 RX ORDER — ACETAMINOPHEN 325 MG/1
650 TABLET ORAL 2 TIMES DAILY
COMMUNITY
End: 2021-03-11

## 2020-03-06 RX ORDER — IPRATROPIUM BROMIDE AND ALBUTEROL SULFATE 2.5; .5 MG/3ML; MG/3ML
1 SOLUTION RESPIRATORY (INHALATION) EVERY 6 HOURS PRN
Status: DISCONTINUED | OUTPATIENT
Start: 2020-03-06 | End: 2020-03-10 | Stop reason: HOSPADM

## 2020-03-06 RX ORDER — ACETAMINOPHEN 325 MG/1
500 TABLET ORAL AT BEDTIME
COMMUNITY
End: 2021-08-14

## 2020-03-06 RX ORDER — IPRATROPIUM BROMIDE AND ALBUTEROL SULFATE 2.5; .5 MG/3ML; MG/3ML
3 SOLUTION RESPIRATORY (INHALATION)
Status: DISCONTINUED | OUTPATIENT
Start: 2020-03-06 | End: 2020-03-06

## 2020-03-06 RX ORDER — ALBUTEROL SULFATE 0.83 MG/ML
2.5 SOLUTION RESPIRATORY (INHALATION) EVERY 4 HOURS PRN
Status: DISCONTINUED | OUTPATIENT
Start: 2020-03-06 | End: 2020-03-10 | Stop reason: HOSPADM

## 2020-03-06 RX ORDER — ACETAMINOPHEN 325 MG/1
325 TABLET ORAL AT BEDTIME
Status: DISCONTINUED | OUTPATIENT
Start: 2020-03-06 | End: 2020-03-10 | Stop reason: HOSPADM

## 2020-03-06 RX ORDER — ACETAMINOPHEN 325 MG/1
650 TABLET ORAL 2 TIMES DAILY
Status: DISCONTINUED | OUTPATIENT
Start: 2020-03-06 | End: 2020-03-10 | Stop reason: HOSPADM

## 2020-03-06 RX ORDER — TERAZOSIN 1 MG/1
1 CAPSULE ORAL AT BEDTIME
Status: DISCONTINUED | OUTPATIENT
Start: 2020-03-06 | End: 2020-03-10 | Stop reason: HOSPADM

## 2020-03-06 RX ORDER — CODEINE PHOSPHATE AND GUAIFENESIN 10; 100 MG/5ML; MG/5ML
5 SOLUTION ORAL EVERY 4 HOURS PRN
Status: DISCONTINUED | OUTPATIENT
Start: 2020-03-06 | End: 2020-03-10 | Stop reason: HOSPADM

## 2020-03-06 RX ORDER — POTASSIUM CHLORIDE 1.5 G/1.58G
20-40 POWDER, FOR SOLUTION ORAL
Status: DISCONTINUED | OUTPATIENT
Start: 2020-03-06 | End: 2020-03-10 | Stop reason: HOSPADM

## 2020-03-06 RX ORDER — ALBUTEROL SULFATE 5 MG/ML
2.5 SOLUTION RESPIRATORY (INHALATION) EVERY 6 HOURS PRN
Status: DISCONTINUED | OUTPATIENT
Start: 2020-03-06 | End: 2020-03-06

## 2020-03-06 RX ORDER — FUROSEMIDE 40 MG
40 TABLET ORAL DAILY
Status: DISCONTINUED | OUTPATIENT
Start: 2020-03-06 | End: 2020-03-10 | Stop reason: HOSPADM

## 2020-03-06 RX ORDER — POTASSIUM CHLORIDE 7.45 MG/ML
10 INJECTION INTRAVENOUS
Status: DISCONTINUED | OUTPATIENT
Start: 2020-03-06 | End: 2020-03-10 | Stop reason: HOSPADM

## 2020-03-06 RX ORDER — IPRATROPIUM BROMIDE AND ALBUTEROL SULFATE 2.5; .5 MG/3ML; MG/3ML
3 SOLUTION RESPIRATORY (INHALATION) 4 TIMES DAILY
Status: DISCONTINUED | OUTPATIENT
Start: 2020-03-07 | End: 2020-03-10 | Stop reason: HOSPADM

## 2020-03-06 RX ORDER — POTASSIUM CHLORIDE 1500 MG/1
20-40 TABLET, EXTENDED RELEASE ORAL
Status: DISCONTINUED | OUTPATIENT
Start: 2020-03-06 | End: 2020-03-10 | Stop reason: HOSPADM

## 2020-03-06 RX ORDER — AZITHROMYCIN 250 MG/1
500 TABLET, FILM COATED ORAL ONCE
Status: COMPLETED | OUTPATIENT
Start: 2020-03-06 | End: 2020-03-06

## 2020-03-06 RX ORDER — AMLODIPINE BESYLATE 5 MG/1
5 TABLET ORAL DAILY
Status: DISCONTINUED | OUTPATIENT
Start: 2020-03-06 | End: 2020-03-10 | Stop reason: HOSPADM

## 2020-03-06 RX ORDER — MAGNESIUM SULFATE HEPTAHYDRATE 40 MG/ML
4 INJECTION, SOLUTION INTRAVENOUS EVERY 4 HOURS PRN
Status: DISCONTINUED | OUTPATIENT
Start: 2020-03-06 | End: 2020-03-10 | Stop reason: HOSPADM

## 2020-03-06 RX ORDER — ONDANSETRON 2 MG/ML
4 INJECTION INTRAMUSCULAR; INTRAVENOUS EVERY 6 HOURS PRN
Status: DISCONTINUED | OUTPATIENT
Start: 2020-03-06 | End: 2020-03-10 | Stop reason: HOSPADM

## 2020-03-06 RX ORDER — ATENOLOL 50 MG/1
100 TABLET ORAL DAILY
Status: DISCONTINUED | OUTPATIENT
Start: 2020-03-06 | End: 2020-03-10 | Stop reason: HOSPADM

## 2020-03-06 RX ORDER — VITAMIN B COMPLEX
1000 TABLET ORAL DAILY
Status: DISCONTINUED | OUTPATIENT
Start: 2020-03-06 | End: 2020-03-10 | Stop reason: HOSPADM

## 2020-03-06 RX ORDER — LIDOCAINE 40 MG/G
CREAM TOPICAL
Status: DISCONTINUED | OUTPATIENT
Start: 2020-03-06 | End: 2020-03-10 | Stop reason: HOSPADM

## 2020-03-06 RX ORDER — ONDANSETRON 4 MG/1
4 TABLET, ORALLY DISINTEGRATING ORAL EVERY 6 HOURS PRN
Status: DISCONTINUED | OUTPATIENT
Start: 2020-03-06 | End: 2020-03-10 | Stop reason: HOSPADM

## 2020-03-06 RX ORDER — LOSARTAN POTASSIUM 100 MG/1
100 TABLET ORAL DAILY
Status: DISCONTINUED | OUTPATIENT
Start: 2020-03-06 | End: 2020-03-10 | Stop reason: HOSPADM

## 2020-03-06 RX ORDER — FLUTICASONE PROPIONATE 50 MCG
1-2 SPRAY, SUSPENSION (ML) NASAL DAILY PRN
Status: DISCONTINUED | OUTPATIENT
Start: 2020-03-06 | End: 2020-03-10 | Stop reason: HOSPADM

## 2020-03-06 RX ADMIN — TRAMADOL HYDROCHLORIDE 50 MG: 50 TABLET, FILM COATED ORAL at 20:48

## 2020-03-06 RX ADMIN — MICONAZOLE NITRATE: 20 POWDER TOPICAL at 22:21

## 2020-03-06 RX ADMIN — LOSARTAN POTASSIUM 100 MG: 100 TABLET, FILM COATED ORAL at 18:25

## 2020-03-06 RX ADMIN — Medication 1 LOZENGE: at 20:48

## 2020-03-06 RX ADMIN — PRAVASTATIN SODIUM 20 MG: 20 TABLET ORAL at 18:26

## 2020-03-06 RX ADMIN — ENOXAPARIN SODIUM 40 MG: 40 INJECTION SUBCUTANEOUS at 18:24

## 2020-03-06 RX ADMIN — AMLODIPINE BESYLATE 5 MG: 5 TABLET ORAL at 18:26

## 2020-03-06 RX ADMIN — MELATONIN 1000 UNITS: at 18:25

## 2020-03-06 RX ADMIN — ACETAMINOPHEN 650 MG: 325 TABLET, FILM COATED ORAL at 20:48

## 2020-03-06 RX ADMIN — FUROSEMIDE 40 MG: 40 TABLET ORAL at 18:25

## 2020-03-06 RX ADMIN — UMECLIDINIUM 1 PUFF: 62.5 AEROSOL, POWDER ORAL at 20:33

## 2020-03-06 RX ADMIN — IOPAMIDOL 82 ML: 755 INJECTION, SOLUTION INTRAVENOUS at 12:54

## 2020-03-06 RX ADMIN — IPRATROPIUM BROMIDE AND ALBUTEROL SULFATE 3 ML: .5; 3 SOLUTION RESPIRATORY (INHALATION) at 20:16

## 2020-03-06 RX ADMIN — METHYLPREDNISOLONE SODIUM SUCCINATE 125 MG: 125 INJECTION, POWDER, FOR SOLUTION INTRAMUSCULAR; INTRAVENOUS at 13:51

## 2020-03-06 RX ADMIN — CEFTRIAXONE 2 G: 2 INJECTION, POWDER, FOR SOLUTION INTRAMUSCULAR; INTRAVENOUS at 13:51

## 2020-03-06 RX ADMIN — GUAIFENESIN AND CODEINE PHOSPHATE 5 ML: 10; 100 LIQUID ORAL at 20:48

## 2020-03-06 RX ADMIN — ACETAMINOPHEN 325 MG: 325 TABLET, FILM COATED ORAL at 22:21

## 2020-03-06 RX ADMIN — IPRATROPIUM BROMIDE AND ALBUTEROL SULFATE 3 ML: .5; 3 SOLUTION RESPIRATORY (INHALATION) at 11:33

## 2020-03-06 RX ADMIN — ATENOLOL 100 MG: 50 TABLET ORAL at 18:25

## 2020-03-06 RX ADMIN — LEVOTHYROXINE SODIUM 50 MCG: 50 TABLET ORAL at 18:25

## 2020-03-06 RX ADMIN — SODIUM CHLORIDE 99 ML: 9 INJECTION, SOLUTION INTRAVENOUS at 12:54

## 2020-03-06 RX ADMIN — AZITHROMYCIN MONOHYDRATE 500 MG: 250 TABLET ORAL at 13:51

## 2020-03-06 RX ADMIN — TERAZOSIN HYDROCHLORIDE ANHYDROUS 1 MG: 1 CAPSULE ORAL at 22:21

## 2020-03-06 RX ADMIN — FLUTICASONE FUROATE AND VILANTEROL TRIFENATATE 1 PUFF: 200; 25 POWDER RESPIRATORY (INHALATION) at 20:34

## 2020-03-06 ASSESSMENT — ENCOUNTER SYMPTOMS
COUGH: 1
WEAKNESS: 1
DIARRHEA: 1
VOMITING: 0
FATIGUE: 1
SORE THROAT: 0
ABDOMINAL PAIN: 0
HEADACHES: 0
FREQUENCY: 1
SHORTNESS OF BREATH: 1
NAUSEA: 0

## 2020-03-06 ASSESSMENT — ACTIVITIES OF DAILY LIVING (ADL)
DRESS: 0-->INDEPENDENT
TOILETING: 0-->INDEPENDENT
ADLS_ACUITY_SCORE: 14
RETIRED_COMMUNICATION: 0-->UNDERSTANDS/COMMUNICATES WITHOUT DIFFICULTY
COGNITION: 0 - NO COGNITION ISSUES REPORTED
BATHING: 0-->INDEPENDENT
FALL_HISTORY_WITHIN_LAST_SIX_MONTHS: NO
RETIRED_EATING: 0-->INDEPENDENT
AMBULATION: 1-->ASSISTIVE EQUIPMENT
TRANSFERRING: 0-->INDEPENDENT
SWALLOWING: 0-->SWALLOWS FOODS/LIQUIDS WITHOUT DIFFICULTY
WHICH_OF_THE_ABOVE_FUNCTIONAL_RISKS_HAD_A_RECENT_ONSET_OR_CHANGE?: AMBULATION

## 2020-03-06 ASSESSMENT — MIFFLIN-ST. JEOR: SCORE: 1512.77

## 2020-03-06 NOTE — LETTER
"Key Recommendations:    Patient presented to the emergency room with increase weakness and lethargy and a cough for the past 10 days.  Patient has been diagnoses with acute right lower lobe pneumonia.  PMH includes: asthma/COPD, hypothyroidism, HTN, GERD.    Education:  Patient requested information on low sodium diet. CM provide [employer] educational material \" Tips for following a low salt diet\" and\" low salt choices\".    Provided and educated patient on the Pneumonia action plan.  Verified patient's understanding using the teach back techniques.    COPD- patient manages her COPD by taking her medications as prescribed.  She does not use oxygen at home at this time.  She uses her nebulizer PRN.  Patient did say she found herself using her rescue inhaler more frequently in the past week without relief.     Patient would benefit from reinforcement of COPD action plan.    Radha Brenner RN BSN    AVS/Discharge Summary is the source of truth; this is a helpful guide for improved communication of patient story          "

## 2020-03-06 NOTE — PHARMACY-ADMISSION MEDICATION HISTORY
Admission medication history interview status for this patient is complete. See [facility] admission navigator for allergy information, prior to admission medications and immunization status.     Medication history interview source(s):Patient  Medication history resources (including written lists, pill bottles, clinic record):[facility] clinic list, [facility], [facility] mail [facility]  Primary pharmacy:[facility] mail order    Changes made to PTA medication list:  Added: scheduled tylenol, prn flonase, scheduled tramadol  Deleted: prn tylenol, qid tylenol, scheduled flonase, prn tramadol, omeprazole 20mg daily  Changed: vit d from 2000 units daily to 1000 unit daily per pt, duplicate losartan entry, salmon cap to prn per pt    Actions taken by pharmacist (provider contacted, etc):[facility]oned [facility] to confirm dose on norvasc, atenolol, lasix, synthroid, losartan, pravastatin, hytrin     Additional medication history information:None    Medication reconciliation/reorder completed by provider prior to medication history? No      For patients on insulin therapy:N(    Prior to Admission medications    Medication Sig Last Dose Taking? Auth Provider   acetaminophen (TYLENOL) 325 MG tablet Take 650 mg by mouth 2 times daily 3/5/2020 at Unknown time Yes Unknown, Entered By History   acetaminophen (TYLENOL) 325 MG tablet Take 325 mg by mouth At Bedtime 3/4/2020 at Unknown time Yes Unknown, Entered By History   albuterol (PROAIR HFA/PROVENTIL HFA/VENTOLIN HFA) 108 (90 Base) MCG/ACT inhaler Inhale 2 puffs into the lungs every 6 hours as needed for shortness of breath / dyspnea or wheezing 3/6/2020 at am Yes Vivian Blue MD   atenolol (TENORMIN) 100 MG tablet Take 1 tablet (100 mg) by mouth daily 3/5/2020 at Unknown time Yes Vivian Blue MD   budesonide-formoterol (SYMBICORT) 160-4.5 MCG/ACT Inhaler Inhale 2 puffs into the lungs 2 times daily 3/5/2020 at Unknown time Yes Su  Vivian Arnett MD   cholecalciferol (VITAMIN D) 1000 UNIT tablet Take 1,000 Units by mouth daily  3/5/2020 at Unknown time Yes Vivian Blue MD   Coenzyme Q10 (COQ10) 30 MG CAPS Take 1 capsule by mouth daily as needed  Past Week at Unknown time Yes Vivian Blue MD   Cranberry Extract 250 MG TABS Take 1 tablet by mouth daily as needed  Past Week at Unknown time Yes Vivian Blue MD   fluticasone (FLONASE) 50 MCG/ACT nasal spray Spray 1-2 sprays into both nostrils daily as needed for rhinitis or allergies Past Month at Unknown time Yes Unknown, Entered By History   furosemide (LASIX) 40 MG tablet Take 1 tablet (40 mg) by mouth daily 3/5/2020 at Unknown time Yes Vivian Blue MD   ipratropium - albuterol 0.5 mg/2.5 mg/3 mL (DUONEB) 0.5-2.5 (3) MG/3ML neb solution Take 1 vial (3 mLs) by nebulization every 6 hours as needed for shortness of breath / dyspnea or wheezing no recent usage Yes Vivian Blue MD   levothyroxine (SYNTHROID/LEVOTHROID) 50 MCG tablet TAKE 1 TABLET DAILY (ONE TIME REFILL ONLY, NEED TO BE SEEN BECAUSE IT HAS BEEN ONE YEAR SINCE LAST VISIT) 3/5/2020 at Unknown time Yes Vivian Blue MD   losartan (COZAAR) 100 MG tablet Take 1 tablet (100 mg) by mouth daily 3/5/2020 at Unknown time Yes Vivian Blue MD   Multiple Vitamins-Minerals (MULTIVITAMIN & MINERAL PO) Take 1 tablet by mouth daily. Past Week at Unknown time Yes Unknown, Entered By History   Nutritional Supplements (SALMON OIL) CAPS Take 1 capsule by mouth daily as needed  Past Week at Unknown time Yes Vivian Blue MD   nystatin (MYCOSTATIN) 620932 UNIT/GM external powder Apply topically 2 times daily as needed (skin rash) Past Week at Unknown time Yes Vivian Blue MD   pravastatin (PRAVACHOL) 20 MG tablet TAKE 1 TABLET DAILY (ONE TIME ONLY, NEED TO BE SEEN BECAUSE IT HAS BEEN ONE  YEAR SINCE LAST VISIT) 3/5/2020 at Unknown time Yes Vivian Blue MD   tiotropium (SPIRIVA HANDIHALER) 18 MCG inhaled capsule Inhale contents of one capsule daily. Past Week at Unknown time Yes Vivian Blue MD   traMADol (ULTRAM) 50 MG tablet Take 50 mg by mouth 2 times daily 3/5/2020 at Unknown time Yes Unknown, Entered By History   amLODIPine (NORVASC) 10 MG tablet Take 0.5 tablets (5 mg) by mouth daily   Vivian Blue MD   terazosin (HYTRIN) 2 MG capsule TAKE 1 CAPSULE AT BEDTIME 3/4/2020  Vivian Blue MD

## 2020-03-06 NOTE — ED PROVIDER NOTES
"  History     Chief Complaint:  Fall and Generalized Weakness    HPI   Marino Prajapati is a 84 year old female with a history of COPD, PE, DVT, hypertension, hyperlipidemia, and bilateral lower extremity lymphedema, who presents for evaluation of generalized weakness and a fall. The patient reports she has been gradually developing increasing generalized weakness over the last couple of days, which was exacerbated last night. This morning, she was having difficulty getting off of the toilet, but her knees gave out on her and she \"sunk down\" to the ground. She did not hit her head or lose consciousness. The patient denies any other recent falls. The patient endorses productive cough with white sputum production with some secondary shortness of breath. She denies any chest pain, sore throat, headaches, abdominal pain, nausea, or vomiting. She notes increased urinary frequency and a few episodes of diarrhea, but was unable to make it to the bathroom on time. She does not lay flat secondary to cough and shortness of breath. The patient does not use oxygen at baseline.     Allergies:  No Known Drug Allergies     Medications:    Albuterol inhaler  Amlodipine  Atenolol  Symbicort inhaler  Coenzyme Q10  Lasix  Levothyroxine  Losartan  Nystatin  Pravastatin   Senokot  Hytrin  Tramadol  Spiriva HandiHaler    Past Medical History:    Anemia  Colon polyps  DVT  GERD  Hypertension  Hyperlipidemia   Kidney stone  Osteoarthritis  Osteoporosis   Postnasal drip  Thrombosis of leg  Asthma   Hyperparathyroidism   Lymphedema or lower extremities  PE (2019)  Hypothyroidism   Mixed stress and urge urinary incontinence  COPD  Hypoxia     Past Surgical History:    Arthroplasty hip, bilateral   Cholecystecotmy   Cystoscopy, retrogrades, insert stent ureter  Laser holmium lithotripsy ureter, insert stent  Liposuction   Strip vein    Family History:    Mother: breast cancer  Father: blood clots    Social History:  The patient was " unaccompanied to the ED.  Smoking Status: Former Smoker, quit 53.4 years ago  Smokeless Tobacco: Never Used  Alcohol Use: Positive, rare  Drug Use: Negative   Marital Status:   [5]     Review of Systems   Constitutional: Positive for fatigue.   HENT: Negative for sore throat.    Respiratory: Positive for cough (productive) and shortness of breath.    Cardiovascular: Negative for chest pain.   Gastrointestinal: Positive for diarrhea. Negative for abdominal pain, nausea and vomiting.   Genitourinary: Positive for frequency.   Neurological: Positive for weakness. Negative for syncope and headaches.   All other systems reviewed and are negative.      Physical Exam     Patient Vitals for the past 24 hrs:   Temp Temp src Pulse Resp SpO2   03/06/20 1032 98.8  F (37.1  C) Oral 79 20 93 %        Physical Exam  General: Nontoxic. Appears chronically unwell.  Head:  Scalp, face, and head appear normal  Eyes:  Pupils are equal, round, and reactive to light    Conjunctivae non-injected and sclerae white  ENT:    The external nose is normal    Pinnae are normal    The oropharynx is normal, mucous membranes dry    Posterior pharynx clear without swelling, exudates or erythema     Uvula is in the midline  Neck:  Normal range of motion    There is no rigidity noted    Trachea is in the midline  CV:  Regular rate and rhythm     Normal S1/S2, no S3/S4    No murmur or rub. Radial pulses 2+ bilaterally   Resp:  Lung sounds are diminished throughout especially in the bases.  There are rhonchi heard over the left anterior and posterior lung fields.  Coarse breath sounds bilaterally at the bases.  No significant wheezing.  Respirations are shallow and there is poor air movement throughout.    Mild tachypnea    No increased work of breathing    No rales, wheezing, or rhonchi  GI:  Abdomen is soft, obese, no rigidity or guarding    No distension, or mass    No tenderness or rebound tenderness   MS:  Normal muscular tone    Symmetric  motor strength    3+ bilateral pitting edema below the knees.  Skin:  No rash or acute skin lesions noted  Neuro: Awake and alert    Speech is normal and fluent    Moves all extremities spontaneously  Psych:  Normal affect.  Appropriate interactions.      Emergency Department Course   ECG:  Time: 1037  Vent. Rate 76 bpm. IL interval 150. QRS duration 82. QT/QTc 390/438. P-R-T axis 27 -5 25.  Normal sinus rhythm   Nonspecific ST abnormality   Abnormal ECG  No significant change compared to EKG dated 5/11/19.   Read time: 1114      Imaging:  Radiographic findings were communicated with the patient who voiced understanding of the findings.    XR Chest 2 Views  IMPRESSION: Interval increasing consolidative opacity involving the  posterior aspect of the lungs best visualized overlying the lower  thoracic spine on the lateral view. This likely lies within the  retrocardiac region of the left lung base and would be suspicious for  pneumonia in the proper clinical setting. Minimal linear atelectasis  right lung base. Mild cardiac enlargement. Normal pulmonary  vascularity. Tortuous and calcified thoracic aorta. Marked  degenerative changes in the spine and both shoulders. Reading per radiology.    CT Chest Pulmonary Embolism w Contrast  IMPRESSION:  1.  No PE.  2.  New right lower lobe airspace disease.  3.  A very slowly growing nodule adjacent to the left major fissure is  again seen. This is almost certainly not malignant given the long  doubling time. No dedicated follow-up is required.  4.  There is cardiomegaly. Reading per radiology.       Laboratory:  CBC: WBC: 15.5 (H), HGB 13.5, PLT: 249    BMP: Glucose 105 (H), o/w WNL (Creatinine: 0.68)    1040 Troponin I: <0.015    D-Dimer: 3.4 (H)    Pro BNP: 808    Magnesium: 2.0     Phosphorous: 2.4 (L)    Hepatic panel: Bilirubin Direct 0.3 (H), Bilirubin Total 1.4 (H), o/w WNL    UA: Ketones 40, Blood Small, Protein Albumin 70, Nitrite Positive, RBC 9 (H), WBC 8 (H),  Bacteria Moderate, Mucous Present, o/w WNL    Urine Culture Aerobic Bacterial: pending    Influenza A/B Antigen: both Negative     Interventions:  1133 Duoneb 3 mL Nebulization   1351 Ceftriaxone 2 g IV  1351 Zithromax 500 mg PO  1351 Solu-Medrol 125 mg IV    Emergency Department Course:   Past medical records, nursing notes, and vitals reviewed.  1116: I performed an exam of the patient and obtained history, as documented above.    EKG obtained in the ED, see results above.      The patient was sent for a Chest XR and CT Chest Pulmonary Embolism while in the emergency department, results above.     IV was inserted and blood was drawn for laboratory testing, results above.     The patient provided a urine sample here in the emergency department. This was sent for laboratory testing, findings above.     The patient was tested for influenza while in the emergency department.      Medication administered.     1524 I spoke with Dr. Prieto, hospitalist, who agreed to admit the patient.     Findings and plan explained to the Patient who consents to admission. Discussed the patient with Dr. Prieto, who will admit the patient to a Adult Med/Surg bed for further monitoring, evaluation, and treatment.     I personally reviewed the laboratory and imaging results with the Patient and answered all related questions prior to admission.     Impression & Plan        Medical Decision Making:  Marino Prajapati is a 84 year old female with past medical history as noted above notably for COPD who presents with generalized weakness, malaise, increasing productive cough and shortness of breath.  On my evaluation she appears to be chronically ill but nontoxic.  Afebrile in the emergency department.  Otherwise hemodynamically stable.  A broad differential diagnosis for symptoms was considered including viral syndrome, URI, influenza, pneumonia, COPD exacerbation, CHF, ACS, pleural effusions, pneumothorax, bronchitis among others.  Patient  had mild hypoxia with room air sats drifting down to 88 to 89% on room air.  She was placed on 2 L nasal cannula with good oxygen saturation.  Work-up in the emergency department revealed negative rapid influenza testing.  CBC with a leukocytosis of 15.5.  Chemistry otherwise reassuring.  Troponin and BNP were normal.  EKG without evidence of acute ischemia or dysrhythmia.  Given her history of prior venous thromboembolism d-dimer was checked and was significantly elevated.  Therefore CTA of the chest was obtained which thankfully does not reveal any evidence of pulmonary embolism but does show sequelae of COPD and right lower lobe likely infectious infiltrate.  This is consistent with her clinical symptoms and suggestive of pneumonia with underlying COPD exacerbation.  Urinalysis also positive for nitrites and 8 WBCs with moderate bacteria.  This was sent for culture and may represent an additional underlying urinary tract infection.  Patient was treated with ceftriaxone and azithromycin.  She remained stable during her entire ED course.  Given her multiple comorbidities, underlying COPD and pneumonia she will be admitted to the hospital for further monitoring evaluation and treatment.  The case was discussed with the hospitalist and she was admitted in stable condition.      Diagnosis:    ICD-10-CM    1. Pneumonia of right lower lobe due to infectious organism (H) J18.1 Influenza A/B antigen   2. COPD exacerbation (H) J44.1        Disposition:  Admitted to Med/Surg    Scribe Disclosure:  Lexis PELAYO, am serving as a scribe on 3/6/2020 at 11:16 AM to personally document services performed by Yunier Finley MD based on my observations and the provider's statements to me.      Lexis Guy  3/6/2020   [facility] EMERGENCY DEPARTMENT       Yunier Finley MD  03/06/20 9301

## 2020-03-06 NOTE — LETTER
Transition Communication Hand-off for Care Transitions to Next Level of Care Provider    Name: Marino Prajapati  : 1935  MRN #: [id number]  Primary Care Provider: Vivian Blue  Primary Care MD Name: Dr. Mendes  Primary Clinic: [facility] [address] [facility] [address]  Primary Care Clinic Name: [facility]  Reason for Hospitalization:  COPD exacerbation (H) [J44.1]  Pneumonia of right lower lobe due to infectious organism (H) [J18.1]  Admit Date/Time: 3/6/2020 10:30 AM  Discharge Date: 3/10/20  Payor Source: Payor: [facility] / Plan: ARE MEDICARE [place] PARTNERS / Product Type: HMO /     Readmission Assessment Measure (JANA) Risk Score/category: AVERAGE        Reason for Communication Hand-off Referral: Admission diagnoses: PN  Fragility    Discharge Plan:       Concern for non-adherence with plan of care:   NO  Discharge Needs Assessment:  Needs      Most Recent Value   Equipment Currently Used at Home  walker, rolling   # of Referrals Placed by [facility]  Transportation   Skilled Nursing Facility  [facility] [phone], Fax: [phone]   PAS Number  [id number]          Already enrolled in Tele-monitoring program and name of program:  na  Follow-up specialty is recommended: No    Follow-up plan:  No future appointments.    Any outstanding tests or procedures:        Referrals     Future Labs/Procedures    Physical Therapy Adult Consult     Comments:    Evaluate and treat as clinically indicated.    Reason:  deconditioning          Supplies     Future Labs/Procedures    Oxygen Adult/Peds     Process Instructions:    By signing this order, the Authorizing Provider is attesting that they have completed a face-to-face evaluation on the patient to determine their need for this equipment in the last 60 days. A new face-to-face evaluation is required each time  A new prescription for on eo f the specified items is ordered.   If an additional provider  completed the evaluation, please indicate their name below.     **As of 2018, an order requisition and face sheet will print for all DME orders. Please give printed order and face sheet to patient if not obtaining product from [facility] DME closet.     Comments:    Oxygen Documentation:   I certify that this patient, Marino Prajapati has been under my care and that I, or a nurse practitioner or physician's assistant working with me, had a face-to-face encounter that meets face-to-face encounter requirements with this patient on March 10, 2020.      Was the patient admitted for an acute respiratory illness? Yes. Has the acute respiratory illness been resolved? No    Marino Prajapati is now in a chronic stable state and continues to require supplemental oxygen due to continued oxygen desaturation.  This patient has been treated in part, or in whole for the following medical condition(s):  Pneumonia with COPD exacerbation   Treatments tried and failed or ruled out to treat hypoxemia include nebs/steroids/abx  If portability is ordered, is the patient mobile within the home? yes    Patients who qualify for home O2 coverage under the CMS guidelines require ABG tests or O2 sat readings obtained closest to, but no earlier than 2 days prior to the discharge, as evidence of the need for home oxygen therapy. Testing must be performed while patient is in the chronic stable state. See notes for O2 sats.        Follow Up and recommended labs and tests     F/u with NH MD/NP in 5-7 days for reassessment   At discharge you should consider set up for home safety evaluation to maximize independence         Key Recommendations:    Patient presented to the emergency room with increase weakness and lethargy and a cough for the past 10 days.  Patient has been diagnoses with acute right lower lobe pneumonia.  PMH includes: asthma/COPD, hypothyroidism, HTN, GERD.    Education:  Patient requested information on low sodium diet.  "CM provide [employer] educational material \" Tips for following a low salt diet\" and\" low salt choices\".    Provided and educated patient on the Pneumonia action plan.  Verified patient's understanding using the teach back techniques.    COPD- patient manages her COPD by taking her medications as prescribed.  She does not use oxygen at home at this time.  She uses her nebulizer PRN.  Patient did say she found herself using her rescue inhaler more frequently in the past week without relief.     Patient would benefit from reinforcement of COPD action plan.    Pt was discharged to [facility] for therapies.      Radha Brenner RN BSN    AVS/Discharge Summary is the source of truth; this is a helpful guide for improved communication of patient story            "

## 2020-03-06 NOTE — ED TRIAGE NOTES
Pt arrives via EMS s/p fall to knees today. Pt has been feeling increasingly weak the last two weeks. No injury, LOC, or blood thinners. A&Ox4, ABCs intact.

## 2020-03-06 NOTE — ED NOTES
Bed: ED01  Expected date: 3/6/20  Expected time:   Means of arrival:   Comments:  BV-3  84F  Fall weakness  ETA5

## 2020-03-06 NOTE — ED NOTES
RECEIVING UNIT ED HANDOFF REVIEW    Above ED Nurse Handoff Report was reviewed: Yes  Reviewed by: Susana Chaudhari RN on March 6, 2020 at 4:04 PM   [facility]  ED Nurse Handoff Report    Marino Prajapati is a 84 year old female   ED Chief complaint: Fall and Generalized Weakness  . ED Diagnosis:   Final diagnoses:   Pneumonia of right lower lobe due to infectious organism (H)   COPD exacerbation (H)     Allergies: No Known Allergies    Code Status: Full Code  Activity level - Baseline/Home:  Independent. Activity Level - Current:   Assist X 2. Lift room needed: Yes. Bariatric: No   Needed: No   Isolation: Yes. Infection: Not Applicable  Influenza Pending.     Vital Signs:   Vitals:    03/06/20 1032   Pulse: 79   Resp: 20   Temp: 98.8  F (37.1  C)   TempSrc: Oral   SpO2: 93%       Cardiac Rhythm:  ,      Pain level:    Patient confused: No. Patient Falls Risk: Yes.   Elimination Status: Has voided   Patient Report - Initial Complaint:    Pt arrives via EMS s/p fall to knees today. Pt has been feeling increasingly weak the last two weeks. No injury, LOC, or blood thinners. A&Ox4, ABCs intact.     . Focused Assessment:   Respiratory Respiratory - Respiratory WDL: -WDL except; cough; breath sounds  Breath Sounds: All Fields  Cough Type: productive; loose   Head To Toe Assessment - All Lung Fields Breath Sounds: Anterior:; Posterior:; crackles        Tests Performed: labs, CXR, CT. Abnormal Results:   Labs Ordered and Resulted from Time of ED Arrival Up to the Time of Departure from the ED   CBC WITH PLATELETS DIFFERENTIAL - Abnormal; Notable for the following components:       Result Value    WBC 15.5 (*)     MCH 33.1 (*)     Absolute Neutrophil 12.0 (*)     Absolute Monocytes 1.8 (*)     All other components within normal limits   BASIC METABOLIC PANEL - Abnormal; Notable for the following components:    Glucose 105 (*)     All other components within normal limits   UA MACROSCOPIC WITH REFLEX TO  MICRO AND CULTURE - Abnormal; Notable for the following components:    Ketones Urine 40 (*)     Blood Urine Small (*)     Protein Albumin Urine 70 (*)     Nitrite Urine Positive (*)     RBC Urine 9 (*)     WBC Urine 8 (*)     Bacteria Urine Moderate (*)     Mucous Urine Present (*)     All other components within normal limits   HEPATIC PANEL - Abnormal; Notable for the following components:    Bilirubin Direct 0.3 (*)     Bilirubin Total 1.4 (*)     All other components within normal limits   PHOSPHORUS - Abnormal; Notable for the following components:    Phosphorus 2.4 (*)     All other components within normal limits   D DIMER QUANTITATIVE - Abnormal; Notable for the following components:    D Dimer 3.4 (*)     All other components within normal limits   MAGNESIUM   TROPONIN I   NT PROBNP INPATIENT   PERIPHERAL IV CATHETER   URINE CULTURE AEROBIC BACTERIAL   INFLUENZA A/B ANTIGEN     CT Chest Pulmonary Embolism w Contrast   Final Result   IMPRESSION:   1.  No PE.   2.  New right lower lobe airspace disease.   3.  A very slowly growing nodule adjacent to the left major fissure is   again seen. This is almost certainly not malignant given the long   doubling time. No dedicated follow-up is required.   4.  There is cardiomegaly.      LESTER J FAHRNER, MD      XR Chest 2 Views   Final Result   IMPRESSION: Interval increasing consolidative opacity involving the   posterior aspect of the lungs best visualized overlying the lower   thoracic spine on the lateral view. This likely lies within the   retrocardiac region of the left lung base and would be suspicious for   pneumonia in the proper clinical setting. Minimal linear atelectasis   right lung base. Mild cardiac enlargement. Normal pulmonary   vascularity. Tortuous and calcified thoracic aorta. Marked   degenerative changes in the spine and both shoulders.      CURT L BEHRNS, MD        .   Treatments provided: see MAR  Family Comments: none at bedside  OBS  brochure/video discussed/provided to patient:  N/A  ED Medications:   Medications   ipratropium - albuterol 0.5 mg/2.5 mg/3 mL (DUONEB) neb solution 3 mL (3 mLs Nebulization Given 3/6/20 1133)   cefTRIAXone (ROCEPHIN) 2 g vial to attach to  ml bag for ADULTS or NS 50 ml bag for PEDS (2 g Intravenous New Bag 3/6/20 1351)   CT Scan Flush (99 mLs Intravenous Given 3/6/20 1254)   iopamidol (ISOVUE-370) solution 500 mL (82 mLs Intravenous Given 3/6/20 1254)   methylPREDNISolone sodium succinate (solu-MEDROL) injection 125 mg (125 mg Intravenous Given 3/6/20 1351)   azithromycin (ZITHROMAX) tablet 500 mg (500 mg Oral Given 3/6/20 1351)     Drips infusing:  No  For the majority of the shift, the patient's behavior Green. Interventions performed were N/A.    Sepsis treatment initiated: No       ED Nurse Name/Phone Number: Elsie Trevino RN,   1:52 PM

## 2020-03-06 NOTE — H&P
[facility]    Hospitalist History and Physical    Name: Marino Prajapati    MRN: [id number]  YOB: 1935    Age: 84 year old  Date of Admission:  3/6/2020  Date of Service (when I saw the patient): 03/06/20    Assessment & Plan   Marino Prajapati is a 84 year old female medical history significant for asthma/COPD, hypothyroidism, hyperlipidemia, hypertension, GERD, chronic cough presented to the emergency room with increased weakness and worsening cough for 10 days.  Evaluation in the emergency room including CT angios to rule out pulmonary embolism showed right lower lobe pneumonia.    Increased weakness and lethargy secondary to possibly new right lower lobe pneumonia  Hypoxia secondary to acute right lower lobe pneumonia  -Blood culture sent  -Influenza antigen negative.  Requested PCR testing  -Droplet precaution  -Started on ceftriaxone and azithromycin will continue  -Requiring 2 L of supplemental O2 will wean as able  -Symptomatic treatment of cough    History of COPD and asthma: No evidence of exacerbation at this time  -No evidence of bronchospasm on exam at this time  -Continue prior to admission long-acting inhalers  -PRN nebs      Abnormal UA with hematuria proteinuria and slightly elevated nitrite  -Patient already on ceftriaxone though doubt UTI  -We will need repeat UA and outpatient follow-up has had some trace blood and protein in urine and prior UAs    Increased weakness  -Likely due to hypoxia and acute pneumonia  -We will consult PT    History of GERD  -Cough could also be worse due to worsening GERD  -Patient was not on PPI as outpatient will start PPI    H/o HTN  -Continue prior to admission medications  -Prior to admission patient on Exelon amlodipine and losartan    Hyperlipidemia  -Continue statins    Miscellaneous: started on minimal doses.  Will need to reconcile meds and adjust prior to admission meds and at their doses    DVT Prophylaxis: Enoxaparin (Lovenox)  SQ  Code Status: DNR / DNI    Disposition: Admitted as inpatient    Primary Care Physician   Vivian Blue    Chief Complaint   Worsening cough shortness of breath and increased weakness progressively for last 10 days    History is obtained from the patient    History of Present Illness   Marino Prajapati is a 84 year old female with past medical history significant for hypertension hyperlipidemia asthma/COPD, chronic cough GERD presented to the emergency room with worsening cough for last 10 days.  Increased weakness and lethargy today.  Patient describes has chronic cough being more frequent and worse for the last 10 days.  Frequently she can bring up phlegm but has had trouble bringing up phlegm lately with coughing spells.  Associated shortness of breath and increased weakness.  Was weak today and was not able to ambulate due to increased weakness.  Was brought to the emergency room was hypoxic required 4 L initially and then was down to 2 L to keep sats above 90%.  Denies any fever.  No chest pain has some shortness of breath.  No abdominal pain no dysuria.  Has chronic lymphedema that has not changed.  Has chronic stasis changes in both lower extremity that has not changed.  Denies any fever or chills.  Denies any recent travels or exposures.  She was seen at her primary care clinic a few weeks ago.  Developed some runny nose after that.  Continues to have runny nose associated with worsening cough.  Review of all the other symptoms are negative.    Past Medical History    Past Medical History:   Diagnosis Date     Anemia      CARDIOVASCULAR SCREENING; LDL GOAL LESS THAN 160      Colon polyps 2010    needs colonoscopy 2013     DVT (deep venous thrombosis) (H) 2007    remote history of DVT while she was on BCP ,coumadin stopped after retroperitoneal/buttock hematoma in 10/11 . On ASA only     Gastro-oesophageal reflux disease      HTN, goal below 140/90      Hyperlipidemia LDL goal <130  1/22/2016     Kidney stone      Osteoarthritis     knees and hip     Osteoporosis      Postnasal drip      S/P total knee arthroplasty      Thrombosis of leg          Past Surgical History   Past Surgical History:   Procedure Laterality Date     ARTHROPLASTY HIP  8/1/2013    Procedure: ARTHROPLASTY HIP;  RIGHT TOTAL HIP ARTHROPLASTY;  Surgeon: Rufino Tong MD;  Location: [facility] OR     ARTHROPLASTY HIP Left 12/12/2014    Procedure: ARTHROPLASTY HIP;  Surgeon: Rufino Tong MD;  Location: [facility] OR     ARTHROPLASTY KNEE  10/22/2012    Procedure: ARTHROPLASTY KNEE;  Right Total knee Arthroplasty  ;  Surgeon: Jagdeep Virgen MD;  Location: [facility] OR     ARTHROPLASTY KNEE  5/23/2013    Procedure: ARTHROPLASTY KNEE;  LEFT TOTAL KNEE ARTHROPLASTY (SMITH & NEPHEW)^;  Surgeon: Rufino Tong MD;  Location:  OR     C PREP FACE/ORAL PROST PALATAL LIFT       CHOLECYSTECTOMY       CYSTOSCOPY, RETROGRADES, INSERT STENT URETER(S), COMBINED  7/16/2012    Procedure: COMBINED CYSTOSCOPY, RETROGRADES, INSERT STENT URETER(S);  Cystoscopy, Right retrogrades, Right Stent Placement;  Surgeon: Mauricio Velazquez MD;  Location:  OR     LASER HOLMIUM LITHOTRIPSY URETER(S), INSERT STENT, COMBINED  8/8/2012    Procedure: COMBINED CYSTOSCOPY, URETEROSCOPY, LASER HOLMIUM LITHOTRIPSY URETER(S), INSERT STENT;  Cystoscopy, Stent Removal, Right Ureteroscopy with Holmium Laser, Stone removal ;  Surgeon: Mauricio Velazquez MD;  Location: [facility] OR     LIPOSUCTION (LOCATION)      tummy     STRIP VEIN         Prior to Admission Medications   Prior to Admission Medications   Prescriptions Last Dose Informant Patient Reported? Taking?   Coenzyme Q10 (COQ10) 30 MG CAPS Past Week at Unknown time  Yes Yes   Sig: Take 1 capsule by mouth daily as needed    Cranberry Extract 250 MG TABS Past Week at Unknown time  Yes Yes   Sig: Take 1 tablet by mouth daily as needed    Multiple Vitamins-Minerals (MULTIVITAMIN & MINERAL PO) Past Week at Unknown  time Self Yes Yes   Sig: Take 1 tablet by mouth daily.   Nutritional Supplements (SALMON OIL) CAPS Past Week at Unknown time  Yes Yes   Sig: Take 1 capsule by mouth daily as needed    acetaminophen (TYLENOL) 325 MG tablet 3/5/2020 at Unknown time  Yes Yes   Sig: Take 650 mg by mouth 2 times daily   acetaminophen (TYLENOL) 325 MG tablet 3/4/2020 at Unknown time  Yes Yes   Sig: Take 325 mg by mouth At Bedtime   albuterol (PROAIR HFA/PROVENTIL HFA/VENTOLIN HFA) 108 (90 Base) MCG/ACT inhaler 3/6/2020 at am  No Yes   Sig: Inhale 2 puffs into the lungs every 6 hours as needed for shortness of breath / dyspnea or wheezing   amLODIPine (NORVASC) 10 MG tablet   No No   Sig: Take 0.5 tablets (5 mg) by mouth daily   atenolol (TENORMIN) 100 MG tablet 3/5/2020 at Unknown time  No Yes   Sig: Take 1 tablet (100 mg) by mouth daily   budesonide-formoterol (SYMBICORT) 160-4.5 MCG/ACT Inhaler 3/5/2020 at Unknown time  No Yes   Sig: Inhale 2 puffs into the lungs 2 times daily   cholecalciferol (VITAMIN D) 1000 UNIT tablet 3/5/2020 at Unknown time  Yes Yes   Sig: Take 1,000 Units by mouth daily    fluticasone (FLONASE) 50 MCG/ACT nasal spray Past Month at Unknown time  Yes Yes   Sig: Spray 1-2 sprays into both nostrils daily as needed for rhinitis or allergies   furosemide (LASIX) 40 MG tablet 3/5/2020 at Unknown time  No Yes   Sig: Take 1 tablet (40 mg) by mouth daily   ipratropium - albuterol 0.5 mg/2.5 mg/3 mL (DUONEB) 0.5-2.5 (3) MG/3ML neb solution no recent usage  No Yes   Sig: Take 1 vial (3 mLs) by nebulization every 6 hours as needed for shortness of breath / dyspnea or wheezing   levothyroxine (SYNTHROID/LEVOTHROID) 50 MCG tablet 3/5/2020 at Unknown time  No Yes   Sig: TAKE 1 TABLET DAILY (ONE TIME REFILL ONLY, NEED TO BE SEEN BECAUSE IT HAS BEEN ONE YEAR SINCE LAST VISIT)   losartan (COZAAR) 100 MG tablet 3/5/2020 at Unknown time  No Yes   Sig: Take 1 tablet (100 mg) by mouth daily   nystatin (MYCOSTATIN) 702684 UNIT/GM  external powder Past Week at Unknown time  No Yes   Sig: Apply topically 2 times daily as needed (skin rash)   order for DME   No No   Sig: Equipment being ordered: Nebulizer and neb supplies (tubing, etc)   order for DME   No No   Sig: Equipment being ordered: 1: Gradient Compression Wraps; 2: BLE knee high 20-30 mm Hg compression stockings; 3: BLE thigh high 20-30 mm Hg compression stockings; 4: Cast Boots   order for DME   No No   Sig: Equipment being ordered: 1: Custom/alternative BLE full leg velco/buckling compression garment   order for DME   No No   Sig: Equipment being ordered: 4 wheeled walker with hand brakes and seat   pravastatin (PRAVACHOL) 20 MG tablet 3/5/2020 at Unknown time  No Yes   Sig: TAKE 1 TABLET DAILY (ONE TIME ONLY, NEED TO BE SEEN BECAUSE IT HAS BEEN ONE YEAR SINCE LAST VISIT)   terazosin (HYTRIN) 2 MG capsule 3/4/2020  No No   Sig: TAKE 1 CAPSULE AT BEDTIME   tiotropium (SPIRIVA HANDIHALER) 18 MCG inhaled capsule Past Week at Unknown time  No Yes   Sig: Inhale contents of one capsule daily.   traMADol (ULTRAM) 50 MG tablet 3/5/2020 at Unknown time  Yes Yes   Sig: Take 50 mg by mouth 2 times daily      Facility-Administered Medications: None     Allergies   No Known Allergies    Social History   Social History     Tobacco Use     Smoking status: Former Smoker     Packs/day: 1.00     Years: 29.00     Pack years: 29.00     Types: Cigarettes     Last attempt to quit: 10/17/1966     Years since quittin.4     Smokeless tobacco: Never Used   Substance Use Topics     Alcohol use: Yes     Comment: 4 drinks yearly     Social History     Social History Narrative     Not on file     Lives with significant other.  Denies smoking quit in .  No alcohol occasional sips of wine    Family History   Mother had breast cancer Sister had some cancer does not know what father  of old age.    Review of Systems   A Comprehensive greater than 10 system review of systems was carried out.  Pertinent  positives and negatives are noted above.  Otherwise negative for contributory information.    Physical Exam   Temp: 98.8  F (37.1  C) Temp src: Oral BP: (!) 149/59 Pulse: 79   Resp: 20 SpO2: 90 % O2 Device: Nasal cannula Oxygen Delivery: 2 LPM  Vital Signs with Ranges  Temp:  [98.8  F (37.1  C)] 98.8  F (37.1  C)  Pulse:  [79] 79  Resp:  [20] 20  BP: (149)/(59) 149/59  SpO2:  [90 %-93 %] 90 %  0 lbs 0 oz    GEN:  Alert, oriented x 3, requiring nasal cannula to help with breathing  HEENT:  Normocephalic/atraumatic, no scleral icterus, no nasal discharge, mouth dry  CV:  Regular rate and rhythm, no murmur or JVD.  S1 + S2 noted, no S3 or S4.  LUNGS: Poor inspiratory effort.  Bibasilar crackles right more than left symmetric chest rise on inhalation noted.  ABD:  Active bowel sounds, soft, non-tender/non-distended.  No rebound/guarding/rigidity.  EXT: Chronic lymphedema no change per patient.  Chronic stasis changes noted bilaterally   SKIN: Chronic stasis and dermatitis bilaterally  NEURO:  Symmetric muscle strength, No new focal deficits appreciated.    Data   Data reviewed today:  I personally reviewed the EKG tracing showing Normal sinus rhythm with T wave inverted in lead III and flat in aVF..    No results for input(s): PH, PHV, PO2, PO2V, SAT, PCO2, PCO2V, HCO3, HCO3V in the last 168 hours.  Recent Labs   Lab 03/06/20  1040   WBC 15.5*   HGB 13.5   HCT 38.6   MCV 95        Recent Labs   Lab 03/06/20  1040      POTASSIUM 3.4   CHLORIDE 103   CO2 26   ANIONGAP 8   *   BUN 12   CR 0.68   GFRESTIMATED 81   GFRESTBLACK >90   SANDRA 8.7     Recent Labs   Lab 03/06/20  1151   CULT PENDING     Recent Labs   Lab 03/06/20  1040   NTBNPI 808     Recent Labs   Lab 03/06/20  1040   AST 24   ALT 18   ALKPHOS 100   BILITOTAL 1.4*     No results for input(s): INR in the last 168 hours.  No results for input(s): LACT in the last 168 hours.  No results for input(s): LIPASE in the last 168 hours.  Recent Labs    Lab 03/06/20  1040   TROPI <0.015     Recent Labs   Lab 03/06/20  1151   COLOR Yellow   APPEARANCE Clear   URINEGLC Negative   URINEBILI Negative   URINEKETONE 40*   SG 1.026   UBLD Small*   URINEPH 6.0   PROTEIN 70*   NITRITE Positive*   LEUKEST Negative   RBCU 9*   WBCU 8*       Recent Results (from the past 24 hour(s))   XR Chest 2 Views    Narrative    CHEST TWO VIEWS 3/6/2020 12:18 PM     HISTORY: Cough, dyspnea, history of chronic obstructive pulmonary  disease (COPD), peripheral edema.    COMPARISON: 5/11/2019       Impression    IMPRESSION: Interval increasing consolidative opacity involving the  posterior aspect of the lungs best visualized overlying the lower  thoracic spine on the lateral view. This likely lies within the  retrocardiac region of the left lung base and would be suspicious for  pneumonia in the proper clinical setting. Minimal linear atelectasis  right lung base. Mild cardiac enlargement. Normal pulmonary  vascularity. Tortuous and calcified thoracic aorta. Marked  degenerative changes in the spine and both shoulders.    CURT L BEHRNS, MD   CT Chest Pulmonary Embolism w Contrast    Narrative    CT CHEST PULMONARY EMBOLISM W CONTRAST 3/6/2020 1:05 PM    CLINICAL HISTORY: Shortness of breath;   TECHNIQUE: CT angiogram chest during arterial phase injection IV  contrast. 2D and 3D MIP reconstructions were performed by the CT  technologist. Dose reduction techniques were used.     CONTRAST: 82mL Isovue-370    COMPARISON: Chest CT dated 4/9/2019 and 8/9/2012. CT of the abdomen  and pelvis dated 10/17/2011.    FINDINGS:  ANGIOGRAM CHEST: Pulmonary arteries are normal caliber and negative  for pulmonary emboli. Thoracic aorta is negative for dissection.    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 There is a new  right lower lobe consolidative airspace process. A 23 mm x 17 mm  opacity in the left upper lobe adjacent to the major pleura has not  changed from the most recent study. This  was approximately 13 mm in  2011. Dependent opacities in the right middle lobe and left lower lobe  likely represent atelectasis.    MEDIASTINUM/AXILLAE: The heart is mildly enlarged. Normal esophagus.  No thoracic lymphadenopathy. There is atherosclerotic disease.    UPPER ABDOMEN: Cholelithiasis. A simple cyst in the right kidney  requires no dedicated follow-up. A right adrenal nodule was  characterized as an adenoma on a comparison study.    MUSCULOSKELETAL: Degenerative changes are present in the spine and  shoulders. There is exaggerated kyphotic curvature of the thoracic  spine related to superior wedging of the T7 vertebral body.      Impression    IMPRESSION:  1.  No PE.  2.  New right lower lobe airspace disease.  3.  A very slowly growing nodule adjacent to the left major fissure is  again seen. This is almost certainly not malignant given the long  doubling time. No dedicated follow-up is required.  4.  There is cardiomegaly.    LESTER J FAHRNER, MD

## 2020-03-07 ENCOUNTER — APPOINTMENT (OUTPATIENT)
Dept: PHYSICAL THERAPY | Facility: CLINIC | Age: 85
DRG: 193 | End: 2020-03-07
Attending: INTERNAL MEDICINE
Payer: COMMERCIAL

## 2020-03-07 LAB
ANION GAP SERPL CALCULATED.3IONS-SCNC: 4 MMOL/L (ref 3–14)
BUN SERPL-MCNC: 16 MG/DL (ref 7–30)
CALCIUM SERPL-MCNC: 8.6 MG/DL (ref 8.5–10.1)
CHLORIDE SERPL-SCNC: 105 MMOL/L (ref 94–109)
CO2 SERPL-SCNC: 26 MMOL/L (ref 20–32)
CREAT SERPL-MCNC: 0.56 MG/DL (ref 0.52–1.04)
ERYTHROCYTE [DISTWIDTH] IN BLOOD BY AUTOMATED COUNT: 14.1 % (ref 10–15)
FLUAV+FLUBV RNA SPEC QL NAA+PROBE: NEGATIVE
FLUAV+FLUBV RNA SPEC QL NAA+PROBE: NEGATIVE
GFR SERPL CREATININE-BSD FRML MDRD: 87 ML/MIN/{1.73_M2}
GLUCOSE SERPL-MCNC: 147 MG/DL (ref 70–99)
HCT VFR BLD AUTO: 37.5 % (ref 35–47)
HGB BLD-MCNC: 13.3 G/DL (ref 11.7–15.7)
LACTATE BLD-SCNC: 1.2 MMOL/L (ref 0.7–2)
MCH RBC QN AUTO: 32.6 PG (ref 26.5–33)
MCHC RBC AUTO-ENTMCNC: 35.5 G/DL (ref 31.5–36.5)
MCV RBC AUTO: 92 FL (ref 78–100)
PLATELET # BLD AUTO: 234 10E9/L (ref 150–450)
POTASSIUM SERPL-SCNC: 3.3 MMOL/L (ref 3.4–5.3)
POTASSIUM SERPL-SCNC: 3.8 MMOL/L (ref 3.4–5.3)
POTASSIUM SERPL-SCNC: ABNORMAL MMOL/L (ref 3.4–5.3)
RBC # BLD AUTO: 4.08 10E12/L (ref 3.8–5.2)
RSV RNA SPEC NAA+PROBE: NEGATIVE
SODIUM SERPL-SCNC: 135 MMOL/L (ref 133–144)
SPECIMEN SOURCE: NORMAL
WBC # BLD AUTO: 13.7 10E9/L (ref 4–11)

## 2020-03-07 PROCEDURE — 25000128 H RX IP 250 OP 636: Performed by: INTERNAL MEDICINE

## 2020-03-07 PROCEDURE — 97161 PT EVAL LOW COMPLEX 20 MIN: CPT | Mod: GP

## 2020-03-07 PROCEDURE — 25000125 ZZHC RX 250: Performed by: INTERNAL MEDICINE

## 2020-03-07 PROCEDURE — 83605 ASSAY OF LACTIC ACID: CPT | Performed by: INTERNAL MEDICINE

## 2020-03-07 PROCEDURE — 12000000 ZZH R&B MED SURG/OB

## 2020-03-07 PROCEDURE — 99232 SBSQ HOSP IP/OBS MODERATE 35: CPT | Performed by: INTERNAL MEDICINE

## 2020-03-07 PROCEDURE — 80048 BASIC METABOLIC PNL TOTAL CA: CPT | Performed by: INTERNAL MEDICINE

## 2020-03-07 PROCEDURE — 97530 THERAPEUTIC ACTIVITIES: CPT | Mod: GP

## 2020-03-07 PROCEDURE — 84132 ASSAY OF SERUM POTASSIUM: CPT | Performed by: INTERNAL MEDICINE

## 2020-03-07 PROCEDURE — 94640 AIRWAY INHALATION TREATMENT: CPT | Mod: 76

## 2020-03-07 PROCEDURE — 87631 RESP VIRUS 3-5 TARGETS: CPT | Performed by: INTERNAL MEDICINE

## 2020-03-07 PROCEDURE — 97110 THERAPEUTIC EXERCISES: CPT | Mod: GP

## 2020-03-07 PROCEDURE — 85027 COMPLETE CBC AUTOMATED: CPT | Performed by: INTERNAL MEDICINE

## 2020-03-07 PROCEDURE — 25000132 ZZH RX MED GY IP 250 OP 250 PS 637: Performed by: INTERNAL MEDICINE

## 2020-03-07 PROCEDURE — 40000275 ZZH STATISTIC RCP TIME EA 10 MIN

## 2020-03-07 PROCEDURE — 36415 COLL VENOUS BLD VENIPUNCTURE: CPT | Performed by: INTERNAL MEDICINE

## 2020-03-07 PROCEDURE — 94640 AIRWAY INHALATION TREATMENT: CPT

## 2020-03-07 PROCEDURE — 97116 GAIT TRAINING THERAPY: CPT | Mod: GP

## 2020-03-07 PROCEDURE — 25800030 ZZH RX IP 258 OP 636: Performed by: INTERNAL MEDICINE

## 2020-03-07 RX ORDER — SODIUM CHLORIDE 9 MG/ML
INJECTION, SOLUTION INTRAVENOUS CONTINUOUS
Status: ACTIVE | OUTPATIENT
Start: 2020-03-07 | End: 2020-03-07

## 2020-03-07 RX ADMIN — POTASSIUM CHLORIDE 40 MEQ: 1.5 POWDER, FOR SOLUTION ORAL at 12:26

## 2020-03-07 RX ADMIN — IPRATROPIUM BROMIDE AND ALBUTEROL SULFATE 3 ML: .5; 3 SOLUTION RESPIRATORY (INHALATION) at 08:00

## 2020-03-07 RX ADMIN — POTASSIUM CHLORIDE 20 MEQ: 1500 TABLET, EXTENDED RELEASE ORAL at 15:26

## 2020-03-07 RX ADMIN — TRAMADOL HYDROCHLORIDE 50 MG: 50 TABLET, FILM COATED ORAL at 08:38

## 2020-03-07 RX ADMIN — AZITHROMYCIN MONOHYDRATE 250 MG: 500 INJECTION, POWDER, LYOPHILIZED, FOR SOLUTION INTRAVENOUS at 13:51

## 2020-03-07 RX ADMIN — MELATONIN 1000 UNITS: at 08:39

## 2020-03-07 RX ADMIN — GUAIFENESIN AND CODEINE PHOSPHATE 5 ML: 10; 100 LIQUID ORAL at 21:33

## 2020-03-07 RX ADMIN — GUAIFENESIN AND CODEINE PHOSPHATE 5 ML: 10; 100 LIQUID ORAL at 08:45

## 2020-03-07 RX ADMIN — MULTIVITAMIN 15 ML: LIQUID ORAL at 08:39

## 2020-03-07 RX ADMIN — FLUTICASONE FUROATE AND VILANTEROL TRIFENATATE 1 PUFF: 200; 25 POWDER RESPIRATORY (INHALATION) at 08:00

## 2020-03-07 RX ADMIN — IPRATROPIUM BROMIDE AND ALBUTEROL SULFATE 3 ML: .5; 3 SOLUTION RESPIRATORY (INHALATION) at 20:09

## 2020-03-07 RX ADMIN — SODIUM CHLORIDE: 9 INJECTION, SOLUTION INTRAVENOUS at 11:18

## 2020-03-07 RX ADMIN — LEVOTHYROXINE SODIUM 50 MCG: 50 TABLET ORAL at 08:41

## 2020-03-07 RX ADMIN — ENOXAPARIN SODIUM 40 MG: 40 INJECTION SUBCUTANEOUS at 16:32

## 2020-03-07 RX ADMIN — AMLODIPINE BESYLATE 5 MG: 5 TABLET ORAL at 08:38

## 2020-03-07 RX ADMIN — FUROSEMIDE 40 MG: 40 TABLET ORAL at 08:40

## 2020-03-07 RX ADMIN — TRAMADOL HYDROCHLORIDE 50 MG: 50 TABLET, FILM COATED ORAL at 21:33

## 2020-03-07 RX ADMIN — ACETAMINOPHEN 650 MG: 325 TABLET, FILM COATED ORAL at 21:33

## 2020-03-07 RX ADMIN — PRAVASTATIN SODIUM 20 MG: 20 TABLET ORAL at 08:42

## 2020-03-07 RX ADMIN — ACETAMINOPHEN 650 MG: 325 TABLET, FILM COATED ORAL at 08:42

## 2020-03-07 RX ADMIN — ATENOLOL 100 MG: 50 TABLET ORAL at 08:41

## 2020-03-07 RX ADMIN — IPRATROPIUM BROMIDE AND ALBUTEROL SULFATE 3 ML: .5; 3 SOLUTION RESPIRATORY (INHALATION) at 11:50

## 2020-03-07 RX ADMIN — LOSARTAN POTASSIUM 100 MG: 100 TABLET, FILM COATED ORAL at 08:41

## 2020-03-07 RX ADMIN — TERAZOSIN HYDROCHLORIDE ANHYDROUS 1 MG: 1 CAPSULE ORAL at 21:33

## 2020-03-07 RX ADMIN — CEFTRIAXONE 2 G: 2 INJECTION, POWDER, FOR SOLUTION INTRAMUSCULAR; INTRAVENOUS at 16:32

## 2020-03-07 RX ADMIN — IPRATROPIUM BROMIDE AND ALBUTEROL SULFATE 3 ML: .5; 3 SOLUTION RESPIRATORY (INHALATION) at 15:36

## 2020-03-07 RX ADMIN — OMEPRAZOLE 20 MG: 20 CAPSULE, DELAYED RELEASE ORAL at 08:40

## 2020-03-07 RX ADMIN — GUAIFENESIN AND CODEINE PHOSPHATE 5 ML: 10; 100 LIQUID ORAL at 16:39

## 2020-03-07 RX ADMIN — ACETAMINOPHEN 325 MG: 325 TABLET, FILM COATED ORAL at 21:33

## 2020-03-07 ASSESSMENT — ACTIVITIES OF DAILY LIVING (ADL)
ADLS_ACUITY_SCORE: 18

## 2020-03-07 NOTE — PLAN OF CARE
[id number]. Afebrile. Continues to require 2 L oxygen in mid 90s. LS diminished with expiratory wheezing. BS x4. Pt denies pain and nausea. Harsh, intermittent, productive cough; received robitussin x1. 2 gm sodium/Low Fat diet. Ax2 with walker and belt to ambulate. Incontinent, pure wick in place, good urine output. PIV - Sl. Getting Zithromax and Rocephin. Influenza and RSV swab negative, keeping on Droplet isolation related to productive cough. Discharge TBD.

## 2020-03-07 NOTE — PROGRESS NOTES
"[facility] RCAT     Date: 3/6/20  Admission Dx: Increased weakness and worsening cough for 10 days.  Pulmonary History: COPD/Asthma.  Home Nebulizer/MDI Use: DuoNeb Q6 PRN, SPIRIVA every day capsule inhale, SYMBICORT 2 puffs inhaler BID  Home Oxygen: N/A pt denies using any oxygen at home.   Acuity Level (RCAT flow sheet): 3  Aerosol Therapy initiated: DuoNeb QID, Albuterol Q4PRN  Pulmonary Hygiene initiated: Deep cough technique   Volume Expansion initiated: IS  Current Oxygen Requirements: 2L NC  Current SpO2: 93%  Re-evaluation date: 3/9/20  Patient Education: Education was performed with the patient in regards to indications/benefits and possible side effects of bronchodilators. Will continue to do education with patient.       See \"RT Assessments\" flow sheet for patient assessment scoring and Acuity Level Details.       Adam Nolan, RT      "

## 2020-03-07 NOTE — PROGRESS NOTES
03/07/20 1626   Quick Adds   Type of Visit Initial PT Evaluation   Living Environment   Lives With significant other   Living Arrangements house   Home Accessibility stairs to enter home   Number of Stairs, Main Entrance 2   Stair Railings, Main Entrance none   Living Environment Comment lives in house with 2 stairs to enter house and all needs can be met on main floor   Self-Care   Usual Activity Tolerance moderate   Current Activity Tolerance fair   Equipment Currently Used at Home walker, rolling   Activity/Exercise/Self-Care Comment has FWW and 4WW available at home   Functional Level Prior   Ambulation 1-->assistive equipment   Transferring 0-->independent   Toileting 0-->independent   Bathing 0-->independent   Fall history within last six months yes   Number of times patient has fallen within last six months 1   Which of the above functional risks had a recent onset or change? ambulation;transferring;toileting;bathing;dressing   Prior Functional Level Comment ambulating in home with FWW and 4WW at baseline   General Information   Onset of Illness/Injury or Date of Surgery - Date 03/06/20   Patient/Family Goals Statement TCU   Pertinent History of Current Problem (include personal factors and/or comorbidities that impact the POC) admitted for pneumonia   Precautions/Limitations fall precautions   General Observations patient is seated in bedside chair upon arrival   Cognitive Status Examination   Orientation orientation to person, place and time   Level of Consciousness alert   Follows Commands and Answers Questions able to follow multistep instructions   Personal Safety and Judgment intact   Memory intact   Posture    Posture Forward head position;Protracted shoulders   Range of Motion (ROM)   ROM Comment WFL   Strength   Strength Comments decreased strength noted through difficulty with transfers and ambulation   Bed Mobility   Bed Mobility Comments sit EOB > supine modA x2   Transfer Skills   Transfer  "Comments sit <> stand with FWW modA x2   Gait   Gait Comments ambulates ~7' with FWW and modA x2   Balance   Balance Comments requires 2 UE support for ambulation   General Therapy Interventions   Planned Therapy Interventions balance training;bed mobility training;gait training;transfer training;home program guidelines;progressive activity/exercise   Clinical Impression   Criteria for Skilled Therapeutic Intervention yes, treatment indicated   PT Diagnosis impaired functional mobility   Influenced by the following impairments decreased activity tolerance, decreased strength   Functional limitations due to impairments ambulation, bed mobility, transfers, stairs   Clinical Presentation Stable/Uncomplicated   Clinical Presentation Rationale medically stable   Clinical Decision Making (Complexity) Low complexity   Therapy Frequency 5x/week   Predicted Duration of Therapy Intervention (days/wks) 3 days   Anticipated Discharge Disposition Transitional Care Facility   Risk & Benefits of therapy have been explained Yes   Patient, Family & other staff in agreement with plan of care Yes   [facility] TM \"6 Clicks\"   2016, Trustees of [facility], under license to [employer].  All rights reserved.   6 Clicks Short Forms Basic Mobility Inpatient Short Form   [facility]  \"6 Clicks\" V.2 Basic Mobility Inpatient Short Form   1. Turning from your back to your side while in a flat bed without using bedrails? 3 - A Little   2. Moving from lying on your back to sitting on the side of a flat bed without using bedrails? 3 - A Little   3. Moving to and from a bed to a chair (including a wheelchair)? 2 - A Lot   4. Standing up from a chair using your arms (e.g., wheelchair, or bedside chair)? 2 - A Lot   5. To walk in hospital room? 2 - A Lot   6. Climbing 3-5 steps with a railing? 1 - Total   Basic Mobility Raw Score (Score out of 24.Lower scores equate to lower levels of function) 13   Total " Evaluation Time   Total Evaluation Time (Minutes) 5

## 2020-03-07 NOTE — PROGRESS NOTES
[facility]  Hospitalist Progress Note  Toney Styles MD 03/07/2020    Reason for Stay (Diagnosis): hypoxia, RLL PNA         Assessment and Plan:      Summary of Stay: Marino Prajapati is a 84 year old female medical history significant for asthma/COPD, hypothyroidism, hyperlipidemia, hypertension, GERD, chronic cough presented to the emergency room with increased weakness and worsening cough for 10 days.  Evaluation in the emergency room including CT to rule out pulmonary embolism and showed right lower lobe pneumonia.    Pt is receiving antibiotics for PNA.  She reports she has felt quite weak the past several days.  PT consulted to assess and treat; would not be surprised if TCU recommended     Increased weakness and lethargy secondary to possibly new right lower lobe pneumonia  Hypoxia secondary to acute right lower lobe pneumonia  -Blood culture sent  -Influenza antigen negative and PCR neg  -Droplet precaution  -Started on ceftriaxone and azithromycin - will continue  -Requiring 2 L of supplemental O2 will wean as able.    -Symptomatic treatment of cough     History of COPD and asthma: No evidence of exacerbation at this time  -No evidence of bronchospasm on exam at this time  -Continue prior to admission long-acting inhalers  -PRN nebs  - Is not on home O2        Abnormal UA with hematuria proteinuria and slightly elevated nitrite  -Patient already on ceftriaxone; urine cx positive     Increased weakness  -Likely due to hypoxia and acute pneumonia  -We will consult PT     History of GERD  -Cough could also be worse due to worsening GERD  -Patient was not on PPI as outpatient will start PPI     H/o HTN  -Continue prior to admission medications  -Prior to admission patient on Exelon amlodipine and losartan       DVT Prophylaxis: Enoxaparin (Lovenox) SQ  Code Status: DNR / DNI  Dispo:  Home vs TCU pending PT input        Interval History (Subjective):      Reports she has felt quite weak  "the past several days to the point she could not get off the toilet without assistance.  Agrees that she may need a TCU.  Not on home O2                  Physical Exam:      Last Vital Signs:  BP (!) 150/54 (BP Location: Right arm)   Pulse 87   Temp 96.6  F (35.9  C) (Oral)   Resp 20   Ht 1.676 m (5' 6\")   Wt 103.6 kg (228 lb 6.4 oz)   SpO2 92%   BMI 36.86 kg/m        Intake/Output Summary (Last 24 hours) at 3/7/2020 1402  Last data filed at 3/7/2020 0552  Gross per 24 hour   Intake 500 ml   Output 850 ml   Net -350 ml       Constitutional: Awake, alert, cooperative, no apparent distress   Respiratory: Clear to auscultation bilaterally, no crackles or wheezing   Cardiovascular: Regular rate and rhythm, normal S1 and S2, and no murmur noted   Abdomen: Normal bowel sounds, soft, non-distended, non-tender   Skin: No rashes, no cyanosis, dry to touch   Neuro: Alert and oriented x3, no weakness, numbness, memory loss.  Able to lift both LE off the bed   Extremities: No edema, normal range of motion   Other(s):        All other systems: Negative          Medications:      All current medications were reviewed with changes reflected in problem list.         Data:      All new lab and imaging data was reviewed.   Labs:  Recent Labs   Lab 03/07/20  0829   WBC 13.7*   HGB 13.3   HCT 37.5   MCV 92         Imaging:   No results found for this or any previous visit (from the past 24 hour(s)).   "

## 2020-03-07 NOTE — CONSULTS
Care Transition Initial Assessment - RN      Met with: Patient.  DATA   Active Problems:    Pneumonia       Cognitive Status: awake, alert and oriented.  Primary Care Clinic Name: [facility]  Primary Care MD Name: Dr. Mendes  Contact information and PCP information verified: Yes  Lives With: significant other   Living Arrangements: house       Insurance concerns: No Insurance issues identified   We did discuss the Medicare qualifying stay.  ASSESSMENT  Patient currently receives the following services:  none        Identified issues/concerns regarding health management:   Patient presented to the emergency room with increase weakness and lethargy and a cough for the past 10 days.  Patient has been diagnoses with acute right lower lobe pneumonia.  PMH includes: asthma/COPD, hypothyroidism, HTN, GERD.    Discharge Planning  CM met with patient at bedside.  Patient was up in chair, with oxygen on, able to contribute to the conversation.  Patient did get side tracked on her relationship and concern for her significant other . Patient expressed interest in going to a TCU from the hospital to gain strength back.  She was concerned that going home she would not be able to get in the house and if she did get into the house, being able to clear the enteryway intoher kitchen.  She also said that her significant other does not like other people in their home, in their business, thus would prefer TCU over HC.  Patient had a poor experience at [facility] and does NOT want to go there. Provided patient the skilled nursing facility guide.Pt/family was given the Medicare Compare list for SNF, with associated star ratings to assist with choice for referrals/discharge planning Yes    Education was given to pt/family that star ratings are updated/maintained by Medicare and can be reviewed by visiting www.medicare.gov Yes   CM actually did compare search for [facility] and  [facility] and  "provided printed results to the patient.      Patient is scheduled for PT this afternoon.  I will follow up tomorrow and send referrals once PT recommendations are in.    Education:  Patient requested information on low sodium diet. CM provide [employer] educational material \" Tips for following a low salt diet\" and\" low salt choices\".    Provided and educated patient on the Pneumonia action plan.  Verified patient's understanding using the teach back techniques.    COPD- patient manages her COPD by taking her medications as prescribed.  She does not use oxygen at home at this time.  She uses her nebulizer PRN.  Patient did say she found herself using her rescue inhaler more frequently in the past week without relief.     PLAN  Financial costs for the patient include TCU or HC .  Patient given options and choices for discharge yes .  Patient/family is agreeable to the plan?  Yes:   Patient anticipates discharging to patient would like to go to a TCU .      Patient anticipates needs for home equipment: No  Transportation/person available to transport on day of discharge  is family.  She is aware to the  out of pocket cost for [facility] transport, $75 for base rate and $5 per mile to the destination. She is hoping to avoid that cost if possible  Plan/Disposition: TCU     Care  (CTS) will continue to follow as needed.    Radha Brenner RN, BSN, PHN, CTS  Care Coordinator  [facility]  [phone]            "

## 2020-03-07 NOTE — PROGRESS NOTES
03/06/20 2059   Significant Event   Significant Event Critical result/value  (Lactic of 2.2)   Will notify MD.     2104: MD Tuttle called back, not concerned as other vitals stable and patient is already getting antibiotics. Will continue to monitor.

## 2020-03-07 NOTE — PLAN OF CARE
PT: orders received, evaluation completed, and treatment initiated. Patient with PMH of asthma/COPD, hypothyroidism, hyperlipidemia, HTN, GERD, and chronic cough. Patient admitted for pneumonia. Reports that she lives in a house with significant other with 2 stairs to enter and no railing. All needs can be met on main floor. Has FWW and 4WW that she uses in the home. Reports independence with all mobility at baseline.    Discharge Planner PT   Patient plan for discharge: TCU  Current status: Patient is seated in bedside chair upon arrival. Educated on PT role and POC and is agreeable to session. Performs seated LE strengthening exercises. Patient transfers sit > stand with FWW with modA x2. Ambulates ~7' with FWW and modAx2. Takes significant amount of time due to patient having difficulty advancing legs due to weakness. Demonstrates decreased toe clearance and decreased step length. Significantly forward flexed posture throughout gait. Patient takes ~3 side steps to the R with FWW to sit EOB. Cues to sit back and reach back with hand. Patient requires modA x2 at trunk and LEs to return to supine from EOB. Patient is supine in bed with all needs in reach at end of session.  Barriers to return to prior living situation: stairs, decreased activity tolerance, decreased strength  Recommendations for discharge: TCU  Rationale for recommendations: Patient presents significantly below baseline for all mobility. Patient would benefit from TCU stay in order to increase strength and activity tolerance in order to return home safely.       Entered by: Rylie Platt 03/07/2020 4:33 PM

## 2020-03-08 LAB
BACTERIA SPEC CULT: ABNORMAL
LACTATE BLD-SCNC: 1.7 MMOL/L (ref 0.7–2)
Lab: ABNORMAL
SPECIMEN SOURCE: ABNORMAL

## 2020-03-08 PROCEDURE — 40000275 ZZH STATISTIC RCP TIME EA 10 MIN

## 2020-03-08 PROCEDURE — 12000000 ZZH R&B MED SURG/OB

## 2020-03-08 PROCEDURE — 99232 SBSQ HOSP IP/OBS MODERATE 35: CPT | Performed by: INTERNAL MEDICINE

## 2020-03-08 PROCEDURE — 25000132 ZZH RX MED GY IP 250 OP 250 PS 637: Performed by: INTERNAL MEDICINE

## 2020-03-08 PROCEDURE — 94640 AIRWAY INHALATION TREATMENT: CPT

## 2020-03-08 PROCEDURE — 25000128 H RX IP 250 OP 636: Performed by: INTERNAL MEDICINE

## 2020-03-08 PROCEDURE — 25800030 ZZH RX IP 258 OP 636: Performed by: INTERNAL MEDICINE

## 2020-03-08 PROCEDURE — 94640 AIRWAY INHALATION TREATMENT: CPT | Mod: 76

## 2020-03-08 PROCEDURE — 36415 COLL VENOUS BLD VENIPUNCTURE: CPT | Performed by: INTERNAL MEDICINE

## 2020-03-08 PROCEDURE — 25000125 ZZHC RX 250: Performed by: INTERNAL MEDICINE

## 2020-03-08 PROCEDURE — 83605 ASSAY OF LACTIC ACID: CPT | Performed by: INTERNAL MEDICINE

## 2020-03-08 RX ORDER — ALBUTEROL SULFATE 90 UG/1
2 AEROSOL, METERED RESPIRATORY (INHALATION)
Status: DISCONTINUED | OUTPATIENT
Start: 2020-03-08 | End: 2020-03-10 | Stop reason: HOSPADM

## 2020-03-08 RX ADMIN — ACETAMINOPHEN 650 MG: 325 TABLET, FILM COATED ORAL at 21:13

## 2020-03-08 RX ADMIN — ATENOLOL 100 MG: 50 TABLET ORAL at 08:49

## 2020-03-08 RX ADMIN — ALBUTEROL SULFATE 2 PUFF: 90 AEROSOL, METERED RESPIRATORY (INHALATION) at 13:40

## 2020-03-08 RX ADMIN — AZITHROMYCIN MONOHYDRATE 250 MG: 500 INJECTION, POWDER, LYOPHILIZED, FOR SOLUTION INTRAVENOUS at 13:41

## 2020-03-08 RX ADMIN — AMLODIPINE BESYLATE 5 MG: 5 TABLET ORAL at 08:47

## 2020-03-08 RX ADMIN — ALBUTEROL SULFATE 2 PUFF: 90 AEROSOL, METERED RESPIRATORY (INHALATION) at 21:07

## 2020-03-08 RX ADMIN — OMEPRAZOLE 20 MG: 20 CAPSULE, DELAYED RELEASE ORAL at 06:36

## 2020-03-08 RX ADMIN — TRAMADOL HYDROCHLORIDE 50 MG: 50 TABLET, FILM COATED ORAL at 21:14

## 2020-03-08 RX ADMIN — LEVOTHYROXINE SODIUM 50 MCG: 50 TABLET ORAL at 08:49

## 2020-03-08 RX ADMIN — ACETAMINOPHEN 650 MG: 325 TABLET, FILM COATED ORAL at 08:46

## 2020-03-08 RX ADMIN — FUROSEMIDE 40 MG: 40 TABLET ORAL at 08:48

## 2020-03-08 RX ADMIN — CEFTRIAXONE 2 G: 2 INJECTION, POWDER, FOR SOLUTION INTRAMUSCULAR; INTRAVENOUS at 16:10

## 2020-03-08 RX ADMIN — IPRATROPIUM BROMIDE AND ALBUTEROL SULFATE 3 ML: .5; 3 SOLUTION RESPIRATORY (INHALATION) at 07:57

## 2020-03-08 RX ADMIN — GUAIFENESIN AND CODEINE PHOSPHATE 5 ML: 10; 100 LIQUID ORAL at 21:13

## 2020-03-08 RX ADMIN — ACETAMINOPHEN 325 MG: 325 TABLET, FILM COATED ORAL at 21:14

## 2020-03-08 RX ADMIN — IPRATROPIUM BROMIDE AND ALBUTEROL SULFATE 3 ML: .5; 3 SOLUTION RESPIRATORY (INHALATION) at 20:34

## 2020-03-08 RX ADMIN — MELATONIN 1000 UNITS: at 08:48

## 2020-03-08 RX ADMIN — ENOXAPARIN SODIUM 40 MG: 40 INJECTION SUBCUTANEOUS at 16:10

## 2020-03-08 RX ADMIN — TRAMADOL HYDROCHLORIDE 50 MG: 50 TABLET, FILM COATED ORAL at 08:48

## 2020-03-08 RX ADMIN — LOSARTAN POTASSIUM 100 MG: 100 TABLET, FILM COATED ORAL at 08:53

## 2020-03-08 RX ADMIN — PRAVASTATIN SODIUM 20 MG: 20 TABLET ORAL at 08:47

## 2020-03-08 RX ADMIN — GUAIFENESIN AND CODEINE PHOSPHATE 5 ML: 10; 100 LIQUID ORAL at 16:56

## 2020-03-08 RX ADMIN — GUAIFENESIN AND CODEINE PHOSPHATE 5 ML: 10; 100 LIQUID ORAL at 10:17

## 2020-03-08 RX ADMIN — FLUTICASONE FUROATE AND VILANTEROL TRIFENATATE 1 PUFF: 200; 25 POWDER RESPIRATORY (INHALATION) at 07:57

## 2020-03-08 RX ADMIN — IPRATROPIUM BROMIDE AND ALBUTEROL SULFATE 3 ML: .5; 3 SOLUTION RESPIRATORY (INHALATION) at 16:12

## 2020-03-08 RX ADMIN — IPRATROPIUM BROMIDE AND ALBUTEROL SULFATE 3 ML: .5; 3 SOLUTION RESPIRATORY (INHALATION) at 11:45

## 2020-03-08 RX ADMIN — TERAZOSIN HYDROCHLORIDE ANHYDROUS 1 MG: 1 CAPSULE ORAL at 21:14

## 2020-03-08 RX ADMIN — MULTIVITAMIN 15 ML: LIQUID ORAL at 08:51

## 2020-03-08 RX ADMIN — ALBUTEROL SULFATE 2 PUFF: 90 AEROSOL, METERED RESPIRATORY (INHALATION) at 19:14

## 2020-03-08 ASSESSMENT — ACTIVITIES OF DAILY LIVING (ADL)
ADLS_ACUITY_SCORE: 18

## 2020-03-08 NOTE — PLAN OF CARE
Up in chair with lift --- using pure wick and incontinent---cough loose -- room air sats 90 %---pt occasionally forgetful and anxious --- appetite good --- resting comfortable

## 2020-03-08 NOTE — PROGRESS NOTES
[facility]  Hospitalist Progress Note  Toney Styles MD 03/08/2020    Reason for Stay (Diagnosis): pna, weakness/deconditioning         Assessment and Plan:      Summary of Stay: Marino Prajapati is a 84 year old female medical history significant for asthma/COPD, hypothyroidism, hyperlipidemia, hypertension, GERD, chronic cough presented to the emergency room with increased weakness and worsening cough for 10 days.  Evaluation in the emergency room including CT to rule out pulmonary embolism and showed right lower lobe pneumonia.     Pt is receiving antibiotics for PNA.  She reports she has felt quite weak the past several days.  PT consulted to assess and recommending TCU which the patient is agreeable to.  SW consult to assist with placement.       Acute hypoxic resp failure secondary to acute right lower lobe pneumonia.  Suspect CAP  -Blood culture sent  -Influenza antigen negative and PCR neg  -Droplet precaution  -Started on ceftriaxone and azithromycin - will continue  -Requiring 2 L of supplemental O2 will wean as able.  pt is not on home O2  -Symptomatic treatment of cough     History of COPD and asthma: No evidence of exacerbation at this time  -No evidence of bronchospasm on exam at this time  -Continue prior to admission long-acting inhalers  -PRN nebs  - Is not on home O2        Abnormal UA with hematuria proteinuria and slightly elevated nitrite  -Patient already on ceftriaxone; urine cx positive for ecoli with sensitivity pending     Increased weakness  -Likely due to hypoxia and acute pneumonia  -PT input appreciated     History of GERD  -Cough could also be worse due to worsening GERD  -receiving PPI     H/o HTN  -Continue prior to admission medications  -Prior to admission patient on atenolol, amlodipine, and losartan        DVT Prophylaxis: Enoxaparin (Lovenox) SQ  Code Status: DNR / DNI  Dispo:  TCU; 1-2 days likely           Interval History (Subjective):      Feels  "better but still having significant generalized weakness                  Physical Exam:      Last Vital Signs:  /57   Pulse 87   Temp 97.7  F (36.5  C) (Oral)   Resp 18   Ht 1.676 m (5' 6\")   Wt 103.6 kg (228 lb 6.4 oz)   SpO2 94%   BMI 36.86 kg/m        Intake/Output Summary (Last 24 hours) at 3/8/2020 1428  Last data filed at 3/8/2020 1000  Gross per 24 hour   Intake 480 ml   Output 1000 ml   Net -520 ml       Constitutional: Awake, alert, cooperative, no apparent distress   Respiratory: Clear to auscultation bilaterally, no crackles or wheezing   Cardiovascular: Regular rate and rhythm, normal S1 and S2, and no murmur noted   Abdomen: Normal bowel sounds, soft, non-distended, non-tender   Skin: No rashes, no cyanosis, dry to touch   Neuro: Alert and oriented x3, no weakness, numbness, memory loss   Extremities: No edema, normal range of motion   Other(s):        All other systems: Negative          Medications:      All current medications were reviewed with changes reflected in problem list.         Data:      All new lab and imaging data was reviewed.   Labs:  Recent Labs   Lab 03/07/20  0829   WBC 13.7*   HGB 13.3   HCT 37.5   MCV 92         Imaging:   No results found for this or any previous visit (from the past 24 hour(s)).   "

## 2020-03-08 NOTE — PLAN OF CARE
End of Shift Summary  For vital signs and complete assessments, please see documentation flowsheets.     Pertinent assessments: LS diminished. Intermittent coughing, cough syrup helpful. Denies pain and nausea. Continues to require oxygen at 2 L.   Major Shift Events; none  Treatment Plan: Zithromax, Rocephin.     Discharge Readiness: Medically active  Expected Discharge Date: TBD  Discharge Disposition: Home vs TCU. Barriers/Criteria for discharge: none

## 2020-03-08 NOTE — PLAN OF CARE
To Do:  End of Shift Summary  For vital signs and complete assessments, please see documentation flowsheets.     Pertinent assessments: LS diminished. Intermittent coughing, cough syrup helpful. Denies pain and nausea. Continues to require oxygen at 2 L.   Major Shift Events;up in chair with robin golden  Treatment Plan: Zithromax, Rocephin.      Discharge Readiness: Medically active  Expected Discharge Date: TBD  Discharge Disposition:  TCU. Barriers/Criteria for discharge: none

## 2020-03-08 NOTE — DOWNTIME EVENT NOTE
The EMR was down for 2.5 hours on 3/8/2020.    Arpita Bynum was responsible for completing the paper charting during this time period.     The following information was re-entered into the system by Arpita Bynum RN: none    The following information will remain in the paper chart: none    Arpita Bynum RN  3/8/2020

## 2020-03-08 NOTE — PROGRESS NOTES
Discharge Planner   Discharge Plans in progress: Met with patient to discuss discharge planning. PT is recommending TCU and patient would like to go to TCU. Her order of preference for TCU: [facility], [facility], [facility].  Referrals were sent. Patient would prefer a private room.  She is aware of the additional cost.  Barriers to discharge plan: none anticipated  Follow up plan: will continue to follow for discharge planning.    Radha Brenner RN, BSN, PHN, CTS  Care Coordinator  [facility]  [phone]           Entered by: Radha Brenner 03/08/2020 1:27 PM

## 2020-03-09 ENCOUNTER — APPOINTMENT (OUTPATIENT)
Dept: PHYSICAL THERAPY | Facility: CLINIC | Age: 85
DRG: 193 | End: 2020-03-09
Payer: COMMERCIAL

## 2020-03-09 LAB
CREAT SERPL-MCNC: 0.63 MG/DL (ref 0.52–1.04)
GFR SERPL CREATININE-BSD FRML MDRD: 83 ML/MIN/{1.73_M2}
PLATELET # BLD AUTO: 263 10E9/L (ref 150–450)

## 2020-03-09 PROCEDURE — 40000274 ZZH STATISTIC RCP CONSULT EA 30 MIN

## 2020-03-09 PROCEDURE — 99207 ZZC CDG-MDM COMPONENT: MEETS LOW - DOWN CODED: CPT | Performed by: INTERNAL MEDICINE

## 2020-03-09 PROCEDURE — 25000132 ZZH RX MED GY IP 250 OP 250 PS 637: Performed by: INTERNAL MEDICINE

## 2020-03-09 PROCEDURE — 25000128 H RX IP 250 OP 636: Performed by: INTERNAL MEDICINE

## 2020-03-09 PROCEDURE — 25000125 ZZHC RX 250: Performed by: INTERNAL MEDICINE

## 2020-03-09 PROCEDURE — 94640 AIRWAY INHALATION TREATMENT: CPT | Mod: 76

## 2020-03-09 PROCEDURE — 36415 COLL VENOUS BLD VENIPUNCTURE: CPT | Performed by: INTERNAL MEDICINE

## 2020-03-09 PROCEDURE — 85049 AUTOMATED PLATELET COUNT: CPT | Performed by: INTERNAL MEDICINE

## 2020-03-09 PROCEDURE — 99232 SBSQ HOSP IP/OBS MODERATE 35: CPT | Performed by: INTERNAL MEDICINE

## 2020-03-09 PROCEDURE — 12000000 ZZH R&B MED SURG/OB

## 2020-03-09 PROCEDURE — 25800030 ZZH RX IP 258 OP 636: Performed by: INTERNAL MEDICINE

## 2020-03-09 PROCEDURE — 40000275 ZZH STATISTIC RCP TIME EA 10 MIN

## 2020-03-09 PROCEDURE — 97530 THERAPEUTIC ACTIVITIES: CPT | Mod: GP

## 2020-03-09 PROCEDURE — 94640 AIRWAY INHALATION TREATMENT: CPT

## 2020-03-09 PROCEDURE — 25000131 ZZH RX MED GY IP 250 OP 636 PS 637: Performed by: INTERNAL MEDICINE

## 2020-03-09 PROCEDURE — 82565 ASSAY OF CREATININE: CPT | Performed by: INTERNAL MEDICINE

## 2020-03-09 RX ORDER — ALBUTEROL SULFATE 90 UG/1
2 AEROSOL, METERED RESPIRATORY (INHALATION) EVERY 6 HOURS PRN
Status: DISCONTINUED | OUTPATIENT
Start: 2020-03-09 | End: 2020-03-09 | Stop reason: ALTCHOICE

## 2020-03-09 RX ORDER — PREDNISONE 20 MG/1
40 TABLET ORAL DAILY
Status: DISCONTINUED | OUTPATIENT
Start: 2020-03-09 | End: 2020-03-10 | Stop reason: HOSPADM

## 2020-03-09 RX ORDER — BENZONATATE 100 MG/1
100 CAPSULE ORAL 3 TIMES DAILY PRN
Status: DISCONTINUED | OUTPATIENT
Start: 2020-03-09 | End: 2020-03-10 | Stop reason: HOSPADM

## 2020-03-09 RX ADMIN — PREDNISONE 40 MG: 20 TABLET ORAL at 17:33

## 2020-03-09 RX ADMIN — OMEPRAZOLE 20 MG: 20 CAPSULE, DELAYED RELEASE ORAL at 06:22

## 2020-03-09 RX ADMIN — IPRATROPIUM BROMIDE AND ALBUTEROL SULFATE 3 ML: .5; 3 SOLUTION RESPIRATORY (INHALATION) at 19:51

## 2020-03-09 RX ADMIN — TERAZOSIN HYDROCHLORIDE ANHYDROUS 1 MG: 1 CAPSULE ORAL at 21:03

## 2020-03-09 RX ADMIN — ENOXAPARIN SODIUM 40 MG: 40 INJECTION SUBCUTANEOUS at 16:34

## 2020-03-09 RX ADMIN — UMECLIDINIUM 1 PUFF: 62.5 AEROSOL, POWDER ORAL at 07:56

## 2020-03-09 RX ADMIN — MULTIVITAMIN 15 ML: LIQUID ORAL at 09:44

## 2020-03-09 RX ADMIN — LOSARTAN POTASSIUM 100 MG: 100 TABLET, FILM COATED ORAL at 09:43

## 2020-03-09 RX ADMIN — IPRATROPIUM BROMIDE AND ALBUTEROL SULFATE 3 ML: .5; 3 SOLUTION RESPIRATORY (INHALATION) at 15:30

## 2020-03-09 RX ADMIN — GUAIFENESIN AND CODEINE PHOSPHATE 5 ML: 10; 100 LIQUID ORAL at 10:35

## 2020-03-09 RX ADMIN — GUAIFENESIN AND CODEINE PHOSPHATE 5 ML: 10; 100 LIQUID ORAL at 02:44

## 2020-03-09 RX ADMIN — BENZONATATE 100 MG: 100 CAPSULE ORAL at 14:51

## 2020-03-09 RX ADMIN — TRAMADOL HYDROCHLORIDE 50 MG: 50 TABLET, FILM COATED ORAL at 21:03

## 2020-03-09 RX ADMIN — FUROSEMIDE 40 MG: 40 TABLET ORAL at 09:43

## 2020-03-09 RX ADMIN — ACETAMINOPHEN 650 MG: 325 TABLET, FILM COATED ORAL at 21:03

## 2020-03-09 RX ADMIN — GUAIFENESIN AND CODEINE PHOSPHATE 5 ML: 10; 100 LIQUID ORAL at 14:51

## 2020-03-09 RX ADMIN — AZITHROMYCIN MONOHYDRATE 250 MG: 500 INJECTION, POWDER, LYOPHILIZED, FOR SOLUTION INTRAVENOUS at 14:11

## 2020-03-09 RX ADMIN — GUAIFENESIN AND CODEINE PHOSPHATE 5 ML: 10; 100 LIQUID ORAL at 18:59

## 2020-03-09 RX ADMIN — ATENOLOL 100 MG: 50 TABLET ORAL at 09:42

## 2020-03-09 RX ADMIN — FLUTICASONE FUROATE AND VILANTEROL TRIFENATATE 1 PUFF: 200; 25 POWDER RESPIRATORY (INHALATION) at 07:56

## 2020-03-09 RX ADMIN — AMLODIPINE BESYLATE 5 MG: 5 TABLET ORAL at 09:42

## 2020-03-09 RX ADMIN — CEFTRIAXONE 2 G: 2 INJECTION, POWDER, FOR SOLUTION INTRAMUSCULAR; INTRAVENOUS at 16:32

## 2020-03-09 RX ADMIN — ACETAMINOPHEN 325 MG: 325 TABLET, FILM COATED ORAL at 21:08

## 2020-03-09 RX ADMIN — TRAMADOL HYDROCHLORIDE 50 MG: 50 TABLET, FILM COATED ORAL at 09:43

## 2020-03-09 RX ADMIN — PRAVASTATIN SODIUM 20 MG: 20 TABLET ORAL at 09:42

## 2020-03-09 RX ADMIN — LEVOTHYROXINE SODIUM 50 MCG: 50 TABLET ORAL at 09:43

## 2020-03-09 RX ADMIN — ACETAMINOPHEN 650 MG: 325 TABLET, FILM COATED ORAL at 09:42

## 2020-03-09 RX ADMIN — IPRATROPIUM BROMIDE AND ALBUTEROL SULFATE 3 ML: .5; 3 SOLUTION RESPIRATORY (INHALATION) at 11:40

## 2020-03-09 RX ADMIN — MELATONIN 1000 UNITS: at 09:43

## 2020-03-09 RX ADMIN — IPRATROPIUM BROMIDE AND ALBUTEROL SULFATE 3 ML: .5; 3 SOLUTION RESPIRATORY (INHALATION) at 07:56

## 2020-03-09 RX ADMIN — IPRATROPIUM BROMIDE AND ALBUTEROL SULFATE 3 ML: .5; 3 SOLUTION RESPIRATORY (INHALATION) at 02:38

## 2020-03-09 ASSESSMENT — ACTIVITIES OF DAILY LIVING (ADL)
ADLS_ACUITY_SCORE: 18
ADLS_ACUITY_SCORE: 14

## 2020-03-09 ASSESSMENT — MIFFLIN-ST. JEOR: SCORE: 1521.84

## 2020-03-09 NOTE — PROGRESS NOTES
Your information has been submitted on March 09th, 2020 at 03:23:55 PM CDT. The confirmation number is [id number]    Natali Adames  Care Management Coordinator  [facility]  [phone]

## 2020-03-09 NOTE — PROGRESS NOTES
[facility]  Hospitalist Progress Note  Kelley Rodríguez MD 03/09/2020    Reason for Stay (Diagnosis): Weakness and deconditioning secondary to pneumonia         Assessment and Plan:      Summary of Stay: Marino Prajapati is a 84 year old female with a history of asthma/COPD, hypothyroidism, HLP/HTN, GERD, presented to the ER with increasing weakness and worsening of chronic cough for 10 days.     Valuation in the ED is notable for a right lower lobe pneumonia by CT scan and so admitted on 3/6/2020 with RLL pneumonia, presumed CAP, complicated by acute hypoxic respiratory failure.      Problem List:   1. Acute hypoxic respiratory failure secondary to right lower lobe pneumonia: Stable and somewhat improved.  May need oxygen at discharge for a brief period of time.  Continue duo nebs as well as treatment with antibiotics.  She does have some wheezing bring up possibility of a COPD exacerbation.  Given ongoing need for oxygen supplementation I am going to do a prednisone burst 40 mg x 5 days.  Will need to watch leukosis.  2. RLL pneumonia: Currently on ceftriaxone and azithromycin.  Will need to complete 5 days of visit, and 7 days of ceftriaxone or oral equivalent.  3. Hypertension: PTA regimen is atenolol 100 mg p.o. daily, furosemide 40 mg p.o. daily, losartan 100 mg p.o. daily, Harrison and 2 mg p.o. nightly, and amlodipine 10 mg p.o. daily  4. COPD/asthma: Continue PTA budesonide-formoterol, Spiriva, PRN duo nebs and albuterol,  5. Hypothyroidism: Continue PTA levothyroxine  6. Hyperlipidemia: Continue statin as at home  7. Pain: Continue PTA tramadol 50 mg twice daily.  DVT Prophylaxis: Enoxaparin (Lovenox) SQ  Code Status: DNR / DNI  Functional Status: Limited mobility  Diet: Regular diet  Kin[place]: Not in place      Disposition Plan   Expected discharge in 1-2 days to TCU, but ultimately will go back to prior living arrangement with SO, will need home OT evaluation for safety once plan laid out      "Entered: Kelley Rodríguez 03/09/2020, 4:27 PM               Interval History (Subjective):      Still short of breath with exertion, continues to have these coughing jags which are very annoying.  No chest pain per se.  P.o. intake is fair no nausea or vomiting                   Physical Exam:      Last Vital Signs:  BP (!) 158/54 (BP Location: Left arm)   Pulse 87   Temp 96.6  F (35.9  C) (Oral)   Resp 24   Ht 1.676 m (5' 6\")   Wt 104.5 kg (230 lb 6.4 oz)   SpO2 93%   BMI 37.19 kg/m  She remains on low level O2 supplementation    I/O: Net -2 L  Weight: Up 1 kg    Pleasant no acute distress looks stated age head is normocephalic atraumatic sclera clear.  She has acute significant coughing jags with any deep inspiration.  She has coarse breath sounds with occasional end expiratory wheeze.  Heart is slightly tachycardic but of normal rhythm I do hear a soft systolic murmur but somewhat obscured by her lung sounds.  Abdominal exam is profoundly obese but soft and nontender skin is otherwise warm dry there is no cyanosis or clubbing of the extremities her affect is appropriate she is alert and oriented and moving all extremities             Medications:      All current medications were reviewed with changes reflected in problem list.         Data:      All new lab and imaging data was reviewed.   Labs:  Recent Labs   Lab 03/09/20 0636 03/07/20 1939 03/07/20  0829   NA  --   --   --  135   POTASSIUM  --  3.8   < > Specimen hemolyzed   CHLORIDE  --   --   --  105   CO2  --   --   --  26   ANIONGAP  --   --   --  4   GLC  --   --   --  147*   BUN  --   --   --  16   CR 0.63  --   --  0.56   GFRESTIMATED 83  --   --  87   GFRESTBLACK >90  --   --  >90   SANDRA  --   --   --  8.6    < > = values in this interval not displayed.     Recent Labs   Lab 03/09/20 0636 03/07/20  0829   WBC  --  13.7*   HGB  --  13.3   HCT  --  37.5   MCV  --  92    234      Imaging:       "

## 2020-03-09 NOTE — PLAN OF CARE
End of Shift Summary: 1900-0730.   For vital signs and complete assessments, please see documentation flowsheets.     Pertinent assessments: LS diminished with expiratory wheezing, scheduled nebs and rescue inhaler used. Harsh, dry, non-productive cough improved with cough syrup. Continues to require 3 L oxygen, does not wear baseline. Denies pain and nausea.   Major Shift Events none  Treatment Plan: Zithrmax, Rocephin    Discharge Readiness: Medically active  Expected Discharge Date: TBD  Discharge Disposition: TCU, referrals out  Barriers/Criteria for discharge: oxygen need

## 2020-03-09 NOTE — PROGRESS NOTES
Discharge Planner   Discharge Plans in progress: yes  Barriers to discharge plan: none  Follow up plan: Pt has accepted the private room at [facility] for tomorrow.  She is agreeable that I set up transportation . She is aware of private cost.  [facility] WC with oxygen is set up for 3/10 at 11:45.    Radha Blackman RN BSN   Inpatient Care Coordination  [facility]  [phone]         Entered by: Tiffanie Blackman 03/09/2020 2:12 PM

## 2020-03-09 NOTE — PROGRESS NOTES
Discharge Planner   Discharge Plans in progress: PT has been accepted for Private room at [facility] for tomorrow. Room open in the AM.   Barriers to discharge plan: none anticipated   Follow up plan: RNCC will update pt on location. SW will follow up on private room fee.    Corinne White [facility]  Inpatient Care Coordination   [phone]  [facility]          Entered by: Corinne C. White 03/09/2020 2:01 PM

## 2020-03-09 NOTE — PLAN OF CARE
Discharge Planner PT   Patient plan for discharge: tcu  Current status:      Focus of session on LE strength. Pt continues to fatigue quickly. 10-12 reps x 2 sets AP, LAQ, marching. Attempted STS with FWW from chair with use of BUE push off to engage LE musculature as well as UE. x 7 reps with FWW, first rep pt able to clear pelvis slightly from chair. Extended rest break. Trial of robin steady for improved ant wt shift for improved success x 2, unable to clear pelvis with max A x 1. End of session pt left seated with alarm on and needs in reach     Barriers to return to prior living situation: Level of assist, impaired activity tolerance  Recommendations for discharge: TCU  Rationale for recommendations: Pt will benefit from continued skilled PT at TCU to improve strength, balance, gait, endurance to improve functional mobility prior to return home          Entered by: Monisha Cox 03/09/2020 11:02 AM

## 2020-03-09 NOTE — PROGRESS NOTES
"[facility] RCAT     Date: 3/9/20  Admission Dx: PNA  Pulmonary History: COPD, Asthma  Home Nebulizer/MDI Use: Albuterol MDI Q6 prn, Symbicort BID, Duoneb Q6 prn  Home Oxygen: NA  Acuity Level (RCAT flow sheet): 3  Aerosol Therapy initiated: Duoneb QID, Albuterol MDI Q2 prn, Breo QD      Pulmonary Hygiene initiated: Deep breathe and cough- TID      Volume Expansion initiated: IS- per nursing protocol      Current Oxygen Requirements: 2L nasal cannula  Current SpO2: 94%    Re-evaluation date: 3/12/20    Patient Education: Explained RCAT process to patient.      See \"RT Assessments\" flow sheet for patient assessment scoring and Acuity Level Details.             "

## 2020-03-10 VITALS
RESPIRATION RATE: 20 BRPM | HEIGHT: 66 IN | TEMPERATURE: 96.2 F | HEART RATE: 87 BPM | BODY MASS INDEX: 37.03 KG/M2 | WEIGHT: 230.4 LBS | SYSTOLIC BLOOD PRESSURE: 164 MMHG | OXYGEN SATURATION: 92 % | DIASTOLIC BLOOD PRESSURE: 70 MMHG

## 2020-03-10 PROCEDURE — 25000131 ZZH RX MED GY IP 250 OP 636 PS 637: Performed by: INTERNAL MEDICINE

## 2020-03-10 PROCEDURE — 25000125 ZZHC RX 250: Performed by: INTERNAL MEDICINE

## 2020-03-10 PROCEDURE — 94640 AIRWAY INHALATION TREATMENT: CPT | Mod: 76

## 2020-03-10 PROCEDURE — 99239 HOSP IP/OBS DSCHRG MGMT >30: CPT | Performed by: INTERNAL MEDICINE

## 2020-03-10 PROCEDURE — 40000275 ZZH STATISTIC RCP TIME EA 10 MIN

## 2020-03-10 PROCEDURE — 94640 AIRWAY INHALATION TREATMENT: CPT

## 2020-03-10 PROCEDURE — 25000132 ZZH RX MED GY IP 250 OP 250 PS 637: Performed by: INTERNAL MEDICINE

## 2020-03-10 RX ORDER — CODEINE PHOSPHATE AND GUAIFENESIN 10; 100 MG/5ML; MG/5ML
5 SOLUTION ORAL EVERY 4 HOURS
Qty: 90 ML | Refills: 0 | Status: SHIPPED | OUTPATIENT
Start: 2020-03-10 | End: 2020-03-16

## 2020-03-10 RX ORDER — TRAMADOL HYDROCHLORIDE 50 MG/1
50 TABLET ORAL 2 TIMES DAILY
Qty: 60 TABLET | Refills: 0 | Status: SHIPPED | OUTPATIENT
Start: 2020-03-10 | End: 2020-04-09

## 2020-03-10 RX ORDER — PREDNISONE 20 MG/1
40 TABLET ORAL DAILY
Qty: 6 TABLET | Refills: 0 | DISCHARGE
Start: 2020-03-11 | End: 2020-03-16

## 2020-03-10 RX ORDER — CEFPODOXIME PROXETIL 200 MG/1
200 TABLET, FILM COATED ORAL 2 TIMES DAILY
Qty: 7 TABLET | Refills: 0 | DISCHARGE
Start: 2020-03-10 | End: 2020-03-16

## 2020-03-10 RX ORDER — BENZONATATE 100 MG/1
100 CAPSULE ORAL 3 TIMES DAILY PRN
DISCHARGE
Start: 2020-03-10 | End: 2021-03-11

## 2020-03-10 RX ORDER — AZITHROMYCIN 250 MG/1
250 TABLET, FILM COATED ORAL DAILY
Qty: 1 TABLET | Refills: 0 | DISCHARGE
Start: 2020-03-10 | End: 2020-03-15

## 2020-03-10 RX ADMIN — MULTIVITAMIN 15 ML: LIQUID ORAL at 08:29

## 2020-03-10 RX ADMIN — ATENOLOL 100 MG: 50 TABLET ORAL at 08:26

## 2020-03-10 RX ADMIN — GUAIFENESIN AND CODEINE PHOSPHATE 5 ML: 10; 100 LIQUID ORAL at 08:30

## 2020-03-10 RX ADMIN — IPRATROPIUM BROMIDE AND ALBUTEROL SULFATE 3 ML: .5; 3 SOLUTION RESPIRATORY (INHALATION) at 11:22

## 2020-03-10 RX ADMIN — IPRATROPIUM BROMIDE AND ALBUTEROL SULFATE 3 ML: .5; 3 SOLUTION RESPIRATORY (INHALATION) at 02:59

## 2020-03-10 RX ADMIN — MELATONIN 1000 UNITS: at 08:25

## 2020-03-10 RX ADMIN — TRAMADOL HYDROCHLORIDE 50 MG: 50 TABLET, FILM COATED ORAL at 08:26

## 2020-03-10 RX ADMIN — FLUTICASONE FUROATE AND VILANTEROL TRIFENATATE 1 PUFF: 200; 25 POWDER RESPIRATORY (INHALATION) at 07:46

## 2020-03-10 RX ADMIN — IPRATROPIUM BROMIDE AND ALBUTEROL SULFATE 3 ML: .5; 3 SOLUTION RESPIRATORY (INHALATION) at 07:46

## 2020-03-10 RX ADMIN — ACETAMINOPHEN 650 MG: 325 TABLET, FILM COATED ORAL at 08:26

## 2020-03-10 RX ADMIN — OMEPRAZOLE 20 MG: 20 CAPSULE, DELAYED RELEASE ORAL at 06:43

## 2020-03-10 RX ADMIN — FUROSEMIDE 40 MG: 40 TABLET ORAL at 08:26

## 2020-03-10 RX ADMIN — LEVOTHYROXINE SODIUM 50 MCG: 50 TABLET ORAL at 08:26

## 2020-03-10 RX ADMIN — LOSARTAN POTASSIUM 100 MG: 100 TABLET, FILM COATED ORAL at 08:25

## 2020-03-10 RX ADMIN — AMLODIPINE BESYLATE 5 MG: 5 TABLET ORAL at 08:25

## 2020-03-10 RX ADMIN — PREDNISONE 40 MG: 20 TABLET ORAL at 08:25

## 2020-03-10 RX ADMIN — PRAVASTATIN SODIUM 20 MG: 20 TABLET ORAL at 08:25

## 2020-03-10 ASSESSMENT — ACTIVITIES OF DAILY LIVING (ADL)
ADLS_ACUITY_SCORE: 14

## 2020-03-10 NOTE — DISCHARGE SUMMARY
Discharge Summary  Hospitalist Service    Marino Prajapati MRN# [id number]   YOB: 1935 Age: 84 year old     Date of Admission:  3/6/2020  Date of Discharge:  3/10/2020  Admitting Physician:  Ida Saravia MD  Discharge Physician: Kelley Rodríguez MD  Discharging Service: Hospitalist Service     Primary Provider: Vivian Blue  Primary Care Physician Phone Number: [phone]         Discharge Diagnoses/Problem Oriented Hospital Course (Providers):    Marino Prajapati was admitted on 3/6/2020 by Ida Saravia MD and I would refer you to their history and physical.  The following problems were addressed during her hospitalization:      Acute hypoxic respiratory failure  COPD with acute exacerbatioin  RLL pna due to infectious organism, not further identified  htn  Hypothyroidism  hlp  Chronic pain  Incidentally noted left major fissure nodule         Code Status:      DNR / DNI        Brief Hospital Stay Summary Sent Home With Patient in AVS:       Summary of Stay: Marino Prajapati is a 84 year old female with a history of asthma/COPD, hypothyroidism, HLP/HTN, GERD, presented to the ER with increasing weakness and worsening of chronic cough for 10 days.      Valuation in the ED is notable for a right lower lobe pneumonia by CT scan and so admitted on 3/6/2020 with RLL pneumonia, presumed CAP, complicated by acute hypoxic respiratory failure.        Problem List:   1. Acute hypoxic respiratory failure secondary to right lower lobe pneumonia: Stable and somewhat improved.  May need oxygen at discharge for a brief period of time.  Continue duo nebs as well as treatment with antibiotics.  She does have some wheezing bring up possibility of a COPD exacerbation.  Given ongoing need for oxygen supplementation I initiated prednisone burst 40 mg x 5 days.    2. RLL pneumonia: Currently on ceftriaxone and azithromycin.  Will need to complete 5 days of azith, and 7 days of ceftriaxone  or oral equivalent.  3. Hypertension: PTA regimen is atenolol 100 mg p.o. daily, furosemide 40 mg p.o. daily, losartan 100 mg p.o. daily, Harrison and 2 mg p.o. nightly, and amlodipine 10 mg p.o. daily-resumed with BPs under variable control-almost certainly due to introduction of steroids  4. COPD/asthma with COPD exacerbation: Continue PTA budesonide-formoterol, Spiriva, PRN duo nebs and albuterol.  Will complete 5 day prednisone burst as described above  5. Hypothyroidism: Continue PTA levothyroxine  6. Hyperlipidemia: Continue statin as at home  7. Pain: Continue PTA tramadol 50 mg twice daily.  8. Incidentally noted left major fissure nodule:  Slow growing, and based on doubling time, per radiology almost certainly benign and does not require dedicated CT f/u  DVT Prophylaxis: Enoxaparin (Lovenox) SQ  Code Status: DNR / DNI  Functional Status: Limited mobility  Diet: Regular diet  [place]: Not in place             Important Results:      As noted below         Pending Results:        Unresulted Labs Ordered in the Past 30 Days of this Admission     No orders found from 2/5/2020 to 3/7/2020.            Discharge Instructions and Follow-Up:      Follow-up Appointments     Follow Up and recommended labs and tests      F/u with NH MD/NP in 5-7 days for reassessment  At discharge you should consider set up for home safety evaluation to   maximize independence               Discharge Disposition:      Discharged to home         Discharge Medications:        Current Discharge Medication List      START taking these medications    Details   azithromycin (ZITHROMAX) 250 MG tablet Take 1 tablet (250 mg) by mouth daily for 1 day  Qty: 1 tablet, Refills: 0    Associated Diagnoses: Pneumonia due to infectious organism, unspecified laterality, unspecified part of lung      benzonatate (TESSALON) 100 MG capsule Take 1 capsule (100 mg) by mouth 3 times daily as needed for cough  Qty:      Associated Diagnoses: Pneumonia due to  infectious organism, unspecified laterality, unspecified part of lung      cefpodoxime (VANTIN) 200 MG tablet Take 1 tablet (200 mg) by mouth 2 times daily for 7 doses  Qty: 7 tablet, Refills: 0    Associated Diagnoses: Pneumonia due to infectious organism, unspecified laterality, unspecified part of lung      guaiFENesin-codeine (ROBITUSSIN AC) 100-10 MG/5ML solution Take 5 mLs by mouth every 4 hours for 3 days Then can change to prn  Qty: 90 mL, Refills: 0    Associated Diagnoses: Pneumonia due to infectious organism, unspecified laterality, unspecified part of lung      predniSONE (DELTASONE) 20 MG tablet Take 2 tablets (40 mg) by mouth daily for 3 days  Qty: 6 tablet, Refills: 0    Associated Diagnoses: Pneumonia due to infectious organism, unspecified laterality, unspecified part of lung         CONTINUE these medications which have CHANGED    Details   traMADol (ULTRAM) 50 MG tablet Take 1 tablet (50 mg) by mouth 2 times daily  Qty: 60 tablet, Refills: 0    Associated Diagnoses: Pneumonia due to infectious organism, unspecified laterality, unspecified part of lung         CONTINUE these medications which have NOT CHANGED    Details   !! acetaminophen (TYLENOL) 325 MG tablet Take 650 mg by mouth 2 times daily      !! acetaminophen (TYLENOL) 325 MG tablet Take 325 mg by mouth At Bedtime      albuterol (PROAIR HFA/PROVENTIL HFA/VENTOLIN HFA) 108 (90 Base) MCG/ACT inhaler Inhale 2 puffs into the lungs every 6 hours as needed for shortness of breath / dyspnea or wheezing  Qty: 1 Inhaler, Refills: 3    Comments: Pharmacy may dispense brand covered by insurance (Proair, or proventil or ventolin or generic albuterol inhaler)  Associated Diagnoses: Mild persistent asthma without complication      atenolol (TENORMIN) 100 MG tablet Take 1 tablet (100 mg) by mouth daily  Qty: 90 tablet, Refills: 0    Associated Diagnoses: HTN, goal below 140/90; Hyperlipidemia LDL goal <130; Bilateral leg edema      budesonide-formoterol  (SYMBICORT) 160-4.5 MCG/ACT Inhaler Inhale 2 puffs into the lungs 2 times daily  Qty: 3 Inhaler, Refills: 1    Associated Diagnoses: Chronic obstructive pulmonary disease, unspecified COPD type (H)      cholecalciferol (VITAMIN D) 1000 UNIT tablet Take 1,000 Units by mouth daily   Qty: 100 tablet, Refills: 3    Associated Diagnoses: Cough; Mild persistent asthma without complication      Coenzyme Q10 (COQ10) 30 MG CAPS Take 1 capsule by mouth daily as needed   Qty: 30 capsule      Cranberry Extract 250 MG TABS Take 1 tablet by mouth daily as needed       fluticasone (FLONASE) 50 MCG/ACT nasal spray Spray 1-2 sprays into both nostrils daily as needed for rhinitis or allergies      furosemide (LASIX) 40 MG tablet Take 1 tablet (40 mg) by mouth daily  Qty: 90 tablet, Refills: 0    Associated Diagnoses: Bilateral leg edema; HTN, goal below 140/90      ipratropium - albuterol 0.5 mg/2.5 mg/3 mL (DUONEB) 0.5-2.5 (3) MG/3ML neb solution Take 1 vial (3 mLs) by nebulization every 6 hours as needed for shortness of breath / dyspnea or wheezing  Qty: 100 vial, Refills: 1    Associated Diagnoses: Cough; Mild persistent asthma without complication      levothyroxine (SYNTHROID/LEVOTHROID) 50 MCG tablet TAKE 1 TABLET DAILY (ONE TIME REFILL ONLY, NEED TO BE SEEN BECAUSE IT HAS BEEN ONE YEAR SINCE LAST VISIT)  Qty: 90 tablet, Refills: 0    Associated Diagnoses: Hypothyroidism, unspecified type      losartan (COZAAR) 100 MG tablet Take 1 tablet (100 mg) by mouth daily  Qty: 90 tablet, Refills: 0    Associated Diagnoses: HTN, goal below 140/90; Hyperlipidemia LDL goal <130; Bilateral leg edema      Multiple Vitamins-Minerals (MULTIVITAMIN & MINERAL PO) Take 1 tablet by mouth daily.      Nutritional Supplements (SALMON OIL) CAPS Take 1 capsule by mouth daily as needed       nystatin (MYCOSTATIN) 591435 UNIT/GM external powder Apply topically 2 times daily as needed (skin rash)  Qty: 60 g, Refills: 3    Associated Diagnoses: Rash and  nonspecific skin eruption      pravastatin (PRAVACHOL) 20 MG tablet TAKE 1 TABLET DAILY (ONE TIME ONLY, NEED TO BE SEEN BECAUSE IT HAS BEEN ONE YEAR SINCE LAST VISIT)  Qty: 90 tablet, Refills: 0    Associated Diagnoses: Hyperlipidemia LDL goal <130; HTN, goal below 140/90; Bilateral leg edema      tiotropium (SPIRIVA HANDIHALER) 18 MCG inhaled capsule Inhale contents of one capsule daily.  Qty: 90 capsule, Refills: 0    Associated Diagnoses: Chronic obstructive pulmonary disease, unspecified COPD type (H)      amLODIPine (NORVASC) 10 MG tablet Take 0.5 tablets (5 mg) by mouth daily  Qty: 90 tablet, Refills: 0    Associated Diagnoses: HTN, goal below 140/90; Hyperlipidemia LDL goal <130; Bilateral leg edema      !! order for DME Equipment being ordered: 4 wheeled walker with hand brakes and seat  Qty: 1 each, Refills: 0    Associated Diagnoses: Primary osteoarthritis of both knees      !! order for DME Equipment being ordered: 1: Custom/alternative BLE full leg velco/buckling compression garment  Qty: 1 each, Refills: 0    Associated Diagnoses: Lymphedema      !! order for DME Equipment being ordered: 1: Gradient Compression Wraps; 2: BLE knee high 20-30 mm Hg compression stockings; 3: BLE thigh high 20-30 mm Hg compression stockings; 4: Cast Boots  Qty: 1 each, Refills: 0    Associated Diagnoses: Bilateral leg edema      !! order for DME Equipment being ordered: Nebulizer and neb supplies (tubing, etc)  Qty: 1 Device, Refills: 0    Associated Diagnoses: Cough; Mild persistent asthma without complication      terazosin (HYTRIN) 2 MG capsule TAKE 1 CAPSULE AT BEDTIME  Qty: 90 capsule, Refills: 0    Associated Diagnoses: HTN, goal below 140/90; Hyperlipidemia LDL goal <130; Bilateral leg edema       !! - Potential duplicate medications found. Please discuss with provider.            Allergies:       No Known Allergies        Consultations This Hospital Stay:      No consultations were requested during this admission  "        Condition and Physical on Discharge:      Discharge condition: Stable   Vitals: Blood pressure (!) 164/70, pulse 87, temperature 96.2  F (35.7  C), temperature source Oral, resp. rate 20, height 1.676 m (5' 6\"), weight 104.5 kg (230 lb 6.4 oz), SpO2 94 %, not currently breastfeeding.     Constitutional: Pleasant nad looks stated age head nc/at sclera clear    Lungs: Much better air movement today, less rhonchi, no wheezing   Cardiovascular: rrr no mrg trace le edema   Abdomen: S/nt/nd, obese   Skin: W/d no c/c   Other: Alert and oriented affect appropriate farooq          Discharge Time:      Greater than 30 minutes.        Image Results From This Hospital Stay (For Non-EPIC Providers):        Results for orders placed or performed during the hospital encounter of 03/06/20   XR Chest 2 Views    Narrative    CHEST TWO VIEWS 3/6/2020 12:18 PM     HISTORY: Cough, dyspnea, history of chronic obstructive pulmonary  disease (COPD), peripheral edema.    COMPARISON: 5/11/2019       Impression    IMPRESSION: Interval increasing consolidative opacity involving the  posterior aspect of the lungs best visualized overlying the lower  thoracic spine on the lateral view. This likely lies within the  retrocardiac region of the left lung base and would be suspicious for  pneumonia in the proper clinical setting. Minimal linear atelectasis  right lung base. Mild cardiac enlargement. Normal pulmonary  vascularity. Tortuous and calcified thoracic aorta. Marked  degenerative changes in the spine and both shoulders.    CURT L BEHRNS, MD   CT Chest Pulmonary Embolism w Contrast    Narrative    CT CHEST PULMONARY EMBOLISM W CONTRAST 3/6/2020 1:05 PM    CLINICAL HISTORY: Shortness of breath;   TECHNIQUE: CT angiogram chest during arterial phase injection IV  contrast. 2D and 3D MIP reconstructions were performed by the CT  technologist. Dose reduction techniques were used.     CONTRAST: 82mL Isovue-370    COMPARISON: Chest CT dated " 4/9/2019 and 8/9/2012. CT of the abdomen  and pelvis dated 10/17/2011.    FINDINGS:  ANGIOGRAM CHEST: Pulmonary arteries are normal caliber and negative  for pulmonary emboli. Thoracic aorta is negative for dissection.    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 There is a new  right lower lobe consolidative airspace process. A 23 mm x 17 mm  opacity in the left upper lobe adjacent to the major pleura has not  changed from the most recent study. This was approximately 13 mm in  2011. Dependent opacities in the right middle lobe and left lower lobe  likely represent atelectasis.    MEDIASTINUM/AXILLAE: The heart is mildly enlarged. Normal esophagus.  No thoracic lymphadenopathy. There is atherosclerotic disease.    UPPER ABDOMEN: Cholelithiasis. A simple cyst in the right kidney  requires no dedicated follow-up. A right adrenal nodule was  characterized as an adenoma on a comparison study.    MUSCULOSKELETAL: Degenerative changes are present in the spine and  shoulders. There is exaggerated kyphotic curvature of the thoracic  spine related to superior wedging of the T7 vertebral body.      Impression    IMPRESSION:  1.  No PE.  2.  New right lower lobe airspace disease.  3.  A very slowly growing nodule adjacent to the left major fissure is  again seen. This is almost certainly not malignant given the long  doubling time. No dedicated follow-up is required.  4.  There is cardiomegaly.    LESTER J FAHRNER, MD           Most Recent Lab Results In EPIC (For Non-EPIC Providers):    Most Recent 3 CBC's:  Recent Labs   Lab Test 03/09/20  0636 03/07/20  0829 03/06/20  1726 03/06/20  1040 01/09/20  1419   WBC  --  13.7*  --  15.5* 7.8   HGB  --  13.3  --  13.5 13.8   MCV  --  92  --  95 95    234 239 249 258      Most Recent 3 BMP's:  Recent Labs   Lab Test 03/09/20  0636 03/07/20  1939 03/07/20  1052 03/07/20  0829 03/06/20  1726 03/06/20  1040 01/09/20  1419   NA  --   --   --  135  --  137 140   POTASSIUM   --  3.8 3.3* Specimen hemolyzed  --  3.4 3.8   CHLORIDE  --   --   --  105  --  103 107   CO2  --   --   --  26  --  26 25   BUN  --   --   --  16  --  12 16   CR 0.63  --   --  0.56 0.62 0.68 0.64   ANIONGAP  --   --   --  4  --  8 8   SANDRA  --   --   --  8.6  --  8.7 9.3   GLC  --   --   --  147*  --  105* 86     Most Recent 2 LFT's:  Recent Labs   Lab Test 03/06/20  1040 01/09/20  1419   AST 24 19   ALT 18 22   ALKPHOS 100 102   BILITOTAL 1.4* 0.6     Most Recent 6 Bacteria Isolates From Any Culture (See EPIC Reports for Culture Details):  Recent Labs   Lab Test 03/06/20  1151 07/30/12  1532 07/17/12  1230 07/15/12  1625   CULT >100,000 colonies/mL  Escherichia coli  * 10 to 50,000 colonies/mL Multiple species present, probable perineal  contamination. No growth 10 to 50,000 colonies/mL Multiple species present, probable perineal  contamination.

## 2020-03-10 NOTE — PROGRESS NOTES
Discharge Planner   Discharge Plans in progress: yes  Barriers to discharge plan: none  Follow up plan: Pt has been offered a bed at [email or web address] private room for today . Transportation available at 11:45 [facility] . I paged MD to notify her of this.    Radha Blackman RN BSN   Inpatient Care Coordination  [facility]  [phone]         Entered by: Tiffanie Blackman 03/10/2020 9:36 AM

## 2020-03-10 NOTE — PLAN OF CARE
End of Shift Summary  For vital signs and complete assessments, please see documentation flowsheets.     Pertinent assessments: LS diminished, frequent congested cough, reports SOB @ rest, neb tx per RT. Continues to require supplemental O2.  Major Shift Events: None.  Treatment Plan: Zithromax, Rocephin, Cough meds, Nebs, Oxygen.     Discharge Readiness: Definite discharge  Expected Discharge Date: 03/10/2020  Discharge Disposition: Transitional Care Unit  Barriers/Criteria for discharge: None.

## 2020-03-10 NOTE — DISCHARGE INSTRUCTIONS
Durable Medical Equipment orders placed for you     Durable Medical Equipment orders placed for you   Oxygen Adult/Peds   As directed    DME Provider: Other (Comments) Comment - transitional care    Oxygen Consult Reqt: Call [facility] before patient leaves at [phone] (to ensure SATS testing is completed).    Did the patient have SpO2 (sat) testing?: Yes    Length of Need: Other (comment) Comment - 3 months    Frequency of Use: With Activity    Mode of Delivery - With Activity: Nasal Cannula    Liter Flow - With Activity: 2    Need for Portability: Yes    Evaluate for Conserving Device: Yes    Maintain Sats >=: 90%    The face to face evaluation was performed on: 3/10/2020    Additional provider who completed a face to face evaluation on the patient: and prn to keep sats > 90 %

## 2020-03-10 NOTE — PLAN OF CARE
PT-attempted to see was in bathroom prior to planned discharge to TCU this date in next 20 minutes. Per chart review goals were not met and PT established POC discharge recommendation was to TCU. Recommend to PT for discharge

## 2020-03-10 NOTE — PROGRESS NOTES
Patient has been assessed for Home Oxygen needs. Oxygen readings:    *Pulse oximetry (SpO2) = 92% on room air at rest while awake.    *SpO2 improved to 93% on 2 liters/minute at rest.    *SpO2 = N/A % on room air during activity/with exercise.    *SpO2 improved to N/A % on N/A liters/minute during activity/with exercise.

## 2020-03-10 NOTE — PLAN OF CARE
End of Shift Summary  For vital signs and complete assessments, please see documentation flowsheets.     Pertinent assessments: GRIFFITH, Ls diminished w/ exp. Wheezing. Scheduled nebulizer provided. Prn Robitussin/tessalon provided. Minimal reilef. Harsh cough, productive. Up W/ 1-2 and sarasteady. Continues on 2 lpm NC  Major Shift Events none  Treatment Plan: Zithrmax, Rocephin, Robitussin/tessalon    Discharge Readiness: Medically active  Expected Discharge Date: 3/10/2020  Discharge Disposition: TCU, referrals out  Barriers/Criteria for discharge: oxygen need

## 2020-03-11 ENCOUNTER — PATIENT OUTREACH (OUTPATIENT)
Dept: CARE COORDINATION | Facility: CLINIC | Age: 85
End: 2020-03-11

## 2020-03-11 ENCOUNTER — RECORDS - HEALTHEAST (OUTPATIENT)
Dept: LAB | Facility: CLINIC | Age: 85
End: 2020-03-11

## 2020-03-11 ENCOUNTER — NURSING HOME VISIT (OUTPATIENT)
Dept: GERIATRICS | Facility: CLINIC | Age: 85
End: 2020-03-11
Payer: COMMERCIAL

## 2020-03-11 VITALS
BODY MASS INDEX: 36.96 KG/M2 | OXYGEN SATURATION: 92 % | SYSTOLIC BLOOD PRESSURE: 142 MMHG | TEMPERATURE: 98.3 F | WEIGHT: 230 LBS | HEART RATE: 77 BPM | HEIGHT: 66 IN | DIASTOLIC BLOOD PRESSURE: 70 MMHG | RESPIRATION RATE: 17 BRPM

## 2020-03-11 DIAGNOSIS — J44.1 CHRONIC OBSTRUCTIVE PULMONARY DISEASE WITH ACUTE EXACERBATION (H): ICD-10-CM

## 2020-03-11 DIAGNOSIS — R91.1 NODULE OF LEFT LUNG: ICD-10-CM

## 2020-03-11 DIAGNOSIS — E03.9 HYPOTHYROIDISM, UNSPECIFIED TYPE: ICD-10-CM

## 2020-03-11 DIAGNOSIS — I10 HTN, GOAL BELOW 140/90: ICD-10-CM

## 2020-03-11 DIAGNOSIS — Z71.89 OTHER SPECIFIED COUNSELING: ICD-10-CM

## 2020-03-11 DIAGNOSIS — G89.29 OTHER CHRONIC PAIN: ICD-10-CM

## 2020-03-11 DIAGNOSIS — E78.5 HYPERLIPIDEMIA LDL GOAL <130: ICD-10-CM

## 2020-03-11 DIAGNOSIS — J18.9 PNEUMONIA OF RIGHT LOWER LOBE DUE TO INFECTIOUS ORGANISM: ICD-10-CM

## 2020-03-11 DIAGNOSIS — J96.01 ACUTE RESPIRATORY FAILURE WITH HYPOXIA (H): Primary | ICD-10-CM

## 2020-03-11 DIAGNOSIS — R53.81 PHYSICAL DECONDITIONING: ICD-10-CM

## 2020-03-11 PROCEDURE — 99310 SBSQ NF CARE HIGH MDM 45: CPT | Performed by: NURSE PRACTITIONER

## 2020-03-11 ASSESSMENT — MIFFLIN-ST. JEOR: SCORE: 1520.02

## 2020-03-11 NOTE — LETTER
3/11/2020        RE: Marino Prajapati  [address] [facility] [address]        [facility]  PRIMARY CARE PROVIDER AND CLINIC:  Vivian Blue MD, [facility] [address]  Chief Complaint   Patient presents with     Hospital F/U     [facility] Medical Record Number:  [id number]  Place of Service where encounter took place:  [facility] ([facility]) [[id number]]    Marino Prajapati  is a 84 year old  (1935), admitted to the above facility from  [facility]. Hospital stay 3/6/20 through 3/10/20..  Admitted to this facility for  rehab, medical management and nursing care.    HPI:    HPI information obtained from: facility chart records, facility staff, patient report and [facility] chart review.   Brief Summary of Hospital Course: Marino Prajapati is an 84 year old female with PMH significant of asthma/ COPD, HTN, hyperlipidemia, GERD, and hypothyroidism who presented to the hospital with increased weakness and worsening cough. She was admitted to [facility] from 3/6/17-3/10/17 after being found to have RLL pneumonia and acute hypoxic respiratory failure. May also have possible COPD exacerbation. Is requiring oxygen supplementation at discharge. She will complete antibiotics and prednisone course at TCU. Prior to discharge BPs were variably controlled due to starting of prednisone. On chest CT it was incidentally noted that she had left major fissure nodule- per radiology is almost certainly benign and does not require dedicated CT follow up. She was discharged to TCU for physical rehabilitation and medication management.    Updates on Status Since Skilled nursing Admission: Marino was seen today for admission visit at TCU. She reports ongoing SOB, productive cough. Is currently on 3L of oxygen at time of visit. Does not use oxygen at home. Reports history of edema to her legs. Denies CP, dizziness/lightheadedness.  Reports bowels moving well. No dysuria or trouble voiding. Denies any pain at time of visit- has history of chronic pain per chart. She is currently requiring hoda lift for transfers. She lives at home with her significant other. Uses walker at home with ambulation. Manages her own medications at home.     CODE STATUS/ADVANCE DIRECTIVES DISCUSSION:   DNR / DNI  Patient's living condition: lives with spouse  ALLERGIES: Patient has no known allergies.  PAST MEDICAL HISTORY:  has a past medical history of Anemia, CARDIOVASCULAR SCREENING; LDL GOAL LESS THAN 160, Colon polyps (2010), DVT (deep venous thrombosis) (H) (2007), Gastro-oesophageal reflux disease, HTN, goal below 140/90, Hyperlipidemia LDL goal <130 (1/22/2016), Kidney stone, Osteoarthritis, Osteoporosis, Postnasal drip, S/P total knee arthroplasty, and Thrombosis of leg. She also has no past medical history of Chronic infection, Malignant hyperthermia, or Sleep apnea.  PAST SURGICAL HISTORY:   has a past surgical history that includes Cholecystectomy; Laser holmium lithotripsy ureter(s), insert stent, combined (8/8/2012); Liposuction (location); PREP FACE/ORAL PROST PALATAL LIFT; Strip vein; Cystoscopy, retrogrades, insert stent ureter(s), combined (7/16/2012); Arthroplasty knee (10/22/2012); Arthroplasty knee (5/23/2013); Arthroplasty hip (8/1/2013); and Arthroplasty hip (Left, 12/12/2014).  FAMILY HISTORY: family history includes Breast Cancer in her mother; Circulatory in her father.  SOCIAL HISTORY:   reports that she quit smoking about 53 years ago. Her smoking use included cigarettes. She has a 29.00 pack-year smoking history. She has never used smokeless tobacco. She reports current alcohol use. She reports that she does not use drugs.    Post Discharge Medication Reconciliation Status: discharge medications reconciled and changed, per note/orders (see AVS)    Current Outpatient Medications   Medication Sig Dispense Refill     acetaminophen (TYLENOL)  325 MG tablet Take 650 mg by mouth 2 times daily       acetaminophen (TYLENOL) 325 MG tablet Take 325 mg by mouth At Bedtime       albuterol (PROAIR HFA/PROVENTIL HFA/VENTOLIN HFA) 108 (90 Base) MCG/ACT inhaler Inhale 2 puffs into the lungs every 6 hours as needed for shortness of breath / dyspnea or wheezing 1 Inhaler 3     amLODIPine (NORVASC) 10 MG tablet Take 0.5 tablets (5 mg) by mouth daily 90 tablet 0     atenolol (TENORMIN) 100 MG tablet Take 1 tablet (100 mg) by mouth daily 90 tablet 0     benzonatate (TESSALON) 100 MG capsule Take 1 capsule (100 mg) by mouth 3 times daily as needed for cough       budesonide-formoterol (SYMBICORT) 160-4.5 MCG/ACT Inhaler Inhale 2 puffs into the lungs 2 times daily 3 Inhaler 1     cholecalciferol (VITAMIN D) 1000 UNIT tablet Take 1,000 Units by mouth daily  100 tablet 3     Coenzyme Q10 (COQ10) 30 MG CAPS Take 1 capsule by mouth daily as needed  30 capsule      Cranberry Extract 250 MG TABS Take 1 tablet by mouth daily as needed        fluticasone (FLONASE) 50 MCG/ACT nasal spray Spray 1-2 sprays into both nostrils daily as needed for rhinitis or allergies       furosemide (LASIX) 40 MG tablet Take 1 tablet (40 mg) by mouth daily 90 tablet 0     ipratropium - albuterol 0.5 mg/2.5 mg/3 mL (DUONEB) 0.5-2.5 (3) MG/3ML neb solution Take 1 vial (3 mLs) by nebulization every 6 hours as needed for shortness of breath / dyspnea or wheezing 100 vial 1     levothyroxine (SYNTHROID/LEVOTHROID) 50 MCG tablet TAKE 1 TABLET DAILY (ONE TIME REFILL ONLY, NEED TO BE SEEN BECAUSE IT HAS BEEN ONE YEAR SINCE LAST VISIT) 90 tablet 0     losartan (COZAAR) 100 MG tablet Take 1 tablet (100 mg) by mouth daily 90 tablet 0     Multiple Vitamins-Minerals (MULTIVITAMIN & MINERAL PO) Take 1 tablet by mouth daily.       Nutritional Supplements (SALMON OIL) CAPS Take 1 capsule by mouth daily as needed        nystatin (MYCOSTATIN) 350709 UNIT/GM external powder Apply topically 2 times daily as needed (skin  "rash) 60 g 3     order for DME Equipment being ordered: 4 wheeled walker with hand brakes and seat 1 each 0     order for DME Equipment being ordered: 1: Custom/alternative BLE full leg velco/buckling compression garment 1 each 0     order for DME Equipment being ordered: 1: Gradient Compression Wraps; 2: BLE knee high 20-30 mm Hg compression stockings; 3: BLE thigh high 20-30 mm Hg compression stockings; 4: Cast Boots 1 each 0     order for DME Equipment being ordered: Nebulizer and neb supplies (tubing, etc) 1 Device 0     pravastatin (PRAVACHOL) 20 MG tablet TAKE 1 TABLET DAILY (ONE TIME ONLY, NEED TO BE SEEN BECAUSE IT HAS BEEN ONE YEAR SINCE LAST VISIT) 90 tablet 0     terazosin (HYTRIN) 2 MG capsule TAKE 1 CAPSULE AT BEDTIME 90 capsule 0     tiotropium (SPIRIVA HANDIHALER) 18 MCG inhaled capsule Inhale contents of one capsule daily. 90 capsule 0     traMADol (ULTRAM) 50 MG tablet Take 1 tablet (50 mg) by mouth 2 times daily 60 tablet 0     ROS:  10 point ROS of systems including Constitutional, Eyes, Respiratory, Cardiovascular, Gastroenterology, Genitourinary, Integumentary, Musculoskeletal, Psychiatric were all negative except for pertinent positives noted in my HPI.    Vitals:  BP (!) 142/70   Pulse 77   Temp 98.3  F (36.8  C)   Resp 17   Ht 1.676 m (5' 6\")   Wt 104.3 kg (230 lb)   SpO2 92%   BMI 37.12 kg/m    Exam:  GENERAL APPEARANCE:  Alert, pleasant and cooperative, elderly female lying in bed on exam  HEENT: normocephalic, conjunctivae, lids, pupils and irises normal, moist mucous membranes, nose without drainage or crusting  RESP:  respiratory effort normal, no respiratory distress, Lung sounds with rhonci and wheezing present, patient is on 3L of oxygen via NC  CV: auscultation of heart done, rate and rhythm regular. no murmur, no rub or gallop. generalized edema to BLE-chronic lymphedema  ABDOMEN: + bowel sounds, soft, nontender, no grimacing or guarding with palpation.  M/S: requiring hoda " lift for transfers; no tenderness or swelling of the joints; able to move all extremities -with generalized weakness  SKIN:  Inspection and palpation of skin and subcutaneous tissue: skin warm, dry without rashes  NEURO: no facial asymmetry, no speech deficits and able to follow directions, moves all extremities symmetrically  PSYCH: affect and mood normal    Lab/Diagnostic data:  Recent labs in [facility] reviewed by me today.     ASSESSMENT/PLAN:  (J96.01) Acute respiratory failure with hypoxia (H)  (primary encounter diagnosis)  Comment: acute, continues to require oxygen at TCU- does not use at baseline- continues to feel SOB- not at goal  -due to pneumonia and COPD exacerbation  Plan: Continue to wean oxygen to keep saturations >88%. Continue antibiotics, prednisone, inhalers as below. Continue to monitor- nursing to update with worsening of symptoms or concerns.     (J18.1) Pneumonia of right lower lobe due to infectious organism (H)  Comment: Acute, continues to require oxygen, with SOB, cough- not at goal  Plan: Continue azithromycin and vantin to complete antibiotic course. Schedule duonebs as below. Continue tessalon and robitussin PRN (discontinued duplicate robitussin orders in EMR- and touched base with nurse about this). CBC, BMP 3/12 due to elevated WBC at last check.    (J44.1) Chronic obstructive pulmonary disease with acute exacerbation (H)  Comment: acute on chronic, continues to have wheezing present today, increased sputum/ cough-not at goal  Plan: Continue prednisone total of 5 day course. Schedule duonebs QID- discussed with nursing today. Continue PTA budesonide-formorterol, Spiriva, albuterol PRN. Continue tessalon PRN, robitussin PRN for cough.     (I10) HTN, goal below 140/90  Comment: acute on chronic SBP, slightly higher than goal since admission ranging from 140-150- suspect due to steroids  Plan: Continue PTA amlodipine, terazosin, losartan, lasix, and atenolol. VS per TCU policy. Suspect BP  will improve when steroid course is complete.    (E03.9) Hypothyroidism, unspecified type  Comment: Chronic, at goal at last check  TSH   Date Value Ref Range Status   01/09/2020 4.78 (H) 0.40 - 4.00 mU/L Final     Plan: Continue PTA levothyroxine.    (E78.5) Hyperlipidemia LDL goal <130  Comment: Chronic  Plan: Continue PTA pravastatin.     (G89.29) Other chronic pain  Comment: Denies any pain today, no discussion of pain in recent PCP physical  Plan: Continue PTA tramadol 50 mg po BID and scheduled tylenol. Consider taper at follow up visit if no ongoing pain.     (R91.1) Nodule of left lung  Comment: incidental finding on chest CT- per radiology is almost certainly benign and does not require dedicated CT follow up.  Plan: Follow up with PCP    (R53.81) Physical deconditioning  Comment: Acute, secondary to recent hospitalization, medical conditions as above  Plan: Encourage participation in physical therapy/occupational therapy for strengthening and deconditioning. Discharge planning per their recommendation. Social work to assist with d/c planning.      Total time spent with patient visit at the skilled nursing facility was 36 minutes including patient visit, review of past records, and discussion with nursing on patient status and new orders as above. Greater than 50% of total time spent with counseling and coordinating care due to acute respiratory failure, COPD exacerbation, pneumonia and other problems as above .     Electronically signed by:  CINTHIA Villareal CNP                     Sincerely,        CINTHIA Villareal CNP

## 2020-03-11 NOTE — PROGRESS NOTES
Transition Communication Hand-off for Care Transitions to Next Level of Care Provider    Name: Marino Prajapati  : 1935  MRN #: [id number]  Primary Care Provider: Vivian Blue  Primary Care MD Name: Dr. Mendes  Primary Clinic: [facility] [address] [facility] [address]  Primary Care Clinic Name: [facility]  Reason for Hospitalization:  COPD exacerbation (H) [J44.1]  Pneumonia of right lower lobe due to infectious organism (H) [J18.1]  Admit Date/Time: 3/6/2020 10:30 AM  Discharge Date: 3/10/20  Payor Source: Payor: [facility] / Plan: ARE MEDICARE [employer]ERS / Product Type: HMO /     Readmission Assessment Measure (JANA) Risk Score/category: AVERAGE        Reason for Communication Hand-off Referral: Admission diagnoses: PN  Fragility    Discharge Plan:       Concern for non-adherence with plan of care:   NO  Discharge Needs Assessment:  Needs      Most Recent Value   Equipment Currently Used at Home  walker, rolling   # of Referrals Placed by [facility]  Transportation   Skilled Nursing Facility  [facility] ([place]) [phone], Fax: [phone]   PAS Number  [id number]          Already enrolled in Tele-monitoring program and name of program:  na  Follow-up specialty is recommended: No    Follow-up plan:  No future appointments.    Any outstanding tests or procedures:        Referrals     Future Labs/Procedures    Physical Therapy Adult Consult     Comments:    Evaluate and treat as clinically indicated.    Reason:  deconditioning          Supplies     Future Labs/Procedures    Oxygen Adult/Peds     Process Instructions:    By signing this order, the Authorizing Provider is attesting that they have completed a face-to-face evaluation on the patient to determine their need for this equipment in the last 60 days. A new face-to-face evaluation is required each time  A new prescription for on eo f the specified items is ordered.   If an additional provider  completed the evaluation, please indicate their name below.     **As of 2018, an order requisition and face sheet will print for all DME orders. Please give printed order and face sheet to patient if not obtaining product from [facility] DME closet.     Comments:    Oxygen Documentation:   I certify that this patient, Marino Prajapati has been under my care and that I, or a nurse practitioner or physician's assistant working with me, had a face-to-face encounter that meets face-to-face encounter requirements with this patient on March 10, 2020.      Was the patient admitted for an acute respiratory illness? Yes. Has the acute respiratory illness been resolved? No    Marino Prajapati is now in a chronic stable state and continues to require supplemental oxygen due to continued oxygen desaturation.  This patient has been treated in part, or in whole for the following medical condition(s):  Pneumonia with COPD exacerbation   Treatments tried and failed or ruled out to treat hypoxemia include nebs/steroids/abx  If portability is ordered, is the patient mobile within the home? yes    Patients who qualify for home O2 coverage under the CMS guidelines require ABG tests or O2 sat readings obtained closest to, but no earlier than 2 days prior to the discharge, as evidence of the need for home oxygen therapy. Testing must be performed while patient is in the chronic stable state. See notes for O2 sats.        Follow Up and recommended labs and tests     F/u with NH MD/NP in 5-7 days for reassessment   At discharge you should consider set up for home safety evaluation to maximize independence         Key Recommendations:    Patient presented to the emergency room with increase weakness and lethargy and a cough for the past 10 days.  Patient has been diagnoses with acute right lower lobe pneumonia.  PMH includes: asthma/COPD, hypothyroidism, HTN, GERD.    Education:  Patient requested information on low sodium diet.  "CM provide [employer] educational material \" Tips for following a low salt diet\" and\" low salt choices\".    Provided and educated patient on the Pneumonia action plan.  Verified patient's understanding using the teach back techniques.    COPD- patient manages her COPD by taking her medications as prescribed.  She does not use oxygen at home at this time.  She uses her nebulizer PRN.  Patient did say she found herself using her rescue inhaler more frequently in the past week without relief.     Patient would benefit from reinforcement of COPD action plan.    Pt was discharged to [facility] TCU for therapies.      Radha Brenner RN BSN    AVS/Discharge Summary is the source of truth; this is a helpful guide for improved communication of patient story    "

## 2020-03-11 NOTE — PROGRESS NOTES
Community Health Worker attempted to call patient for CCC outreach.  However, upon chart review, it looks like further review is needed.     CHW CC'ed message to JORGE Johnson, for chart review.

## 2020-03-11 NOTE — PROGRESS NOTES
Hand-off Letter. Patient identified as high risk for readmission.  Patient meets criteria for care coordination outreach.    Néstor Epps, [facility]  Clinic Care Coordinator  Ph. [phone]  [email or web address]

## 2020-03-11 NOTE — PROGRESS NOTES
[place] [facility]  PRIMARY CARE PROVIDER AND CLINIC:  Vivian Blue MD, [address] [facility] [address]  Chief Complaint   Patient presents with     Hospital F/U     [place] Medical Record Number:  [id number]  Place of Service where encounter took place:  [facility] ([facility]) [[id number]]    Marino Prajapati  is a 84 year old  (1935), admitted to the above facility from  [facility]. Hospital stay 3/6/20 through 3/10/20..  Admitted to this facility for  rehab, medical management and nursing care.    HPI:    HPI information obtained from: facility chart records, facility staff, patient report and [facility] chart review.   Brief Summary of Hospital Course: Marino Prajapati is an 84 year old female with PMH significant of asthma/ COPD, HTN, hyperlipidemia, GERD, and hypothyroidism who presented to the hospital with increased weakness and worsening cough. She was admitted to [facility] from 3/6/17-3/10/17 after being found to have RLL pneumonia and acute hypoxic respiratory failure. May also have possible COPD exacerbation. Is requiring oxygen supplementation at discharge. She will complete antibiotics and prednisone course at TCU. Prior to discharge BPs were variably controlled due to starting of prednisone. On chest CT it was incidentally noted that she had left major fissure nodule- per radiology is almost certainly benign and does not require dedicated CT follow up. She was discharged to TCU for physical rehabilitation and medication management.    Updates on Status Since Skilled nursing Admission: Marino was seen today for admission visit at TCU. She reports ongoing SOB, productive cough. Is currently on 3L of oxygen at time of visit. Does not use oxygen at home. Reports history of edema to her legs. Denies CP, dizziness/lightheadedness. Reports bowels moving well. No dysuria or trouble voiding. Denies any pain at time of visit- has  history of chronic pain per chart. She is currently requiring hoda lift for transfers. She lives at home with her significant other. Uses walker at home with ambulation. Manages her own medications at home.     CODE STATUS/ADVANCE DIRECTIVES DISCUSSION:   DNR / DNI  Patient's living condition: lives with spouse  ALLERGIES: Patient has no known allergies.  PAST MEDICAL HISTORY:  has a past medical history of Anemia, CARDIOVASCULAR SCREENING; LDL GOAL LESS THAN 160, Colon polyps (2010), DVT (deep venous thrombosis) (H) (2007), Gastro-oesophageal reflux disease, HTN, goal below 140/90, Hyperlipidemia LDL goal <130 (1/22/2016), Kidney stone, Osteoarthritis, Osteoporosis, Postnasal drip, S/P total knee arthroplasty, and Thrombosis of leg. She also has no past medical history of Chronic infection, Malignant hyperthermia, or Sleep apnea.  PAST SURGICAL HISTORY:   has a past surgical history that includes Cholecystectomy; Laser holmium lithotripsy ureter(s), insert stent, combined (8/8/2012); Liposuction (location); PREP FACE/ORAL PROST PALATAL LIFT; Strip vein; Cystoscopy, retrogrades, insert stent ureter(s), combined (7/16/2012); Arthroplasty knee (10/22/2012); Arthroplasty knee (5/23/2013); Arthroplasty hip (8/1/2013); and Arthroplasty hip (Left, 12/12/2014).  FAMILY HISTORY: family history includes Breast Cancer in her mother; Circulatory in her father.  SOCIAL HISTORY:   reports that she quit smoking about 53 years ago. Her smoking use included cigarettes. She has a 29.00 pack-year smoking history. She has never used smokeless tobacco. She reports current alcohol use. She reports that she does not use drugs.    Post Discharge Medication Reconciliation Status: discharge medications reconciled and changed, per note/orders (see AVS)    Current Outpatient Medications   Medication Sig Dispense Refill     acetaminophen (TYLENOL) 325 MG tablet Take 650 mg by mouth 2 times daily       acetaminophen (TYLENOL) 325 MG tablet  Take 325 mg by mouth At Bedtime       albuterol (PROAIR HFA/PROVENTIL HFA/VENTOLIN HFA) 108 (90 Base) MCG/ACT inhaler Inhale 2 puffs into the lungs every 6 hours as needed for shortness of breath / dyspnea or wheezing 1 Inhaler 3     amLODIPine (NORVASC) 10 MG tablet Take 0.5 tablets (5 mg) by mouth daily 90 tablet 0     atenolol (TENORMIN) 100 MG tablet Take 1 tablet (100 mg) by mouth daily 90 tablet 0     benzonatate (TESSALON) 100 MG capsule Take 1 capsule (100 mg) by mouth 3 times daily as needed for cough       budesonide-formoterol (SYMBICORT) 160-4.5 MCG/ACT Inhaler Inhale 2 puffs into the lungs 2 times daily 3 Inhaler 1     cholecalciferol (VITAMIN D) 1000 UNIT tablet Take 1,000 Units by mouth daily  100 tablet 3     Coenzyme Q10 (COQ10) 30 MG CAPS Take 1 capsule by mouth daily as needed  30 capsule      Cranberry Extract 250 MG TABS Take 1 tablet by mouth daily as needed        fluticasone (FLONASE) 50 MCG/ACT nasal spray Spray 1-2 sprays into both nostrils daily as needed for rhinitis or allergies       furosemide (LASIX) 40 MG tablet Take 1 tablet (40 mg) by mouth daily 90 tablet 0     ipratropium - albuterol 0.5 mg/2.5 mg/3 mL (DUONEB) 0.5-2.5 (3) MG/3ML neb solution Take 1 vial (3 mLs) by nebulization every 6 hours as needed for shortness of breath / dyspnea or wheezing 100 vial 1     levothyroxine (SYNTHROID/LEVOTHROID) 50 MCG tablet TAKE 1 TABLET DAILY (ONE TIME REFILL ONLY, NEED TO BE SEEN BECAUSE IT HAS BEEN ONE YEAR SINCE LAST VISIT) 90 tablet 0     losartan (COZAAR) 100 MG tablet Take 1 tablet (100 mg) by mouth daily 90 tablet 0     Multiple Vitamins-Minerals (MULTIVITAMIN & MINERAL PO) Take 1 tablet by mouth daily.       Nutritional Supplements (SALMON OIL) CAPS Take 1 capsule by mouth daily as needed        nystatin (MYCOSTATIN) 559313 UNIT/GM external powder Apply topically 2 times daily as needed (skin rash) 60 g 3     order for DME Equipment being ordered: 4 wheeled walker with hand brakes  "and seat 1 each 0     order for DME Equipment being ordered: 1: Custom/alternative BLE full leg velco/buckling compression garment 1 each 0     order for DME Equipment being ordered: 1: Gradient Compression Wraps; 2: BLE knee high 20-30 mm Hg compression stockings; 3: BLE thigh high 20-30 mm Hg compression stockings; 4: Cast Boots 1 each 0     order for DME Equipment being ordered: Nebulizer and neb supplies (tubing, etc) 1 Device 0     pravastatin (PRAVACHOL) 20 MG tablet TAKE 1 TABLET DAILY (ONE TIME ONLY, NEED TO BE SEEN BECAUSE IT HAS BEEN ONE YEAR SINCE LAST VISIT) 90 tablet 0     terazosin (HYTRIN) 2 MG capsule TAKE 1 CAPSULE AT BEDTIME 90 capsule 0     tiotropium (SPIRIVA HANDIHALER) 18 MCG inhaled capsule Inhale contents of one capsule daily. 90 capsule 0     traMADol (ULTRAM) 50 MG tablet Take 1 tablet (50 mg) by mouth 2 times daily 60 tablet 0     ROS:  10 point ROS of systems including Constitutional, Eyes, Respiratory, Cardiovascular, Gastroenterology, Genitourinary, Integumentary, Musculoskeletal, Psychiatric were all negative except for pertinent positives noted in my HPI.    Vitals:  BP (!) 142/70   Pulse 77   Temp 98.3  F (36.8  C)   Resp 17   Ht 1.676 m (5' 6\")   Wt 104.3 kg (230 lb)   SpO2 92%   BMI 37.12 kg/m    Exam:  GENERAL APPEARANCE:  Alert, pleasant and cooperative, elderly female lying in bed on exam  HEENT: normocephalic, conjunctivae, lids, pupils and irises normal, moist mucous membranes, nose without drainage or crusting  RESP:  respiratory effort normal, no respiratory distress, Lung sounds with rhonci and wheezing present, patient is on 3L of oxygen via NC  CV: auscultation of heart done, rate and rhythm regular. no murmur, no rub or gallop. generalized edema to BLE-chronic lymphedema  ABDOMEN: + bowel sounds, soft, nontender, no grimacing or guarding with palpation.  M/S: requiring hoda lift for transfers; no tenderness or swelling of the joints; able to move all extremities " -with generalized weakness  SKIN:  Inspection and palpation of skin and subcutaneous tissue: skin warm, dry without rashes  NEURO: no facial asymmetry, no speech deficits and able to follow directions, moves all extremities symmetrically  PSYCH: affect and mood normal    Lab/Diagnostic data:  Recent labs in [facility] reviewed by me today.     ASSESSMENT/PLAN:  (J96.01) Acute respiratory failure with hypoxia (H)  (primary encounter diagnosis)  Comment: acute, continues to require oxygen at TCU- does not use at baseline- continues to feel SOB- not at goal  -due to pneumonia and COPD exacerbation  Plan: Continue to wean oxygen to keep saturations >88%. Continue antibiotics, prednisone, inhalers as below. Continue to monitor- nursing to update with worsening of symptoms or concerns.     (J18.1) Pneumonia of right lower lobe due to infectious organism (H)  Comment: Acute, continues to require oxygen, with SOB, cough- not at goal  Plan: Continue azithromycin and vantin to complete antibiotic course. Schedule duonebs as below. Continue tessalon and robitussin PRN (discontinued duplicate robitussin orders in EMR- and touched base with nurse about this). CBC, BMP 3/12 due to elevated WBC at last check.    (J44.1) Chronic obstructive pulmonary disease with acute exacerbation (H)  Comment: acute on chronic, continues to have wheezing present today, increased sputum/ cough-not at goal  Plan: Continue prednisone total of 5 day course. Schedule duonebs QID- discussed with nursing today. Continue PTA budesonide-formorterol, Spiriva, albuterol PRN. Continue tessalon PRN, robitussin PRN for cough.     (I10) HTN, goal below 140/90  Comment: acute on chronic SBP, slightly higher than goal since admission ranging from 140-150- suspect due to steroids  Plan: Continue PTA amlodipine, terazosin, losartan, lasix, and atenolol. VS per TCU policy. Suspect BP will improve when steroid course is complete.    (E03.9) Hypothyroidism, unspecified  type  Comment: Chronic, at goal at last check  TSH   Date Value Ref Range Status   01/09/2020 4.78 (H) 0.40 - 4.00 mU/L Final     Plan: Continue PTA levothyroxine.    (E78.5) Hyperlipidemia LDL goal <130  Comment: Chronic  Plan: Continue PTA pravastatin.     (G89.29) Other chronic pain  Comment: Denies any pain today, no discussion of pain in recent PCP physical  Plan: Continue PTA tramadol 50 mg po BID and scheduled tylenol. Consider taper at follow up visit if no ongoing pain.     (R91.1) Nodule of left lung  Comment: incidental finding on chest CT- per radiology is almost certainly benign and does not require dedicated CT follow up.  Plan: Follow up with PCP    (R53.81) Physical deconditioning  Comment: Acute, secondary to recent hospitalization, medical conditions as above  Plan: Encourage participation in physical therapy/occupational therapy for strengthening and deconditioning. Discharge planning per their recommendation. Social work to assist with d/c planning.      Total time spent with patient visit at the skilled nursing facility was 36 minutes including patient visit, review of past records, and discussion with nursing on patient status and new orders as above. Greater than 50% of total time spent with counseling and coordinating care due to acute respiratory failure, COPD exacerbation, pneumonia and other problems as above .     Electronically signed by:  CINTHIA Villareal CNP

## 2020-03-12 ENCOUNTER — TRANSFERRED RECORDS (OUTPATIENT)
Dept: HEALTH INFORMATION MANAGEMENT | Facility: CLINIC | Age: 85
End: 2020-03-12

## 2020-03-12 ENCOUNTER — PATIENT OUTREACH (OUTPATIENT)
Dept: CARE COORDINATION | Facility: CLINIC | Age: 85
End: 2020-03-12

## 2020-03-12 LAB
ANION GAP SERPL CALCULATED.3IONS-SCNC: 11 MMOL/L (ref 5–18)
ANION GAP SERPL CALCULATED.3IONS-SCNC: 11 MMOL/L (ref 5–18)
BUN SERPL-MCNC: 13 MG/DL (ref 8–28)
BUN SERPL-MCNC: 13 MG/DL (ref 8–28)
CALCIUM SERPL-MCNC: 9.2 MG/DL (ref 8.5–10.5)
CALCIUM SERPL-MCNC: 9.2 MG/DL (ref 8.5–10.5)
CHLORIDE BLD-SCNC: 102 MMOL/L (ref 98–107)
CHLORIDE SERPLBLD-SCNC: 102 MMOL/L (ref 98–107)
CO2 SERPL-SCNC: 29 MMOL/L (ref 22–31)
CO2 SERPL-SCNC: 29 MMOL/L (ref 22–31)
CREAT SERPL-MCNC: 0.69 MG/DL (ref 0.6–1.1)
CREAT SERPL-MCNC: 0.69 MG/DL (ref 0.6–1.1)
ERYTHROCYTE [DISTWIDTH] IN BLOOD BY AUTOMATED COUNT: 14.5 % (ref 11–14.5)
ERYTHROCYTE [DISTWIDTH] IN BLOOD BY AUTOMATED COUNT: 14.5 % (ref 11–14.5)
GFR SERPL CREATININE-BSD FRML MDRD: >60 ML/MIN/1.73M2
GFR SERPL CREATININE-BSD FRML MDRD: >60 ML/MIN/1.73M2
GLUCOSE BLD-MCNC: 75 MG/DL (ref 70–125)
GLUCOSE SERPL-MCNC: 75 MG/DL (ref 70–125)
HCT VFR BLD AUTO: 41.6 % (ref 35–47)
HCT VFR BLD AUTO: 41.6 % (ref 35–47)
HEMOGLOBIN: 13.5 G/DL (ref 12–16)
HGB BLD-MCNC: 13.5 G/DL (ref 12–16)
MCH RBC QN AUTO: 30 PG (ref 27–34)
MCH RBC QN AUTO: 30 PG (ref 27–34)
MCHC RBC AUTO-ENTMCNC: 32.5 G/DL (ref 32–36)
MCHC RBC AUTO-ENTMCNC: 32.5 G/DL (ref 32–36)
MCV RBC AUTO: 92 FL (ref 80–100)
MCV RBC AUTO: 92 FL (ref 80–100)
PLATELET # BLD AUTO: 306 THOU/UL (ref 140–440)
PLATELET # BLD AUTO: 306 THOU/UL (ref 140–440)
PMV BLD AUTO: 9.9 FL (ref 8.5–12.5)
POTASSIUM BLD-SCNC: 3.5 MMOL/L (ref 3.5–5)
POTASSIUM SERPL-SCNC: 3.5 MMOL/L (ref 3.5–5)
RBC # BLD AUTO: 4.5 MILL/UL (ref 3.8–5.4)
RBC # BLD AUTO: 4.5 MILL/UL (ref 3.8–5.4)
SODIUM SERPL-SCNC: 142 MMOL/L (ref 136–145)
SODIUM SERPL-SCNC: 142 MMOL/L (ref 136–145)
WBC # BLD AUTO: 13.1 THOU/UL (ref 4–11)
WBC: 13.1 THOU/UL (ref 4–11)

## 2020-03-12 NOTE — LETTER
[facility]   To:   Please give to facility     From:  Samira Cowart RN-BSN     Care Coordination  Phone:  [phone]  Email: [email or web address] [place], [place]    Patient Name:  Marino Prajapati Date of Birth: [birth year]   Admit date: 3/10/20      *Information Needed:  Please contact me when the patient will discharge (or if they will move to long term care)- include the discharge date, disposition, and main diagnosis.  I would also appreciate being notified of any care conferences being held.   - If the patient is discharged with home care services, please provide the name of the agency                           Please include: Phone, Fax or Email with information                                                      THANK YOU!

## 2020-03-12 NOTE — PROGRESS NOTES
Clinic Care Coordination Contact  Care Coordination Transition Communication    Referral Source: IP Handoff    Clinical Data: Patient was hospitalized at [facility] from 3/6/20 to 3/10/20 with diagnosis of pneumonia and COPD exacerbation.  Patient discharged to TCU for continued medical management, nursing care, and therapies.    Transition to Facility:              Facility Name: [facility]              Contact name and phone number: [employer] [phone]    Plan: RN/SW Care Coordinator will await notification from facility staff informing RN/SW Care Coordinator of patient's discharge plans/needs.     Samira Cowart RN  Care Coordination  Phone:  [phone]  Email: [email or web address]  [facility]-[place], [place], [place] and [facility]

## 2020-03-15 ENCOUNTER — RECORDS - HEALTHEAST (OUTPATIENT)
Dept: LAB | Facility: CLINIC | Age: 85
End: 2020-03-15

## 2020-03-16 ENCOUNTER — NURSING HOME VISIT (OUTPATIENT)
Dept: GERIATRICS | Facility: CLINIC | Age: 85
End: 2020-03-16
Payer: COMMERCIAL

## 2020-03-16 ENCOUNTER — TRANSFERRED RECORDS (OUTPATIENT)
Dept: HEALTH INFORMATION MANAGEMENT | Facility: CLINIC | Age: 85
End: 2020-03-16

## 2020-03-16 VITALS
HEART RATE: 87 BPM | DIASTOLIC BLOOD PRESSURE: 76 MMHG | OXYGEN SATURATION: 93 % | RESPIRATION RATE: 16 BRPM | SYSTOLIC BLOOD PRESSURE: 131 MMHG | HEIGHT: 66 IN | TEMPERATURE: 98.6 F | WEIGHT: 228.6 LBS | BODY MASS INDEX: 36.74 KG/M2

## 2020-03-16 DIAGNOSIS — J44.1 CHRONIC OBSTRUCTIVE PULMONARY DISEASE WITH ACUTE EXACERBATION (H): ICD-10-CM

## 2020-03-16 DIAGNOSIS — J18.9 PNEUMONIA OF RIGHT LOWER LOBE DUE TO INFECTIOUS ORGANISM: ICD-10-CM

## 2020-03-16 DIAGNOSIS — B35.1 ONYCHOMYCOSIS: ICD-10-CM

## 2020-03-16 DIAGNOSIS — G89.29 OTHER CHRONIC PAIN: ICD-10-CM

## 2020-03-16 DIAGNOSIS — I10 HTN, GOAL BELOW 140/90: ICD-10-CM

## 2020-03-16 DIAGNOSIS — R53.81 PHYSICAL DECONDITIONING: ICD-10-CM

## 2020-03-16 DIAGNOSIS — J96.01 ACUTE RESPIRATORY FAILURE WITH HYPOXIA (H): Primary | ICD-10-CM

## 2020-03-16 DIAGNOSIS — R41.89 COGNITIVE IMPAIRMENT: ICD-10-CM

## 2020-03-16 DIAGNOSIS — Z86.711 HISTORY OF PULMONARY EMBOLISM: ICD-10-CM

## 2020-03-16 LAB
ANION GAP SERPL CALCULATED.3IONS-SCNC: 10 MMOL/L (ref 5–18)
ANION GAP SERPL CALCULATED.3IONS-SCNC: 10 MMOL/L (ref 5–18)
BUN SERPL-MCNC: 15 MG/DL (ref 8–28)
BUN SERPL-MCNC: 15 MG/DL (ref 8–28)
CALCIUM SERPL-MCNC: 8.6 MG/DL (ref 8.5–10.5)
CALCIUM SERPL-MCNC: 8.6 MG/DL (ref 8.5–10.5)
CHLORIDE BLD-SCNC: 100 MMOL/L (ref 98–107)
CHLORIDE SERPLBLD-SCNC: 100 MMOL/L (ref 98–107)
CO2 SERPL-SCNC: 30 MMOL/L (ref 22–31)
CO2 SERPL-SCNC: 30 MMOL/L (ref 22–31)
CREAT SERPL-MCNC: 0.7 MG/DL (ref 0.6–1.1)
CREAT SERPL-MCNC: 0.7 MG/DL (ref 0.6–1.1)
ERYTHROCYTE [DISTWIDTH] IN BLOOD BY AUTOMATED COUNT: 14.6 % (ref 11–14.5)
ERYTHROCYTE [DISTWIDTH] IN BLOOD BY AUTOMATED COUNT: 14.6 % (ref 11–14.5)
GFR SERPL CREATININE-BSD FRML MDRD: >60 ML/MIN/1.73M2
GFR SERPL CREATININE-BSD FRML MDRD: >60 ML/MIN/1.73M2
GLUCOSE BLD-MCNC: 77 MG/DL (ref 70–125)
GLUCOSE SERPL-MCNC: 77 MG/DL (ref 70–125)
HCT VFR BLD AUTO: 40.4 % (ref 35–47)
HCT VFR BLD AUTO: 40.4 % (ref 35–47)
HEMOGLOBIN: 13 G/DL (ref 12–16)
HGB BLD-MCNC: 13 G/DL (ref 12–16)
MCH RBC QN AUTO: 29.7 PG (ref 27–34)
MCH RBC QN AUTO: 29.7 PG (ref 27–34)
MCHC RBC AUTO-ENTMCNC: 32.2 G/DL (ref 32–36)
MCHC RBC AUTO-ENTMCNC: 32.2 G/DL (ref 32–36)
MCV RBC AUTO: 92 FL (ref 80–100)
MCV RBC AUTO: 92 FL (ref 80–100)
PLATELET # BLD AUTO: 217 THOU/UL (ref 140–440)
PLATELET # BLD AUTO: 217 THOU/UL (ref 140–440)
PMV BLD AUTO: 10.3 FL (ref 8.5–12.5)
POTASSIUM BLD-SCNC: 3.9 MMOL/L (ref 3.5–5)
POTASSIUM SERPL-SCNC: 3.9 MMOL/L (ref 3.5–5)
RBC # BLD AUTO: 4.37 MILL/UL (ref 3.8–5.4)
RBC # BLD AUTO: 4.37 MILL/UL (ref 3.8–5.4)
SODIUM SERPL-SCNC: 140 MMOL/L (ref 136–145)
SODIUM SERPL-SCNC: 140 MMOL/L (ref 136–145)
WBC # BLD AUTO: 10.7 THOU/UL (ref 4–11)
WBC: 10.7 THOU/UL (ref 4–11)

## 2020-03-16 PROCEDURE — 99305 1ST NF CARE MODERATE MDM 35: CPT | Mod: AI | Performed by: INTERNAL MEDICINE

## 2020-03-16 RX ORDER — CODEINE PHOSPHATE AND GUAIFENESIN 10; 100 MG/5ML; MG/5ML
1 SOLUTION ORAL EVERY 4 HOURS PRN
COMMUNITY
End: 2020-03-23

## 2020-03-16 RX ORDER — IPRATROPIUM BROMIDE AND ALBUTEROL SULFATE 2.5; .5 MG/3ML; MG/3ML
1 SOLUTION RESPIRATORY (INHALATION) 4 TIMES DAILY
COMMUNITY
End: 2020-03-31

## 2020-03-16 RX ORDER — POTASSIUM CHLORIDE 750 MG/1
10 CAPSULE, EXTENDED RELEASE ORAL DAILY
COMMUNITY
End: 2021-03-11

## 2020-03-16 ASSESSMENT — MIFFLIN-ST. JEOR: SCORE: 1513.67

## 2020-03-16 NOTE — LETTER
"    3/16/2020        RE: Marino Prajapati  [address] [address]        [place] [facility]  PHYSICIAN NOTE    PRIMARY CARE PROVIDER AND CLINIC:  [facility]ivian Blue MD[facility] [address] [address]    Chief Complaint   Patient presents with     Hospital F/U     [place] Medical Record Number:  [id number]  Place of Service where encounter took place:  [facility] ([facility]) [[id number]]    Marino Prajapati is a 84 year old (1935), admitted to the above facility from  [facility]. Hospital stay 3/6/20 through 3/10/20. Admitted to this facility for  rehab, medical management and nursing care.     HPI:    HPI information obtained from: facility chart records, facility staff, patient report and [facility] chart review.     Brief summary of hospital course: Marino was admitted with progressive weakness at home, productive cough and dyspnea found per CT to have RLL airspace disease treated as pneumonia. She has underlying past smoking history with COPD/asthma though is not baseline O2 dependent though needed O2 in the hospital. Negative influenza testing, WBC 15K. Treated with Ceftriaxone/Azithromycin  (transitioned to oral equivalent at discharge) and prednisone burst as well as addition of neb therapy and O2. D/t weakness, needed transition to TCU for strengthening. Lives at home with SO of many years, Josue.    Updates on status since skilled nursing admission: Marino is seen in her room today. Still needing O2 but down from 3 LPM to 2 LPM. Feels she is having \"less coughing fits\" and the coughing is really the only time she feels significant dyspnea. No dyspnea lying flat. Sometimes physical therapy brings on a coughing fit. Knows she had pneumonia and needs to recover. No chest pain and feels edema is less. Says bowel/bladder functioning well. Really wants to work hard on physical therapy to gain strength for home going " "and is slightly critical of occupational therapy cognitive testing. She does c/o some toenail discomfort r/t curled hammer toes and long onychomycosis and her inability to cut her own toenails. Apparently one of the nurses said she could help with that but is not on shift today. She takes Tramadol 50 mg BID and says has been on that same dose for several years and finds it \"definetely is helpful\" for her joint pain (especially back). Feels it keeps pain at bay enough to get up and be mobile. I spoke with her nurse today who had no acute concerns.    CODE STATUS/ADVANCE DIRECTIVES DISCUSSION:   DNR / DNI  Patient's living condition: lives with partner Josue of 23 years    ALLERGIES: Patient has no known allergies.    Past Medical History:   Diagnosis Date     Anemia      CARDIOVASCULAR SCREENING; LDL GOAL LESS THAN 160      Colon polyps 2010    needs colonoscopy 2013     DVT (deep venous thrombosis) (H) 2007    remote history of DVT while she was on BCP ,coumadin stopped after retroperitoneal/buttock hematoma in 10/11 . On ASA only     Gastro-oesophageal reflux disease      HTN, goal below 140/90      Hyperlipidemia LDL goal <130 1/22/2016     Kidney stone      Osteoarthritis     knees and hip     Osteoporosis      Postnasal drip      S/P total knee arthroplasty      Thrombosis of leg       Past Surgical History:   Procedure Laterality Date     ARTHROPLASTY HIP  8/1/2013    Procedure: ARTHROPLASTY HIP;  RIGHT TOTAL HIP ARTHROPLASTY;  Surgeon: Rufino Tong MD;  Location:  OR     ARTHROPLASTY HIP Left 12/12/2014    Procedure: ARTHROPLASTY HIP;  Surgeon: Rufino Tong MD;  Location: [facility] OR     ARTHROPLASTY KNEE  10/22/2012    Procedure: ARTHROPLASTY KNEE;  Right Total knee Arthroplasty  ;  Surgeon: Jagdeep Virgen MD;  Location: [facility] OR     ARTHROPLASTY KNEE  5/23/2013    Procedure: ARTHROPLASTY KNEE;  LEFT TOTAL KNEE ARTHROPLASTY (SMITH & NEPHEW)^;  Surgeon: Rufino Tong MD;  Location:  OR      " PREP FACE/ORAL PROST PALATAL LIFT       CHOLECYSTECTOMY       CYSTOSCOPY, RETROGRADES, INSERT STENT URETER(S), COMBINED  2012    Procedure: COMBINED CYSTOSCOPY, RETROGRADES, INSERT STENT URETER(S);  Cystoscopy, Right retrogrades, Right Stent Placement;  Surgeon: Mauricio Velazquez MD;  Location: [facility] OR     LASER HOLMIUM LITHOTRIPSY URETER(S), INSERT STENT, COMBINED  2012    Procedure: COMBINED CYSTOSCOPY, URETEROSCOPY, LASER HOLMIUM LITHOTRIPSY URETER(S), INSERT STENT;  Cystoscopy, Stent Removal, Right Ureteroscopy with Holmium Laser, Stone removal ;  Surgeon: Mauricio Velazquez MD;  Location: [facility] OR     LIPOSUCTION (LOCATION)      tummy     STRIP VEIN       Family History   Problem Relation Age of Onset     Breast Cancer Mother      Circulatory Father         Blood clots     Social History     Tobacco Use     Smoking status: Former Smoker     Packs/day: 1.00     Years: 29.00     Pack years: 29.00     Types: Cigarettes     Last attempt to quit: 10/17/1966     Years since quittin.4     Smokeless tobacco: Never Used   Substance Use Topics     Alcohol use: Yes     Comment: 4 drinks yearly     Drug use: No        Post-discharge medication reconciliation status: Reviewed in [facility]    Current Outpatient Medications   Medication Sig Dispense Refill     acetaminophen (TYLENOL) 325 MG tablet Take 650 mg by mouth 2 times daily At 0800 and 1400       acetaminophen (TYLENOL) 325 MG tablet Take 325 mg by mouth At Bedtime       albuterol (PROAIR HFA/PROVENTIL HFA/VENTOLIN HFA) 108 (90 Base) MCG/ACT inhaler Inhale 2 puffs into the lungs every 6 hours as needed for shortness of breath / dyspnea or wheezing 1 Inhaler 3     amLODIPine (NORVASC) 10 MG tablet Take 0.5 tablets (5 mg) by mouth daily 90 tablet 0     atenolol (TENORMIN) 100 MG tablet Take 1 tablet (100 mg) by mouth daily 90 tablet 0     benzonatate (TESSALON) 100 MG capsule Take 1 capsule (100 mg) by mouth 3 times daily as needed for cough        budesonide-formoterol (SYMBICORT) 160-4.5 MCG/ACT Inhaler Inhale 2 puffs into the lungs 2 times daily 3 Inhaler 1     cholecalciferol (VITAMIN D) 1000 UNIT tablet Take 1,000 Units by mouth daily  100 tablet 3     Coenzyme Q10 (COQ10) 30 MG CAPS Take 1 capsule by mouth daily as needed  30 capsule      Cranberry Extract 250 MG TABS Take 1 tablet by mouth daily as needed        fluticasone (FLONASE) 50 MCG/ACT nasal spray Spray 1-2 sprays into both nostrils daily as needed for rhinitis or allergies       furosemide (LASIX) 40 MG tablet Take 1 tablet (40 mg) by mouth daily 90 tablet 0     guaiFENesin-codeine (ROBITUSSIN AC) 100-10 MG/5ML solution Take 1 teaspoonful by mouth every 4 hours as needed for cough       ipratropium - albuterol 0.5 mg/2.5 mg/3 mL (DUONEB) 0.5-2.5 (3) MG/3ML neb solution Take 1 vial by nebulization 4 times daily       levothyroxine (SYNTHROID/LEVOTHROID) 50 MCG tablet TAKE 1 TABLET DAILY (ONE TIME REFILL ONLY, NEED TO BE SEEN BECAUSE IT HAS BEEN ONE YEAR SINCE LAST VISIT) 90 tablet 0     losartan (COZAAR) 100 MG tablet Take 1 tablet (100 mg) by mouth daily 90 tablet 0     Multiple Vitamins-Minerals (MULTIVITAMIN & MINERAL PO) Take 1 tablet by mouth daily.       nystatin (MYCOSTATIN) 303292 UNIT/GM external powder Apply topically 2 times daily as needed (skin rash) 60 g 3     order for DME Equipment being ordered: 4 wheeled walker with hand brakes and seat 1 each 0     order for DME Equipment being ordered: 1: Custom/alternative BLE full leg velco/buckling compression garment 1 each 0     order for DME Equipment being ordered: 1: Gradient Compression Wraps; 2: BLE knee high 20-30 mm Hg compression stockings; 3: BLE thigh high 20-30 mm Hg compression stockings; 4: Cast Boots 1 each 0     order for DME Equipment being ordered: Nebulizer and neb supplies (tubing, etc) 1 Device 0     potassium chloride ER (MICRO-K) 10 MEQ CR capsule Take 10 mEq by mouth daily       pravastatin (PRAVACHOL) 20 MG  "tablet TAKE 1 TABLET DAILY (ONE TIME ONLY, NEED TO BE SEEN BECAUSE IT HAS BEEN ONE YEAR SINCE LAST VISIT) 90 tablet 0     terazosin (HYTRIN) 2 MG capsule TAKE 1 CAPSULE AT BEDTIME 90 capsule 0     tiotropium (SPIRIVA HANDIHALER) 18 MCG inhaled capsule Inhale contents of one capsule daily. 90 capsule 0     traMADol (ULTRAM) 50 MG tablet Take 1 tablet (50 mg) by mouth 2 times daily 60 tablet 0       ROS:  10 point ROS of systems including Constitutional, Eyes, Respiratory, Cardiovascular, Gastroenterology, Genitourinary, Integumentary, Musculoskeletal, Psychiatric were all negative except for pertinent positives noted in my HPI.    Exam:  /76   Pulse 87   Temp 98.6  F (37  C)   Resp 16   Ht 1.676 m (5' 6\")   Wt 103.7 kg (228 lb 9.6 oz)   SpO2 93%   BMI 36.90 kg/m    Alert, pleasant, NAD, sitting in wheelchair, appears stated age  No scleral icterus  Moist oral mucosa  HRRR without mrg  No edema  Dim right lung base to auscultation, good airflow demonstrated otherwise, raspy cough at times, breathing non-labored, wearing 2 LPM nasal canula O2  Abdomen soft, +BS  Mood euthymic and bright  Normal speech, no tremor  Onychomycosis bilateral feet with several hammer toes but no open sores visualized on either foot    Lab/Diagnostic data:  Normal BMP and CBC today with normalization of WBC to 10.7    ASSESSMENT/PLAN:  Acute respiratory failure with hypoxia (H)  Pneumonia of right lower lobe due to infectious organism (H)  Chronic obstructive pulmonary disease with acute exacerbation (H)  Still requiring some O2 therapy though clinically otherwise improving; off of antibiotics and prednisone  On inhaled medications given h/o COPD  Former smoker  ?Need chronic O2 (h/o needing O2 therapy last spring when she had PE)  Orders are in place for staff to wean O2 as able    History of pulmonary embolism  Noted h/o PE spring 2019 thought to be d/t immobility at home; CT for PE this hospital stay showed no evidence of " recurrent PE thankfully  It appears on chart review her Apixaban was discontinued (or maybe stopped by patient) at some point as is no longer on anticoagulation     Physical deconditioning  Cognitive impairment   Physical therapy and occupational therapy eval and treat for safe dispo  TCU stay last springtime SLUMS: 21/30  She is motivated to get physically stronger though is resistive to cognitive testing    Other chronic pain  Remains on chronic home dose Tramadol 50 mg BID and has scheduled Tylenol too  She reports this effective (see HPI)    HTN, goal below 140/90  VSS on home regimen; on new K 10 mEq daily for potassium 3.5 early on in TCU stay with improvement on recheck to 3.9 today    Onychomycosis  Appreciate nursing staff who can help trim her thick toenails (podiatry services not offered in TCU); reassured her no visible open sores today         Electronically signed by:  Lexis Marinelli DO        Sincerely,        Lexis Marinelli DO

## 2020-03-17 ENCOUNTER — TELEPHONE (OUTPATIENT)
Dept: GERIATRICS | Facility: CLINIC | Age: 85
End: 2020-03-17

## 2020-03-17 NOTE — TELEPHONE ENCOUNTER
Weekly Telephone Check in at TCU:    S: Nursing staff report patient is doing well. Overall is requiring less oxygen than last week. Nursing feels she is improving. She continues to have cough. Nurse also plans to cut her toenails today.     O: Her oxygen saturations are 94% on 2L. BP variable control from 120s-150s, HR 70-80s    A/P: No new orders today.

## 2020-03-17 NOTE — PROGRESS NOTES
"[facility]  PHYSICIAN NOTE    PRIMARY CARE PROVIDER AND CLINIC:  Vivian Blue MD, [address] [facility] / [facility] [address]    Chief Complaint   Patient presents with     Hospital F/U     [facility] Record Number:  [id number]  Place of Service where encounter took place:  [facility] ([facility]) [[id number]]    Marino Prajapati is a 84 year old (1935), admitted to the above facility from  [facility]. Hospital stay 3/6/20 through 3/10/20. Admitted to this facility for  rehab, medical management and nursing care.     HPI:    HPI information obtained from: facility chart records, facility staff, patient report and [facility] chart review.     Brief summary of hospital course: Marino was admitted with progressive weakness at home, productive cough and dyspnea found per CT to have RLL airspace disease treated as pneumonia. She has underlying past smoking history with COPD/asthma though is not baseline O2 dependent though needed O2 in the hospital. Negative influenza testing, WBC 15K. Treated with Ceftriaxone/Azithromycin  (transitioned to oral equivalent at discharge) and prednisone burst as well as addition of neb therapy and O2. D/t weakness, needed transition to TCU for strengthening. Lives at home with SO of many years, Josue.    Updates on status since skilled nursing admission: Marino is seen in her room today. Still needing O2 but down from 3 LPM to 2 LPM. Feels she is having \"less coughing fits\" and the coughing is really the only time she feels significant dyspnea. No dyspnea lying flat. Sometimes physical therapy brings on a coughing fit. Knows she had pneumonia and needs to recover. No chest pain and feels edema is less. Says bowel/bladder functioning well. Really wants to work hard on physical therapy to gain strength for home going and is slightly critical of occupational therapy cognitive testing. She does c/o some toenail " "discomfort r/t curled hammer toes and long onychomycosis and her inability to cut her own toenails. Apparently one of the nurses said she could help with that but is not on shift today. She takes Tramadol 50 mg BID and says has been on that same dose for several years and finds it \"definetely is helpful\" for her joint pain (especially back). Feels it keeps pain at bay enough to get up and be mobile. I spoke with her nurse today who had no acute concerns.    CODE STATUS/ADVANCE DIRECTIVES DISCUSSION:   DNR / DNI  Patient's living condition: lives with partner Josue of 23 years    ALLERGIES: Patient has no known allergies.    Past Medical History:   Diagnosis Date     Anemia      CARDIOVASCULAR SCREENING; LDL GOAL LESS THAN 160      Colon polyps 2010    needs colonoscopy 2013     DVT (deep venous thrombosis) (H) 2007    remote history of DVT while she was on BCP ,coumadin stopped after retroperitoneal/buttock hematoma in 10/11 . On ASA only     Gastro-oesophageal reflux disease      HTN, goal below 140/90      Hyperlipidemia LDL goal <130 1/22/2016     Kidney stone      Osteoarthritis     knees and hip     Osteoporosis      Postnasal drip      S/P total knee arthroplasty      Thrombosis of leg       Past Surgical History:   Procedure Laterality Date     ARTHROPLASTY HIP  8/1/2013    Procedure: ARTHROPLASTY HIP;  RIGHT TOTAL HIP ARTHROPLASTY;  Surgeon: Rufino Tong MD;  Location:  OR     ARTHROPLASTY HIP Left 12/12/2014    Procedure: ARTHROPLASTY HIP;  Surgeon: Rufino Tong MD;  Location: [facility] OR     ARTHROPLASTY KNEE  10/22/2012    Procedure: ARTHROPLASTY KNEE;  Right Total knee Arthroplasty  ;  Surgeon: Jagdeep Virgen MD;  Location: [facility] OR     ARTHROPLASTY KNEE  5/23/2013    Procedure: ARTHROPLASTY KNEE;  LEFT TOTAL KNEE ARTHROPLASTY (SMITH & NEPHEW)^;  Surgeon: Rufino Tong MD;  Location:  OR     C PREP FACE/ORAL PROST PALATAL LIFT       CHOLECYSTECTOMY       CYSTOSCOPY, RETROGRADES, INSERT " STENT URETER(S), COMBINED  2012    Procedure: COMBINED CYSTOSCOPY, RETROGRADES, INSERT STENT URETER(S);  Cystoscopy, Right retrogrades, Right Stent Placement;  Surgeon: Mauricio Velazquez MD;  Location: [facility] OR     LASER HOLMIUM LITHOTRIPSY URETER(S), INSERT STENT, COMBINED  2012    Procedure: COMBINED CYSTOSCOPY, URETEROSCOPY, LASER HOLMIUM LITHOTRIPSY URETER(S), INSERT STENT;  Cystoscopy, Stent Removal, Right Ureteroscopy with Holmium Laser, Stone removal ;  Surgeon: Mauricio Velazquez MD;  Location: [facility] OR     LIPOSUCTION (LOCATION)      tummy     STRIP VEIN       Family History   Problem Relation Age of Onset     Breast Cancer Mother      Circulatory Father         Blood clots     Social History     Tobacco Use     Smoking status: Former Smoker     Packs/day: 1.00     Years: 29.00     Pack years: 29.00     Types: Cigarettes     Last attempt to quit: 10/17/1966     Years since quittin.4     Smokeless tobacco: Never Used   Substance Use Topics     Alcohol use: Yes     Comment: 4 drinks yearly     Drug use: No        Post-discharge medication reconciliation status: Reviewed in [facility]    Current Outpatient Medications   Medication Sig Dispense Refill     acetaminophen (TYLENOL) 325 MG tablet Take 650 mg by mouth 2 times daily At 0800 and 1400       acetaminophen (TYLENOL) 325 MG tablet Take 325 mg by mouth At Bedtime       albuterol (PROAIR HFA/PROVENTIL HFA/VENTOLIN HFA) 108 (90 Base) MCG/ACT inhaler Inhale 2 puffs into the lungs every 6 hours as needed for shortness of breath / dyspnea or wheezing 1 Inhaler 3     amLODIPine (NORVASC) 10 MG tablet Take 0.5 tablets (5 mg) by mouth daily 90 tablet 0     atenolol (TENORMIN) 100 MG tablet Take 1 tablet (100 mg) by mouth daily 90 tablet 0     benzonatate (TESSALON) 100 MG capsule Take 1 capsule (100 mg) by mouth 3 times daily as needed for cough       budesonide-formoterol (SYMBICORT) 160-4.5 MCG/ACT Inhaler Inhale 2 puffs into the lungs 2 times  daily 3 Inhaler 1     cholecalciferol (VITAMIN D) 1000 UNIT tablet Take 1,000 Units by mouth daily  100 tablet 3     Coenzyme Q10 (COQ10) 30 MG CAPS Take 1 capsule by mouth daily as needed  30 capsule      Cranberry Extract 250 MG TABS Take 1 tablet by mouth daily as needed        fluticasone (FLONASE) 50 MCG/ACT nasal spray Spray 1-2 sprays into both nostrils daily as needed for rhinitis or allergies       furosemide (LASIX) 40 MG tablet Take 1 tablet (40 mg) by mouth daily 90 tablet 0     guaiFENesin-codeine (ROBITUSSIN AC) 100-10 MG/5ML solution Take 1 teaspoonful by mouth every 4 hours as needed for cough       ipratropium - albuterol 0.5 mg/2.5 mg/3 mL (DUONEB) 0.5-2.5 (3) MG/3ML neb solution Take 1 vial by nebulization 4 times daily       levothyroxine (SYNTHROID/LEVOTHROID) 50 MCG tablet TAKE 1 TABLET DAILY (ONE TIME REFILL ONLY, NEED TO BE SEEN BECAUSE IT HAS BEEN ONE YEAR SINCE LAST VISIT) 90 tablet 0     losartan (COZAAR) 100 MG tablet Take 1 tablet (100 mg) by mouth daily 90 tablet 0     Multiple Vitamins-Minerals (MULTIVITAMIN & MINERAL PO) Take 1 tablet by mouth daily.       nystatin (MYCOSTATIN) 999234 UNIT/GM external powder Apply topically 2 times daily as needed (skin rash) 60 g 3     order for DME Equipment being ordered: 4 wheeled walker with hand brakes and seat 1 each 0     order for DME Equipment being ordered: 1: Custom/alternative BLE full leg velco/buckling compression garment 1 each 0     order for DME Equipment being ordered: 1: Gradient Compression Wraps; 2: BLE knee high 20-30 mm Hg compression stockings; 3: BLE thigh high 20-30 mm Hg compression stockings; 4: Cast Boots 1 each 0     order for DME Equipment being ordered: Nebulizer and neb supplies (tubing, etc) 1 Device 0     potassium chloride ER (MICRO-K) 10 MEQ CR capsule Take 10 mEq by mouth daily       pravastatin (PRAVACHOL) 20 MG tablet TAKE 1 TABLET DAILY (ONE TIME ONLY, NEED TO BE SEEN BECAUSE IT HAS BEEN ONE YEAR SINCE LAST  "VISIT) 90 tablet 0     terazosin (HYTRIN) 2 MG capsule TAKE 1 CAPSULE AT BEDTIME 90 capsule 0     tiotropium (SPIRIVA HANDIHALER) 18 MCG inhaled capsule Inhale contents of one capsule daily. 90 capsule 0     traMADol (ULTRAM) 50 MG tablet Take 1 tablet (50 mg) by mouth 2 times daily 60 tablet 0       ROS:  10 point ROS of systems including Constitutional, Eyes, Respiratory, Cardiovascular, Gastroenterology, Genitourinary, Integumentary, Musculoskeletal, Psychiatric were all negative except for pertinent positives noted in my HPI.    Exam:  /76   Pulse 87   Temp 98.6  F (37  C)   Resp 16   Ht 1.676 m (5' 6\")   Wt 103.7 kg (228 lb 9.6 oz)   SpO2 93%   BMI 36.90 kg/m    Alert, pleasant, NAD, sitting in wheelchair, appears stated age  No scleral icterus  Moist oral mucosa  HRRR without mrg  No edema  Dim right lung base to auscultation, good airflow demonstrated otherwise, raspy cough at times, breathing non-labored, wearing 2 LPM nasal canula O2  Abdomen soft, +BS  Mood euthymic and bright  Normal speech, no tremor  Onychomycosis bilateral feet with several hammer toes but no open sores visualized on either foot    Lab/Diagnostic data:  Normal BMP and CBC today with normalization of WBC to 10.7    ASSESSMENT/PLAN:  Acute respiratory failure with hypoxia (H)  Pneumonia of right lower lobe due to infectious organism (H)  Chronic obstructive pulmonary disease with acute exacerbation (H)  Still requiring some O2 therapy though clinically otherwise improving; off of antibiotics and prednisone  On inhaled medications given h/o COPD  Former smoker  ?Need chronic O2 (h/o needing O2 therapy last spring when she had PE)  Orders are in place for staff to wean O2 as able    History of pulmonary embolism  Noted h/o PE spring 2019 thought to be d/t immobility at home; CT for PE this hospital stay showed no evidence of recurrent PE thankfully  It appears on chart review her Apixaban was discontinued (or maybe stopped by " patient) at some point as is no longer on anticoagulation     Physical deconditioning  Cognitive impairment   Physical therapy and occupational therapy eval and treat for safe dispo  TCU stay last springtime SLUMS: 21/30  She is motivated to get physically stronger though is resistive to cognitive testing    Other chronic pain  Remains on chronic home dose Tramadol 50 mg BID and has scheduled Tylenol too  She reports this effective (see HPI)    HTN, goal below 140/90  VSS on home regimen; on new K 10 mEq daily for potassium 3.5 early on in TCU stay with improvement on recheck to 3.9 today    Onychomycosis  Appreciate nursing staff who can help trim her thick toenails (podiatry services not offered in TCU); reassured her no visible open sores today         Electronically signed by:  Lexis Marinelli DO

## 2020-03-21 ENCOUNTER — TELEPHONE (OUTPATIENT)
Dept: GERIATRICS | Facility: CLINIC | Age: 85
End: 2020-03-21

## 2020-03-21 NOTE — TELEPHONE ENCOUNTER
Nursing called to report a fever of 100.1  Recent pneumonia and off ABX therapy as of the 13th.  Did have a prednisone burst from what was said.  Resident is lethargic, wheezing.    Orders:  Rocephin 1GM IM today with mix of Lidocaine and call tomorrow with update due to known pneumonia  Prednisone 20mg daily for 3 days    The nurse asked about nasal swab for COVID-19.  Spoke with MD and NP supervisor and then called facility back with update.    They are to consider this 1 case of a temp.  If they have another temp and resp issues then facility needs to call MD and they will come do the swabs of the residents.  Facility not to do a swab at this time.    Electronically signed by Sofie Brizuela RN, CNP

## 2020-03-23 ENCOUNTER — TELEPHONE (OUTPATIENT)
Dept: GERIATRICS | Facility: CLINIC | Age: 85
End: 2020-03-23

## 2020-03-23 DIAGNOSIS — R05.9 COUGH: Primary | ICD-10-CM

## 2020-03-23 RX ORDER — CODEINE PHOSPHATE AND GUAIFENESIN 10; 100 MG/5ML; MG/5ML
1 SOLUTION ORAL EVERY 4 HOURS PRN
Qty: 100 ML | Refills: 0 | Status: SHIPPED | OUTPATIENT
Start: 2020-03-23 | End: 2021-03-11

## 2020-03-23 NOTE — TELEPHONE ENCOUNTER
Telephone visit to review medications and discontinue any non essential medications due to COVID 19.     Spoke to nursing staff and they report Marino is doing ok today. She is still coughing per nursing. Nursing staff reports they need a new script sent for guaifenesin codeine- so script was sent to the pharmacy.    Orders given to nursing staff to discontinue: multivitamin, fluticasone, co-q 10, and cranberry extract.

## 2020-03-27 ENCOUNTER — TELEPHONE (OUTPATIENT)
Dept: GERIATRICS | Facility: CLINIC | Age: 85
End: 2020-03-27

## 2020-03-27 NOTE — TELEPHONE ENCOUNTER
Telephone Check in at TCU:    Nursing staff report Marino is doing really well today. She has been weaned off oxygen and is currently sating 91%. Did not require oxygen overnight or yesterday either. Nurse reports her cough is improving- she has not heard her cough at all today. Nurse reports overall improvement. She has been up with therapy today and overall is doing well. No concerns. SBP ranging from 130s-150s. HR 70s -80s.    No new orders today. If breathing continues to improve next week will change duonebs to PRN. Nursing to continue to monitor patient and update provider with concerns.

## 2020-03-31 ENCOUNTER — TELEPHONE (OUTPATIENT)
Dept: GERIATRICS | Facility: CLINIC | Age: 85
End: 2020-03-31

## 2020-03-31 DIAGNOSIS — J44.1 COPD EXACERBATION (H): Primary | ICD-10-CM

## 2020-03-31 RX ORDER — PREDNISONE 20 MG/1
40 TABLET ORAL DAILY
Start: 2020-03-31 | End: 2020-04-06

## 2020-03-31 RX ORDER — DOXYCYCLINE 100 MG/1
200 CAPSULE ORAL DAILY
Start: 2020-03-31 | End: 2020-04-06

## 2020-03-31 NOTE — TELEPHONE ENCOUNTER
Telephone Check in at TCU:    Nursing staff report patient now requiring oxygen today. Had previously been weaned off oxygen last week. Patient reports increased SOB, also has cough. Nurse reports after discussion with patient that she has chronic cough- hard to determine if cough is worse than normal currently. With crackles to bases of lungs on assessment per nursing staff. Weight overall stable since admission. Nurse did not notice increased edema to BLE. Current set of VS 97.2, HR 70, /73, R 24, Oxygen 90% on 2L. Is currently on scheduled tylenol. Nurse does not believe that there are any other patients with respiratory issues in the facility currently. Nurse does feel overall she has declined since last week.    Patient recently treated for pneumonia/ COPD exacerbation and completed antibiotics (azithromycin on 3/10 and vantin on 3/13).     Discussed with Dr. Marinelli. Differential does include COVID 19, COPD exacerbation, pneumonia. It is possible that fever is masked by scheduled tylenol, or patient is mild symptoms. Currently limiting outside people from entering facilities- so no labs, xray at this time. Patient should be placed in droplet/contact isolation and ID nurse/ nurse manager at the facility should be aware. Discussed with nurse that if 2 patient's in the facility have respiratory symptoms that [facility] needs to be contacted to investigate. Discontinue nebulizer with concern this could be COVID. Encouraged use of albuterol inhaler PRN. Will also treat for COPD exacerbation. Start prednisone 40 mg po daily x5 days. Start doxycycline 200 mg po daily x5 days. Continue to monitor patient and update provider with worsening. Orders communicated to nursing staff at TCU.

## 2020-04-01 ENCOUNTER — VIRTUAL VISIT (OUTPATIENT)
Dept: GERIATRICS | Facility: CLINIC | Age: 85
End: 2020-04-01
Payer: COMMERCIAL

## 2020-04-01 VITALS
TEMPERATURE: 98.2 F | OXYGEN SATURATION: 90 % | SYSTOLIC BLOOD PRESSURE: 131 MMHG | WEIGHT: 230 LBS | BODY MASS INDEX: 37.12 KG/M2 | HEART RATE: 70 BPM | RESPIRATION RATE: 24 BRPM | DIASTOLIC BLOOD PRESSURE: 73 MMHG

## 2020-04-01 DIAGNOSIS — E78.5 HYPERLIPIDEMIA LDL GOAL <130: ICD-10-CM

## 2020-04-01 DIAGNOSIS — J96.01 ACUTE RESPIRATORY FAILURE WITH HYPOXIA (H): Primary | ICD-10-CM

## 2020-04-01 DIAGNOSIS — E03.9 HYPOTHYROIDISM, UNSPECIFIED TYPE: ICD-10-CM

## 2020-04-01 DIAGNOSIS — R91.1 NODULE OF LEFT LUNG: ICD-10-CM

## 2020-04-01 DIAGNOSIS — G89.29 OTHER CHRONIC PAIN: ICD-10-CM

## 2020-04-01 DIAGNOSIS — J44.1 COPD EXACERBATION (H): ICD-10-CM

## 2020-04-01 DIAGNOSIS — R53.81 PHYSICAL DECONDITIONING: ICD-10-CM

## 2020-04-01 PROCEDURE — 99309 SBSQ NF CARE MODERATE MDM 30: CPT | Mod: GT | Performed by: NURSE PRACTITIONER

## 2020-04-01 NOTE — LETTER
"    4/1/2020        RE: Marino Prajapati  [address] [facility] [id number]        [facility] E-VISIT   Marino Prajapati is being evaluated via a billable video visit due to the restrictions of the Covid-19 pandemic.   The patient has been notified of following:  \"This video visit will be conducted via a call between you and your provider. We have found that certain health care needs can be provided without the need for an in-person physical exam.  This service lets us provide the care you need with a video conversation. If during the course of the call the provider feels a video visit is not appropriate, you will not be charged for this service.\"   The provider has received verbal consent for a Video Visit from the patient or first contact? Yes  Patient  or facility staff would like the video invitation sent by: N/A   Video Start Time: 1:56PM  [place] Medical Record Number:  [id number]  Place of Location at the time of visit: [facility] -TCU  Chief Complaint   Patient presents with     Nursing Home Acute     HPI:  Marino Prajapati  is a 84 year old (1935), who is being seen today for an E-visit.  HPI information obtained from: facility chart records, facility staff, patient report and [facility] chart review.     Brief Summary of Hospital Course: Marino Prajapati is an 84 year old female with PMH significant for asthma/ COPD, HTN, hyperlipidemia, GERD, and hypothyroidism who presented to the hospital with increased weakness and worsening cough. She was admitted to [facility] from 3/6/17-3/10/17 after being found to have RLL pneumonia and acute hypoxic respiratory failure. May also have possible COPD exacerbation. Is requiring oxygen supplementation at discharge. She will complete antibiotics and prednisone course at TCU. Prior to discharge BPs were variably controlled due to starting of prednisone. On chest CT it was incidentally noted that she " had left major fissure nodule- per radiology is almost certainly benign and does not require dedicated CT follow up. She was discharged to TCU for physical rehabilitation and medication management.   While at TCU overall has been improving. Was weaned off oxygen last week per nursing staff and cough was improving. Was started on prednisone, doxycyline for suspected COPD exacerbation 3/31- first dose of prednisone 4/1. However based on symptoms COVID is also in differential.     Today's concern is:  Marino was seen today via virtual visit for episodic follow up at TCU. Nursing staff report that Marino has now been requiring oxygen again. Today she is on 3L of oxygen via NC and O2 saturations are 95%. Marino reports she woke up this morning with SOB- reports her breathing was bad, she was sweating all over and reports some pain under left ribs (which she tells me was due to being hunched over when she woke up.) She sleeps in a recliner at home and has difficulty sleeping in the bed because she falls into positions that make it difficult for her to breathe and are uncomfortable. She reports the pain under her left rib went away with repositioning. At time of visit this afternoon she tells me her breathing is much better. She has a lot of mucus in her chest and has been able to cough some out today. Reports cough is also overall better- but she does state that she has a chronic cough. She remains afebrile. Has chronic edema to her legs that nursing staff shows me is pitting with right >left. Marino is unable to tell me if this is at her baseline. Her weight is stable from admission. SBP generally 130s-140s. HR generally 70-80s.    Past Medical and Surgical History reviewed in Epic today.  MEDICATIONS:  Current Outpatient Medications   Medication Sig Dispense Refill     acetaminophen (TYLENOL) 325 MG tablet Take 650 mg by mouth 2 times daily At 0800 and 1400       acetaminophen (TYLENOL) 325 MG tablet Take 325 mg by mouth At  Bedtime       albuterol (PROAIR HFA/PROVENTIL HFA/VENTOLIN HFA) 108 (90 Base) MCG/ACT inhaler Inhale 2 puffs into the lungs every 6 hours as needed for shortness of breath / dyspnea or wheezing 1 Inhaler 3     amLODIPine (NORVASC) 10 MG tablet Take 0.5 tablets (5 mg) by mouth daily 90 tablet 0     atenolol (TENORMIN) 100 MG tablet Take 1 tablet (100 mg) by mouth daily 90 tablet 0     benzonatate (TESSALON) 100 MG capsule Take 1 capsule (100 mg) by mouth 3 times daily as needed for cough       budesonide-formoterol (SYMBICORT) 160-4.5 MCG/ACT Inhaler Inhale 2 puffs into the lungs 2 times daily 3 Inhaler 1     cholecalciferol (VITAMIN D) 1000 UNIT tablet Take 1,000 Units by mouth daily  100 tablet 3     doxycycline hyclate (VIBRAMYCIN) 100 MG capsule Take 2 capsules (200 mg) by mouth daily       furosemide (LASIX) 40 MG tablet Take 1 tablet (40 mg) by mouth daily 90 tablet 0     guaiFENesin-codeine (ROBITUSSIN AC) 100-10 MG/5ML solution Take 5 mLs by mouth every 4 hours as needed for cough 100 mL 0     levothyroxine (SYNTHROID/LEVOTHROID) 50 MCG tablet TAKE 1 TABLET DAILY (ONE TIME REFILL ONLY, NEED TO BE SEEN BECAUSE IT HAS BEEN ONE YEAR SINCE LAST VISIT) 90 tablet 0     losartan (COZAAR) 100 MG tablet Take 1 tablet (100 mg) by mouth daily 90 tablet 0     nystatin (MYCOSTATIN) 062573 UNIT/GM external powder Apply topically 2 times daily as needed (skin rash) 60 g 3     order for DME Equipment being ordered: 4 wheeled walker with hand brakes and seat 1 each 0     order for DME Equipment being ordered: 1: Custom/alternative BLE full leg velco/buckling compression garment 1 each 0     order for DME Equipment being ordered: 1: Gradient Compression Wraps; 2: BLE knee high 20-30 mm Hg compression stockings; 3: BLE thigh high 20-30 mm Hg compression stockings; 4: Cast Boots 1 each 0     order for DME Equipment being ordered: Nebulizer and neb supplies (tubing, etc) 1 Device 0     potassium chloride ER (MICRO-K) 10 MEQ CR  capsule Take 10 mEq by mouth daily       pravastatin (PRAVACHOL) 20 MG tablet TAKE 1 TABLET DAILY (ONE TIME ONLY, NEED TO BE SEEN BECAUSE IT HAS BEEN ONE YEAR SINCE LAST VISIT) 90 tablet 0     predniSONE (DELTASONE) 20 MG tablet Take 2 tablets (40 mg) by mouth daily       terazosin (HYTRIN) 2 MG capsule TAKE 1 CAPSULE AT BEDTIME 90 capsule 0     tiotropium (SPIRIVA HANDIHALER) 18 MCG inhaled capsule Inhale contents of one capsule daily. 90 capsule 0     traMADol (ULTRAM) 50 MG tablet Take 1 tablet (50 mg) by mouth 2 times daily 60 tablet 0     REVIEW OF SYSTEMS: 4 point ROS including Respiratory, CV, GI and , other than that noted in the HPI,  is negative  Objective: /73   Pulse 70   Temp 98.2  F (36.8  C)   Resp 24   Wt 104.3 kg (230 lb)   SpO2 90%   BMI 37.12 kg/m     E-visit exam done given COVID-19 precautions.   GENERAL APPEARANCE:  Alert, pleasant and cooperative, elderly female sitting in wheelchair on exam  HEENT: normocephalic, conjunctivae, lids, pupils and irises normal, moist mucous membranes, nose without drainage or crusting  RESP:  respiratory effort normal, no respiratory distress, patient is on 3L of oxygen via NC  CV:  Pitting edema to BLE- right >left  PSYCH: affect and mood normal    Labs:   Labs done in SNF are in [facility] EPIC. Please refer to them using [employer]Care Everywhere. and Recent labs in EPIC reviewed by me today.     ASSESSMENT/PLAN:  (J96.01) Acute respiratory failure with hypoxia (H)  (primary encounter diagnosis)  Comment: acute, was weaned off oxygen last week, but started to need it again earlier this week. Suspect COPD exacerbation, COVID is also in differential- however there are no confirmed cases of COVID in the facility. NOT AT GOAL   Patient does remain afebrile, however could be masked by scheduled tylenol  Plan: Continue to wean oxygen to keep saturations >88%. Continue antibiotics, prednisone, inhalers as below. If patient's symptoms worsened on prednisone  will plan to discontinue as it is not recommended in patient's with COVID, but based on symptoms she likely is having COPD exacerbation (and no confirmed cases of COVID onsite). Continue to monitor- nursing to update with worsening of symptoms or concerns.     (J44.1) Chronic obstructive pulmonary disease with acute exacerbation (H)  Comment: acute on chronic, with increased mucus, with chronic cough, SOB- now needing oxygen again- NOT AT GOAL  Plan: Continue prednisone for total of 5 day course. No nebs due to possible differential of COVID. Continue PTA budesonide-formorterol, Spiriva, albuterol PRN. Continue tessalon PRN, robitussin PRN for cough.     (E03.9) Hypothyroidism, unspecified type  Comment: Chronic, at goal at last check  TSH   Date Value Ref Range Status   01/09/2020 4.78 (H) 0.40 - 4.00 mU/L Final     Plan: Continue PTA levothyroxine.      (E78.5) Hyperlipidemia LDL goal <130  Comment: Chronic  Plan: Continue PTA pravastatin.      (G89.29) Other chronic pain  Comment: Reports pain at ribs today due to positioning   Plan: Continue PTA tramadol 50 mg po BID and scheduled tylenol. Monitor s/sx of pain.      (R91.1) Nodule of left lung  Comment: incidental finding on chest CT- per radiology is almost certainly benign and does not require dedicated CT follow up.  Plan: Follow up with PCP     (R53.81) Physical deconditioning  Comment: Acute, secondary to recent hospitalization, medical conditions as above  Plan: Encourage participation in physical therapy/occupational therapy for strengthening and deconditioning. Discharge planning per their recommendation. Social work to assist with d/c planning.    (J18.1) Pneumonia of right lower lobe due to infectious organism (H)-resolved  Comment: Acute, resolved  -completed azithromycin and vantin at TCU   Plan: Monitor respiratory symptoms.    Electronically signed by:  CINTHIA Villareal CNP   Video-Visit Details  Type of service:  Video Visit  Video End Time  (time video stopped): 2:05 PM  Distant Location (provider location):  [facility]           Sincerely,        CINTHIA Villareal CNP

## 2020-04-01 NOTE — PROGRESS NOTES
"[place] E-VISIT   Marino Prajapati is being evaluated via a billable video visit due to the restrictions of the Covid-19 pandemic.   The patient has been notified of following:  \"This video visit will be conducted via a call between you and your provider. We have found that certain health care needs can be provided without the need for an in-person physical exam.  This service lets us provide the care you need with a video conversation. If during the course of the call the provider feels a video visit is not appropriate, you will not be charged for this service.\"   The provider has received verbal consent for a Video Visit from the patient or first contact? Yes  Patient  or facility staff would like the video invitation sent by: N/A   Video Start Time: 1:56PM  [place] Medical Record Number:  [id number]  Place of Location at the time of visit: [facility] -TCU  Chief Complaint   Patient presents with     Nursing Home Acute     HPI:  Marino Prajapati  is a 84 year old (1935), who is being seen today for an E-visit.  HPI information obtained from: facility chart records, facility staff, patient report and [facility] chart review.     Brief Summary of Hospital Course: Marino Prajapati is an 84 year old female with PMH significant for asthma/ COPD, HTN, hyperlipidemia, GERD, and hypothyroidism who presented to the hospital with increased weakness and worsening cough. She was admitted to [facility] from 3/6/17-3/10/17 after being found to have RLL pneumonia and acute hypoxic respiratory failure. May also have possible COPD exacerbation. Is requiring oxygen supplementation at discharge. She will complete antibiotics and prednisone course at TCU. Prior to discharge BPs were variably controlled due to starting of prednisone. On chest CT it was incidentally noted that she had left major fissure nodule- per radiology is almost certainly benign and does not require " dedicated CT follow up. She was discharged to TCU for physical rehabilitation and medication management.   While at TCU overall has been improving. Was weaned off oxygen last week per nursing staff and cough was improving. Was started on prednisone, doxycyline for suspected COPD exacerbation 3/31- first dose of prednisone 4/1. However based on symptoms COVID is also in differential.     Today's concern is:  Marino was seen today via virtual visit for episodic follow up at TCU. Nursing staff report that Marino has now been requiring oxygen again. Today she is on 3L of oxygen via NC and O2 saturations are 95%. Marino reports she woke up this morning with SOB- reports her breathing was bad, she was sweating all over and reports some pain under left ribs (which she tells me was due to being hunched over when she woke up.) She sleeps in a recliner at home and has difficulty sleeping in the bed because she falls into positions that make it difficult for her to breathe and are uncomfortable. She reports the pain under her left rib went away with repositioning. At time of visit this afternoon she tells me her breathing is much better. She has a lot of mucus in her chest and has been able to cough some out today. Reports cough is also overall better- but she does state that she has a chronic cough. She remains afebrile. Has chronic edema to her legs that nursing staff shows me is pitting with right >left. Marino is unable to tell me if this is at her baseline. Her weight is stable from admission. SBP generally 130s-140s. HR generally 70-80s.    Past Medical and Surgical History reviewed in Epic today.  MEDICATIONS:  Current Outpatient Medications   Medication Sig Dispense Refill     acetaminophen (TYLENOL) 325 MG tablet Take 650 mg by mouth 2 times daily At 0800 and 1400       acetaminophen (TYLENOL) 325 MG tablet Take 325 mg by mouth At Bedtime       albuterol (PROAIR HFA/PROVENTIL HFA/VENTOLIN HFA) 108 (90 Base) MCG/ACT inhaler  Inhale 2 puffs into the lungs every 6 hours as needed for shortness of breath / dyspnea or wheezing 1 Inhaler 3     amLODIPine (NORVASC) 10 MG tablet Take 0.5 tablets (5 mg) by mouth daily 90 tablet 0     atenolol (TENORMIN) 100 MG tablet Take 1 tablet (100 mg) by mouth daily 90 tablet 0     benzonatate (TESSALON) 100 MG capsule Take 1 capsule (100 mg) by mouth 3 times daily as needed for cough       budesonide-formoterol (SYMBICORT) 160-4.5 MCG/ACT Inhaler Inhale 2 puffs into the lungs 2 times daily 3 Inhaler 1     cholecalciferol (VITAMIN D) 1000 UNIT tablet Take 1,000 Units by mouth daily  100 tablet 3     doxycycline hyclate (VIBRAMYCIN) 100 MG capsule Take 2 capsules (200 mg) by mouth daily       furosemide (LASIX) 40 MG tablet Take 1 tablet (40 mg) by mouth daily 90 tablet 0     guaiFENesin-codeine (ROBITUSSIN AC) 100-10 MG/5ML solution Take 5 mLs by mouth every 4 hours as needed for cough 100 mL 0     levothyroxine (SYNTHROID/LEVOTHROID) 50 MCG tablet TAKE 1 TABLET DAILY (ONE TIME REFILL ONLY, NEED TO BE SEEN BECAUSE IT HAS BEEN ONE YEAR SINCE LAST VISIT) 90 tablet 0     losartan (COZAAR) 100 MG tablet Take 1 tablet (100 mg) by mouth daily 90 tablet 0     nystatin (MYCOSTATIN) 091580 UNIT/GM external powder Apply topically 2 times daily as needed (skin rash) 60 g 3     order for DME Equipment being ordered: 4 wheeled walker with hand brakes and seat 1 each 0     order for DME Equipment being ordered: 1: Custom/alternative BLE full leg velco/buckling compression garment 1 each 0     order for DME Equipment being ordered: 1: Gradient Compression Wraps; 2: BLE knee high 20-30 mm Hg compression stockings; 3: BLE thigh high 20-30 mm Hg compression stockings; 4: Cast Boots 1 each 0     order for DME Equipment being ordered: Nebulizer and neb supplies (tubing, etc) 1 Device 0     potassium chloride ER (MICRO-K) 10 MEQ CR capsule Take 10 mEq by mouth daily       pravastatin (PRAVACHOL) 20 MG tablet TAKE 1 TABLET  DAILY (ONE TIME ONLY, NEED TO BE SEEN BECAUSE IT HAS BEEN ONE YEAR SINCE LAST VISIT) 90 tablet 0     predniSONE (DELTASONE) 20 MG tablet Take 2 tablets (40 mg) by mouth daily       terazosin (HYTRIN) 2 MG capsule TAKE 1 CAPSULE AT BEDTIME 90 capsule 0     tiotropium (SPIRIVA HANDIHALER) 18 MCG inhaled capsule Inhale contents of one capsule daily. 90 capsule 0     traMADol (ULTRAM) 50 MG tablet Take 1 tablet (50 mg) by mouth 2 times daily 60 tablet 0     REVIEW OF SYSTEMS: 4 point ROS including Respiratory, CV, GI and , other than that noted in the HPI,  is negative  Objective: /73   Pulse 70   Temp 98.2  F (36.8  C)   Resp 24   Wt 104.3 kg (230 lb)   SpO2 90%   BMI 37.12 kg/m     E-visit exam done given COVID-19 precautions.   GENERAL APPEARANCE:  Alert, pleasant and cooperative, elderly female sitting in wheelchair on exam  HEENT: normocephalic, conjunctivae, lids, pupils and irises normal, moist mucous membranes, nose without drainage or crusting  RESP:  respiratory effort normal, no respiratory distress, patient is on 3L of oxygen via NC  CV:  Pitting edema to BLE- right >left  PSYCH: affect and mood normal    Labs:   Labs done in SNF are in [place] EPIC. Please refer to them using [employer]Care Everywhere. and Recent labs in EPIC reviewed by me today.     ASSESSMENT/PLAN:  (J96.01) Acute respiratory failure with hypoxia (H)  (primary encounter diagnosis)  Comment: acute, was weaned off oxygen last week, but started to need it again earlier this week. Suspect COPD exacerbation, COVID is also in differential- however there are no confirmed cases of COVID in the facility. NOT AT GOAL   Patient does remain afebrile, however could be masked by scheduled tylenol  Plan: Continue to wean oxygen to keep saturations >88%. Continue antibiotics, prednisone, inhalers as below. If patient's symptoms worsened on prednisone will plan to discontinue as it is not recommended in patient's with COVID, but based on  symptoms she likely is having COPD exacerbation (and no confirmed cases of COVID onsite). Continue to monitor- nursing to update with worsening of symptoms or concerns.     (J44.1) Chronic obstructive pulmonary disease with acute exacerbation (H)  Comment: acute on chronic, with increased mucus, with chronic cough, SOB- now needing oxygen again- NOT AT GOAL  Plan: Continue prednisone for total of 5 day course. No nebs due to possible differential of COVID. Continue PTA budesonide-formorterol, Spiriva, albuterol PRN. Continue tessalon PRN, robitussin PRN for cough.     (E03.9) Hypothyroidism, unspecified type  Comment: Chronic, at goal at last check  TSH   Date Value Ref Range Status   01/09/2020 4.78 (H) 0.40 - 4.00 mU/L Final     Plan: Continue PTA levothyroxine.      (E78.5) Hyperlipidemia LDL goal <130  Comment: Chronic  Plan: Continue PTA pravastatin.      (G89.29) Other chronic pain  Comment: Reports pain at ribs today due to positioning   Plan: Continue PTA tramadol 50 mg po BID and scheduled tylenol. Monitor s/sx of pain.      (R91.1) Nodule of left lung  Comment: incidental finding on chest CT- per radiology is almost certainly benign and does not require dedicated CT follow up.  Plan: Follow up with PCP     (R53.81) Physical deconditioning  Comment: Acute, secondary to recent hospitalization, medical conditions as above  Plan: Encourage participation in physical therapy/occupational therapy for strengthening and deconditioning. Discharge planning per their recommendation. Social work to assist with d/c planning.    (J18.1) Pneumonia of right lower lobe due to infectious organism (H)-resolved  Comment: Acute, resolved  -completed azithromycin and vantin at TCU   Plan: Monitor respiratory symptoms.    Electronically signed by:  CINTHIA Villareal CNP   Video-Visit Details  Type of service:  Video Visit  Video End Time (time video stopped): 2:05 PM  Distant Location (provider location):  [facility]  SERVICES

## 2020-04-02 ENCOUNTER — TELEPHONE (OUTPATIENT)
Dept: GERIATRICS | Facility: CLINIC | Age: 85
End: 2020-04-02

## 2020-04-06 ENCOUNTER — VIRTUAL VISIT (OUTPATIENT)
Dept: GERIATRICS | Facility: CLINIC | Age: 85
End: 2020-04-06
Payer: COMMERCIAL

## 2020-04-06 VITALS
OXYGEN SATURATION: 91 % | HEART RATE: 73 BPM | WEIGHT: 231 LBS | DIASTOLIC BLOOD PRESSURE: 77 MMHG | TEMPERATURE: 97.3 F | RESPIRATION RATE: 22 BRPM | BODY MASS INDEX: 37.28 KG/M2 | SYSTOLIC BLOOD PRESSURE: 159 MMHG

## 2020-04-06 DIAGNOSIS — G89.29 OTHER CHRONIC PAIN: ICD-10-CM

## 2020-04-06 DIAGNOSIS — J44.9 CHRONIC OBSTRUCTIVE PULMONARY DISEASE, UNSPECIFIED COPD TYPE (H): Primary | ICD-10-CM

## 2020-04-06 DIAGNOSIS — I10 BENIGN ESSENTIAL HYPERTENSION: ICD-10-CM

## 2020-04-06 DIAGNOSIS — R23.8 SKIN IRRITATION: ICD-10-CM

## 2020-04-06 PROCEDURE — 99309 SBSQ NF CARE MODERATE MDM 30: CPT | Mod: 95 | Performed by: NURSE PRACTITIONER

## 2020-04-06 RX ORDER — LIDOCAINE 4 G/G
1 PATCH TOPICAL EVERY 24 HOURS
COMMUNITY
End: 2021-03-11

## 2020-04-06 NOTE — LETTER
"    4/6/2020        RE: Marino Prajapati  [address] [facility] [id number]        [facility]   Marino Prajapati is being evaluated via a billable video visit due to the restrictions of the Covid-19 pandemic.   The patient has been notified of following:  \"This video visit will be conducted via a call between you and your provider. We have found that certain health care needs can be provided without the need for an in-person physical exam.  This service lets us provide the care you need with a video conversation. If during the course of the call the provider feels a video visit is not appropriate, you will not be charged for this service.\"   The provider has received verbal consent for a Video Visit from the patient or first contact? Yes  Patient  or facility staff would like the video invitation sent by: N/A   Video Start Time: 0839  [place] Medical Record Number:  [id number]  Place of Location at the time of visit: [facility]   Chief Complaint   Patient presents with     Nursing Home Acute     HPI:  Marino Prajapati  is a 84 year old (1935), who is being seen today for a visit.  HPI information obtained from: facility chart records, facility staff, patient report and [facility] chart review. Today's concern is:    Chronic Obstructive Pulmonary Disease. Started on Prednisone for presumed COPD exacerbation on 4/1/20. Since that time, patient reports improvement in breathing. Felt good this morning. Sat up in bed and \"a ton of stuff drained out\". Has been coughing intermittently. Is wondering when she'll return home.     Chronic Pain. Denies rib pain. Complains of right shoulder pain which is improved with patch to right shoulder.     Hypertension. Upon review of blood pressure over the past 5 days, systolic range from 133-165, most < 150. Diastolic range from 68-89.     Skin Irritation. Over the weekend, noted 3 small patchy areas on upper lip. " Started over the weekend. Questioned if it was related to an inhaler she was using. Tried Vasoline, but it was uncomfortable. Facility was ordering chapstick for her, but she hasn't received it. No pain or itching. No drainage.    Past Medical and Surgical History reviewed in Epic today.  MEDICATIONS:  Current Outpatient Medications   Medication Sig Dispense Refill     acetaminophen (TYLENOL) 325 MG tablet Take 650 mg by mouth 2 times daily At 0800 and 1400       acetaminophen (TYLENOL) 325 MG tablet Take 325 mg by mouth At Bedtime       albuterol (PROAIR HFA/PROVENTIL HFA/VENTOLIN HFA) 108 (90 Base) MCG/ACT inhaler Inhale 2 puffs into the lungs every 6 hours as needed for shortness of breath / dyspnea or wheezing 1 Inhaler 3     amLODIPine (NORVASC) 10 MG tablet Take 0.5 tablets (5 mg) by mouth daily 90 tablet 0     atenolol (TENORMIN) 100 MG tablet Take 1 tablet (100 mg) by mouth daily 90 tablet 0     benzonatate (TESSALON) 100 MG capsule Take 1 capsule (100 mg) by mouth 3 times daily as needed for cough       budesonide-formoterol (SYMBICORT) 160-4.5 MCG/ACT Inhaler Inhale 2 puffs into the lungs 2 times daily 3 Inhaler 1     cholecalciferol (VITAMIN D) 1000 UNIT tablet Take 1,000 Units by mouth daily  100 tablet 3     furosemide (LASIX) 40 MG tablet Take 1 tablet (40 mg) by mouth daily 90 tablet 0     guaiFENesin-codeine (ROBITUSSIN AC) 100-10 MG/5ML solution Take 5 mLs by mouth every 4 hours as needed for cough 100 mL 0     levothyroxine (SYNTHROID/LEVOTHROID) 50 MCG tablet TAKE 1 TABLET DAILY (ONE TIME REFILL ONLY, NEED TO BE SEEN BECAUSE IT HAS BEEN ONE YEAR SINCE LAST VISIT) 90 tablet 0     Lidocaine (LIDOCARE) 4 % Patch Place 1 patch onto the skin every 24 hours To prevent lidocaine toxicity, patient should be patch free for 12 hrs daily.       losartan (COZAAR) 100 MG tablet Take 1 tablet (100 mg) by mouth daily 90 tablet 0     nystatin (MYCOSTATIN) 018313 UNIT/GM external powder Apply topically 2 times  daily as needed (skin rash) 60 g 3     potassium chloride ER (MICRO-K) 10 MEQ CR capsule Take 10 mEq by mouth daily       pravastatin (PRAVACHOL) 20 MG tablet TAKE 1 TABLET DAILY (ONE TIME ONLY, NEED TO BE SEEN BECAUSE IT HAS BEEN ONE YEAR SINCE LAST VISIT) 90 tablet 0     terazosin (HYTRIN) 2 MG capsule TAKE 1 CAPSULE AT BEDTIME 90 capsule 0     tiotropium (SPIRIVA HANDIHALER) 18 MCG inhaled capsule Inhale contents of one capsule daily. 90 capsule 0     traMADol (ULTRAM) 50 MG tablet Take 1 tablet (50 mg) by mouth 2 times daily 60 tablet 0     order for DME Equipment being ordered: 4 wheeled walker with hand brakes and seat 1 each 0     order for DME Equipment being ordered: 1: Custom/alternative BLE full leg velco/buckling compression garment 1 each 0     order for DME Equipment being ordered: 1: Gradient Compression Wraps; 2: BLE knee high 20-30 mm Hg compression stockings; 3: BLE thigh high 20-30 mm Hg compression stockings; 4: Cast Boots 1 each 0     order for DME Equipment being ordered: Nebulizer and neb supplies (tubing, etc) 1 Device 0     REVIEW OF SYSTEMS: 4 point ROS including Respiratory, CV, GI and , other than that noted in the HPI,  is negative  Objective: BP (!) 159/77   Pulse 73   Temp 97.3  F (36.3  C)   Resp 22   Wt 104.8 kg (231 lb)   SpO2 91%   BMI 37.28 kg/m    Limited visit exam done given COVID-19 precautions.   GENERAL APPEARANCE:  Alert, in no distress  ENT:  Mouth and posterior oropharynx normal, moist mucous membranes  EYES:  EOM, conjunctivae, lids, pupils and irises normal  RESP:  no respiratory distress  M/S:   Active movement of bilateral upper extremities.  SKIN:  Small scabs present on upper lip. Difficult to visualize on telephone screen  PSYCH:  oriented to person and place  Labs:   Labs done in SNF are in [place] EPIC. Please refer to them using [employer] Everywhere.    ASSESSMENT/PLAN:  Chronic Obstructive Pulmonary Disease. With recent hospitalization for a COPD  exacerbation and pneumonia on 3/6/20 through 3/10/20. Weaned off Oxygen initially during TCU stay, but recently began to use again. Started on Prednisone for a presumed COPD exacerbation on 4/1/20 through 4/5/20 and Doxycycline 3/31/20 through 4/4/20. Since that time, patient reports improvement. Wean Oxygen to maintain SpO2 > 88%. Continue Albuterol, Benzonatate, Budesonide-Formoterol, Guaifenesin with Codeine and Tiotropium as ordered.     Chronic Pain. Stable on Tramadol and Lidocaine Patch to right shoulder as ordered.    Hypertension. Most sbp < 150, but monitor closely to determine if adjustment in medication is indicated. Daily blood pressure. Continue Amlodipine, Losartan and Atenolol as ordered. Also on Terazosin.     Skin Irritation. Difficult to visualize on screen, but may be dry skin from inhalers and covering mouth with kleenex. Continue some sort of lubricant, such as chapstick facility is ordered. Continue to monitor. Update if symptoms worsen.     Electronically signed by:  CINTHIA Davis CNP     Video-Visit Details  Type of service:  Video Visit  Video End Time (time video stopped): 846  Distant Location (provider location):  [facility]             Sincerely,        CINTHIA Davis CNP

## 2020-04-06 NOTE — PROGRESS NOTES
"[facility]   Marino Prajapati is being evaluated via a billable video visit due to the restrictions of the Covid-19 pandemic.   The patient has been notified of following:  \"This video visit will be conducted via a call between you and your provider. We have found that certain health care needs can be provided without the need for an in-person physical exam.  This service lets us provide the care you need with a video conversation. If during the course of the call the provider feels a video visit is not appropriate, you will not be charged for this service.\"   The provider has received verbal consent for a Video Visit from the patient or first contact? Yes  Patient  or facility staff would like the video invitation sent by: N/A   Video Start Time: 0839  [place] Medical Record Number:  [id number]  Place of Location at the time of visit: [facility]   Chief Complaint   Patient presents with     Nursing Home Acute     HPI:  Marino Prajapati  is a 84 year old (1935), who is being seen today for a visit.  HPI information obtained from: facility chart records, facility staff, patient report and [facility] Epic chart review. Today's concern is:    Chronic Obstructive Pulmonary Disease. Started on Prednisone for presumed COPD exacerbation on 4/1/20. Since that time, patient reports improvement in breathing. Felt good this morning. Sat up in bed and \"a ton of stuff drained out\". Has been coughing intermittently. Is wondering when she'll return home.     Chronic Pain. Denies rib pain. Complains of right shoulder pain which is improved with patch to right shoulder.     Hypertension. Upon review of blood pressure over the past 5 days, systolic range from 133-165, most < 150. Diastolic range from 68-89.     Skin Irritation. Over the weekend, noted 3 small patchy areas on upper lip. Started over the weekend. Questioned if it was related to an inhaler she was using. Tried " Vasoline, but it was uncomfortable. Facility was ordering chapstick for her, but she hasn't received it. No pain or itching. No drainage.    Past Medical and Surgical History reviewed in Epic today.  MEDICATIONS:  Current Outpatient Medications   Medication Sig Dispense Refill     acetaminophen (TYLENOL) 325 MG tablet Take 650 mg by mouth 2 times daily At 0800 and 1400       acetaminophen (TYLENOL) 325 MG tablet Take 325 mg by mouth At Bedtime       albuterol (PROAIR HFA/PROVENTIL HFA/VENTOLIN HFA) 108 (90 Base) MCG/ACT inhaler Inhale 2 puffs into the lungs every 6 hours as needed for shortness of breath / dyspnea or wheezing 1 Inhaler 3     amLODIPine (NORVASC) 10 MG tablet Take 0.5 tablets (5 mg) by mouth daily 90 tablet 0     atenolol (TENORMIN) 100 MG tablet Take 1 tablet (100 mg) by mouth daily 90 tablet 0     benzonatate (TESSALON) 100 MG capsule Take 1 capsule (100 mg) by mouth 3 times daily as needed for cough       budesonide-formoterol (SYMBICORT) 160-4.5 MCG/ACT Inhaler Inhale 2 puffs into the lungs 2 times daily 3 Inhaler 1     cholecalciferol (VITAMIN D) 1000 UNIT tablet Take 1,000 Units by mouth daily  100 tablet 3     furosemide (LASIX) 40 MG tablet Take 1 tablet (40 mg) by mouth daily 90 tablet 0     guaiFENesin-codeine (ROBITUSSIN AC) 100-10 MG/5ML solution Take 5 mLs by mouth every 4 hours as needed for cough 100 mL 0     levothyroxine (SYNTHROID/LEVOTHROID) 50 MCG tablet TAKE 1 TABLET DAILY (ONE TIME REFILL ONLY, NEED TO BE SEEN BECAUSE IT HAS BEEN ONE YEAR SINCE LAST VISIT) 90 tablet 0     Lidocaine (LIDOCARE) 4 % Patch Place 1 patch onto the skin every 24 hours To prevent lidocaine toxicity, patient should be patch free for 12 hrs daily.       losartan (COZAAR) 100 MG tablet Take 1 tablet (100 mg) by mouth daily 90 tablet 0     nystatin (MYCOSTATIN) 671007 UNIT/GM external powder Apply topically 2 times daily as needed (skin rash) 60 g 3     potassium chloride ER (MICRO-K) 10 MEQ CR capsule Take  10 mEq by mouth daily       pravastatin (PRAVACHOL) 20 MG tablet TAKE 1 TABLET DAILY (ONE TIME ONLY, NEED TO BE SEEN BECAUSE IT HAS BEEN ONE YEAR SINCE LAST VISIT) 90 tablet 0     terazosin (HYTRIN) 2 MG capsule TAKE 1 CAPSULE AT BEDTIME 90 capsule 0     tiotropium (SPIRIVA HANDIHALER) 18 MCG inhaled capsule Inhale contents of one capsule daily. 90 capsule 0     traMADol (ULTRAM) 50 MG tablet Take 1 tablet (50 mg) by mouth 2 times daily 60 tablet 0     order for DME Equipment being ordered: 4 wheeled walker with hand brakes and seat 1 each 0     order for DME Equipment being ordered: 1: Custom/alternative BLE full leg velco/buckling compression garment 1 each 0     order for DME Equipment being ordered: 1: Gradient Compression Wraps; 2: BLE knee high 20-30 mm Hg compression stockings; 3: BLE thigh high 20-30 mm Hg compression stockings; 4: Cast Boots 1 each 0     order for DME Equipment being ordered: Nebulizer and neb supplies (tubing, etc) 1 Device 0     REVIEW OF SYSTEMS: 4 point ROS including Respiratory, CV, GI and , other than that noted in the HPI,  is negative  Objective: BP (!) 159/77   Pulse 73   Temp 97.3  F (36.3  C)   Resp 22   Wt 104.8 kg (231 lb)   SpO2 91%   BMI 37.28 kg/m    Limited visit exam done given COVID-19 precautions.   GENERAL APPEARANCE:  Alert, in no distress  ENT:  Mouth and posterior oropharynx normal, moist mucous membranes  EYES:  EOM, conjunctivae, lids, pupils and irises normal  RESP:  no respiratory distress  M/S:   Active movement of bilateral upper extremities.  SKIN:  Small scabs present on upper lip. Difficult to visualize on telephone screen  PSYCH:  oriented to person and place  Labs:   Labs done in SNF are in [place] EPIC. Please refer to them using [employer]/Care Everywhere.    ASSESSMENT/PLAN:  Chronic Obstructive Pulmonary Disease. With recent hospitalization for a COPD exacerbation and pneumonia on 3/6/20 through 3/10/20. Weaned off Oxygen initially during TCU stay,  but recently began to use again. Started on Prednisone for a presumed COPD exacerbation on 4/1/20 through 4/5/20 and Doxycycline 3/31/20 through 4/4/20. Since that time, patient reports improvement. Wean Oxygen to maintain SpO2 > 88%. Continue Albuterol, Benzonatate, Budesonide-Formoterol, Guaifenesin with Codeine and Tiotropium as ordered.     Chronic Pain. Stable on Tramadol and Lidocaine Patch to right shoulder as ordered.    Hypertension. Most sbp < 150, but monitor closely to determine if adjustment in medication is indicated. Daily blood pressure. Continue Amlodipine, Losartan and Atenolol as ordered. Also on Terazosin.     Skin Irritation. Difficult to visualize on screen, but may be dry skin from inhalers and covering mouth with kleenex. Continue some sort of lubricant, such as chapstick facility is ordered. Continue to monitor. Update if symptoms worsen.     Electronically signed by:  CINTHIA Davis CNP     Video-Visit Details  Type of service:  Video Visit  Video End Time (time video stopped): 846  Distant Location (provider location):  [facility]

## 2020-04-09 ENCOUNTER — VIRTUAL VISIT (OUTPATIENT)
Dept: GERIATRICS | Facility: CLINIC | Age: 85
End: 2020-04-09
Payer: COMMERCIAL

## 2020-04-09 VITALS
HEIGHT: 66 IN | HEART RATE: 81 BPM | WEIGHT: 231 LBS | DIASTOLIC BLOOD PRESSURE: 61 MMHG | OXYGEN SATURATION: 92 % | RESPIRATION RATE: 18 BRPM | TEMPERATURE: 95.9 F | BODY MASS INDEX: 37.12 KG/M2 | SYSTOLIC BLOOD PRESSURE: 136 MMHG

## 2020-04-09 DIAGNOSIS — J18.9 PNEUMONIA DUE TO INFECTIOUS ORGANISM, UNSPECIFIED LATERALITY, UNSPECIFIED PART OF LUNG: ICD-10-CM

## 2020-04-09 DIAGNOSIS — J44.1 COPD EXACERBATION (H): ICD-10-CM

## 2020-04-09 DIAGNOSIS — I10 BENIGN ESSENTIAL HYPERTENSION: ICD-10-CM

## 2020-04-09 DIAGNOSIS — R53.81 PHYSICAL DECONDITIONING: ICD-10-CM

## 2020-04-09 DIAGNOSIS — E03.9 HYPOTHYROIDISM, UNSPECIFIED TYPE: ICD-10-CM

## 2020-04-09 DIAGNOSIS — G89.29 OTHER CHRONIC PAIN: ICD-10-CM

## 2020-04-09 DIAGNOSIS — J96.01 ACUTE RESPIRATORY FAILURE WITH HYPOXIA (H): Primary | ICD-10-CM

## 2020-04-09 DIAGNOSIS — R91.1 NODULE OF LEFT LUNG: ICD-10-CM

## 2020-04-09 PROCEDURE — 99316 NF DSCHRG MGMT 30 MIN+: CPT | Mod: GT | Performed by: NURSE PRACTITIONER

## 2020-04-09 PROCEDURE — 99207 ZZC CDG-CODE CATEGORY CHANGED: CPT | Performed by: NURSE PRACTITIONER

## 2020-04-09 RX ORDER — PREDNISONE 10 MG/1
10 TABLET ORAL DAILY
Start: 2020-04-09 | End: 2020-06-08

## 2020-04-09 RX ORDER — TRAMADOL HYDROCHLORIDE 50 MG/1
50 TABLET ORAL 2 TIMES DAILY
Qty: 14 TABLET | Refills: 0 | Status: SHIPPED | OUTPATIENT
Start: 2020-04-09 | End: 2020-04-09

## 2020-04-09 RX ORDER — TRAMADOL HYDROCHLORIDE 50 MG/1
50 TABLET ORAL 2 TIMES DAILY
Qty: 3 TABLET | Refills: 0 | Status: SHIPPED | OUTPATIENT
Start: 2020-04-09 | End: 2020-04-10

## 2020-04-09 ASSESSMENT — MIFFLIN-ST. JEOR: SCORE: 1524.56

## 2020-04-09 NOTE — PROGRESS NOTES
"[facility] DISCHARGE SUMMARY  Marino Prajapati is being evaluated via a billable video visit due to the restrictions of the Covid-19 pandemic.   The patient has been notified of following:  \"This video visit will be conducted via a call between you and a [facility] provider. We have found that certain health care needs can be provided without the need for an in-person physical exam.  This service lets us provide the care you need with a video conversation. If during the course of the call the provider feels a video visit is not appropriate, you will not be charged for this service.\"   The provider has received verbal consent for a Video Visit from the patient or family/first contact? Yes  Patient or /facility staff would like the video invitation sent by: N/A   Video Start Time: 8:34    PATIENT'S NAME: Marino Prajapati  YOB: 1935  MEDICAL RECORD NUMBER:  [id number]  Place of Location at the time of visit: [facility]   PRIMARY CARE PROVIDER AND CLINIC RESPONSIBLE AFTER TRANSFER:   Vivian Blue MD, [address] / [place] [address];   Transferring providers: Tana Seaman, CINTHIA QUISPE, Lexis Marinelli MD  Recent Hospitalization/ED: [facility] stay 3/6/20 through 3/10/20.   Date of SNF Admission: March / 10 / 2020  Date of SNF (anticipated) Discharge: April / 10 / 2020  Discharged to: with significant other to home  Cognitive Scores: not available  Physical Function: Ambulating 30 ft with FWW and transerring with minimal assist  DME: Wheelchair and Home Oxygen  CODE STATUS/ADVANCE DIRECTIVES DISCUSSION:  DNR / DNI   ALLERGIES: Patient has no known allergies.    DISCHARGE DIAGNOSIS/NURSING FACILITY COURSE:     Patient is a 84 year old female with PMH significant for COPD< HTN, HLD, GERD, hypothyroidism.  She presented to the hospital with increased weakness and cough.  She was noted to have RLL pneumonia with acute " hypoxic respiratory failure, possible COPD exacerbation.  She was initially weaned off O2 in the TCU, but had worsening cough and shortness of breath 4/1. She was treated for  repeat COPD exacerbation (although COVID was also in the differential) 4/1-4/5 with prednisone and doxycyline and was improving but has continued to require O2.  I was informed that she needed increased O2 past couple of days up to 4L to maintain her sats >88% (previously on 2L).  She was scheduled for an acute visit today to assess increased O2 needs, however, has informed the staff and SW at the facility that she is leaving tomorrow with her S.O.  We did discuss safety concerns and encouraged her to wait until Monday so could further monitor and assess her breathing, but she is insistent to go home tomorrow.    She is denying chest pain, shortness of breath, increased cough today.  No recent increase in weight and she denies orthopnea.  She is anxious to leave    (J96.01) Acute respiratory failure with hypoxia (H)  (primary encounter diagnosis)  (J44.1) COPD exacerbation (H)  Comment: past 2 days has required increased O2 needs from 2L to 4L  -most recent hospitalization for pneumonia and respiratory failure  -treated for suspected COPD exacerbation 4/1-4/5 with doxycycline and prednisone  -denies increased cough or sputum today, afebrile. Encouraged patient to remain in TCU over the weekend to further assess her respiratory status and attempt to wean O2, she states she is leaving tomorrow   Plan: continue current inhalers: Spiriva, budesonide-formorterol, prn albuteral  -prn tessalon and robitussin for cough  -will send home on prednisone 10mg po every day: PCP to assess when to wean off of, appears she has been having frequent exacerbations.  I am concerned for repeat hospitalization   -O2 face to face completed, home care nursing to assist with attempt of taper   -home care nursing    (R51.33) Physical deconditioning  Comment: due to  medical condition, hospitalizations  -currently ambulating very short distance with 4ww  -does no have ramp into her home, recommended this be in place prior to discharge.  Patient states she plans to have her S.O and his son assist her into home.  SW and myself did express our safety concerns today, patient plans to leave tomorrow   Plan: home care therapy for safety and strength training  -home care SW    (I10) Benign essential hypertension  Comment: per history, adequate control  Plan: continue current POC and monitor BP    (G89.29) Other chronic pain  Comment: chronic pain, states pain ok today  -previously has been on Tramadol and APAP scheduled      (E03.9) Hypothyroidism, unspecified type  Comment: chronic, at goal  Lab Results   Component Value Date    TSH 4.78 01/09/2020       Plan: continue synthroid  -annual TSH per PCP    (R91.1) Nodule of left lung  Comment: incidental finding on chest CT inpatient  -per radiology is almost certainly benign and does not require dedicated CT f/u  Plan: f/u with PCP    Past Medical History:  has a past medical history of Anemia, CARDIOVASCULAR SCREENING; LDL GOAL LESS THAN 160, Colon polyps (2010), DVT (deep venous thrombosis) (H) (2007), Gastro-oesophageal reflux disease, HTN, goal below 140/90, Hyperlipidemia LDL goal <130 (1/22/2016), Kidney stone, Osteoarthritis, Osteoporosis, Postnasal drip, S/P total knee arthroplasty, and Thrombosis of leg. She also has no past medical history of Chronic infection, Malignant hyperthermia, or Sleep apnea.  Discharge Medications:    Current Outpatient Medications   Medication Sig Dispense Refill     acetaminophen (TYLENOL) 325 MG tablet Take 650 mg by mouth 2 times daily At 0800 and 1400       acetaminophen (TYLENOL) 325 MG tablet Take 325 mg by mouth At Bedtime       albuterol (PROAIR HFA/PROVENTIL HFA/VENTOLIN HFA) 108 (90 Base) MCG/ACT inhaler Inhale 2 puffs into the lungs every 6 hours as needed for shortness of breath / dyspnea  or wheezing 1 Inhaler 3     amLODIPine (NORVASC) 10 MG tablet Take 0.5 tablets (5 mg) by mouth daily 90 tablet 0     atenolol (TENORMIN) 100 MG tablet Take 1 tablet (100 mg) by mouth daily 90 tablet 0     benzonatate (TESSALON) 100 MG capsule Take 1 capsule (100 mg) by mouth 3 times daily as needed for cough       budesonide-formoterol (SYMBICORT) 160-4.5 MCG/ACT Inhaler Inhale 2 puffs into the lungs 2 times daily 3 Inhaler 1     cholecalciferol (VITAMIN D) 1000 UNIT tablet Take 1,000 Units by mouth daily  100 tablet 3     furosemide (LASIX) 40 MG tablet Take 1 tablet (40 mg) by mouth daily 90 tablet 0     guaiFENesin-codeine (ROBITUSSIN AC) 100-10 MG/5ML solution Take 5 mLs by mouth every 4 hours as needed for cough 100 mL 0     levothyroxine (SYNTHROID/LEVOTHROID) 50 MCG tablet TAKE 1 TABLET DAILY (ONE TIME REFILL ONLY, NEED TO BE SEEN BECAUSE IT HAS BEEN ONE YEAR SINCE LAST VISIT) 90 tablet 0     Lidocaine (LIDOCARE) 4 % Patch Place 1 patch onto the skin every 24 hours To prevent lidocaine toxicity, patient should be patch free for 12 hrs daily.       losartan (COZAAR) 100 MG tablet Take 1 tablet (100 mg) by mouth daily 90 tablet 0     nystatin (MYCOSTATIN) 717441 UNIT/GM external powder Apply topically 2 times daily as needed (skin rash) 60 g 3     order for DME Equipment being ordered: 4 wheeled walker with hand brakes and seat 1 each 0     order for DME Equipment being ordered: 1: Custom/alternative BLE full leg velco/buckling compression garment 1 each 0     order for DME Equipment being ordered: 1: Gradient Compression Wraps; 2: BLE knee high 20-30 mm Hg compression stockings; 3: BLE thigh high 20-30 mm Hg compression stockings; 4: Cast Boots 1 each 0     order for DME Equipment being ordered: Nebulizer and neb supplies (tubing, etc) 1 Device 0     potassium chloride ER (MICRO-K) 10 MEQ CR capsule Take 10 mEq by mouth daily       pravastatin (PRAVACHOL) 20 MG tablet TAKE 1 TABLET DAILY (ONE TIME ONLY, NEED  "TO BE SEEN BECAUSE IT HAS BEEN ONE YEAR SINCE LAST VISIT) 90 tablet 0     terazosin (HYTRIN) 2 MG capsule TAKE 1 CAPSULE AT BEDTIME 90 capsule 0     tiotropium (SPIRIVA HANDIHALER) 18 MCG inhaled capsule Inhale contents of one capsule daily. 90 capsule 0     traMADol (ULTRAM) 50 MG tablet Take 1 tablet (50 mg) by mouth 2 times daily 60 tablet 0      Medication Changes/Rationale:     Prednisone 10mg daily, has had several COPD exacerbations, as noted above, O2 needs have increased past few days, she is currently afebrile and denies increased cough.  Has responded well to prednisone.  Hopefully can be tapered off in future    Completed doxycycline in TCU 4/5 for COPD exacerbation  Controlled medications sent with patient:   Medication Tramadol 50mg, sent for 14 tabs electronically prescribed to [facility] in [place] [facility] [address] [facility]     ROS: 4 point ROS including Respiratory, CV, GI and , other than that noted in the HPI,  is negative    Physical Exam: Vitals: /61   Pulse 81   Temp 95.9  F (35.5  C)   Resp 18   Ht 1.676 m (5' 6\")   Wt 104.8 kg (231 lb)   SpO2 92%   BMI 37.28 kg/m   BMI= Body mass index is 37.28 kg/m .  Limited Visit exam done for COVID-19 precautions  GENERAL APPEARANCE:  Alert, morbidly obese, anxious  RESP:  wearing O2 2L during encounter, rate relaxed and able to carry conversation without becoming dyspnic, occasional non-productive cough during encounter  M/S:   generalized weakness, in w/c during encounter, ambulating short distances with 4ww  NEURO:   no facial asymmetry, speech clear, no tremors  PSYCH:  insight and judgement impaired, anxious, upset and wants to leave facility to go home      SNF labs: Labs done in SNF are in [place] EPIC. Please refer to them using [facility]Care Everywhere.;    DISCHARGE PLAN:    Follow up labs: No labs orders/due    Medical Follow Up:      Follow up with primary care provider in 1 weeks    Current [place] scheduled appointments:      Discharge " Services: Home Care:  Occupational Therapy, Physical Therapy, Registered Nurse, Home Health Aide and     Discharge Instructions Verbalized to Patient at Discharge:     Oxygen at 2-4 liters/minute via nasal cannula to maintain O2 sats >88%     TOTAL DISCHARGE TIME:   Greater than 30 minutes  Electronically signed by:  CINTHIA Lang CNP     Video-Visit Details  Type of service:  Video Visit  Video End Time (time video stopped): 9:00  Distant Location (provider location):  [facility]     Spent 38 minutes doing discharge (26 minutes with patient) and 26 minutes speaking with SW and nurse manger regarding discharge and safety concerns as noted above    Home care Face to Face documentation done in EPIC attached to Home care orders for [facility] [place].         Face to Face and Medical Necessity Statement for DME Provider visit    Demographic Information on Marino Prajapati:  Gender: female  : 1935  [address] [facility] [id number]  [phone] (home)     Medical Record: [id number]  Social Security Number: [id number]  Primary Care Provider: Vivian Blue  Insurance: Payor: [employer] / Plan: [employer] MEDICARE / Product Type: HMO /     HPI:   Marino Prajapati is a 84 year old  (1935), who is being seen today for a face to face provider visit at [facility]  ; medical necessity statement for DME included. This patient requires the following:  DME Ordered and Medical Necessity Statement   Oxygen: 2-4 L/min via n/c continuous ; Clinical Documentation: (Obtain documented RA sat with in 2 days of discharge in acute setting or within 30 days of provider visit):  Method 1: Room air at rest: Qualifing sat level is < 88% or below on room air at rest it was 85% on 20 date      Pt needing above DME with expected length of need of 99   years  due to medical necessity associated with following diagnosis:     Acute  "respiratory failure with hypoxia (H)  COPD exacerbation (H)  Physical deconditioning  Benign essential hypertension  Other chronic pain  Hypothyroidism, unspecified type  Nodule of left lung      PMH   has a past medical history of Anemia, CARDIOVASCULAR SCREENING; LDL GOAL LESS THAN 160, Colon polyps (2010), DVT (deep venous thrombosis) (H) (2007), Gastro-oesophageal reflux disease, HTN, goal below 140/90, Hyperlipidemia LDL goal <130 (1/22/2016), Kidney stone, Osteoarthritis, Osteoporosis, Postnasal drip, S/P total knee arthroplasty, and Thrombosis of leg. She also has no past medical history of Chronic infection, Malignant hyperthermia, or Sleep apnea.    ROS:4 point ROS including Respiratory, CV, GI and , other than that noted in the HPI,  is negative    EXAM  Vitals: /61   Pulse 81   Temp 95.9  F (35.5  C)   Resp 18   Ht 1.676 m (5' 6\")   Wt 104.8 kg (231 lb)   SpO2 92%   BMI 37.28 kg/m  ;BMI= Body mass index is 37.28 kg/m .  GENERAL APPEARANCE:  Alert, morbidly obese, anxious  RESP:  wearing O2 2L during encounter, rate relaxed and able to carry conversation without becoming dyspnic, occasional non-productive cough during encounter  M/S:   generalized weakness, in w/c during encounter, ambulating short distances with 4ww  NEURO:   no facial asymmetry, speech clear, no tremors  PSYCH:  insight and judgement impaired, anxious, upset and wants to leave facility to go home      ASSESSMENT/PLAN:  1. Acute respiratory failure with hypoxia (H)    2. COPD exacerbation (H)    3. Physical deconditioning    4. Benign essential hypertension    5. Other chronic pain    6. Hypothyroidism, unspecified type    7. Nodule of left lung        Orders:  1. Facility staff/TC to contact DME company to get their order form for provider to fill out    ELECTRONICALLY SIGNED BY BECCAOS CERTIFIED PROVIDER:  CINTHIA Lang CNP   NPI: [id number]  [facility]  [address] [place] [address], [id number]  [place] [address]

## 2020-04-09 NOTE — Clinical Note
Just an FYI patient discharging tomorrow to home.  I did not follow her in TCU but did her discharge today.  Really feel she should not be going home just yet, increased O2 levels, not enough support at home but she states is leaving either way.  She initially declined O2 and home care and then later agreed.  I did send her home on small dose of prednisone, left it up to you when to try taper her off.

## 2020-04-09 NOTE — LETTER
"    4/9/2020        RE: Marino Prajapati  [address] [facility] [address]        [facility] DISCHARGE SUMMARY  Marino Prajapati is being evaluated via a billable video visit due to the restrictions of the Covid-19 pandemic.   The patient has been notified of following:  \"This video visit will be conducted via a call between you and a [facility] provider. We have found that certain health care needs can be provided without the need for an in-person physical exam.  This service lets us provide the care you need with a video conversation. If during the course of the call the provider feels a video visit is not appropriate, you will not be charged for this service.\"   The provider has received verbal consent for a Video Visit from the patient or family/first contact? Yes  Patient or /facility staff would like the video invitation sent by: N/A   Video Start Time: 8:34    PATIENT'S NAME: Marino Prajapati  YOB: 1935  MEDICAL RECORD NUMBER:  [id number]  Place of Location at the time of visit: [facility]   PRIMARY CARE PROVIDER AND CLINIC RESPONSIBLE AFTER TRANSFER:   Vivian Blue MD, [address] / [facility] [address];   Transferring providers: CINTHIA Lang CNP, Lexis Marinelli MD  Recent Hospitalization/ED: [facility] stay 3/6/20 through 3/10/20.   Date of SNF Admission: March / 10 / 2020  Date of SNF (anticipated) Discharge: April / 10 / 2020  Discharged to: with significant other to home  Cognitive Scores: not available  Physical Function: Ambulating 30 ft with FWW and transerring with minimal assist  DME: Wheelchair and Home Oxygen  CODE STATUS/ADVANCE DIRECTIVES DISCUSSION:  DNR / DNI   ALLERGIES: Patient has no known allergies.    DISCHARGE DIAGNOSIS/NURSING FACILITY COURSE:     Patient is a 84 year old female with PMH significant for COPD< HTN, HLD, GERD, hypothyroidism.  She presented to the " hospital with increased weakness and cough.  She was noted to have RLL pneumonia with acute hypoxic respiratory failure, possible COPD exacerbation.  She was initially weaned off O2 in the TCU, but had worsening cough and shortness of breath 4/1. She was treated for  repeat COPD exacerbation (although COVID was also in the differential) 4/1-4/5 with prednisone and doxycyline and was improving but has continued to require O2.  I was informed that she needed increased O2 past couple of days up to 4L to maintain her sats >88% (previously on 2L).  She was scheduled for an acute visit today to assess increased O2 needs, however, has informed the staff and SW at the facility that she is leaving tomorrow with her S.O.  We did discuss safety concerns and encouraged her to wait until Monday so could further monitor and assess her breathing, but she is insistent to go home tomorrow.    She is denying chest pain, shortness of breath, increased cough today.  No recent increase in weight and she denies orthopnea.  She is anxious to leave    (J96.01) Acute respiratory failure with hypoxia (H)  (primary encounter diagnosis)  (J44.1) COPD exacerbation (H)  Comment: past 2 days has required increased O2 needs from 2L to 4L  -most recent hospitalization for pneumonia and respiratory failure  -treated for suspected COPD exacerbation 4/1-4/5 with doxycycline and prednisone  -denies increased cough or sputum today, afebrile. Encouraged patient to remain in TCU over the weekend to further assess her respiratory status and attempt to wean O2, she states she is leaving tomorrow   Plan: continue current inhalers: Spiriva, budesonide-formorterol, prn albuteral  -prn tessalon and robitussin for cough  -will send home on prednisone 10mg po every day: PCP to assess when to wean off of, appears she has been having frequent exacerbations.  I am concerned for repeat hospitalization   -O2 face to face completed, home care nursing to assist with  attempt of taper   -home care nursing    (R53.81) Physical deconditioning  Comment: due to medical condition, hospitalizations  -currently ambulating very short distance with 4ww  -does no have ramp into her home, recommended this be in place prior to discharge.  Patient states she plans to have her S.O and his son assist her into home.  SW and myself did express our safety concerns today, patient plans to leave tomorrow   Plan: home care therapy for safety and strength training  -home care SW    (I10) Benign essential hypertension  Comment: per history, adequate control  Plan: continue current POC and monitor BP    (G89.29) Other chronic pain  Comment: chronic pain, states pain ok today  -previously has been on Tramadol and APAP scheduled      (E03.9) Hypothyroidism, unspecified type  Comment: chronic, at goal  Lab Results   Component Value Date    TSH 4.78 01/09/2020       Plan: continue synthroid  -annual TSH per PCP    (R91.1) Nodule of left lung  Comment: incidental finding on chest CT inpatient  -per radiology is almost certainly benign and does not require dedicated CT f/u  Plan: f/u with PCP    Past Medical History:  has a past medical history of Anemia, CARDIOVASCULAR SCREENING; LDL GOAL LESS THAN 160, Colon polyps (2010), DVT (deep venous thrombosis) (H) (2007), Gastro-oesophageal reflux disease, HTN, goal below 140/90, Hyperlipidemia LDL goal <130 (1/22/2016), Kidney stone, Osteoarthritis, Osteoporosis, Postnasal drip, S/P total knee arthroplasty, and Thrombosis of leg. She also has no past medical history of Chronic infection, Malignant hyperthermia, or Sleep apnea.  Discharge Medications:    Current Outpatient Medications   Medication Sig Dispense Refill     acetaminophen (TYLENOL) 325 MG tablet Take 650 mg by mouth 2 times daily At 0800 and 1400       acetaminophen (TYLENOL) 325 MG tablet Take 325 mg by mouth At Bedtime       albuterol (PROAIR HFA/PROVENTIL HFA/VENTOLIN HFA) 108 (90 Base) MCG/ACT  inhaler Inhale 2 puffs into the lungs every 6 hours as needed for shortness of breath / dyspnea or wheezing 1 Inhaler 3     amLODIPine (NORVASC) 10 MG tablet Take 0.5 tablets (5 mg) by mouth daily 90 tablet 0     atenolol (TENORMIN) 100 MG tablet Take 1 tablet (100 mg) by mouth daily 90 tablet 0     benzonatate (TESSALON) 100 MG capsule Take 1 capsule (100 mg) by mouth 3 times daily as needed for cough       budesonide-formoterol (SYMBICORT) 160-4.5 MCG/ACT Inhaler Inhale 2 puffs into the lungs 2 times daily 3 Inhaler 1     cholecalciferol (VITAMIN D) 1000 UNIT tablet Take 1,000 Units by mouth daily  100 tablet 3     furosemide (LASIX) 40 MG tablet Take 1 tablet (40 mg) by mouth daily 90 tablet 0     guaiFENesin-codeine (ROBITUSSIN AC) 100-10 MG/5ML solution Take 5 mLs by mouth every 4 hours as needed for cough 100 mL 0     levothyroxine (SYNTHROID/LEVOTHROID) 50 MCG tablet TAKE 1 TABLET DAILY (ONE TIME REFILL ONLY, NEED TO BE SEEN BECAUSE IT HAS BEEN ONE YEAR SINCE LAST VISIT) 90 tablet 0     Lidocaine (LIDOCARE) 4 % Patch Place 1 patch onto the skin every 24 hours To prevent lidocaine toxicity, patient should be patch free for 12 hrs daily.       losartan (COZAAR) 100 MG tablet Take 1 tablet (100 mg) by mouth daily 90 tablet 0     nystatin (MYCOSTATIN) 166311 UNIT/GM external powder Apply topically 2 times daily as needed (skin rash) 60 g 3     order for DME Equipment being ordered: 4 wheeled walker with hand brakes and seat 1 each 0     order for DME Equipment being ordered: 1: Custom/alternative BLE full leg velco/buckling compression garment 1 each 0     order for DME Equipment being ordered: 1: Gradient Compression Wraps; 2: BLE knee high 20-30 mm Hg compression stockings; 3: BLE thigh high 20-30 mm Hg compression stockings; 4: Cast Boots 1 each 0     order for DME Equipment being ordered: Nebulizer and neb supplies (tubing, etc) 1 Device 0     potassium chloride ER (MICRO-K) 10 MEQ CR capsule Take 10 mEq by  "mouth daily       pravastatin (PRAVACHOL) 20 MG tablet TAKE 1 TABLET DAILY (ONE TIME ONLY, NEED TO BE SEEN BECAUSE IT HAS BEEN ONE YEAR SINCE LAST VISIT) 90 tablet 0     terazosin (HYTRIN) 2 MG capsule TAKE 1 CAPSULE AT BEDTIME 90 capsule 0     tiotropium (SPIRIVA HANDIHALER) 18 MCG inhaled capsule Inhale contents of one capsule daily. 90 capsule 0     traMADol (ULTRAM) 50 MG tablet Take 1 tablet (50 mg) by mouth 2 times daily 60 tablet 0      Medication Changes/Rationale:     Prednisone 10mg daily, has had several COPD exacerbations, as noted above, O2 needs have increased past few days, she is currently afebrile and denies increased cough.  Has responded well to prednisone.  Hopefully can be tapered off in future    Completed doxycycline in TCU 4/5 for COPD exacerbation  Controlled medications sent with patient:   Medication Tramadol 50mg, sent for 14 tabs electronically prescribed to [facility] in [place] off [facility]     ROS: 4 point ROS including Respiratory, CV, GI and , other than that noted in the HPI,  is negative    Physical Exam: Vitals: /61   Pulse 81   Temp 95.9  F (35.5  C)   Resp 18   Ht 1.676 m (5' 6\")   Wt 104.8 kg (231 lb)   SpO2 92%   BMI 37.28 kg/m   BMI= Body mass index is 37.28 kg/m .  Limited Visit exam done for COVID-19 precautions  GENERAL APPEARANCE:  Alert, morbidly obese, anxious  RESP:  wearing O2 2L during encounter, rate relaxed and able to carry conversation without becoming dyspnic, occasional non-productive cough during encounter  M/S:   generalized weakness, in w/c during encounter, ambulating short distances with 4ww  NEURO:   no facial asymmetry, speech clear, no tremors  PSYCH:  insight and judgement impaired, anxious, upset and wants to leave facility to go home      SNF labs: Labs done in SNF are in [place] EPIC. Please refer to them using [facility] Everywhere.;    DISCHARGE PLAN:    Follow up labs: No labs orders/due    Medical Follow Up:      Follow up " with primary care provider in 1 weeks    Current [place] scheduled appointments:      Discharge Services: Home Care:  Occupational Therapy, Physical Therapy, Registered Nurse, Home Health Aide and     Discharge Instructions Verbalized to Patient at Discharge:     Oxygen at 2-4 liters/minute via nasal cannula to maintain O2 sats >88%     TOTAL DISCHARGE TIME:   Greater than 30 minutes  Electronically signed by:  CINTHIA Lang CNP     Video-Visit Details  Type of service:  Video Visit  Video End Time (time video stopped): 9:00  Distant Location (provider location):  [facility]     Spent 38 minutes doing discharge (26 minutes with patient) and 26 minutes speaking with SW and nurse manger regarding discharge and safety concerns as noted above    Home care Face to Face documentation done in EPIC attached to Home care orders for [facility].         Face to Face and Medical Necessity Statement for DME Provider visit    Demographic Information on Marino Prajapati:  Gender: female  : 1935  [address] [facility] [address]  [phone] (home)     Medical Record: [id number]  Social Security Number: [id number]  Primary Care Provider: Vivian Blue  Insurance: Payor: [facility] / Plan: [employer] MEDICARE / Product Type: HMO /     HPI:   Marino Prajapati is a 84 year old  (1935), who is being seen today for a face to face provider visit at [facility]  ; medical necessity statement for DME included. This patient requires the following:  DME Ordered and Medical Necessity Statement   Oxygen: 2-4 L/min via n/c continuous ; Clinical Documentation: (Obtain documented RA sat with in 2 days of discharge in acute setting or within 30 days of provider visit):  Method 1: Room air at rest: Qualifing sat level is < 88% or below on room air at rest it was 85% on 20 date      Pt needing above DME with expected length of  "need of 99   years  due to medical necessity associated with following diagnosis:     Acute respiratory failure with hypoxia (H)  COPD exacerbation (H)  Physical deconditioning  Benign essential hypertension  Other chronic pain  Hypothyroidism, unspecified type  Nodule of left lung      PMH   has a past medical history of Anemia, CARDIOVASCULAR SCREENING; LDL GOAL LESS THAN 160, Colon polyps (2010), DVT (deep venous thrombosis) (H) (2007), Gastro-oesophageal reflux disease, HTN, goal below 140/90, Hyperlipidemia LDL goal <130 (1/22/2016), Kidney stone, Osteoarthritis, Osteoporosis, Postnasal drip, S/P total knee arthroplasty, and Thrombosis of leg. She also has no past medical history of Chronic infection, Malignant hyperthermia, or Sleep apnea.    ROS:4 point ROS including Respiratory, CV, GI and , other than that noted in the HPI,  is negative    EXAM  Vitals: /61   Pulse 81   Temp 95.9  F (35.5  C)   Resp 18   Ht 1.676 m (5' 6\")   Wt 104.8 kg (231 lb)   SpO2 92%   BMI 37.28 kg/m  ;BMI= Body mass index is 37.28 kg/m .  GENERAL APPEARANCE:  Alert, morbidly obese, anxious  RESP:  wearing O2 2L during encounter, rate relaxed and able to carry conversation without becoming dyspnic, occasional non-productive cough during encounter  M/S:   generalized weakness, in w/c during encounter, ambulating short distances with 4ww  NEURO:   no facial asymmetry, speech clear, no tremors  PSYCH:  insight and judgement impaired, anxious, upset and wants to leave facility to go home      ASSESSMENT/PLAN:  1. Acute respiratory failure with hypoxia (H)    2. COPD exacerbation (H)    3. Physical deconditioning    4. Benign essential hypertension    5. Other chronic pain    6. Hypothyroidism, unspecified type    7. Nodule of left lung        Orders:  1. Facility staff/TC to contact DME company to get their order form for provider to fill out    ELECTRONICALLY SIGNED BY IVANIA CERTIFIED PROVIDER:  CINTHIA Lang " CNP   NPI: [id number]  [facility]  [address] [place] [address], [address]  [place] [address]            Sincerely,        CINTHIA Lang CNP

## 2020-04-10 ENCOUNTER — DOCUMENTATION ONLY (OUTPATIENT)
Dept: FAMILY MEDICINE | Facility: CLINIC | Age: 85
End: 2020-04-10

## 2020-04-10 DIAGNOSIS — J18.9 PNEUMONIA DUE TO INFECTIOUS ORGANISM, UNSPECIFIED LATERALITY, UNSPECIFIED PART OF LUNG: ICD-10-CM

## 2020-04-10 RX ORDER — TRAMADOL HYDROCHLORIDE 50 MG/1
50 TABLET ORAL 2 TIMES DAILY
Qty: 20 TABLET | Refills: 0 | Status: SHIPPED | OUTPATIENT
Start: 2020-04-10 | End: 2020-06-08

## 2020-04-10 NOTE — PROGRESS NOTES
Dear Dr. Lexis Marinelli and Dr. Vivian Blue  S: We are notifying you that Marino Prajapati; MRN [id number]  Has declined home care assessment.    B: The patient discharged from  [facility] from 3/6 3/10 to [facility] (discharging today) related to pneumonia and COPD.  [facility] received a referral for home care assessment.    A: Writer called and spoke with Emergency Contact Josue who reported patient is doing well, eating and drinking without issues, ambulating safely, able to verbalize understanding and manage medications.  The patient at this time respectfully declines Home Care Assessment.    Writer reinforced with Josue DOSS to call PCP to schedule follow up appointment and or call MD if there are any further concerns they have or if they desire home care services in the future.  Josue verbalized understanding with the plan moving forward.    R: Please follow up with patient as appropriate and if the patient's needs change, please submit a new home care referral order through [facility].  Thank you for the referral  Sincerely [facility]  Marylou Dowling, RN  [phone]

## 2020-04-23 ENCOUNTER — TELEPHONE (OUTPATIENT)
Dept: INTERNAL MEDICINE | Facility: CLINIC | Age: 85
End: 2020-04-23

## 2020-04-23 NOTE — TELEPHONE ENCOUNTER
Will route to primary care provider for review. Primary care provider, would you like patient to do a telephone visit?

## 2020-04-23 NOTE — TELEPHONE ENCOUNTER
Patient called to let her PCP know she is home from rehab. Call her if you have any questions. [phone] (home)

## 2020-05-22 DIAGNOSIS — I10 HTN, GOAL BELOW 140/90: ICD-10-CM

## 2020-05-22 DIAGNOSIS — E78.5 HYPERLIPIDEMIA LDL GOAL <130: Chronic | ICD-10-CM

## 2020-05-22 DIAGNOSIS — J18.9 PNEUMONIA DUE TO INFECTIOUS ORGANISM, UNSPECIFIED LATERALITY, UNSPECIFIED PART OF LUNG: ICD-10-CM

## 2020-05-22 DIAGNOSIS — R60.0 BILATERAL LEG EDEMA: ICD-10-CM

## 2020-05-22 RX ORDER — FUROSEMIDE 40 MG
40 TABLET ORAL DAILY
Qty: 90 TABLET | Refills: 0 | Status: CANCELLED | OUTPATIENT
Start: 2020-05-22

## 2020-05-22 RX ORDER — LOSARTAN POTASSIUM 100 MG/1
100 TABLET ORAL DAILY
Qty: 90 TABLET | Refills: 0 | Status: CANCELLED | OUTPATIENT
Start: 2020-05-22

## 2020-05-22 RX ORDER — ATENOLOL 100 MG/1
100 TABLET ORAL DAILY
Qty: 90 TABLET | Refills: 0 | Status: CANCELLED | OUTPATIENT
Start: 2020-05-22

## 2020-05-22 RX ORDER — TRAMADOL HYDROCHLORIDE 50 MG/1
50 TABLET ORAL 2 TIMES DAILY
Qty: 20 TABLET | Refills: 0 | Status: CANCELLED | OUTPATIENT
Start: 2020-05-22

## 2020-05-22 NOTE — TELEPHONE ENCOUNTER
Controlled Substance Refill Request for tramadol  Problem List Complete:  No     PROVIDER TO CONSIDER COMPLETION OF PROBLEM LIST AND OVERVIEW/CONTROLLED SUBSTANCE AGREEMENT    Last Written Prescription Date:  4/10/20  Last Fill Quantity: 20,   # refills: 0    THE MOST RECENT OFFICE VISIT MUST BE WITHIN THE PAST 3 MONTHS. AT LEAST ONE FACE TO FACE VISIT MUST OCCUR EVERY 6 MONTHS. ADDITIONAL VISITS CAN BE VIRTUAL.  (THIS STATEMENT SHOULD BE DELETED.)    Last Office Visit with [facility] primary care provider: 12/5/19 Cinthya    Future Office visit:     Controlled substance agreement:   Encounter-Level CSA - 05/10/2016:    Controlled Substance Agreement - Scan on 5/16/2016 10:48 AM: [place] CONTROLLED SUBSTANCE AGREEMENT, 5/10/16     Patient-Level CSA:    There are no patient-level csa.         Last Urine Drug Screen: No results found for: CDAUT, No results found for: COMDAT, No results found for: THC13, PCP13, COC13, MAMP13, OPI13, AMP13, BZO13, TCA13, MTD13, BAR13, OXY13, PPX13, BUP13     Processing:  e scribe     [email or web address]       checked in past 3 months?  No, route to RN

## 2020-05-27 DIAGNOSIS — I10 HTN, GOAL BELOW 140/90: ICD-10-CM

## 2020-05-27 DIAGNOSIS — R60.0 BILATERAL LEG EDEMA: ICD-10-CM

## 2020-05-27 DIAGNOSIS — E78.5 HYPERLIPIDEMIA LDL GOAL <130: Chronic | ICD-10-CM

## 2020-05-28 NOTE — TELEPHONE ENCOUNTER
Patient calling. She doesn't understand video visits and no computer skills. Will do a telephone visit only. Will scheduled

## 2020-05-31 RX ORDER — PRAVASTATIN SODIUM 20 MG
TABLET ORAL
Qty: 90 TABLET | Refills: 0 | Status: SHIPPED | OUTPATIENT
Start: 2020-05-31 | End: 2020-06-22

## 2020-05-31 RX ORDER — LOSARTAN POTASSIUM 100 MG/1
TABLET ORAL
Qty: 90 TABLET | Refills: 0 | Status: SHIPPED | OUTPATIENT
Start: 2020-05-31 | End: 2020-06-22

## 2020-06-05 ENCOUNTER — TELEPHONE (OUTPATIENT)
Dept: INTERNAL MEDICINE | Facility: CLINIC | Age: 85
End: 2020-06-05

## 2020-06-05 DIAGNOSIS — J18.9 PNEUMONIA DUE TO INFECTIOUS ORGANISM, UNSPECIFIED LATERALITY, UNSPECIFIED PART OF LUNG: ICD-10-CM

## 2020-06-05 RX ORDER — TRAMADOL HYDROCHLORIDE 50 MG/1
50 TABLET ORAL 2 TIMES DAILY
Qty: 20 TABLET | Refills: 0 | Status: CANCELLED | OUTPATIENT
Start: 2020-06-05

## 2020-06-05 NOTE — TELEPHONE ENCOUNTER
Controlled Substance Refill Request for Tramadol  Problem List Complete:  No     PROVIDER TO CONSIDER COMPLETION OF PROBLEM LIST AND OVERVIEW/CONTROLLED SUBSTANCE AGREEMENT    Last Written Prescription Date:  4/10/20  Last Fill Quantity: 20,   # refills: 0    THE MOST RECENT OFFICE VISIT MUST BE WITHIN THE PAST 3 MONTHS. AT LEAST ONE FACE TO FACE VISIT MUST OCCUR EVERY 6 MONTHS. ADDITIONAL VISITS CAN BE VIRTUAL.  (THIS STATEMENT SHOULD BE DELETED.)    Last Office Visit with [facility] primary care provider: 12/5/19    Future Office visit:   Next 5 appointments (look out 90 days)    Jun 08, 2020  9:20 AM CDT  Telephone Visit with Vivian Blue MD  [facility] [address]  [facility] [address]  [phone]          Controlled substance agreement:   Encounter-Level CSA - 05/10/2016:    Controlled Substance Agreement - Scan on 5/16/2016 10:48 AM: [place] CONTROLLED SUBSTANCE AGREEMENT, 5/10/16     Patient-Level CSA:    There are no patient-level csa.         Last Urine Drug Screen: No results found for: CDAUT, No results found for: COMDAT, No results found for: THC13, PCP13, COC13, MAMP13, OPI13, AMP13, BZO13, TCA13, MTD13, BAR13, OXY13, PPX13, BUP13     Processing:  Rx to be electronically transmitted to pharmacy by provider      [email or web address]       checked in past 3 months?  Yes Pt received several rxs from other providers in Feb and Mar - It appears patient was in a rehab facility at that time

## 2020-06-05 NOTE — TELEPHONE ENCOUNTER
Patient has appointment 6/8/2020 but she will run out of   traMADol (ULTRAM) 50 MG tablet     Patient is requesting a 1 month supply be sent to local pharmacy not her mail order service  [facility] in [facility] to call and lm [phone]

## 2020-06-08 ENCOUNTER — VIRTUAL VISIT (OUTPATIENT)
Dept: INTERNAL MEDICINE | Facility: CLINIC | Age: 85
End: 2020-06-08
Payer: COMMERCIAL

## 2020-06-08 DIAGNOSIS — J44.9 CHRONIC OBSTRUCTIVE PULMONARY DISEASE, UNSPECIFIED COPD TYPE (H): Chronic | ICD-10-CM

## 2020-06-08 DIAGNOSIS — M25.562 BILATERAL CHRONIC KNEE PAIN: Primary | ICD-10-CM

## 2020-06-08 DIAGNOSIS — G89.29 BILATERAL CHRONIC KNEE PAIN: Primary | ICD-10-CM

## 2020-06-08 DIAGNOSIS — J45.30 MILD PERSISTENT ASTHMA WITHOUT COMPLICATION: Chronic | ICD-10-CM

## 2020-06-08 DIAGNOSIS — M25.561 BILATERAL CHRONIC KNEE PAIN: Primary | ICD-10-CM

## 2020-06-08 PROCEDURE — 99214 OFFICE O/P EST MOD 30 MIN: CPT | Mod: 95 | Performed by: INTERNAL MEDICINE

## 2020-06-08 RX ORDER — ALBUTEROL SULFATE 90 UG/1
2 AEROSOL, METERED RESPIRATORY (INHALATION) EVERY 6 HOURS PRN
Qty: 1 INHALER | Refills: 3 | Status: SHIPPED | OUTPATIENT
Start: 2020-06-08 | End: 2020-06-08

## 2020-06-08 RX ORDER — TRAMADOL HYDROCHLORIDE 50 MG/1
50 TABLET ORAL 2 TIMES DAILY
Qty: 60 TABLET | Refills: 0 | Status: SHIPPED | OUTPATIENT
Start: 2020-06-08 | End: 2020-08-27

## 2020-06-08 RX ORDER — BUDESONIDE AND FORMOTEROL FUMARATE DIHYDRATE 160; 4.5 UG/1; UG/1
2 AEROSOL RESPIRATORY (INHALATION) 2 TIMES DAILY
Qty: 3 INHALER | Refills: 1 | Status: SHIPPED | OUTPATIENT
Start: 2020-06-08 | End: 2021-03-11

## 2020-06-08 RX ORDER — ALBUTEROL SULFATE 90 UG/1
2 AEROSOL, METERED RESPIRATORY (INHALATION) EVERY 6 HOURS PRN
Qty: 3 INHALER | Refills: 0 | Status: SHIPPED | OUTPATIENT
Start: 2020-06-08 | End: 2021-03-11

## 2020-06-08 RX ORDER — IPRATROPIUM BROMIDE AND ALBUTEROL SULFATE 2.5; .5 MG/3ML; MG/3ML
1 SOLUTION RESPIRATORY (INHALATION) EVERY 6 HOURS PRN
Qty: 1 BOX | Refills: 0 | Status: SHIPPED | OUTPATIENT
Start: 2020-06-08 | End: 2021-03-09

## 2020-06-08 NOTE — TELEPHONE ENCOUNTER
The patient is calling because the virtual visit never took place today and she very much needs the tramadol rx sent to the local pharmacy. Please also send the rx to [facility] mail [facility].  She would also like to schedule another visit if the visit cannot take place today.

## 2020-06-08 NOTE — PROGRESS NOTES
"Marino Prajapati is a 84 year old female who is being evaluated via a billable telephone visit.      The patient has been notified of following:     \"This telephone visit will be conducted via a call between you and your physician/provider. We have found that certain health care needs can be provided without the need for a physical exam.  This service lets us provide the care you need with a short phone conversation.  If a prescription is necessary we can send it directly to your pharmacy.  If lab work is needed we can place an order for that and you can then stop by our lab to have the test done at a later time.    Telephone visits are billed at different rates depending on your insurance coverage. During this emergency period, for some insurers they may be billed the same as an in-person visit.  Please reach out to your insurance provider with any questions.    If during the course of the call the physician/provider feels a telephone visit is not appropriate, you will not be charged for this service.\"    Patient has given verbal consent for Telephone visit?  Yes    What phone number would you like to be contacted at? [phone]    How would you like to obtain your AVS? Mail a copy         This is a telephone encounter with the patient.       14:13 --- 14:27          Dr Mendes's note      Patient's instructions / PLAN:                                                        Plan:  1. Tramadol 1 tablet twice a day as needed for [pain  2. Albuterol and nebs - refills         ASSESSMENT & PLAN:                                                      (M25.561,  M25.562,  G89.29) Bilateral chronic knee pain  (primary encounter diagnosis)  Comment:   Plan: traMADol (ULTRAM) 50 MG tablet            (J44.9) COPD (H)  (J45.30) Asthma,   Comment: Not controlled   Plan: albuterol (PROAIR HFA/PROVENTIL HFA/VENTOLIN         HFA) 108 (90 Base) MCG/ACT inhaler, ipratropium        - albuterol 0.5 mg/2.5 mg/3 mL (DUONEB) 0.5-2.5     "    (3) MG/3ML neb solution, DISCONTINUED:         albuterol (PROAIR HFA/PROVENTIL HFA/VENTOLIN         HFA) 108 (90 Base) MCG/ACT inhaler               Chief complaint:                                                      She feels stable   She needs refills   Difficult appointment SHe doesn't hear well and the connection is not perfect     SUBJECTIVE:                                                    History of present illness:    Chr pain  -- for the chr joint pain   -- she takes 2 tabs Tramadol a day      COPD  -- she needs refills    She missed her appointment this am. She understand we have limited time and she will schedule a different appointment to discuss other issues     Review of Systems:                                                      ROS: negative for fever, chills,  wheezes, chest pain,  vomiting, abdominal pain, leg swelling pos for cough and chr SOB    A 10-point review of systems was obtained.  Those pertinent are above and in the in the Subjective section.  The rest of the systems are negative.           OBJECTIVE:           An actual physical exam can't be done during phone visit   A limited exam can sometimes be performed by video visit       PMHx: reviewed  Past Medical History:   Diagnosis Date     Anemia      CARDIOVASCULAR SCREENING; LDL GOAL LESS THAN 160      Colon polyps 2010    needs colonoscopy 2013     DVT (deep venous thrombosis) (H) 2007    remote history of DVT while she was on BCP ,coumadin stopped after retroperitoneal/buttock hematoma in 10/11 . On ASA only     Gastro-oesophageal reflux disease      HTN, goal below 140/90      Hyperlipidemia LDL goal <130 1/22/2016     Kidney stone      Osteoarthritis     knees and hip     Osteoporosis      Postnasal drip      S/P total knee arthroplasty      Thrombosis of leg       PSHx: reviewed  Past Surgical History:   Procedure Laterality Date     ARTHROPLASTY HIP  8/1/2013    Procedure: ARTHROPLASTY HIP;  RIGHT TOTAL HIP ARTHROPLASTY;   Surgeon: Rufino Tong MD;  [facility]     ARTHROPLASTY HIP Left 12/12/2014    Procedure: ARTHROPLASTY HIP;  Surgeon: Rufino Tong MD;  Location: [facility]     ARTHROPLASTY KNEE  10/22/2012    Procedure: ARTHROPLASTY KNEE;  Right Total knee Arthroplasty  ;  Surgeon: Jagdeep Virgen MD;  Location: [facility]     ARTHROPLASTY KNEE  5/23/2013    Procedure: ARTHROPLASTY KNEE;  LEFT TOTAL KNEE ARTHROPLASTY (SMITH & NEPHEW)^;  Surgeon: Rufino Tong MD;  Location: [facility]     C PREP FACE/ORAL PROST PALATAL LIFT       CHOLECYSTECTOMY       CYSTOSCOPY, RETROGRADES, INSERT STENT URETER(S), COMBINED  7/16/2012    Procedure: COMBINED CYSTOSCOPY, RETROGRADES, INSERT STENT URETER(S);  Cystoscopy, Right retrogrades, Right Stent Placement;  Surgeon: Mauricio Velazquez MD;  Location: [facility]     LASER HOLMIUM LITHOTRIPSY URETER(S), INSERT STENT, COMBINED  8/8/2012    Procedure: COMBINED CYSTOSCOPY, URETEROSCOPY, LASER HOLMIUM LITHOTRIPSY URETER(S), INSERT STENT;  Cystoscopy, Stent Removal, Right Ureteroscopy with Holmium Laser, Stone removal ;  Surgeon: Mauricio Velazquez MD;  Location: [facility]     LIPOSUCTION (LOCATION)      tummy     STRIP VEIN          Meds: reviewed  Current Outpatient Medications   Medication Sig Dispense Refill     acetaminophen (TYLENOL) 325 MG tablet Take 650 mg by mouth 2 times daily At 0800 and 1400       acetaminophen (TYLENOL) 325 MG tablet Take 325 mg by mouth At Bedtime       albuterol (PROAIR HFA/PROVENTIL HFA/VENTOLIN HFA) 108 (90 Base) MCG/ACT inhaler Inhale 2 puffs into the lungs every 6 hours as needed for shortness of breath / dyspnea or wheezing 1 Inhaler 3     amLODIPine (NORVASC) 10 MG tablet Take 0.5 tablets (5 mg) by mouth daily 90 tablet 0     atenolol (TENORMIN) 100 MG tablet Take 1 tablet (100 mg) by mouth daily 90 tablet 0     benzonatate (TESSALON) 100 MG capsule Take 1 capsule (100 mg) by mouth 3 times daily as needed for cough       budesonide-formoterol  (SYMBICORT) 160-4.5 MCG/ACT Inhaler Inhale 2 puffs into the lungs 2 times daily 3 Inhaler 1     cholecalciferol (VITAMIN D) 1000 UNIT tablet Take 1,000 Units by mouth daily  100 tablet 3     furosemide (LASIX) 40 MG tablet Take 1 tablet (40 mg) by mouth daily 90 tablet 0     guaiFENesin-codeine (ROBITUSSIN AC) 100-10 MG/5ML solution Take 5 mLs by mouth every 4 hours as needed for cough 100 mL 0     levothyroxine (SYNTHROID/LEVOTHROID) 50 MCG tablet TAKE 1 TABLET DAILY (ONE TIME REFILL ONLY, NEED TO BE SEEN BECAUSE IT HAS BEEN ONE YEAR SINCE LAST VISIT) 90 tablet 0     Lidocaine (LIDOCARE) 4 % Patch Place 1 patch onto the skin every 24 hours To prevent lidocaine toxicity, patient should be patch free for 12 hrs daily.       losartan (COZAAR) 100 MG tablet TAKE 1 TABLET DAILY 90 tablet 0     nystatin (MYCOSTATIN) 093480 UNIT/GM external powder Apply topically 2 times daily as needed (skin rash) 60 g 3     order for DME Equipment being ordered: 4 wheeled walker with hand brakes and seat 1 each 0     order for DME Equipment being ordered: 1: Custom/alternative BLE full leg velco/buckling compression garment 1 each 0     order for DME Equipment being ordered: 1: Gradient Compression Wraps; 2: BLE knee high 20-30 mm Hg compression stockings; 3: BLE thigh high 20-30 mm Hg compression stockings; 4: Cast Boots 1 each 0     order for DME Equipment being ordered: Nebulizer and neb supplies (tubing, etc) 1 Device 0     potassium chloride ER (MICRO-K) 10 MEQ CR capsule Take 10 mEq by mouth daily       pravastatin (PRAVACHOL) 20 MG tablet TAKE 1 TABLET DAILY (NEED TO BE SEEN BECAUSE IT HAS BEEN ONE YEAR SINCE LAST VISIT) 90 tablet 0     predniSONE (DELTASONE) 10 MG tablet Take 1 tablet (10 mg) by mouth daily       terazosin (HYTRIN) 2 MG capsule TAKE 1 CAPSULE AT BEDTIME 90 capsule 0     tiotropium (SPIRIVA HANDIHALER) 18 MCG inhaled capsule Inhale contents of one capsule daily. 90 capsule 0     traMADol (ULTRAM) 50 MG tablet  Take 1 tablet (50 mg) by mouth 2 times daily 20 tablet 0       Soc Hx: reviewed  Fam Hx: reviewed          Vivian Mendes MD  Internal Medicine

## 2020-06-22 ENCOUNTER — VIRTUAL VISIT (OUTPATIENT)
Dept: INTERNAL MEDICINE | Facility: CLINIC | Age: 85
End: 2020-06-22
Payer: COMMERCIAL

## 2020-06-22 DIAGNOSIS — Z86.718 PERSONAL HISTORY OF DVT (DEEP VEIN THROMBOSIS): Chronic | ICD-10-CM

## 2020-06-22 DIAGNOSIS — I10 HTN, GOAL BELOW 140/90: Primary | ICD-10-CM

## 2020-06-22 DIAGNOSIS — R60.0 BILATERAL LEG EDEMA: ICD-10-CM

## 2020-06-22 DIAGNOSIS — E78.5 HYPERLIPIDEMIA LDL GOAL <130: Chronic | ICD-10-CM

## 2020-06-22 DIAGNOSIS — J44.9 CHRONIC OBSTRUCTIVE PULMONARY DISEASE, UNSPECIFIED COPD TYPE (H): Chronic | ICD-10-CM

## 2020-06-22 DIAGNOSIS — E03.9 HYPOTHYROIDISM, UNSPECIFIED TYPE: ICD-10-CM

## 2020-06-22 DIAGNOSIS — I26.92 ACUTE SADDLE PULMONARY EMBOLISM WITHOUT ACUTE COR PULMONALE (H): ICD-10-CM

## 2020-06-22 DIAGNOSIS — I89.0 LYMPHEDEMA OF BOTH LOWER EXTREMITIES: ICD-10-CM

## 2020-06-22 PROCEDURE — 99214 OFFICE O/P EST MOD 30 MIN: CPT | Mod: 95 | Performed by: INTERNAL MEDICINE

## 2020-06-22 RX ORDER — LEVOTHYROXINE SODIUM 50 UG/1
TABLET ORAL
Qty: 90 TABLET | Refills: 0 | Status: SHIPPED | OUTPATIENT
Start: 2020-06-22 | End: 2020-11-15

## 2020-06-22 RX ORDER — TERAZOSIN 2 MG/1
CAPSULE ORAL
Qty: 90 CAPSULE | Refills: 0 | Status: SHIPPED | OUTPATIENT
Start: 2020-06-22 | End: 2021-03-11

## 2020-06-22 RX ORDER — AMLODIPINE BESYLATE 5 MG/1
5 TABLET ORAL DAILY
Qty: 90 TABLET | Refills: 0 | Status: SHIPPED | OUTPATIENT
Start: 2020-06-22 | End: 2021-06-17

## 2020-06-22 RX ORDER — FUROSEMIDE 40 MG
40 TABLET ORAL DAILY
Qty: 90 TABLET | Refills: 0 | Status: SHIPPED | OUTPATIENT
Start: 2020-06-22 | End: 2021-03-11

## 2020-06-22 RX ORDER — ATENOLOL 100 MG/1
100 TABLET ORAL DAILY
Qty: 90 TABLET | Refills: 0 | Status: SHIPPED | OUTPATIENT
Start: 2020-06-22 | End: 2020-11-15

## 2020-06-22 RX ORDER — PRAVASTATIN SODIUM 20 MG
TABLET ORAL
Qty: 90 TABLET | Refills: 0 | Status: SHIPPED | OUTPATIENT
Start: 2020-06-22 | End: 2021-01-28

## 2020-06-22 RX ORDER — LOSARTAN POTASSIUM 100 MG/1
100 TABLET ORAL DAILY
Qty: 90 TABLET | Refills: 0 | Status: SHIPPED | OUTPATIENT
Start: 2020-06-22 | End: 2020-11-15

## 2020-06-22 NOTE — PROGRESS NOTES
"Marino Prajapati is a 84 year old female who is being evaluated via a billable telephone visit.      The patient has been notified of following:     \"This telephone visit will be conducted via a call between you and your physician/provider. We have found that certain health care needs can be provided without the need for a physical exam.  This service lets us provide the care you need with a short phone conversation.  If a prescription is necessary we can send it directly to your pharmacy.  If lab work is needed we can place an order for that and you can then stop by our lab to have the test done at a later time.    Telephone visits are billed at different rates depending on your insurance coverage. During this emergency period, for some insurers they may be billed the same as an in-person visit.  Please reach out to your insurance provider with any questions.    If during the course of the call the physician/provider feels a telephone visit is not appropriate, you will not be charged for this service.\"    Patient has given verbal consent for Telephone visit?  Yes, Mallory Guillen    What phone number would you like to be contacted at? [phone]    How would you like to obtain your AVS? Mail a copy         This is a telephone encounter with the patient.       12:50 --- 13:12          Dr Mendes's note      Patient's instructions / PLAN:                                                        Plan:  1. Continue same meds, same doses for now   2. You will let me know when you can come to the clinic for labs and appointment       ASSESSMENT & PLAN:                                                      (I10) HTN, goal below 140/90  (primary encounter diagnosis)  Comment: possible at [facility]  Plan: amLODIPine (NORVASC) 5 MG tablet, atenolol         (TENORMIN) 100 MG tablet, furosemide (LASIX) 40        MG tablet, losartan (COZAAR) 100 MG tablet,         terazosin (HYTRIN) 2 MG capsule, pravastatin         (PRAVACHOL) 20 MG " tablet            (J44.9) COPD (H)  Comment: on O2  Plan: Continue same meds, same doses for now     (E03.9) Hypothyroidism, unspecified type  Comment:   Plan: levothyroxine (SYNTHROID/LEVOTHROID) 50 MCG         tablet            (I89.0) Lymphedema of both lower extremities  Comment: persistent   Plan: Continue same meds, same doses for now     (E78.5) Hyperlipidemia LDL goal <130  Comment:   Plan: atenolol (TENORMIN) 100 MG tablet, losartan         (COZAAR) 100 MG tablet, terazosin (HYTRIN) 2 MG        capsule, pravastatin (PRAVACHOL) 20 MG tablet            (R60.0) Bilateral leg edema  Comment:   Plan: atenolol (TENORMIN) 100 MG tablet, furosemide         (LASIX) 40 MG tablet, losartan (COZAAR) 100 MG         tablet, terazosin (HYTRIN) 2 MG capsule,         pravastatin (PRAVACHOL) 20 MG tablet            (I26.92) Pulmonary Embolism - April 2019 (H) Neg chest CT April 2020    (Z86.718) Personal history of DVT (deep vein thrombosis)  Comment: not on anticoag   Plan: obs       Chief complaint:                                                      Follow up chronic medical problems      SUBJECTIVE:                                                    History of present illness:    Chr pain  -- she has been taking the tramadol 1 tablet  + tylenol bid.     HTN  -- she hasn't checked the BP at home     Labs March reviewed: BMP, CBC normal,     She states she can't get out of the home for labs because she can't walk more than few feet.       She has COPD and now she has O2 at home at night and as needed during the day. The O2 was given when she left the hospital           Hyperlipidemia Follow-Up      Are you regularly taking any medication or supplement to lower your cholesterol?   Yes- PRAVASTATIN    Are you having muscle aches or other side effects that you think could be caused by your cholesterol lowering medication?  No    Hypertension Follow-up      Do you check your blood pressure regularly outside of the clinic? No      Are you following a low salt diet? Yes    Are your blood pressures ever more than 140 on the top number (systolic) OR more   than 90 on the bottom number (diastolic), for example 140/90? No      How many servings of fruits and vegetables do you eat daily?  4 or more    On average, how many sweetened beverages do you drink each day (Examples: soda, juice, sweet tea, etc.  Do NOT count diet or artificially sweetened beverages)?   1 A WEEK    How many days per week do you exercise enough to make your heart beat faster? 3 or less    How many minutes a day do you exercise enough to make your heart beat faster? 9 or less    How many days per week do you miss taking your medication? 0      Review of Systems:                                                      ROS: negative for fever, chills, cough, wheezes, chest pain,  vomiting, abdominal pain, pos for chr SOB and leg swelling         OBJECTIVE:           An actual physical exam can't be done during phone visit   A limited exam can sometimes be performed by video visit       PMHx: reviewed  Past Medical History:   Diagnosis Date     Anemia      CARDIOVASCULAR SCREENING; LDL GOAL LESS THAN 160      Colon polyps 2010    needs colonoscopy 2013     DVT (deep venous thrombosis) (H) 2007    remote history of DVT while she was on BCP ,coumadin stopped after retroperitoneal/buttock hematoma in 10/11 . On ASA only     Gastro-oesophageal reflux disease      HTN, goal below 140/90      Hyperlipidemia LDL goal <130 1/22/2016     Kidney stone      Osteoarthritis     knees and hip     Osteoporosis      Postnasal drip      S/P total knee arthroplasty      Thrombosis of leg       PSHx: reviewed  Past Surgical History:   Procedure Laterality Date     ARTHROPLASTY HIP  8/1/2013    Procedure: ARTHROPLASTY HIP;  RIGHT TOTAL HIP ARTHROPLASTY;  Surgeon: Rufino Tong MD;  Location:  OR     ARTHROPLASTY HIP Left 12/12/2014    Procedure: ARTHROPLASTY HIP;  Surgeon: Rufino Tong  MD CINDA;  Location: [facility]     ARTHROPLASTY KNEE  10/22/2012    Procedure: ARTHROPLASTY KNEE;  Right Total knee Arthroplasty  ;  Surgeon: Jagdeep Virgen MD;  [facility]: [facility]     ARTHROPLASTY KNEE  5/23/2013    Procedure: ARTHROPLASTY KNEE;  LEFT TOTAL KNEE ARTHROPLASTY (SMITH & NEPHEW)^;  Surgeon: Rufino Tong MD;  Location: [facility]     C PREP FACE/ORAL PROST PALATAL LIFT       CHOLECYSTECTOMY       CYSTOSCOPY, RETROGRADES, INSERT STENT URETER(S), COMBINED  7/16/2012    Procedure: COMBINED CYSTOSCOPY, RETROGRADES, INSERT STENT URETER(S);  Cystoscopy, Right retrogrades, Right Stent Placement;  Surgeon: Mauricio Velazquez MD;  Location: [facility]     LASER HOLMIUM LITHOTRIPSY URETER(S), INSERT STENT, COMBINED  8/8/2012    Procedure: COMBINED CYSTOSCOPY, URETEROSCOPY, LASER HOLMIUM LITHOTRIPSY URETER(S), INSERT STENT;  Cystoscopy, Stent Removal, Right Ureteroscopy with Holmium Laser, Stone removal ;  Surgeon: Mauricio Velazquez MD;  Location: [facility]     LIPOSUCTION (LOCATION)      tummy     STRIP VEIN          Meds: reviewed  Current Outpatient Medications   Medication Sig Dispense Refill     acetaminophen (TYLENOL) 325 MG tablet Take 650 mg by mouth 2 times daily At 0800 and 1400       acetaminophen (TYLENOL) 325 MG tablet Take 325 mg by mouth At Bedtime       albuterol (PROAIR HFA/PROVENTIL HFA/VENTOLIN HFA) 108 (90 Base) MCG/ACT inhaler Inhale 2 puffs into the lungs every 6 hours as needed for shortness of breath / dyspnea or wheezing 3 Inhaler 0     amLODIPine (NORVASC) 10 MG tablet Take 0.5 tablets (5 mg) by mouth daily 90 tablet 0     atenolol (TENORMIN) 100 MG tablet Take 1 tablet (100 mg) by mouth daily 90 tablet 0     benzonatate (TESSALON) 100 MG capsule Take 1 capsule (100 mg) by mouth 3 times daily as needed for cough       budesonide-formoterol (SYMBICORT) 160-4.5 MCG/ACT Inhaler Inhale 2 puffs into the lungs 2 times daily 3 Inhaler 1     cholecalciferol (VITAMIN D) 1000 UNIT tablet Take 1,000  Units by mouth daily  100 tablet 3     furosemide (LASIX) 40 MG tablet Take 1 tablet (40 mg) by mouth daily 90 tablet 0     guaiFENesin-codeine (ROBITUSSIN AC) 100-10 MG/5ML solution Take 5 mLs by mouth every 4 hours as needed for cough 100 mL 0     ipratropium - albuterol 0.5 mg/2.5 mg/3 mL (DUONEB) 0.5-2.5 (3) MG/3ML neb solution Take 1 vial (3 mLs) by nebulization every 6 hours as needed for shortness of breath / dyspnea or wheezing 1 Box 0     levothyroxine (SYNTHROID/LEVOTHROID) 50 MCG tablet TAKE 1 TABLET DAILY (ONE TIME REFILL ONLY, NEED TO BE SEEN BECAUSE IT HAS BEEN ONE YEAR SINCE LAST VISIT) 90 tablet 0     Lidocaine (LIDOCARE) 4 % Patch Place 1 patch onto the skin every 24 hours To prevent lidocaine toxicity, patient should be patch free for 12 hrs daily.       losartan (COZAAR) 100 MG tablet TAKE 1 TABLET DAILY 90 tablet 0     nystatin (MYCOSTATIN) 235162 UNIT/GM external powder Apply topically 2 times daily as needed (skin rash) 60 g 3     order for DME Equipment being ordered: 4 wheeled walker with hand brakes and seat 1 each 0     order for DME Equipment being ordered: 1: Custom/alternative BLE full leg velco/buckling compression garment 1 each 0     order for DME Equipment being ordered: 1: Gradient Compression Wraps; 2: BLE knee high 20-30 mm Hg compression stockings; 3: BLE thigh high 20-30 mm Hg compression stockings; 4: Cast Boots 1 each 0     order for DME Equipment being ordered: Nebulizer and neb supplies (tubing, etc) 1 Device 0     potassium chloride ER (MICRO-K) 10 MEQ CR capsule Take 10 mEq by mouth daily       pravastatin (PRAVACHOL) 20 MG tablet TAKE 1 TABLET DAILY (NEED TO BE SEEN BECAUSE IT HAS BEEN ONE YEAR SINCE LAST VISIT) 90 tablet 0     terazosin (HYTRIN) 2 MG capsule TAKE 1 CAPSULE AT BEDTIME 90 capsule 0     tiotropium (SPIRIVA HANDIHALER) 18 MCG inhaled capsule Inhale contents of one capsule daily. 90 capsule 0     traMADol (ULTRAM) 50 MG tablet Take 1 tablet (50 mg) by mouth 2  times daily 60 tablet 0       Soc Hx: reviewed  Fam Hx: reviewed          Vivian Mendes MD  Internal Medicine

## 2020-08-26 DIAGNOSIS — M25.562 BILATERAL CHRONIC KNEE PAIN: ICD-10-CM

## 2020-08-26 DIAGNOSIS — M25.561 BILATERAL CHRONIC KNEE PAIN: ICD-10-CM

## 2020-08-26 DIAGNOSIS — G89.29 BILATERAL CHRONIC KNEE PAIN: ICD-10-CM

## 2020-08-26 NOTE — TELEPHONE ENCOUNTER
Controlled Substance Refill Request for tramadol  Problem List Complete:  No     PROVIDER TO CONSIDER COMPLETION OF PROBLEM LIST AND OVERVIEW/CONTROLLED SUBSTANCE AGREEMENT    Last Written Prescription Date:  6/8/20  Last Fill Quantity: 60,   # refills: 0    THE MOST RECENT OFFICE VISIT MUST BE WITHIN THE PAST 3 MONTHS. AT LEAST ONE FACE TO FACE VISIT MUST OCCUR EVERY 6 MONTHS. ADDITIONAL VISITS CAN BE VIRTUAL.  (THIS STATEMENT SHOULD BE DELETED.)    Last Office Visit with [facility] primary care provider: sarah Mendes    Future Office visit:     Controlled substance agreement:   Encounter-Level CSA - 05/10/2016:    Controlled Substance Agreement - Scan on 5/16/2016 10:48 AM: [place] CONTROLLED SUBSTANCE AGREEMENT, 5/10/16     Patient-Level CSA:    There are no patient-level csa.         Last Urine Drug Screen: No results found for: CDAUT, No results found for: COMDAT, No results found for: THC13, PCP13, COC13, MAMP13, OPI13, AMP13, BZO13, TCA13, MTD13, BAR13, OXY13, PPX13, BUP13     Processing:  e scribe     [email or web address]       checked in past 3 months?  No, route to RN

## 2020-08-27 RX ORDER — TRAMADOL HYDROCHLORIDE 50 MG/1
50 TABLET ORAL 2 TIMES DAILY
Qty: 60 TABLET | Refills: 0 | Status: SHIPPED | OUTPATIENT
Start: 2020-08-27 | End: 2020-10-09

## 2020-08-27 NOTE — TELEPHONE ENCOUNTER
[place] reviewed:  reviewed- no concerns  [place] profile:  [email or web address]

## 2020-10-09 DIAGNOSIS — M25.562 BILATERAL CHRONIC KNEE PAIN: ICD-10-CM

## 2020-10-09 DIAGNOSIS — M25.561 BILATERAL CHRONIC KNEE PAIN: ICD-10-CM

## 2020-10-09 DIAGNOSIS — G89.29 BILATERAL CHRONIC KNEE PAIN: ICD-10-CM

## 2020-10-09 RX ORDER — TRAMADOL HYDROCHLORIDE 50 MG/1
50 TABLET ORAL 2 TIMES DAILY
Qty: 60 TABLET | Refills: 0 | Status: SHIPPED | OUTPATIENT
Start: 2020-10-09 | End: 2020-11-20

## 2020-10-09 NOTE — TELEPHONE ENCOUNTER
Patient is calling for refill. She says she contacted her pharmacy a few days ago about this and it needs time to come in the mail. She would like this done today please..    Controlled Substance Refill Request for tramadol  Problem List Complete:  No     PROVIDER TO CONSIDER COMPLETION OF PROBLEM LIST AND OVERVIEW/CONTROLLED SUBSTANCE AGREEMENT    Last Written Prescription Date:  8/27/20  Last Fill Quantity: 60,   # refills: 0    THE MOST RECENT OFFICE VISIT MUST BE WITHIN THE PAST 3 MONTHS. AT LEAST ONE FACE TO FACE VISIT MUST OCCUR EVERY 6 MONTHS. ADDITIONAL VISITS CAN BE VIRTUAL.  (THIS STATEMENT SHOULD BE DELETED.)    Last Office Visit with [facility] primary care provider: virtual 6/22/20 Cinthya    Future Office visit:     Controlled substance agreement:   Encounter-Level CSA - 05/10/2016:    Controlled Substance Agreement - Scan on 5/16/2016 10:48 AM: [place] CONTROLLED SUBSTANCE AGREEMENT, 5/10/16     Patient-Level CSA:    There are no patient-level csa.         Last Urine Drug Screen: No results found for: CDAUT, No results found for: COMDAT, No results found for: THC13, PCP13, COC13, MAMP13, OPI13, AMP13, BZO13, TCA13, MTD13, BAR13, OXY13, PPX13, BUP13     Processing:  Rx to be electronically transmitted to pharmacy by provider      [email or web address]       checked in past 3 months?  No, route to RN

## 2020-10-21 NOTE — LETTER
"    4/13/2019        RE: Marino Prajapati  [address] [facility] [address]        [facility]  PRIMARY CARE PROVIDER AND CLINIC:  Vivian Blue MD, [facility] [address]  Chief Complaint   Patient presents with     Hospital F/U     [facility] Record Number:  [id number]  Place of Service where encounter took place:  [facility]  ([facility]) [[id number]]    HPI:    HPI information obtained from: facility chart records, facility staff, patient report and [facility] chart review.     Brief Summary of Hospital Course:   Marino Prajapati  is a 83 year old (1935) female with a medical history of COPD, HTN, lymphedema and hx of DVT. She presented to [facility] on 4/9/19 with progressive weakness and inability to care for herself at home. She was found to have a pulmonary embolism and was admitted for further evaluation and treatment. She was treated with heparin which was transitioned to apixaban for discharge. She was admitted to this facility on 4/12/19 for rehab, medical management and nursing care.    Updates on Status Since Skilled nursing Admission:   Ms. Prajapati was visited today while still in bed. She reports that she currently lives in a house with her partner. She noted weakness that began two weeks prior to hospitalization, and by the time she went to the ED, was requiring assistance to transfer out of the chair. Prior to current illness, she was ambulating independently with walker and able to care for herself. She has COPD with chronic phlegm production, she has used mucinex in the past but unsure if it has helped. She denies shortness of breath today but has oxygen on which is new for her. Denies fever, body aches or chills. No chest pain with inspiration. Appetite is poor. \"problems with the bladder\" and wearing a brief at the TCU. Having regular bowel movements. Complaints of low back pain which is chronic, and hurts " Subjective:   Patient ID:  Nicolasa Jurado is a 89 y.o. female who presents for follow up of Coronary Artery Disease and Hypertension    Problem List:  Labile hypertension    CAD    -LCX coated stent 08/14/2003    Diastolic heart failure, EF 65%    SANA s/p stent on the right side  Hypercholesterolemia    Hypercalcemia and primary hyperparathyroidism    Asthma    HPI: Very pleasant woman here for 3 month f/u.  She describes two separate episodes of chest pain waking her from sleep.  The first was a pressure-like discomfort that started in the center of her chest and migrated down toward her abdomen before dissipating spontaneously.  The second was a pins and needles feeling upon awakening one day.  It varied with movement and went away without intervention.    She complains of only sleeping 3 hours per night.    She does not think she's taking amlodipine anymore, and doesn't know why it fell off.  She also thinks she might be out of HCTZ.  Lastly, she seems surprised when I mention that carvedilol is a BID medicine.     Ms. Cerrato' July 2020 HPI:  Ms. Jurado is in clinic today for routine follow up.  Reports BP in the 170-200s systolic.  She takes the telmisartan 80 mg in the morning, the HCTZ 12.5 mg and amlodipine 10 mg at lunch, and the metoprolol 100 mg at night.  Prior to her stent in 2003, she had elevations in her BP but no pain.  She had a single episode of chest pain that woke her up and lasted about 8 minutes.  She is not interested in an angiogram at this time or a stress test.      Feb 2020 HPI: 4 month f/u of challenging HTN.  She's now in the digital HTN program but her control is not even close to adequate, and she's having frequent readings at home over 200 systolic.  They note that pressures have been much higher on average since I last saw her.  She does note her BP spiking with albuterol use, but digital HTN data suggests that pressures are consistently uncontrolled.     She is doing well with  no new symptoms or cardiovascular complaints and no change in exercise capacity.  She denies palpitations, PND/orthopnea, lightheadedness and syncope.  Sometimes when the pressure is particularly high, she gets a discomfort in her left chest.          Sept 2019 HPI: Back for 2 month f/u of difficult HTN.  She did not get enrolled in digital HTN but states that her pressures at home have been much better.  Sometimes, in the AM, her BP is near 100 systolic (and this is checked with two separate cuffs - hers and her son-in-law's) and in the evenings her spikes are much less than they were previously (no higher than 140s, they say).  Occasionally, she has lightheadedness with the lowest BPs.     No angina or new dyspnea.      July 2019 HPI: Very pleasant woman here with her grandson's wife for evaluation of her hypertension.  Her HTN has been very difficult to control.  She continues to use clonidine tablets most days, once or twice daily.     Her grandson's wife, a NP, is willing to help with using digital HTN.     Pressures run 140s to 170s systolic at home, but sometimes she has higher spikes.  Recently, she was referred for DSE (by Dr. Gaston) because of some chest pain reported when her BP spiked to 225 systolic.  When she showed up for the stress test, her SBP was too high to proceed.     She is on max doses of an ARB and Norvasc.  She does not take HCTZ because of a history of hypercalcemia - mild - and that was deemed to be a consequence of primary hyperparathyroidism, controlled with Sensipar.        Karen Ramsey Feb 2019 HPI: Nicolasa MUNOZ Jurado' is in clinic today for ED follow up for hypertensive urgency. She is currently taking ramipril 10mg, nifedipine 60mg, amlodipine 10mg, clonidine 0.1 mg TID.  Patient denies chest pain with exertion or at rest, palpitations, SOB, IBARRA, dizziness, syncope, edema, orthopnea, PND, or claudication.  Reports no routine exercise.  She is active gardening, cooking, and cleaning.  while sitting at the edge of the bed.     CODE STATUS/ADVANCE DIRECTIVES DISCUSSION:   DNR / DNI  Patient's living condition: lives with partner  ALLERGIES: Patient has no known allergies.  PAST MEDICAL HISTORY:  has a past medical history of Anemia, CARDIOVASCULAR SCREENING; LDL GOAL LESS THAN 160, Colon polyps (2010), DVT (deep venous thrombosis) (H) (2007), Gastro-oesophageal reflux disease, HTN, goal below 140/90, Hyperlipidemia LDL goal <130 (1/22/2016), Kidney stone, Osteoarthritis, Osteoporosis, Postnasal drip, S/P total knee arthroplasty, and Thrombosis of leg. She also has no past medical history of Chronic infection, Malignant hyperthermia, or Sleep apnea.  PAST SURGICAL HISTORY:   has a past surgical history that includes Cholecystectomy; Laser holmium lithotripsy ureter(s), insert stent, combined (8/8/2012); Liposuction (location); PREP FACE/ORAL PROST PALATAL LIFT; Strip vein; Cystoscopy, retrogrades, insert stent ureter(s), combined (7/16/2012); Arthroplasty knee (10/22/2012); Arthroplasty knee (5/23/2013); Arthroplasty hip (8/1/2013); and Arthroplasty hip (Left, 12/12/2014).  FAMILY HISTORY: family history includes Breast Cancer in her mother; Circulatory in her father.  SOCIAL HISTORY:   reports that she quit smoking about 52 years ago. Her smoking use included cigarettes. She has a 29.00 pack-year smoking history. She has never used smokeless tobacco. She reports that she drinks alcohol. She reports that she does not use drugs.    Post Discharge Medication Reconciliation Status: discharge medications reconciled and changed, per note/orders (see AVS)    Current Outpatient Medications   Medication Sig Dispense Refill     acetaminophen (TYLENOL) 325 MG tablet Take 1-2 tablets (325-650 mg) by mouth every 6 hours as needed for mild pain 250 tablet 11     albuterol (PROAIR HFA/PROVENTIL HFA/VENTOLIN HFA) 108 (90 Base) MCG/ACT Inhaler Inhale 2 puffs into the lungs every 6 hours as needed for shortness of  breath / dyspnea or wheezing 1 Inhaler 3     amLODIPine (NORVASC) 10 MG tablet Take 0.5 tablets (5 mg) by mouth daily 90 tablet 1     apixaban ANTICOAGULANT (ELIQUIS) 5 MG tablet Take 2 tablets (10 mg) by mouth 2 times daily       [START ON 4/17/2019] apixaban ANTICOAGULANT (ELIQUIS) 5 MG tablet Take 1 tablet (5 mg) by mouth 2 times daily       ascorbic acid (VITAMIN C) 500 MG tablet Take 500 mg by mouth daily  100 tablet 12     atenolol (TENORMIN) 100 MG tablet Take 1 tablet (100 mg) by mouth daily 90 tablet 1     budesonide-formoterol (SYMBICORT) 160-4.5 MCG/ACT Inhaler Inhale 2 puffs into the lungs 2 times daily 3 Inhaler 1     cholecalciferol (VITAMIN D) 1000 UNIT tablet Take 2 tablets (2,000 Units) by mouth daily 100 tablet 3     Coenzyme Q10 (COQ10) 30 MG CAPS Take 1 capsule by mouth daily  30 capsule      Cranberry Extract 250 MG TABS Take 1 tablet by mouth daily        Cyanocobalamin (B-12) 1000 MCG TBCR Take 1,000 mcg by mouth daily 100 tablet 1     fluticasone (FLONASE) 50 MCG/ACT nasal spray Spray 1-2 sprays into both nostrils daily 3 Bottle 0     furosemide (LASIX) 40 MG tablet Take 20 mg by mouth daily as needed        Garlic (GARLIC 1500) 1500 MG CAPS Take 1,500 mg by mouth daily        ipratropium - albuterol 0.5 mg/2.5 mg/3 mL (DUONEB) 0.5-2.5 (3) MG/3ML neb solution Take 1 vial (3 mLs) by nebulization every 6 hours as needed for shortness of breath / dyspnea or wheezing 100 vial 1     levothyroxine (SYNTHROID/LEVOTHROID) 50 MCG tablet Take 50 mcg by mouth daily       losartan (COZAAR) 100 MG tablet Take 1 tablet (100 mg) by mouth daily 90 tablet 1     Multiple Minerals (CALCIUM-MAGNESIUM-ZINC) TABS Take 1 tablet by mouth daily        Multiple Vitamins-Minerals (MULTIVITAMIN & MINERAL PO) Take 1 tablet by mouth daily.       Nutritional Supplements (SALMON OIL) CAPS Take 1 capsule by mouth daily        omeprazole 20 MG tablet Take 1 tablet (20 mg) by mouth daily Take 30-60 minutes before a meal. 90   Reports SOB that started years ago. She is sleeping on 3 pillows because otherwise she feels SOB.  She does have a h/o asthma and a h/o diastolic dysfunction without heart failure.  Reports good urinary response to the furosemide 20mg.  She is drinking 3 of the 16 oz bottles of water a day, coffee (1 cup), and juice (3 cups). Reports some relief of the SOB with use of her rescue inhaler.  She walks 2 blocks and becomes SOB.   Her SOB is unchanged.     Patient Active Problem List   Diagnosis    Pure hypercholesterolemia    CAD (coronary artery disease)    Osteoporosis, postmenopausal    Primary hyperparathyroidism    Renal artery stenosis    Mesenteric artery stenosis    Essential hypertension    Hypercalcemia    Atherosclerosis of aorta    Asthma    Chronic diastolic heart failure    Left ventricular diastolic dysfunction with preserved systolic function    Exudative macular degeneration    Insomnia    Thrombocytopenia    Overweight    Neuropathy    Lactose intolerance    CKD (chronic kidney disease) stage 2, GFR 60-89 ml/min    Skin lesion of back    Sciatica of left side    Vision impairment       Current Outpatient Medications   Medication Sig    acyclovir 5% (ZOVIRAX) 5 % ointment Use tid for buttock rash (Patient taking differently: as needed. Use tid for buttock rash)    albuterol (PROVENTIL/VENTOLIN HFA) 90 mcg/actuation inhaler Inhale 2 puffs into the lungs every 6 (six) hours as needed for Wheezing.    amLODIPine (NORVASC) 10 MG tablet Take 1 tablet (10 mg total) by mouth once daily.    aspirin 81 mg Tab Take 81 mg by mouth every other day.     atorvastatin (LIPITOR) 80 MG tablet Take 1 tablet (80 mg total) by mouth once daily.    brimonidine 0.2% (ALPHAGAN) 0.2 % Drop Place 1 drop into both eyes 3 (three) times daily.     carvediloL (COREG) 12.5 MG tablet Take 1 tablet (12.5 mg total) by mouth 2 (two) times daily with meals.    cinacalcet (SENSIPAR) 30 MG Tab TAKE 2 TABLETS  "tablet 0     pravastatin (PRAVACHOL) 20 MG tablet Take 1 tablet (20 mg) by mouth daily 90 tablet 1     sennosides (SENOKOT) 8.6 MG tablet Take 1 tablet by mouth 2 times daily       terazosin (HYTRIN) 2 MG capsule Take 1 capsule (2 mg) by mouth At Bedtime 90 capsule 1     tiotropium (SPIRIVA HANDIHALER) 18 MCG inhaled capsule Inhale contents of one capsule daily. 90 capsule 0     traMADol (ULTRAM) 50 MG tablet TAKE 1 TABLET BY MOUTH TWICE DAILY AS NEEDED FOR PAIN 16 tablet 0     vitamin  B complex with vitamin C (VITAMIN  B COMPLEX) TABS Take 1 tablet by mouth daily  0     order for DME Equipment being ordered: 4 wheeled walker with hand brakes and seat 1 each 0     order for DME Equipment being ordered: 1: Custom/alternative BLE full leg velco/buckling compression garment 1 each 0     order for DME Equipment being ordered: 1: Gradient Compression Wraps; 2: BLE knee high 20-30 mm Hg compression stockings; 3: BLE thigh high 20-30 mm Hg compression stockings; 4: Cast Boots 1 each 0     order for DME Equipment being ordered: Nebulizer and neb supplies (tubing, etc) 1 Device 0     ROS:  4 point ROS including Respiratory, CV, GI and , other than that noted in the HPI,  is negative    Vitals:  /74   Pulse 77   Temp 97.5  F (36.4  C)   Resp 18   Ht 1.689 m (5' 6.5\")   Wt 99.3 kg (219 lb)   SpO2 93%   BMI 34.82 kg/m     Exam:  GENERAL APPEARANCE:  Alert, in no distress, pleasant, cooperative, oriented x 4  EYES: no discharge or mattering on lids or lashes noted  ENT:  moist mucous membranes, hearing acuity intact  NECK: supple, symmetrical  RESP: no respiratory distress, Lung sounds clear, diminished in bases, patient is on 2 liters per nasal cannula  CV:  rate and rhythm regular, no murmur. Edema none in bilateral lower extremities. VASCULAR: warm extremities without open areas.  ABDOMEN: normal bowel sounds, soft, nontender.  M/S:   Gait and station: unsafe without the assistance of 2, no tenderness or " BY MOUTH DAILY WITH BREAKFAST    diclofenac sodium (VOLTAREN) 1 % Gel Apply 2 g topically 3 (three) times daily.    dorzolamide (TRUSOPT) 2 % ophthalmic solution Place 1 drop into both eyes 3 (three) times daily.     dorzolamide-timolol 2-0.5% (COSOPT) 22.3-6.8 mg/mL ophthalmic solution 1 drop 2 (two) times daily.    ergocalciferol, vitamin D2, (VITAMIN D ORAL) Take 2,000 Int'l Units by mouth once daily.    fluocinonide (LIDEX) 0.05 % external solution Use hs for scalp prn pruritus    fluticasone propionate (FLONASE) 50 mcg/actuation nasal spray 1 spray by Each Nare route daily as needed.     hydrocortisone-pramoxine (ANALPRAM-HC) 2.5-1 % Crea Place rectally 3 (three) times daily.    latanoprost 0.005 % ophthalmic solution Place 1 drop into both eyes every evening.     levalbuterol (XOPENEX) 0.63 mg/3 mL nebulizer solution TAKE 3 MILLILITERS BY NEBULIZATION EVERY 4 HOURS AS NEEDED FOR WHEEZING(RESCUE)    methazoLAMIDE (NEPTAZANE) 25 mg tablet Take 25 mg by mouth 3 (three) times daily.     nitroGLYCERIN (NITROSTAT) 0.4 MG SL tablet 1 Tablet Sublingual  One tablet under tounge as needed for chest pain.    pilocarpine HCL 2% (PILOCAR) 2 % ophthalmic solution INT 1 GTT INTO OU QID    predniSONE (DELTASONE) 20 MG tablet Take 1 tablet (20 mg total) by mouth once daily. (Patient taking differently: Take 20 mg by mouth as needed. )    telmisartan (MICARDIS) 80 MG Tab TAKE 1 TABLET(80 MG) BY MOUTH EVERY DAY    timolol maleate 0.5% (TIMOPTIC) 0.5 % Drop Place 1 drop into both eyes 2 (two) times a day.    tiZANidine (ZANAFLEX) 2 MG tablet Take 1 tablet by mouth as needed.    vit C-vit E-copper-ZnOx-lutein (PRESERVISION) 226-200-5 mg-unit-mg Cap Take by mouth. 2 Capsule Oral Every day    hydroCHLOROthiazide (HYDRODIURIL) 12.5 MG Tab Take 1 tablet (12.5 mg total) by mouth once daily.     No current facility-administered medications for this visit.        Review of Systems   Constitution: Negative.   HENT:  Negative.    Eyes: Negative.    Cardiovascular: Negative.  Negative for chest pain, dyspnea on exertion, near-syncope, orthopnea and palpitations.   Respiratory: Negative.  Negative for cough and shortness of breath.    Endocrine: Negative.    Hematologic/Lymphatic: Negative.    Skin: Negative.    Musculoskeletal: Negative.    Gastrointestinal: Negative.    Genitourinary: Negative.    Neurological: Negative.    Psychiatric/Behavioral: Negative.      Objective:   Physical Exam   Constitutional: She is oriented to person, place, and time. She appears well-developed and well-nourished.   HENT:   Head: Normocephalic and atraumatic.   Mouth/Throat: Oropharynx is clear and moist.   Eyes: Conjunctivae and EOM are normal. No scleral icterus.   Neck: Normal range of motion. Neck supple. No JVD present.   Cardiovascular: Normal rate, regular rhythm, normal heart sounds and intact distal pulses. Exam reveals no gallop and no friction rub.   No murmur heard.  Pulmonary/Chest: Effort normal and breath sounds normal. She has no wheezes. She has no rales.   Abdominal: Soft. Bowel sounds are normal. She exhibits no distension. There is no abdominal tenderness.   Musculoskeletal: Normal range of motion.         General: No edema.   Neurological: She is alert and oriented to person, place, and time.   Skin: Skin is warm and dry. No rash noted. No erythema.   Psychiatric: She has a normal mood and affect. Her behavior is normal. Judgment and thought content normal.   Vitals reviewed.      Lab Results   Component Value Date    WBC 6.33 08/10/2018    HGB 12.3 08/10/2018    HCT 37.8 08/10/2018    MCV 85 08/10/2018     (L) 08/10/2018         Chemistry        Component Value Date/Time     10/14/2020 0915    K 3.7 10/14/2020 0915    CL 99 10/14/2020 0915    CO2 34 (H) 10/14/2020 0915    BUN 17 10/14/2020 0915    CREATININE 0.8 10/14/2020 0915     10/14/2020 0915        Component Value Date/Time    CALCIUM 9.5 10/14/2020  swelling of the joints; able to move all extremities   SKIN:  Inspection and palpation of skin and subcutaneous tissue: skin warm, dry and intact without rashes  NEURO: no facial asymmetry, no speech deficits and able to follow directions, moves all extremities symmetrically  PSYCH:  insight and judgement intact, memory intact, affect and mood normal    Lab/Diagnostic data:  CBC RESULTS:   Recent Labs   Lab Test 04/10/19  0823 04/09/19  0254   WBC 9.1 10.5   RBC 4.30 4.58   HGB 12.9 13.7   HCT 38.5 41.4   MCV 90 90   MCH 30.0 29.9   MCHC 33.5 33.1   RDW 14.2 14.5    256       Last Basic Metabolic Panel:  Recent Labs   Lab Test 04/10/19  0823 04/09/19  0254    139   POTASSIUM 3.9 3.2*   CHLORIDE 107 104   SANDRA 8.3* 8.8   CO2 28 29   BUN 10 17   CR 0.63 0.82   GLC 95 96       Liver Function Studies -   Recent Labs   Lab Test 04/09/19 0254 11/02/18  1215   PROTTOTAL 7.2 7.9   ALBUMIN 3.6 4.2   BILITOTAL 0.8 0.6   ALKPHOS 84 82   AST 20 28   ALT 22 29       TSH   Date Value Ref Range Status   11/02/2018 4.72 (H) 0.40 - 4.00 mU/L Final   11/28/2017 4.08 (H) 0.40 - 4.00 mU/L Final     ASSESSMENT/PLAN:  Pulmonary embolism  Secondary to decreased mobility at home. Doppler negative for DVT. Now on oxygen which is new. Saturations 92-95%.   --continue with apixaban 10 mg BID until 4/17/19  --start apixaban 5 mg BID on 4/18/19.    COPD  No s/sx of exacerbation or pna on exam. Has phlegm in throat while lying flat but clears when sitting up.   --start mucinex 600 mg PO q12h  --continue with spiriva daily  --budesonide-formoterol 2 puffs BID  --duonebs q6h PRN  --chest xray if develops fever or hypoxia    Hypertension / Hyperlipidemia  Systolic BP ranges of 138-155 since admission to TCU yesterday. Will allow to settle in prior to adjusting medications. If persistently >150's systolic, will increase amlodipine. HR in the 70's.   --continue with amlodipine to 5 mg daily  --losartan 100 mg daily  --atenolol 100 mg  0915    ALKPHOS 79 10/14/2020 0915    AST 19 10/14/2020 0915    ALT 10 10/14/2020 0915    BILITOT 0.5 10/14/2020 0915    ESTGFRAFRICA >60.0 10/14/2020 0915    EGFRNONAA >60.0 10/14/2020 0915            Lab Results   Component Value Date    CHOL 131 10/14/2020    CHOL 131 02/01/2019    CHOL 123 11/02/2017     Lab Results   Component Value Date    HDL 39 (L) 10/14/2020    HDL 46 02/01/2019    HDL 35 (L) 11/02/2017     Lab Results   Component Value Date    LDLCALC 59.8 (L) 10/14/2020    LDLCALC 58.6 (L) 02/01/2019    LDLCALC 64.0 11/02/2017     Lab Results   Component Value Date    TRIG 161 (H) 10/14/2020    TRIG 132 02/01/2019    TRIG 120 11/02/2017     Lab Results   Component Value Date    CHOLHDL 29.8 10/14/2020    CHOLHDL 35.1 02/01/2019    CHOLHDL 28.5 11/02/2017       Lab Results   Component Value Date    TSH 2.078 05/29/2017       Lab Results   Component Value Date    HGBA1C 5.9 01/22/2015       Assessment:     1. Coronary artery disease involving native coronary artery of native heart without angina pectoris    2. Essential hypertension    3. Atherosclerosis of aorta    4. Left ventricular diastolic dysfunction with preserved systolic function        Plan:     Renew medicines and stress to family the need to help with compliance.    Continue current medicines.    Diet/exercise goals reinforced.    Hand washing and social distancing stressed.    F/U 6 months               daily  --terazosin 2 mg at HS--having frequent urination during the night so may want to discontinue in the future if blood pressure allows during the stay.  --pravastatin 20 mg daily   --Continue to monitor blood pressure and heart rate, adjust medications as needed.  --BMP on 4/15.    Lymphedema  Chronic, controlled.  --continue with furosemide 20 mg daily.   --TEDS on in the a.m off at HS.    Low back pain  Chronic. Takes tramadol PRN at baseline.   --schedule tylenol 650 mg QID  --tramadol 50 mg BID PRN  --Icy hot to lower back BID    GERD  --continue with omeprazole 20 mg daily    Hypothyroid  TSH 4.72 which is at goal given age  --continue with levothyroxine 50 mcg per day.     Constipation  Having regular bowel movements.  --continue with senna 1 tablet BID    Generalized weakness  Physical deconditioning  Secondary to pulmonary embolism, recent hospitalization and underlying chronic medical conditions. Requiring heavy assistance of 2 to stand up from the bed today.   --ongoing PT/OT for strengthening.       Electronically signed by:  CINTHIA Mann CNP                         Sincerely,        CINTHIA Mann CNP

## 2020-11-13 DIAGNOSIS — E03.9 HYPOTHYROIDISM, UNSPECIFIED TYPE: ICD-10-CM

## 2020-11-13 DIAGNOSIS — E78.5 HYPERLIPIDEMIA LDL GOAL <130: Chronic | ICD-10-CM

## 2020-11-13 DIAGNOSIS — R60.0 BILATERAL LEG EDEMA: ICD-10-CM

## 2020-11-13 DIAGNOSIS — I10 HTN, GOAL BELOW 140/90: ICD-10-CM

## 2020-11-15 RX ORDER — ATENOLOL 100 MG/1
TABLET ORAL
Qty: 90 TABLET | Refills: 0 | Status: SHIPPED | OUTPATIENT
Start: 2020-11-15 | End: 2021-06-17

## 2020-11-15 RX ORDER — LOSARTAN POTASSIUM 100 MG/1
TABLET ORAL
Qty: 90 TABLET | Refills: 0 | Status: SHIPPED | OUTPATIENT
Start: 2020-11-15 | End: 2021-11-02

## 2020-11-15 RX ORDER — LEVOTHYROXINE SODIUM 50 UG/1
TABLET ORAL
Qty: 90 TABLET | Refills: 0 | Status: SHIPPED | OUTPATIENT
Start: 2020-11-15 | End: 2021-08-14

## 2020-11-19 DIAGNOSIS — M25.561 BILATERAL CHRONIC KNEE PAIN: ICD-10-CM

## 2020-11-19 DIAGNOSIS — G89.29 BILATERAL CHRONIC KNEE PAIN: ICD-10-CM

## 2020-11-19 DIAGNOSIS — M25.562 BILATERAL CHRONIC KNEE PAIN: ICD-10-CM

## 2020-11-19 NOTE — TELEPHONE ENCOUNTER
Pateint requesting Tramadol refill be sent to mail order pharmacy in chart. Would like 3 month supply if possible.

## 2020-11-20 RX ORDER — TRAMADOL HYDROCHLORIDE 50 MG/1
50 TABLET ORAL 2 TIMES DAILY
Qty: 60 TABLET | Refills: 0 | Status: SHIPPED | OUTPATIENT
Start: 2020-11-20 | End: 2020-12-16

## 2020-12-16 ENCOUNTER — NURSE TRIAGE (OUTPATIENT)
Dept: NURSING | Facility: CLINIC | Age: 85
End: 2020-12-16

## 2020-12-16 DIAGNOSIS — M25.561 BILATERAL CHRONIC KNEE PAIN: ICD-10-CM

## 2020-12-16 DIAGNOSIS — G89.29 BILATERAL CHRONIC KNEE PAIN: ICD-10-CM

## 2020-12-16 DIAGNOSIS — M25.562 BILATERAL CHRONIC KNEE PAIN: ICD-10-CM

## 2020-12-16 NOTE — TELEPHONE ENCOUNTER
[facility].     Please advise.     Luma Paz RN on 12/16/2020 at 1:19 PM        Additional Information    Caller requesting a NON-URGENT new prescription or refill and triager unable to refill per department policy    Protocols used: MEDICATION QUESTION CALL-A-[place]

## 2020-12-21 RX ORDER — TRAMADOL HYDROCHLORIDE 50 MG/1
50 TABLET ORAL 2 TIMES DAILY
Qty: 60 TABLET | Refills: 0 | Status: SHIPPED | OUTPATIENT
Start: 2020-12-21 | End: 2021-01-18

## 2020-12-21 NOTE — TELEPHONE ENCOUNTER
Controlled Substance Refill Request for Tramadol  Problem List Complete:  No     PROVIDER TO CONSIDER COMPLETION OF PROBLEM LIST AND OVERVIEW/CONTROLLED SUBSTANCE AGREEMENT    Last Written Prescription Date:  11-20-20  Last Fill Quantity: 60,   # refills: 0    THE MOST RECENT OFFICE VISIT MUST BE WITHIN THE PAST 3 MONTHS. AT LEAST ONE FACE TO FACE VISIT MUST OCCUR EVERY 6 MONTHS. ADDITIONAL VISITS CAN BE VIRTUAL.  (THIS STATEMENT SHOULD BE DELETED.)    Last Office Visit with [facility] primary care provider: 6-22-20 virtual    Future Office visit:     Controlled substance agreement:   Encounter-Level CSA - 05/10/2016:    Controlled Substance Agreement - Scan on 5/16/2016 10:48 AM: [place] CONTROLLED SUBSTANCE AGREEMENT, 5/10/16     Patient-Level CSA:    There are no patient-level csa.         Last Urine Drug Screen: No results found for: CDAUT, No results found for: COMDAT, No results found for: THC13, PCP13, COC13, MAMP13, OPI13, AMP13, BZO13, TCA13, MTD13, BAR13, OXY13, PPX13, BUP13     Processing:  Rx to be electronically transmitted to pharmacy by provider      [email or web address]       checked in past 3 months?  Yes 12-21-20 No concerns.    Please advise, thanks.

## 2021-01-14 DIAGNOSIS — G89.29 BILATERAL CHRONIC KNEE PAIN: ICD-10-CM

## 2021-01-14 DIAGNOSIS — M25.561 BILATERAL CHRONIC KNEE PAIN: ICD-10-CM

## 2021-01-14 DIAGNOSIS — M25.562 BILATERAL CHRONIC KNEE PAIN: ICD-10-CM

## 2021-01-14 NOTE — TELEPHONE ENCOUNTER
Controlled Substance Refill Request for tramadol  Problem List Complete:  No     PROVIDER TO CONSIDER COMPLETION OF PROBLEM LIST AND OVERVIEW/CONTROLLED SUBSTANCE AGREEMENT    Last Written Prescription Date:  12/21/20  Last Fill Quantity: 60,   # refills: 0    THE MOST RECENT OFFICE VISIT MUST BE WITHIN THE PAST 3 MONTHS. AT LEAST ONE FACE TO FACE VISIT MUST OCCUR EVERY 6 MONTHS. ADDITIONAL VISITS CAN BE VIRTUAL.  (THIS STATEMENT SHOULD BE DELETED.)    Last Office Visit with [facility] primary care provider: sarah Mendes 6/22/20    Future Office visit:     Controlled substance agreement:   Encounter-Level CSA - 05/10/2016:    Controlled Substance Agreement - Scan on 5/16/2016 10:48 AM: [place] CONTROLLED SUBSTANCE AGREEMENT, 5/10/16     Patient-Level CSA:    There are no patient-level csa.         Last Urine Drug Screen: No results found for: CDAUT, No results found for: COMDAT, No results found for: THC13, PCP13, COC13, MAMP13, OPI13, AMP13, BZO13, TCA13, MTD13, BAR13, OXY13, PPX13, BUP13     Processing:  Rx to be electronically transmitted to pharmacy by provider      [email or web address]       checked in past 3 months?  No, route to RN

## 2021-01-15 NOTE — TELEPHONE ENCOUNTER
[place] reviewed:  not reviewed/not due - last done on 12/21/20  [place] profile:  [email or web address]

## 2021-01-18 RX ORDER — TRAMADOL HYDROCHLORIDE 50 MG/1
50 TABLET ORAL 2 TIMES DAILY
Qty: 60 TABLET | Refills: 0 | Status: SHIPPED | OUTPATIENT
Start: 2021-01-18 | End: 2021-02-18

## 2021-01-26 DIAGNOSIS — I10 HTN, GOAL BELOW 140/90: ICD-10-CM

## 2021-01-26 DIAGNOSIS — E78.5 HYPERLIPIDEMIA LDL GOAL <130: Chronic | ICD-10-CM

## 2021-01-26 DIAGNOSIS — R60.0 BILATERAL LEG EDEMA: ICD-10-CM

## 2021-01-28 RX ORDER — PRAVASTATIN SODIUM 20 MG
TABLET ORAL
Qty: 90 TABLET | Refills: 0 | Status: SHIPPED | OUTPATIENT
Start: 2021-01-28 | End: 2021-06-17

## 2021-01-28 NOTE — TELEPHONE ENCOUNTER
Medication is being filled for 1 time refill only due to:  Patient needs labs to check cholesterol.     Order pended for cholesterol, routed to PCP to review for any additional lab orders.     Please contact patient to schedule a lab only appointment.

## 2021-02-15 DIAGNOSIS — M25.561 BILATERAL CHRONIC KNEE PAIN: ICD-10-CM

## 2021-02-15 DIAGNOSIS — M25.562 BILATERAL CHRONIC KNEE PAIN: ICD-10-CM

## 2021-02-15 DIAGNOSIS — G89.29 BILATERAL CHRONIC KNEE PAIN: ICD-10-CM

## 2021-02-17 NOTE — TELEPHONE ENCOUNTER
Controlled Substance Refill Request for Tramadol  Problem List Complete:  No     PROVIDER TO CONSIDER COMPLETION OF PROBLEM LIST AND OVERVIEW/CONTROLLED SUBSTANCE AGREEMENT    Last Written Prescription Date:  1/18/21  Last Fill Quantity: 60,   # refills: 0    Last Office Visit with [facility] [place] [facility] primary care provider: 6/22/20    Future Office visit:     Controlled substance agreement:   Encounter-Level CSA - 05/10/2016:    Controlled Substance Agreement - Scan on 5/16/2016 10:48 AM: [place] CONTROLLED SUBSTANCE AGREEMENT, 5/10/16     Patient-Level CSA:    There are no patient-level csa.         Last Urine Drug Screen: No results found for: CDAUT, No results found for: COMDAT, No results found for: THC13, PCP13, COC13, MAMP13, OPI13, AMP13, BZO13, TCA13, MTD13, BAR13, OXY13, PPX13, BUP13     [place] reviewed:  not reviewed/not due - last done on 2/21/20  [place] profile:  [email or web address]

## 2021-02-18 RX ORDER — TRAMADOL HYDROCHLORIDE 50 MG/1
50 TABLET ORAL 2 TIMES DAILY
Qty: 60 TABLET | Refills: 0 | Status: SHIPPED | OUTPATIENT
Start: 2021-02-18 | End: 2021-03-11

## 2021-03-07 DIAGNOSIS — J44.9 CHRONIC OBSTRUCTIVE PULMONARY DISEASE, UNSPECIFIED COPD TYPE (H): Chronic | ICD-10-CM

## 2021-03-07 DIAGNOSIS — J45.30 MILD PERSISTENT ASTHMA WITHOUT COMPLICATION: Chronic | ICD-10-CM

## 2021-03-07 NOTE — LETTER
[facility]  [address]  [facility] [address]  Phone: [phone]        March 15, 2021      Marino Prajapati                                                                                                                                [address] [facility] [address]            Dear Ms. Prajapati,    We are concerned about your health care.  We recently provided you with a medication refill.  Many medications require routine follow-up with your Doctor.      At this time we ask that: You schedule a routine office visit with your physician to follow your asthma     Your prescription: Has been refilled for 1 time  so you may have time for the above noted follow-up.      Thank you,    [facility]

## 2021-03-09 RX ORDER — IPRATROPIUM BROMIDE AND ALBUTEROL SULFATE 2.5; .5 MG/3ML; MG/3ML
SOLUTION RESPIRATORY (INHALATION)
Qty: 180 ML | Refills: 0 | Status: SHIPPED | OUTPATIENT
Start: 2021-03-09 | End: 2021-03-11

## 2021-03-09 NOTE — TELEPHONE ENCOUNTER
Medication is being filled for 1 time refill only due to:  Patient needs to be seen because due for asthma managment appointment.    Please call patient and assist with scheduling prior to additional refills.    Dede PIÑA - Registered Nurse  [facility]  Acute and Diagnostic Services

## 2021-03-11 ENCOUNTER — VIRTUAL VISIT (OUTPATIENT)
Dept: INTERNAL MEDICINE | Facility: CLINIC | Age: 86
End: 2021-03-11
Payer: COMMERCIAL

## 2021-03-11 DIAGNOSIS — J44.9 CHRONIC OBSTRUCTIVE PULMONARY DISEASE, UNSPECIFIED COPD TYPE (H): Chronic | ICD-10-CM

## 2021-03-11 DIAGNOSIS — G89.29 BILATERAL CHRONIC KNEE PAIN: ICD-10-CM

## 2021-03-11 DIAGNOSIS — M25.561 BILATERAL CHRONIC KNEE PAIN: ICD-10-CM

## 2021-03-11 DIAGNOSIS — E78.5 HYPERLIPIDEMIA LDL GOAL <130: Chronic | ICD-10-CM

## 2021-03-11 DIAGNOSIS — R60.0 BILATERAL LEG EDEMA: ICD-10-CM

## 2021-03-11 DIAGNOSIS — I10 HTN, GOAL BELOW 140/90: ICD-10-CM

## 2021-03-11 DIAGNOSIS — R21 RASH AND NONSPECIFIC SKIN ERUPTION: ICD-10-CM

## 2021-03-11 DIAGNOSIS — J45.30 MILD PERSISTENT ASTHMA WITHOUT COMPLICATION: Chronic | ICD-10-CM

## 2021-03-11 DIAGNOSIS — M25.562 BILATERAL CHRONIC KNEE PAIN: ICD-10-CM

## 2021-03-11 PROCEDURE — 99443 PR PHYSICIAN TELEPHONE EVALUATION 21-30 MIN: CPT | Mod: 95 | Performed by: INTERNAL MEDICINE

## 2021-03-11 RX ORDER — BUDESONIDE AND FORMOTEROL FUMARATE DIHYDRATE 160; 4.5 UG/1; UG/1
2 AEROSOL RESPIRATORY (INHALATION) 2 TIMES DAILY
Qty: 30.6 G | Refills: 3 | Status: SHIPPED | OUTPATIENT
Start: 2021-03-11

## 2021-03-11 RX ORDER — IPRATROPIUM BROMIDE AND ALBUTEROL SULFATE 2.5; .5 MG/3ML; MG/3ML
SOLUTION RESPIRATORY (INHALATION)
Qty: 1000 ML | Refills: 1 | Status: SHIPPED | OUTPATIENT
Start: 2021-03-11

## 2021-03-11 RX ORDER — TIOTROPIUM BROMIDE 18 UG/1
18 CAPSULE ORAL; RESPIRATORY (INHALATION) DAILY
Qty: 90 CAPSULE | Refills: 1 | Status: SHIPPED | OUTPATIENT
Start: 2021-03-11

## 2021-03-11 RX ORDER — TERAZOSIN 2 MG/1
CAPSULE ORAL
Qty: 90 CAPSULE | Refills: 3 | Status: SHIPPED | OUTPATIENT
Start: 2021-03-11 | End: 2022-04-09

## 2021-03-11 RX ORDER — NYSTATIN 100000 [USP'U]/G
POWDER TOPICAL 2 TIMES DAILY PRN
Qty: 60 G | Refills: 3 | Status: SHIPPED | OUTPATIENT
Start: 2021-03-11

## 2021-03-11 RX ORDER — TRAMADOL HYDROCHLORIDE 50 MG/1
50 TABLET ORAL 2 TIMES DAILY
Qty: 60 TABLET | Refills: 0 | Status: SHIPPED | OUTPATIENT
Start: 2021-03-18 | End: 2021-03-29

## 2021-03-11 RX ORDER — ALBUTEROL SULFATE 90 UG/1
2 AEROSOL, METERED RESPIRATORY (INHALATION) EVERY 6 HOURS PRN
Qty: 3 INHALER | Refills: 1 | Status: SHIPPED | OUTPATIENT
Start: 2021-03-11

## 2021-03-11 NOTE — PROGRESS NOTES
Marino is a 85 year old who is being evaluated via a billable telephone visit.      What phone number would you like to be contacted at?   How would you like to obtain your AVS?         This is a telephone encounter with the patient.       12:13 --- 12:35           Dr Mendes's note      Patient's instructions / PLAN:                                                        Plan:  1. Continue same meds, same doses for now   2. Recommended Covid vaccin        ASSESSMENT & PLAN:                                                      (M25.561,  M25.562,  G89.29) Bilateral chr knee pain - Tramadol 50mg, #60/month, pt will call 2 ws in advanced for the rx since it is mail rx   Comment: we discussed about the rx   Plan: traMADol (ULTRAM) 50 MG tablet              (J44.9) COPD (H)  Comment: Not controlled   Plan: ipratropium - albuterol 0.5 mg/2.5 mg/3 mL         (DUONEB) 0.5-2.5 (3) MG/3ML neb solution,         budesonide-formoterol (SYMBICORT) 160-4.5         MCG/ACT Inhaler, albuterol (PROAIR         HFA/PROVENTIL HFA/VENTOLIN HFA) 108 (90 Base)         MCG/ACT inhaler, tiotropium (SPIRIVA) 18 MCG         inhaled capsule            (I10) HTN, goal below 140/90  Comment: Controlled    Plan: terazosin (HYTRIN) 2 MG capsule            (E78.5) Hyperlipidemia LDL goal <130  Comment: Controlled    Plan: terazosin (HYTRIN) 2 MG capsule            (R60.0) Bilateral leg edema  Comment: Not controlled / lymphedema   Plan: terazosin (HYTRIN) 2 MG capsule            (R21) Rash and nonspecific skin eruption  Comment: on/off   Plan: nystatin (MYCOSTATIN) 040708 UNIT/GM external         powder               Chief complaint:                                                      Follow up chronic medical problems    significant other helps w the appointment because she coughs a lot    SUBJECTIVE:                                                    History of present illness:    Chr legs pain  -- She feels well expect the knees They sudhakar  after few steps.  -- Tramadol is a mail rx and it doesn't come in time  -- she can't get out of the home atthis time     COPD  -- she has been using it only twice a day because she doesn't have enough supply   -- she feels she needs it more than twice a day  -- she doesn't take spiriva or symbicort because she doesn't feel they help  -- I advised her to take them          Hyperlipidemia Follow-Up      Are you regularly taking any medication or supplement to lower your cholesterol?   Yes- see med list    Are you having muscle aches or other side effects that you think could be caused by your cholesterol lowering medication?  No    Hypertension Follow-up      Do you check your blood pressure regularly outside of the clinic? Yes     Are you following a low salt diet? No    Are your blood pressures ever more than 140 on the top number (systolic) OR more   than 90 on the bottom number (diastolic), for example 140/90? No      How many servings of fruits and vegetables do you eat daily?  4 or more    On average, how many sweetened beverages do you drink each day (Examples: soda, juice, sweet tea, etc.  Do NOT count diet or artificially sweetened beverages)?   0    How many days per week do you exercise enough to make your heart beat faster? 3 or less can't stand    How many minutes a day do you exercise enough to make your heart beat faster? 9 or less    How many days per week do you miss taking your medication? 0      Review of Systems:                                                      ROS: negative for fever, chills, cough, wheezes, chest pain, shortness of breath, vomiting, abdominal pain, leg swelling           OBJECTIVE:               PMHx: reviewed  Past Medical History:   Diagnosis Date     Anemia      CARDIOVASCULAR SCREENING; LDL GOAL LESS THAN 160      Colon polyps 2010    needs colonoscopy 2013     DVT (deep venous thrombosis) (H) 2007    remote history of DVT while she was on BCP ,coumadin stopped after  retroperitoneal/buttock hematoma in 10/11 . On ASA only     Gastro-oesophageal reflux disease      HTN, goal below 140/90      Hyperlipidemia LDL goal <130 1/22/2016     Kidney stone      Osteoarthritis     knees and hip     Osteoporosis      Postnasal drip      S/P total knee arthroplasty      Thrombosis of leg       PSHx: reviewed  Past Surgical History:   Procedure Laterality Date     ARTHROPLASTY HIP  8/1/2013    Procedure: ARTHROPLASTY HIP;  RIGHT TOTAL HIP ARTHROPLASTY;  Surgeon: Rufino Tong MD;  Location: [facility] OR     ARTHROPLASTY HIP Left 12/12/2014    Procedure: ARTHROPLASTY HIP;  Surgeon: Rufino Tong MD;  Location: [facility] OR     ARTHROPLASTY KNEE  10/22/2012    Procedure: ARTHROPLASTY KNEE;  Right Total knee Arthroplasty  ;  Surgeon: Jagdeep Virgen MD;  Location: [facility] OR     ARTHROPLASTY KNEE  5/23/2013    Procedure: ARTHROPLASTY KNEE;  LEFT TOTAL KNEE ARTHROPLASTY (SMITH & NEPHEW)^;  Surgeon: Rufino Tong MD;  Location: [facility] OR     C PREP FACE/ORAL PROST PALATAL LIFT       CHOLECYSTECTOMY       CYSTOSCOPY, RETROGRADES, INSERT STENT URETER(S), COMBINED  7/16/2012    Procedure: COMBINED CYSTOSCOPY, RETROGRADES, INSERT STENT URETER(S);  Cystoscopy, Right retrogrades, Right Stent Placement;  Surgeon: Mauricio Velazquez MD;  Location:  OR     LASER HOLMIUM LITHOTRIPSY URETER(S), INSERT STENT, COMBINED  8/8/2012    Procedure: COMBINED CYSTOSCOPY, URETEROSCOPY, LASER HOLMIUM LITHOTRIPSY URETER(S), INSERT STENT;  Cystoscopy, Stent Removal, Right Ureteroscopy with Holmium Laser, Stone removal ;  Surgeon: Mauricio Velazquez MD;  Location: [facility] OR     LIPOSUCTION (LOCATION)      tummy     STRIP VEIN          Meds: reviewed  Current Outpatient Medications   Medication Sig Dispense Refill     acetaminophen (TYLENOL) 325 MG tablet Take 500 mg by mouth At Bedtime Takes two tablets total 1000 mg daily       albuterol (PROAIR HFA/PROVENTIL HFA/VENTOLIN HFA) 108 (90 Base) MCG/ACT inhaler Inhale 2  puffs into the lungs every 6 hours as needed for shortness of breath / dyspnea or wheezing 3 Inhaler 0     amLODIPine (NORVASC) 5 MG tablet Take 1 tablet (5 mg) by mouth daily 90 tablet 0     atenolol (TENORMIN) 100 MG tablet TAKE 1 TABLET DAILY 90 tablet 0     budesonide-formoterol (SYMBICORT) 160-4.5 MCG/ACT Inhaler Inhale 2 puffs into the lungs 2 times daily 3 Inhaler 1     cholecalciferol (VITAMIN D) 1000 UNIT tablet Take 1,000 Units by mouth daily  100 tablet 3     ipratropium - albuterol 0.5 mg/2.5 mg/3 mL (DUONEB) 0.5-2.5 (3) MG/3ML neb solution INHALE CONTENTS OF 1 VIAL (3 MLS) VIA NEBULIZER EVERY 6 HOURS AS NEEDED FOR SHORTNESS OF BREATH, DYSPNEA, OR WHEEZING 180 mL 0     levothyroxine (SYNTHROID/LEVOTHROID) 50 MCG tablet TAKE 1 TABLET DAILY (ONE TIME REFILL ONLY, NEED TO BE SEEN BECAUSE IT HAS BEEN ONE YEAR SINCE LAST VISIT) 90 tablet 0     losartan (COZAAR) 100 MG tablet TAKE 1 TABLET DAILY 90 tablet 0     nystatin (MYCOSTATIN) 153291 UNIT/GM external powder Apply topically 2 times daily as needed (skin rash) 60 g 3     order for DME Equipment being ordered: 4 wheeled walker with hand brakes and seat 1 each 0     order for DME Equipment being ordered: 1: Custom/alternative BLE full leg velco/buckling compression garment 1 each 0     order for DME Equipment being ordered: 1: Gradient Compression Wraps; 2: BLE knee high 20-30 mm Hg compression stockings; 3: BLE thigh high 20-30 mm Hg compression stockings; 4: Cast Boots 1 each 0     order for DME Equipment being ordered: Nebulizer and neb supplies (tubing, etc) 1 Device 0     pravastatin (PRAVACHOL) 20 MG tablet TAKE 1 TABLET DAILY 90 tablet 0     terazosin (HYTRIN) 2 MG capsule TAKE 1 CAPSULE AT BEDTIME 90 capsule 0     traMADol (ULTRAM) 50 MG tablet Take 1 tablet (50 mg) by mouth 2 times daily 60 tablet 0       Soc Hx: reviewed  Fam Hx: reviewed          Vivian Mendes MD  Internal Medicine

## 2021-03-29 DIAGNOSIS — G89.29 BILATERAL CHRONIC KNEE PAIN: ICD-10-CM

## 2021-03-29 DIAGNOSIS — M25.561 BILATERAL CHRONIC KNEE PAIN: ICD-10-CM

## 2021-03-29 DIAGNOSIS — M25.562 BILATERAL CHRONIC KNEE PAIN: ICD-10-CM

## 2021-03-29 RX ORDER — TRAMADOL HYDROCHLORIDE 50 MG/1
50 TABLET ORAL 2 TIMES DAILY
Qty: 60 TABLET | Refills: 0 | Status: SHIPPED | OUTPATIENT
Start: 2021-04-15 | End: 2021-04-30

## 2021-04-28 DIAGNOSIS — M25.561 BILATERAL CHRONIC KNEE PAIN: ICD-10-CM

## 2021-04-28 DIAGNOSIS — G89.29 BILATERAL CHRONIC KNEE PAIN: ICD-10-CM

## 2021-04-28 DIAGNOSIS — M25.562 BILATERAL CHRONIC KNEE PAIN: ICD-10-CM

## 2021-04-28 NOTE — TELEPHONE ENCOUNTER
Patient calling for refill of traMADol. Spoke with Vijaya at [facility] today at 5:30 EST, 30 day supply was received by pt on 3/19/21 then again on 4/3/21. Will be due to receive next refill on 5/1/21.

## 2021-04-29 NOTE — TELEPHONE ENCOUNTER
See message below.    Tramadol      Last Written Prescription Date:  4-15-21  Last Fill Quantity: 60,   # refills: 0  Last Office Visit: 3-11-21 virtual  Future Office visit:       Routing refill request to provider for review/approval because:  Drug not on the [facility], [facility] or [facility] refill protocol or controlled substance    Please advise

## 2021-04-30 RX ORDER — TRAMADOL HYDROCHLORIDE 50 MG/1
50 TABLET ORAL 2 TIMES DAILY
Qty: 60 TABLET | Refills: 0 | Status: SHIPPED | OUTPATIENT
Start: 2021-04-30 | End: 2021-05-28

## 2021-05-27 DIAGNOSIS — M25.561 BILATERAL CHRONIC KNEE PAIN: ICD-10-CM

## 2021-05-27 DIAGNOSIS — G89.29 BILATERAL CHRONIC KNEE PAIN: ICD-10-CM

## 2021-05-27 DIAGNOSIS — M25.562 BILATERAL CHRONIC KNEE PAIN: ICD-10-CM

## 2021-05-28 RX ORDER — TRAMADOL HYDROCHLORIDE 50 MG/1
50 TABLET ORAL 2 TIMES DAILY
Qty: 60 TABLET | Refills: 0 | Status: SHIPPED | OUTPATIENT
Start: 2021-05-28 | End: 2021-07-01

## 2021-05-28 NOTE — TELEPHONE ENCOUNTER
Pending Prescriptions:                       Disp   Refills    traMADol (ULTRAM) 50 MG tablet             60 tab*0        Sig: Take 1 tablet (50 mg) by mouth 2 times daily    Routing refill request to provider for review/approval because:  Drug not on the [facility] refill protocol

## 2021-06-04 ENCOUNTER — TELEPHONE (OUTPATIENT)
Dept: INTERNAL MEDICINE | Facility: CLINIC | Age: 86
End: 2021-06-04

## 2021-06-04 DIAGNOSIS — J44.9 CHRONIC OBSTRUCTIVE PULMONARY DISEASE, UNSPECIFIED COPD TYPE (H): Primary | ICD-10-CM

## 2021-06-04 NOTE — TELEPHONE ENCOUNTER
Patient calls to request a new RX for Oxygen be faxed to [facility] at [phone]. Patient did not know her settings other than to say she typically keeps it on 2.

## 2021-06-07 NOTE — TELEPHONE ENCOUNTER
"Called patient to advise appointment is needed.  Significant other then took the phone.  Per significant other Josue Reece patient is no longer able to walk and even if he could get her into a wheelchair he wouldn't be able to get her out of it.  Explained that Medicare has certain requirements and in addition to a face to face we would need to get some O2 sat readings on her. SO states she is in a lot of pain, is doing well with oxygen and he states \"Hilda said it is as simple as the doctor signing off on it.  Have the doctor do her part and we will work the rest out because she will die without the oxygen\".  BRANDEE Chowdary R.N.    "

## 2021-06-09 NOTE — TELEPHONE ENCOUNTER
Oxygen orders printed and faxed to [facility] with cover sheet stating pt is unable to physically come into the office for face to face visit at this time.

## 2021-06-15 DIAGNOSIS — E78.5 HYPERLIPIDEMIA LDL GOAL <130: Chronic | ICD-10-CM

## 2021-06-15 DIAGNOSIS — I10 HTN, GOAL BELOW 140/90: ICD-10-CM

## 2021-06-15 DIAGNOSIS — R60.0 BILATERAL LEG EDEMA: ICD-10-CM

## 2021-06-17 RX ORDER — FUROSEMIDE 40 MG
TABLET ORAL
Qty: 90 TABLET | Refills: 0 | Status: ON HOLD | OUTPATIENT
Start: 2021-06-17 | End: 2023-01-01

## 2021-06-17 RX ORDER — ATENOLOL 100 MG/1
TABLET ORAL
Qty: 90 TABLET | Refills: 0 | Status: SHIPPED | OUTPATIENT
Start: 2021-06-17 | End: 2021-11-02

## 2021-06-17 RX ORDER — AMLODIPINE BESYLATE 5 MG/1
TABLET ORAL
Qty: 90 TABLET | Refills: 0 | Status: SHIPPED | OUTPATIENT
Start: 2021-06-17 | End: 2022-02-08

## 2021-06-17 RX ORDER — PRAVASTATIN SODIUM 20 MG
TABLET ORAL
Qty: 90 TABLET | Refills: 0 | Status: SHIPPED | OUTPATIENT
Start: 2021-06-17 | End: 2021-09-09

## 2021-06-17 NOTE — TELEPHONE ENCOUNTER
Pending Prescriptions:                       Disp   Refills    atenolol (TENORMIN) 100 MG tablet [Pharmac*90 tab*3        Sig: TAKE 1 TABLET DAILY    pravastatin (PRAVACHOL) 20 MG tablet [Phar*90 tab*3        Sig: TAKE 1 TABLET DAILY    amLODIPine (NORVASC) 5 MG tablet [Pharmacy*90 tab*3        Sig: TAKE 1 TABLET DAILY    furosemide (LASIX) 40 MG tablet [Pharmacy *90 tab*3        Sig: TAKE 1 TABLET DAILY    Routing refill request to provider for review/approval because:  LDL Cholesterol Calculated   Date Value Ref Range Status   01/09/2020 89 <100 mg/dL Final     Comment:     Desirable:       <100 mg/dl     BP Readings from Last 3 Encounters:   04/09/20 136/61   04/06/20 (!) 159/77   04/01/20 131/73

## 2021-06-30 DIAGNOSIS — M25.561 BILATERAL CHRONIC KNEE PAIN: ICD-10-CM

## 2021-06-30 DIAGNOSIS — M25.562 BILATERAL CHRONIC KNEE PAIN: ICD-10-CM

## 2021-06-30 DIAGNOSIS — G89.29 BILATERAL CHRONIC KNEE PAIN: ICD-10-CM

## 2021-07-01 RX ORDER — TRAMADOL HYDROCHLORIDE 50 MG/1
50 TABLET ORAL 2 TIMES DAILY
Qty: 60 TABLET | Refills: 0 | Status: SHIPPED | OUTPATIENT
Start: 2021-07-01 | End: 2021-07-29

## 2021-07-01 NOTE — TELEPHONE ENCOUNTER
Controlled Substance Refill Request for tramadol  Problem List Complete:  Yes    Last Written Prescription Date:  5/28/21  Last Fill Quantity: 60,   # refills: 0    THE MOST RECENT OFFICE VISIT MUST BE WITHIN THE PAST 3 MONTHS. AT LEAST ONE FACE TO FACE VISIT MUST OCCUR EVERY 6 MONTHS. ADDITIONAL VISITS CAN BE VIRTUAL.  (THIS STATEMENT SHOULD BE DELETED.)    Last Office Visit with [facility] primary care provider: 3/11/21    Future Office visit:     Controlled substance agreement:   Encounter-Level CSA - 05/10/2016:    Controlled Substance Agreement - Scan on 5/16/2016 10:48 AM: [place] CONTROLLED SUBSTANCE AGREEMENT, 5/10/16     Patient-Level CSA:    There are no patient-level csa.         Last Urine Drug Screen: No results found for: CDAUT, No results found for: COMDAT, No results found for: THC13, PCP13, COC13, MAMP13, OPI13, AMP13, BZO13, TCA13, MTD13, BAR13, OXY13, PPX13, BUP13     Processing:  Rx to be electronically transmitted to pharmacy by provider

## 2021-07-27 DIAGNOSIS — M25.561 BILATERAL CHRONIC KNEE PAIN: ICD-10-CM

## 2021-07-27 DIAGNOSIS — G89.29 BILATERAL CHRONIC KNEE PAIN: ICD-10-CM

## 2021-07-27 DIAGNOSIS — M25.562 BILATERAL CHRONIC KNEE PAIN: ICD-10-CM

## 2021-07-29 RX ORDER — TRAMADOL HYDROCHLORIDE 50 MG/1
50 TABLET ORAL 2 TIMES DAILY
Qty: 60 TABLET | Refills: 0 | Status: ON HOLD | OUTPATIENT
Start: 2021-07-29 | End: 2021-08-18

## 2021-08-14 ENCOUNTER — APPOINTMENT (OUTPATIENT)
Dept: GENERAL RADIOLOGY | Facility: CLINIC | Age: 86
DRG: 542 | End: 2021-08-14
Attending: EMERGENCY MEDICINE
Payer: COMMERCIAL

## 2021-08-14 ENCOUNTER — HOSPITAL ENCOUNTER (INPATIENT)
Facility: CLINIC | Age: 86
LOS: 4 days | Discharge: NURSING, REHAB OR LONG-TERM CARE | DRG: 542 | End: 2021-08-18
Attending: EMERGENCY MEDICINE | Admitting: INTERNAL MEDICINE
Payer: COMMERCIAL

## 2021-08-14 ENCOUNTER — APPOINTMENT (OUTPATIENT)
Dept: ULTRASOUND IMAGING | Facility: CLINIC | Age: 86
DRG: 542 | End: 2021-08-14
Attending: INTERNAL MEDICINE
Payer: COMMERCIAL

## 2021-08-14 ENCOUNTER — ANESTHESIA EVENT (OUTPATIENT)
Dept: MEDSURG UNIT | Facility: CLINIC | Age: 86
DRG: 542 | End: 2021-08-14
Payer: COMMERCIAL

## 2021-08-14 ENCOUNTER — APPOINTMENT (OUTPATIENT)
Dept: CT IMAGING | Facility: CLINIC | Age: 86
DRG: 542 | End: 2021-08-14
Attending: EMERGENCY MEDICINE
Payer: COMMERCIAL

## 2021-08-14 ENCOUNTER — ANESTHESIA (OUTPATIENT)
Dept: MEDSURG UNIT | Facility: CLINIC | Age: 86
DRG: 542 | End: 2021-08-14
Payer: COMMERCIAL

## 2021-08-14 ENCOUNTER — APPOINTMENT (OUTPATIENT)
Dept: CT IMAGING | Facility: CLINIC | Age: 86
DRG: 542 | End: 2021-08-14
Attending: INTERNAL MEDICINE
Payer: COMMERCIAL

## 2021-08-14 DIAGNOSIS — I21.4 NSTEMI (NON-ST ELEVATED MYOCARDIAL INFARCTION) (H): ICD-10-CM

## 2021-08-14 DIAGNOSIS — S82.891A ANKLE FRACTURE, RIGHT, CLOSED, INITIAL ENCOUNTER: ICD-10-CM

## 2021-08-14 DIAGNOSIS — J44.9 CHRONIC OBSTRUCTIVE PULMONARY DISEASE, UNSPECIFIED COPD TYPE (H): ICD-10-CM

## 2021-08-14 DIAGNOSIS — M25.561 BILATERAL CHRONIC KNEE PAIN: ICD-10-CM

## 2021-08-14 DIAGNOSIS — G89.29 BILATERAL CHRONIC KNEE PAIN: ICD-10-CM

## 2021-08-14 DIAGNOSIS — M25.562 BILATERAL CHRONIC KNEE PAIN: ICD-10-CM

## 2021-08-14 DIAGNOSIS — N30.00 ACUTE CYSTITIS WITHOUT HEMATURIA: Primary | ICD-10-CM
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
## 2021-08-14 LAB
ALBUMIN SERPL-MCNC: 3.6 G/DL (ref 3.4–5)
ALBUMIN UR-MCNC: 70 MG/DL
ALP SERPL-CCNC: 89 U/L (ref 40–150)
ALT SERPL W P-5'-P-CCNC: 23 U/L (ref 0–50)
ANION GAP SERPL CALCULATED.3IONS-SCNC: 7 MMOL/L (ref 3–14)
APPEARANCE UR: ABNORMAL
AST SERPL W P-5'-P-CCNC: 26 U/L (ref 0–45)
BACTERIA #/AREA URNS HPF: ABNORMAL /HPF
BASOPHILS # BLD AUTO: 0.1 10E3/UL (ref 0–0.2)
BASOPHILS NFR BLD AUTO: 1 %
BILIRUB SERPL-MCNC: 1.1 MG/DL (ref 0.2–1.3)
BILIRUB UR QL STRIP: NEGATIVE
BUN SERPL-MCNC: 13 MG/DL (ref 7–30)
CALCIUM SERPL-MCNC: 9.1 MG/DL (ref 8.5–10.1)
CHLORIDE BLD-SCNC: 107 MMOL/L (ref 94–109)
CO2 SERPL-SCNC: 26 MMOL/L (ref 20–32)
COLOR UR AUTO: YELLOW
CREAT SERPL-MCNC: 0.58 MG/DL (ref 0.52–1.04)
D DIMER PPP FEU-MCNC: 12.89 UG/ML FEU (ref 0–0.5)
EOSINOPHIL # BLD AUTO: 0 10E3/UL (ref 0–0.7)
EOSINOPHIL NFR BLD AUTO: 0 %
ERYTHROCYTE [DISTWIDTH] IN BLOOD BY AUTOMATED COUNT: 13.9 % (ref 10–15)
GFR SERPL CREATININE-BSD FRML MDRD: 86 ML/MIN/1.73M2
GLUCOSE BLD-MCNC: 110 MG/DL (ref 70–99)
GLUCOSE UR STRIP-MCNC: NEGATIVE MG/DL
HCT VFR BLD AUTO: 45.1 % (ref 35–47)
HGB BLD-MCNC: 15.2 G/DL (ref 11.7–15.7)
HGB UR QL STRIP: ABNORMAL
HOLD SPECIMEN: NORMAL
HOLD SPECIMEN: NORMAL
IMM GRANULOCYTES # BLD: 0.1 10E3/UL
IMM GRANULOCYTES NFR BLD: 1 %
KETONES UR STRIP-MCNC: 40 MG/DL
LEUKOCYTE ESTERASE UR QL STRIP: ABNORMAL
LYMPHOCYTES # BLD AUTO: 2.1 10E3/UL (ref 0.8–5.3)
LYMPHOCYTES NFR BLD AUTO: 19 %
MCH RBC QN AUTO: 31.2 PG (ref 26.5–33)
MCHC RBC AUTO-ENTMCNC: 33.7 G/DL (ref 31.5–36.5)
MCV RBC AUTO: 93 FL (ref 78–100)
MONOCYTES # BLD AUTO: 1 10E3/UL (ref 0–1.3)
MONOCYTES NFR BLD AUTO: 9 %
MUCOUS THREADS #/AREA URNS LPF: PRESENT /LPF
NEUTROPHILS # BLD AUTO: 7.9 10E3/UL (ref 1.6–8.3)
NEUTROPHILS NFR BLD AUTO: 70 %
NITRATE UR QL: POSITIVE
NRBC # BLD AUTO: 0 10E3/UL
NRBC BLD AUTO-RTO: 0 /100
NT-PROBNP SERPL-MCNC: 1670 PG/ML (ref 0–1800)
PH UR STRIP: 6 [PH] (ref 5–7)
PLATELET # BLD AUTO: 208 10E3/UL (ref 150–450)
POTASSIUM BLD-SCNC: 3.6 MMOL/L (ref 3.4–5.3)
PROCALCITONIN SERPL-MCNC: <0.05 NG/ML
PROT SERPL-MCNC: 7.2 G/DL (ref 6.8–8.8)
RBC # BLD AUTO: 4.87 10E6/UL (ref 3.8–5.2)
RBC URINE: 6 /HPF
SARS-COV-2 RNA RESP QL NAA+PROBE: NEGATIVE
SODIUM SERPL-SCNC: 140 MMOL/L (ref 133–144)
SP GR UR STRIP: 1.03 (ref 1–1.03)
SQUAMOUS EPITHELIAL: <1 /HPF
TROPONIN I SERPL-MCNC: 0.33 UG/L (ref 0–0.04)
TROPONIN I SERPL-MCNC: 0.53 UG/L (ref 0–0.04)
UROBILINOGEN UR STRIP-MCNC: NORMAL MG/DL
WBC # BLD AUTO: 11.1 10E3/UL (ref 4–11)
WBC URINE: 9 /HPF

## 2021-08-14 PROCEDURE — 81001 URINALYSIS AUTO W/SCOPE: CPT | Performed by: EMERGENCY MEDICINE

## 2021-08-14 PROCEDURE — 36415 COLL VENOUS BLD VENIPUNCTURE: CPT | Performed by: INTERNAL MEDICINE

## 2021-08-14 PROCEDURE — 96365 THER/PROPH/DIAG IV INF INIT: CPT | Mod: 59

## 2021-08-14 PROCEDURE — 84484 ASSAY OF TROPONIN QUANT: CPT | Performed by: EMERGENCY MEDICINE

## 2021-08-14 PROCEDURE — 250N000011 HC RX IP 250 OP 636: Performed by: EMERGENCY MEDICINE

## 2021-08-14 PROCEDURE — 36415 COLL VENOUS BLD VENIPUNCTURE: CPT | Performed by: EMERGENCY MEDICINE

## 2021-08-14 PROCEDURE — 96376 TX/PRO/DX INJ SAME DRUG ADON: CPT

## 2021-08-14 PROCEDURE — 999N000157 HC STATISTIC RCP TIME EA 10 MIN

## 2021-08-14 PROCEDURE — 99223 1ST HOSP IP/OBS HIGH 75: CPT | Mod: AI | Performed by: INTERNAL MEDICINE

## 2021-08-14 PROCEDURE — 250N000009 HC RX 250: Performed by: EMERGENCY MEDICINE

## 2021-08-14 PROCEDURE — 250N000013 HC RX MED GY IP 250 OP 250 PS 637: Performed by: INTERNAL MEDICINE

## 2021-08-14 PROCEDURE — 84145 PROCALCITONIN (PCT): CPT | Performed by: INTERNAL MEDICINE

## 2021-08-14 PROCEDURE — 83880 ASSAY OF NATRIURETIC PEPTIDE: CPT | Performed by: EMERGENCY MEDICINE

## 2021-08-14 PROCEDURE — 93971 EXTREMITY STUDY: CPT | Mod: RT

## 2021-08-14 PROCEDURE — 99285 EMERGENCY DEPT VISIT HI MDM: CPT | Mod: 25

## 2021-08-14 PROCEDURE — 93005 ELECTROCARDIOGRAM TRACING: CPT

## 2021-08-14 PROCEDURE — 70450 CT HEAD/BRAIN W/O DYE: CPT

## 2021-08-14 PROCEDURE — 120N000001 HC R&B MED SURG/OB

## 2021-08-14 PROCEDURE — 87086 URINE CULTURE/COLONY COUNT: CPT | Performed by: EMERGENCY MEDICINE

## 2021-08-14 PROCEDURE — 71275 CT ANGIOGRAPHY CHEST: CPT

## 2021-08-14 PROCEDURE — 85379 FIBRIN DEGRADATION QUANT: CPT | Performed by: EMERGENCY MEDICINE

## 2021-08-14 PROCEDURE — 96375 TX/PRO/DX INJ NEW DRUG ADDON: CPT

## 2021-08-14 PROCEDURE — 87635 SARS-COV-2 COVID-19 AMP PRB: CPT | Performed by: EMERGENCY MEDICINE

## 2021-08-14 PROCEDURE — 94640 AIRWAY INHALATION TREATMENT: CPT | Mod: 76

## 2021-08-14 PROCEDURE — 84484 ASSAY OF TROPONIN QUANT: CPT | Performed by: INTERNAL MEDICINE

## 2021-08-14 PROCEDURE — 71045 X-RAY EXAM CHEST 1 VIEW: CPT

## 2021-08-14 PROCEDURE — 73610 X-RAY EXAM OF ANKLE: CPT | Mod: 50

## 2021-08-14 PROCEDURE — 250N000013 HC RX MED GY IP 250 OP 250 PS 637: Performed by: EMERGENCY MEDICINE

## 2021-08-14 PROCEDURE — 82040 ASSAY OF SERUM ALBUMIN: CPT | Performed by: EMERGENCY MEDICINE

## 2021-08-14 PROCEDURE — 94640 AIRWAY INHALATION TREATMENT: CPT

## 2021-08-14 PROCEDURE — 250N000011 HC RX IP 250 OP 636: Performed by: INTERNAL MEDICINE

## 2021-08-14 PROCEDURE — C9803 HOPD COVID-19 SPEC COLLECT: HCPCS

## 2021-08-14 PROCEDURE — 370N000003 HC ANESTHESIA WARD SERVICE

## 2021-08-14 PROCEDURE — 250N000009 HC RX 250: Performed by: INTERNAL MEDICINE

## 2021-08-14 PROCEDURE — 85025 COMPLETE CBC W/AUTO DIFF WBC: CPT | Performed by: EMERGENCY MEDICINE

## 2021-08-14 RX ORDER — ALBUTEROL SULFATE 0.83 MG/ML
2.5 SOLUTION RESPIRATORY (INHALATION)
Status: DISCONTINUED | OUTPATIENT
Start: 2021-08-14 | End: 2021-08-18 | Stop reason: HOSPADM

## 2021-08-14 RX ORDER — IOPAMIDOL 755 MG/ML
500 INJECTION, SOLUTION INTRAVASCULAR ONCE
Status: COMPLETED | OUTPATIENT
Start: 2021-08-14 | End: 2021-08-14

## 2021-08-14 RX ORDER — HEPARIN SODIUM 10000 [USP'U]/100ML
0-5000 INJECTION, SOLUTION INTRAVENOUS CONTINUOUS
Status: DISCONTINUED | OUTPATIENT
Start: 2021-08-14 | End: 2021-08-14

## 2021-08-14 RX ORDER — ASPIRIN 81 MG/1
324 TABLET, CHEWABLE ORAL ONCE
Status: COMPLETED | OUTPATIENT
Start: 2021-08-14 | End: 2021-08-14

## 2021-08-14 RX ORDER — ACETAMINOPHEN 500 MG
1000 TABLET ORAL 2 TIMES DAILY
COMMUNITY

## 2021-08-14 RX ORDER — IPRATROPIUM BROMIDE AND ALBUTEROL SULFATE 2.5; .5 MG/3ML; MG/3ML
3 SOLUTION RESPIRATORY (INHALATION)
Status: DISCONTINUED | OUTPATIENT
Start: 2021-08-14 | End: 2021-08-18 | Stop reason: HOSPADM

## 2021-08-14 RX ORDER — HEPARIN SODIUM 10000 [USP'U]/100ML
0-5000 INJECTION, SOLUTION INTRAVENOUS CONTINUOUS
Status: DISCONTINUED | OUTPATIENT
Start: 2021-08-14 | End: 2021-08-15

## 2021-08-14 RX ORDER — ACETAMINOPHEN 325 MG/1
650 TABLET ORAL EVERY 6 HOURS PRN
Status: DISCONTINUED | OUTPATIENT
Start: 2021-08-14 | End: 2021-08-18 | Stop reason: HOSPADM

## 2021-08-14 RX ORDER — LIDOCAINE 40 MG/G
CREAM TOPICAL
Status: DISCONTINUED | OUTPATIENT
Start: 2021-08-14 | End: 2021-08-18 | Stop reason: HOSPADM

## 2021-08-14 RX ORDER — ACETAMINOPHEN 650 MG/1
650 SUPPOSITORY RECTAL EVERY 6 HOURS PRN
Status: DISCONTINUED | OUTPATIENT
Start: 2021-08-14 | End: 2021-08-18 | Stop reason: HOSPADM

## 2021-08-14 RX ORDER — PRAVASTATIN SODIUM 20 MG
20 TABLET ORAL DAILY
Status: DISCONTINUED | OUTPATIENT
Start: 2021-08-14 | End: 2021-08-18 | Stop reason: HOSPADM

## 2021-08-14 RX ORDER — METHYLPREDNISOLONE SODIUM SUCCINATE 40 MG/ML
40 INJECTION, POWDER, LYOPHILIZED, FOR SOLUTION INTRAMUSCULAR; INTRAVENOUS EVERY 12 HOURS
Status: DISCONTINUED | OUTPATIENT
Start: 2021-08-14 | End: 2021-08-15

## 2021-08-14 RX ORDER — LABETALOL HYDROCHLORIDE 5 MG/ML
10 INJECTION, SOLUTION INTRAVENOUS ONCE
Status: COMPLETED | OUTPATIENT
Start: 2021-08-14 | End: 2021-08-14

## 2021-08-14 RX ORDER — AMLODIPINE BESYLATE 5 MG/1
5 TABLET ORAL DAILY
Status: DISCONTINUED | OUTPATIENT
Start: 2021-08-14 | End: 2021-08-18 | Stop reason: HOSPADM

## 2021-08-14 RX ORDER — ATENOLOL 50 MG/1
100 TABLET ORAL DAILY
Status: DISCONTINUED | OUTPATIENT
Start: 2021-08-14 | End: 2021-08-18 | Stop reason: HOSPADM

## 2021-08-14 RX ADMIN — MICONAZOLE NITRATE: 20 POWDER TOPICAL at 21:49

## 2021-08-14 RX ADMIN — AMLODIPINE BESYLATE 5 MG: 5 TABLET ORAL at 17:19

## 2021-08-14 RX ADMIN — ACETAMINOPHEN 650 MG: 325 TABLET, FILM COATED ORAL at 21:49

## 2021-08-14 RX ADMIN — PRAVASTATIN SODIUM 20 MG: 20 TABLET ORAL at 17:19

## 2021-08-14 RX ADMIN — ASPIRIN 81 MG CHEWABLE TABLET 324 MG: 81 TABLET CHEWABLE at 13:41

## 2021-08-14 RX ADMIN — HEPARIN SODIUM 1000 UNITS/HR: 10000 INJECTION, SOLUTION INTRAVENOUS at 19:15

## 2021-08-14 RX ADMIN — METHYLPREDNISOLONE SODIUM SUCCINATE 40 MG: 40 INJECTION, POWDER, FOR SOLUTION INTRAMUSCULAR; INTRAVENOUS at 19:27

## 2021-08-14 RX ADMIN — OMEPRAZOLE 20 MG: 20 CAPSULE, DELAYED RELEASE ORAL at 17:19

## 2021-08-14 RX ADMIN — HEPARIN SODIUM 1000 UNITS/HR: 10000 INJECTION, SOLUTION INTRAVENOUS at 13:43

## 2021-08-14 RX ADMIN — IPRATROPIUM BROMIDE AND ALBUTEROL SULFATE 3 ML: .5; 3 SOLUTION RESPIRATORY (INHALATION) at 20:28

## 2021-08-14 RX ADMIN — LABETALOL HYDROCHLORIDE 10 MG: 5 INJECTION, SOLUTION INTRAVENOUS at 12:21

## 2021-08-14 RX ADMIN — IOPAMIDOL 70 ML: 755 INJECTION, SOLUTION INTRAVENOUS at 14:07

## 2021-08-14 RX ADMIN — ATENOLOL 100 MG: 50 TABLET ORAL at 17:19

## 2021-08-14 RX ADMIN — SODIUM CHLORIDE 83 ML: 9 INJECTION, SOLUTION INTRAVENOUS at 14:06

## 2021-08-14 ASSESSMENT — ACTIVITIES OF DAILY LIVING (ADL)
ADLS_ACUITY_SCORE: 14
ADLS_ACUITY_SCORE: 16

## 2021-08-14 ASSESSMENT — ENCOUNTER SYMPTOMS
ARTHRALGIAS: 1
COUGH: 1

## 2021-08-14 ASSESSMENT — MIFFLIN-ST. JEOR: SCORE: 1363.07

## 2021-08-14 NOTE — H&P
Admitted: 08/14/2021    CHIEF COMPLAINT:  Fall.    HISTORY OF PRESENT ILLNESS:  History is mainly obtained from the patient.  This is an 85-year-old white female who ambulates short distances with a walker.  She has a history of COPD and uses oxygen p.r.n., hypertension, hyperlipidemia, chronic cough, who presents after she had a fall in the bathroom.  She had gone into her bathroom with her walker when she felt her knee buckle and subsequently landed on her right side.  She feels she may have twisted her right ankle and has been having pain there.  She denies any loss of consciousness.  She denies any head trauma.  She denies any recent fevers or chills.  She denies any chest pain to me.  She has a chronic cough with sputum, which is productive, but over the past few days, it has been more thick.  In the ER over here, she is seen by Dr. Mancera.  She is noted to have a right ankle fracture and elevated troponin and elevated D-dimer.  I am asked to admit her for further evaluation.    PAST MEDICAL HISTORY:  Hypertension, COPD, history of DVT, osteoporosis, hyperlipidemia.    PAST SURGICAL HISTORY:  Significant for cholecystectomy, bilateral knee arthroplasties, left hip arthroplasty.    FAMILY HISTORY:  Significant for mother who had breast cancer.    MEDICATIONS:  Home medication regimen awaits reconciliation, but includes Symbicort, Lasix, losartan, Pravachol, Hytrin, Prilosec, Spiriva, Norvasc, atenolol.    ALLERGIES:  NO KNOWN DRUG ALLERGIES.    REVIEW OF SYSTEMS:  As mentioned in the HPI.  She lives with a friend who does most of the cares for her at home.  The patient predominantly sits in a recliner.  She is able to ambulate very short distances.  All other systems are reviewed and deemed unremarkable and negative.    SOCIAL HISTORY:  The patient does not smoke or drink alcohol.    PHYSICAL EXAMINATION:    VITAL SIGNS:  Her temperature is 97.6, pulse is 68.  Her blood pressure is 173/79, respiratory rate is  29, O2 sat is 90% on 2 liters.  GENERAL:  The patient is alert, awake, oriented, appears quite frail, in no acute distress on exam.  HEENT:  Pupils equal, round, react to light.  LUNGS:  She has diminished breath sounds bilaterally.  HEART:  Regular rate.  S1, S2 normal.  No murmurs or gallops are.  ABDOMEN:  Soft, nontender, nondistended with good bowel sounds.  EXTREMITIES:  She has 1+ edema on the right lower extremity.    NEUROLOGIC:  She has difficulty with pain with range of motion of the right ankle with swelling there and tenderness to palpation on her ankle joint.  She has difficulty moving her left upper extremity above her shoulder, which is chronic.  SKIN:  No rash.    LABORATORY DATA:  Lab work obtained included a CMP which is grossly unremarkable.  Her BNP was 1670.  Her troponin was 0.530.  On a CBC, her white cell count is elevated at 11.1 with an absolute neutrophil count of 7.9.  A urinalysis shows 6 RBCs, 9 WBCs, trace leukocyte esterase, positive nitrites.  Fast COVID test is read as negative.  Her D-dimer was 12.89.  An EKG was read as normal sinus rhythm at 62 beats per minute.    IMAGING STUDIES:  She had the following imaging studies to include CT of the head without contrast, which showed no acute intracranial process.  X-ray of her ankles bilaterally, whereby the left ankle showed no fracture, the right ankle showed a nondisplaced transverse fracture through the medial malleolus and probable nondisplaced fracture of the posterior malleolus, equivocal nondisplaced lateral malleolus fracture, soft tissue swelling.  Chest x-ray showed that the lungs are clear.    ASSESSMENT AND PLAN:   1.  Mechanical fall with subsequent right ankle fracture.  We will admit her as an inpatient. She has quite a bit of swelling of RLE so will get U/S to r/o DVT.  A boot has been fitted for her.  We will consult Orthopedics.  We will manage her pain. Likely nonoperative management given her limited mobility at  baseline.  2.  Elevated troponin, significance unknown, probable type 2 MI.  She is going to get a CT of the chest as her D-dimer is elevated.  We will follow up on that.  We will monitor on telemetry, get serial troponins on her and get an echocardiogram. Continue IV heparin that was started in ER.  3.  Hypertensive urgency, likely contributing to the elevated troponin.  Her blood pressure is markedly high.  We will resume her home medications.  4. Acute exacerbation of COPD.   Nebs, IV steroids, check procal.    CODE STATUS:  DNR/DNI.      She will be admitted as an inpatient to telemetry.    Wiliam Rajput MD        D: 2021   T: 2021   MT: NOLAN    Name:     FER MILES  MRN:      [id number]        Account:     [id number]   :      1935           Admitted:    2021       Document: [id number]    cc:  Vivian Blue MD

## 2021-08-14 NOTE — ED PROVIDER NOTES
History   Chief Complaint:  Fall     The history is provided by the patient.      Marino Prajapati is a 85 year old female with history of hypertension and hyperlipidemia who presents after a fall. Per the triage note, she fell earlier this morning at home. Her family was unsuccessful in getting her up. Patient endorses ankle pain.     Per the patient, she lost her ability to stand. Notes bilateral leg pain and cough (baseline). Denies hip pain.  Denies falling often. Denies pain when she is stationary.    Review of Systems   Constitutional:        Fall    Respiratory: Positive for cough (baseline).    Musculoskeletal: Positive for arthralgias.        Negative for hip pain    All other systems reviewed and are negative.      Allergies:  Atorvastatin  Lisinopril    Medications:  albuterol  amlodipine  atenolol   budesonide-formoterol   cholecalciferol   furosemide   Duoneb   levothyroxine   losartan   nystatin   pravastatin   terazosin   tiotropium   tramadol     Past Medical History:    Anemia  Colon polyps   DVT   Gastroesophageal reflux disease   Hypertension   Hyperlipidemia  Kidney stone  Osteoarthritis   Osteoporosis   Thrombosis of leg   Cognitive impairment   Pulmonary embolism   Onychomycosis  Pneumonia   Hypothyroidism   Hypoxia   Lymphedema   Hyperparathyroidism   Asthma  COPD  DJD  Histoplasmosis  Diverticulosis of large intestine  Disorder of bone and cartilage   Internal hemorrhoids   Pure Hypercholesteremia   Pathologic fracture of vertebrae  Histoplasmosis   Pyogenic granuloma of skin and subcutaneous tissue  Congenital deficiency of clotting factors    Past Surgical History:    Arthoplasty hip x2  Arthoplasty knee x2  Cholecystectomy   C prep face/oral prost palatal lift   Laser holmium lithotripsy ureters, insert stent, combined   Liposuction (tummy)   Strip vein    Family History:    Mother: breast cancer  Father: blood clots    Social History:  Arrival via EMS   PCP: Vivian Blue  Hope  Lives at home with her .     Physical Exam     Patient Vitals for the past 24 hrs:   BP Temp Temp src Pulse Resp SpO2 Weight   08/14/21 1255 -- -- -- -- -- -- 82.6 kg (182 lb 3.2 oz)   08/14/21 1245 (!) 173/79 -- -- 68 29 90 % --   08/14/21 1230 (!) 153/93 -- -- 68 22 96 % --   08/14/21 1220 (!) 212/90 -- -- 67 -- 94 % --   08/14/21 1215 (!) 202/84 -- -- 61 -- 92 % --   08/14/21 1200 (!) 191/83 -- -- 64 -- 94 % --   08/14/21 1145 (!) 217/94 -- -- 75 -- 94 % --   08/14/21 1100 (!) 193/81 -- -- 61 -- 93 % --   08/14/21 1045 (!) 176/91 -- -- 62 -- 95 % --   08/14/21 1030 (!) 191/89 -- -- 64 -- 94 % --   08/14/21 1015 (!) 193/88 -- -- 62 -- 93 % --   08/14/21 1012 -- -- -- -- -- 93 % --   08/14/21 1011 -- -- -- -- -- 90 % --   08/14/21 1006 -- -- -- -- -- (!) 88 % --   08/14/21 1005 -- -- -- -- -- (!) 89 % --   08/14/21 1000 (!) 184/81 -- -- 62 -- 91 % --   08/14/21 0947 -- 97.6  F (36.4  C) Oral -- -- -- --       Physical Exam    Nursing note and vitals reviewed.  HENT:   Mouth/Throat: Moist mucous membranes.   Eyes: EOMI, nonicteric sclera  Cardiovascular: Normal rate, regular rhythm, no murmurs, rubs, or gallops  Pulmonary/Chest: Effort normal and breath sounds normal. No respiratory distress. No wheezes. No rales.   Abdominal: Soft. Nontender, nondistended, no guarding or rigidity.   Musculoskeletal: Normal range of motion. Pain with ROM bilateral ankles. No pain with hip and knee ROM.   Neurological: Alert. Moves all extremities spontaneously.   Skin: Skin is warm and dry. No rash noted.   Psychiatric: Normal mood and affect.       Emergency Department Course     ECG:  ECG taken at 1014, ECG read at 1037  Normal sinus rhythm  Normal ECG  Rate 62 bpm. HI interval 156 ms. QRS duration 82 ms. QT/QTc 478/485 ms. P-R-T axes 31 6 23.    Imaging:  XR Chest 1 View   Final Result   IMPRESSION: Portable chest. Lungs are clear. Heart is normal in size.   No pneumothorax. No definite pleural effusions.  Degenerative bilateral   glenohumeral joint changes are again noted.       CHARISSE ZHAO MD            SYSTEM ID:  [id number]      XR Ankle Bilateral G/E 3 Views   Final Result   IMPRESSION:    3 views of the left ankle show no definitive fracture. Bones are   demineralized. Ankle mortise is intact.      AP and lateral views of the right ankle are submitted for   interpretation, which show a nondisplaced transverse fracture through   the medial malleolus and probable nondisplaced fracture of the   posterior malleolus. Equivocal nondisplaced lateral malleolus   fracture. Mild tibiotalar joint degenerative change. Soft tissue   swelling. Bones are demineralized.      MIKE RENEE MD            SYSTEM ID:  [id number]      Head CT w/o contrast   Final Result   IMPRESSION: Diffuse cerebral volume loss and cerebral white matter   changes consistent with chronic small vessel ischemic disease. No   evidence for acute intracranial pathology.      Radiation dose for this scan was reduced using automated exposure   control, adjustment of the mA and/or kV according to patient size, or   iterative reconstruction technique.       BOOKER VILLALOBOS MD            SYSTEM ID:  [id number]      CT Chest Pulmonary Embolism w Contrast    (Results Pending)     Laboratory:  CBC: WBC 11.1 (H) , HGB 15.2,    CMP: Glucose 110 (H), Creatinine 0.58 o/w WNL  BNP: 1,670  Troponin (Collected 1001): 0.530 (HH)   Symptomatic COVID-19 Virus (Coronavirus) by PCR Nasopharyngeal : Negative   D dimer qualitative: 12.89 (H)   UA with microscopic: appearance slightly cloudy, ketones 40, blood small, Protein Albumin 70, nitrite positive, leukocyte esterase trace, bacteria few, mucus present, RBC 6 (H), WBC 9 (H) o/w WNL  Urine Culture: pending     Procedures    Emergency Department Course:    Reviewed:  I reviewed nursing notes, vitals, past medical history and care everywhere    Assessments:  0940 I obtained history and examined the patient as noted  above.      Consults:   1205 : I spoke with Dr. Rajput of the Hospitalist service from [facility] regarding patient's presentation, findings, and plan of care.    Interventions:  1221 labetalol 10mg IV     Disposition:  The patient was admitted to the hospital under the care of Dr. Rajput.     Impression & Plan     Medical Decision Making:  Patient presents after fall at home.  She was unable to get up off the floor.  Uncertain etiology of the fall, but patient does appear to have a right ankle fracture.  Fracture boot was placed.  Patient also with chronic cough and dyspnea.  Chest x-ray negative.  Troponin was obtained as part of elderly weak work-up and was found to be elevated.  Uncertain etiology.  We will treat this as an NSTEMI with IV heparin.  Previous troponin was negative in 2019.  D-dimer was elevated and CT of the chest was ordered.  Due to IV access problems, CT was unable to be obtained and patient was admitted upstairs where there going to reattempt IV access before obtaining CT.  Patient's blood pressure also elevated upon arrival.  She did receive 10 mg dose of labetalol with some improvement of her blood pressure.  This may be contributing to her troponin elevation.  Discussed all this with patient at bedside.  Her daughter had already left.  All of patient's questions were answered and she is in agreement with plan for admission.  Dr. Rajput accepts patient for admission.  All questions answered.      Covid-19  Marino Prajapati was evaluated during a global COVID-19 pandemic, which necessitated consideration that the patient might be at risk for infection with the SARS-CoV-2 virus that causes COVID-19.   Applicable protocols for evaluation were followed during the patient's care.   COVID-19 was considered as part of the patient's evaluation. The plan for testing is:  a test was obtained during this visit.    Diagnosis:    ICD-10-CM    1. NSTEMI (non-ST elevated myocardial infarction) (H)   I21.4    2. Fall, initial encounter  W19.XXXA    3. Ankle fracture, right, closed, initial encounter  S82.891A      Scribe Disclosure:  I, Norah Newmannis, am serving as a scribe at 9:31 AM on 8/14/2021 to document services personally performed by Suman Mancera MD based on my observations and the provider's statements to me.        Suman Mancera MD  08/14/21 [id number]

## 2021-08-14 NOTE — ED TRIAGE NOTES
Patient arrives via EMS from home after falling early this morning. Per EMS, family attempted unsuccessfully to get patient up. Left leg internally rotated and painful to touch. ABCs intact, alert and orientated.

## 2021-08-14 NOTE — PROGRESS NOTES
Patient arrived to CT 2x with poor IV . CT tech contacted 3rd floor to inform them that the CT scan was not done yet, due to poor IV access. A new IV is needed and also new order with hospitalist name on CT order is needed. Please call CT back when this is done.

## 2021-08-14 NOTE — PHARMACY-ADMISSION MEDICATION HISTORY
Admission medication history interview status for this patient is complete. See [facility] admission navigator for allergy information, prior to admission medications and immunization status.     Medication history interview done, indicate source(s): significant other - Josue.  Medication history resources (including written lists, pill bottles, clinic record): home pill bottles per Josue, Walter and Care Everywhere  Pharmacy: [facility]. [facility] for discharge    Changes made to PTA medication list:  Added: omeprazole  Changed: APAP 1000mg at bedtime --> 1000mg BID with tramadol. Symbicort BID --> BID PRN (how pt takes). Losartan 100mg daily --> 50mg daily  Reported as Not Taking: Spiriva  Removed: levothyroxine    Actions taken by pharmacist (provider contacted, etc): Pt suspected Covid, called phone number listed and S/O Josue answered and confirmed home med list     Additional medication history information: Ran out of terazosin a few weeks ago, has not refilled it yet.     Medication reconciliation/reorder completed by provider prior to medication history?  N   (Y/N)       Prior to Admission medications    Medication Sig Last Dose Taking? Auth Provider   acetaminophen (TYLENOL) 500 MG tablet Take 1,000 mg by mouth 2 times daily Takes with tramadol. 8/14/2021 at am Yes Unknown, Entered By History   albuterol (PROAIR HFA/PROVENTIL HFA/VENTOLIN HFA) 108 (90 Base) MCG/ACT inhaler Inhale 2 puffs into the lungs every 6 hours as needed for shortness of breath / dyspnea or wheezing Past Week at Unknown time Yes Vivian Blue MD   amLODIPine (NORVASC) 5 MG tablet TAKE 1 TABLET DAILY 8/13/2021 at am Yes Vivian Blue MD   atenolol (TENORMIN) 100 MG tablet TAKE 1 TABLET DAILY 8/13/2021 at am Yes Vivian Blue MD   budesonide-formoterol (SYMBICORT) 160-4.5 MCG/ACT Inhaler Inhale 2 puffs into the lungs 2 times daily  Patient taking differently: Inhale 2 puffs into  the lungs 2 times daily as needed  Past Week at Unknown time Yes Vivian Blue MD   cholecalciferol (VITAMIN D) 1000 UNIT tablet Take 1,000 Units by mouth daily  8/13/2021 at am Yes Vivian Blue MD   furosemide (LASIX) 40 MG tablet TAKE 1 TABLET DAILY 8/13/2021 at am Yes Vivian Blue MD   ipratropium - albuterol 0.5 mg/2.5 mg/3 mL (DUONEB) 0.5-2.5 (3) MG/3ML neb solution INHALE CONTENTS OF 1 VIAL (3 MLS) VIA NEBULIZER EVERY 6 HOURS AS NEEDED FOR SHORTNESS OF BREATH, DYSPNEA, OR WHEEZING Past Week at Unknown time Yes Vivian Blue MD   losartan (COZAAR) 100 MG tablet TAKE 1 TABLET DAILY  Patient taking differently: Take 50 mg by mouth daily  8/13/2021 at am Yes Vivian Blue MD   nystatin (MYCOSTATIN) 751622 UNIT/GM external powder Apply topically 2 times daily as needed (skin rash) Past Week at Unknown time Yes Vivian Blue MD   omeprazole (PRILOSEC) 20 MG DR capsule Take 20 mg by mouth daily 8/13/2021 at am Yes Unknown, Entered By History   pravastatin (PRAVACHOL) 20 MG tablet TAKE 1 TABLET DAILY 8/13/2021 at am Yes Vivian Blue MD   terazosin (HYTRIN) 2 MG capsule TAKE 1 CAPSULE AT BEDTIME Past Month at Unknown time Yes Vivian Blue MD   traMADol (ULTRAM) 50 MG tablet Take 1 tablet (50 mg) by mouth 2 times daily 8/14/2021 at 0900 Yes Vivian Blue MD   tiotropium (SPIRIVA) 18 MCG inhaled capsule Inhale 1 capsule (18 mcg) into the lungs daily  Patient not taking: Reported on 8/14/2021 Not Taking at Unknown time  Vivian Blue MD

## 2021-08-14 NOTE — ED NOTES
DATE:  8/14/2021   TIME OF RECEIPT FROM LAB:  1039  LAB TEST:  Troponin    LAB VALUE:  0.580  (connection was VERY bad)  RESULTS GIVEN WITH READ-BACK TO (PROVIDER):  [place]   TIME LAB VALUE REPORTED TO PROVIDER:   1049

## 2021-08-14 NOTE — ED NOTES
[facility]  ED Nurse Handoff Report    Marino Prajapati is a 85 year old female   ED Chief complaint: Fall  . ED Diagnosis:   Final diagnoses:   NSTEMI (non-ST elevated myocardial infarction) (H)   Fall, initial encounter   Ankle fracture, right, closed, initial encounter     Allergies: No Known Allergies    Code Status: DNR / DNI  Activity level - Baseline/Home:  Assist X 1. Activity Level - Current:   Total Care. Lift room needed: Yes. Bariatric: No   Needed: No   Isolation: No. Infection: Not Applicable.     Vital Signs:   Vitals:    08/14/21 1220 08/14/21 1230 08/14/21 1245 08/14/21 1255   BP: (!) 212/90 (!) 153/93 (!) 173/79    Pulse: 67 68 68    Resp:  22 29    Temp:       TempSrc:       SpO2: 94% 96% 90%    Weight:    82.6 kg (182 lb 3.2 oz)       Cardiac Rhythm:  ,      Pain level:    Patient confused: Yes, forgetful. Patient Falls Risk: Yes.   Elimination Status: purewick in place, incontinent  Patient Report - Initial Complaint: Fall. Focused Assessment: Marino Prajapati is a 85 year old female with history of hypertension and hyperlipidemia who presents after a fall. Per the triage note, she fell earlier this morning at home. Her family was unsuccessful in getting her up. Patient endorses ankle pain.    Tests Performed:   XR Chest 1 View   Final Result   IMPRESSION: Portable chest. Lungs are clear. Heart is normal in size.   No pneumothorax. No definite pleural effusions. Degenerative bilateral   glenohumeral joint changes are again noted.       CHARISSE ZHAO MD            SYSTEM ID:  [id number]      XR Ankle Bilateral G/E 3 Views   Final Result   IMPRESSION:    3 views of the left ankle show no definitive fracture. Bones are   demineralized. Ankle mortise is intact.      AP and lateral views of the right ankle are submitted for   interpretation, which show a nondisplaced transverse fracture through   the medial malleolus and probable nondisplaced fracture of the   posterior  malleolus. Equivocal nondisplaced lateral malleolus   fracture. Mild tibiotalar joint degenerative change. Soft tissue   swelling. Bones are demineralized.      MIKE RENEE MD            SYSTEM ID:  [id number]      Head CT w/o contrast   Final Result   IMPRESSION: Diffuse cerebral volume loss and cerebral white matter   changes consistent with chronic small vessel ischemic disease. No   evidence for acute intracranial pathology.      Radiation dose for this scan was reduced using automated exposure   control, adjustment of the mA and/or kV according to patient size, or   iterative reconstruction technique.       BOOKER VILLALOBOS MD            SYSTEM ID:  [id number]      CT Chest Pulmonary Embolism w Contrast    (Results Pending)     Labs Ordered and Resulted from Time of ED Arrival Up to the Time of Departure from the ED   COMPREHENSIVE METABOLIC PANEL - Abnormal; Notable for the following components:       Result Value    Glucose 110 (*)     All other components within normal limits   ROUTINE UA WITH MICROSCOPIC REFLEX TO CULTURE - Abnormal; Notable for the following components:    Appearance Urine Slightly Cloudy (*)     Ketones Urine 40  (*)     Blood Urine Small (*)     Protein Albumin Urine 70  (*)     Nitrite Urine Positive (*)     Leukocyte Esterase Urine Trace (*)     Bacteria Urine Few (*)     Mucus Urine Present (*)     RBC Urine 6 (*)     WBC Urine 9 (*)     All other components within normal limits    Narrative:     Urine Culture ordered based on laboratory criteria   TROPONIN I - Abnormal; Notable for the following components:    Troponin I 0.530 (*)     All other components within normal limits   CBC WITH PLATELETS AND DIFFERENTIAL - Abnormal; Notable for the following components:    WBC Count 11.1 (*)     Absolute Immature Granulocytes 0.1 (*)     All other components within normal limits   D DIMER QUANTITATIVE - Abnormal; Notable for the following components:    D-Dimer Quantitative 12.89 (*)      All other components within normal limits    Narrative:     This D-dimer assay is intended for use in conjunction with a clinical pretest probability assessment model to exclude pulmonary embolism (PE) and deep venous thrombosis (DVT) in outpatients suspected of PE or DVT. The cut-off value is 0.50 ug/mL FEU.   COVID-19 VIRUS (CORONAVIRUS) BY PCR - Normal    Narrative:     Testing was performed using the igor  SARS-CoV-2 & Influenza A/B Assay on the igor  Rayna  System.  This test should be ordered for the detection of SARS-COV-2 in individuals who meet SARS-CoV-2 clinical and/or epidemiological criteria. Test performance is unknown in asymptomatic patients.  This test is for in vitro diagnostic use under the FDA EUA for laboratories certified under CLIA to perform moderate and/or high complexity testing. This test has not been FDA cleared or approved.  A negative test does not rule out the presence of PCR inhibitors in the specimen or target RNA in concentration below the limit of detection for the assay. The possibility of a false negative should be considered if the patient's recent exposure or clinical presentation suggests COVID-19.  [facility] are certified under the Clinical Laboratory Improvement Amendments of 1988 (CLIA-88) as qualified to perform moderate and/or high complexity laboratory testing.   NT PROBNP INPATIENT - Normal   CBC WITH PLATELETS & DIFFERENTIAL    Narrative:     The following orders were created for panel order CBC + differential.  Procedure                               Abnormality         Status                     ---------                               -----------         ------                     CBC with platelets and d...[[id number]]  Abnormal            Final result                 Please view results for these tests on the individual orders.   EXTRA RED TOP TUBE   EXTRA GREEN TOP (LITHIUM HEPARIN) TUBE   PERIPHERAL IV CATHETER   MEASURE WEIGHT   URINE  CULTURE   EXTRA TUBE    Narrative:     The following orders were created for panel order Diana Draw.  Procedure                               Abnormality         Status                     ---------                               -----------         ------                     Extra Red Top Tube[[id number]]                               Final result               Extra Green Top (Lithium...[[id number]]                      Final result                 Please view results for these tests on the individual orders.     . Abnormal Results:   Abnormal Labs Reviewed   COMPREHENSIVE METABOLIC PANEL - Abnormal; Notable for the following components:       Result Value    Glucose 110 (*)     All other components within normal limits   ROUTINE UA WITH MICROSCOPIC REFLEX TO CULTURE - Abnormal; Notable for the following components:    Appearance Urine Slightly Cloudy (*)     Ketones Urine 40  (*)     Blood Urine Small (*)     Protein Albumin Urine 70  (*)     Nitrite Urine Positive (*)     Leukocyte Esterase Urine Trace (*)     Bacteria Urine Few (*)     Mucus Urine Present (*)     RBC Urine 6 (*)     WBC Urine 9 (*)     All other components within normal limits    Narrative:     Urine Culture ordered based on laboratory criteria   TROPONIN I - Abnormal; Notable for the following components:    Troponin I 0.530 (*)     All other components within normal limits   CBC WITH PLATELETS AND DIFFERENTIAL - Abnormal; Notable for the following components:    WBC Count 11.1 (*)     Absolute Immature Granulocytes 0.1 (*)     All other components within normal limits   D DIMER QUANTITATIVE - Abnormal; Notable for the following components:    D-Dimer Quantitative 12.89 (*)     All other components within normal limits    Narrative:     This D-dimer assay is intended for use in conjunction with a clinical pretest probability assessment model to exclude pulmonary embolism (PE) and deep venous thrombosis (DVT) in outpatients suspected of PE or  DVT. The cut-off value is 0.50 ug/mL FEU.       Treatments provided: labs, imaging  Family Comments: daughter present at bedside this shift  OBS brochure/video discussed/provided to patient:  No  ED Medications:   Medications   aspirin (ASA) chewable tablet 324 mg (has no administration in time range)   heparin 25,000 units in 0.45% NaCl 250 mL ANTICOAGULANT infusion (has no administration in time range)   heparin loading dose for LOW INTENSITY TREATMENT * Give BEFORE starting heparin infusion (has no administration in time range)   labetalol (NORMODYNE/TRANDATE) injection 10 mg (10 mg Intravenous Given 8/14/21 1221)     Drips infusing:  No  For the majority of the shift, the patient's behavior Green. Interventions performed were update on plan of care.    Sepsis treatment initiated: No     Patient tested for COVID 19 prior to admission: YES    ED Nurse Name/Phone Number: Judit Mclaughlin RN,   1:14 PM    RECEIVING UNIT ED HANDOFF REVIEW    Above ED Nurse Handoff Report was reviewed: Yes  Reviewed by: Courtney Melendez RN on August 14, 2021 at 1:27 PM

## 2021-08-15 ENCOUNTER — APPOINTMENT (OUTPATIENT)
Dept: CARDIOLOGY | Facility: CLINIC | Age: 86
DRG: 542 | End: 2021-08-15
Attending: INTERNAL MEDICINE
Payer: COMMERCIAL

## 2021-08-15 ENCOUNTER — APPOINTMENT (OUTPATIENT)
Dept: PHYSICAL THERAPY | Facility: CLINIC | Age: 86
DRG: 542 | End: 2021-08-15
Attending: INTERNAL MEDICINE
Payer: COMMERCIAL

## 2021-08-15 LAB
ANION GAP SERPL CALCULATED.3IONS-SCNC: 7 MMOL/L (ref 3–14)
BACTERIA UR CULT: ABNORMAL
BASOPHILS # BLD AUTO: 0 10E3/UL (ref 0–0.2)
BASOPHILS NFR BLD AUTO: 0 %
BUN SERPL-MCNC: 13 MG/DL (ref 7–30)
CALCIUM SERPL-MCNC: 8.6 MG/DL (ref 8.5–10.1)
CHLORIDE BLD-SCNC: 106 MMOL/L (ref 94–109)
CO2 SERPL-SCNC: 26 MMOL/L (ref 20–32)
CREAT SERPL-MCNC: 0.59 MG/DL (ref 0.52–1.04)
EOSINOPHIL # BLD AUTO: 0 10E3/UL (ref 0–0.7)
EOSINOPHIL NFR BLD AUTO: 0 %
ERYTHROCYTE [DISTWIDTH] IN BLOOD BY AUTOMATED COUNT: 13.9 % (ref 10–15)
GFR SERPL CREATININE-BSD FRML MDRD: 85 ML/MIN/1.73M2
GLUCOSE BLD-MCNC: 117 MG/DL (ref 70–99)
HCT VFR BLD AUTO: 41.2 % (ref 35–47)
HGB BLD-MCNC: 13.8 G/DL (ref 11.7–15.7)
IMM GRANULOCYTES # BLD: 0.1 10E3/UL
IMM GRANULOCYTES NFR BLD: 1 %
LVEF ECHO: NORMAL
LYMPHOCYTES # BLD AUTO: 1.7 10E3/UL (ref 0.8–5.3)
LYMPHOCYTES NFR BLD AUTO: 19 %
MCH RBC QN AUTO: 30.6 PG (ref 26.5–33)
MCHC RBC AUTO-ENTMCNC: 33.5 G/DL (ref 31.5–36.5)
MCV RBC AUTO: 91 FL (ref 78–100)
MONOCYTES # BLD AUTO: 0.5 10E3/UL (ref 0–1.3)
MONOCYTES NFR BLD AUTO: 6 %
NEUTROPHILS # BLD AUTO: 6.8 10E3/UL (ref 1.6–8.3)
NEUTROPHILS NFR BLD AUTO: 74 %
NRBC # BLD AUTO: 0 10E3/UL
NRBC BLD AUTO-RTO: 0 /100
PLATELET # BLD AUTO: 202 10E3/UL (ref 150–450)
POTASSIUM BLD-SCNC: 3.7 MMOL/L (ref 3.4–5.3)
RBC # BLD AUTO: 4.51 10E6/UL (ref 3.8–5.2)
SODIUM SERPL-SCNC: 139 MMOL/L (ref 133–144)
TROPONIN I SERPL-MCNC: 0.17 UG/L (ref 0–0.04)
UFH PPP CHRO-ACNC: 0.19 IU/ML
UFH PPP CHRO-ACNC: 0.89 IU/ML
WBC # BLD AUTO: 9.1 10E3/UL (ref 4–11)

## 2021-08-15 PROCEDURE — 97530 THERAPEUTIC ACTIVITIES: CPT | Mod: GP

## 2021-08-15 PROCEDURE — 93306 TTE W/DOPPLER COMPLETE: CPT | Mod: 26 | Performed by: INTERNAL MEDICINE

## 2021-08-15 PROCEDURE — 255N000002 HC RX 255 OP 636: Performed by: INTERNAL MEDICINE

## 2021-08-15 PROCEDURE — 250N000011 HC RX IP 250 OP 636: Performed by: INTERNAL MEDICINE

## 2021-08-15 PROCEDURE — 85520 HEPARIN ASSAY: CPT | Performed by: INTERNAL MEDICINE

## 2021-08-15 PROCEDURE — 84484 ASSAY OF TROPONIN QUANT: CPT | Performed by: INTERNAL MEDICINE

## 2021-08-15 PROCEDURE — 250N000013 HC RX MED GY IP 250 OP 250 PS 637: Performed by: INTERNAL MEDICINE

## 2021-08-15 PROCEDURE — 999N000208 ECHOCARDIOGRAM COMPLETE

## 2021-08-15 PROCEDURE — 120N000001 HC R&B MED SURG/OB

## 2021-08-15 PROCEDURE — 250N000009 HC RX 250: Performed by: INTERNAL MEDICINE

## 2021-08-15 PROCEDURE — 97161 PT EVAL LOW COMPLEX 20 MIN: CPT | Mod: GP

## 2021-08-15 PROCEDURE — 85025 COMPLETE CBC W/AUTO DIFF WBC: CPT | Performed by: INTERNAL MEDICINE

## 2021-08-15 PROCEDURE — 99233 SBSQ HOSP IP/OBS HIGH 50: CPT | Performed by: INTERNAL MEDICINE

## 2021-08-15 PROCEDURE — 250N000012 HC RX MED GY IP 250 OP 636 PS 637: Performed by: INTERNAL MEDICINE

## 2021-08-15 PROCEDURE — 36415 COLL VENOUS BLD VENIPUNCTURE: CPT | Performed by: INTERNAL MEDICINE

## 2021-08-15 PROCEDURE — 80048 BASIC METABOLIC PNL TOTAL CA: CPT | Performed by: INTERNAL MEDICINE

## 2021-08-15 PROCEDURE — C8929 TTE W OR WO FOL WCON,DOPPLER: HCPCS

## 2021-08-15 PROCEDURE — 97110 THERAPEUTIC EXERCISES: CPT | Mod: GP

## 2021-08-15 RX ORDER — ASPIRIN 81 MG/1
81 TABLET, CHEWABLE ORAL DAILY
Status: DISCONTINUED | OUTPATIENT
Start: 2021-08-15 | End: 2021-08-18 | Stop reason: HOSPADM

## 2021-08-15 RX ORDER — HEPARIN SODIUM 5000 [USP'U]/.5ML
5000 INJECTION, SOLUTION INTRAVENOUS; SUBCUTANEOUS EVERY 12 HOURS
Status: DISCONTINUED | OUTPATIENT
Start: 2021-08-15 | End: 2021-08-18 | Stop reason: HOSPADM

## 2021-08-15 RX ORDER — CEFTRIAXONE 1 G/1
1 INJECTION, POWDER, FOR SOLUTION INTRAMUSCULAR; INTRAVENOUS EVERY 24 HOURS
Status: DISCONTINUED | OUTPATIENT
Start: 2021-08-15 | End: 2021-08-17

## 2021-08-15 RX ORDER — PREDNISONE 20 MG/1
40 TABLET ORAL DAILY
Status: DISCONTINUED | OUTPATIENT
Start: 2021-08-15 | End: 2021-08-17

## 2021-08-15 RX ORDER — FLUORIDE TOOTHPASTE
5 TOOTHPASTE DENTAL 4 TIMES DAILY
Status: DISCONTINUED | OUTPATIENT
Start: 2021-08-15 | End: 2021-08-18 | Stop reason: HOSPADM

## 2021-08-15 RX ADMIN — HEPARIN SODIUM 5000 UNITS: 5000 INJECTION INTRAVENOUS; SUBCUTANEOUS at 09:22

## 2021-08-15 RX ADMIN — PRAVASTATIN SODIUM 20 MG: 20 TABLET ORAL at 09:07

## 2021-08-15 RX ADMIN — MICONAZOLE NITRATE: 20 POWDER TOPICAL at 09:08

## 2021-08-15 RX ADMIN — ACETAMINOPHEN 650 MG: 325 TABLET, FILM COATED ORAL at 11:36

## 2021-08-15 RX ADMIN — CEFTRIAXONE 1 G: 1 INJECTION, POWDER, FOR SOLUTION INTRAMUSCULAR; INTRAVENOUS at 11:37

## 2021-08-15 RX ADMIN — ATENOLOL 100 MG: 50 TABLET ORAL at 09:07

## 2021-08-15 RX ADMIN — Medication 5 ML: at 21:05

## 2021-08-15 RX ADMIN — MICONAZOLE NITRATE: 20 POWDER TOPICAL at 21:05

## 2021-08-15 RX ADMIN — HEPARIN SODIUM 1150 UNITS/HR: 10000 INJECTION, SOLUTION INTRAVENOUS at 01:27

## 2021-08-15 RX ADMIN — METHYLPREDNISOLONE SODIUM SUCCINATE 40 MG: 40 INJECTION, POWDER, FOR SOLUTION INTRAMUSCULAR; INTRAVENOUS at 05:10

## 2021-08-15 RX ADMIN — ACETAMINOPHEN 325 MG: 325 TABLET, FILM COATED ORAL at 05:11

## 2021-08-15 RX ADMIN — IPRATROPIUM BROMIDE AND ALBUTEROL SULFATE 3 ML: .5; 3 SOLUTION RESPIRATORY (INHALATION) at 21:04

## 2021-08-15 RX ADMIN — Medication 5 ML: at 17:44

## 2021-08-15 RX ADMIN — HEPARIN SODIUM 5000 UNITS: 5000 INJECTION INTRAVENOUS; SUBCUTANEOUS at 19:42

## 2021-08-15 RX ADMIN — PREDNISONE 40 MG: 20 TABLET ORAL at 09:07

## 2021-08-15 RX ADMIN — ASPIRIN 81 MG CHEWABLE TABLET 81 MG: 81 TABLET CHEWABLE at 09:07

## 2021-08-15 RX ADMIN — IPRATROPIUM BROMIDE AND ALBUTEROL SULFATE 3 ML: .5; 3 SOLUTION RESPIRATORY (INHALATION) at 13:07

## 2021-08-15 RX ADMIN — HUMAN ALBUMIN MICROSPHERES AND PERFLUTREN 3 ML: 10; .22 INJECTION, SOLUTION INTRAVENOUS at 11:09

## 2021-08-15 RX ADMIN — OMEPRAZOLE 20 MG: 20 CAPSULE, DELAYED RELEASE ORAL at 09:07

## 2021-08-15 RX ADMIN — IPRATROPIUM BROMIDE AND ALBUTEROL SULFATE 3 ML: .5; 3 SOLUTION RESPIRATORY (INHALATION) at 17:42

## 2021-08-15 RX ADMIN — Medication 5 ML: at 14:34

## 2021-08-15 RX ADMIN — AMLODIPINE BESYLATE 5 MG: 5 TABLET ORAL at 09:07

## 2021-08-15 ASSESSMENT — ACTIVITIES OF DAILY LIVING (ADL)
ADLS_ACUITY_SCORE: 18
ADLS_ACUITY_SCORE: 18
ADLS_ACUITY_SCORE: 16

## 2021-08-15 ASSESSMENT — MIFFLIN-ST. JEOR: SCORE: 1351.73

## 2021-08-15 NOTE — PLAN OF CARE
"BP (!) 158/58   Pulse 66   Temp 97.5  F (36.4  C) (Oral)   Resp 22   Ht 1.676 m (5' 6\")   Wt 89.1 kg (196 lb 8 oz)   SpO2 94%   BMI 31.72 kg/m      NEURO:A/Ox4  VS:BP elevated up to 180s systolic  PAIN:C/O RLE pain with movement  TELE:SR peaked Ts  IV:Hep gtt infusing at 10 ml/hr to RA  RESPIRATORY:LS- coarse, GRIFFITH, 2L O2 per NC  GI:+BS, intermittent nausea  :Incontinent, purewick in place  SKIN:Blanchable redness to left heel, scattered bruising, rash under right breast and right groin- Miconazole powdered ordered, 2+ edema to RLE- orthotic boot in place  ACTIVITY:Ax2 lift, Q2H turns as tolerated  DIET:Cardiac no caffeine  PLAN: Continue hep gtt, trend trops, BP control, scheduled nebs, and IV solumedrol, ortho consulted, continue POC.   "

## 2021-08-15 NOTE — PROGRESS NOTES
[facility]    Medicine Progress Note - Hospitalist Service       Date of Admission:  8/14/2021    Assessment & Plan            85-year-old white female who ambulates short distances with a walker.  She has a history of COPD and uses oxygen p.r.n., hypertension, hyperlipidemia, chronic cough, who presents after she had a fall in the bathroom.  She had gone into her bathroom with her walker when she felt her knee buckle and subsequently landed on her right side.  She feels she may have twisted her right ankle and has been having pain there.  She denies any loss of consciousness.  She denies any head trauma.  She denies any recent fevers or chills.  She denies any chest pain to me.  She has a chronic cough with sputum, which is productive, but over the past few days, it has been more thick.  In the ER over here, she is seen by Dr. Mancera.  She is noted to have a right ankle fracture and elevated troponin and elevated D-dimer.  I am asked to admit her for further evaluation.      1.  Mechanical fall with subsequent right ankle fracture.   A boot has been fitted for her.  We will consult Orthopedics.  We will manage her pain. Likely nonoperative management given her limited mobility at baseline.    2.  Elevated troponin, significance unknown, probable type 2 MI.   We will monitor on telemetry, get serial troponins on her.  - Peak troponin is 0.530.  - Echocardiogram pending.     3.  Hypertensive urgency, likely contributing to the elevated troponin.  Her blood pressure is markedly high.  We will resume her home medications.    4. Acute exacerbation of COPD.   Nebs, PO steroids,  Procal, wnl.    5. UTI.  - start IV rocephin.         Diet: Combination Diet Low Saturated Fat Na <2400mg Diet, No Caffeine Diet    DVT Prophylaxis: Heparin SQ  Kinney Catheter: Not present  Central Lines: None  Code Status: No CPR- Do NOT Intubate      Disposition Plan   Expected discharge: 08/17/2021 recommended to  transitional care unit once adequate pain management/ tolerating PO medications and safe disposition plan/ TCU bed available.     The patient's care was discussed with the Patient.    Wiliam Rajput MD  Hospitalist Service  [facility]  Securely message with the Vocera Web Console (learn more here)  Text page via [facility] Paging/Directory      Clinically Significant Risk Factors Present on Admission                   ______________________________________________________________________    Interval History     R ankle Pain. No chest pain/worsening SOB. No fevers/chills.    Data reviewed today: I reviewed all medications, new labs and imaging results over the last 24 hours. I personally reviewed no images or EKG's today.    Physical Exam   Vital Signs: Temp: 97.7  F (36.5  C) Temp src: Oral BP: 135/85 Pulse: 74   Resp: 18 SpO2: 91 % O2 Device: Nasal cannula Oxygen Delivery: 2 LPM  Weight: 194 lbs 0 oz    GENERAL:  The patient is alert, awake, oriented, appears quite frail, in no acute distress on exam.  HEENT:  Pupils equal, round, react to light.  LUNGS:  She has diminished breath sounds bilaterally.  HEART:  Regular rate.  S1, S2 normal.  No murmurs or gallops are.  ABDOMEN:  Soft, nontender, nondistended with good bowel sounds.  EXTREMITIES:  She has 1+ edema on the right lower extremity.  + boot RLE.    Data   Recent Labs   Lab 08/15/21  0613 08/15/21  0052 08/14/21  1744 08/14/21  1001   WBC 9.1  --   --  11.1*   HGB 13.8  --   --  15.2   MCV 91  --   --  93     --   --  208     --   --  140   POTASSIUM 3.7  --   --  3.6   CHLORIDE 106  --   --  107   CO2 26  --   --  26   BUN 13  --   --  13   CR 0.59  --   --  0.58   ANIONGAP 7  --   --  7   SANDRA 8.6  --   --  9.1   *  --   --  110*   ALBUMIN  --   --   --  3.6   PROTTOTAL  --   --   --  7.2   BILITOTAL  --   --   --  1.1   ALKPHOS  --   --   --  89   ALT  --   --   --  23   AST  --   --   --  26   TROPONIN  --  0.174*  0.325* 0.530*     Recent Results (from the past 24 hour(s))   Head CT w/o contrast    Narrative    CT OF THE HEAD WITHOUT CONTRAST 8/14/2021 11:24 AM     COMPARISON: Head CT 9/12/2010.    HISTORY: Fall, uncertain head trauma, altered mental status. Pain.    TECHNIQUE: 5 mm thick axial CT images of the head were acquired  without IV contrast material.    FINDINGS: There is mild diffuse cerebral volume loss. There are  extensive confluent areas of decreased density in the cerebral white  matter bilaterally that are consistent with sequela of chronic small  vessel ischemic disease.    The ventricles and basal cisterns are within normal limits in  configuration given the degree of cerebral volume loss.  There is no  midline shift. There are no extra-axial fluid collections.    No intracranial hemorrhage, mass or recent infarct.    The visualized paranasal sinuses are well-aerated. There is no  mastoiditis. There are no fractures of the visualized bones.      Impression    IMPRESSION: Diffuse cerebral volume loss and cerebral white matter  changes consistent with chronic small vessel ischemic disease. No  evidence for acute intracranial pathology.    Radiation dose for this scan was reduced using automated exposure  control, adjustment of the mA and/or kV according to patient size, or  iterative reconstruction technique.     BOOKER VILLALOBOS MD         SYSTEM ID:  [id number]   XR Ankle Bilateral G/E 3 Views    Narrative    XR ANKLE BILATERAL G/E 3 VIEWS 8/14/2021 11:49 AM     HISTORY: fall, bilateral ankle pain    COMPARISON: None.       Impression    IMPRESSION:   3 views of the left ankle show no definitive fracture. Bones are  demineralized. Ankle mortise is intact.    AP and lateral views of the right ankle are submitted for  interpretation, which show a nondisplaced transverse fracture through  the medial malleolus and probable nondisplaced fracture of the  posterior malleolus. Equivocal nondisplaced lateral  malleolus  fracture. Mild tibiotalar joint degenerative change. Soft tissue  swelling. Bones are demineralized.    MIKE RENEE MD         SYSTEM ID:  [id number]   XR Chest 1 View    Narrative    CHEST ONE VIEW   8/14/2021 11:51 AM     HISTORY: Fall, cough, weakness.    COMPARISON: Chest x-ray 3/6/2020.      Impression    IMPRESSION: Portable chest. Lungs are clear. Heart is normal in size.  No pneumothorax. No definite pleural effusions. Degenerative bilateral  glenohumeral joint changes are again noted.     CHARISSE ZHAO MD         SYSTEM ID:  [id number]   CT Chest Pulmonary Embolism w Contrast    Narrative    EXAM: CT CHEST PULMONARY EMBOLISM W CONTRAST  LOCATION: [facility]  DATE/TIME: 8/14/2021 6:18 PM    INDICATION: Fall with injury. Cough.  COMPARISON: 3/6/2020  TECHNIQUE: CT chest pulmonary angiogram during arterial phase injection of IV contrast. Multiplanar reformats and MIP reconstructions were performed. Dose reduction techniques were used.   CONTRAST: 70mL Isovue-370    FINDINGS:  ANGIOGRAM CHEST: Mild motion artifact with some blurring of the smaller peripheral pulmonary arteries but no convincing acute emboli identified. Thoracic aorta is negative for dissection. No CT evidence of right heart strain.    LUNGS AND PLEURA: No significant change in the lobulated low density left mid lung mass that measures approximately 2.7 x 2 cm and should be benign. Improved aeration with some clearing of lower lobe pneumonia that was seen previously. There remains mild   inflammatory bronchial wall thickening and bands of atelectasis or scar. No definite new infiltrate.    MEDIASTINUM/AXILLAE: Normal.    CORONARY ARTERY CALCIFICATION: Severe.    UPPER ABDOMEN: Prior cholecystectomy. Small right renal cyst.    MUSCULOSKELETAL: Prominent thoracic kyphosis. Mid thoracic vertebral compression unchanged. Severe compression of L1 indeterminate age, not included on the previous exam. Mild  compression of L3 also not included on prior study. Severe DJD right shoulder.      Impression    IMPRESSION:  1.  No convincing acute pulmonary emboli identified.  2.  Improved aeration at lung bases. Mild inflammatory bronchial wall thickening persists.  3.  No change in the benign left midlung pulmonary nodule.  4.  Thoracic/lumbar compression fractures, at least one chronic with others indeterminate in age.   US Lower Extremity Venous Duplex Right    Narrative    EXAM: US LOWER EXTREMITY VENOUS DUPLEX RIGHT  LOCATION: [facility]  DATE/TIME: 8/14/2021 6:34 PM    INDICATION: Left leg swelling. Broken ankle.  COMPARISON: Ultrasound 4/9/2019  TECHNIQUE: Venous Duplex ultrasound of the right lower extremity with and without compression, augmentation and duplex. Color flow and spectral Doppler with waveform analysis performed.    FINDINGS: Exam includes the common femoral, femoral, popliteal, and contralateral common femoral veins as well as segmentally visualized deep calf veins and greater saphenous vein.     RIGHT: No deep vein thrombosis. No superficial thrombophlebitis. No popliteal cyst.      Impression    IMPRESSION:  1.  No deep venous thrombosis in the right lower extremity.     Medications     heparin Stopped (08/15/21 0746)     - MEDICATION INSTRUCTIONS -         amLODIPine  5 mg Oral Daily     artificial saliva  5 mL Swish & Spit 4x Daily     atenolol  100 mg Oral Daily     ipratropium - albuterol 0.5 mg/2.5 mg/3 mL  3 mL Nebulization 4x daily     miconazole   Topical BID     omeprazole  20 mg Oral Daily     pravastatin  20 mg Oral Daily     predniSONE  40 mg Oral Daily     sodium chloride (PF)  3 mL Intracatheter Q8H

## 2021-08-15 NOTE — PLAN OF CARE
Occupational Therapy: Orders received. Chart reviewed and discussed with care team.?Acute care Occupational Therapy not indicated due to pt with plans to discharge to TCU and plans to have short hospital LOS.? Defer discharge recommendations to PT. Defer OT to next level of care.? Will complete orders.

## 2021-08-15 NOTE — PLAN OF CARE
Pt AxO with intermittent confusion. Bedrest. Rt ankle fracture in boot, nonoperable. Tele SR with peaked T. Echo done. Urine culture has gram negative bacilli, order to start ceftriaxone. Hep drip stopped. Order changed to hep shot q12. Solumedrol stopped, changed to oral prednisone. 2L NC. Occasional pain, prn tylenol given. Mandie nebs. Ortho, PT, OT. Discharge plan to TCU potentially in 2 days.

## 2021-08-15 NOTE — PROVIDER NOTIFICATION
Dr. Rajput notified via [employer] messaging that the preliminary urine culture resulted in gram negative bacilli.     MD responded with order for ceftriaxone.

## 2021-08-15 NOTE — PLAN OF CARE
Patient VSS, only SR on tele, patient still on heparin gtt increased at 0130 redraw at 0730.  Patient is turned in bed.  Patient has minimal requests and appeared to rest intermit through the NOC will cont to monitor.

## 2021-08-15 NOTE — PROGRESS NOTES
[facility]    Orthopedics Consultation    Date of Admission:  8/14/2021    Assessment & Plan   Marino Prajapati is a 85 year old female who was admitted on 8/14/2021. I was asked to see the patient for a right, non-displaced transverse fracture of the medial malleolus status post mechanical fall.     1. Non-operative management   2. CAM boot. WBAT for transfers only   3. PRN pain control   4. Follow up outpatient with Dr. Lew at [facility] in 2 weeks     Simon Salvador PA-C    Code Status    No CPR- Do NOT Intubate    Reason for Consult   Reason for consult: right medial malleolar ankle fracture     Primary Care Physician   *Vivian Blue    History of Present Illness   Marino Prajapati is a 85 year old female who presents with right ankle pain secondary to mechanical fall. The patient reports she had gone into the bathroom with her walker when her knee gave out causing her to fall. She landed on her right side and reports she may have twisted her ankle during the fall. She denies hitting her head or loss of consciousness.     MEDS:   No current outpatient medications on file.       PAST MEDICAL HISTORY:   Past Medical History:   Diagnosis Date     Anemia      CARDIOVASCULAR SCREENING; LDL GOAL LESS THAN 160      Colon polyps 2010    needs colonoscopy 2013     DVT (deep venous thrombosis) (H) 2007    remote history of DVT while she was on BCP ,coumadin stopped after retroperitoneal/buttock hematoma in 10/11 . On ASA only     Gastro-oesophageal reflux disease      HTN, goal below 140/90      Hyperlipidemia LDL goal <130 1/22/2016     Kidney stone      Osteoarthritis     knees and hip     Osteoporosis      Postnasal drip      S/P total knee arthroplasty      Thrombosis of leg        PAST SURGICAL HISTORY:   Past Surgical History:   Procedure Laterality Date     ARTHROPLASTY HIP  8/1/2013    Procedure: ARTHROPLASTY HIP;  RIGHT TOTAL HIP ARTHROPLASTY;  Surgeon: Rufino Tong  MD CINDA;  Location: [facility] OR     ARTHROPLASTY HIP Left 2014    Procedure: ARTHROPLASTY HIP;  Surgeon: Rufino Tong MD;  Location: [facility] OR     ARTHROPLASTY KNEE  10/22/2012    Procedure: ARTHROPLASTY KNEE;  Right Total knee Arthroplasty  ;  Surgeon: Jagdeep Virgen MD;  Location: [facility] OR     ARTHROPLASTY KNEE  2013    Procedure: ARTHROPLASTY KNEE;  LEFT TOTAL KNEE ARTHROPLASTY (SMITH & NEPHEW)^;  Surgeon: Rufino Tong MD;  Location: [facility] OR     C PREP FACE/ORAL PROST PALATAL LIFT       CHOLECYSTECTOMY       CYSTOSCOPY, RETROGRADES, INSERT STENT URETER(S), COMBINED  2012    Procedure: COMBINED CYSTOSCOPY, RETROGRADES, INSERT STENT URETER(S);  Cystoscopy, Right retrogrades, Right Stent Placement;  Surgeon: Mauricio Velazquez MD;  Location: [facility] OR     LASER HOLMIUM LITHOTRIPSY URETER(S), INSERT STENT, COMBINED  2012    Procedure: COMBINED CYSTOSCOPY, URETEROSCOPY, LASER HOLMIUM LITHOTRIPSY URETER(S), INSERT STENT;  Cystoscopy, Stent Removal, Right Ureteroscopy with Holmium Laser, Stone removal ;  Surgeon: Mauricio Velazquez MD;  Location: [facility] OR     LIPOSUCTION (LOCATION)      tummy     STRIP VEIN         FAMILY HISTORY:   Family History   Problem Relation Age of Onset     Breast Cancer Mother      Circulatory Father         Blood clots       SOCIAL HISTORY:   Social History     Tobacco Use     Smoking status: Former Smoker     Packs/day: 1.00     Years: 29.00     Pack years: 29.00     Types: Cigarettes     Quit date: 10/17/1966     Years since quittin.8     Smokeless tobacco: Never Used   Substance Use Topics     Alcohol use: Yes     Comment: 4 drinks yearly       ALLERGIES:  No Known Allergies    ROS:  10 point ROS neg other than the symptoms noted above in the HPI.    Physical Exam   Temp: 98  F (36.7  C) Temp src: Oral BP: (!) 165/75 Pulse: 71   Resp: 20 SpO2: 90 % O2 Device: Nasal cannula Oxygen Delivery: 2 LPM  Vital Signs with Ranges  Temp:  [97.5  F (36.4  C)-98.4  F (36.9   C)] 98  F (36.7  C)  Pulse:  [61-75] 71  Resp:  [13-29] 20  BP: (135-217)/(58-98) 165/75  SpO2:  [90 %-97 %] 90 %  194 lbs 0 oz    Constitutional: Pleasant, alert, appropriate, following commands.  HEENT: Head atraumatic normocephalic. Pupils equal round and reactive to light.  Respiratory: Unlabored breathing no audible wheeze  Cardiovascular: Regular rate and rhythm  GI: Abdomen soft nontender nondistended.  Lymph/Hematologic: No lymphadenopathy in areas examined  Skin: No rashes, no cyanosis, no edema.  Musculoskeletal: right ankle: 1+ edema without ecchymosis, erythema or gross bony deformities. SILT. Able to actively move toes.   Neurologic: normal without focal findings, mental status, speech normal, alert and oriented x iii, EDUARDO  Neuropsychiatric: normal mood and affect       Data   Results for orders placed or performed during the hospital encounter of 08/14/21 (from the past 24 hour(s))   XR Ankle Bilateral G/E 3 Views    Narrative    XR ANKLE BILATERAL G/E 3 VIEWS 8/14/2021 11:49 AM     HISTORY: fall, bilateral ankle pain    COMPARISON: None.       Impression    IMPRESSION:   3 views of the left ankle show no definitive fracture. Bones are  demineralized. Ankle mortise is intact.    AP and lateral views of the right ankle are submitted for  interpretation, which show a nondisplaced transverse fracture through  the medial malleolus and probable nondisplaced fracture of the  posterior malleolus. Equivocal nondisplaced lateral malleolus  fracture. Mild tibiotalar joint degenerative change. Soft tissue  swelling. Bones are demineralized.    MIKE RENEE MD         SYSTEM ID:  [id number]   XR Chest 1 View    Narrative    CHEST ONE VIEW   8/14/2021 11:51 AM     HISTORY: Fall, cough, weakness.    COMPARISON: Chest x-ray 3/6/2020.      Impression    IMPRESSION: Portable chest. Lungs are clear. Heart is normal in size.  No pneumothorax. No definite pleural effusions. Degenerative bilateral  glenohumeral  joint changes are again noted.     CHARISSE ZHAO MD         SYSTEM ID:  [id number]   D dimer quantitative   Result Value Ref Range    D-Dimer Quantitative 12.89 (H) 0.00 - 0.50 ug/mL FEU    Narrative    This D-dimer assay is intended for use in conjunction with a clinical pretest probability assessment model to exclude pulmonary embolism (PE) and deep venous thrombosis (DVT) in outpatients suspected of PE or DVT. The cut-off value is 0.50 ug/mL FEU.   Procalcitonin   Result Value Ref Range    Procalcitonin <0.05 <5.00 ng/mL   Troponin I   Result Value Ref Range    Troponin I 0.325 (HH) 0.000 - 0.045 ug/L   CT Chest Pulmonary Embolism w Contrast    Narrative    EXAM: CT CHEST PULMONARY EMBOLISM W CONTRAST  LOCATION: [facility]  DATE/TIME: 8/14/2021 6:18 PM    INDICATION: Fall with injury. Cough.  COMPARISON: 3/6/2020  TECHNIQUE: CT chest pulmonary angiogram during arterial phase injection of IV contrast. Multiplanar reformats and MIP reconstructions were performed. Dose reduction techniques were used.   CONTRAST: 70mL Isovue-370    FINDINGS:  ANGIOGRAM CHEST: Mild motion artifact with some blurring of the smaller peripheral pulmonary arteries but no convincing acute emboli identified. Thoracic aorta is negative for dissection. No CT evidence of right heart strain.    LUNGS AND PLEURA: No significant change in the lobulated low density left mid lung mass that measures approximately 2.7 x 2 cm and should be benign. Improved aeration with some clearing of lower lobe pneumonia that was seen previously. There remains mild   inflammatory bronchial wall thickening and bands of atelectasis or scar. No definite new infiltrate.    MEDIASTINUM/AXILLAE: Normal.    CORONARY ARTERY CALCIFICATION: Severe.    UPPER ABDOMEN: Prior cholecystectomy. Small right renal cyst.    MUSCULOSKELETAL: Prominent thoracic kyphosis. Mid thoracic vertebral compression unchanged. Severe compression of L1 indeterminate age, not  included on the previous exam. Mild compression of L3 also not included on prior study. Severe DJD right shoulder.      Impression    IMPRESSION:  1.  No convincing acute pulmonary emboli identified.  2.  Improved aeration at lung bases. Mild inflammatory bronchial wall thickening persists.  3.  No change in the benign left midlung pulmonary nodule.  4.  Thoracic/lumbar compression fractures, at least one chronic with others indeterminate in age.   US Lower Extremity Venous Duplex Right    Narrative    EXAM: US LOWER EXTREMITY VENOUS DUPLEX RIGHT  LOCATION: [facility]  DATE/TIME: 8/14/2021 6:34 PM    INDICATION: Left leg swelling. Broken ankle.  COMPARISON: Ultrasound 4/9/2019  TECHNIQUE: Venous Duplex ultrasound of the right lower extremity with and without compression, augmentation and duplex. Color flow and spectral Doppler with waveform analysis performed.    FINDINGS: Exam includes the common femoral, femoral, popliteal, and contralateral common femoral veins as well as segmentally visualized deep calf veins and greater saphenous vein.     RIGHT: No deep vein thrombosis. No superficial thrombophlebitis. No popliteal cyst.      Impression    IMPRESSION:  1.  No deep venous thrombosis in the right lower extremity.   Troponin I   Result Value Ref Range    Troponin I 0.174 (HH) 0.000 - 0.045 ug/L   Heparin Unfractionated Anti Xa Level   Result Value Ref Range    Anti Xa Unfractionated Heparin 0.19 For Reference Range, See Comment IU/mL    Narrative    Therapeutic Range: UFH: 0.25-0.50 IU/mL for low intensity dosing,  0.30-0.70 IU/mL for high intensity dosing DVT and PE.  This test is not validated for other direct factor X inhibitors (e.g. rivaroxaban, apixaban, edoxaban, betrixaban, fondaparinux) and should not be used for monitoring of other medications.   Basic metabolic panel   Result Value Ref Range    Sodium 139 133 - 144 mmol/L    Potassium 3.7 3.4 - 5.3 mmol/L    Chloride 106 94 - 109  mmol/L    Carbon Dioxide (CO2) 26 20 - 32 mmol/L    Anion Gap 7 3 - 14 mmol/L    Urea Nitrogen 13 7 - 30 mg/dL    Creatinine 0.59 0.52 - 1.04 mg/dL    Calcium 8.6 8.5 - 10.1 mg/dL    Glucose 117 (H) 70 - 99 mg/dL    GFR Estimate 85 >60 mL/min/1.73m2   CBC with platelets differential    Narrative    The following orders were created for panel order CBC with platelets differential.  Procedure                               Abnormality         Status                     ---------                               -----------         ------                     CBC with platelets and d...[[id number]]  Abnormal            Final result                 Please view results for these tests on the individual orders.   Heparin Unfractionated Anti Xa Level   Result Value Ref Range    Anti Xa Unfractionated Heparin 0.89 For Reference Range, See Comment IU/mL    Narrative    Therapeutic Range: UFH: 0.25-0.50 IU/mL for low intensity dosing,  0.30-0.70 IU/mL for high intensity dosing DVT and PE.  This test is not validated for other direct factor X inhibitors (e.g. rivaroxaban, apixaban, edoxaban, betrixaban, fondaparinux) and should not be used for monitoring of other medications.   CBC with platelets and differential   Result Value Ref Range    WBC Count 9.1 4.0 - 11.0 10e3/uL    RBC Count 4.51 3.80 - 5.20 10e6/uL    Hemoglobin 13.8 11.7 - 15.7 g/dL    Hematocrit 41.2 35.0 - 47.0 %    MCV 91 78 - 100 fL    MCH 30.6 26.5 - 33.0 pg    MCHC 33.5 31.5 - 36.5 g/dL    RDW 13.9 10.0 - 15.0 %    Platelet Count 202 150 - 450 10e3/uL    % Neutrophils 74 %    % Lymphocytes 19 %    % Monocytes 6 %    % Eosinophils 0 %    % Basophils 0 %    % Immature Granulocytes 1 %    NRBCs per 100 WBC 0 <1 /100    Absolute Neutrophils 6.8 1.6 - 8.3 10e3/uL    Absolute Lymphocytes 1.7 0.8 - 5.3 10e3/uL    Absolute Monocytes 0.5 0.0 - 1.3 10e3/uL    Absolute Eosinophils 0.0 0.0 - 0.7 10e3/uL    Absolute Basophils 0.0 0.0 - 0.2 10e3/uL    Absolute Immature  Granulocytes 0.1 (H) <=0.0 10e3/uL    Absolute NRBCs 0.0 10e3/uL       ATTESTATION: all clinical and radiographic findings were reviewed with Dr. Lew. Total time spent on consult: 15 minutes.

## 2021-08-15 NOTE — PROGRESS NOTES
08/15/21 1330   Quick Adds   Type of Visit Initial PT Evaluation   Living Environment   People in home significant other   Current Living Arrangements house   Home Accessibility stairs to enter home   Number of Stairs, Main Entrance 3   Transportation Anticipated family or friend will provide   Living Environment Comments Patient indicates her friend helps with all household chores and assists patient as needed. However patient indicates steps to enter home has been a barrier since discharging to home from last TCU stay and patient notes being home bound since returning.   Self-Care   Usual Activity Tolerance moderate   Current Activity Tolerance poor   Equipment Currently Used at Home walker, rolling   Activity/Exercise/Self-Care Comment Patient able to ambulate short distances with use of walker and assist from friend has needed. Friend assisting patient with dressing & bath tasks (performing sponge baths).   Disability/Function   Fall history within last six months yes   Number of times patient has fallen within last six months 2  ( reason of admit)   General Information   Onset of Illness/Injury or Date of Surgery 08/15/21   Referring Physician Wiliam Rajput MD   Patient/Family Therapy Goals Statement (PT) To get better   Pertinent History of Current Problem (include personal factors and/or comorbidities that impact the POC) 85-year-old white female who ambulates short distances with a walker.  She has a history of COPD and uses oxygen p.r.n., hypertension, hyperlipidemia, chronic cough, who presents after she had a fall in the bathroom.  She had gone into her bathroom with her walker when she felt her knee buckle and subsequently landed on her right side. Found to have ankle fx - nonoperative management determined by ortho with WBAT in CAM boot for transfer only.    Existing Precautions/Restrictions fall   Weight-Bearing Status - LLE full weight-bearing   Weight-Bearing Status - RLE weight-bearing as  tolerated  (with CAM boot donned for transfers only)   General Observations Greeted patient supine in bed.    Cognition   Orientation Status (Cognition) oriented to;person;place   Affect/Mental Status (Cognition) WFL   Follows Commands (Cognition) WFL   Pain Assessment   Patient Currently in Pain   (winces/cries out with R LE movement)   Integumentary/Edema   Integumentary/Edema Comments dry scaly skin of LE; CAM boot donned on R foot   Range of Motion (ROM)   ROM Comment limited R ankle ROM; otherwise B LE WFL for PROM   Strength   Strength Comments B LE functionally weak, unable to perform unassisted SLR   Bed Mobility   Comment (Bed Mobility) rolling to L & R side at modAx2   Transfers   Transfer Safety Comments dependent   Gait/Stairs (Locomotion)   Comment (Gait/Stairs) dependent   Balance   Balance Comments poor trunk stability noted in supine, patient tends to slouch to one side or the other when spine in bed with HOB elevated, unable to self-correct without assist   Clinical Impression   Criteria for Skilled Therapeutic Intervention yes, treatment indicated   PT Diagnosis (PT) impaired functional mobility   Influenced by the following impairments LE weakness, pain, decreased activity tolerance   Functional limitations due to impairments bed mobility, transfers, ambulation   Clinical Presentation Evolving/Changing   Clinical Presentation Rationale PMH, current presentation, clinical judgement   Clinical Decision Making (Complexity) low complexity   Therapy Frequency (PT) 5x/week   Predicted Duration of Therapy Intervention (days/wks) 1 week   Planned Therapy Interventions (PT) balance training;bed mobility training;gait training;home exercise program;patient/family education;strengthening;transfer training;progressive activity/exercise   Risk & Benefits of therapy have been explained evaluation/treatment results reviewed;care plan/treatment goals reviewed;patient   PT Discharge Planning    PT Discharge  Recommendation (DC Rec) Transitional Care Facility   PT Rationale for DC Rec PT - Patient is significantly below baseline for functional mobility, requiring modAx2 for rolling in bed & would benefit from continued physical therapy in the setting of a TCU for improving functional mobility, LE strength and tolerance for functional activities in order to return to baseline of functioning.    Total Evaluation Time   Total Evaluation Time (Minutes) 8

## 2021-08-16 LAB
ANION GAP SERPL CALCULATED.3IONS-SCNC: 5 MMOL/L (ref 3–14)
ATRIAL RATE - MUSE: 62 BPM
BASOPHILS # BLD AUTO: 0 10E3/UL (ref 0–0.2)
BASOPHILS NFR BLD AUTO: 0 %
BUN SERPL-MCNC: 20 MG/DL (ref 7–30)
CALCIUM SERPL-MCNC: 8.7 MG/DL (ref 8.5–10.1)
CHLORIDE BLD-SCNC: 107 MMOL/L (ref 94–109)
CO2 SERPL-SCNC: 27 MMOL/L (ref 20–32)
CREAT SERPL-MCNC: 0.72 MG/DL (ref 0.52–1.04)
DIASTOLIC BLOOD PRESSURE - MUSE: NORMAL MMHG
EOSINOPHIL # BLD AUTO: 0 10E3/UL (ref 0–0.7)
EOSINOPHIL NFR BLD AUTO: 0 %
ERYTHROCYTE [DISTWIDTH] IN BLOOD BY AUTOMATED COUNT: 14.1 % (ref 10–15)
GFR SERPL CREATININE-BSD FRML MDRD: 78 ML/MIN/1.73M2
GLUCOSE BLD-MCNC: 90 MG/DL (ref 70–99)
HCT VFR BLD AUTO: 36.4 % (ref 35–47)
HGB BLD-MCNC: 12.3 G/DL (ref 11.7–15.7)
IMM GRANULOCYTES # BLD: 0.1 10E3/UL
IMM GRANULOCYTES NFR BLD: 1 %
INTERPRETATION ECG - MUSE: NORMAL
LYMPHOCYTES # BLD AUTO: 3.1 10E3/UL (ref 0.8–5.3)
LYMPHOCYTES NFR BLD AUTO: 20 %
MCH RBC QN AUTO: 30.8 PG (ref 26.5–33)
MCHC RBC AUTO-ENTMCNC: 33.8 G/DL (ref 31.5–36.5)
MCV RBC AUTO: 91 FL (ref 78–100)
MONOCYTES # BLD AUTO: 1.8 10E3/UL (ref 0–1.3)
MONOCYTES NFR BLD AUTO: 12 %
NEUTROPHILS # BLD AUTO: 10.3 10E3/UL (ref 1.6–8.3)
NEUTROPHILS NFR BLD AUTO: 67 %
NRBC # BLD AUTO: 0 10E3/UL
NRBC BLD AUTO-RTO: 0 /100
P AXIS - MUSE: 31 DEGREES
PLATELET # BLD AUTO: 247 10E3/UL (ref 150–450)
POTASSIUM BLD-SCNC: 3.3 MMOL/L (ref 3.4–5.3)
POTASSIUM BLD-SCNC: 3.3 MMOL/L (ref 3.4–5.3)
POTASSIUM BLD-SCNC: 4.3 MMOL/L (ref 3.4–5.3)
PR INTERVAL - MUSE: 156 MS
QRS DURATION - MUSE: 82 MS
QT - MUSE: 478 MS
QTC - MUSE: 485 MS
R AXIS - MUSE: 6 DEGREES
RBC # BLD AUTO: 4 10E6/UL (ref 3.8–5.2)
SODIUM SERPL-SCNC: 139 MMOL/L (ref 133–144)
SYSTOLIC BLOOD PRESSURE - MUSE: NORMAL MMHG
T AXIS - MUSE: 23 DEGREES
VENTRICULAR RATE- MUSE: 62 BPM
WBC # BLD AUTO: 15.3 10E3/UL (ref 4–11)

## 2021-08-16 PROCEDURE — 99233 SBSQ HOSP IP/OBS HIGH 50: CPT | Performed by: INTERNAL MEDICINE

## 2021-08-16 PROCEDURE — 250N000013 HC RX MED GY IP 250 OP 250 PS 637: Performed by: INTERNAL MEDICINE

## 2021-08-16 PROCEDURE — 250N000009 HC RX 250: Performed by: INTERNAL MEDICINE

## 2021-08-16 PROCEDURE — 999N000157 HC STATISTIC RCP TIME EA 10 MIN

## 2021-08-16 PROCEDURE — 94640 AIRWAY INHALATION TREATMENT: CPT | Mod: 76

## 2021-08-16 PROCEDURE — 94640 AIRWAY INHALATION TREATMENT: CPT

## 2021-08-16 PROCEDURE — 80048 BASIC METABOLIC PNL TOTAL CA: CPT | Performed by: INTERNAL MEDICINE

## 2021-08-16 PROCEDURE — 250N000011 HC RX IP 250 OP 636: Performed by: INTERNAL MEDICINE

## 2021-08-16 PROCEDURE — 120N000001 HC R&B MED SURG/OB

## 2021-08-16 PROCEDURE — 250N000012 HC RX MED GY IP 250 OP 636 PS 637: Performed by: INTERNAL MEDICINE

## 2021-08-16 PROCEDURE — 85025 COMPLETE CBC W/AUTO DIFF WBC: CPT | Performed by: INTERNAL MEDICINE

## 2021-08-16 PROCEDURE — 84132 ASSAY OF SERUM POTASSIUM: CPT | Performed by: INTERNAL MEDICINE

## 2021-08-16 PROCEDURE — 36415 COLL VENOUS BLD VENIPUNCTURE: CPT | Performed by: INTERNAL MEDICINE

## 2021-08-16 RX ORDER — SODIUM CHLORIDE FOR INHALATION 3 %
3 VIAL, NEBULIZER (ML) INHALATION
Status: DISCONTINUED | OUTPATIENT
Start: 2021-08-16 | End: 2021-08-18 | Stop reason: HOSPADM

## 2021-08-16 RX ORDER — BENZONATATE 100 MG/1
100 CAPSULE ORAL 3 TIMES DAILY PRN
Status: DISCONTINUED | OUTPATIENT
Start: 2021-08-16 | End: 2021-08-18 | Stop reason: HOSPADM

## 2021-08-16 RX ORDER — POTASSIUM CHLORIDE 1500 MG/1
40 TABLET, EXTENDED RELEASE ORAL ONCE
Status: DISCONTINUED | OUTPATIENT
Start: 2021-08-16 | End: 2021-08-16 | Stop reason: ALTCHOICE

## 2021-08-16 RX ORDER — POTASSIUM CHLORIDE 20MEQ/15ML
40 LIQUID (ML) ORAL ONCE
Status: COMPLETED | OUTPATIENT
Start: 2021-08-16 | End: 2021-08-16

## 2021-08-16 RX ADMIN — GUAIFENESIN 10 ML: 100 SOLUTION ORAL at 02:12

## 2021-08-16 RX ADMIN — Medication 5 ML: at 20:52

## 2021-08-16 RX ADMIN — PREDNISONE 40 MG: 20 TABLET ORAL at 08:50

## 2021-08-16 RX ADMIN — ASPIRIN 81 MG CHEWABLE TABLET 81 MG: 81 TABLET CHEWABLE at 08:50

## 2021-08-16 RX ADMIN — ACETAMINOPHEN 650 MG: 325 TABLET, FILM COATED ORAL at 20:32

## 2021-08-16 RX ADMIN — GUAIFENESIN 10 ML: 100 SOLUTION ORAL at 06:15

## 2021-08-16 RX ADMIN — IPRATROPIUM BROMIDE AND ALBUTEROL SULFATE 3 ML: .5; 3 SOLUTION RESPIRATORY (INHALATION) at 11:34

## 2021-08-16 RX ADMIN — MICONAZOLE NITRATE: 20 POWDER TOPICAL at 08:56

## 2021-08-16 RX ADMIN — HEPARIN SODIUM 5000 UNITS: 5000 INJECTION INTRAVENOUS; SUBCUTANEOUS at 20:33

## 2021-08-16 RX ADMIN — HEPARIN SODIUM 5000 UNITS: 5000 INJECTION INTRAVENOUS; SUBCUTANEOUS at 08:51

## 2021-08-16 RX ADMIN — Medication 5 ML: at 08:55

## 2021-08-16 RX ADMIN — IPRATROPIUM BROMIDE AND ALBUTEROL SULFATE 3 ML: .5; 3 SOLUTION RESPIRATORY (INHALATION) at 15:58

## 2021-08-16 RX ADMIN — CEFTRIAXONE 1 G: 1 INJECTION, POWDER, FOR SOLUTION INTRAMUSCULAR; INTRAVENOUS at 11:25

## 2021-08-16 RX ADMIN — ATENOLOL 100 MG: 50 TABLET ORAL at 08:50

## 2021-08-16 RX ADMIN — BENZONATATE 100 MG: 100 CAPSULE ORAL at 02:12

## 2021-08-16 RX ADMIN — POTASSIUM CHLORIDE 40 MEQ: 1.5 SOLUTION ORAL at 11:21

## 2021-08-16 RX ADMIN — IPRATROPIUM BROMIDE AND ALBUTEROL SULFATE 3 ML: .5; 3 SOLUTION RESPIRATORY (INHALATION) at 20:24

## 2021-08-16 RX ADMIN — OMEPRAZOLE 20 MG: 20 CAPSULE, DELAYED RELEASE ORAL at 08:50

## 2021-08-16 RX ADMIN — MICONAZOLE NITRATE: 20 POWDER TOPICAL at 20:53

## 2021-08-16 RX ADMIN — Medication 5 ML: at 16:43

## 2021-08-16 RX ADMIN — AMLODIPINE BESYLATE 5 MG: 5 TABLET ORAL at 08:50

## 2021-08-16 RX ADMIN — PRAVASTATIN SODIUM 20 MG: 20 TABLET ORAL at 08:50

## 2021-08-16 RX ADMIN — IPRATROPIUM BROMIDE AND ALBUTEROL SULFATE 3 ML: .5; 3 SOLUTION RESPIRATORY (INHALATION) at 07:37

## 2021-08-16 RX ADMIN — ACETAMINOPHEN 650 MG: 325 TABLET, FILM COATED ORAL at 00:39

## 2021-08-16 ASSESSMENT — ACTIVITIES OF DAILY LIVING (ADL)
ADLS_ACUITY_SCORE: 22
ADLS_ACUITY_SCORE: 18

## 2021-08-16 NOTE — PROGRESS NOTES
[facility]    Medicine Progress Note - Hospitalist Service       Date of Admission:  8/14/2021    Assessment & Plan                       85-year-old white female who ambulates short distances with a walker.  She has a history of COPD and uses oxygen p.r.n., hypertension, hyperlipidemia, chronic cough, who presents after she had a fall in the bathroom.  She had gone into her bathroom with her walker when she felt her knee buckle and subsequently landed on her right side.  She feels she may have twisted her right ankle and has been having pain there.  She denies any loss of consciousness.  She denies any head trauma.  She denies any recent fevers or chills.  She denies any chest pain to me.  She has a chronic cough with sputum, which is productive, but over the past few days, it has been more thick.  In the ER over here, she is seen by Dr. Mancera.  She is noted to have a right ankle fracture and elevated troponin and elevated D-dimer.  I am asked to admit her for further evaluation.      1.  Mechanical fall with subsequent right ankle fracture.   A boot has been fitted for her.  We will consult Orthopedics.  We will manage her pain. Likely nonoperative management given her limited mobility at baseline.    2.  Elevated troponin, significance unknown, probable type 2 MI.   We will monitor on telemetry, get serial troponins on her.  - Peak troponin is 0.530.  - Echocardiogram wnl with grade 1 DD.     3.  Hypertensive urgency, likely contributing to the elevated troponin.  Her blood pressure is markedly high.  We will resume her home medications.    4. Acute exacerbation of COPD.   Nebs, PO steroids,  Procal, wnl.    5. UTI with citrobacter .  -  IV rocephin.           Diet: Combination Diet Low Saturated Fat Na <2400mg Diet, No Caffeine Diet    DVT Prophylaxis: Heparin SQ  Kinney Catheter: Not present  Central Lines: None  Code Status: No CPR- Do NOT Intubate      Disposition Plan   Expected  discharge: 08/18/2021 recommended to transitional care unit once antibiotic plan established.     The patient's care was discussed with the Bedside Nurse and Patient.    Wiliam Rajput MD  Hospitalist Service  [facility]  Securely message with the Vocera Web Console (learn more here)  Text page via [facility] Paging/Directory      Clinically Significant Risk Factors Present on Admission               ______________________________________________________________________    Interval History     No fevers/chills. + sputum which is thick.    Data reviewed today: I reviewed all medications, new labs and imaging results over the last 24 hours. I personally reviewed no images or EKG's today.    Physical Exam   Vital Signs: Temp: 98.1  F (36.7  C) Temp src: Oral BP: (!) 168/58 Pulse: 67   Resp: 20 SpO2: 93 % O2 Device: Nasal cannula Oxygen Delivery: 3 LPM  Weight: 194 lbs 0 oz    GENERAL:  The patient is alert, awake, oriented, appears quite frail, in no acute distress on exam.  HEENT:  Pupils equal, round, react to light.  LUNGS:  She has diminished breath sounds bilaterally.  HEART:  Regular rate.  S1, S2 normal.  No murmurs or gallops are.  ABDOMEN:  Soft, nontender, nondistended with good bowel sounds.  EXTREMITIES:  She has 1+ edema on the right lower extremity.  + boot RLE.    Data   Recent Labs   Lab 08/16/21  0631 08/15/21  0613 08/15/21  0052 08/14/21  1744 08/14/21  1001   WBC 15.3* 9.1  --   --  11.1*   HGB 12.3 13.8  --   --  15.2   MCV 91 91  --   --  93    202  --   --  208    139  --   --  140   POTASSIUM 3.3* 3.7  --   --  3.6   CHLORIDE 107 106  --   --  107   CO2 27 26  --   --  26   BUN 20 13  --   --  13   CR 0.72 0.59  --   --  0.58   ANIONGAP 5 7  --   --  7   SANDRA 8.7 8.6  --   --  9.1   GLC 90 117*  --   --  110*   ALBUMIN  --   --   --   --  3.6   PROTTOTAL  --   --   --   --  7.2   BILITOTAL  --   --   --   --  1.1   ALKPHOS  --   --   --   --  89   ALT  --   --    --   --  23   AST  --   --   --   --  26   TROPONIN  --   --  0.174* 0.325* 0.530*     Recent Results (from the past 24 hour(s))   Echocardiogram Complete   Result Value    LVEF  60-65%    [facility]    [id number]  [id number]  [id number]  [id number]^TORIN^AGATA^CHIN     [facility]  Echocardiography Laboratory  [address] [place] [address]     Name: FER MILES  MRN: [id number]  : 1935  Study Date: 08/15/2021 10:49 AM  Age: 85 yrs  Gender: Female  Patient Location: [facility]  Reason For Study: Syncope  Ordering Physician: AGATA HARKINS  Referring Physician: Vivian Blue MD  Performed By: Sandy Pearson YONAS     BSA: 1.9 m2  Height: 66 in  Weight: 182 lb  HR: 66  BP: 148/98 mmHg  ______________________________________________________________________________  Procedure  Complete Portable Echo Adult. Optison (NDC #[id number]) given intravenously.  ______________________________________________________________________________  Interpretation Summary     Poor image quality  Left ventricular systolic function is normal.  The visual ejection fraction is 60-65%.  The right ventricular systolic function is normal.  No hemodynamically significant valvular abnormalities on 2D or color flow  imaging. Compared to prior study, there is no significant change.  ______________________________________________________________________________  Left Ventricle  The left ventricle is normal in size. Grade I or early diastolic dysfunction.  Left ventricular systolic function is normal. The visual ejection fraction is  60-65%. No regional wall motion abnormalities noted.     Right Ventricle  The right ventricle is not well visualized. The right ventricular systolic  function is normal.     Atria  The left atrium is not well visualized. Right atrium not well visualized.     Tricuspid Valve  The tricuspid valve is not well visualized. Right ventricular systolic  pressure could not be approximated  due to inadequate tricuspid regurgitation.     Aortic Valve  The aortic valve is not well visualized. No hemodynamically significant  valvular aortic stenosis.     Pulmonic Valve  The pulmonic valve is not well visualized. Normal pulmonic valve velocity.     Vessels  (The upper limit of normal is 3.7cm for aortic root diameter.). Inferior vena  cava not well visualized for estimation of right atrial pressure.     Pericardium  The pericardium is not well visualized.     ______________________________________________________________________________  MMode/2D Measurements & Calculations  IVSd: 0.95 cm  LVIDd: 4.4 cm  LVIDs: 2.8 cm  LVPWd: 1.0 cm  FS: 35.5 %  LV mass(C)d: 148.3 grams  LV mass(C)dI: 77.2 grams/m2  Ao root diam: 3.7 cm  LA dimension: 3.5 cm  asc Aorta Diam: 3.6 cm  LA/Ao: 0.94  LVOT diam: 2.2 cm  LVOT area: 3.8 cm2     LA Volume (BP): 59.0 ml  LA Volume Index (BP): 30.7 ml/m2  RWT: 0.48     Doppler Measurements & Calculations  MV E max dominique: 61.0 cm/sec  MV A max dominique: 85.7 cm/sec  MV E/A: 0.71  MV dec time: 0.21 sec  Ao V2 max: 145.0 cm/sec  Ao max P.4 mmHg  E/E' av.5  Lateral E/e': 12.8     Medial E/e': 10.3     ______________________________________________________________________________  Report approved by: Tez Andrews 08/15/2021 12:27 PM           Medications     - MEDICATION INSTRUCTIONS -         amLODIPine  5 mg Oral Daily     artificial saliva  5 mL Swish & Spit 4x Daily     aspirin  81 mg Oral Daily     atenolol  100 mg Oral Daily     cefTRIAXone  1 g Intravenous Q24H     heparin ANTICOAGULANT  5,000 Units Subcutaneous Q12H     ipratropium - albuterol 0.5 mg/2.5 mg/3 mL  3 mL Nebulization 4x daily     miconazole   Topical BID     omeprazole  20 mg Oral Daily     potassium chloride  40 mEq Oral or Feeding Tube Once     potassium chloride  40 mEq Oral Once     pravastatin  20 mg Oral Daily     predniSONE  40 mg Oral Daily     sodium chloride (PF)  3 mL Intracatheter Q8H

## 2021-08-16 NOTE — PLAN OF CARE
AxOx 4, forgetful at times, VSS. 3 L NC, O2 sats mid 90s. TELE SR. Mild aching to right ankle, prn tylenol given, limb supported in boot with pillows. CMS intact to RLE. Frequent congested cough, prn tessalon and robitussin given with relief noted. Pt denies N/V, SOB, lightheadedness, and dizziness. Incontinent of bladder, purwick in place with good output. PIV SL. Remained in bed for duration of shift, provided frequent turn/repo. Alarms on for safety. Plan plan for TCU at discharge, ortho following.

## 2021-08-16 NOTE — PLAN OF CARE
Vss except elevated BP. SPO2 94 % 2 LPM NC. Denies pain. Tele: SR. Pt [place]. A&O but forgetful at times. Boot to the right foot CMS intact. Continue monitoring & POC.

## 2021-08-17 ENCOUNTER — APPOINTMENT (OUTPATIENT)
Dept: PHYSICAL THERAPY | Facility: CLINIC | Age: 86
DRG: 542 | End: 2021-08-17
Payer: COMMERCIAL

## 2021-08-17 PROCEDURE — 250N000013 HC RX MED GY IP 250 OP 250 PS 637: Performed by: INTERNAL MEDICINE

## 2021-08-17 PROCEDURE — 97110 THERAPEUTIC EXERCISES: CPT | Mod: GP

## 2021-08-17 PROCEDURE — 250N000011 HC RX IP 250 OP 636: Performed by: INTERNAL MEDICINE

## 2021-08-17 PROCEDURE — 999N000157 HC STATISTIC RCP TIME EA 10 MIN

## 2021-08-17 PROCEDURE — 94640 AIRWAY INHALATION TREATMENT: CPT | Mod: 76

## 2021-08-17 PROCEDURE — 250N000012 HC RX MED GY IP 250 OP 636 PS 637: Performed by: INTERNAL MEDICINE

## 2021-08-17 PROCEDURE — 250N000009 HC RX 250: Performed by: INTERNAL MEDICINE

## 2021-08-17 PROCEDURE — 94640 AIRWAY INHALATION TREATMENT: CPT

## 2021-08-17 PROCEDURE — 120N000001 HC R&B MED SURG/OB

## 2021-08-17 PROCEDURE — 99233 SBSQ HOSP IP/OBS HIGH 50: CPT | Performed by: INTERNAL MEDICINE

## 2021-08-17 RX ORDER — FUROSEMIDE 40 MG
40 TABLET ORAL DAILY
Status: DISCONTINUED | OUTPATIENT
Start: 2021-08-17 | End: 2021-08-18 | Stop reason: HOSPADM

## 2021-08-17 RX ORDER — LOSARTAN POTASSIUM 50 MG/1
50 TABLET ORAL DAILY
Status: DISCONTINUED | OUTPATIENT
Start: 2021-08-17 | End: 2021-08-18 | Stop reason: HOSPADM

## 2021-08-17 RX ORDER — HYDRALAZINE HYDROCHLORIDE 20 MG/ML
10 INJECTION INTRAMUSCULAR; INTRAVENOUS EVERY 4 HOURS PRN
Status: DISCONTINUED | OUTPATIENT
Start: 2021-08-17 | End: 2021-08-18 | Stop reason: HOSPADM

## 2021-08-17 RX ORDER — TERAZOSIN 1 MG/1
2 CAPSULE ORAL AT BEDTIME
Status: DISCONTINUED | OUTPATIENT
Start: 2021-08-17 | End: 2021-08-18 | Stop reason: HOSPADM

## 2021-08-17 RX ORDER — SULFAMETHOXAZOLE/TRIMETHOPRIM 800-160 MG
1 TABLET ORAL 2 TIMES DAILY
Status: DISCONTINUED | OUTPATIENT
Start: 2021-08-17 | End: 2021-08-18 | Stop reason: HOSPADM

## 2021-08-17 RX ORDER — PREDNISONE 20 MG/1
20 TABLET ORAL DAILY
Status: DISCONTINUED | OUTPATIENT
Start: 2021-08-17 | End: 2021-08-18 | Stop reason: HOSPADM

## 2021-08-17 RX ADMIN — Medication 5 ML: at 18:07

## 2021-08-17 RX ADMIN — FUROSEMIDE 40 MG: 40 TABLET ORAL at 09:15

## 2021-08-17 RX ADMIN — PREDNISONE 20 MG: 20 TABLET ORAL at 09:15

## 2021-08-17 RX ADMIN — ATENOLOL 100 MG: 50 TABLET ORAL at 09:15

## 2021-08-17 RX ADMIN — IPRATROPIUM BROMIDE AND ALBUTEROL SULFATE 3 ML: .5; 3 SOLUTION RESPIRATORY (INHALATION) at 16:59

## 2021-08-17 RX ADMIN — GUAIFENESIN 10 ML: 100 SOLUTION ORAL at 11:03

## 2021-08-17 RX ADMIN — Medication 5 ML: at 09:14

## 2021-08-17 RX ADMIN — SULFAMETHOXAZOLE AND TRIMETHOPRIM 1 TABLET: 800; 160 TABLET ORAL at 12:09

## 2021-08-17 RX ADMIN — CEFTRIAXONE 1 G: 1 INJECTION, POWDER, FOR SOLUTION INTRAMUSCULAR; INTRAVENOUS at 11:02

## 2021-08-17 RX ADMIN — MICONAZOLE NITRATE: 20 POWDER TOPICAL at 09:20

## 2021-08-17 RX ADMIN — ASPIRIN 81 MG CHEWABLE TABLET 81 MG: 81 TABLET CHEWABLE at 09:15

## 2021-08-17 RX ADMIN — HEPARIN SODIUM 5000 UNITS: 5000 INJECTION INTRAVENOUS; SUBCUTANEOUS at 09:16

## 2021-08-17 RX ADMIN — IPRATROPIUM BROMIDE AND ALBUTEROL SULFATE 3 ML: .5; 3 SOLUTION RESPIRATORY (INHALATION) at 07:07

## 2021-08-17 RX ADMIN — SULFAMETHOXAZOLE AND TRIMETHOPRIM 1 TABLET: 800; 160 TABLET ORAL at 20:31

## 2021-08-17 RX ADMIN — IPRATROPIUM BROMIDE AND ALBUTEROL SULFATE 3 ML: .5; 3 SOLUTION RESPIRATORY (INHALATION) at 11:27

## 2021-08-17 RX ADMIN — MICONAZOLE NITRATE: 20 POWDER TOPICAL at 20:31

## 2021-08-17 RX ADMIN — PRAVASTATIN SODIUM 20 MG: 20 TABLET ORAL at 09:15

## 2021-08-17 RX ADMIN — BENZONATATE 100 MG: 100 CAPSULE ORAL at 11:03

## 2021-08-17 RX ADMIN — AMLODIPINE BESYLATE 5 MG: 5 TABLET ORAL at 09:15

## 2021-08-17 RX ADMIN — HEPARIN SODIUM 5000 UNITS: 5000 INJECTION INTRAVENOUS; SUBCUTANEOUS at 20:32

## 2021-08-17 RX ADMIN — OMEPRAZOLE 20 MG: 20 CAPSULE, DELAYED RELEASE ORAL at 09:15

## 2021-08-17 RX ADMIN — LOSARTAN POTASSIUM 50 MG: 50 TABLET, FILM COATED ORAL at 09:15

## 2021-08-17 RX ADMIN — Medication 5 ML: at 21:58

## 2021-08-17 RX ADMIN — ACETAMINOPHEN 650 MG: 325 TABLET, FILM COATED ORAL at 09:15

## 2021-08-17 RX ADMIN — IPRATROPIUM BROMIDE AND ALBUTEROL SULFATE 3 ML: .5; 3 SOLUTION RESPIRATORY (INHALATION) at 19:45

## 2021-08-17 RX ADMIN — TERAZOSIN HYDROCHLORIDE 2 MG: 1 CAPSULE ORAL at 22:02

## 2021-08-17 ASSESSMENT — ACTIVITIES OF DAILY LIVING (ADL)
ADLS_ACUITY_SCORE: 16

## 2021-08-17 NOTE — PLAN OF CARE
Admitted with fall and non operative ankle fracture. Tylenol available for pain control. UTI on Rocephin. Skin is very dry. Under rt breast is red. Medicated powder on MAR. Barrier cream to butt for redness and protection. Incont. Pure wick in place. Sub Q heparin. Potassium 3.3, replaced. Recheck WNL. Continue POC. Possible discharge to TCU on Wednesday.

## 2021-08-17 NOTE — PROGRESS NOTES
Care Management Follow Up    Length of Stay (days): 3    Expected Discharge Date: 08/18/2021     Concerns to be Addressed: discharge planning     Patient plan of care discussed at interdisciplinary rounds: Yes    Anticipated Discharge Disposition: Skilled Nursing Facilty, Transitional Care     Anticipated Discharge Services: None  Anticipated Discharge DME: Oxygen, Walker    Patient/family educated on Medicare website which has current facility and service quality ratings: no (Pt knew which facility she wanted to go to.)  Education Provided on the Discharge Plan:    Patient/Family in Agreement with the Plan: yes    Referrals Placed by CM/SW: Post Acute Facilities  Private pay costs discussed: transportation costs    Additional Information:  SW received call from patient confirming that she does want HE WC transport at discharge and is aware of cost. SW will continue to follow. Referral was sent to [facility] for TCU 8/16, awaiting response.     CYRUS Hart

## 2021-08-17 NOTE — PLAN OF CARE
Patient admitted after a fall at home on 8/14/21. Non operable rt ankle fracture. Boot in place.  UTI on PO bactrim DS. Cough is loose and chronic. Prednisone decreased today from 40 to 20. Up to chair via lift. Frequent repositioning. Tylenol for ankle discomfort. Skin is dry and flaky. Pure wick in place. Plan is to discharge tomorrow to TCU.

## 2021-08-17 NOTE — PROGRESS NOTES
"Care Management Discharge Note    Discharge Date: 08/18/2021     Discharge Disposition: Skilled Nursing Facilty, [facility]    Discharge Services: None    Discharge DME: Oxygen, Walker    Discharge Transportation: other (see comments) (TBD)    Private pay costs discussed: transportation costs    PAS Confirmation Code:  Being completed  Patient/family educated on Medicare website which has current facility and service quality ratings: no (Pt knew which facility she wanted to go to.)    Education Provided on the Discharge Plan:  Yes  Persons Notified of Discharge Plans: Patient, significant other [facility] ([phone] [facility])  Patient/Family in Agreement with the Plan: yes    Handoff Referral Completed: No    Additional Information:  [facility] has clinically accepted patient and has a private room available tomorrow after 1400. Patient has not been COVID vaccinated, no charge for private room fee. Patient will need to be \"quarantined\" for 14 days and is not allowed visitors. Patient and significant other aware.     Reviewed out of pocket cost for [facility] transport, $78.65 for base rate and $5.06 per mile to the destination. Pt/family expressed understanding and are agreeable to this. HE WC transport w/ O2 to be arranged at discharge.    Anticipated discharge tomorrow per MD documentation. SW will continue to follow.     HE WC w/ O2 arranged for 1500 tomorrow, 8/18.     Your information has been submitted on August 17th, 2021 at 11:27:35 AM CDT. The confirmation number is [id number]    CYRUS Hart        "

## 2021-08-17 NOTE — PROGRESS NOTES
[facility]    Medicine Progress Note - Hospitalist Service       Date of Admission:  8/14/2021    Assessment & Plan                                  85-year-old white female who ambulates short distances with a walker.  She has a history of COPD and uses oxygen p.r.n., hypertension, hyperlipidemia, chronic cough, who presents after she had a fall in the bathroom.  She had gone into her bathroom with her walker when she felt her knee buckle and subsequently landed on her right side.  She feels she may have twisted her right ankle and has been having pain there.  She denies any loss of consciousness.  She denies any head trauma.  She denies any recent fevers or chills.  She denies any chest pain to me.  She has a chronic cough with sputum, which is productive, but over the past few days, it has been more thick.  In the ER over here, she is seen by Dr. Mancera.  She is noted to have a right ankle fracture and elevated troponin and elevated D-dimer.  I am asked to admit her for further evaluation.      1.  Mechanical fall with subsequent right ankle fracture.   A boot has been fitted for her.  We will consult Orthopedics.  We will manage her pain. Likely nonoperative management given her limited mobility at baseline.    2.  Elevated troponin, significance unknown, probable type 2 MI.   We will monitor on telemetry, get serial troponins on her.  - Peak troponin is 0.530.  - Echocardiogram wnl with grade 1 DD.     3.  Hypertensive urgency, likely contributing to the elevated troponin.  Her blood pressure is markedly high.  We will resume her home medications.    4. Acute exacerbation of COPD.   Nebs, PO steroids will decrease to 20 mg 8/17,  Procal, wnl.    5. UTI with citrobacter .  -  IV rocephin.             Diet: Combination Diet Low Saturated Fat Na <2400mg Diet, No Caffeine Diet    DVT Prophylaxis: Heparin SQ  Kinney Catheter: Not present  Central Lines: None  Code Status: No CPR- Do NOT  Intubate      Disposition Plan   Expected discharge: 08/18/2021 recommended to transitional care unit once antibiotic plan established.     The patient's care was discussed with the Bedside Nurse and Patient.    Wiliam Rajput MD  Hospitalist Service  [facility]  Securely message with the Vocera Web Console (learn more here)  Text page via [facility] Paging/Directory      Clinically Significant Risk Factors Present on Admission               ______________________________________________________________________    Interval History     No fevers/chills. RLE pain is better.    Data reviewed today: I reviewed all medications, new labs and imaging results over the last 24 hours. I personally reviewed no images or EKG's today.    Physical Exam   Vital Signs: Temp: 98.6  F (37  C) Temp src: Oral BP: (!) 170/62 Pulse: 76   Resp: 20 SpO2: 95 % O2 Device: Nasal cannula Oxygen Delivery: 3 LPM  Weight: 194 lbs 0 oz    GENERAL:  The patient is alert, awake, oriented, appears quite frail, in no acute distress on exam.  HEENT:  Pupils equal, round, react to light.  LUNGS:  She has diminished breath sounds bilaterally.  HEART:  Regular rate.  S1, S2 normal.  No murmurs or gallops are.  ABDOMEN:  Soft, nontender, nondistended with good bowel sounds.  EXTREMITIES:  She has 1+ edema on the right lower extremity.  + boot RLE.    Data   Recent Labs   Lab 08/16/21  1544 08/16/21  0842 08/16/21  0631 08/15/21  0613 08/15/21  0052 08/14/21  1744 08/14/21  1001   WBC  --   --  15.3* 9.1  --   --  11.1*   HGB  --   --  12.3 13.8  --   --  15.2   MCV  --   --  91 91  --   --  93   PLT  --   --  247 202  --   --  208   NA  --   --  139 139  --   --  140   POTASSIUM 4.3 3.3* 3.3* 3.7  --   --  3.6   CHLORIDE  --   --  107 106  --   --  107   CO2  --   --  27 26  --   --  26   BUN  --   --  20 13  --   --  13   CR  --   --  0.72 0.59  --   --  0.58   ANIONGAP  --   --  5 7  --   --  7   SANDRA  --   --  8.7 8.6  --   --  9.1    GLC  --   --  90 117*  --   --  110*   ALBUMIN  --   --   --   --   --   --  3.6   PROTTOTAL  --   --   --   --   --   --  7.2   BILITOTAL  --   --   --   --   --   --  1.1   ALKPHOS  --   --   --   --   --   --  89   ALT  --   --   --   --   --   --  23   AST  --   --   --   --   --   --  26   TROPONIN  --   --   --   --  0.174* 0.325* 0.530*     No results found for this or any previous visit (from the past 24 hour(s)).  Medications     - MEDICATION INSTRUCTIONS -         amLODIPine  5 mg Oral Daily     artificial saliva  5 mL Swish & Spit 4x Daily     aspirin  81 mg Oral Daily     atenolol  100 mg Oral Daily     cefTRIAXone  1 g Intravenous Q24H     heparin ANTICOAGULANT  5,000 Units Subcutaneous Q12H     ipratropium - albuterol 0.5 mg/2.5 mg/3 mL  3 mL Nebulization 4x daily     miconazole   Topical BID     omeprazole  20 mg Oral Daily     pravastatin  20 mg Oral Daily     predniSONE  40 mg Oral Daily     sodium chloride (PF)  3 mL Intracatheter Q8H

## 2021-08-17 NOTE — PROGRESS NOTES
Pt. A/Ox4 Tylenol given for RLE pain. Staff repositioned pt for comfort.Pt has boot on RLE supported with pillows. Pure wick in place draining well. Pt slept comfortably remainder of night. Social work following for discharge planning to TCU.

## 2021-08-18 ENCOUNTER — APPOINTMENT (OUTPATIENT)
Dept: PHYSICAL THERAPY | Facility: CLINIC | Age: 86
DRG: 542 | End: 2021-08-18
Payer: COMMERCIAL

## 2021-08-18 VITALS
WEIGHT: 194 LBS | HEART RATE: 68 BPM | HEIGHT: 66 IN | OXYGEN SATURATION: 92 % | DIASTOLIC BLOOD PRESSURE: 88 MMHG | SYSTOLIC BLOOD PRESSURE: 131 MMHG | BODY MASS INDEX: 31.18 KG/M2 | RESPIRATION RATE: 18 BRPM | TEMPERATURE: 98.1 F

## 2021-08-18 LAB
ANION GAP SERPL CALCULATED.3IONS-SCNC: 7 MMOL/L (ref 3–14)
BASOPHILS # BLD AUTO: 0 10E3/UL (ref 0–0.2)
BASOPHILS NFR BLD AUTO: 0 %
BUN SERPL-MCNC: 15 MG/DL (ref 7–30)
CALCIUM SERPL-MCNC: 8.4 MG/DL (ref 8.5–10.1)
CHLORIDE BLD-SCNC: 105 MMOL/L (ref 94–109)
CO2 SERPL-SCNC: 28 MMOL/L (ref 20–32)
CREAT SERPL-MCNC: 0.8 MG/DL (ref 0.52–1.04)
EOSINOPHIL # BLD AUTO: 0.1 10E3/UL (ref 0–0.7)
EOSINOPHIL NFR BLD AUTO: 1 %
ERYTHROCYTE [DISTWIDTH] IN BLOOD BY AUTOMATED COUNT: 14.1 % (ref 10–15)
GFR SERPL CREATININE-BSD FRML MDRD: 68 ML/MIN/1.73M2
GLUCOSE BLD-MCNC: 83 MG/DL (ref 70–99)
HCT VFR BLD AUTO: 37.4 % (ref 35–47)
HGB BLD-MCNC: 12.4 G/DL (ref 11.7–15.7)
IMM GRANULOCYTES # BLD: 0.1 10E3/UL
IMM GRANULOCYTES NFR BLD: 1 %
LYMPHOCYTES # BLD AUTO: 3.6 10E3/UL (ref 0.8–5.3)
LYMPHOCYTES NFR BLD AUTO: 39 %
MCH RBC QN AUTO: 31 PG (ref 26.5–33)
MCHC RBC AUTO-ENTMCNC: 33.2 G/DL (ref 31.5–36.5)
MCV RBC AUTO: 94 FL (ref 78–100)
MONOCYTES # BLD AUTO: 1.1 10E3/UL (ref 0–1.3)
MONOCYTES NFR BLD AUTO: 12 %
NEUTROPHILS # BLD AUTO: 4.5 10E3/UL (ref 1.6–8.3)
NEUTROPHILS NFR BLD AUTO: 47 %
NRBC # BLD AUTO: 0 10E3/UL
NRBC BLD AUTO-RTO: 0 /100
PLATELET # BLD AUTO: 231 10E3/UL (ref 150–450)
POTASSIUM BLD-SCNC: 3.2 MMOL/L (ref 3.4–5.3)
POTASSIUM BLD-SCNC: 4.4 MMOL/L (ref 3.4–5.3)
RBC # BLD AUTO: 4 10E6/UL (ref 3.8–5.2)
SODIUM SERPL-SCNC: 140 MMOL/L (ref 133–144)
WBC # BLD AUTO: 9.4 10E3/UL (ref 4–11)

## 2021-08-18 PROCEDURE — 85025 COMPLETE CBC W/AUTO DIFF WBC: CPT | Performed by: INTERNAL MEDICINE

## 2021-08-18 PROCEDURE — 250N000011 HC RX IP 250 OP 636: Performed by: INTERNAL MEDICINE

## 2021-08-18 PROCEDURE — 250N000009 HC RX 250: Performed by: INTERNAL MEDICINE

## 2021-08-18 PROCEDURE — 97530 THERAPEUTIC ACTIVITIES: CPT | Mod: GP

## 2021-08-18 PROCEDURE — 99239 HOSP IP/OBS DSCHRG MGMT >30: CPT | Performed by: INTERNAL MEDICINE

## 2021-08-18 PROCEDURE — 250N000012 HC RX MED GY IP 250 OP 636 PS 637: Performed by: INTERNAL MEDICINE

## 2021-08-18 PROCEDURE — 36415 COLL VENOUS BLD VENIPUNCTURE: CPT | Performed by: INTERNAL MEDICINE

## 2021-08-18 PROCEDURE — 250N000013 HC RX MED GY IP 250 OP 250 PS 637: Performed by: INTERNAL MEDICINE

## 2021-08-18 PROCEDURE — 999N000157 HC STATISTIC RCP TIME EA 10 MIN

## 2021-08-18 PROCEDURE — 94640 AIRWAY INHALATION TREATMENT: CPT

## 2021-08-18 PROCEDURE — 97110 THERAPEUTIC EXERCISES: CPT | Mod: GP

## 2021-08-18 PROCEDURE — 84132 ASSAY OF SERUM POTASSIUM: CPT | Performed by: INTERNAL MEDICINE

## 2021-08-18 PROCEDURE — 94640 AIRWAY INHALATION TREATMENT: CPT | Mod: 76

## 2021-08-18 PROCEDURE — 80048 BASIC METABOLIC PNL TOTAL CA: CPT | Performed by: INTERNAL MEDICINE

## 2021-08-18 RX ORDER — SULFAMETHOXAZOLE/TRIMETHOPRIM 800-160 MG
1 TABLET ORAL 2 TIMES DAILY
Qty: 5 TABLET | Refills: 0 | DISCHARGE
Start: 2021-08-18 | End: 2021-08-21

## 2021-08-18 RX ORDER — PREDNISONE 20 MG/1
20 TABLET ORAL DAILY
Qty: 3 TABLET | Refills: 0 | DISCHARGE
Start: 2021-08-19 | End: 2021-08-22

## 2021-08-18 RX ORDER — POTASSIUM CHLORIDE 1500 MG/1
40 TABLET, EXTENDED RELEASE ORAL ONCE
Status: COMPLETED | OUTPATIENT
Start: 2021-08-18 | End: 2021-08-18

## 2021-08-18 RX ORDER — ASPIRIN 81 MG/1
81 TABLET, CHEWABLE ORAL DAILY
DISCHARGE
Start: 2021-08-19

## 2021-08-18 RX ORDER — TRAMADOL HYDROCHLORIDE 50 MG/1
50 TABLET ORAL 2 TIMES DAILY
Qty: 10 TABLET | Refills: 0 | Status: SHIPPED | DISCHARGE
Start: 2021-08-18 | End: 2021-09-16

## 2021-08-18 RX ADMIN — PREDNISONE 20 MG: 20 TABLET ORAL at 08:59

## 2021-08-18 RX ADMIN — FUROSEMIDE 40 MG: 40 TABLET ORAL at 08:59

## 2021-08-18 RX ADMIN — ATENOLOL 100 MG: 50 TABLET ORAL at 08:59

## 2021-08-18 RX ADMIN — IPRATROPIUM BROMIDE AND ALBUTEROL SULFATE 3 ML: .5; 3 SOLUTION RESPIRATORY (INHALATION) at 07:00

## 2021-08-18 RX ADMIN — BENZONATATE 100 MG: 100 CAPSULE ORAL at 09:02

## 2021-08-18 RX ADMIN — GUAIFENESIN 10 ML: 100 SOLUTION ORAL at 09:02

## 2021-08-18 RX ADMIN — Medication 5 ML: at 09:00

## 2021-08-18 RX ADMIN — ASPIRIN 81 MG CHEWABLE TABLET 81 MG: 81 TABLET CHEWABLE at 08:59

## 2021-08-18 RX ADMIN — OMEPRAZOLE 20 MG: 20 CAPSULE, DELAYED RELEASE ORAL at 08:59

## 2021-08-18 RX ADMIN — IPRATROPIUM BROMIDE AND ALBUTEROL SULFATE 3 ML: .5; 3 SOLUTION RESPIRATORY (INHALATION) at 11:28

## 2021-08-18 RX ADMIN — SULFAMETHOXAZOLE AND TRIMETHOPRIM 1 TABLET: 800; 160 TABLET ORAL at 08:59

## 2021-08-18 RX ADMIN — AMLODIPINE BESYLATE 5 MG: 5 TABLET ORAL at 08:59

## 2021-08-18 RX ADMIN — PRAVASTATIN SODIUM 20 MG: 20 TABLET ORAL at 08:59

## 2021-08-18 RX ADMIN — MICONAZOLE NITRATE: 20 POWDER TOPICAL at 09:08

## 2021-08-18 RX ADMIN — ACETAMINOPHEN 650 MG: 325 TABLET, FILM COATED ORAL at 00:05

## 2021-08-18 RX ADMIN — HEPARIN SODIUM 5000 UNITS: 5000 INJECTION INTRAVENOUS; SUBCUTANEOUS at 08:59

## 2021-08-18 RX ADMIN — POTASSIUM CHLORIDE 40 MEQ: 1500 TABLET, EXTENDED RELEASE ORAL at 10:28

## 2021-08-18 RX ADMIN — ACETAMINOPHEN 650 MG: 325 TABLET, FILM COATED ORAL at 08:59

## 2021-08-18 RX ADMIN — LOSARTAN POTASSIUM 50 MG: 50 TABLET, FILM COATED ORAL at 08:59

## 2021-08-18 ASSESSMENT — ACTIVITIES OF DAILY LIVING (ADL)
ADLS_ACUITY_SCORE: 20
ADLS_ACUITY_SCORE: 20
ADLS_ACUITY_SCORE: 18
ADLS_ACUITY_SCORE: 20

## 2021-08-18 NOTE — PROGRESS NOTES
Care Management Discharge Note    Discharge Date: 08/18/2021  Expected Time of Departure: 1500    Discharge Disposition: Skilled Nursing Facilty, Transitional Care    Discharge Services: None    Discharge DME: Oxygen, Walker    Discharge Transportation: agency - [facility] w/c    Private pay costs discussed: private room/amenity fees and transportation costs   Reviewed out of pocket cost for [facility] transport, $78.65 for base rate and $5.06 per mile to the destination. Pt/family expressed understanding and are agreeable to this.     PAS Confirmation Code: [id number]  Patient/family educated on Medicare website which has current facility and service quality ratings: no (Pt knew which facility she wanted to go to.)    Education Provided on the Discharge Plan:  yes  Persons Notified of Discharge Plans: Pt and [facility]   Patient/Family in Agreement with the Plan: yes    Handoff Referral Completed: Yes    Additional Information:  The pt will discharge to [facility] today via [facility] w/c transport at 1500.    Sw confirmed the discharge plan with Breana at [facility] and sent her the pt's discharge orders.    Sw will continue to be available as needed until discharge.    CYRUS Carrillo, [facility]  Inpatient Care Coordination  [facility]  [phone]

## 2021-08-18 NOTE — PLAN OF CARE
"AOx4. Tylenol for pain. Elevated BP, received first dose of terazosin. Purewick in place. Bactrim DS for UTI. No IV. No tele.  Plan: Continue to monitor. Discharge to [facility] TCU with [facility] wheelchair transport at 1500.    /57 (BP Location: Left arm)   Pulse 61   Temp 98  F (36.7  C) (Oral)   Resp 24   Ht 1.676 m (5' 6\")   Wt 88 kg (194 lb)   SpO2 91%   BMI 31.31 kg/m      "

## 2021-08-18 NOTE — PLAN OF CARE
Patient admitted with fall and UTI. Has non operative rt ankle fx and is on Bactrim DS for UTI. Up with lift, [place], incont of urine, pure wick in place. Skin is dry and flaky. Bowel movement today. Discharged to TCU at 3pm today. All belongs with patient.  All questions answered.

## 2021-08-18 NOTE — DISCHARGE SUMMARY
[facility]  Discharge Summary  Name: Marino Prajapati    MRN: [id number]  YOB: 1935    Age: 85 year old  Date of Discharge:  8/18/2021  Date of Admission: 8/14/2021  Primary Care Provider: Vivian Blue  Discharge Physician:  Patricio Ornelas MD  Discharging Service:  Hospitalist               85-year-old white female who ambulates short distances with a walker.  She has a history of COPD and uses oxygen p.r.n., hypertension, hyperlipidemia, chronic cough, who presents after she had a fall in the bathroom.  She had gone into her bathroom with her walker when she felt her knee buckle and subsequently landed on her right side.  She feels she may have twisted her right ankle and has been having pain there.  She denies any loss of consciousness.  She denies any head trauma.  She denies any recent fevers or chills.  She denies any chest pain to me.  She has a chronic cough with sputum, which is productive, but over the past few days, it has been more thick.  In the ER over here, she is seen by Dr. Mancera.  She is noted to have a right ankle fracture and elevated troponin and elevated D-dimer.  I am asked to admit her for further evaluation.        1.  Mechanical fall with subsequent right ankle fracture.   A boot has been fitted for her.  We will consult Orthopedics.   -Nonoperative approach  -Has a cam boot  -Pain under control not even utilizing narcotics here  -I resume her home regimen of tramadol and wrote new prescription for TCU purposes    Likely nonoperative management given her limited mobility at baseline.     2.  Elevated troponin, significance unknown, suspecting secondary to type 2 MI with demand ischemia.   We will monitor on telemetry, get serial troponins on her.  - Peak troponin is 0.530.  - Echocardiogram wnl with grade 1 DD.      3.  Hypertensive urgency, likely contributing to the elevated troponin.  Her blood pressure is markedly high.  We will resume her  home medications.   -Currently normotensive, blood pressure significantly improved    4. Acute exacerbation of COPD.   Nebs, PO steroids will decrease to 20 mg 8/17,  Procal, wnl.  -Continue oral prednisone upon discharge to finish 3 more days     5. UTI with citrobacter .  -Received intravenous ceftriaxone earlier and switch to Bactrim since yesterday and will continue to finish 1 week course total antibiotic coverage     6.  Physical deconditioning      Oxygen Documentation:   I certify that this patient, Marino Prajapati has been under my care (or a nurse practitioner or physican's assistant working with me). This is the face-to-face encounter for oxygen medical necessity.       Marino Prajapati is now in a chronic stable state and continues to require supplemental oxygen. Patient has continued oxygen desaturation due to COPD J44.9.     Alternative treatment(s) tried or considered and deemed clinically infective for treatment of COPD J44.9 include nebulizers, steroids, and pulmonary toileting.   If portability is ordered, is the patient mobile within the home? no     Patients who qualify for home O2 coverage under the CMS guidelines require ABG tests or O2 sat readings obtained closest to, but no earlier than 2 days prior to the discharge, as evidence of the need for home oxygen therapy. Testing must be performed while patient is in the chronic stable state. See notes for O2 sats.    Other Diagnosis:  Past Medical History:   Diagnosis Date     Anemia      CARDIOVASCULAR SCREENING; LDL GOAL LESS THAN 160      Colon polyps 2010    needs colonoscopy 2013     DVT (deep venous thrombosis) (H) 2007    remote history of DVT while she was on BCP ,coumadin stopped after retroperitoneal/buttock hematoma in 10/11 . On ASA only     Gastro-oesophageal reflux disease      HTN, goal below 140/90      Hyperlipidemia LDL goal <130 1/22/2016     Kidney stone      Osteoarthritis     knees and hip     Osteoporosis      Postnasal  drip      S/P total knee arthroplasty      Thrombosis of leg           Discharge Disposition:  Discharged to rehabilitation facility     Allergies:  No Known Allergies     Discharge Medications:   Current Discharge Medication List      START taking these medications    Details   aspirin (ASA) 81 MG chewable tablet Take 1 tablet (81 mg) by mouth daily    Associated Diagnoses: NSTEMI (non-ST elevated myocardial infarction) (H)      predniSONE (DELTASONE) 20 MG tablet Take 1 tablet (20 mg) by mouth daily for 3 days  Qty: 3 tablet, Refills: 0    Associated Diagnoses: Chronic obstructive pulmonary disease, unspecified COPD type (H)      sulfamethoxazole-trimethoprim (BACTRIM DS) 800-160 MG tablet Take 1 tablet by mouth 2 times daily for 5 doses  Qty: 5 tablet, Refills: 0    Associated Diagnoses: Acute cystitis without hematuria         CONTINUE these medications which have CHANGED    Details   traMADol (ULTRAM) 50 MG tablet Take 1 tablet (50 mg) by mouth 2 times daily  Qty: 10 tablet, Refills: 0    Associated Diagnoses: Bilateral chronic knee pain         CONTINUE these medications which have NOT CHANGED    Details   acetaminophen (TYLENOL) 500 MG tablet Take 1,000 mg by mouth 2 times daily Takes with tramadol.      albuterol (PROAIR HFA/PROVENTIL HFA/VENTOLIN HFA) 108 (90 Base) MCG/ACT inhaler Inhale 2 puffs into the lungs every 6 hours as needed for shortness of breath / dyspnea or wheezing  Qty: 3 Inhaler, Refills: 1    Comments: Pharmacy may dispense brand covered by insurance (Proair, or proventil or ventolin or generic albuterol inhaler)  Associated Diagnoses: Mild persistent asthma without complication; Chronic obstructive pulmonary disease, unspecified COPD type (H)      amLODIPine (NORVASC) 5 MG tablet TAKE 1 TABLET DAILY  Qty: 90 tablet, Refills: 0    Associated Diagnoses: HTN, goal below 140/90      atenolol (TENORMIN) 100 MG tablet TAKE 1 TABLET DAILY  Qty: 90 tablet, Refills: 0    Associated Diagnoses: HTN,  goal below 140/90; Hyperlipidemia LDL goal <130; Bilateral leg edema      budesonide-formoterol (SYMBICORT) 160-4.5 MCG/ACT Inhaler Inhale 2 puffs into the lungs 2 times daily  Qty: 30.6 g, Refills: 3    Comments: Profile Rx: patient will contact pharmacy when needed  Associated Diagnoses: Chronic obstructive pulmonary disease, unspecified COPD type (H); Mild persistent asthma without complication      cholecalciferol (VITAMIN D) 1000 UNIT tablet Take 1,000 Units by mouth daily   Qty: 100 tablet, Refills: 3    Associated Diagnoses: Cough; Mild persistent asthma without complication      furosemide (LASIX) 40 MG tablet TAKE 1 TABLET DAILY  Qty: 90 tablet, Refills: 0    Associated Diagnoses: HTN, goal below 140/90; Bilateral leg edema      ipratropium - albuterol 0.5 mg/2.5 mg/3 mL (DUONEB) 0.5-2.5 (3) MG/3ML neb solution INHALE CONTENTS OF 1 VIAL (3 MLS) VIA NEBULIZER EVERY 6 HOURS AS NEEDED FOR SHORTNESS OF BREATH, DYSPNEA, OR WHEEZING  Qty: 1000 mL, Refills: 1    Associated Diagnoses: Mild persistent asthma without complication; Chronic obstructive pulmonary disease, unspecified COPD type (H)      losartan (COZAAR) 100 MG tablet TAKE 1 TABLET DAILY  Qty: 90 tablet, Refills: 0    Associated Diagnoses: HTN, goal below 140/90; Hyperlipidemia LDL goal <130; Bilateral leg edema      nystatin (MYCOSTATIN) 633274 UNIT/GM external powder Apply topically 2 times daily as needed (skin rash)  Qty: 60 g, Refills: 3    Associated Diagnoses: Rash and nonspecific skin eruption      omeprazole (PRILOSEC) 20 MG DR capsule Take 20 mg by mouth daily      pravastatin (PRAVACHOL) 20 MG tablet TAKE 1 TABLET DAILY  Qty: 90 tablet, Refills: 0    Associated Diagnoses: Hyperlipidemia LDL goal <130; HTN, goal below 140/90; Bilateral leg edema      terazosin (HYTRIN) 2 MG capsule TAKE 1 CAPSULE AT BEDTIME  Qty: 90 capsule, Refills: 3    Associated Diagnoses: HTN, goal below 140/90; Hyperlipidemia LDL goal <130; Bilateral leg edema     "  tiotropium (SPIRIVA) 18 MCG inhaled capsule Inhale 1 capsule (18 mcg) into the lungs daily  Qty: 90 capsule, Refills: 1    Comments: Profile Rx: patient will contact pharmacy when needed  Associated Diagnoses: Mild persistent asthma without complication; Chronic obstructive pulmonary disease, unspecified COPD type (H)              Condition on Discharge:  Discharge condition: Stable   Discharge vitals: Blood pressure 131/88, pulse 71, temperature 98.1  F (36.7  C), temperature source Oral, resp. rate 20, height 1.676 m (5' 6\"), weight 88 kg (194 lb), SpO2 92 %, not currently breastfeeding.   Code status on discharge: DNR / DNI     History of Present Illness:  See detailed admission note for full details.        Significant Physical Exam Findings Day of Discharge:  HEENT; Atraumatic, normocephalic, pinkish conjuctiva, pupils bilateral reactive   Skin: warm and moist, no rashes  Lungs: equal chest expansion, clear to auscultation, no wheezes, no stridor, no crackles,   Heart: normal rate, normal rhythm, no rubs or gallops.   Abdomen: normal bowel sounds, no tenderness, no peritoneal signs, no guarding  Extremities: no deformities, cam boot, +1 edema right lower extremity  Neuro; follow commands, alert and oriented x3, spontaneous speech, coherent, moves all extremities spontaneously  Psych; no hallucination, euthymic mood, not agitated        Procedures other than Imaging:  none     Imaging:  Results for orders placed or performed during the hospital encounter of 08/14/21   Head CT w/o contrast    Narrative    CT OF THE HEAD WITHOUT CONTRAST 8/14/2021 11:24 AM     COMPARISON: Head CT 9/12/2010.    HISTORY: Fall, uncertain head trauma, altered mental status. Pain.    TECHNIQUE: 5 mm thick axial CT images of the head were acquired  without IV contrast material.    FINDINGS: There is mild diffuse cerebral volume loss. There are  extensive confluent areas of decreased density in the cerebral white  matter bilaterally " that are consistent with sequela of chronic small  vessel ischemic disease.    The ventricles and basal cisterns are within normal limits in  configuration given the degree of cerebral volume loss.  There is no  midline shift. There are no extra-axial fluid collections.    No intracranial hemorrhage, mass or recent infarct.    The visualized paranasal sinuses are well-aerated. There is no  mastoiditis. There are no fractures of the visualized bones.      Impression    IMPRESSION: Diffuse cerebral volume loss and cerebral white matter  changes consistent with chronic small vessel ischemic disease. No  evidence for acute intracranial pathology.    Radiation dose for this scan was reduced using automated exposure  control, adjustment of the mA and/or kV according to patient size, or  iterative reconstruction technique.     BOOKER VILLALOBOS MD         SYSTEM ID:  [id number]   XR Ankle Bilateral G/E 3 Views    Narrative    XR ANKLE BILATERAL G/E 3 VIEWS 8/14/2021 11:49 AM     HISTORY: fall, bilateral ankle pain    COMPARISON: None.       Impression    IMPRESSION:   3 views of the left ankle show no definitive fracture. Bones are  demineralized. Ankle mortise is intact.    AP and lateral views of the right ankle are submitted for  interpretation, which show a nondisplaced transverse fracture through  the medial malleolus and probable nondisplaced fracture of the  posterior malleolus. Equivocal nondisplaced lateral malleolus  fracture. Mild tibiotalar joint degenerative change. Soft tissue  swelling. Bones are demineralized.    MIKE RENEE MD         SYSTEM ID:  [id number]   XR Chest 1 View    Narrative    CHEST ONE VIEW   8/14/2021 11:51 AM     HISTORY: Fall, cough, weakness.    COMPARISON: Chest x-ray 3/6/2020.      Impression    IMPRESSION: Portable chest. Lungs are clear. Heart is normal in size.  No pneumothorax. No definite pleural effusions. Degenerative bilateral  glenohumeral joint changes are again noted.      CHARISSE ZHAO MD         SYSTEM ID:  [id number]   US Lower Extremity Venous Duplex Right    Narrative    EXAM: US LOWER EXTREMITY VENOUS DUPLEX RIGHT  LOCATION: [facility]  DATE/TIME: 8/14/2021 6:34 PM    INDICATION: Left leg swelling. Broken ankle.  COMPARISON: Ultrasound 4/9/2019  TECHNIQUE: Venous Duplex ultrasound of the right lower extremity with and without compression, augmentation and duplex. Color flow and spectral Doppler with waveform analysis performed.    FINDINGS: Exam includes the common femoral, femoral, popliteal, and contralateral common femoral veins as well as segmentally visualized deep calf veins and greater saphenous vein.     RIGHT: No deep vein thrombosis. No superficial thrombophlebitis. No popliteal cyst.      Impression    IMPRESSION:  1.  No deep venous thrombosis in the right lower extremity.   CT Chest Pulmonary Embolism w Contrast    Narrative    EXAM: CT CHEST PULMONARY EMBOLISM W CONTRAST  LOCATION: [facility]  DATE/TIME: 8/14/2021 6:18 PM    INDICATION: Fall with injury. Cough.  COMPARISON: 3/6/2020  TECHNIQUE: CT chest pulmonary angiogram during arterial phase injection of IV contrast. Multiplanar reformats and MIP reconstructions were performed. Dose reduction techniques were used.   CONTRAST: 70mL [id number]    FINDINGS:  ANGIOGRAM CHEST: Mild motion artifact with some blurring of the smaller peripheral pulmonary arteries but no convincing acute emboli identified. Thoracic aorta is negative for dissection. No CT evidence of right heart strain.    LUNGS AND PLEURA: No significant change in the lobulated low density left mid lung mass that measures approximately 2.7 x 2 cm and should be benign. Improved aeration with some clearing of lower lobe pneumonia that was seen previously. There remains mild   inflammatory bronchial wall thickening and bands of atelectasis or scar. No definite new infiltrate.    MEDIASTINUM/AXILLAE:  Normal.    CORONARY ARTERY CALCIFICATION: Severe.    UPPER ABDOMEN: Prior cholecystectomy. Small right renal cyst.    MUSCULOSKELETAL: Prominent thoracic kyphosis. Mid thoracic vertebral compression unchanged. Severe compression of L1 indeterminate age, not included on the previous exam. Mild compression of L3 also not included on prior study. Severe DJD right shoulder.      Impression    IMPRESSION:  1.  No convincing acute pulmonary emboli identified.  2.  Improved aeration at lung bases. Mild inflammatory bronchial wall thickening persists.  3.  No change in the benign left midlung pulmonary nodule.  4.  Thoracic/lumbar compression fractures, at least one chronic with others indeterminate in age.   Echocardiogram Complete     Value    LVEF  60-65%    Narrative    [id number]  [id number]  [id number]  [id number]^TORIN^AGATA^CHIN     [facility]  Echocardiography Laboratory  [address] [place] [address]     Name: FER MILES  MRN: [id number]  : 1935  Study Date: 08/15/2021 10:49 AM  Age: 85 yrs  Gender: Female  Patient Location: [facility]  Reason For Study: Syncope  Ordering Physician: AGATA HARKINS  Referring Physician: Vivian Blue MD  Performed By: Sandy Pearson RDCS     BSA: 1.9 m2  Height: 66 in  Weight: 182 lb  HR: 66  BP: 148/98 mmHg  ______________________________________________________________________________  Procedure  Complete Portable Echo Adult. Optison (NDC #5998-1553) given intravenously.  ______________________________________________________________________________  Interpretation Summary     Poor image quality  Left ventricular systolic function is normal.  The visual ejection fraction is 60-65%.  The right ventricular systolic function is normal.  No hemodynamically significant valvular abnormalities on 2D or color flow  imaging. Compared to prior study, there is no significant  change.  ______________________________________________________________________________  Left Ventricle  The left ventricle is normal in size. Grade I or early diastolic dysfunction.  Left ventricular systolic function is normal. The visual ejection fraction is  60-65%. No regional wall motion abnormalities noted.     Right Ventricle  The right ventricle is not well visualized. The right ventricular systolic  function is normal.     Atria  The left atrium is not well visualized. Right atrium not well visualized.     Tricuspid Valve  The tricuspid valve is not well visualized. Right ventricular systolic  pressure could not be approximated due to inadequate tricuspid regurgitation.     Aortic Valve  The aortic valve is not well visualized. No hemodynamically significant  valvular aortic stenosis.     Pulmonic Valve  The pulmonic valve is not well visualized. Normal pulmonic valve velocity.     Vessels  (The upper limit of normal is 3.7cm for aortic root diameter.). Inferior vena  cava not well visualized for estimation of right atrial pressure.     Pericardium  The pericardium is not well visualized.     ______________________________________________________________________________  MMode/2D Measurements & Calculations  IVSd: 0.95 cm  LVIDd: 4.4 cm  LVIDs: 2.8 cm  LVPWd: 1.0 cm  FS: 35.5 %  LV mass(C)d: 148.3 grams  LV mass(C)dI: 77.2 grams/m2  Ao root diam: 3.7 cm  LA dimension: 3.5 cm  asc Aorta Diam: 3.6 cm  LA/Ao: 0.94  LVOT diam: 2.2 cm  LVOT area: 3.8 cm2     LA Volume (BP): 59.0 ml  LA Volume Index (BP): 30.7 ml/m2  RWT: 0.48     Doppler Measurements & Calculations  MV E max dominique: 61.0 cm/sec  MV A max dominique: 85.7 cm/sec  MV E/A: 0.71  MV dec time: 0.21 sec  Ao V2 max: 145.0 cm/sec  Ao max P.4 mmHg  E/E' av.5  Lateral E/e': 12.8     Medial E/e': 10.3     ______________________________________________________________________________  Report approved by: Tez Andrews 08/15/2021 12:27 PM                 Consultations:  Consultation during this admission received from orthopedics.     Recent Lab Results:  Recent Labs   Lab 08/18/21  0725 08/16/21  0631 08/15/21  0613   WBC 9.4 15.3* 9.1   HGB 12.4 12.3 13.8   HCT 37.4 36.4 41.2   MCV 94 91 91    247 202     No results for input(s): CULT in the last 168 hours.  Recent Labs   Lab 08/18/21  0725 08/16/21  1544 08/16/21  0842 08/16/21  0631 08/15/21  0613 08/14/21  1001     --   --  139 139 140   POTASSIUM 3.2* 4.3 3.3* 3.3* 3.7 3.6   CHLORIDE 105  --   --  107 106 107   CO2 28  --   --  27 26 26   ANIONGAP 7  --   --  5 7 7   GLC 83  --   --  90 117* 110*   BUN 15  --   --  20 13 13   CR 0.80  --   --  0.72 0.59 0.58   GFRESTIMATED 68  --   --  78 85 86   SANDRA 8.4*  --   --  8.7 8.6 9.1   PROTTOTAL  --   --   --   --   --  7.2   ALBUMIN  --   --   --   --   --  3.6   BILITOTAL  --   --   --   --   --  1.1   ALKPHOS  --   --   --   --   --  89   AST  --   --   --   --   --  26   ALT  --   --   --   --   --  23     Recent Labs   Lab 08/18/21  0725 08/16/21  0631 08/15/21  0613 08/14/21  1001   GLC 83 90 117* 110*     No results for input(s): LACT in the last 168 hours.  Recent Labs   Lab 08/15/21  0052 08/14/21  1744 08/14/21  1001   TROPONIN 0.174* 0.325* 0.530*     Recent Labs   Lab 08/14/21  1057   COLOR Yellow   APPEARANCE Slightly Cloudy*   URINEGLC Negative   URINEBILI Negative   URINEKETONE 40 *   SG 1.030   UBLD Small*   URINEPH 6.0   PROTEIN 70 *   NITRITE Positive*   LEUKEST Trace*   RBCU 6*   WBCU 9*          Pending Results:    Unresulted Labs Ordered in the Past 30 Days of this Admission     No orders found from 7/15/2021 to 8/15/2021.           Discharge Instructions and Follow-Up:   Discharge diet: Orders Placed This Encounter      Combination Diet Low Saturated Fat Na <2400mg Diet, No Caffeine Diet      Advance Diet as Tolerated     Discharge activity: Activity as tolerated   Discharge follow-up: 1-2 weeks with PCP  Orthopedics service as  scheduled   Outpatient therapy: None    Other instructions:  Weightbearing instructions as per orthopedics     Hospital Course:  I assumed service care today.  Marino Handley is feeling much better.  No complaints of uncontrolled pain.  Demonstrating stable hemodynamics.  No diarrhea.  Tolerating oral diet.  Resumed oxygen support.  She mentioned that she uses oxygen at home by nasal cannula.  This can be continued upon discharge  I was informed that she has a pending discharge plans today with available TCU bed placement.  She is to medically optimize and will proceed with TCU discharge with continuation of her home oxygen       Total time spent in face to face contact with the patient and coordinating discharge was:  > 30 Minutes.

## 2021-08-19 ENCOUNTER — PATIENT OUTREACH (OUTPATIENT)
Dept: NURSING | Facility: CLINIC | Age: 86
End: 2021-08-19
Attending: INTERNAL MEDICINE
Payer: COMMERCIAL

## 2021-08-19 DIAGNOSIS — S82.891A ANKLE FRACTURE, RIGHT, CLOSED, INITIAL ENCOUNTER: ICD-10-CM

## 2021-08-19 NOTE — PLAN OF CARE
Physical Therapy Discharge Summary     Reason for therapy discharge:    Discharged to transitional care facility.     Progress towards therapy goal(s). See goals on Care Plan in [facility] electronic health record for goal details.  Goals not met.  Barriers to achieving goals:   discharge from facility.     Therapy recommendation(s):    Continued therapy is recommended.  Rationale/Recommendations:  Patient would benefit from PT eval at TCU in order to increase strength, activity tolerance, balance and independence with mobility.

## 2021-08-19 NOTE — LETTER
[facility]   To:   [facility]'s TCU          Please give to facility    From:   Néstor CARRION  Care Coordinator   [facility]     Patient Name:  Marino Prajapati YOB: 1935   Admit date: 8.18.2021      *Information Needed:  Please contact me when the patient will discharge (or if they will move to long term care)- include the discharge date, disposition, and main diagnosis   - If the patient is discharged with home care services, please provide the name of the agency    Also- Please inform me if a care conference is being held.   Phone, Fax or Email with information       SHEYLA Johnson  Clinic Care Coordinator  Ph. [phone]  [email or web address]

## 2021-08-19 NOTE — PROGRESS NOTES
Clinic Care Coordination Contact  Care Coordination Transition Communication    Referral Source: IP Handoff    Clinical Data: Patient was hospitalized at [facility] from 8.14.2021 to 8.18.2021 with diagnoses of:  1.  Mechanical fall with subsequent right ankle fracture.   A boot has been fitted for her.  We will consult Orthopedics.   -Nonoperative approach  -Has a cam boot  -Pain under control not even utilizing narcotics here  -I resume her home regimen of tramadol and wrote new prescription for TCU purposes    Likely nonoperative management given her limited mobility at baseline.     2.  Elevated troponin, significance unknown, suspecting secondary to type 2 MI with demand ischemia.   We will monitor on telemetry, get serial troponins on her.  - Peak troponin is 0.530.  - Echocardiogram wnl with grade 1 DD.      3.  Hypertensive urgency, likely contributing to the elevated troponin.  Her blood pressure is markedly high.  We will resume her home medications.   -Currently normotensive, blood pressure significantly improved     4. Acute exacerbation of COPD.   Nebs, PO steroids will decrease to 20 mg 8/17,  Procal, wnl.  -Continue oral prednisone upon discharge to finish 3 more days     5. UTI with citrobacter .  -Received intravenous ceftriaxone earlier and switch to Bactrim since yesterday and will continue to finish 1 week course total antibiotic coverage                 Transition to Facility:              Facility Name: [facility]              Contact name and phone number/fax:    . [phone], Fax. [phone].     Plan: RN/SW Care Coordinator will await notification from facility staff informing RN/SW Care Coordinator of patient's discharge plans/needs. RN/SW Care Coordinator will review chart and outreach to facility staff every 4 weeks and as needed.       SHEYLA Johnson  Clinic Care Coordinator  Ph. [phone]  [email or web address]

## 2021-08-22 ENCOUNTER — TRANSFERRED RECORDS (OUTPATIENT)
Dept: HEALTH INFORMATION MANAGEMENT | Facility: CLINIC | Age: 86
End: 2021-08-22

## 2021-08-29 ENCOUNTER — TRANSFERRED RECORDS (OUTPATIENT)
Dept: HEALTH INFORMATION MANAGEMENT | Facility: CLINIC | Age: 86
End: 2021-08-29

## 2021-08-29 LAB
ALT SERPL-CCNC: 29 IU/L (ref 8–45)
AST SERPL-CCNC: 25 IU/L (ref 2–40)
CREATININE (EXTERNAL): 0.7 MG/DL (ref 0.57–1.11)
GFR ESTIMATED (EXTERNAL): >60 ML/MIN/1.73M2
GFR ESTIMATED (IF AFRICAN AMERICAN) (EXTERNAL): >60 ML/MIN/1.73M2
GLUCOSE (EXTERNAL): 105 MG/DL (ref 65–100)
POTASSIUM (EXTERNAL): 3.9 MMOL/L (ref 3.5–5)

## 2021-09-07 ENCOUNTER — TELEPHONE (OUTPATIENT)
Dept: INTERNAL MEDICINE | Facility: CLINIC | Age: 86
End: 2021-09-07

## 2021-09-07 NOTE — TELEPHONE ENCOUNTER
SKILLED NURSING / PHYSICAL THERAPY / OCCUPATIONAL THERAPY/ HHA orders received via fax. Form in your mailbox to be signed.

## 2021-09-07 NOTE — TELEPHONE ENCOUNTER
Never seen   sherry has not seen since 3/2021  Cannot sign - no face to face or home care order in place by Sherry

## 2021-09-09 DIAGNOSIS — E78.5 HYPERLIPIDEMIA LDL GOAL <130: Primary | ICD-10-CM

## 2021-09-14 DIAGNOSIS — M25.562 BILATERAL CHRONIC KNEE PAIN: ICD-10-CM

## 2021-09-14 DIAGNOSIS — M25.561 BILATERAL CHRONIC KNEE PAIN: ICD-10-CM

## 2021-09-14 DIAGNOSIS — G89.29 BILATERAL CHRONIC KNEE PAIN: ICD-10-CM

## 2021-09-14 NOTE — TELEPHONE ENCOUNTER
Reason for Call:  Medication or medication refill:    Do you use a [facility]?  Name of the pharmacy and phone number for the current request:  [facility] - [place], [place] - [address] [place] [address]    Name of the medication requested: Tramadol 50mg    Other request: na    Can we leave a detailed message on this number? YES    Phone number patient can be reached at: Home number on file [phone] (home)    Best Time: any    Call taken on 9/14/2021 at 1:15 PM by Blanco Santos

## 2021-09-14 NOTE — TELEPHONE ENCOUNTER
Requesting Tramadol 50 mg rx refilled.  Last received 10 tabs on 8/18/21 no refills  For bilateral chronic knee pain.  BRANDEE Chowdary R.N.'

## 2021-09-15 NOTE — TELEPHONE ENCOUNTER
Tramadol has been refilled by multiple providers. - pt  got 40 tab by Randy Barrientos on 09/02/21( pt is supposed to use bid per Dr Mendes) which should last for 20 days.   Please hold for Dr Mendes     PDMP -See below    09/02/2021  1   09/02/2021  Tramadol Hcl 50 MG Tablet  40.00  8 Ri Daren   [id number]   All (1146)   0/0  25.00 M[place]   08/27/2021  2   08/27/2021  Tramadol Hcl 50 MG Tablet  30.00  5 Me Lar   [id number]   Omn (4177)   0/0  30.00 [place]   08/27/2021  2   08/22/2021  Hydrocodone-Acetamin 5-325 MG  2.00  1 Kathy Hea   [id number]   Omn (4177)   0/0  10.00 [place]   08/24/2021  2   08/24/2021  Tramadol Hcl 50 MG Tablet  58.00  29 Me Lar   [id number]   Omn (4177)   0/0  10.00 [place]   08/23/2021  2   08/22/2021  Hydrocodone-Acetamin 5-325 MG  13.00  3 Kathy Hea   [id number]   Omn (4177)   0/0  21.67 [place]   08/18/2021  2   08/18/2021  Tramadol Hcl 50 MG Tablet  9.00  4 Al Gal   [id number]   Omn (4177)   0/0  11.25 [place]   07/29/2021  1   07/29/2021  Tramadol Hcl 50 MG Tablet  60.00  30 Di Cri   [id number]   Exp (4317)   0/0  10.00 MME  [place]   07/05/2021  1   07/01/2021  Tramadol Hcl 50 MG Tablet  60.00  30 Di Cri   [id number]   Exp

## 2021-09-16 RX ORDER — TRAMADOL HYDROCHLORIDE 50 MG/1
50 TABLET ORAL 2 TIMES DAILY
Qty: 60 TABLET | Refills: 0 | Status: SHIPPED | OUTPATIENT
Start: 2021-09-16 | End: 2021-10-18

## 2021-09-16 NOTE — TELEPHONE ENCOUNTER
Patient was in [facility] and there was an episode with the hoda lift.  She has 4 broken vertebrae and a broken ankle.  Telephone Visit is also scheduled with Dr. Lopes for 09/17/21.  states she is in a lot of pain and they are asking for refill to be done today.

## 2021-09-16 NOTE — TELEPHONE ENCOUNTER
The Xarelto can be done at the appointment with Dr. Lopes tomorrow.  If I send the tramadol through [facility] are they going to get it in time?

## 2021-09-16 NOTE — TELEPHONE ENCOUNTER
Called and spoke with significant other. She is still taking Tramadol 2 times daily.  Please send refill of Tramadol to [facility].     Patient was prescribed Xarelto for PE when in hospital on 8/29/21 and was given a 17 day supply and has 4 days left.  S.O asked if patient is to continue this medication.  Patient has history of blood clots.  Please advise, thanks.      Please send refill of xarelto to [facility] in [place]-- pharmacy pended.  Please send refill if still needing to take medication.

## 2021-09-17 ENCOUNTER — VIRTUAL VISIT (OUTPATIENT)
Dept: INTERNAL MEDICINE | Facility: CLINIC | Age: 86
End: 2021-09-17
Payer: COMMERCIAL

## 2021-09-17 VITALS
DIASTOLIC BLOOD PRESSURE: 83 MMHG | OXYGEN SATURATION: 85 % | HEART RATE: 97 BPM | HEIGHT: 66 IN | SYSTOLIC BLOOD PRESSURE: 130 MMHG | BODY MASS INDEX: 31.31 KG/M2

## 2021-09-17 DIAGNOSIS — S32.000D COMPRESSION FRACTURE OF LUMBAR VERTEBRA WITH ROUTINE HEALING, UNSPECIFIED LUMBAR VERTEBRAL LEVEL, SUBSEQUENT ENCOUNTER: Primary | ICD-10-CM

## 2021-09-17 DIAGNOSIS — S22.000D COMPRESSION FRACTURE OF THORACIC VERTEBRA WITH ROUTINE HEALING, UNSPECIFIED THORACIC VERTEBRAL LEVEL, SUBSEQUENT ENCOUNTER: ICD-10-CM

## 2021-09-17 DIAGNOSIS — J44.9 CHRONIC OBSTRUCTIVE PULMONARY DISEASE, UNSPECIFIED COPD TYPE (H): Chronic | ICD-10-CM

## 2021-09-17 DIAGNOSIS — I26.99 ACUTE PULMONARY EMBOLISM WITHOUT ACUTE COR PULMONALE, UNSPECIFIED PULMONARY EMBOLISM TYPE (H): ICD-10-CM

## 2021-09-17 PROBLEM — R26.89 OTHER ABNORMALITIES OF GAIT AND MOBILITY: Status: ACTIVE | Noted: 2019-04-12

## 2021-09-17 PROBLEM — S22.060A COMPRESSION FRACTURE OF T8 VERTEBRA (H): Status: ACTIVE | Noted: 2021-08-22

## 2021-09-17 PROBLEM — K21.9 GASTRO-ESOPHAGEAL REFLUX DISEASE WITHOUT ESOPHAGITIS: Status: ACTIVE | Noted: 2019-04-12

## 2021-09-17 PROBLEM — M62.81 MUSCLE WEAKNESS (GENERALIZED): Status: ACTIVE | Noted: 2019-04-12

## 2021-09-17 PROBLEM — N30.00 ACUTE CYSTITIS WITHOUT HEMATURIA: Status: ACTIVE | Noted: 2021-08-19

## 2021-09-17 PROBLEM — M54.50 LOW BACK PAIN: Status: ACTIVE | Noted: 2019-04-12

## 2021-09-17 PROBLEM — I25.10 CORONARY ATHEROSCLEROSIS: Status: ACTIVE | Noted: 2021-08-22

## 2021-09-17 PROBLEM — R53.81 OTHER MALAISE: Status: ACTIVE | Noted: 2019-04-12

## 2021-09-17 PROBLEM — J96.01 ACUTE RESPIRATORY FAILURE WITH HYPOXIA (H): Status: ACTIVE | Noted: 2021-08-29

## 2021-09-17 PROBLEM — R91.8 MASS OF LEFT LUNG: Status: ACTIVE | Noted: 2021-08-29

## 2021-09-17 PROBLEM — R48.8 OTHER SYMBOLIC DYSFUNCTIONS: Status: ACTIVE | Noted: 2019-05-22

## 2021-09-17 PROBLEM — Z86.718 PERSONAL HISTORY OF OTHER VENOUS THROMBOSIS AND EMBOLISM: Status: ACTIVE | Noted: 2019-04-12

## 2021-09-17 PROBLEM — G89.29 OTHER CHRONIC PAIN: Status: ACTIVE | Noted: 2019-04-12

## 2021-09-17 PROBLEM — I89.0 LYMPHEDEMA, NOT ELSEWHERE CLASSIFIED: Status: ACTIVE | Noted: 2019-04-12

## 2021-09-17 PROCEDURE — 99443 PR PHYSICIAN TELEPHONE EVALUATION 21-30 MIN: CPT | Mod: 95 | Performed by: FAMILY MEDICINE

## 2021-09-17 NOTE — PROGRESS NOTES
Marino is a 86 year old who is being evaluated via a billable telephone visit.       What phone number would you like to be contacted at? [phone]  How would you like to obtain your AVS? Mail a copy    (S32.000D) Compression fracture of lumbar vertebra with routine healing, unspecified lumbar vertebral level, subsequent encounter  (primary encounter diagnosis)  Now at home in hospital bed.  Significant other and his EMT daughter are caregivers.  Comment:   Plan:   Home health nursing, nurses aide, PT and OT orders were signed today.      (S22.000D) Compression fracture of thoracic vertebra with routine healing, unspecified thoracic vertebral level, subsequent encounter  Comment:   Plan: Same as above.        (I26.99) Acute pulmonary embolism without acute cor pulmonale, unspecified pulmonary embolism type (H)  Comment:   CT PE study was abnormal on August 23 and Xarelto was started.  With her history she should continue on this med.  Rx provided.  Plan: rivaroxaban ANTICOAGULANT (XARELTO         ANTICOAGULANT) 20 MG TABS tablet            (J44.9) COPD (H)  Comment:   Underlying problem.  It sounds like she is on O2.  Plan:         Subjective       Patient is having a Telephone visit, to discuss home orders.      HPI     Marino is an 86-year-old woman who initially had an ankle fracture.    It sounds like she was then in transitional care and there was an incident involving a Basil lift in which she felt an acute, crunching sensation and back pain.    She has a number of compression fractures in the thoracic and lumbar spine including especially T8 and L3 but levels L1, L4, L5 are also involved.    She is currently back in her home.  Caregivers are her significant other as well as his daughter who is an EMT.  Patient is reported to be unable to get up and around on her own.    Referrals have been made to home health nurse and other services such as nursing aide, OT, PT but orders needed to be signed.    She had a PE  apparently on August 23 and was placed on Xarelto.  There is need for a refill of this.  She is not reported to have shortness of breath at the present time.      Review of Systems     No fevers.  No shortness of breath.  No chest pain.  Considerable back pain.  Alert.      Objective           Vitals:  No vitals were obtained today due to virtual visit.    Physical Exam     This was a telephone visit and my conversations were mainly with her significant other.    It is reported that patient is still weak and has pain so difficulty ambulating.          Phone call duration: 25 minutes    A 25-minute visit requiring review of medical records including imaging studies, conversation with patient and significant other and then a second conversation about medication, preparation of medical record.

## 2021-09-20 ENCOUNTER — TELEPHONE (OUTPATIENT)
Dept: INTERNAL MEDICINE | Facility: CLINIC | Age: 86
End: 2021-09-20

## 2021-09-20 NOTE — TELEPHONE ENCOUNTER
[facility] [employer] [facility] [employer] [facility]10[facility] for review and signature.  Put in Dr. Stinson's in basket in Dr. Mendes's absence.

## 2021-09-25 ENCOUNTER — HEALTH MAINTENANCE LETTER (OUTPATIENT)
Age: 86
End: 2021-09-25

## 2021-10-01 ENCOUNTER — TELEPHONE (OUTPATIENT)
Dept: INTERNAL MEDICINE | Facility: CLINIC | Age: 86
End: 2021-10-01
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
## 2021-10-07 ENCOUNTER — PATIENT OUTREACH (OUTPATIENT)
Dept: CARE COORDINATION | Facility: CLINIC | Age: 86
End: 2021-10-07

## 2021-10-07 NOTE — PROGRESS NOTES
Clinic Care Coordination Contact  Care Coordination Communication    Referral Source: IP Handoff    Clinical Data: Patient was receiving restorative services at [facility] and was discharged to home on 9/2/2021.    Home Care Contact:              Home Care Agency: [facility]            Ph. [phone]      Plan: RN/SW Care Coordinator will await notification from home care staff informing RN/SW Care Coordinator of patients discharge plans/needs.       SHEYLA Johnson  Clinic Care Coordinator  Ph. [phone]  [email or web address]

## 2021-10-11 ENCOUNTER — TELEPHONE (OUTPATIENT)
Dept: INTERNAL MEDICINE | Facility: CLINIC | Age: 86
End: 2021-10-11

## 2021-10-11 NOTE — TELEPHONE ENCOUNTER
Fax received from [facility] 10/09/21 for review and signature.  Put in Dr. Mendes's in basket.

## 2021-10-14 ENCOUNTER — TELEPHONE (OUTPATIENT)
Dept: INTERNAL MEDICINE | Facility: CLINIC | Age: 86
End: 2021-10-14

## 2021-10-14 NOTE — TELEPHONE ENCOUNTER
Toby with [facility] ([phone]) calls for verbal orders for pressure wound on her bottom and friction injuries.  Put TRIAD paste and cover with dressings.     Slough wound on heel. Clean and cover with Silver Alginate or Lordiside and cover wound woth foam dressing or ABD Pad.  Change 3 x wk.    Wound on second toe.  Clean and apply Hydrocolloide Dressing.    OK to leave a detailed message.

## 2021-10-15 DIAGNOSIS — M25.561 BILATERAL CHRONIC KNEE PAIN: ICD-10-CM

## 2021-10-15 DIAGNOSIS — M25.562 BILATERAL CHRONIC KNEE PAIN: ICD-10-CM

## 2021-10-15 DIAGNOSIS — G89.29 BILATERAL CHRONIC KNEE PAIN: ICD-10-CM

## 2021-10-15 NOTE — TELEPHONE ENCOUNTER
Tramadol      Last Written Prescription Date:  09/16/21  Last Fill Quantity: 60,   # refills: 0  Last Office Visit: V 09/17/51  Future Office visit:       Routing refill request to provider for review/approval because:  Drug not on the [facility], P or [facility] refill protocol or controlled substance

## 2021-10-18 RX ORDER — TRAMADOL HYDROCHLORIDE 50 MG/1
50 TABLET ORAL 2 TIMES DAILY
Qty: 60 TABLET | Refills: 0 | Status: SHIPPED | OUTPATIENT
Start: 2021-10-18 | End: 2021-11-02

## 2021-10-19 NOTE — TELEPHONE ENCOUNTER
Left detailed voicemail message for Toby giving approval to requested wound care outlined below.  BRANDEE Chowdary R.N.

## 2021-10-21 ENCOUNTER — TELEPHONE (OUTPATIENT)
Dept: INTERNAL MEDICINE | Facility: CLINIC | Age: 86
End: 2021-10-21
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
## 2021-10-22 ENCOUNTER — TELEPHONE (OUTPATIENT)
Dept: INTERNAL MEDICINE | Facility: CLINIC | Age: 86
End: 2021-10-22

## 2021-10-22 NOTE — TELEPHONE ENCOUNTER
Alginate gel dressing/ foam dressing/ hydrocolloid/wnd filler gel order received via fax. Form in your mailbox to be signed.

## 2021-10-25 ENCOUNTER — MEDICAL CORRESPONDENCE (OUTPATIENT)
Dept: HEALTH INFORMATION MANAGEMENT | Facility: CLINIC | Age: 86
End: 2021-10-25
Payer: COMMERCIAL

## 2021-10-27 ENCOUNTER — TELEPHONE (OUTPATIENT)
Dept: INTERNAL MEDICINE | Facility: CLINIC | Age: 86
End: 2021-10-27

## 2021-10-27 NOTE — TELEPHONE ENCOUNTER
Nadiya from [facility] calling to talk with an RN about the patients home care orders  Ok to call and  [phone]

## 2021-10-27 NOTE — TELEPHONE ENCOUNTER
She stated a fax was returned stating patient not seen at clinic and won't sign  Upon further investigation   It was sent to the wrong clinic  Gave Nadiya correct fax and will put on Dr Mendes's desk for signature    Needing to continue treatment so needs to be signed.

## 2021-10-28 ENCOUNTER — TELEPHONE (OUTPATIENT)
Dept: INTERNAL MEDICINE | Facility: CLINIC | Age: 86
End: 2021-10-28

## 2021-10-28 NOTE — TELEPHONE ENCOUNTER
[facility] for medication management order received via fax. Form in your mailbox to be signed.

## 2021-10-29 ENCOUNTER — MEDICAL CORRESPONDENCE (OUTPATIENT)
Dept: HEALTH INFORMATION MANAGEMENT | Facility: CLINIC | Age: 86
End: 2021-10-29
Payer: COMMERCIAL

## 2021-11-01 ENCOUNTER — TELEPHONE (OUTPATIENT)
Dept: INTERNAL MEDICINE | Facility: CLINIC | Age: 86
End: 2021-11-01

## 2021-11-02 ENCOUNTER — MEDICAL CORRESPONDENCE (OUTPATIENT)
Dept: HEALTH INFORMATION MANAGEMENT | Facility: CLINIC | Age: 86
End: 2021-11-02

## 2021-11-02 ENCOUNTER — TELEPHONE (OUTPATIENT)
Dept: INTERNAL MEDICINE | Facility: CLINIC | Age: 86
End: 2021-11-02

## 2021-11-02 ENCOUNTER — VIRTUAL VISIT (OUTPATIENT)
Dept: INTERNAL MEDICINE | Facility: CLINIC | Age: 86
End: 2021-11-02
Payer: COMMERCIAL

## 2021-11-02 DIAGNOSIS — S32.010A COMPRESSION FRACTURE OF L1 VERTEBRA, INITIAL ENCOUNTER (H): Primary | ICD-10-CM

## 2021-11-02 DIAGNOSIS — I26.99 ACUTE PULMONARY EMBOLISM WITHOUT ACUTE COR PULMONALE, UNSPECIFIED PULMONARY EMBOLISM TYPE (H): ICD-10-CM

## 2021-11-02 DIAGNOSIS — S22.060A COMPRESSION FRACTURE OF T8 VERTEBRA, INITIAL ENCOUNTER (H): ICD-10-CM

## 2021-11-02 DIAGNOSIS — I10 HTN, GOAL BELOW 140/90: ICD-10-CM

## 2021-11-02 DIAGNOSIS — G89.29 BILATERAL CHRONIC KNEE PAIN: ICD-10-CM

## 2021-11-02 DIAGNOSIS — M25.561 BILATERAL CHRONIC KNEE PAIN: ICD-10-CM

## 2021-11-02 DIAGNOSIS — M25.562 BILATERAL CHRONIC KNEE PAIN: ICD-10-CM

## 2021-11-02 PROCEDURE — 99214 OFFICE O/P EST MOD 30 MIN: CPT | Mod: 95 | Performed by: INTERNAL MEDICINE

## 2021-11-02 RX ORDER — TRAMADOL HYDROCHLORIDE 50 MG/1
50 TABLET ORAL 3 TIMES DAILY PRN
Qty: 90 TABLET | Refills: 0 | Status: SHIPPED | OUTPATIENT
Start: 2021-11-02 | End: 2021-11-02

## 2021-11-02 RX ORDER — TRAMADOL HYDROCHLORIDE 50 MG/1
50 TABLET ORAL 3 TIMES DAILY PRN
Qty: 90 TABLET | Refills: 0 | Status: SHIPPED | OUTPATIENT
Start: 2021-11-02 | End: 2022-01-12

## 2021-11-02 NOTE — PROGRESS NOTES
Marino is a 86 year old who is being evaluated via a billable video visit.      How would you like to obtain your AVS? MyChart  If the video visit is dropped, the invitation should be resent by: Text to cell phone: [phone]   Will anyone else be joining your video visit? Yes: Partner Jas and son Dominick.. How would they like to receive their invitation? Text to cell phone: as above          This is a VIDEO ( using Doximity)  encounter with the patient.       Location of the provider : office   Location of the patient : home      18:00  --- 18:26            Dr Mendes's note      Patient's instructions / PLAN:                                                        Plan:  1. Tramadol 50 mg 3 times a day   2. Continue the other meds, same doses for now.  3. Avoid dairy for 2 weeks and try BRAT diet  4. Continue HHC  5. Follow up video appointment Nov 30        ASSESSMENT & PLAN:                                                      (S32.010A) Compression fracture of L1 vertebra, initial encounter (H)  (primary encounter diagnosis)  Comment:   Plan: traMADol (ULTRAM) 50 MG tablet, DISCONTINUED:         traMADol (ULTRAM) 50 MG tablet            (M25.561,  M25.562,  G89.29) Bilateral chr knee pain - Tramadol 50mg, #60/month, pt will call 2 ws in advanced for the rx since it is mail rx   Comment:   Plan: traMADol (ULTRAM) 50 MG tablet, DISCONTINUED:         traMADol (ULTRAM) 50 MG tablet            (S22.060A) Compression fracture of T8 vertebra, initial encounter (H)  Comment:   Plan: traMADol (ULTRAM) 50 MG tablet, DISCONTINUED:         traMADol (ULTRAM) 50 MG tablet            (I26.99) Acute pulmonary embolism without acute cor pulmonale, unspecified pulmonary embolism type (H)  Comment:   Plan: rivaroxaban ANTICOAGULANT (XARELTO         ANTICOAGULANT) 20 MG TABS tablet            (I10) HTN, goal below 140/90  Comment: Controlled    Plan: Continue same meds, same doses for now        Chief complaint:                                                       Follow up chronic medical problems    helps w the appointment     SUBJECTIVE:                                                    History of present illness:    Compression fracture L1 T8 and R ankle fx  -- bed ridden  -- she hopes that w PT she can seat and stand    Pain  -- she takes Tramadol 2 tabs and the pain is not controlled     HTN  -- Atenolol and Losartan were stopped while in hospital   -- I didn't understand from the chart the reasons for the changes   -- CBC and BMP - in acceptable range     PE  -- on Xarelto     Loose BM  -- once in a while     Hypoxia   -- 84% RA, needs O2     Review of Systems:                                                      ROS: negative for fever, chills, cough, wheezes, chest pain,  vomiting, abdominal pain, leg swelling  Pos for chr SOB         OBJECTIVE:           An actual physical exam can't be done during phone visit   A limited exam can sometimes be performed by video visit   NAD      PMHx: reviewed  Past Medical History:   Diagnosis Date     Anemia      CARDIOVASCULAR SCREENING; LDL GOAL LESS THAN 160      Colon polyps 2010    needs colonoscopy 2013     DVT (deep venous thrombosis) (H) 2007    remote history of DVT while she was on BCP ,coumadin stopped after retroperitoneal/buttock hematoma in 10/11 . On ASA only     Gastro-oesophageal reflux disease      HTN, goal below 140/90      Hyperlipidemia LDL goal <130 1/22/2016     Kidney stone      Osteoarthritis     knees and hip     Osteoporosis      Postnasal drip      S/P total knee arthroplasty      Thrombosis of leg       PSHx: reviewed  Past Surgical History:   Procedure Laterality Date     ARTHROPLASTY HIP  8/1/2013    Procedure: ARTHROPLASTY HIP;  RIGHT TOTAL HIP ARTHROPLASTY;  Surgeon: Rufino Tong MD;  Location: [facility] OR     ARTHROPLASTY HIP Left 12/12/2014    Procedure: ARTHROPLASTY HIP;  Surgeon: Rufino Tong MD;  Location: [facility] OR     ARTHROPLASTY KNEE   10/22/2012    Procedure: ARTHROPLASTY KNEE;  Right Total knee Arthroplasty  ;  Surgeon: Jagdeep Virgen MD;  Location: [facility] OR     ARTHROPLASTY KNEE  5/23/2013    Procedure: ARTHROPLASTY KNEE;  LEFT TOTAL KNEE ARTHROPLASTY (SMITH & NEPHEW)^;  Surgeon: Rufino Tong MD;  Location: [facility] OR     C PREP FACE/ORAL PROST PALATAL LIFT       CHOLECYSTECTOMY       CYSTOSCOPY, RETROGRADES, INSERT STENT URETER(S), COMBINED  7/16/2012    Procedure: COMBINED CYSTOSCOPY, RETROGRADES, INSERT STENT URETER(S);  Cystoscopy, Right retrogrades, Right Stent Placement;  Surgeon: Mauricio Velazquez MD;  Location: [facility] OR     LASER HOLMIUM LITHOTRIPSY URETER(S), INSERT STENT, COMBINED  8/8/2012    Procedure: COMBINED CYSTOSCOPY, URETEROSCOPY, LASER HOLMIUM LITHOTRIPSY URETER(S), INSERT STENT;  Cystoscopy, Stent Removal, Right Ureteroscopy with Holmium Laser, Stone removal ;  Surgeon: Mauricio Velazquez MD;  Location: [facility] OR     LIPOSUCTION (LOCATION)      tummy     STRIP VEIN          Meds: reviewed  Current Outpatient Medications   Medication Sig Dispense Refill     acetaminophen (TYLENOL) 500 MG tablet Take 1,000 mg by mouth 2 times daily Takes with tramadol.       albuterol (PROAIR HFA/PROVENTIL HFA/VENTOLIN HFA) 108 (90 Base) MCG/ACT inhaler Inhale 2 puffs into the lungs every 6 hours as needed for shortness of breath / dyspnea or wheezing 3 Inhaler 1     amLODIPine (NORVASC) 5 MG tablet TAKE 1 TABLET DAILY 90 tablet 0     aspirin (ASA) 81 MG chewable tablet Take 1 tablet (81 mg) by mouth daily       budesonide-formoterol (SYMBICORT) 160-4.5 MCG/ACT Inhaler Inhale 2 puffs into the lungs 2 times daily (Patient taking differently: Inhale 2 puffs into the lungs 2 times daily as needed ) 30.6 g 3     cholecalciferol (VITAMIN D) 1000 UNIT tablet Take 1,000 Units by mouth daily  100 tablet 3     furosemide (LASIX) 40 MG tablet TAKE 1 TABLET DAILY 90 tablet 0     ipratropium - albuterol 0.5 mg/2.5 mg/3 mL (DUONEB) 0.5-2.5 (3) MG/3ML  neb solution INHALE CONTENTS OF 1 VIAL (3 MLS) VIA NEBULIZER EVERY 6 HOURS AS NEEDED FOR SHORTNESS OF BREATH, DYSPNEA, OR WHEEZING 1000 mL 1     nystatin (MYCOSTATIN) 397387 UNIT/GM external powder Apply topically 2 times daily as needed (skin rash) 60 g 3     omeprazole (PRILOSEC) 20 MG DR capsule Take 20 mg by mouth daily       pravastatin (PRAVACHOL) 20 MG tablet TAKE 1 TABLET DAILY 30 tablet 1     rivaroxaban ANTICOAGULANT (XARELTO ANTICOAGULANT) 20 MG TABS tablet Take 1 tablet (20 mg) by mouth daily (with dinner) 30 tablet 1     terazosin (HYTRIN) 2 MG capsule TAKE 1 CAPSULE AT BEDTIME 90 capsule 3     tiotropium (SPIRIVA) 18 MCG inhaled capsule Inhale 1 capsule (18 mcg) into the lungs daily 90 capsule 1     traMADol (ULTRAM) 50 MG tablet Take 1 tablet (50 mg) by mouth 2 times daily 60 tablet 0       Soc Hx: reviewed  Fam Hx: reviewed          Vivian Mendes MD  Internal Medicine

## 2021-11-04 ENCOUNTER — TELEPHONE (OUTPATIENT)
Dept: INTERNAL MEDICINE | Facility: CLINIC | Age: 86
End: 2021-11-04

## 2021-11-04 ENCOUNTER — MEDICAL CORRESPONDENCE (OUTPATIENT)
Dept: HEALTH INFORMATION MANAGEMENT | Facility: CLINIC | Age: 86
End: 2021-11-04
Payer: COMMERCIAL

## 2021-11-04 NOTE — TELEPHONE ENCOUNTER
[facility] orders from 11/2/21 for Skilled nursing, physical therapy and occupational therapy received via fax, form to your in box for signature

## 2021-11-04 NOTE — TELEPHONE ENCOUNTER
[facility] Occupational Therapy order date 9/8/21 received via fax, form to your in box for signature

## 2021-11-05 ENCOUNTER — TELEPHONE (OUTPATIENT)
Dept: INTERNAL MEDICINE | Facility: CLINIC | Age: 86
End: 2021-11-05
Payer: COMMERCIAL

## 2021-11-05 ENCOUNTER — MEDICAL CORRESPONDENCE (OUTPATIENT)
Dept: HEALTH INFORMATION MANAGEMENT | Facility: CLINIC | Age: 86
End: 2021-11-05
Payer: COMMERCIAL

## 2021-11-05 NOTE — TELEPHONE ENCOUNTER
Wound / incision skilled nursing order received via fax  Forms in Dr. mail box for review and signature.

## 2021-11-08 ENCOUNTER — MEDICAL CORRESPONDENCE (OUTPATIENT)
Dept: HEALTH INFORMATION MANAGEMENT | Facility: CLINIC | Age: 86
End: 2021-11-08
Payer: COMMERCIAL

## 2021-11-09 ENCOUNTER — TELEPHONE (OUTPATIENT)
Dept: INTERNAL MEDICINE | Facility: CLINIC | Age: 86
End: 2021-11-09
Payer: COMMERCIAL

## 2021-11-09 ENCOUNTER — TELEPHONE (OUTPATIENT)
Dept: INTERNAL MEDICINE | Facility: CLINIC | Age: 86
End: 2021-11-09

## 2021-11-09 NOTE — TELEPHONE ENCOUNTER
Fax received from [facility] of Pressure Ulcer of Right Heel for review and signature.  Put in Cinthya Best's in basket.

## 2021-11-11 ENCOUNTER — TELEPHONE (OUTPATIENT)
Dept: INTERNAL MEDICINE | Facility: CLINIC | Age: 86
End: 2021-11-11
Payer: COMMERCIAL

## 2021-11-11 ENCOUNTER — MEDICAL CORRESPONDENCE (OUTPATIENT)
Dept: HEALTH INFORMATION MANAGEMENT | Facility: CLINIC | Age: 86
End: 2021-11-11
Payer: COMMERCIAL

## 2021-11-11 NOTE — TELEPHONE ENCOUNTER
[facility] for wound care/ OCCUPATIONAL THERAPY orders received via fax. Form in your mailbox to be signed.

## 2021-11-12 ENCOUNTER — MEDICAL CORRESPONDENCE (OUTPATIENT)
Dept: HEALTH INFORMATION MANAGEMENT | Facility: CLINIC | Age: 86
End: 2021-11-12
Payer: COMMERCIAL

## 2021-11-15 ENCOUNTER — TELEPHONE (OUTPATIENT)
Dept: INTERNAL MEDICINE | Facility: CLINIC | Age: 86
End: 2021-11-15
Payer: COMMERCIAL

## 2021-11-15 DIAGNOSIS — G89.29 BILATERAL CHRONIC KNEE PAIN: Primary | ICD-10-CM

## 2021-11-15 DIAGNOSIS — M25.562 BILATERAL CHRONIC KNEE PAIN: Primary | ICD-10-CM

## 2021-11-15 DIAGNOSIS — S32.010A COMPRESSION FRACTURE OF L1 VERTEBRA, INITIAL ENCOUNTER (H): ICD-10-CM

## 2021-11-15 DIAGNOSIS — S22.060A COMPRESSION FRACTURE OF T8 VERTEBRA, INITIAL ENCOUNTER (H): ICD-10-CM

## 2021-11-15 DIAGNOSIS — M25.561 BILATERAL CHRONIC KNEE PAIN: Primary | ICD-10-CM

## 2021-11-15 NOTE — TELEPHONE ENCOUNTER
[facility] calling for a order for a drop arm bed side commode faxed to [facility] at [phone]  Patient is bed bound and the order should have patients DX on the form  Lost call and was not able to get return call

## 2021-11-16 ENCOUNTER — TELEPHONE (OUTPATIENT)
Dept: INTERNAL MEDICINE | Facility: CLINIC | Age: 86
End: 2021-11-16
Payer: COMMERCIAL

## 2021-11-16 NOTE — TELEPHONE ENCOUNTER
Toby-wound care nurse from [facility].  [phone]      Would like verbal order for hydrocolloid wound dressing in lieu of foam.  Foam dressing not sticking well with patient rolling around.    No drainage just coverage needed to protect for healing    Please advise  Thanks  Ok to leave detailed message.

## 2021-11-18 ENCOUNTER — TELEPHONE (OUTPATIENT)
Dept: INTERNAL MEDICINE | Facility: CLINIC | Age: 86
End: 2021-11-18
Payer: COMMERCIAL

## 2021-11-18 NOTE — TELEPHONE ENCOUNTER
[facility] with [facility] is asking for a manual wheelchair w/ leg rest and pressure relief cushion.     Fax number to [facility] [phone]    Santos phone number [phone]. He will also be faxing over the wheelchair assessment.( [phone])

## 2021-11-19 ENCOUNTER — TELEPHONE (OUTPATIENT)
Dept: INTERNAL MEDICINE | Facility: CLINIC | Age: 86
End: 2021-11-19
Payer: COMMERCIAL

## 2021-11-21 ENCOUNTER — MEDICAL CORRESPONDENCE (OUTPATIENT)
Dept: HEALTH INFORMATION MANAGEMENT | Facility: CLINIC | Age: 86
End: 2021-11-21
Payer: COMMERCIAL

## 2021-11-23 NOTE — TELEPHONE ENCOUNTER
[facility] calls regarding status of wheelchair form and order. Advised that Dr. Mendes did receive the form, but had not seen the wheelchair assessment yet from physical therapy. A report was placed in her in-basket, but we were not sure if it was the wheelchair assessment and Dr. Mendes is out of the office until Friday.     They state to fax form to [phone] when completed.    Nitza Wright RN  [facility]

## 2021-11-24 ENCOUNTER — TELEPHONE (OUTPATIENT)
Dept: INTERNAL MEDICINE | Facility: CLINIC | Age: 86
End: 2021-11-24
Payer: COMMERCIAL

## 2021-11-24 NOTE — TELEPHONE ENCOUNTER
[facility] visit required for instruction on bedsore prevention   Received via fax   Forms in  mail box for review and signature.

## 2021-11-24 NOTE — TELEPHONE ENCOUNTER
[facility] health  & POC 11-3-2021 to 1-1-2022  Received via fax.     Forms in Dr. mail box for review and signature.

## 2021-11-26 ENCOUNTER — TELEPHONE (OUTPATIENT)
Dept: INTERNAL MEDICINE | Facility: CLINIC | Age: 86
End: 2021-11-26
Payer: COMMERCIAL

## 2021-11-26 ENCOUNTER — MEDICAL CORRESPONDENCE (OUTPATIENT)
Dept: HEALTH INFORMATION MANAGEMENT | Facility: CLINIC | Age: 86
End: 2021-11-26
Payer: COMMERCIAL

## 2021-11-27 NOTE — TELEPHONE ENCOUNTER
Medicare requires face-to-face face appointment regarding this request  Video appointment is okay

## 2021-11-29 ENCOUNTER — TELEPHONE (OUTPATIENT)
Dept: INTERNAL MEDICINE | Facility: CLINIC | Age: 86
End: 2021-11-29
Payer: COMMERCIAL

## 2021-11-29 NOTE — TELEPHONE ENCOUNTER
Joan calling [facility] at [phone] ext [phone].    Joan states there is missing documentation from the provider in regards to the wheelchair.     [facility] needs:   1. Need prescription for the wheelchair    2. Progress notes - Needs documentation in the note stating patient needs a wheelchair    3. Need medicare mobility statements if patient is on Medicare    Fax to: [phone]     Routing to Dr. Mendes and team Mateus.     Miranda CORRAL RN   [facility]

## 2021-11-29 NOTE — TELEPHONE ENCOUNTER
Patient has video noah tomorrow.  We need to know what exact documentation is needed for the wheel chair

## 2021-11-29 NOTE — TELEPHONE ENCOUNTER
Call to [facility] was disconnected 4 times.  Finally pushed another option and left a detailed voicemail message asking for a call back. BRANDEE Chowdary R.N.

## 2021-11-30 ENCOUNTER — TELEPHONE (OUTPATIENT)
Dept: INTERNAL MEDICINE | Facility: CLINIC | Age: 86
End: 2021-11-30

## 2021-11-30 ENCOUNTER — VIRTUAL VISIT (OUTPATIENT)
Dept: INTERNAL MEDICINE | Facility: CLINIC | Age: 86
End: 2021-11-30
Payer: COMMERCIAL

## 2021-11-30 ENCOUNTER — MEDICAL CORRESPONDENCE (OUTPATIENT)
Dept: HEALTH INFORMATION MANAGEMENT | Facility: CLINIC | Age: 86
End: 2021-11-30

## 2021-11-30 VITALS — BODY MASS INDEX: 31.31 KG/M2 | HEIGHT: 66 IN | RESPIRATION RATE: 18 BRPM

## 2021-11-30 DIAGNOSIS — S22.060G COMPRESSION FRACTURE OF T8 VERTEBRA WITH DELAYED HEALING, SUBSEQUENT ENCOUNTER: ICD-10-CM

## 2021-11-30 DIAGNOSIS — Z74.01 CONFINED TO BED: ICD-10-CM

## 2021-11-30 DIAGNOSIS — M17.10 ARTHRITIS OF KNEE: Primary | ICD-10-CM

## 2021-11-30 PROCEDURE — 99215 OFFICE O/P EST HI 40 MIN: CPT | Mod: 95 | Performed by: INTERNAL MEDICINE

## 2021-11-30 NOTE — PROGRESS NOTES
Marino is a 86 year old who is being evaluated via a billable video visit.      How would you like to obtain your AVS? Mail a copy  If the video visit is dropped, the invitation should be resent by: Text to cell phone: [phone]  Will anyone else be joining your video visit? No          This is a VIDEO ( using Doximity)  encounter with the patient.       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 Then she developed compression fracture which made her bed ridden.   She needs physical therapy  And she needs an wheelchair and a commode to help her improve and come to the doctor's appointment     This appointment is a face to face video appointment   Patient is unable to come to the clinic     (M17.10) Arthritis of knee  (primary encounter diagnosis)  (S22.060G) Compression fracture of T8 vertebra with delayed healing, subsequent encounter  (Z74.01) Confined to bed      Chief complaint:                                                      Mobility      SUBJECTIVE:                                                    History of present illness:      LOV on Nov 2.  had massive MI with ICU admission and 4 stents    Marino has an old wheelchair and the home PT recommended a new wheelchair     The PT used a transferred device and for the first time in 90 days Marino was able to transfer from bed to the wheelchair and then to use the commode.    For the last 90 days Marino has been confined to bed because of the compression fracture and because of the severe knee and hip arthritis. Before the compression fracture she had also had severe mobility issues and she barely could walk with the walker inside the house. Because of the severe mobility issues she even  couldn't come to see dr Mendes for her regular appointments. Because severe mobility issues she fell and broke the Right ankle. She continues to deal with this ankle with an nonhealing ulcer.    While she was in rehab she developed compression fractures. Since the compression fractures she hasn't been able to get out of bed until today when thuse a walker or a mcclellan. She will greatly benefit from an wheelchair so her family can transport her to different rooms and hopefully to appointments.     Because she is bed ridden secondary her pain and general weakness and severe arthritis she urinates and defecates only in depends. With PT help and guidance she hopes she can get to move from the bed to a commode at her bed side. She needs a drop arm commode to facilitate the transfer. Marino is confined to her hospital bed in the living room. There is a toilet on the same level but as I wrote above she can't even get out of her bed without major assistance and she can't walk. She doesn't have a wheelchair to move out of her confined room.           Hyperlipidemia Follow-Up      Are you regularly taking any medication or supplement to lower your cholesterol?   Yes- Pravastatin    Are you having muscle aches or other side effects that you think could be caused by your cholesterol lowering medication?  No    Hypertension Follow-up      Do you check your blood pressure regularly outside of the clinic? No     Are you following a low salt diet? Yes    Are your blood pressures ever more than 140 on the top number (systolic) OR more   than 90 on the bottom number (diastolic), for example 140/90? No      How many servings of fruits and vegetables do you eat daily?  4 or more    On average, how many sweetened beverages do you drink each day (Examples: soda, juice, sweet tea, etc.  Do NOT count diet or artificially sweetened beverages)?   1 a week    How many days per week do you exercise enough to make your heart beat faster? 3 or  less    How many minutes a day do you exercise enough to make your heart beat faster? 9 or less    How many days per week do you miss taking your medication? 0      Review of Systems:                                                      ROS: negative for fever, chills, cough, wheezes, chest pain, shortness of breath, vomiting, abdominal pain, leg swelling       OBJECTIVE:           An actual physical exam can't be done during phone visit   A limited exam can sometimes be performed by video visit   NAD.  Patient is in bed. She can't roll from side to side without help. She can't sit up.       PMHx: reviewed  Past Medical History:   Diagnosis Date     Anemia      CARDIOVASCULAR SCREENING; LDL GOAL LESS THAN 160      Colon polyps 2010    needs colonoscopy 2013     DVT (deep venous thrombosis) (H) 2007    remote history of DVT while she was on BCP ,coumadin stopped after retroperitoneal/buttock hematoma in 10/11 . On ASA only     Gastro-oesophageal reflux disease      HTN, goal below 140/90      Hyperlipidemia LDL goal <130 1/22/2016     Kidney stone      Osteoarthritis     knees and hip     Osteoporosis      Postnasal drip      S/P total knee arthroplasty      Thrombosis of leg       PSHx: reviewed  Past Surgical History:   Procedure Laterality Date     ARTHROPLASTY HIP  8/1/2013    Procedure: ARTHROPLASTY HIP;  RIGHT TOTAL HIP ARTHROPLASTY;  Surgeon: Rufino Tong MD;  Location:  OR     ARTHROPLASTY HIP Left 12/12/2014    Procedure: ARTHROPLASTY HIP;  Surgeon: Rufino Tong MD;  Location:  OR     ARTHROPLASTY KNEE  10/22/2012    Procedure: ARTHROPLASTY KNEE;  Right Total knee Arthroplasty  ;  Surgeon: Jagdeep Virgen MD;  Location:  OR     ARTHROPLASTY KNEE  5/23/2013    Procedure: ARTHROPLASTY KNEE;  LEFT TOTAL KNEE ARTHROPLASTY (SMITH & NEPHEW)^;  Surgeon: Rufino Tong MD;  Location:  OR     C PREP FACE/ORAL PROST PALATAL LIFT       CHOLECYSTECTOMY       CYSTOSCOPY, RETROGRADES, INSERT  STENT URETER(S), COMBINED  7/16/2012    Procedure: COMBINED CYSTOSCOPY, RETROGRADES, INSERT STENT URETER(S);  Cystoscopy, Right retrogrades, Right Stent Placement;  Surgeon: Mauricio Velazquez MD;  Location: [facility] OR     LASER HOLMIUM LITHOTRIPSY URETER(S), INSERT STENT, COMBINED  8/8/2012    Procedure: COMBINED CYSTOSCOPY, URETEROSCOPY, LASER HOLMIUM LITHOTRIPSY URETER(S), INSERT STENT;  Cystoscopy, Stent Removal, Right Ureteroscopy with Holmium Laser, Stone removal ;  Surgeon: Mauricio Velazquez MD;  Location: [facility] OR     LIPOSUCTION (LOCATION)      tummy     STRIP VEIN          Meds: reviewed  Current Outpatient Medications   Medication Sig Dispense Refill     acetaminophen (TYLENOL) 500 MG tablet Take 1,000 mg by mouth 2 times daily Takes with tramadol.       albuterol (PROAIR HFA/PROVENTIL HFA/VENTOLIN HFA) 108 (90 Base) MCG/ACT inhaler Inhale 2 puffs into the lungs every 6 hours as needed for shortness of breath / dyspnea or wheezing 3 Inhaler 1     amLODIPine (NORVASC) 5 MG tablet TAKE 1 TABLET DAILY 90 tablet 0     aspirin (ASA) 81 MG chewable tablet Take 1 tablet (81 mg) by mouth daily       budesonide-formoterol (SYMBICORT) 160-4.5 MCG/ACT Inhaler Inhale 2 puffs into the lungs 2 times daily (Patient taking differently: Inhale 2 puffs into the lungs 2 times daily as needed ) 30.6 g 3     cholecalciferol (VITAMIN D) 1000 UNIT tablet Take 1,000 Units by mouth daily  100 tablet 3     furosemide (LASIX) 40 MG tablet TAKE 1 TABLET DAILY 90 tablet 0     ipratropium - albuterol 0.5 mg/2.5 mg/3 mL (DUONEB) 0.5-2.5 (3) MG/3ML neb solution INHALE CONTENTS OF 1 VIAL (3 MLS) VIA NEBULIZER EVERY 6 HOURS AS NEEDED FOR SHORTNESS OF BREATH, DYSPNEA, OR WHEEZING 1000 mL 1     nystatin (MYCOSTATIN) 276995 UNIT/GM external powder Apply topically 2 times daily as needed (skin rash) 60 g 3     omeprazole (PRILOSEC) 20 MG DR capsule Take 20 mg by mouth daily       pravastatin (PRAVACHOL) 20 MG tablet TAKE 1 TABLET DAILY 30  tablet 1     rivaroxaban ANTICOAGULANT (XARELTO ANTICOAGULANT) 20 MG TABS tablet Take 1 tablet (20 mg) by mouth daily (with dinner) 90 tablet 1     terazosin (HYTRIN) 2 MG capsule TAKE 1 CAPSULE AT BEDTIME 90 capsule 3     tiotropium (SPIRIVA) 18 MCG inhaled capsule Inhale 1 capsule (18 mcg) into the lungs daily 90 capsule 1     traMADol (ULTRAM) 50 MG tablet Take 1 tablet (50 mg) by mouth 3 times daily as needed for severe pain 90 tablet 0       Soc Hx: reviewed  Fam Hx: reviewed      42 minutes spent on the date of the encounter doing   chart review,   patient visit,   documentation,   discussion with family and    Vivian Mendes MD  Internal Medicine

## 2021-11-30 NOTE — TELEPHONE ENCOUNTER
Call to Joan at [facility] at [phone] ext [phone].     Informed Joan of Dr. Mendes's response below.     Joan states patient needs to have a face-to-face documentation for the progress notes. Cannot be virtual visit.     For face-to-face progress notes:    1. Needs documentation stating why patient needs the wheelchair.    2. For Medicare Mobility Statements and on the progress notes, needs to state patient cannot use a cane or walker to assist and only a wheelchair to assist them.     Dr. Mendes did request a commode as well:    MD must ask patient below questions and have at least 2 of the 3 questions answered:    1. Is patient confined to a single room?  2. Is the patient confined to a single level of the home where there is no toilet available?  3. Is the patient confined to a home where there is no toilet facility?    Joan states they also need a prescription for the wheelchair. They already have a RX for the commode.     Fax all the above to: [phone]     Once Joan receives all the information via fax, Joan will call the patient and arrange delivery of wheelchair and commode.     Routing to provider, Dr. Mendes to please review and advise. Patient has virtual appointment scheduled for today. Should we cancel this?     Appointments in Next Year    Nov 30, 2021  5:30 PM  Video Visit with Vivian Blue MD  [facility] ([facility] ) [phone]        Miranda CORRAL RN   [facility]

## 2021-11-30 NOTE — TELEPHONE ENCOUNTER
PHYSICAL THERAPY wheelchair assessment order received via fax. Form in your mailbox to be signed.

## 2021-12-02 ENCOUNTER — TELEPHONE (OUTPATIENT)
Dept: INTERNAL MEDICINE | Facility: CLINIC | Age: 86
End: 2021-12-02
Payer: COMMERCIAL

## 2021-12-02 NOTE — TELEPHONE ENCOUNTER
[facility] OT & HH aid orders dated 11/21/21 received via fax, form to your in box for signature

## 2021-12-02 NOTE — TELEPHONE ENCOUNTER
[facility] OTorder dated 12/1 received via fax, form to your in box for signature

## 2021-12-02 NOTE — TELEPHONE ENCOUNTER
[facility] Certification & Plan of Care dates: 11/3/21-1/1/22 received via fax, form to your in box for signature

## 2021-12-03 ENCOUNTER — MEDICAL CORRESPONDENCE (OUTPATIENT)
Dept: HEALTH INFORMATION MANAGEMENT | Facility: CLINIC | Age: 86
End: 2021-12-03
Payer: COMMERCIAL

## 2021-12-08 ENCOUNTER — TELEPHONE (OUTPATIENT)
Dept: INTERNAL MEDICINE | Facility: CLINIC | Age: 86
End: 2021-12-08
Payer: COMMERCIAL

## 2021-12-08 DIAGNOSIS — S22.060G COMPRESSION FRACTURE OF T8 VERTEBRA WITH DELAYED HEALING, SUBSEQUENT ENCOUNTER: ICD-10-CM

## 2021-12-08 DIAGNOSIS — M62.81 MUSCLE WEAKNESS (GENERALIZED): ICD-10-CM

## 2021-12-08 DIAGNOSIS — R26.89 OTHER ABNORMALITIES OF GAIT AND MOBILITY: Primary | ICD-10-CM

## 2021-12-08 DIAGNOSIS — G89.29 OTHER CHRONIC PAIN: ICD-10-CM

## 2021-12-08 NOTE — TELEPHONE ENCOUNTER
[facility] is asking for a order/RX for:    18x18 wheelchair  18x18 cushion  Elevated leg rest  Standard arm rest    And a commode    pls fax to [phone]

## 2021-12-09 ENCOUNTER — MEDICAL CORRESPONDENCE (OUTPATIENT)
Dept: HEALTH INFORMATION MANAGEMENT | Facility: CLINIC | Age: 86
End: 2021-12-09
Payer: COMMERCIAL

## 2021-12-09 ENCOUNTER — TELEPHONE (OUTPATIENT)
Dept: INTERNAL MEDICINE | Facility: CLINIC | Age: 86
End: 2021-12-09
Payer: COMMERCIAL

## 2021-12-09 NOTE — TELEPHONE ENCOUNTER
Call to [facility] [phone]. Left detailed message advising need to know if a standard or bariatric wheel chair/commode is needed.

## 2021-12-09 NOTE — TELEPHONE ENCOUNTER
[facility]  skilled nursing [id number]     Forms in DrDaquan mail box for review and signature.

## 2021-12-10 ENCOUNTER — TELEPHONE (OUTPATIENT)
Dept: INTERNAL MEDICINE | Facility: CLINIC | Age: 86
End: 2021-12-10
Payer: COMMERCIAL

## 2021-12-10 NOTE — TELEPHONE ENCOUNTER
Munir OCHOA w/ [facility] asking for verbal orders  to change patients back meds from Zinc to Dimethicon and her bottom treatment to hydrocolloid dressing.     Call back number [phone]

## 2021-12-13 NOTE — TELEPHONE ENCOUNTER
Call back from [facility] with information below. Patient needs a standard wheelchair. Please sign orders.

## 2021-12-14 NOTE — TELEPHONE ENCOUNTER
Call to GABRIELA Amaral at [facility] at [phone]. Munir informed of Dr. Zuñiga's response below. Munir verbalized understanding and denies questions or concerns.     Miranda CORRAL RN   [facility]

## 2021-12-15 ENCOUNTER — TELEPHONE (OUTPATIENT)
Dept: INTERNAL MEDICINE | Facility: CLINIC | Age: 86
End: 2021-12-15
Payer: COMMERCIAL

## 2021-12-15 NOTE — TELEPHONE ENCOUNTER
DME orders signed by Iris Stephenson. Faxed to [facility].    Nitza Wright RN  [facility]

## 2021-12-20 ENCOUNTER — TELEPHONE (OUTPATIENT)
Dept: INTERNAL MEDICINE | Facility: CLINIC | Age: 86
End: 2021-12-20
Payer: COMMERCIAL

## 2021-12-20 DIAGNOSIS — Z74.01 CONFINED TO BED: Primary | ICD-10-CM

## 2021-12-20 NOTE — TELEPHONE ENCOUNTER
Ghassan calls to request orders for a wheelchair to be faxed to [facility] at [phone].  Please make sure orders says 18x18 Wheelchair 18x18 seat cushion with left and right elevated leg rest, a full armrest, and the SARITA 99 months. Please send asap.

## 2021-12-21 ENCOUNTER — TELEPHONE (OUTPATIENT)
Dept: INTERNAL MEDICINE | Facility: CLINIC | Age: 86
End: 2021-12-21
Payer: COMMERCIAL

## 2021-12-21 NOTE — TELEPHONE ENCOUNTER
[facility] wound care received via fax      Forms in Dr. mail box for review and signature.

## 2021-12-21 NOTE — TELEPHONE ENCOUNTER
Fax received from [facility] 12/20/21 for review and signature.  Put in Dr. Mendes's in basket.

## 2021-12-22 ENCOUNTER — TELEPHONE (OUTPATIENT)
Dept: INTERNAL MEDICINE | Facility: CLINIC | Age: 86
End: 2021-12-22
Payer: COMMERCIAL

## 2021-12-22 NOTE — TELEPHONE ENCOUNTER
"I called [employer] regarding the wheelchair order. Joan stated they have it but what they are needing is the \"Medicare Mobility Statement \" form signed. I have requested that it be faxed again and confirned the fax number.   I will contact Joan again tomorrow to confirm the faxes have been received.      [facility] the patients occupational therapy called today to follow up on several calls made by her and other home health aids regarding the delay in obtaining signatures for this item.     I called Loc the patient spouse to inform him that we are following up on the wheelchair order.   "

## 2021-12-22 NOTE — TELEPHONE ENCOUNTER
Home medical equipment prescription for wheelchair received via fax. Form in your mailbox to be signed.

## 2021-12-23 ENCOUNTER — TELEPHONE (OUTPATIENT)
Dept: INTERNAL MEDICINE | Facility: CLINIC | Age: 86
End: 2021-12-23
Payer: COMMERCIAL

## 2021-12-23 ENCOUNTER — MEDICAL CORRESPONDENCE (OUTPATIENT)
Dept: HEALTH INFORMATION MANAGEMENT | Facility: CLINIC | Age: 86
End: 2021-12-23
Payer: COMMERCIAL

## 2021-12-23 NOTE — TELEPHONE ENCOUNTER
[facility]  Home Medical Equipment Presciption received via fax     Forms in Dr. mail box for review and signature.

## 2021-12-23 NOTE — TELEPHONE ENCOUNTER
[employer] wheelchair form with Medicare Mobility Statement information received via fax .     The Medicare Mobility Statement questions needs to be added into visit appointment notes     Forms in DrDaquan gray box for review and signature.    The patient is eager to get her wheelchair and get out of bed.

## 2021-12-24 ENCOUNTER — TELEPHONE (OUTPATIENT)
Dept: INTERNAL MEDICINE | Facility: CLINIC | Age: 86
End: 2021-12-24
Payer: COMMERCIAL

## 2021-12-24 NOTE — TELEPHONE ENCOUNTER
[facility] Home Health order from 12/7/21 for Skilled Nursing received via fax, form to your in box for signature

## 2021-12-28 ENCOUNTER — TELEPHONE (OUTPATIENT)
Dept: INTERNAL MEDICINE | Facility: CLINIC | Age: 86
End: 2021-12-28
Payer: COMMERCIAL

## 2021-12-28 NOTE — TELEPHONE ENCOUNTER
Fax received from [facility] 12/20/21 for review and signature.  Put in Dr. Mendes's in basket.

## 2021-12-29 ENCOUNTER — TELEPHONE (OUTPATIENT)
Dept: INTERNAL MEDICINE | Facility: CLINIC | Age: 86
End: 2021-12-29
Payer: COMMERCIAL

## 2021-12-29 DIAGNOSIS — R60.0 BILATERAL LEG EDEMA: ICD-10-CM

## 2021-12-29 DIAGNOSIS — E78.5 HYPERLIPIDEMIA LDL GOAL <130: Chronic | ICD-10-CM

## 2021-12-29 DIAGNOSIS — I10 HTN, GOAL BELOW 140/90: ICD-10-CM

## 2021-12-29 NOTE — TELEPHONE ENCOUNTER
"Requested Prescriptions   Pending Prescriptions Disp Refills     pravastatin (PRAVACHOL) 20 MG tablet  Last Written Prescription Date:  09/09/21  Last Fill Quantity: 30,  # refills: 1   Last office visit: Visit date not found with prescribing provider:  VV 11/30/21   Future Office Visit:           30 tablet 1     Sig: Take 1 tablet (20 mg) by mouth daily       Statins Protocol Failed - 12/29/2021  3:55 PM        Failed - LDL on file in past 12 months     Recent Labs   Lab Test 01/09/20  1419   LDL 89             Passed - No abnormal creatine kinase in past 12 months     No lab results found.             Passed - Recent (12 mo) or future (30 days) visit within the authorizing provider's specialty     Patient has had an office visit with the authorizing provider or a provider within the authorizing providers department within the previous 12 mos or has a future within next 30 days. See \"Patient Info\" tab in inbasket, or \"Choose Columns\" in Meds & Orders section of the refill encounter.              Passed - Medication is active on med list        Passed - Patient is age 18 or older        Passed - No active pregnancy on record        Passed - No positive pregnancy test in past 12 months           Tramadol      Last Written Prescription Date:  11/02/21  Last Fill Quantity: 90,   # refills: 0  Last Office Visit: VV 11/30/21  Future Office visit:       Routing refill request to provider for review/approval because:  Drug not on the [facility], P or  Health refill protocol or controlled substance    "

## 2021-12-30 ENCOUNTER — TELEPHONE (OUTPATIENT)
Dept: INTERNAL MEDICINE | Facility: CLINIC | Age: 86
End: 2021-12-30
Payer: COMMERCIAL

## 2021-12-30 RX ORDER — PRAVASTATIN SODIUM 20 MG
20 TABLET ORAL DAILY
Qty: 30 TABLET | Refills: 1 | Status: SHIPPED | OUTPATIENT
Start: 2021-12-30 | End: 2022-04-09

## 2022-01-01 ENCOUNTER — HEALTH MAINTENANCE LETTER (OUTPATIENT)
Age: 87
End: 2022-01-01

## 2022-01-03 ENCOUNTER — MEDICAL CORRESPONDENCE (OUTPATIENT)
Dept: HEALTH INFORMATION MANAGEMENT | Facility: CLINIC | Age: 87
End: 2022-01-03
Payer: COMMERCIAL

## 2022-01-03 ENCOUNTER — TELEPHONE (OUTPATIENT)
Dept: INTERNAL MEDICINE | Facility: CLINIC | Age: 87
End: 2022-01-03
Payer: COMMERCIAL

## 2022-01-03 NOTE — TELEPHONE ENCOUNTER
Albuterol/ nystatin powder/ oxygne/ PHYSICAL THERAPY orders received via fax. Form in your mailbox to be signed.

## 2022-01-04 ENCOUNTER — TELEPHONE (OUTPATIENT)
Dept: INTERNAL MEDICINE | Facility: CLINIC | Age: 87
End: 2022-01-04
Payer: COMMERCIAL

## 2022-01-04 NOTE — TELEPHONE ENCOUNTER
[facility] calling for orders for an electric hoda lift for pt. Orders need to specifically state an electric one. They can be faxed to [facility] at [phone]. [facility] would like a follow up call back at [phone]. Please advise. Thanks.

## 2022-01-05 NOTE — TELEPHONE ENCOUNTER
Please see message below requesting orders for an electric hoda lift.     Last visit with patient was on 11/30/2021 (virtual) with Dr. Mendes.     Miranda CORRAL RN   [facility]

## 2022-01-06 ENCOUNTER — TELEPHONE (OUTPATIENT)
Dept: INTERNAL MEDICINE | Facility: CLINIC | Age: 87
End: 2022-01-06
Payer: COMMERCIAL

## 2022-01-07 NOTE — TELEPHONE ENCOUNTER
Call back to [facility]. States if a manual lift is needed she will have significant other call to schedule an appointment. If an electric lift is needed an order will not be needed as it will not be covered by insurance.

## 2022-01-07 NOTE — TELEPHONE ENCOUNTER
Meaghan, OT with [employer] ([phone]) calls back.   She states that they are still in the process of deciding what type of lift is needed for patient. If her significant other can operate a manual hoda lift, then they would need a face-to-face. Insurance said they would accept a telehealth visit for the face-to-face. However, [facility] is concerned that patient's significant other will not be able to operate a manual lift and thinks that an electronic hoda lift would be better option for patient. The electronic hoda lift would not be covered so it would not require a face-to-face. She states if they go with the manual hoda lift, she will have them call to schedule an appointment. Please call [facility] back with any questions.     Nitza Wright RN  [facility]

## 2022-01-09 DIAGNOSIS — M25.561 BILATERAL CHRONIC KNEE PAIN: ICD-10-CM

## 2022-01-09 DIAGNOSIS — S22.060A COMPRESSION FRACTURE OF T8 VERTEBRA, INITIAL ENCOUNTER (H): ICD-10-CM

## 2022-01-09 DIAGNOSIS — S32.010A COMPRESSION FRACTURE OF L1 VERTEBRA, INITIAL ENCOUNTER (H): ICD-10-CM

## 2022-01-09 DIAGNOSIS — G89.29 BILATERAL CHRONIC KNEE PAIN: ICD-10-CM

## 2022-01-09 DIAGNOSIS — M25.562 BILATERAL CHRONIC KNEE PAIN: ICD-10-CM

## 2022-01-11 ENCOUNTER — TELEPHONE (OUTPATIENT)
Dept: INTERNAL MEDICINE | Facility: CLINIC | Age: 87
End: 2022-01-11
Payer: COMMERCIAL

## 2022-01-11 NOTE — TELEPHONE ENCOUNTER
Routing refill request to provider for review/approval because:  Drug not on the [facility] refill protocol   Maricarmen Guardado RN, BSN

## 2022-01-12 ENCOUNTER — TELEPHONE (OUTPATIENT)
Dept: INTERNAL MEDICINE | Facility: CLINIC | Age: 87
End: 2022-01-12
Payer: COMMERCIAL

## 2022-01-12 RX ORDER — TRAMADOL HYDROCHLORIDE 50 MG/1
TABLET ORAL
Qty: 90 TABLET | Refills: 0 | Status: SHIPPED | OUTPATIENT
Start: 2022-01-12 | End: 2022-02-26

## 2022-01-14 ENCOUNTER — TELEPHONE (OUTPATIENT)
Dept: INTERNAL MEDICINE | Facility: CLINIC | Age: 87
End: 2022-01-14
Payer: COMMERCIAL

## 2022-01-14 ENCOUNTER — MEDICAL CORRESPONDENCE (OUTPATIENT)
Dept: HEALTH INFORMATION MANAGEMENT | Facility: CLINIC | Age: 87
End: 2022-01-14
Payer: COMMERCIAL

## 2022-01-14 NOTE — TELEPHONE ENCOUNTER
[facility] wound ostomy home care verbal order request from Toby.   Best number to call [phone]    Patient caregiver would like to use imodium lactate lotion vs the dimethicone lotion. Toby the RN says it should work just as well.     T[facility] also is asking for order for [facility] to come to the home to provide wound care. The visiting provider is a NP. Toby said the patient could be discharged from [facility] but that [facility] can still come to the home.

## 2022-01-18 ENCOUNTER — TELEPHONE (OUTPATIENT)
Dept: INTERNAL MEDICINE | Facility: CLINIC | Age: 87
End: 2022-01-18
Payer: COMMERCIAL

## 2022-01-18 NOTE — TELEPHONE ENCOUNTER
[facility] wound ostomy nurse Toby ([phone]) learned of a service that he feels would benefit patient.  [facility] has wound/ostomy nurse practitioners that will visit home bound patients in the home.  Toby rarely sees patients in the home, most of his work is done remotely using photos and videos so he thinks [facility] would be a good fit for patient for ongoing wound care.      Will route to referrals to see if we can refer patient.  BRANDEE Chowdary R.N.

## 2022-01-18 NOTE — TELEPHONE ENCOUNTER
Advised Toby, wound care nurse with [facility] that primary care provider approved the requested order. BRANDEE Chowdary R.N.

## 2022-01-19 ENCOUNTER — MEDICAL CORRESPONDENCE (OUTPATIENT)
Dept: HEALTH INFORMATION MANAGEMENT | Facility: CLINIC | Age: 87
End: 2022-01-19
Payer: COMMERCIAL

## 2022-01-21 ENCOUNTER — TELEPHONE (OUTPATIENT)
Dept: INTERNAL MEDICINE | Facility: CLINIC | Age: 87
End: 2022-01-21
Payer: COMMERCIAL

## 2022-01-24 ENCOUNTER — MEDICAL CORRESPONDENCE (OUTPATIENT)
Dept: HEALTH INFORMATION MANAGEMENT | Facility: CLINIC | Age: 87
End: 2022-01-24
Payer: COMMERCIAL

## 2022-01-31 DIAGNOSIS — I26.99 ACUTE PULMONARY EMBOLISM WITHOUT ACUTE COR PULMONALE, UNSPECIFIED PULMONARY EMBOLISM TYPE (H): ICD-10-CM

## 2022-01-31 NOTE — TELEPHONE ENCOUNTER
xarelto refill request to NEW pharmacy    The patient will not be picking up the script sent to [facility].

## 2022-02-01 ENCOUNTER — TELEPHONE (OUTPATIENT)
Dept: INTERNAL MEDICINE | Facility: CLINIC | Age: 87
End: 2022-02-01
Payer: COMMERCIAL

## 2022-02-01 NOTE — TELEPHONE ENCOUNTER
[facility] skilled patient for lower extremity strength  Forms in DrDaquan mail box for review and signature.

## 2022-02-02 ENCOUNTER — MEDICAL CORRESPONDENCE (OUTPATIENT)
Dept: HEALTH INFORMATION MANAGEMENT | Facility: CLINIC | Age: 87
End: 2022-02-02
Payer: COMMERCIAL

## 2022-02-07 ENCOUNTER — TELEPHONE (OUTPATIENT)
Dept: INTERNAL MEDICINE | Facility: CLINIC | Age: 87
End: 2022-02-07
Payer: COMMERCIAL

## 2022-02-08 ENCOUNTER — TELEPHONE (OUTPATIENT)
Dept: INTERNAL MEDICINE | Facility: CLINIC | Age: 87
End: 2022-02-08
Payer: COMMERCIAL

## 2022-02-08 NOTE — TELEPHONE ENCOUNTER
Clotrimazole betamethasone/ fluticasone order received via fax. Form in your mailbox to be signed.

## 2022-02-10 ENCOUNTER — TELEPHONE (OUTPATIENT)
Dept: INTERNAL MEDICINE | Facility: CLINIC | Age: 87
End: 2022-02-10
Payer: COMMERCIAL

## 2022-02-10 ENCOUNTER — MEDICAL CORRESPONDENCE (OUTPATIENT)
Dept: HEALTH INFORMATION MANAGEMENT | Facility: CLINIC | Age: 87
End: 2022-02-10
Payer: COMMERCIAL

## 2022-02-11 NOTE — TELEPHONE ENCOUNTER
"[facility] approved [facility] wound care services therefore for \"continuity of care\" Pt will stay with [facility].    TREVON [facility] Referral Rep    "

## 2022-02-15 ENCOUNTER — MEDICAL CORRESPONDENCE (OUTPATIENT)
Dept: HEALTH INFORMATION MANAGEMENT | Facility: CLINIC | Age: 87
End: 2022-02-15
Payer: COMMERCIAL

## 2022-02-15 ENCOUNTER — TELEPHONE (OUTPATIENT)
Dept: INTERNAL MEDICINE | Facility: CLINIC | Age: 87
End: 2022-02-15
Payer: COMMERCIAL

## 2022-02-15 NOTE — TELEPHONE ENCOUNTER
Munir OCHOA wound / ostomy.     Munir reports heal wound is almost closed up and would like to discontinue the idosorb and alginate? Just needs to clean and cover at this time.     The antifungal that was prescribed for her back has worked well and her back looks good.     Best number to call  [phone]

## 2022-02-16 ENCOUNTER — TELEPHONE (OUTPATIENT)
Dept: INTERNAL MEDICINE | Facility: CLINIC | Age: 87
End: 2022-02-16
Payer: COMMERCIAL

## 2022-02-16 NOTE — TELEPHONE ENCOUNTER
Clotrimazole-betamethasone/ fluconazole order received via fax. Form in your mailbox to be signed.

## 2022-02-21 ENCOUNTER — TELEPHONE (OUTPATIENT)
Dept: INTERNAL MEDICINE | Facility: CLINIC | Age: 87
End: 2022-02-21
Payer: COMMERCIAL

## 2022-02-22 ENCOUNTER — TELEPHONE (OUTPATIENT)
Dept: INTERNAL MEDICINE | Facility: CLINIC | Age: 87
End: 2022-02-22
Payer: COMMERCIAL

## 2022-02-22 NOTE — TELEPHONE ENCOUNTER
[facility] requesting verbal orders    SKILLED NURSING     1x2wk     Home Health Aide   1x2wk    [place] [phone]

## 2022-02-25 ENCOUNTER — MEDICAL CORRESPONDENCE (OUTPATIENT)
Dept: HEALTH INFORMATION MANAGEMENT | Facility: CLINIC | Age: 87
End: 2022-02-25
Payer: COMMERCIAL

## 2022-02-25 DIAGNOSIS — S22.060A COMPRESSION FRACTURE OF T8 VERTEBRA, INITIAL ENCOUNTER (H): ICD-10-CM

## 2022-02-25 DIAGNOSIS — G89.29 BILATERAL CHRONIC KNEE PAIN: ICD-10-CM

## 2022-02-25 DIAGNOSIS — S32.010A COMPRESSION FRACTURE OF L1 VERTEBRA, INITIAL ENCOUNTER (H): ICD-10-CM

## 2022-02-25 DIAGNOSIS — M25.562 BILATERAL CHRONIC KNEE PAIN: ICD-10-CM

## 2022-02-25 DIAGNOSIS — M25.561 BILATERAL CHRONIC KNEE PAIN: ICD-10-CM

## 2022-02-25 NOTE — TELEPHONE ENCOUNTER
Pending Prescriptions:                       Disp   Refills    traMADol (ULTRAM) 50 MG tablet [Pharmacy M*90 tab*0        Sig: TAKE 1 TABLET THREE TIMES A DAY AS NEEDED FOR SEVERE           PAIN    Routing refill request to provider for review/approval because:  Drug not on the [facility] refill protocol     Please advise, thanks.

## 2022-02-26 RX ORDER — TRAMADOL HYDROCHLORIDE 50 MG/1
TABLET ORAL
Qty: 90 TABLET | Refills: 0 | Status: SHIPPED | OUTPATIENT
Start: 2022-02-26 | End: 2022-04-09

## 2022-03-01 ENCOUNTER — TELEPHONE (OUTPATIENT)
Dept: INTERNAL MEDICINE | Facility: CLINIC | Age: 87
End: 2022-03-01
Payer: COMMERCIAL

## 2022-03-01 NOTE — TELEPHONE ENCOUNTER
[facility] * wound / incision care order received via fax    Forms in Dr. mail box for review and signature.

## 2022-03-02 ENCOUNTER — TELEPHONE (OUTPATIENT)
Dept: INTERNAL MEDICINE | Facility: CLINIC | Age: 87
End: 2022-03-02
Payer: COMMERCIAL

## 2022-03-02 NOTE — TELEPHONE ENCOUNTER
Reason for call:  Home Healthcare Reason for Call:  Home Health Care    Syd with [facility] called regarding (reason for call): orders    Orders are needed for this patient.     PT: [facility] for bathing assistance    [facility]/weekly for assessment & wound cares    Pt Provider: Dr. Mendes    Phone Number Homecare Nurse can be reached at: [phone]    Can we leave a detailed message on this number? YES     Call taken on 3/2/2022 at 5:21 PM by Charisse Yang

## 2022-03-03 ENCOUNTER — TELEPHONE (OUTPATIENT)
Dept: INTERNAL MEDICINE | Facility: CLINIC | Age: 87
End: 2022-03-03
Payer: COMMERCIAL

## 2022-03-04 NOTE — TELEPHONE ENCOUNTER
The Home Care/Assisted Living/Nursing Facility is calling regarding an established patient.  Has the patient seen Home Care in the past or is currently residing in Assisted Living or Nursing Facility? Yes.     Syd calling from [facility] requesting the following orders that are within the Home Care, Assisted Living or Nursing Home Eval and Treatment standing order and can be signed as standing order signature required by RN.    Home Care Visits Continuation - Call to below number. Verbal OK given for requested orders.     Any additional Orders:  Any order requested not stated above are outside of the standing order and must be routed to a licensed practitioner for approval.    No    Writer has verified [facility] will send fax to have orders signed.

## 2022-03-08 ENCOUNTER — TELEPHONE (OUTPATIENT)
Dept: INTERNAL MEDICINE | Facility: CLINIC | Age: 87
End: 2022-03-08
Payer: COMMERCIAL

## 2022-03-14 ENCOUNTER — TELEPHONE (OUTPATIENT)
Dept: INTERNAL MEDICINE | Facility: CLINIC | Age: 87
End: 2022-03-14
Payer: COMMERCIAL

## 2022-03-17 ENCOUNTER — TELEPHONE (OUTPATIENT)
Dept: INTERNAL MEDICINE | Facility: CLINIC | Age: 87
End: 2022-03-17
Payer: COMMERCIAL

## 2022-03-23 ENCOUNTER — TELEPHONE (OUTPATIENT)
Dept: INTERNAL MEDICINE | Facility: CLINIC | Age: 87
End: 2022-03-23
Payer: COMMERCIAL

## 2022-03-23 DIAGNOSIS — R21 RASH AND NONSPECIFIC SKIN ERUPTION: Primary | ICD-10-CM

## 2022-03-23 NOTE — TELEPHONE ENCOUNTER
"Earlene w/ [facility]     Redness \"very bad\"on back L side worse. Under breast and armpit  No fever, denies pain while up but when she lies down it is painful.      Earlene is asking for a  rx     Earlene said to use her usual pharmacy  but that is [facility]. That will take too long to get to the patient.       Best number to call back [phone]   "

## 2022-03-23 NOTE — TELEPHONE ENCOUNTER
Like a sheet of bright redness on left side of her back, painful to lay on.  Earlene reports that is the side patient lays on a lot.  Earlene thinks the under breast and axillary rash is most likely fungal.  No blisters, blotches or drainage from any of the rash. No fever.    Can primary care provider do a virtual visit with patient and her ?  Earlene took photos during visit today but isn't sure how she can get them to provider as she is no longer with patient and won't see her until next week.  Uses [facility] retail pharmacy.  BRANDEE Chowdary R.N.

## 2022-03-24 RX ORDER — NYSTATIN 100000 [USP'U]/G
POWDER TOPICAL 2 TIMES DAILY
Qty: 60 G | Refills: 1 | Status: SHIPPED | OUTPATIENT
Start: 2022-03-24

## 2022-03-24 NOTE — TELEPHONE ENCOUNTER
"Call to patient. Patient gave verbal okay to speak with her spouse over the phone. Spouse informed of Dr. Mendes's below response. He verbalized understanding.     This RN offered to assist with scheduling a virtual visit. Spouse declined this at this time and will call back later when ready/want to schedule.     Spouse wanted Dr. Meneds to be aware of the below:     Spouse states \"Unfortunately, Marino is not going to be able to leave that bed again. The artifical knee has turned about an inch to the side so she can't put any weight into it. I'm taking good care of Marino. The physical therapy has not been able to help her to stand, we can get her to sit.\"    Miranda CORRAL RN   [facility]             "

## 2022-04-01 ENCOUNTER — TELEPHONE (OUTPATIENT)
Dept: INTERNAL MEDICINE | Facility: CLINIC | Age: 87
End: 2022-04-01
Payer: COMMERCIAL

## 2022-04-07 ENCOUNTER — TELEPHONE (OUTPATIENT)
Dept: INTERNAL MEDICINE | Facility: CLINIC | Age: 87
End: 2022-04-07
Payer: COMMERCIAL

## 2022-04-07 DIAGNOSIS — I10 HTN, GOAL BELOW 140/90: ICD-10-CM

## 2022-04-07 DIAGNOSIS — E78.5 HYPERLIPIDEMIA LDL GOAL <130: Chronic | ICD-10-CM

## 2022-04-07 DIAGNOSIS — G89.29 BILATERAL CHRONIC KNEE PAIN: ICD-10-CM

## 2022-04-07 DIAGNOSIS — R60.0 BILATERAL LEG EDEMA: ICD-10-CM

## 2022-04-07 DIAGNOSIS — M25.561 BILATERAL CHRONIC KNEE PAIN: ICD-10-CM

## 2022-04-07 DIAGNOSIS — S32.010A COMPRESSION FRACTURE OF L1 VERTEBRA, INITIAL ENCOUNTER (H): ICD-10-CM

## 2022-04-07 DIAGNOSIS — S22.060A COMPRESSION FRACTURE OF T8 VERTEBRA, INITIAL ENCOUNTER (H): ICD-10-CM

## 2022-04-07 DIAGNOSIS — M25.562 BILATERAL CHRONIC KNEE PAIN: ICD-10-CM

## 2022-04-08 ENCOUNTER — MEDICAL CORRESPONDENCE (OUTPATIENT)
Dept: HEALTH INFORMATION MANAGEMENT | Facility: CLINIC | Age: 87
End: 2022-04-08
Payer: COMMERCIAL

## 2022-04-08 ENCOUNTER — TELEPHONE (OUTPATIENT)
Dept: INTERNAL MEDICINE | Facility: CLINIC | Age: 87
End: 2022-04-08
Payer: COMMERCIAL

## 2022-04-08 DIAGNOSIS — J45.30 MILD PERSISTENT ASTHMA WITHOUT COMPLICATION: Chronic | ICD-10-CM

## 2022-04-08 DIAGNOSIS — B37.2 YEAST INFECTION OF THE SKIN: Primary | ICD-10-CM

## 2022-04-08 DIAGNOSIS — J44.9 CHRONIC OBSTRUCTIVE PULMONARY DISEASE, UNSPECIFIED COPD TYPE (H): Chronic | ICD-10-CM

## 2022-04-08 NOTE — TELEPHONE ENCOUNTER
Munir wound RN with [facility] asking for prescription for low airloss mattress and new bed frame to hold this particular matress RX to [facility] [phone]     Best call back number [phone] Munir

## 2022-04-09 RX ORDER — PRAVASTATIN SODIUM 20 MG
TABLET ORAL
Qty: 90 TABLET | Refills: 0 | Status: SHIPPED | OUTPATIENT
Start: 2022-04-09

## 2022-04-09 RX ORDER — TRAMADOL HYDROCHLORIDE 50 MG/1
TABLET ORAL
Qty: 90 TABLET | Refills: 0 | Status: SHIPPED | OUTPATIENT
Start: 2022-04-09

## 2022-04-09 RX ORDER — TERAZOSIN 2 MG/1
CAPSULE ORAL
Qty: 90 CAPSULE | Refills: 0 | Status: SHIPPED | OUTPATIENT
Start: 2022-04-09

## 2022-04-13 NOTE — TELEPHONE ENCOUNTER
We need detailed documentation from RN about this request   and likely the insurance will ask face-to-face appointment for this request

## 2022-04-13 NOTE — TELEPHONE ENCOUNTER
Advised wound/ostomy nurse Munir that we would need documentation and would also need a face to face visit, video visit OK.  Munir states he doesn't know if patient/caregiver would be able to complete a video visit.   He has actually reached out to the Complex Care team to have a physician see patient in her home as patient is no longer able to leave her home.  Caregiver/SO (Jas) has agreed to Complex Care but states he wants to speak with primary care provider about it first and was going to call clinic.  No calls received yet.     Munir, RN requests treatment oral and topical for what he believes is a bad fungal infection on patient's back and now buttocks also.  It has seemed to improve with antifungals (oral and topical) but caregiver will no longer allow previous NP to come in to see patient so would need primary care provider to order.  Munir believes if we use both oral and topical at the same time there may be a better chance of clearing it up.  BRANDEE Chowdary R.N.

## 2022-04-15 RX ORDER — FLUCONAZOLE 100 MG/1
100 TABLET ORAL DAILY
Qty: 15 TABLET | Refills: 0 | Status: SHIPPED | OUTPATIENT
Start: 2022-04-15 | End: 2022-04-30

## 2022-04-15 RX ORDER — NYSTATIN 100000 [USP'U]/G
POWDER TOPICAL 2 TIMES DAILY
Qty: 60 G | Refills: 1 | Status: SHIPPED | OUTPATIENT
Start: 2022-04-15

## 2022-05-09 ENCOUNTER — TELEPHONE (OUTPATIENT)
Dept: INTERNAL MEDICINE | Facility: CLINIC | Age: 87
End: 2022-05-09
Payer: COMMERCIAL

## 2022-05-09 ENCOUNTER — NURSE TRIAGE (OUTPATIENT)
Dept: INTERNAL MEDICINE | Facility: CLINIC | Age: 87
End: 2022-05-09
Payer: COMMERCIAL

## 2022-05-09 NOTE — TELEPHONE ENCOUNTER
Reason for Call: Request for an order or referral: Order    Order or referral being requested: [employer] Mattress    Date needed: ASAP    Has the patient been seen by the PCP for this problem? Yes    Additional comments: Notes from 4/28 show RN Toby Pozo reporting that a mattress was needed. New care team from [facility] called to get mattress for when pt transitions to home health care. Send to [employer].    Phone number Patient can be reached at:  [facility] [employer]) # [phone], FAX for [employer] is [phone]    Best Time:  Anytime    Can we leave a detailed message on this number?  N/A    Call taken on 5/9/2022 at 3:52 PM by Fidel Fisher

## 2022-05-09 NOTE — TELEPHONE ENCOUNTER
Pt has been experiencing pain where her skull connects to the neck.  It is causing tingling of her right hand/fingers.  Pain is 10/10 when she is sleeping and she is not able to lay kym for more than an hour because pain is severe when laying down.   When patient is not laying down pain is 3/10 and neck is always sore.   No neck injury.  States that she has a muscle problem for over 2 years and she has been going to a chiropractor which helps but her chiropractor advised her to see a provider.   States that she uses a myofascial release ball and it helps relief the pain and tingling but then it comes back.   Pt states that she has had this neck pain and tingling before but it went way and now it is back again.  Advised pt to be seen.  Scheduled visit for tomorrow.    Pt is going to establish care at the [place] clinic.    Reason for Disposition    Neck pain persists > 2 weeks    MODERATE neck pain (e.g., interferes with normal activities like work or school)    Additional Information    Negative: Head is twisting to one side (or ask 'is it turning against your will?')    Negative: Fever > 103 F (39.4 C)    Negative: Fever > 100.0 F (37.8 C) and IVDA (intravenous drug abuse)    Negative: SEVERE pain (e.g., excruciating, unable to do any normal activities)    Negative: Followed an injury to neck (e.g., MVA, sports, impact or collision)    Negative: Chest pain    Negative: Lymph node in the neck is swollen or painful to the touch    Negative: Sore throat is the main symptom    Negative: Shock suspected (e.g., cold/pale/clammy skin, too weak to stand, low BP, rapid pulse)    Negative: Similar pain previously and it was from 'heart attack'    Negative: Similar pain previously from 'angina' and not relieved by nitroglycerin    Negative: Difficult to awaken or acting confused (e.g., disoriented, slurred speech)    Negative: Sounds like a life-threatening emergency to the triager    Answer Assessment - Initial Assessment  "Questions  1. ONSET: \"When did the pain begin?\"       \"for a while\". Has had a muscle issue for 2 years or more.  2. LOCATION: \"Where does it hurt?\"       Back of head where sameera connects to neck.  3. PATTERN \"Does the pain come and go, or has it been constant since it started?\"       Constant. Pain is more severe when laying down/sleeping.   4. SEVERITY: \"How bad is the pain?\"  (Scale 1-10; or mild, moderate, severe)    - MILD (1-3): doesn't interfere with normal activities     - MODERATE (4-7): interferes with normal activities or awakens from sleep     - SEVERE (8-10):  excruciating pain, unable to do any normal activities       \"excrutiating when laying\" 10/10. But when she is up and awake it's about a 3/10.  5. RADIATION: \"Does the pain go anywhere else, shoot into your arms?\"      No  6. CORD SYMPTOMS: \"Any weakness or numbness of the arms or legs?\"    Yes. Tingling of the right fingers.  7. CAUSE: \"What do you think is causing the neck pain?\"      Gets knots and she has a muscle issue.  8. NECK OVERUSE: \"Any recent activities that involved turning or twisting the neck?\"      No  9. OTHER SYMPTOMS: \"Do you have any other symptoms?\" (e.g., headache, fever, chest pain, difficulty breathing, neck swelling)      No  10. PREGNANCY: \"Is there any chance you are pregnant?\" \"When was your last menstrual period?\"        No    Protocols used: NECK PAIN OR [id number]          Joy Cordoba RN  [facility]    "

## 2022-05-10 NOTE — TELEPHONE ENCOUNTER
I agree with the mattress    I suspect that insurance might request F2F appointment   We will see

## 2022-05-13 NOTE — TELEPHONE ENCOUNTER
I don't know the answers to the questions   Please contact the [facility] nurse  I will be able to sign the Rx after ALL the questions have an answer

## 2022-05-13 NOTE — TELEPHONE ENCOUNTER
Call to [facility] at below number. Left detailed message requesting a call back. Please have caller speak with a triage nurse so questions in the order can be answered.

## 2022-05-16 NOTE — TELEPHONE ENCOUNTER
Left voicemail message for Katie Mendiola to return call to clinic.  Please ask her the questions that are included in the DME mattress order regarding number of wounds, locations, stages, etc.  BRANDEE Chowdary R.N.

## 2022-05-18 NOTE — TELEPHONE ENCOUNTER
Left message on Katie's voicemail asking her to return call and speak to any one of the triage nurses.  Please see mattress order, there are several questions we need her help in answering in order to sign mattress order. BRANDEE Chowdary R.N.

## 2022-05-23 NOTE — TELEPHONE ENCOUNTER
Left message on Katie's voicemail asking her to call back to speak with any nurse. BRANDEE Chowdary R.N.

## 2022-05-25 NOTE — TELEPHONE ENCOUNTER
Katie isn't returning calls.  This RN googled and called [facility].  Patient is not active with them for home care but there is a Lashay Park, gerontology NP who is involved with this patient's care.  There are apparently notes indicating that Lashay Ellis is ordering the [employer] mattress and is having her staff fax their chart notes to [employer].  Left name and number for that staff to call us for any additional needs.  BRANDEE Chowdary R.N.

## 2023-01-01 ENCOUNTER — APPOINTMENT (OUTPATIENT)
Dept: GENERAL RADIOLOGY | Facility: CLINIC | Age: 88
DRG: 871 | End: 2023-01-01
Attending: EMERGENCY MEDICINE
Payer: COMMERCIAL

## 2023-01-01 ENCOUNTER — APPOINTMENT (OUTPATIENT)
Dept: SPEECH THERAPY | Facility: CLINIC | Age: 88
DRG: 871 | End: 2023-01-01
Payer: COMMERCIAL

## 2023-01-01 ENCOUNTER — TELEPHONE (OUTPATIENT)
Dept: INTERNAL MEDICINE | Facility: CLINIC | Age: 88
End: 2023-01-01
Payer: COMMERCIAL

## 2023-01-01 ENCOUNTER — APPOINTMENT (OUTPATIENT)
Dept: CT IMAGING | Facility: CLINIC | Age: 88
DRG: 871 | End: 2023-01-01
Attending: EMERGENCY MEDICINE
Payer: COMMERCIAL

## 2023-01-01 ENCOUNTER — APPOINTMENT (OUTPATIENT)
Dept: GENERAL RADIOLOGY | Facility: CLINIC | Age: 88
DRG: 871 | End: 2023-01-01
Attending: INTERNAL MEDICINE
Payer: COMMERCIAL

## 2023-01-01 ENCOUNTER — PATIENT OUTREACH (OUTPATIENT)
Dept: CARE COORDINATION | Facility: CLINIC | Age: 88
End: 2023-01-01
Payer: COMMERCIAL

## 2023-01-01 ENCOUNTER — DOCUMENTATION ONLY (OUTPATIENT)
Dept: OTHER | Facility: CLINIC | Age: 88
End: 2023-01-01
Payer: COMMERCIAL

## 2023-01-01 ENCOUNTER — HOSPITAL ENCOUNTER (INPATIENT)
Facility: CLINIC | Age: 88
LOS: 8 days | Discharge: HOME | DRG: 871 | End: 2023-02-13
Attending: EMERGENCY MEDICINE | Admitting: INTERNAL MEDICINE
Payer: COMMERCIAL

## 2023-01-01 ENCOUNTER — APPOINTMENT (OUTPATIENT)
Dept: SPEECH THERAPY | Facility: CLINIC | Age: 88
DRG: 871 | End: 2023-01-01
Attending: INTERNAL MEDICINE
Payer: COMMERCIAL

## 2023-01-01 VITALS
HEART RATE: 88 BPM | BODY MASS INDEX: 25.05 KG/M2 | WEIGHT: 155.9 LBS | TEMPERATURE: 98.4 F | SYSTOLIC BLOOD PRESSURE: 140 MMHG | DIASTOLIC BLOOD PRESSURE: 70 MMHG | RESPIRATION RATE: 22 BRPM | HEIGHT: 66 IN | OXYGEN SATURATION: 98 %

## 2023-01-01 DIAGNOSIS — I10 HTN, GOAL BELOW 140/90: Primary | ICD-10-CM

## 2023-01-01 DIAGNOSIS — Z79.899 CONTROLLED SUBSTANCE AGREEMENT SIGNED: ICD-10-CM

## 2023-01-01 DIAGNOSIS — T68.XXXA HYPOTHERMIA, INITIAL ENCOUNTER: ICD-10-CM

## 2023-01-01 DIAGNOSIS — G93.40 ENCEPHALOPATHY: ICD-10-CM

## 2023-01-01 DIAGNOSIS — J44.9 CHRONIC OBSTRUCTIVE PULMONARY DISEASE, UNSPECIFIED COPD TYPE (H): Chronic | ICD-10-CM

## 2023-01-01 DIAGNOSIS — R56.9 NEW ONSET SEIZURE (H): ICD-10-CM

## 2023-01-01 DIAGNOSIS — L89.90 PRESSURE INJURY OF SKIN, UNSPECIFIED INJURY STAGE, UNSPECIFIED LOCATION: ICD-10-CM

## 2023-01-01 LAB
ALBUMIN SERPL BCG-MCNC: 3 G/DL (ref 3.5–5.2)
ALBUMIN SERPL BCG-MCNC: 3 G/DL (ref 3.5–5.2)
ALBUMIN SERPL BCG-MCNC: 3.3 G/DL (ref 3.5–5.2)
ALBUMIN SERPL BCG-MCNC: 3.7 G/DL (ref 3.5–5.2)
ALBUMIN UR-MCNC: 10 MG/DL
ALP SERPL-CCNC: 103 U/L (ref 35–104)
ALP SERPL-CCNC: 67 U/L (ref 35–104)
ALP SERPL-CCNC: 79 U/L (ref 35–104)
ALP SERPL-CCNC: 89 U/L (ref 35–104)
ALT SERPL W P-5'-P-CCNC: 14 U/L (ref 10–35)
ALT SERPL W P-5'-P-CCNC: 16 U/L (ref 10–35)
ALT SERPL W P-5'-P-CCNC: 16 U/L (ref 10–35)
ALT SERPL W P-5'-P-CCNC: 19 U/L (ref 10–35)
AMMONIA PLAS-SCNC: 35 UMOL/L (ref 11–51)
AMORPH CRY #/AREA URNS HPF: ABNORMAL /HPF
ANION GAP BLD CALCULATED.3IONS-SCNC: 13 MMOL/L (ref 3–14)
ANION GAP SERPL CALCULATED.3IONS-SCNC: 10 MMOL/L (ref 7–15)
ANION GAP SERPL CALCULATED.3IONS-SCNC: 11 MMOL/L (ref 7–15)
ANION GAP SERPL CALCULATED.3IONS-SCNC: 12 MMOL/L (ref 7–15)
ANION GAP SERPL CALCULATED.3IONS-SCNC: 6 MMOL/L (ref 7–15)
ANION GAP SERPL CALCULATED.3IONS-SCNC: 7 MMOL/L (ref 7–15)
APPEARANCE UR: ABNORMAL
AST SERPL W P-5'-P-CCNC: 18 U/L (ref 10–35)
AST SERPL W P-5'-P-CCNC: 27 U/L (ref 10–35)
AST SERPL W P-5'-P-CCNC: 27 U/L (ref 10–35)
AST SERPL W P-5'-P-CCNC: 29 U/L (ref 10–35)
ATRIAL RATE - MUSE: 84 BPM
BACTERIA BLD CULT: ABNORMAL
BACTERIA BLD CULT: ABNORMAL
BACTERIA BLD CULT: NO GROWTH
BACTERIA UR CULT: NO GROWTH
BASE EXCESS BLDV CALC-SCNC: 4 MMOL/L (ref -7.7–1.9)
BASOPHILS # BLD AUTO: 0 10E3/UL (ref 0–0.2)
BASOPHILS NFR BLD AUTO: 0 %
BILIRUB DIRECT SERPL-MCNC: 0.25 MG/DL (ref 0–0.3)
BILIRUB SERPL-MCNC: 0.6 MG/DL
BILIRUB SERPL-MCNC: 0.6 MG/DL
BILIRUB SERPL-MCNC: 0.7 MG/DL
BILIRUB SERPL-MCNC: 0.8 MG/DL
BILIRUB UR QL STRIP: NEGATIVE
BUN SERPL-MCNC: 10.9 MG/DL (ref 8–23)
BUN SERPL-MCNC: 12.2 MG/DL (ref 8–23)
BUN SERPL-MCNC: 14.9 MG/DL (ref 8–23)
BUN SERPL-MCNC: 17 MG/DL (ref 7–30)
BUN SERPL-MCNC: 7 MG/DL (ref 8–23)
BUN SERPL-MCNC: 9.1 MG/DL (ref 8–23)
CA-I BLD-MCNC: 4.8 MG/DL (ref 4.4–5.2)
CALCIUM SERPL-MCNC: 8.4 MG/DL (ref 8.8–10.2)
CALCIUM SERPL-MCNC: 8.5 MG/DL (ref 8.8–10.2)
CALCIUM SERPL-MCNC: 8.5 MG/DL (ref 8.8–10.2)
CALCIUM SERPL-MCNC: 8.6 MG/DL (ref 8.8–10.2)
CALCIUM SERPL-MCNC: 9.3 MG/DL (ref 8.8–10.2)
CHLORIDE BLD-SCNC: 101 MMOL/L (ref 94–109)
CHLORIDE SERPL-SCNC: 101 MMOL/L (ref 98–107)
CHLORIDE SERPL-SCNC: 104 MMOL/L (ref 98–107)
CHLORIDE SERPL-SCNC: 105 MMOL/L (ref 98–107)
CHLORIDE SERPL-SCNC: 106 MMOL/L (ref 98–107)
CHLORIDE SERPL-SCNC: 107 MMOL/L (ref 98–107)
CK SERPL-CCNC: 81 U/L (ref 26–192)
CO2 BLD-SCNC: 32 MMOL/L (ref 20–32)
COLOR UR AUTO: YELLOW
CREAT BLD-MCNC: 0.4 MG/DL (ref 0.5–1)
CREAT SERPL-MCNC: 0.36 MG/DL (ref 0.51–0.95)
CREAT SERPL-MCNC: 0.41 MG/DL (ref 0.51–0.95)
CREAT SERPL-MCNC: 0.42 MG/DL (ref 0.51–0.95)
CREAT SERPL-MCNC: 0.42 MG/DL (ref 0.51–0.95)
CREAT SERPL-MCNC: 0.43 MG/DL (ref 0.51–0.95)
CRP SERPL-MCNC: 27.2 MG/L
DEPRECATED HCO3 PLAS-SCNC: 26 MMOL/L (ref 22–29)
DEPRECATED HCO3 PLAS-SCNC: 26 MMOL/L (ref 22–29)
DEPRECATED HCO3 PLAS-SCNC: 27 MMOL/L (ref 22–29)
DEPRECATED HCO3 PLAS-SCNC: 29 MMOL/L (ref 22–29)
DEPRECATED HCO3 PLAS-SCNC: 30 MMOL/L (ref 22–29)
DIASTOLIC BLOOD PRESSURE - MUSE: NORMAL MMHG
ENTEROCOCCUS FAECALIS: NOT DETECTED
ENTEROCOCCUS FAECIUM: NOT DETECTED
EOSINOPHIL # BLD AUTO: 0 10E3/UL (ref 0–0.7)
EOSINOPHIL # BLD AUTO: 0 10E3/UL (ref 0–0.7)
EOSINOPHIL # BLD AUTO: 0.2 10E3/UL (ref 0–0.7)
EOSINOPHIL # BLD AUTO: 0.2 10E3/UL (ref 0–0.7)
EOSINOPHIL # BLD AUTO: 0.3 10E3/UL (ref 0–0.7)
EOSINOPHIL NFR BLD AUTO: 0 %
EOSINOPHIL NFR BLD AUTO: 0 %
EOSINOPHIL NFR BLD AUTO: 1 %
EOSINOPHIL NFR BLD AUTO: 2 %
EOSINOPHIL NFR BLD AUTO: 3 %
ERYTHROCYTE [DISTWIDTH] IN BLOOD BY AUTOMATED COUNT: 14.7 % (ref 10–15)
ERYTHROCYTE [DISTWIDTH] IN BLOOD BY AUTOMATED COUNT: 14.7 % (ref 10–15)
ERYTHROCYTE [DISTWIDTH] IN BLOOD BY AUTOMATED COUNT: 14.8 % (ref 10–15)
ERYTHROCYTE [DISTWIDTH] IN BLOOD BY AUTOMATED COUNT: 14.9 % (ref 10–15)
ERYTHROCYTE [DISTWIDTH] IN BLOOD BY AUTOMATED COUNT: 15.1 % (ref 10–15)
ERYTHROCYTE [SEDIMENTATION RATE] IN BLOOD BY WESTERGREN METHOD: 107 MM/HR (ref 0–30)
ETHANOL SERPL-MCNC: <0.01 G/DL
FLUAV RNA SPEC QL NAA+PROBE: NEGATIVE
FLUBV RNA RESP QL NAA+PROBE: NEGATIVE
GFR SERPL CREATININE-BSD FRML MDRD: >90 ML/MIN/1.73M2
GLUCOSE BLD-MCNC: 108 MG/DL (ref 70–99)
GLUCOSE BLDC GLUCOMTR-MCNC: 118 MG/DL (ref 70–99)
GLUCOSE SERPL-MCNC: 100 MG/DL (ref 70–99)
GLUCOSE SERPL-MCNC: 104 MG/DL (ref 70–99)
GLUCOSE SERPL-MCNC: 109 MG/DL (ref 70–99)
GLUCOSE SERPL-MCNC: 88 MG/DL (ref 70–99)
GLUCOSE SERPL-MCNC: 93 MG/DL (ref 70–99)
GLUCOSE UR STRIP-MCNC: NEGATIVE MG/DL
HCO3 BLDV-SCNC: 32 MMOL/L (ref 21–28)
HCO3 BLDV-SCNC: 34 MMOL/L (ref 21–28)
HCT VFR BLD AUTO: 32 % (ref 35–47)
HCT VFR BLD AUTO: 32 % (ref 35–47)
HCT VFR BLD AUTO: 32.5 % (ref 35–47)
HCT VFR BLD AUTO: 35.2 % (ref 35–47)
HCT VFR BLD AUTO: 40.7 % (ref 35–47)
HCT VFR BLD CALC: 40 % (ref 35–47)
HGB BLD-MCNC: 10.5 G/DL (ref 11.7–15.7)
HGB BLD-MCNC: 10.5 G/DL (ref 11.7–15.7)
HGB BLD-MCNC: 10.6 G/DL (ref 11.7–15.7)
HGB BLD-MCNC: 11.9 G/DL (ref 11.7–15.7)
HGB BLD-MCNC: 13.4 G/DL (ref 11.7–15.7)
HGB BLD-MCNC: 13.6 G/DL (ref 11.7–15.7)
HGB UR QL STRIP: ABNORMAL
HOLD SPECIMEN: NORMAL
IMM GRANULOCYTES # BLD: 0 10E3/UL
IMM GRANULOCYTES # BLD: 0 10E3/UL
IMM GRANULOCYTES # BLD: 0.1 10E3/UL
IMM GRANULOCYTES NFR BLD: 0 %
IMM GRANULOCYTES NFR BLD: 1 %
INTERPRETATION ECG - MUSE: NORMAL
KETONES UR STRIP-MCNC: ABNORMAL MG/DL
LACTATE BLD-SCNC: 3.2 MMOL/L
LACTATE SERPL-SCNC: 0.7 MMOL/L (ref 0.7–2)
LACTATE SERPL-SCNC: 0.7 MMOL/L (ref 0.7–2)
LACTATE SERPL-SCNC: 0.9 MMOL/L (ref 0.7–2)
LACTATE SERPL-SCNC: 3.4 MMOL/L (ref 0.7–2)
LEUKOCYTE ESTERASE UR QL STRIP: NEGATIVE
LISTERIA SPECIES (DETECTED/NOT DETECTED): NOT DETECTED
LYMPHOCYTES # BLD AUTO: 1.7 10E3/UL (ref 0.8–5.3)
LYMPHOCYTES # BLD AUTO: 1.9 10E3/UL (ref 0.8–5.3)
LYMPHOCYTES # BLD AUTO: 1.9 10E3/UL (ref 0.8–5.3)
LYMPHOCYTES # BLD AUTO: 2 10E3/UL (ref 0.8–5.3)
LYMPHOCYTES # BLD AUTO: 3.6 10E3/UL (ref 0.8–5.3)
LYMPHOCYTES NFR BLD AUTO: 17 %
LYMPHOCYTES NFR BLD AUTO: 21 %
LYMPHOCYTES NFR BLD AUTO: 22 %
LYMPHOCYTES NFR BLD AUTO: 24 %
LYMPHOCYTES NFR BLD AUTO: 34 %
MAGNESIUM SERPL-MCNC: 1.6 MG/DL (ref 1.7–2.3)
MAGNESIUM SERPL-MCNC: 1.8 MG/DL (ref 1.7–2.3)
MAGNESIUM SERPL-MCNC: 1.8 MG/DL (ref 1.7–2.3)
MAGNESIUM SERPL-MCNC: 2 MG/DL (ref 1.7–2.3)
MCH RBC QN AUTO: 30.7 PG (ref 26.5–33)
MCH RBC QN AUTO: 31.3 PG (ref 26.5–33)
MCH RBC QN AUTO: 31.4 PG (ref 26.5–33)
MCH RBC QN AUTO: 31.5 PG (ref 26.5–33)
MCH RBC QN AUTO: 32.2 PG (ref 26.5–33)
MCHC RBC AUTO-ENTMCNC: 32.6 G/DL (ref 31.5–36.5)
MCHC RBC AUTO-ENTMCNC: 32.8 G/DL (ref 31.5–36.5)
MCHC RBC AUTO-ENTMCNC: 32.8 G/DL (ref 31.5–36.5)
MCHC RBC AUTO-ENTMCNC: 32.9 G/DL (ref 31.5–36.5)
MCHC RBC AUTO-ENTMCNC: 33.8 G/DL (ref 31.5–36.5)
MCV RBC AUTO: 94 FL (ref 78–100)
MCV RBC AUTO: 95 FL (ref 78–100)
MCV RBC AUTO: 95 FL (ref 78–100)
MCV RBC AUTO: 96 FL (ref 78–100)
MCV RBC AUTO: 96 FL (ref 78–100)
MONOCYTES # BLD AUTO: 1 10E3/UL (ref 0–1.3)
MONOCYTES # BLD AUTO: 1.2 10E3/UL (ref 0–1.3)
MONOCYTES # BLD AUTO: 1.3 10E3/UL (ref 0–1.3)
MONOCYTES NFR BLD AUTO: 10 %
MONOCYTES NFR BLD AUTO: 12 %
MONOCYTES NFR BLD AUTO: 13 %
MONOCYTES NFR BLD AUTO: 14 %
MONOCYTES NFR BLD AUTO: 9 %
MUCOUS THREADS #/AREA URNS LPF: PRESENT /LPF
NEUTROPHILS # BLD AUTO: 4.8 10E3/UL (ref 1.6–8.3)
NEUTROPHILS # BLD AUTO: 5.7 10E3/UL (ref 1.6–8.3)
NEUTROPHILS # BLD AUTO: 5.8 10E3/UL (ref 1.6–8.3)
NEUTROPHILS # BLD AUTO: 5.9 10E3/UL (ref 1.6–8.3)
NEUTROPHILS # BLD AUTO: 7 10E3/UL (ref 1.6–8.3)
NEUTROPHILS NFR BLD AUTO: 55 %
NEUTROPHILS NFR BLD AUTO: 60 %
NEUTROPHILS NFR BLD AUTO: 62 %
NEUTROPHILS NFR BLD AUTO: 65 %
NEUTROPHILS NFR BLD AUTO: 72 %
NITRATE UR QL: NEGATIVE
NRBC # BLD AUTO: 0 10E3/UL
NRBC BLD AUTO-RTO: 0 /100
O2/TOTAL GAS SETTING VFR VENT: 6 %
P AXIS - MUSE: 91 DEGREES
PCO2 BLDV: 62 MM HG (ref 40–50)
PCO2 BLDV: 62 MM HG (ref 40–50)
PH BLDV: 7.32 [PH] (ref 7.32–7.43)
PH BLDV: 7.34 [PH] (ref 7.32–7.43)
PH UR STRIP: 7 [PH] (ref 5–7)
PHOSPHATE SERPL-MCNC: 3.1 MG/DL (ref 2.5–4.5)
PLATELET # BLD AUTO: 162 10E3/UL (ref 150–450)
PLATELET # BLD AUTO: 163 10E3/UL (ref 150–450)
PLATELET # BLD AUTO: 187 10E3/UL (ref 150–450)
PLATELET # BLD AUTO: 198 10E3/UL (ref 150–450)
PLATELET # BLD AUTO: 213 10E3/UL (ref 150–450)
PO2 BLDV: 59 MM HG (ref 25–47)
PO2 BLDV: 68 MM HG (ref 25–47)
POTASSIUM BLD-SCNC: 3.7 MMOL/L (ref 3.4–5.3)
POTASSIUM SERPL-SCNC: 3.1 MMOL/L (ref 3.4–5.3)
POTASSIUM SERPL-SCNC: 3.3 MMOL/L (ref 3.4–5.3)
POTASSIUM SERPL-SCNC: 3.4 MMOL/L (ref 3.4–5.3)
POTASSIUM SERPL-SCNC: 3.4 MMOL/L (ref 3.4–5.3)
POTASSIUM SERPL-SCNC: 3.5 MMOL/L (ref 3.4–5.3)
POTASSIUM SERPL-SCNC: 3.6 MMOL/L (ref 3.4–5.3)
POTASSIUM SERPL-SCNC: 3.7 MMOL/L (ref 3.4–5.3)
POTASSIUM SERPL-SCNC: 3.7 MMOL/L (ref 3.4–5.3)
POTASSIUM SERPL-SCNC: 3.8 MMOL/L (ref 3.4–5.3)
PR INTERVAL - MUSE: 160 MS
PROCALCITONIN SERPL IA-MCNC: 0.08 NG/ML
PROCALCITONIN SERPL IA-MCNC: 0.13 NG/ML
PROT SERPL-MCNC: 5.4 G/DL (ref 6.4–8.3)
PROT SERPL-MCNC: 5.6 G/DL (ref 6.4–8.3)
PROT SERPL-MCNC: 5.8 G/DL (ref 6.4–8.3)
PROT SERPL-MCNC: 6.6 G/DL (ref 6.4–8.3)
QRS DURATION - MUSE: 76 MS
QT - MUSE: 364 MS
QTC - MUSE: 430 MS
R AXIS - MUSE: 48 DEGREES
RBC # BLD AUTO: 3.33 10E6/UL (ref 3.8–5.2)
RBC # BLD AUTO: 3.35 10E6/UL (ref 3.8–5.2)
RBC # BLD AUTO: 3.45 10E6/UL (ref 3.8–5.2)
RBC # BLD AUTO: 3.69 10E6/UL (ref 3.8–5.2)
RBC # BLD AUTO: 4.27 10E6/UL (ref 3.8–5.2)
RBC URINE: 4 /HPF
RSV RNA SPEC NAA+PROBE: NEGATIVE
SAO2 % BLDV: 92 % (ref 94–100)
SARS-COV-2 RNA RESP QL NAA+PROBE: NEGATIVE
SODIUM BLD-SCNC: 142 MMOL/L (ref 133–144)
SODIUM SERPL-SCNC: 139 MMOL/L (ref 136–145)
SODIUM SERPL-SCNC: 141 MMOL/L (ref 136–145)
SODIUM SERPL-SCNC: 142 MMOL/L (ref 136–145)
SODIUM SERPL-SCNC: 142 MMOL/L (ref 136–145)
SODIUM SERPL-SCNC: 143 MMOL/L (ref 136–145)
SP GR UR STRIP: 1.02 (ref 1–1.03)
SQUAMOUS EPITHELIAL: 1 /HPF
STAPHYLOCOCCUS AUREUS: NOT DETECTED
STAPHYLOCOCCUS EPIDERMIDIS: NOT DETECTED
STAPHYLOCOCCUS LUGDUNENSIS: NOT DETECTED
STAPHYLOCOCCUS SPECIES: DETECTED
STREPTOCOCCUS AGALACTIAE: NOT DETECTED
STREPTOCOCCUS ANGINOSUS GROUP: NOT DETECTED
STREPTOCOCCUS PNEUMONIAE: NOT DETECTED
STREPTOCOCCUS PYOGENES: NOT DETECTED
STREPTOCOCCUS SPECIES: NOT DETECTED
SYSTOLIC BLOOD PRESSURE - MUSE: NORMAL MMHG
T AXIS - MUSE: 11 DEGREES
T4 FREE SERPL-MCNC: 1.29 NG/DL (ref 0.9–1.7)
TROPONIN T SERPL HS-MCNC: 14 NG/L
TSH SERPL DL<=0.005 MIU/L-ACNC: 19.77 UIU/ML (ref 0.3–4.2)
UROBILINOGEN UR STRIP-MCNC: 2 MG/DL
VENTRICULAR RATE- MUSE: 84 BPM
WBC # BLD AUTO: 10.5 10E3/UL (ref 4–11)
WBC # BLD AUTO: 7.9 10E3/UL (ref 4–11)
WBC # BLD AUTO: 9.1 10E3/UL (ref 4–11)
WBC # BLD AUTO: 9.2 10E3/UL (ref 4–11)
WBC # BLD AUTO: 9.8 10E3/UL (ref 4–11)
WBC URINE: 3 /HPF

## 2023-01-01 PROCEDURE — 71260 CT THORAX DX C+: CPT

## 2023-01-01 PROCEDURE — 250N000011 HC RX IP 250 OP 636: Performed by: INTERNAL MEDICINE

## 2023-01-01 PROCEDURE — 250N000013 HC RX MED GY IP 250 OP 250 PS 637: Performed by: INTERNAL MEDICINE

## 2023-01-01 PROCEDURE — 94640 AIRWAY INHALATION TREATMENT: CPT

## 2023-01-01 PROCEDURE — 250N000009 HC RX 250: Performed by: INTERNAL MEDICINE

## 2023-01-01 PROCEDURE — 94640 AIRWAY INHALATION TREATMENT: CPT | Mod: 76

## 2023-01-01 PROCEDURE — 84443 ASSAY THYROID STIM HORMONE: CPT | Performed by: EMERGENCY MEDICINE

## 2023-01-01 PROCEDURE — 92526 ORAL FUNCTION THERAPY: CPT | Mod: GN

## 2023-01-01 PROCEDURE — 120N000001 HC R&B MED SURG/OB

## 2023-01-01 PROCEDURE — 258N000003 HC RX IP 258 OP 636

## 2023-01-01 PROCEDURE — G0463 HOSPITAL OUTPT CLINIC VISIT: HCPCS

## 2023-01-01 PROCEDURE — 71045 X-RAY EXAM CHEST 1 VIEW: CPT

## 2023-01-01 PROCEDURE — 36415 COLL VENOUS BLD VENIPUNCTURE: CPT | Performed by: INTERNAL MEDICINE

## 2023-01-01 PROCEDURE — 96365 THER/PROPH/DIAG IV INF INIT: CPT

## 2023-01-01 PROCEDURE — 250N000009 HC RX 250: Performed by: EMERGENCY MEDICINE

## 2023-01-01 PROCEDURE — 84484 ASSAY OF TROPONIN QUANT: CPT | Performed by: EMERGENCY MEDICINE

## 2023-01-01 PROCEDURE — 999N000157 HC STATISTIC RCP TIME EA 10 MIN

## 2023-01-01 PROCEDURE — 94668 MNPJ CHEST WALL SBSQ: CPT

## 2023-01-01 PROCEDURE — 82248 BILIRUBIN DIRECT: CPT | Performed by: INTERNAL MEDICINE

## 2023-01-01 PROCEDURE — 96375 TX/PRO/DX INJ NEW DRUG ADDON: CPT

## 2023-01-01 PROCEDURE — 84439 ASSAY OF FREE THYROXINE: CPT | Performed by: EMERGENCY MEDICINE

## 2023-01-01 PROCEDURE — 87149 DNA/RNA DIRECT PROBE: CPT | Performed by: EMERGENCY MEDICINE

## 2023-01-01 PROCEDURE — 99285 EMERGENCY DEPT VISIT HI MDM: CPT | Mod: 25

## 2023-01-01 PROCEDURE — 84132 ASSAY OF SERUM POTASSIUM: CPT | Performed by: INTERNAL MEDICINE

## 2023-01-01 PROCEDURE — 99232 SBSQ HOSP IP/OBS MODERATE 35: CPT | Performed by: INTERNAL MEDICINE

## 2023-01-01 PROCEDURE — 82140 ASSAY OF AMMONIA: CPT | Performed by: EMERGENCY MEDICINE

## 2023-01-01 PROCEDURE — 83605 ASSAY OF LACTIC ACID: CPT | Performed by: INTERNAL MEDICINE

## 2023-01-01 PROCEDURE — 999N000127 HC STATISTIC PERIPHERAL IV START W US GUIDANCE

## 2023-01-01 PROCEDURE — 85025 COMPLETE CBC W/AUTO DIFF WBC: CPT | Performed by: INTERNAL MEDICINE

## 2023-01-01 PROCEDURE — 85652 RBC SED RATE AUTOMATED: CPT | Performed by: INTERNAL MEDICINE

## 2023-01-01 PROCEDURE — 93005 ELECTROCARDIOGRAM TRACING: CPT

## 2023-01-01 PROCEDURE — 36415 COLL VENOUS BLD VENIPUNCTURE: CPT | Performed by: EMERGENCY MEDICINE

## 2023-01-01 PROCEDURE — 85004 AUTOMATED DIFF WBC COUNT: CPT | Performed by: EMERGENCY MEDICINE

## 2023-01-01 PROCEDURE — 99233 SBSQ HOSP IP/OBS HIGH 50: CPT | Performed by: INTERNAL MEDICINE

## 2023-01-01 PROCEDURE — 250N000011 HC RX IP 250 OP 636: Performed by: EMERGENCY MEDICINE

## 2023-01-01 PROCEDURE — 83605 ASSAY OF LACTIC ACID: CPT

## 2023-01-01 PROCEDURE — 82550 ASSAY OF CK (CPK): CPT | Performed by: INTERNAL MEDICINE

## 2023-01-01 PROCEDURE — 94667 MNPJ CHEST WALL 1ST: CPT

## 2023-01-01 PROCEDURE — 82803 BLOOD GASES ANY COMBINATION: CPT | Performed by: EMERGENCY MEDICINE

## 2023-01-01 PROCEDURE — 80048 BASIC METABOLIC PNL TOTAL CA: CPT | Performed by: INTERNAL MEDICINE

## 2023-01-01 PROCEDURE — 83605 ASSAY OF LACTIC ACID: CPT | Performed by: EMERGENCY MEDICINE

## 2023-01-01 PROCEDURE — 80053 COMPREHEN METABOLIC PANEL: CPT | Performed by: INTERNAL MEDICINE

## 2023-01-01 PROCEDURE — 84100 ASSAY OF PHOSPHORUS: CPT | Performed by: INTERNAL MEDICINE

## 2023-01-01 PROCEDURE — 82962 GLUCOSE BLOOD TEST: CPT

## 2023-01-01 PROCEDURE — 84145 PROCALCITONIN (PCT): CPT | Performed by: INTERNAL MEDICINE

## 2023-01-01 PROCEDURE — 83735 ASSAY OF MAGNESIUM: CPT | Performed by: INTERNAL MEDICINE

## 2023-01-01 PROCEDURE — 87086 URINE CULTURE/COLONY COUNT: CPT | Performed by: EMERGENCY MEDICINE

## 2023-01-01 PROCEDURE — 92610 EVALUATE SWALLOWING FUNCTION: CPT | Mod: GN | Performed by: SPEECH-LANGUAGE PATHOLOGIST

## 2023-01-01 PROCEDURE — 99232 SBSQ HOSP IP/OBS MODERATE 35: CPT | Performed by: NURSE PRACTITIONER

## 2023-01-01 PROCEDURE — 258N000003 HC RX IP 258 OP 636: Performed by: INTERNAL MEDICINE

## 2023-01-01 PROCEDURE — 99239 HOSP IP/OBS DSCHRG MGMT >30: CPT | Performed by: INTERNAL MEDICINE

## 2023-01-01 PROCEDURE — 99223 1ST HOSP IP/OBS HIGH 75: CPT | Performed by: NURSE PRACTITIONER

## 2023-01-01 PROCEDURE — 85014 HEMATOCRIT: CPT

## 2023-01-01 PROCEDURE — 80053 COMPREHEN METABOLIC PANEL: CPT | Performed by: EMERGENCY MEDICINE

## 2023-01-01 PROCEDURE — 94761 N-INVAS EAR/PLS OXIMETRY MLT: CPT

## 2023-01-01 PROCEDURE — 82077 ASSAY SPEC XCP UR&BREATH IA: CPT | Performed by: EMERGENCY MEDICINE

## 2023-01-01 PROCEDURE — 86140 C-REACTIVE PROTEIN: CPT | Performed by: INTERNAL MEDICINE

## 2023-01-01 PROCEDURE — 87637 SARSCOV2&INF A&B&RSV AMP PRB: CPT | Performed by: INTERNAL MEDICINE

## 2023-01-01 PROCEDURE — 87077 CULTURE AEROBIC IDENTIFY: CPT | Performed by: EMERGENCY MEDICINE

## 2023-01-01 PROCEDURE — 81001 URINALYSIS AUTO W/SCOPE: CPT | Performed by: EMERGENCY MEDICINE

## 2023-01-01 PROCEDURE — 250N000011 HC RX IP 250 OP 636

## 2023-01-01 PROCEDURE — 99223 1ST HOSP IP/OBS HIGH 75: CPT | Mod: AI | Performed by: INTERNAL MEDICINE

## 2023-01-01 PROCEDURE — 999N000147 HC STATISTIC PT IP EVAL DEFER: Performed by: PHYSICAL THERAPIST

## 2023-01-01 PROCEDURE — 70450 CT HEAD/BRAIN W/O DYE: CPT

## 2023-01-01 PROCEDURE — 80047 BASIC METABLC PNL IONIZED CA: CPT

## 2023-01-01 PROCEDURE — 258N000003 HC RX IP 258 OP 636: Performed by: EMERGENCY MEDICINE

## 2023-01-01 PROCEDURE — 999N000105 HC STATISTIC NO DOCUMENTATION TO SUPPORT CHARGE

## 2023-01-01 RX ORDER — IOPAMIDOL 755 MG/ML
500 INJECTION, SOLUTION INTRAVASCULAR ONCE
Status: COMPLETED | OUTPATIENT
Start: 2023-01-01 | End: 2023-01-01

## 2023-01-01 RX ORDER — DIPHENHYDRAMINE HCL 25 MG
50 CAPSULE ORAL
Status: DISCONTINUED | OUTPATIENT
Start: 2023-01-01 | End: 2023-01-01 | Stop reason: HOSPADM

## 2023-01-01 RX ORDER — ONDANSETRON 4 MG/1
4 TABLET, ORALLY DISINTEGRATING ORAL EVERY 6 HOURS PRN
Status: DISCONTINUED | OUTPATIENT
Start: 2023-01-01 | End: 2023-01-01 | Stop reason: HOSPADM

## 2023-01-01 RX ORDER — GUAIFENESIN AND DEXTROMETHORPHAN HYDROBROMIDE 600; 30 MG/1; MG/1
1 TABLET, EXTENDED RELEASE ORAL 2 TIMES DAILY
Status: DISCONTINUED | OUTPATIENT
Start: 2023-01-01 | End: 2023-01-01 | Stop reason: HOSPADM

## 2023-01-01 RX ORDER — POTASSIUM CHLORIDE 7.45 MG/ML
10 INJECTION INTRAVENOUS
Status: DISCONTINUED | OUTPATIENT
Start: 2023-01-01 | End: 2023-01-01

## 2023-01-01 RX ORDER — CEFTRIAXONE 1 G/1
1 INJECTION, POWDER, FOR SOLUTION INTRAMUSCULAR; INTRAVENOUS EVERY 24 HOURS
Status: CANCELLED | OUTPATIENT
Start: 2023-01-01

## 2023-01-01 RX ORDER — ACETAMINOPHEN 650 MG/1
650 SUPPOSITORY RECTAL EVERY 6 HOURS PRN
Status: DISCONTINUED | OUTPATIENT
Start: 2023-01-01 | End: 2023-01-01 | Stop reason: HOSPADM

## 2023-01-01 RX ORDER — DIAZEPAM 2.5 MG/.5ML
5 GEL RECTAL EVERY 10 MIN PRN
Status: DISCONTINUED | OUTPATIENT
Start: 2023-01-01 | End: 2023-01-01 | Stop reason: ALTCHOICE

## 2023-01-01 RX ORDER — BISACODYL 10 MG
10 SUPPOSITORY, RECTAL RECTAL DAILY PRN
Status: DISCONTINUED | OUTPATIENT
Start: 2023-01-01 | End: 2023-01-01 | Stop reason: HOSPADM

## 2023-01-01 RX ORDER — BISACODYL 10 MG
10 SUPPOSITORY, RECTAL RECTAL ONCE
Status: DISCONTINUED | OUTPATIENT
Start: 2023-01-01 | End: 2023-01-01

## 2023-01-01 RX ORDER — LORAZEPAM 2 MG/ML
2 INJECTION INTRAMUSCULAR
Status: DISCONTINUED | OUTPATIENT
Start: 2023-01-01 | End: 2023-01-01

## 2023-01-01 RX ORDER — AMLODIPINE BESYLATE 5 MG/1
5 TABLET ORAL DAILY
Qty: 30 TABLET | Refills: 0 | Status: SHIPPED | OUTPATIENT
Start: 2023-01-01

## 2023-01-01 RX ORDER — GUAIFENESIN AND DEXTROMETHORPHAN HYDROBROMIDE 600; 30 MG/1; MG/1
1 TABLET, EXTENDED RELEASE ORAL 2 TIMES DAILY
Qty: 6 TABLET | Refills: 0 | Status: SHIPPED | OUTPATIENT
Start: 2023-01-01 | End: 2023-01-01

## 2023-01-01 RX ORDER — TERAZOSIN 1 MG/1
2 CAPSULE ORAL AT BEDTIME
Status: DISCONTINUED | OUTPATIENT
Start: 2023-01-01 | End: 2023-01-01 | Stop reason: HOSPADM

## 2023-01-01 RX ORDER — LORAZEPAM 2 MG/ML
2 INJECTION INTRAMUSCULAR ONCE
Status: COMPLETED | OUTPATIENT
Start: 2023-01-01 | End: 2023-01-01

## 2023-01-01 RX ORDER — ONDANSETRON 2 MG/ML
4 INJECTION INTRAMUSCULAR; INTRAVENOUS EVERY 6 HOURS PRN
Status: DISCONTINUED | OUTPATIENT
Start: 2023-01-01 | End: 2023-01-01 | Stop reason: HOSPADM

## 2023-01-01 RX ORDER — FLUCONAZOLE 2 MG/ML
800 INJECTION INTRAVENOUS ONCE
Status: COMPLETED | OUTPATIENT
Start: 2023-01-01 | End: 2023-01-01

## 2023-01-01 RX ORDER — LEVETIRACETAM 10 MG/ML
2000 INJECTION INTRAVASCULAR ONCE
Status: COMPLETED | OUTPATIENT
Start: 2023-01-01 | End: 2023-01-01

## 2023-01-01 RX ORDER — NALOXONE HYDROCHLORIDE 0.4 MG/ML
0.2 INJECTION, SOLUTION INTRAMUSCULAR; INTRAVENOUS; SUBCUTANEOUS
Status: DISCONTINUED | OUTPATIENT
Start: 2023-01-01 | End: 2023-01-01 | Stop reason: HOSPADM

## 2023-01-01 RX ORDER — LEVETIRACETAM 5 MG/ML
500 INJECTION INTRAVASCULAR EVERY 12 HOURS
Status: DISCONTINUED | OUTPATIENT
Start: 2023-01-01 | End: 2023-01-01

## 2023-01-01 RX ORDER — ASPIRIN 81 MG/1
81 TABLET, CHEWABLE ORAL DAILY
Status: DISCONTINUED | OUTPATIENT
Start: 2023-01-01 | End: 2023-01-01 | Stop reason: HOSPADM

## 2023-01-01 RX ORDER — ENOXAPARIN SODIUM 100 MG/ML
40 INJECTION SUBCUTANEOUS EVERY 24 HOURS
Status: DISCONTINUED | OUTPATIENT
Start: 2023-01-01 | End: 2023-01-01

## 2023-01-01 RX ORDER — LORAZEPAM 2 MG/ML
1 INJECTION INTRAMUSCULAR
Status: DISCONTINUED | OUTPATIENT
Start: 2023-01-01 | End: 2023-01-01 | Stop reason: HOSPADM

## 2023-01-01 RX ORDER — ACETAMINOPHEN 325 MG/1
650 TABLET ORAL EVERY 6 HOURS PRN
Status: DISCONTINUED | OUTPATIENT
Start: 2023-01-01 | End: 2023-01-01 | Stop reason: HOSPADM

## 2023-01-01 RX ORDER — SODIUM CHLORIDE 9 MG/ML
INJECTION, SOLUTION INTRAVENOUS CONTINUOUS
Status: DISCONTINUED | OUTPATIENT
Start: 2023-01-01 | End: 2023-01-01

## 2023-01-01 RX ORDER — LEVETIRACETAM 500 MG/1
500 TABLET ORAL 2 TIMES DAILY
Qty: 60 TABLET | Refills: 0 | Status: SHIPPED | OUTPATIENT
Start: 2023-01-01

## 2023-01-01 RX ORDER — FLUCONAZOLE 2 MG/ML
400 INJECTION, SOLUTION INTRAVENOUS EVERY 24 HOURS
Status: DISCONTINUED | OUTPATIENT
Start: 2023-01-01 | End: 2023-01-01

## 2023-01-01 RX ORDER — IPRATROPIUM BROMIDE AND ALBUTEROL SULFATE 2.5; .5 MG/3ML; MG/3ML
3 SOLUTION RESPIRATORY (INHALATION)
Status: DISCONTINUED | OUTPATIENT
Start: 2023-01-01 | End: 2023-01-01 | Stop reason: HOSPADM

## 2023-01-01 RX ORDER — POTASSIUM CHLORIDE 7.45 MG/ML
10 INJECTION INTRAVENOUS
Status: COMPLETED | OUTPATIENT
Start: 2023-01-01 | End: 2023-01-01

## 2023-01-01 RX ORDER — AMLODIPINE BESYLATE 5 MG/1
5 TABLET ORAL DAILY
Status: DISCONTINUED | OUTPATIENT
Start: 2023-01-01 | End: 2023-01-01 | Stop reason: HOSPADM

## 2023-01-01 RX ORDER — PRAVASTATIN SODIUM 20 MG
20 TABLET ORAL DAILY
Status: DISCONTINUED | OUTPATIENT
Start: 2023-01-01 | End: 2023-01-01 | Stop reason: HOSPADM

## 2023-01-01 RX ORDER — POTASSIUM CHLORIDE 1500 MG/1
40 TABLET, EXTENDED RELEASE ORAL ONCE
Status: COMPLETED | OUTPATIENT
Start: 2023-01-01 | End: 2023-01-01

## 2023-01-01 RX ORDER — FLUTICASONE FUROATE AND VILANTEROL 200; 25 UG/1; UG/1
1 POWDER RESPIRATORY (INHALATION) DAILY
Status: DISCONTINUED | OUTPATIENT
Start: 2023-01-01 | End: 2023-01-01 | Stop reason: HOSPADM

## 2023-01-01 RX ORDER — LIDOCAINE 40 MG/G
CREAM TOPICAL
Status: DISCONTINUED | OUTPATIENT
Start: 2023-01-01 | End: 2023-01-01 | Stop reason: HOSPADM

## 2023-01-01 RX ORDER — LORAZEPAM 2 MG/ML
INJECTION INTRAMUSCULAR
Status: COMPLETED
Start: 2023-01-01 | End: 2023-01-01

## 2023-01-01 RX ORDER — ACETYLCYSTEINE 200 MG/ML
2 SOLUTION ORAL; RESPIRATORY (INHALATION) EVERY 4 HOURS
Status: DISCONTINUED | OUTPATIENT
Start: 2023-01-01 | End: 2023-01-01 | Stop reason: HOSPADM

## 2023-01-01 RX ORDER — FLUCONAZOLE 150 MG/1
150 TABLET ORAL WEEKLY
Status: DISCONTINUED | OUTPATIENT
Start: 2023-01-01 | End: 2023-01-01 | Stop reason: HOSPADM

## 2023-01-01 RX ORDER — NALOXONE HYDROCHLORIDE 0.4 MG/ML
0.4 INJECTION, SOLUTION INTRAMUSCULAR; INTRAVENOUS; SUBCUTANEOUS
Status: DISCONTINUED | OUTPATIENT
Start: 2023-01-01 | End: 2023-01-01 | Stop reason: HOSPADM

## 2023-01-01 RX ORDER — ALBUTEROL SULFATE 90 UG/1
2 AEROSOL, METERED RESPIRATORY (INHALATION) EVERY 6 HOURS PRN
Status: DISCONTINUED | OUTPATIENT
Start: 2023-01-01 | End: 2023-01-01 | Stop reason: HOSPADM

## 2023-01-01 RX ORDER — MULTIPLE VITAMINS W/ MINERALS TAB 9MG-400MCG
1 TAB ORAL DAILY
Status: DISCONTINUED | OUTPATIENT
Start: 2023-01-01 | End: 2023-01-01 | Stop reason: HOSPADM

## 2023-01-01 RX ORDER — LEVETIRACETAM 500 MG/1
500 TABLET ORAL 2 TIMES DAILY
Status: DISCONTINUED | OUTPATIENT
Start: 2023-01-01 | End: 2023-01-01 | Stop reason: HOSPADM

## 2023-01-01 RX ORDER — DIPHENHYDRAMINE HYDROCHLORIDE 50 MG/ML
50 INJECTION INTRAMUSCULAR; INTRAVENOUS
Status: DISCONTINUED | OUTPATIENT
Start: 2023-01-01 | End: 2023-01-01 | Stop reason: HOSPADM

## 2023-01-01 RX ORDER — SODIUM CHLORIDE AND POTASSIUM CHLORIDE 150; 450 MG/100ML; MG/100ML
INJECTION, SOLUTION INTRAVENOUS CONTINUOUS
Status: DISCONTINUED | OUTPATIENT
Start: 2023-01-01 | End: 2023-01-01

## 2023-01-01 RX ADMIN — IPRATROPIUM BROMIDE AND ALBUTEROL SULFATE 3 ML: 2.5; .5 SOLUTION RESPIRATORY (INHALATION) at 00:24

## 2023-01-01 RX ADMIN — Medication 25 MG: at 08:34

## 2023-01-01 RX ADMIN — ACETAMINOPHEN 650 MG: 325 TABLET ORAL at 15:59

## 2023-01-01 RX ADMIN — UMECLIDINIUM 1 PUFF: 62.5 AEROSOL, POWDER ORAL at 09:13

## 2023-01-01 RX ADMIN — GUAIFENESIN AND DEXTROMETHORPHAN HYDROBROMIDE 1 TABLET: 600; 30 TABLET, EXTENDED RELEASE ORAL at 20:08

## 2023-01-01 RX ADMIN — ENOXAPARIN SODIUM 40 MG: 40 INJECTION SUBCUTANEOUS at 16:35

## 2023-01-01 RX ADMIN — ACETYLCYSTEINE 2 ML: 200 SOLUTION ORAL; RESPIRATORY (INHALATION) at 20:10

## 2023-01-01 RX ADMIN — TAZOBACTAM SODIUM AND PIPERACILLIN SODIUM 3.38 G: 375; 3 INJECTION, SOLUTION INTRAVENOUS at 05:30

## 2023-01-01 RX ADMIN — IPRATROPIUM BROMIDE AND ALBUTEROL SULFATE 3 ML: 2.5; .5 SOLUTION RESPIRATORY (INHALATION) at 11:41

## 2023-01-01 RX ADMIN — TAZOBACTAM SODIUM AND PIPERACILLIN SODIUM 3.38 G: 375; 3 INJECTION, SOLUTION INTRAVENOUS at 05:34

## 2023-01-01 RX ADMIN — ACETYLCYSTEINE 2 ML: 200 SOLUTION ORAL; RESPIRATORY (INHALATION) at 15:26

## 2023-01-01 RX ADMIN — IPRATROPIUM BROMIDE AND ALBUTEROL SULFATE 3 ML: 2.5; .5 SOLUTION RESPIRATORY (INHALATION) at 07:52

## 2023-01-01 RX ADMIN — TAZOBACTAM SODIUM AND PIPERACILLIN SODIUM 3.38 G: 375; 3 INJECTION, SOLUTION INTRAVENOUS at 11:56

## 2023-01-01 RX ADMIN — TAZOBACTAM SODIUM AND PIPERACILLIN SODIUM 3.38 G: 375; 3 INJECTION, SOLUTION INTRAVENOUS at 17:29

## 2023-01-01 RX ADMIN — ACETYLCYSTEINE 2 ML: 200 SOLUTION ORAL; RESPIRATORY (INHALATION) at 15:25

## 2023-01-01 RX ADMIN — IPRATROPIUM BROMIDE AND ALBUTEROL SULFATE 3 ML: 2.5; .5 SOLUTION RESPIRATORY (INHALATION) at 11:37

## 2023-01-01 RX ADMIN — IPRATROPIUM BROMIDE AND ALBUTEROL SULFATE 3 ML: 2.5; .5 SOLUTION RESPIRATORY (INHALATION) at 04:45

## 2023-01-01 RX ADMIN — ACETAMINOPHEN 650 MG: 325 TABLET ORAL at 02:29

## 2023-01-01 RX ADMIN — TAZOBACTAM SODIUM AND PIPERACILLIN SODIUM 3.38 G: 375; 3 INJECTION, SOLUTION INTRAVENOUS at 11:51

## 2023-01-01 RX ADMIN — GUAIFENESIN AND DEXTROMETHORPHAN HYDROBROMIDE 1 TABLET: 600; 30 TABLET, EXTENDED RELEASE ORAL at 20:27

## 2023-01-01 RX ADMIN — MULTIPLE VITAMINS W/ MINERALS TAB 1 TABLET: TAB at 08:34

## 2023-01-01 RX ADMIN — LORAZEPAM 2 MG: 2 INJECTION INTRAMUSCULAR at 16:53

## 2023-01-01 RX ADMIN — ACETYLCYSTEINE 2 ML: 200 SOLUTION ORAL; RESPIRATORY (INHALATION) at 16:00

## 2023-01-01 RX ADMIN — IPRATROPIUM BROMIDE AND ALBUTEROL SULFATE 3 ML: 2.5; .5 SOLUTION RESPIRATORY (INHALATION) at 15:19

## 2023-01-01 RX ADMIN — POTASSIUM CHLORIDE 10 MEQ: 7.46 INJECTION, SOLUTION INTRAVENOUS at 11:15

## 2023-01-01 RX ADMIN — FLUCONAZOLE IN SODIUM CHLORIDE 800 MG: 2 INJECTION, SOLUTION INTRAVENOUS at 15:50

## 2023-01-01 RX ADMIN — FLUTICASONE FUROATE AND VILANTEROL TRIFENATATE 1 PUFF: 200; 25 POWDER RESPIRATORY (INHALATION) at 09:48

## 2023-01-01 RX ADMIN — MULTIPLE VITAMINS W/ MINERALS TAB 1 TABLET: TAB at 10:35

## 2023-01-01 RX ADMIN — IPRATROPIUM BROMIDE AND ALBUTEROL SULFATE 3 ML: 2.5; .5 SOLUTION RESPIRATORY (INHALATION) at 11:40

## 2023-01-01 RX ADMIN — TERAZOSIN HYDROCHLORIDE 2 MG: 1 CAPSULE ORAL at 22:02

## 2023-01-01 RX ADMIN — POTASSIUM CHLORIDE AND SODIUM CHLORIDE: 450; 150 INJECTION, SOLUTION INTRAVENOUS at 03:02

## 2023-01-01 RX ADMIN — TAZOBACTAM SODIUM AND PIPERACILLIN SODIUM 3.38 G: 375; 3 INJECTION, SOLUTION INTRAVENOUS at 12:04

## 2023-01-01 RX ADMIN — ACETYLCYSTEINE 2 ML: 200 SOLUTION ORAL; RESPIRATORY (INHALATION) at 07:48

## 2023-01-01 RX ADMIN — ACETYLCYSTEINE 2 ML: 200 SOLUTION ORAL; RESPIRATORY (INHALATION) at 20:13

## 2023-01-01 RX ADMIN — ACETYLCYSTEINE 2 ML: 200 SOLUTION ORAL; RESPIRATORY (INHALATION) at 01:16

## 2023-01-01 RX ADMIN — LEVETIRACETAM 500 MG: 500 TABLET, FILM COATED ORAL at 09:41

## 2023-01-01 RX ADMIN — IPRATROPIUM BROMIDE AND ALBUTEROL SULFATE 3 ML: 2.5; .5 SOLUTION RESPIRATORY (INHALATION) at 15:26

## 2023-01-01 RX ADMIN — PRAVASTATIN SODIUM 20 MG: 20 TABLET ORAL at 08:25

## 2023-01-01 RX ADMIN — POTASSIUM CHLORIDE AND SODIUM CHLORIDE: 450; 150 INJECTION, SOLUTION INTRAVENOUS at 14:13

## 2023-01-01 RX ADMIN — LEVETIRACETAM 500 MG: 500 TABLET, FILM COATED ORAL at 20:39

## 2023-01-01 RX ADMIN — GUAIFENESIN AND DEXTROMETHORPHAN HYDROBROMIDE 1 TABLET: 600; 30 TABLET, EXTENDED RELEASE ORAL at 08:58

## 2023-01-01 RX ADMIN — FLUTICASONE FUROATE AND VILANTEROL TRIFENATATE 1 PUFF: 200; 25 POWDER RESPIRATORY (INHALATION) at 07:10

## 2023-01-01 RX ADMIN — LEVETIRACETAM 500 MG: 500 TABLET, FILM COATED ORAL at 08:35

## 2023-01-01 RX ADMIN — ACETYLCYSTEINE 2 ML: 200 SOLUTION ORAL; RESPIRATORY (INHALATION) at 07:58

## 2023-01-01 RX ADMIN — AMLODIPINE BESYLATE 5 MG: 5 TABLET ORAL at 08:35

## 2023-01-01 RX ADMIN — MULTIPLE VITAMINS W/ MINERALS TAB 1 TABLET: TAB at 08:57

## 2023-01-01 RX ADMIN — IPRATROPIUM BROMIDE AND ALBUTEROL SULFATE 3 ML: 2.5; .5 SOLUTION RESPIRATORY (INHALATION) at 11:24

## 2023-01-01 RX ADMIN — TERAZOSIN HYDROCHLORIDE 2 MG: 1 CAPSULE ORAL at 20:23

## 2023-01-01 RX ADMIN — ACETYLCYSTEINE 2 ML: 200 SOLUTION ORAL; RESPIRATORY (INHALATION) at 04:44

## 2023-01-01 RX ADMIN — LEVETIRACETAM 500 MG: 5 INJECTION INTRAVENOUS at 08:00

## 2023-01-01 RX ADMIN — ACETYLCYSTEINE 2 ML: 200 SOLUTION ORAL; RESPIRATORY (INHALATION) at 23:45

## 2023-01-01 RX ADMIN — IPRATROPIUM BROMIDE AND ALBUTEROL SULFATE 3 ML: 2.5; .5 SOLUTION RESPIRATORY (INHALATION) at 15:23

## 2023-01-01 RX ADMIN — IPRATROPIUM BROMIDE AND ALBUTEROL SULFATE 3 ML: 2.5; .5 SOLUTION RESPIRATORY (INHALATION) at 21:08

## 2023-01-01 RX ADMIN — IPRATROPIUM BROMIDE AND ALBUTEROL SULFATE 3 ML: 2.5; .5 SOLUTION RESPIRATORY (INHALATION) at 16:58

## 2023-01-01 RX ADMIN — IPRATROPIUM BROMIDE AND ALBUTEROL SULFATE 3 ML: 2.5; .5 SOLUTION RESPIRATORY (INHALATION) at 08:16

## 2023-01-01 RX ADMIN — IPRATROPIUM BROMIDE AND ALBUTEROL SULFATE 3 ML: 2.5; .5 SOLUTION RESPIRATORY (INHALATION) at 01:16

## 2023-01-01 RX ADMIN — TAZOBACTAM SODIUM AND PIPERACILLIN SODIUM 3.38 G: 375; 3 INJECTION, SOLUTION INTRAVENOUS at 17:27

## 2023-01-01 RX ADMIN — Medication 25 MG: at 14:36

## 2023-01-01 RX ADMIN — LEVETIRACETAM 2000 MG: 10 INJECTION INTRAVENOUS at 17:00

## 2023-01-01 RX ADMIN — MULTIPLE VITAMINS W/ MINERALS TAB 1 TABLET: TAB at 09:12

## 2023-01-01 RX ADMIN — Medication 25 MG: at 20:22

## 2023-01-01 RX ADMIN — ACETYLCYSTEINE 2 ML: 200 SOLUTION ORAL; RESPIRATORY (INHALATION) at 11:41

## 2023-01-01 RX ADMIN — Medication 25 MG: at 11:50

## 2023-01-01 RX ADMIN — ACETYLCYSTEINE 2 ML: 200 SOLUTION ORAL; RESPIRATORY (INHALATION) at 07:51

## 2023-01-01 RX ADMIN — TAZOBACTAM SODIUM AND PIPERACILLIN SODIUM 3.38 G: 375; 3 INJECTION, SOLUTION INTRAVENOUS at 18:17

## 2023-01-01 RX ADMIN — Medication 25 MG: at 22:06

## 2023-01-01 RX ADMIN — TERAZOSIN HYDROCHLORIDE 2 MG: 1 CAPSULE ORAL at 21:46

## 2023-01-01 RX ADMIN — Medication 25 MG: at 20:20

## 2023-01-01 RX ADMIN — IPRATROPIUM BROMIDE AND ALBUTEROL SULFATE 3 ML: 2.5; .5 SOLUTION RESPIRATORY (INHALATION) at 20:13

## 2023-01-01 RX ADMIN — ACETAMINOPHEN 650 MG: 325 TABLET ORAL at 10:00

## 2023-01-01 RX ADMIN — ACETYLCYSTEINE 2 ML: 200 SOLUTION ORAL; RESPIRATORY (INHALATION) at 15:05

## 2023-01-01 RX ADMIN — MULTIPLE VITAMINS W/ MINERALS TAB 1 TABLET: TAB at 08:21

## 2023-01-01 RX ADMIN — IPRATROPIUM BROMIDE AND ALBUTEROL SULFATE 3 ML: 2.5; .5 SOLUTION RESPIRATORY (INHALATION) at 08:10

## 2023-01-01 RX ADMIN — VANCOMYCIN HYDROCHLORIDE 1250 MG: 5 INJECTION, POWDER, LYOPHILIZED, FOR SOLUTION INTRAVENOUS at 22:30

## 2023-01-01 RX ADMIN — GUAIFENESIN AND DEXTROMETHORPHAN HYDROBROMIDE 1 TABLET: 600; 30 TABLET, EXTENDED RELEASE ORAL at 08:21

## 2023-01-01 RX ADMIN — ACETYLCYSTEINE 2 ML: 200 SOLUTION ORAL; RESPIRATORY (INHALATION) at 00:24

## 2023-01-01 RX ADMIN — IPRATROPIUM BROMIDE AND ALBUTEROL SULFATE 3 ML: 2.5; .5 SOLUTION RESPIRATORY (INHALATION) at 07:35

## 2023-01-01 RX ADMIN — SODIUM CHLORIDE: 9 INJECTION, SOLUTION INTRAVENOUS at 21:36

## 2023-01-01 RX ADMIN — RIVAROXABAN 20 MG: 20 TABLET, FILM COATED ORAL at 17:27

## 2023-01-01 RX ADMIN — ACETAMINOPHEN 650 MG: 325 TABLET ORAL at 05:23

## 2023-01-01 RX ADMIN — ACETYLCYSTEINE 2 ML: 200 SOLUTION ORAL; RESPIRATORY (INHALATION) at 08:05

## 2023-01-01 RX ADMIN — ACETYLCYSTEINE 2 ML: 200 SOLUTION ORAL; RESPIRATORY (INHALATION) at 20:02

## 2023-01-01 RX ADMIN — IPRATROPIUM BROMIDE AND ALBUTEROL SULFATE 3 ML: 2.5; .5 SOLUTION RESPIRATORY (INHALATION) at 04:40

## 2023-01-01 RX ADMIN — IPRATROPIUM BROMIDE AND ALBUTEROL SULFATE 3 ML: 2.5; .5 SOLUTION RESPIRATORY (INHALATION) at 12:24

## 2023-01-01 RX ADMIN — ACETYLCYSTEINE 2 ML: 200 SOLUTION ORAL; RESPIRATORY (INHALATION) at 04:47

## 2023-01-01 RX ADMIN — ACETYLCYSTEINE 2 ML: 200 SOLUTION ORAL; RESPIRATORY (INHALATION) at 20:43

## 2023-01-01 RX ADMIN — FLUTICASONE FUROATE AND VILANTEROL TRIFENATATE 1 PUFF: 200; 25 POWDER RESPIRATORY (INHALATION) at 07:58

## 2023-01-01 RX ADMIN — POTASSIUM CHLORIDE 40 MEQ: 1500 TABLET, EXTENDED RELEASE ORAL at 13:58

## 2023-01-01 RX ADMIN — IPRATROPIUM BROMIDE AND ALBUTEROL SULFATE 3 ML: 2.5; .5 SOLUTION RESPIRATORY (INHALATION) at 04:20

## 2023-01-01 RX ADMIN — TAZOBACTAM SODIUM AND PIPERACILLIN SODIUM 3.38 G: 375; 3 INJECTION, SOLUTION INTRAVENOUS at 23:31

## 2023-01-01 RX ADMIN — ACETYLCYSTEINE 2 ML: 200 SOLUTION ORAL; RESPIRATORY (INHALATION) at 23:38

## 2023-01-01 RX ADMIN — ACETYLCYSTEINE 2 ML: 200 SOLUTION ORAL; RESPIRATORY (INHALATION) at 04:19

## 2023-01-01 RX ADMIN — TAZOBACTAM SODIUM AND PIPERACILLIN SODIUM 3.38 G: 375; 3 INJECTION, SOLUTION INTRAVENOUS at 05:21

## 2023-01-01 RX ADMIN — UMECLIDINIUM 1 PUFF: 62.5 AEROSOL, POWDER ORAL at 09:48

## 2023-01-01 RX ADMIN — RIVAROXABAN 20 MG: 20 TABLET, FILM COATED ORAL at 17:15

## 2023-01-01 RX ADMIN — FLUTICASONE FUROATE AND VILANTEROL TRIFENATATE 1 PUFF: 200; 25 POWDER RESPIRATORY (INHALATION) at 07:52

## 2023-01-01 RX ADMIN — ACETYLCYSTEINE 2 ML: 200 SOLUTION ORAL; RESPIRATORY (INHALATION) at 11:52

## 2023-01-01 RX ADMIN — GUAIFENESIN AND DEXTROMETHORPHAN HYDROBROMIDE 1 TABLET: 600; 30 TABLET, EXTENDED RELEASE ORAL at 09:29

## 2023-01-01 RX ADMIN — ACETAMINOPHEN 650 MG: 325 TABLET ORAL at 08:34

## 2023-01-01 RX ADMIN — IPRATROPIUM BROMIDE AND ALBUTEROL SULFATE 3 ML: 2.5; .5 SOLUTION RESPIRATORY (INHALATION) at 20:01

## 2023-01-01 RX ADMIN — LEVETIRACETAM 500 MG: 500 TABLET, FILM COATED ORAL at 08:58

## 2023-01-01 RX ADMIN — ACETYLCYSTEINE 2 ML: 200 SOLUTION ORAL; RESPIRATORY (INHALATION) at 15:19

## 2023-01-01 RX ADMIN — TAZOBACTAM SODIUM AND PIPERACILLIN SODIUM 3.38 G: 375; 3 INJECTION, SOLUTION INTRAVENOUS at 00:34

## 2023-01-01 RX ADMIN — GUAIFENESIN AND DEXTROMETHORPHAN HYDROBROMIDE 1 TABLET: 600; 30 TABLET, EXTENDED RELEASE ORAL at 20:39

## 2023-01-01 RX ADMIN — ACETAMINOPHEN 650 MG: 325 TABLET ORAL at 17:28

## 2023-01-01 RX ADMIN — TAZOBACTAM SODIUM AND PIPERACILLIN SODIUM 3.38 G: 375; 3 INJECTION, SOLUTION INTRAVENOUS at 06:15

## 2023-01-01 RX ADMIN — LEVETIRACETAM 500 MG: 500 TABLET, FILM COATED ORAL at 08:25

## 2023-01-01 RX ADMIN — IPRATROPIUM BROMIDE AND ALBUTEROL SULFATE 3 ML: 2.5; .5 SOLUTION RESPIRATORY (INHALATION) at 22:35

## 2023-01-01 RX ADMIN — IPRATROPIUM BROMIDE AND ALBUTEROL SULFATE 3 ML: 2.5; .5 SOLUTION RESPIRATORY (INHALATION) at 07:58

## 2023-01-01 RX ADMIN — ACETAMINOPHEN 650 MG: 325 TABLET ORAL at 09:12

## 2023-01-01 RX ADMIN — Medication 25 MG: at 03:10

## 2023-01-01 RX ADMIN — Medication 25 MG: at 18:39

## 2023-01-01 RX ADMIN — IPRATROPIUM BROMIDE AND ALBUTEROL SULFATE 3 ML: 2.5; .5 SOLUTION RESPIRATORY (INHALATION) at 16:32

## 2023-01-01 RX ADMIN — IPRATROPIUM BROMIDE AND ALBUTEROL SULFATE 3 ML: 2.5; .5 SOLUTION RESPIRATORY (INHALATION) at 23:16

## 2023-01-01 RX ADMIN — Medication 25 MG: at 05:29

## 2023-01-01 RX ADMIN — ACETYLCYSTEINE 2 ML: 200 SOLUTION ORAL; RESPIRATORY (INHALATION) at 21:08

## 2023-01-01 RX ADMIN — ACETYLCYSTEINE 2 ML: 200 SOLUTION ORAL; RESPIRATORY (INHALATION) at 11:37

## 2023-01-01 RX ADMIN — LEVETIRACETAM 500 MG: 5 INJECTION INTRAVENOUS at 08:35

## 2023-01-01 RX ADMIN — ACETYLCYSTEINE 2 ML: 200 SOLUTION ORAL; RESPIRATORY (INHALATION) at 12:24

## 2023-01-01 RX ADMIN — IOPAMIDOL 70 ML: 755 INJECTION, SOLUTION INTRAVENOUS at 19:34

## 2023-01-01 RX ADMIN — ACETYLCYSTEINE 2 ML: 200 SOLUTION ORAL; RESPIRATORY (INHALATION) at 07:34

## 2023-01-01 RX ADMIN — LEVETIRACETAM 500 MG: 500 TABLET, FILM COATED ORAL at 10:35

## 2023-01-01 RX ADMIN — LEVETIRACETAM 500 MG: 5 INJECTION INTRAVENOUS at 20:18

## 2023-01-01 RX ADMIN — LEVETIRACETAM 500 MG: 5 INJECTION INTRAVENOUS at 20:38

## 2023-01-01 RX ADMIN — ACETYLCYSTEINE 2 ML: 200 SOLUTION ORAL; RESPIRATORY (INHALATION) at 08:10

## 2023-01-01 RX ADMIN — UMECLIDINIUM 1 PUFF: 62.5 AEROSOL, POWDER ORAL at 07:52

## 2023-01-01 RX ADMIN — ASPIRIN 81 MG CHEWABLE TABLET 81 MG: 81 TABLET CHEWABLE at 08:58

## 2023-01-01 RX ADMIN — RIVAROXABAN 20 MG: 20 TABLET, FILM COATED ORAL at 17:17

## 2023-01-01 RX ADMIN — PRAVASTATIN SODIUM 20 MG: 20 TABLET ORAL at 08:21

## 2023-01-01 RX ADMIN — IPRATROPIUM BROMIDE AND ALBUTEROL SULFATE 3 ML: 2.5; .5 SOLUTION RESPIRATORY (INHALATION) at 15:06

## 2023-01-01 RX ADMIN — RIVAROXABAN 20 MG: 20 TABLET, FILM COATED ORAL at 18:01

## 2023-01-01 RX ADMIN — VANCOMYCIN HYDROCHLORIDE 1750 MG: 10 INJECTION, POWDER, LYOPHILIZED, FOR SOLUTION INTRAVENOUS at 17:24

## 2023-01-01 RX ADMIN — PRAVASTATIN SODIUM 20 MG: 20 TABLET ORAL at 15:41

## 2023-01-01 RX ADMIN — IPRATROPIUM BROMIDE AND ALBUTEROL SULFATE 3 ML: 2.5; .5 SOLUTION RESPIRATORY (INHALATION) at 23:45

## 2023-01-01 RX ADMIN — GUAIFENESIN AND DEXTROMETHORPHAN HYDROBROMIDE 1 TABLET: 600; 30 TABLET, EXTENDED RELEASE ORAL at 20:22

## 2023-01-01 RX ADMIN — ACETAMINOPHEN 650 MG: 325 TABLET ORAL at 23:27

## 2023-01-01 RX ADMIN — AMLODIPINE BESYLATE 5 MG: 5 TABLET ORAL at 10:36

## 2023-01-01 RX ADMIN — PRAVASTATIN SODIUM 20 MG: 20 TABLET ORAL at 08:34

## 2023-01-01 RX ADMIN — IPRATROPIUM BROMIDE AND ALBUTEROL SULFATE 3 ML: 2.5; .5 SOLUTION RESPIRATORY (INHALATION) at 20:26

## 2023-01-01 RX ADMIN — ACETYLCYSTEINE 2 ML: 200 SOLUTION ORAL; RESPIRATORY (INHALATION) at 07:10

## 2023-01-01 RX ADMIN — ACETYLCYSTEINE 2 ML: 200 SOLUTION ORAL; RESPIRATORY (INHALATION) at 11:25

## 2023-01-01 RX ADMIN — POTASSIUM CHLORIDE 40 MEQ: 1500 TABLET, EXTENDED RELEASE ORAL at 09:29

## 2023-01-01 RX ADMIN — GUAIFENESIN AND DEXTROMETHORPHAN HYDROBROMIDE 1 TABLET: 600; 30 TABLET, EXTENDED RELEASE ORAL at 09:12

## 2023-01-01 RX ADMIN — ASPIRIN 81 MG CHEWABLE TABLET 81 MG: 81 TABLET CHEWABLE at 08:25

## 2023-01-01 RX ADMIN — AMLODIPINE BESYLATE 5 MG: 5 TABLET ORAL at 08:58

## 2023-01-01 RX ADMIN — TAZOBACTAM SODIUM AND PIPERACILLIN SODIUM 3.38 G: 375; 3 INJECTION, SOLUTION INTRAVENOUS at 06:03

## 2023-01-01 RX ADMIN — PRAVASTATIN SODIUM 20 MG: 20 TABLET ORAL at 09:12

## 2023-01-01 RX ADMIN — IPRATROPIUM BROMIDE AND ALBUTEROL SULFATE 3 ML: 2.5; .5 SOLUTION RESPIRATORY (INHALATION) at 20:10

## 2023-01-01 RX ADMIN — UMECLIDINIUM 1 PUFF: 62.5 AEROSOL, POWDER ORAL at 07:50

## 2023-01-01 RX ADMIN — TAZOBACTAM SODIUM AND PIPERACILLIN SODIUM 3.38 G: 375; 3 INJECTION, SOLUTION INTRAVENOUS at 00:06

## 2023-01-01 RX ADMIN — TAZOBACTAM SODIUM AND PIPERACILLIN SODIUM 4.5 G: 500; 4 INJECTION, SOLUTION INTRAVENOUS at 17:32

## 2023-01-01 RX ADMIN — ACETAMINOPHEN 650 MG: 325 TABLET ORAL at 08:58

## 2023-01-01 RX ADMIN — Medication 1 MG: at 21:53

## 2023-01-01 RX ADMIN — Medication 25 MG: at 09:20

## 2023-01-01 RX ADMIN — POTASSIUM CHLORIDE 40 MEQ: 1500 TABLET, EXTENDED RELEASE ORAL at 09:35

## 2023-01-01 RX ADMIN — ACETYLCYSTEINE 2 ML: 200 SOLUTION ORAL; RESPIRATORY (INHALATION) at 11:30

## 2023-01-01 RX ADMIN — ACETAMINOPHEN 650 MG: 325 TABLET ORAL at 21:46

## 2023-01-01 RX ADMIN — SODIUM CHLORIDE 1000 ML: 9 INJECTION, SOLUTION INTRAVENOUS at 17:04

## 2023-01-01 RX ADMIN — Medication 25 MG: at 08:58

## 2023-01-01 RX ADMIN — LEVETIRACETAM 500 MG: 500 TABLET, FILM COATED ORAL at 20:08

## 2023-01-01 RX ADMIN — ACETAMINOPHEN 650 MG: 650 SUPPOSITORY RECTAL at 07:12

## 2023-01-01 RX ADMIN — LEVETIRACETAM 500 MG: 500 TABLET, FILM COATED ORAL at 09:12

## 2023-01-01 RX ADMIN — TAZOBACTAM SODIUM AND PIPERACILLIN SODIUM 3.38 G: 375; 3 INJECTION, SOLUTION INTRAVENOUS at 17:30

## 2023-01-01 RX ADMIN — PRAVASTATIN SODIUM 20 MG: 20 TABLET ORAL at 08:58

## 2023-01-01 RX ADMIN — Medication 25 MG: at 14:53

## 2023-01-01 RX ADMIN — TAZOBACTAM SODIUM AND PIPERACILLIN SODIUM 3.38 G: 375; 3 INJECTION, SOLUTION INTRAVENOUS at 14:18

## 2023-01-01 RX ADMIN — RIVAROXABAN 20 MG: 20 TABLET, FILM COATED ORAL at 17:44

## 2023-01-01 RX ADMIN — ACETYLCYSTEINE 2 ML: 200 SOLUTION ORAL; RESPIRATORY (INHALATION) at 03:17

## 2023-01-01 RX ADMIN — ACETAMINOPHEN 650 MG: 325 TABLET ORAL at 17:19

## 2023-01-01 RX ADMIN — ACETYLCYSTEINE 2 ML: 200 SOLUTION ORAL; RESPIRATORY (INHALATION) at 20:26

## 2023-01-01 RX ADMIN — AMLODIPINE BESYLATE 5 MG: 5 TABLET ORAL at 09:12

## 2023-01-01 RX ADMIN — ACETYLCYSTEINE 2 ML: 200 SOLUTION ORAL; RESPIRATORY (INHALATION) at 04:40

## 2023-01-01 RX ADMIN — TERAZOSIN HYDROCHLORIDE 2 MG: 1 CAPSULE ORAL at 21:59

## 2023-01-01 RX ADMIN — FLUCONAZOLE 150 MG: 150 TABLET ORAL at 08:21

## 2023-01-01 RX ADMIN — ACETYLCYSTEINE 2 ML: 200 SOLUTION ORAL; RESPIRATORY (INHALATION) at 11:33

## 2023-01-01 RX ADMIN — GUAIFENESIN AND DEXTROMETHORPHAN HYDROBROMIDE 1 TABLET: 600; 30 TABLET, EXTENDED RELEASE ORAL at 19:52

## 2023-01-01 RX ADMIN — LEVETIRACETAM 500 MG: 500 TABLET, FILM COATED ORAL at 20:20

## 2023-01-01 RX ADMIN — LORAZEPAM 2 MG: 2 INJECTION INTRAMUSCULAR; INTRAVENOUS at 16:53

## 2023-01-01 RX ADMIN — IPRATROPIUM BROMIDE AND ALBUTEROL SULFATE 3 ML: 2.5; .5 SOLUTION RESPIRATORY (INHALATION) at 11:29

## 2023-01-01 RX ADMIN — LEVETIRACETAM 500 MG: 500 TABLET, FILM COATED ORAL at 19:52

## 2023-01-01 RX ADMIN — ASPIRIN 81 MG CHEWABLE TABLET 81 MG: 81 TABLET CHEWABLE at 08:34

## 2023-01-01 RX ADMIN — MULTIPLE VITAMINS W/ MINERALS TAB 1 TABLET: TAB at 08:25

## 2023-01-01 RX ADMIN — FLUTICASONE FUROATE AND VILANTEROL TRIFENATATE 1 PUFF: 200; 25 POWDER RESPIRATORY (INHALATION) at 07:50

## 2023-01-01 RX ADMIN — IPRATROPIUM BROMIDE AND ALBUTEROL SULFATE 3 ML: 2.5; .5 SOLUTION RESPIRATORY (INHALATION) at 23:30

## 2023-01-01 RX ADMIN — GUAIFENESIN AND DEXTROMETHORPHAN HYDROBROMIDE 1 TABLET: 600; 30 TABLET, EXTENDED RELEASE ORAL at 08:25

## 2023-01-01 RX ADMIN — IPRATROPIUM BROMIDE AND ALBUTEROL SULFATE 3 ML: 2.5; .5 SOLUTION RESPIRATORY (INHALATION) at 07:47

## 2023-01-01 RX ADMIN — TAZOBACTAM SODIUM AND PIPERACILLIN SODIUM 3.38 G: 375; 3 INJECTION, SOLUTION INTRAVENOUS at 01:06

## 2023-01-01 RX ADMIN — PRAVASTATIN SODIUM 20 MG: 20 TABLET ORAL at 10:35

## 2023-01-01 RX ADMIN — GUAIFENESIN AND DEXTROMETHORPHAN HYDROBROMIDE 1 TABLET: 600; 30 TABLET, EXTENDED RELEASE ORAL at 10:35

## 2023-01-01 RX ADMIN — IPRATROPIUM BROMIDE AND ALBUTEROL SULFATE 3 ML: 2.5; .5 SOLUTION RESPIRATORY (INHALATION) at 20:42

## 2023-01-01 RX ADMIN — AMLODIPINE BESYLATE 5 MG: 5 TABLET ORAL at 08:25

## 2023-01-01 RX ADMIN — GUAIFENESIN AND DEXTROMETHORPHAN HYDROBROMIDE 1 TABLET: 600; 30 TABLET, EXTENDED RELEASE ORAL at 20:20

## 2023-01-01 RX ADMIN — SODIUM CHLORIDE 73 ML: 9 INJECTION, SOLUTION INTRAVENOUS at 19:34

## 2023-01-01 RX ADMIN — TAZOBACTAM SODIUM AND PIPERACILLIN SODIUM 3.38 G: 375; 3 INJECTION, SOLUTION INTRAVENOUS at 12:12

## 2023-01-01 RX ADMIN — ACETYLCYSTEINE 2 ML: 200 SOLUTION ORAL; RESPIRATORY (INHALATION) at 16:32

## 2023-01-01 RX ADMIN — IPRATROPIUM BROMIDE AND ALBUTEROL SULFATE 3 ML: 2.5; .5 SOLUTION RESPIRATORY (INHALATION) at 08:04

## 2023-01-01 RX ADMIN — IPRATROPIUM BROMIDE AND ALBUTEROL SULFATE 3 ML: 2.5; .5 SOLUTION RESPIRATORY (INHALATION) at 11:52

## 2023-01-01 RX ADMIN — TAZOBACTAM SODIUM AND PIPERACILLIN SODIUM 3.38 G: 375; 3 INJECTION, SOLUTION INTRAVENOUS at 00:05

## 2023-01-01 RX ADMIN — TAZOBACTAM SODIUM AND PIPERACILLIN SODIUM 3.38 G: 375; 3 INJECTION, SOLUTION INTRAVENOUS at 18:11

## 2023-01-01 RX ADMIN — TERAZOSIN HYDROCHLORIDE 2 MG: 1 CAPSULE ORAL at 20:10

## 2023-01-01 RX ADMIN — LEVETIRACETAM 500 MG: 500 TABLET, FILM COATED ORAL at 20:21

## 2023-01-01 RX ADMIN — ASPIRIN 81 MG CHEWABLE TABLET 81 MG: 81 TABLET CHEWABLE at 10:35

## 2023-01-01 RX ADMIN — FLUCONAZOLE IN SODIUM CHLORIDE 400 MG: 2 INJECTION, SOLUTION INTRAVENOUS at 12:43

## 2023-01-01 RX ADMIN — ACETYLCYSTEINE 2 ML: 200 SOLUTION ORAL; RESPIRATORY (INHALATION) at 15:24

## 2023-01-01 RX ADMIN — IPRATROPIUM BROMIDE AND ALBUTEROL SULFATE 3 ML: 2.5; .5 SOLUTION RESPIRATORY (INHALATION) at 03:17

## 2023-01-01 RX ADMIN — ACETAMINOPHEN 650 MG: 325 TABLET ORAL at 17:15

## 2023-01-01 RX ADMIN — POTASSIUM CHLORIDE 10 MEQ: 7.46 INJECTION, SOLUTION INTRAVENOUS at 12:29

## 2023-01-01 RX ADMIN — POTASSIUM CHLORIDE AND SODIUM CHLORIDE: 450; 150 INJECTION, SOLUTION INTRAVENOUS at 21:50

## 2023-01-01 RX ADMIN — ACETAMINOPHEN 650 MG: 325 TABLET ORAL at 08:24

## 2023-01-01 RX ADMIN — IPRATROPIUM BROMIDE AND ALBUTEROL SULFATE 3 ML: 2.5; .5 SOLUTION RESPIRATORY (INHALATION) at 11:33

## 2023-01-01 RX ADMIN — GUAIFENESIN AND DEXTROMETHORPHAN HYDROBROMIDE 1 TABLET: 600; 30 TABLET, EXTENDED RELEASE ORAL at 08:35

## 2023-01-01 RX ADMIN — IPRATROPIUM BROMIDE AND ALBUTEROL SULFATE 3 ML: 2.5; .5 SOLUTION RESPIRATORY (INHALATION) at 16:00

## 2023-01-01 RX ADMIN — FLUTICASONE FUROATE AND VILANTEROL TRIFENATATE 1 PUFF: 200; 25 POWDER RESPIRATORY (INHALATION) at 08:05

## 2023-01-01 RX ADMIN — ACETAMINOPHEN 650 MG: 325 TABLET ORAL at 14:36

## 2023-01-01 RX ADMIN — IPRATROPIUM BROMIDE AND ALBUTEROL SULFATE 3 ML: 2.5; .5 SOLUTION RESPIRATORY (INHALATION) at 07:10

## 2023-01-01 RX ADMIN — ASPIRIN 81 MG CHEWABLE TABLET 81 MG: 81 TABLET CHEWABLE at 09:12

## 2023-01-01 RX ADMIN — ASPIRIN 81 MG CHEWABLE TABLET 81 MG: 81 TABLET CHEWABLE at 08:21

## 2023-01-01 ASSESSMENT — ACTIVITIES OF DAILY LIVING (ADL)
ADLS_ACUITY_SCORE: 59
ADLS_ACUITY_SCORE: 55
ADLS_ACUITY_SCORE: 49
ADLS_ACUITY_SCORE: 49
ADLS_ACUITY_SCORE: 51
ADLS_ACUITY_SCORE: 51
ADLS_ACUITY_SCORE: 53
ADLS_ACUITY_SCORE: 49
ADLS_ACUITY_SCORE: 49
ADLS_ACUITY_SCORE: 55
ADLS_ACUITY_SCORE: 61
ADLS_ACUITY_SCORE: 57
ADLS_ACUITY_SCORE: 53
ADLS_ACUITY_SCORE: 55
ADLS_ACUITY_SCORE: 61
ADLS_ACUITY_SCORE: 55
ADLS_ACUITY_SCORE: 35
ADLS_ACUITY_SCORE: 61
ADLS_ACUITY_SCORE: 61
ADLS_ACUITY_SCORE: 57
ADLS_ACUITY_SCORE: 51
ADLS_ACUITY_SCORE: 51
ADLS_ACUITY_SCORE: 61
ADLS_ACUITY_SCORE: 61
ADLS_ACUITY_SCORE: 53
ADLS_ACUITY_SCORE: 55
ADLS_ACUITY_SCORE: 61
ADLS_ACUITY_SCORE: 61
ADLS_ACUITY_SCORE: 49
ADLS_ACUITY_SCORE: 35
ADLS_ACUITY_SCORE: 57
ADLS_ACUITY_SCORE: 61
ADLS_ACUITY_SCORE: 55
ADLS_ACUITY_SCORE: 61
ADLS_ACUITY_SCORE: 51
ADLS_ACUITY_SCORE: 47
ADLS_ACUITY_SCORE: 57
ADLS_ACUITY_SCORE: 35
ADLS_ACUITY_SCORE: 51
ADLS_ACUITY_SCORE: 53
ADLS_ACUITY_SCORE: 35
ADLS_ACUITY_SCORE: 35
ADLS_ACUITY_SCORE: 61
ADLS_ACUITY_SCORE: 55
ADLS_ACUITY_SCORE: 61
ADLS_ACUITY_SCORE: 51
ADLS_ACUITY_SCORE: 51
ADLS_ACUITY_SCORE: 49
ADLS_ACUITY_SCORE: 61
ADLS_ACUITY_SCORE: 35
ADLS_ACUITY_SCORE: 49
ADLS_ACUITY_SCORE: 55
ADLS_ACUITY_SCORE: 49
ADLS_ACUITY_SCORE: 55
ADLS_ACUITY_SCORE: 51
ADLS_ACUITY_SCORE: 53
ADLS_ACUITY_SCORE: 61
ADLS_ACUITY_SCORE: 61
ADLS_ACUITY_SCORE: 49
ADLS_ACUITY_SCORE: 61
ADLS_ACUITY_SCORE: 61
ADLS_ACUITY_SCORE: 57
ADLS_ACUITY_SCORE: 55
ADLS_ACUITY_SCORE: 55
ADLS_ACUITY_SCORE: 47
ADLS_ACUITY_SCORE: 51
ADLS_ACUITY_SCORE: 51
ADLS_ACUITY_SCORE: 53
ADLS_ACUITY_SCORE: 51
ADLS_ACUITY_SCORE: 55
ADLS_ACUITY_SCORE: 51
ADLS_ACUITY_SCORE: 51
ADLS_ACUITY_SCORE: 57
ADLS_ACUITY_SCORE: 61
ADLS_ACUITY_SCORE: 49
ADLS_ACUITY_SCORE: 47
ADLS_ACUITY_SCORE: 61
ADLS_ACUITY_SCORE: 61
ADLS_ACUITY_SCORE: 35
ADLS_ACUITY_SCORE: 61
ADLS_ACUITY_SCORE: 61
ADLS_ACUITY_SCORE: 59
ADLS_ACUITY_SCORE: 55
ADLS_ACUITY_SCORE: 61
ADLS_ACUITY_SCORE: 57
ADLS_ACUITY_SCORE: 35
ADLS_ACUITY_SCORE: 53
ADLS_ACUITY_SCORE: 59
ADLS_ACUITY_SCORE: 53
ADLS_ACUITY_SCORE: 53
ADLS_ACUITY_SCORE: 35
ADLS_ACUITY_SCORE: 53
ADLS_ACUITY_SCORE: 53
DEPENDENT_IADLS:: CLEANING;COOKING;LAUNDRY;SHOPPING;MEAL PREPARATION;MEDICATION MANAGEMENT;MONEY MANAGEMENT;INCONTINENCE
ADLS_ACUITY_SCORE: 61

## 2023-01-01 NOTE — TELEPHONE ENCOUNTER
[facility] Certification & Plan of Care dates: 9/4/21-11/2/21 received via fax, form to your in box for signature      FREE:[LAST:[Amelia],FIRST:[Denis],PHONE:[phone]],FAX:[phone]],ADDRESS:[[address] [place] [address]],FOLLOWUP:[1-3 days]]

## 2023-02-05 PROBLEM — R56.9 NEW ONSET SEIZURE (H): Status: ACTIVE | Noted: 2023-01-01

## 2023-02-05 PROBLEM — G93.40 ENCEPHALOPATHY: Status: ACTIVE | Noted: 2023-01-01

## 2023-02-05 PROBLEM — T68.XXXA HYPOTHERMIA, INITIAL ENCOUNTER: Status: ACTIVE | Noted: 2023-01-01

## 2023-02-05 PROBLEM — L89.90 PRESSURE INJURY OF SKIN, UNSPECIFIED INJURY STAGE, UNSPECIFIED LOCATION: Status: ACTIVE | Noted: 2023-01-01

## 2023-02-05 PROBLEM — R41.82 AMS (ALTERED MENTAL STATUS): Status: ACTIVE | Noted: 2023-01-01

## 2023-02-05 NOTE — ED TRIAGE NOTES
Pt arrives vis EMS for concern of an episode of unresponsiveness. EMS reports via the pt's son that he heard the patient have irregular breathing followed by a period of unresponsiveness. Son called EMS and they report finding the patient responsive to physical stimuli, but increasingly responsive. Pt arrives soiled with significant skin break down on the left side. Responsive to light physical stimuli and able to follow some commands, but pt speak is incomprehensible. ABC intact.    Following initial triage patient log rolled and cleaned of soiled bedding. During this process the patient became unresponsive with rigid muscle tone.     Triage Assessment       Row Name 02/05/23 1640       Respiratory WDL    Respiratory WDL WDL       Skin Circulation/Temperature WDL    Skin Circulation/Temperature WDL X  bed sores per EMS       Cardiac WDL    Cardiac WDL WDL       Peripheral/Neurovascular WDL    Peripheral Neurovascular WDL WDL       Cognitive/Neuro/Behavioral WDL    Cognitive/Neuro/Behavioral WDL WDL

## 2023-02-05 NOTE — ED PROVIDER NOTES
"  History     Chief Complaint:  Altered Mental Status     HPI   Marino Prajapati is a 87 year old female on Xarelto, with a history of COPD, GERD, DJD, and hypertension who presents with altered mental status. Patient was able to follow simple instructions, and was able to state her name. Due to patient's disorientation, she was not able to provide any history.    Independent Historian:      EMS - They report the patient is bed ridden and lives at home with son. Son reports to EMS finding patient to have irregular breathing for 45 minuets, then unresponsive for 30 minuets. When patient was questioned by EMS, patient spoke \"gibberish\" and was unable to create full sentences.  Enroute, patient was hypertensive around 160-170, blood sugar of 88, and had a very strong \"UTI\" odor. They note the patient to have sores over her hips and arms, no catheter, and left arm pressurization.     Daughter - They report receiving the news from the patient's  and not son. She reports the patient has general weakness in her left arm, that this is not new. She states that her mother has been sleeping and favoring her left side for over 1 1/2 years. She also notes that last few weeks, patient has been more confused and disoriented. She states there to be no known medical history of strokes. She confirmed the patient has a DNR and DNI.     Review of External Notes: I reviewed gerontology note from January 11 in which there was no reported pressure ulceration to the left chest/arm    ROS:  Review of Systems   Unable to perform ROS: Patient unresponsive     Allergies:  Atorvastatin  Lisinopril    Medications:    Proair  ASA  Lasix  Duoneb  Prilosec  Pravachol  Xarelto  Hytrin  Spiriva  Ultram    Past Medical History:    Anemia  Asthma  LDL  Colon polyps  GERD  Hypertension  Hyperlipidemia  Kidney stones  Osteoarthritis  DJD  Osteoporosis  Thrombosis of leg  Encephalopathy  Hypothermia  DVT  COPD  Pulmonary Embolism "   Pneumonia  Onychomycosis  NSTEMI   Congenital deficiency of clotting factors   Coronary atherosclerosis  Diverticulosis   Lymphedema  Hypercholesterolemia    Past Surgical History:    Bilat Knees and Hips arthroplasty  Compression fracture of T8 vertebra  Pathologic fracture of vertebrae  Cholecystectomy  Laser holmium lithotripsy ureter(s), insert stent, combined   Liposuction   Strip vein  Face /oral prost palatal lift     Family History:    Father: Blood clots  Mother: Breast cancer    Social History:  Patient arrived alone via EMS to the ED.   PCP: Vivian Blue     Physical Exam     Patient Vitals for the past 24 hrs:   BP Temp Temp src Pulse Resp SpO2   02/05/23 1930 -- (!) 95.5  F (35.3  C) -- -- -- 99 %   02/05/23 1915 123/59 (!) 96.1  F (35.6  C) -- 77 18 97 %   02/05/23 1901 -- (!) 95.9  F (35.5  C) -- 80 18 98 %   02/05/23 1900 129/63 -- -- 80 -- --   02/05/23 1854 -- (!) 95.9  F (35.5  C) -- 81 19 98 %   02/05/23 1845 -- (!) 95.7  F (35.4  C) -- 81 (!) 8 99 %   02/05/23 1839 110/62 (!) 95.7  F (35.4  C) -- 82 (!) 0 99 %   02/05/23 1830 107/60 (!) 95.5  F (35.3  C) -- 83 (!) 3 99 %   02/05/23 1821 -- (!) 95.5  F (35.3  C) -- 83 10 99 %   02/05/23 1815 113/54 (!) 95.5  F (35.3  C) -- 83 (!) 2 99 %   02/05/23 1810 -- (!) 95.5  F (35.3  C) -- 87 15 99 %   02/05/23 1806 121/52 (!) 95.5  F (35.3  C) -- 80 (!) 0 99 %   02/05/23 1753 -- (!) 95.5  F (35.3  C) -- 81 (!) 3 100 %   02/05/23 1745 (!) 147/70 (!) 95.4  F (35.2  C) -- 85 17 99 %   02/05/23 1740 -- (!) 94.8  F (34.9  C) -- 87 (!) 0 100 %   02/05/23 1735 -- (!) 94.8  F (34.9  C) -- 86 (!) 8 98 %   02/05/23 1730 135/64 -- -- 91 11 98 %   02/05/23 1709 -- -- -- -- 17 97 %   02/05/23 1708 (!) 182/93 -- -- 118 14 97 %   02/05/23 1707 -- -- -- -- 20 98 %   02/05/23 1706 (!) 169/101 -- -- (!) 124 16 97 %   02/05/23 1705 -- -- -- 117 10 98 %   02/05/23 1704 (!) 187/101 -- -- 115 16 98 %   02/05/23 1702 (!) 173/116 -- -- 117 20 98 %   02/05/23  1700 -- -- -- -- 10 --   02/05/23 1659 -- (!) 95  F (35  C) Rectal -- -- --   02/05/23 1653 (!) 212/112 -- -- 112 -- 100 %        Physical Exam  Skin:               General:   Ill, debilitated  female  HEENT:    Oropharynx is moist,     Dried blood at the lips  Eyes:    Conjunctiva normal  Neck:    Supple, no meningismus.     CV:     Tachycardic, regular hythm.      No murmurs, rubs or gallops.       No unilateral leg swelling.       2+ radial pulses bilateral.    PULM:    Markedly diminished breath sounds on left     No respiratory distress.      No rales or wheezing.     No stridor.  ABD:    Soft, non-tender, non-distended.       No pulsatile masses.       No rebound, guarding or rigidity.  MSK:     No gross deformity to all four extremities.   LYMPH:   No cervical lymphadenopathy.  NEURO:   Somnolent     Oriented to self alone     Only able to speak 1-2 words at a time     Increased muscular tone to left upper extremity     No tremor  Skin:    Cool         Emergency Department Course   ECG  ECG taken at 1745, ECG read at 1755  Normal sinus rhythm   Low voltage QRS  Borderline ECG   No significant changes as compared to prior, dated 08/14/21.  Rate 84 bpm. OR interval 160 ms. QRS duration 76 ms. QT/QTc 364/430 ms. P-R-T axes 91 48 11.     Imaging:  XR Chest Port 1 View   Final Result   IMPRESSION: Cardiomegaly. Opacities have developed throughout the left lung and may represent a combination of pleural fluid, pneumonia and/or atelectasis. This would be better assessed with CT. Linear scarring or atelectasis right lower lung field.    Surgical clips right upper quadrant. Scoliosis and degenerative changes of the spine and both shoulders.      Head CT w/o contrast   Final Result   IMPRESSION:   1.  No convincing findings of acute transcortical infarct. Underlying microvascular ischemic changes can limit this assessment by CT. If sufficient clinical concern warrants further imaging and there is no  contraindication, consider MRI.   2.  No acute intracranial hemorrhage or mass effect.    3.  Aerated secretions in the sphenoid sinuses can be seen with acute sinusitis in the appropriate cortical context.   4.  Large new left mastoid and middle ear effusions may be inflammatory. Correlate clinically.            CT Chest w Contrast    (Results Pending)      Report per radiology    Laboratory:  Labs Ordered and Resulted from Time of ED Arrival to Time of ED Departure   COMPREHENSIVE METABOLIC PANEL - Abnormal       Result Value    Sodium 142      Potassium 3.5      Chloride 101      Carbon Dioxide (CO2) 30 (*)     Anion Gap 11      Urea Nitrogen 14.9      Creatinine 0.36 (*)     Calcium 9.3      Glucose 109 (*)     Alkaline Phosphatase 103      AST 29      ALT 19      Protein Total 6.6      Albumin 3.7      Bilirubin Total 0.6      GFR Estimate >90     LACTIC ACID WHOLE BLOOD - Abnormal    Lactic Acid 3.4 (*)    TSH WITH FREE T4 REFLEX - Abnormal    TSH 19.77 (*)    BLOOD GAS VENOUS - Abnormal    pH Venous 7.32      pCO2 Venous 62 (*)     pO2 Venous 59 (*)     Bicarbonate Venous 32 (*)     Base Excess/Deficit (+/-) 4.0 (*)     FIO2 6     ROUTINE UA WITH MICROSCOPIC REFLEX TO CULTURE - Abnormal    Color Urine Yellow      Appearance Urine Slightly Cloudy (*)     Glucose Urine Negative      Bilirubin Urine Negative      Ketones Urine Trace (*)     Specific Gravity Urine 1.020      Blood Urine Trace (*)     pH Urine 7.0      Protein Albumin Urine 10 (*)     Urobilinogen Urine 2.0      Nitrite Urine Negative      Leukocyte Esterase Urine Negative      Mucus Urine Present (*)     Amorphous Crystals Urine Few (*)     RBC Urine 4 (*)     WBC Urine 3      Squamous Epithelials Urine 1     GLUCOSE BY METER - Abnormal    GLUCOSE BY METER POCT 118 (*)    ISTAT GASES LACTATE VENOUS POCT - Abnormal    Lactic Acid POCT 3.2 (*)     Bicarbonate Venous POCT 34 (*)     O2 Sat, Venous POCT 92 (*)     pCO2V Venous POCT 62 (*)     pH  Venous POCT 7.34      pO2 Venous POCT 68 (*)    ISTAT BASIC CHEM ICA HEMATOCRIT POCT - Abnormal    Chloride POCT 101      Potassium POCT 3.7      Sodium POCT 142      UREA NITROGEN POCT 17      Calcium, Ionized Whole Blood POCT 4.8      Glucose Whole Blood POCT 108 (*)     Anion Gap POCT 13.0      Hemoglobin POCT 13.6      Hematocrit POCT 40      Creatinine POCT 0.4 (*)     TOTAL CO2 POCT 32     TROPONIN T, HIGH SENSITIVITY - Normal    Troponin T, High Sensitivity 14     AMMONIA - Normal    Ammonia 35     ETHYL ALCOHOL LEVEL - Normal    Alcohol ethyl <0.01     T4 FREE - Normal    Free T4 1.29     LACTIC ACID WHOLE BLOOD - Normal    Lactic Acid 0.7     CBC WITH PLATELETS AND DIFFERENTIAL    WBC Count 10.5      RBC Count 4.27      Hemoglobin 13.4      Hematocrit 40.7      MCV 95      MCH 31.4      MCHC 32.9      RDW 14.7      Platelet Count 213      % Neutrophils 55      % Lymphocytes 34      % Monocytes 9      % Eosinophils 1      % Basophils 0      % Immature Granulocytes 1      NRBCs per 100 WBC 0      Absolute Neutrophils 5.8      Absolute Lymphocytes 3.6      Absolute Monocytes 1.0      Absolute Eosinophils 0.2      Absolute Basophils 0.0      Absolute Immature Granulocytes 0.1      Absolute NRBCs 0.0     LACTIC ACID WHOLE BLOOD   URINE CULTURE   BLOOD CULTURE   BLOOD CULTURE        Emergency Department Course & Assessments:     Interventions:  Medications   LORazepam (ATIVAN) injection 2 mg (has no administration in time range)   0.9% sodium chloride BOLUS (0 mLs Intravenous Stopped 2/5/23 1854)   levETIRAcetam (KEPPRA) intermittent infusion 2,000 mg (0 mg Intravenous Stopped 2/5/23 1715)   LORazepam (ATIVAN) injection 2 mg (2 mg Intravenous Given 2/5/23 1653)   piperacillin-tazobactam (ZOSYN) intermittent infusion 4.5 g (0 g Intravenous Stopped 2/5/23 1802)   vancomycin (VANCOCIN) 1,750 mg in 0.9% NaCl 500 mL intermittent infusion (1,750 mg Intravenous Given 2/5/23 1724)   sodium chloride for CT scan flush use  (73 mLs Intravenous Given 23)   iopamidol (ISOVUE-370) solution 500 mL (70 mLs Intravenous Given 23)        Independent Interpretation (X-rays, CTs, rhythm strip):  I independently reviewed head CT which there is no large intracranial mass or hemorrhage  I reviewed chest x-ray in which there is opacification of the left lung    Consultations/Discussion of Management or Tests:    ED Course as of 232   Sun 2023   164 I performed a chaperoned rectal exam.    165 Nurse reported patient was seizing, ED team in the room.    171 I, Dr. Rinaldi, spoke with the patient's daughter over the phone regarding the patient.     I rechecked the patient and spoke with the daughter in person.    I spoke with Dr. Ornelas, hospitalist, who agreed to admit the patient.       Disposition:  The patient was admitted to the hospital under the care of Dr. Ornelas.     Impression & Plan        Medical Decision Makin-year-old female presented to the ED with altered mental status.  She was not hypoglycemic or electrolyte disturbance to account for symptoms.  It was unclear upon presentation what the source of her altered mental status was when she had a witnessed tonic-clonic seizure lasting approximately 2 to 3 minutes that was witnessed by myself and staff.  Seizure terminated with lorazepam and followed by Keppra.  I believe patient had a seizure at home as initial evaluation was consistent with postictal state and further supported by dried blood around the mouth.  She had no recurrent seizures in the ED.  There were no laboratory findings to account for new onset seizure and head CT was unremarkable.    Patient remained quite obtunded after her  seizure in part due to postictal state and likely secondary to lorazepam.  I am concerned she cannot maintain her airway and we were preparing for intubation.  Prior to intubation, we evaluated the EMR in which she has a DNR/DNI documented.   I emergently called the daughter who confirms DNR/DNI status.  Daughter wants continued medical management but again remain DNR/DNI.    During her evaluation early on, she was noted to be tachycardic and hypothermic.  There was concern for occult infection. She was given broad-spectrum antibiotics and IV fluids.  No overt infection noted.  Chest x-ray revealed opacification of the left lung.  CT scan was performed to further define anatomy.  CT scan results were obtained after patient had transferred to the floor. She has severe mucous plugging leading to atelectasis and left airway collapse.  This is likely secondary to aspiration from seizure and hypoventilation.    Diagnosis:    ICD-10-CM    1. New onset seizure (H)  R56.9       2. Encephalopathy  G93.40       3. Pressure injury of skin, unspecified injury stage, unspecified location  L89.90       4. Hypothermia, initial encounter  T68.XXXA            Scribe Disclosure:  I, JULIUS REDMOND, am serving as a scribe at 7:42 PM on 2/5/2023 to document services personally performed by Albert Rinaldi MD based on my observations and the provider's statements to me.     2/5/2023   Albert Rinaldi MD Matthews, Jeremiah R, MD  02/05/23 9779

## 2023-02-06 NOTE — PROGRESS NOTES
"PT: Orders received. Chart reviewed and discussed with care team.  PT not indicated due to pt's PLOF; per Palliative note: \"Marino was last hospitalized in 8/2021 with PE and ankle fracture.  While she was at TCU she developed new back pain and was found to have compression fractures of lumbar and thoracic spine.  At that time family decided to take her home and care for her.  Despite home care and home therapy, she has remained bed bound since.  She is now managed through [facility] care home services\".   Pt appears to have no acute PT goals to address while inpatient.     Defer discharge recommendations to medical team.  Will complete orders.    "

## 2023-02-06 NOTE — PROGRESS NOTES
CLINICAL SWALLOW EVALUATION       02/06/23 1870   Appointment Info   Signing Clinician's Name / Credentials (SLP) Jewels Longoria M.A., CCC-SLP, [facility]   General Information   Onset of Illness/Injury or Date of Surgery 02/05/23   Referring Physician Dr. Martin   Patient/Family Therapy Goal Statement (SLP) Patient unable to state goal.   Pertinent History of Current Problem Patient admitted with AMS, encephalopathy with hypothermia presentation and possible seizure activity.  Her PMH is significant for COPD, PE, GERD, and  HTN.  She does not have a known dysphagia hx per EMR.   General Observations Patient lethargic, but with intermittent command following and response to stimuli per RN.   Type of Evaluation   Type of Evaluation Swallow Evaluation   Oral Motor   Oral Musculature unable to assess due to poor participation/comprehension   Dentition (Oral Motor)   Dentition (Oral Motor) adequate dentition   Facial Symmetry (Oral Motor)   Facial Symmetry (Oral Motor) WNL  (At rest)   Tongue Function (Oral Motor)   Tongue Strength (Oral Motor) strength decreased   Comment, Tongue Function (Oral Motor) Tongue falling to side in side position, generalized weakness present   Vocal Quality/Secretion Management (Oral Motor)   Secretion Management (Oral Motor) WNL   General Swallowing Observations   Past History of Dysphagia None known.   Respiratory Support (General Swallowing Observations) oxygen mask   Current Diet/Method of Nutritional Intake (General Swallowing Observations, NIS) clear liquid diet  (PO held per RN team due to AMS)   Swallowing Evaluation Clinical swallow evaluation   Clinical Swallow Evaluation   Feeding Assistance dependent   Clinical Swallow Evaluation Textures Trialed thin liquids   Clinical Swallow Eval: Thin Liquid Texture Trial   Mode of Presentation, Thin Liquids spoon;fed by clinician   Volume of Liquid or Food Presented Moist swabs only   Oral Residue left lip drooling;left anterior lateral sulci    Pharyngeal Phase of Swallow impaired;no awareness of problems;other (see comments)  (No complete swallow response elicited)   Diagnostic Statement Inability to demonstrate swallow response even after attempt initiated by patient   Swallowing Recommendations   Diet Consistency Recommendations NPO   Medication Administration Recommendations, Swallowing (SLP) NPO, unless alertness changes and patient demonstrating consistent swallow response   Clinical Impression   Criteria for Skilled Therapeutic Interventions Met (SLP Eval) Yes, treatment indicated   SLP Diagnosis Severe oropharyngeal dysphagia due to AMS and generalized weakness at this time   Risks & Benefits of therapy have been explained evaluation/treatment results reviewed;caregiver   SLP Total Evaluation Time   Eval: oral/pharyngeal swallow function, clinical swallow Minutes (26748) 15                  Interventions   Interventions Quick Adds Swallowing Dysfunction   SLP Discharge Planning   SLP Plan PO readiness   SLP Discharge Recommendation Transitional Care Facility   SLP Rationale for DC Rec Pending progress, recommend SLP services at next level of care.   SLP Brief overview of current status  Limited assessment due to lethargy despite max stim with repositioning, face wash, oral cares, thermal stimulation to oral cavity. Clinical swallow eval indicating severe dysphagia likely related to AMS and weakness, not ready for any PO at this time.  Recommend NPO with oral cares/oral moisture only, necessary oral meds only to be trialed if patient is alert and demonstrating a consistent swallow response to oral moisture or ice chips, etc.   Total Session Time   Total Session Time (sum of timed and untimed services) 15

## 2023-02-06 NOTE — PROGRESS NOTES
[facility]    Hospitalist Progress Note      Assessment & Plan   Marino Prajapati is a 87 year old woman who was admitted on 2/5/2023. PMH significant for COPD, prior PE on maintenance DOAC, GERD, DJD, hypertension, anemia, osteoarthritis, prior DVT, and GERD. Patient lives at home with her . She is confined to bed and under the care of [facility] services including home physician visits. Patient presented to the ER on 2/5/23 due to increased disorientation with lethargy and ultimately being unresponsive at home. Blood pressure was elevated, glucose 88, and patient with multiple sores noted to hips, arms, under breast. T 95 in the ER. Lactic acidosis was noted and subsequently improved. Patient did have a witnessed seizure in the ER. Patient admitted to hospitalist service for further evaluation and treatment.      Encephalopathy, minimally responsive  Metabolic encephalopathy with accompanying hypothermia  High suspicion for underlying sepsis with lactic acidosis, tachycardia, hypothermia with unclear source, possible soft tissue infection  Witnessed seizure in the emergency room  - Admitted as inpatient. Intubation was considered, however DNR/DNI was confirmed with family. Goals are restorative with hopes for return to home with family.   - Continue supportive care.  - Initially treated with vancomycin and Zosyn. Zosyn was continued. Will follow blood cultures.   - Patient also with evidence of likely candidiasis rash to skin with areas of ulceration. Had been being treated with weekly diflucan as outpatient. As unable to take PO and with concern for sepsis, will treat with fluconazole 800 mg IV x 1 followed by 400 mg daily for now.   - CT of the head showed no obvious hemorrhage or major infarct  - Seizure precautions. Continue Keppra IV. Awaiting formal neurology consultation with recommendations. Suspect may need some combination of EEG, lumbar puncture, MRI brain.    - Keep NPO given encephalopathy and SLP evaluation.  - No evidence of UTI.  - Will check CBC, procal, ESR, CRP.  - Continue IV fluids with 1/2 NS + KCl 20 mEq/L at 75 ml/h.     Candidiasis  - Patient also with evidence of likely candidiasis rash to skin with areas of ulceration. Had been being treated with weekly diflucan as outpatient. As unable to take PO and with concern for sepsis, will treat with fluconazole 800 mg IV x 1 followed by 400 mg daily for now.   - Wound care following.     Mucus-plugging and left-sided atelectasis  - Requesting RT consult with chest PT, suctioning if needed, and mucomyst nebs as able.     Abnormal thyroid lab  Elevated TSH at 19, normal free T4  -Unlikely patient has underlying myxedema coma with normal free T4  - Started on increased dose of PTA levothyroxine at admission.     History of VTE, PE  - On DOAC at home. Can determine timeline of previous VTE. While NPO, will give Lovenox 40 mg subQ daily rather than Xarelto.     Physical deconditioning  - Has been confined to bed at home.   - Once medically improved, can discuss further with SW regarding home services, but family hoping patient will return home.     Multiple wounds, poor skin care  -Reportedly on a bedbound state at home  -Wound care evaluation  -Dietary service evaluation      DVT Prophylaxis: DOAC  Code Status: No CPR- Do NOT Intubate. Goals are currently restorative.   Expected discharge: Anticipate hospitalization at least 2-4 more days pending clinical improvement. Family's goals are for patient to return home with  if able, noting that there may be a new baseline.     Clarisse Lane MD FACP  Hospitalist Service  [facility]      Interval History   Patient continues to be encephalopathic. Minimally responsive. Seen by wound care and palliative care. Family not at bedside at time of my rounding throughout day including morning and late afternoon. Will plan to update further tomorrow as they  recently spoke to palliative care.    -Data reviewed today: I reviewed all new labs and imaging results over the last 24 hours.     Physical Exam   Temp: 99.6  F (37.6  C) Temp src: Temporal BP: (!) 161/67 Pulse: 100   Resp: 22 SpO2: 98 % O2 Device: Oxymask Oxygen Delivery: 7 LPM  Vitals:    02/06/23 0222   Weight: 74.4 kg (164 lb 1.6 oz)     Vital Signs with Ranges  Temp:  [94.8  F (34.9  C)-99.6  F (37.6  C)] 99.6  F (37.6  C)  Pulse:  [] 100  Resp:  [0-22] 22  BP: (107-212)/() 161/67  Cuff Mean (mmHg):  [109] 109  SpO2:  [96 %-100 %] 98 %  I/O last 3 completed shifts:  In: 753 [I.V.:753]  Out: 550 [Urine:550]    Constitutional: Older woman seen resting in bed. Not responding to voice or gentle exam. Unable to assess orientation. Does not appear toxic. No diaphoresis. No acute distress.   HEENT: NCAT. Pupils 2 cm bilaterally, reactive to light. Not tracking. No eye gaze deviation noted. Moist oral mucosa.  Respiratory: Minimal chest rise on left with lung sounds diminished. Clear on the right. No crackles appreciated. No increased work of breathing though minimal effort noted.   Cardiovascular: Regular rate and rhythm. No murmur.  GI: Soft, nontender, nondistended. Normoactive bowel sounds.   Genitourinary: Clear, yellow urine output noted in Kinney catheter.  Musculoskeletal: No gross deformities.   Neurologic: Patient not responding to external stimulus. No acute focal neurologic deficit.     Medications     0.45% sodium chloride + KCl 20 mEq/L 75 mL/hr at 02/06/23 1413     - MEDICATION INSTRUCTIONS -         bisacodyl  10 mg Rectal Once     dextromethorphan-guaiFENesin  1 tablet Oral BID     fluconazole  800 mg Intravenous Once     [START ON 2/7/2023] fluconazole  400 mg Intravenous Q24H     ipratropium - albuterol 0.5 mg/2.5 mg/3 mL  3 mL Nebulization 4x daily     levETIRAcetam  500 mg Intravenous Q12H     piperacillin-tazobactam  3.375 g Intravenous Q6H     rivaroxaban ANTICOAGULANT  20 mg Oral  Daily with supper     sodium chloride (PF)  3 mL Intracatheter Q8H     umeclidinium  1 puff Inhalation Daily       Data   Recent Labs   Lab 02/06/23  1333 02/06/23  0636 02/05/23  1701   WBC  --  9.8 10.5   HGB  --  11.9 13.4  13.6   MCV  --  95 95   PLT  --  198 213   NA  --  143 142  142   POTASSIUM 3.7 3.3* 3.7  3.5   CHLORIDE  --  105 101  101   CO2  --  26 30*   BUN  --  12.2 17  14.9   CR  --  0.42* 0.36*  0.4*   ANIONGAP  --  12 11   SANDRA  --  8.5* 9.3   GLC  --  93 108*  109*   ALBUMIN  --  3.3* 3.7   PROTTOTAL  --  5.8* 6.6   BILITOTAL  --  0.7 0.6   ALKPHOS  --  89 103   ALT  --  16 19   AST  --  27 29       Recent Results (from the past 24 hour(s))   Head CT w/o contrast    Narrative    EXAM: CT HEAD W/O CONTRAST  LOCATION: [facility]  DATE/TIME: 2/5/2023 5:32 PM    INDICATION: Seizure.  COMPARISON: CT head 08/14/2021.  TECHNIQUE: Routine CT Head without IV contrast. Multiplanar reformats. Dose reduction techniques were used.    FINDINGS: Difficult patient positioning.  INTRACRANIAL CONTENTS: Gray-white matter differentiation is maintained. No hemorrhage or extraaxial collection. No mass effect. Subarachnoid cisterns are patent. Patchy and confluent white matter hypodensities are, while nonspecific, most compatible with   chronic microvascular ischemic changes. Unchanged proportional prominence of the ventricles and sulci is compatible with diffuse cerebral volume loss.    VISUALIZED ORBITS/SINUSES/MASTOIDS: No visible intraorbital abnormality. Partially visualized mucosal thickening and aerated secretions in the sphenoid sinuses. Large left mastoid and middle ear effusion. No middle ear or mastoid effusion.    BONES/SOFT TISSUES: No acute abnormality.      Impression    IMPRESSION:  1.  No convincing findings of acute transcortical infarct. Underlying microvascular ischemic changes can limit this assessment by CT. If sufficient clinical concern warrants further imaging and  there is no contraindication, consider MRI.  2.  No acute intracranial hemorrhage or mass effect.   3.  Aerated secretions in the sphenoid sinuses can be seen with acute sinusitis in the appropriate cortical context.  4.  Large new left mastoid and middle ear effusions may be inflammatory. Correlate clinically.       XR Chest Port 1 View    Narrative    EXAM: XR CHEST PORT 1 VIEW  LOCATION: [facility]  DATE/TIME: 2/5/2023 6:56 PM    INDICATION: AMS  COMPARISON: 08/14/2021 and CT chest 08/14/2021      Impression    IMPRESSION: Cardiomegaly. Opacities have developed throughout the left lung and may represent a combination of pleural fluid, pneumonia and/or atelectasis. This would be better assessed with CT. Linear scarring or atelectasis right lower lung field.   Surgical clips right upper quadrant. Scoliosis and degenerative changes of the spine and both shoulders.   CT Chest w Contrast    Narrative    EXAM: CT CHEST W CONTRAST  LOCATION: [facility]  DATE/TIME: 2/5/2023 7:43 PM    INDICATION: AMS, hypoxia  COMPARISON: 03/06/2020.  TECHNIQUE: CT chest with IV contrast. Multiplanar reformats were obtained. Dose reduction techniques were used.    CONTRAST: 70mL Isovue 370    FINDINGS:   LUNGS AND PLEURA: New severe mucous plugging within the left mainstem, upper, and lower lobe bronchi with complete atelectasis of the left lung. There is mild subsegmental atelectasis in the right lower lobe and right middle lobe. No pneumothorax. No   significant pleural effusion.    MEDIASTINUM/AXILLAE: Shifted to the left. Great vessels normal in caliber. No pericardial effusion. Cardiac chambers unremarkable. No abnormally enlarged lymph nodes.    CORONARY ARTERY CALCIFICATION: Severe.    UPPER ABDOMEN: Prior cholecystectomy. Small right renal cysts, no follow up needed.    MUSCULOSKELETAL: Severe upper thoracic kyphosis. Severe vertebral compression deformity at the approximate T8 and  L1, stable. Severe degenerative change in the shoulders bilaterally.      Impression    IMPRESSION:   1.  Severe mucous plugging within the left mainstem and upper and lower lobe bronchi with complete atelectasis of the left lung.

## 2023-02-06 NOTE — PLAN OF CARE
Goal Outcome Evaluation:           Overall Patient Progress: no changeOverall Patient Progress: no change     Pt lethargic, pulse 114, /92. O2 sat 97% on 7 lpm with oxymask. Multiple wounds, redness on skin, skin barrier applied. Pt turned and repositioned. PIV x2 on bilateral wrists. NS running @ 100 mL/hr, hill patent, output clear and yellow, Fecal incontinence x1, seizure precautions, total care. Will continue to monitor.

## 2023-02-06 NOTE — PROGRESS NOTES
Pt in need of second IV due to multiple IV meds due at the same time.  Requested vascular access, as tried x1 with bleeding easily under skin.  Mag replace   K replace  Diflucan  .45/ 20K IVF  Zosyn

## 2023-02-06 NOTE — CONSULTS
Care Management Follow Up    Length of Stay (days): 1    Expected Discharge Date: 02/09/2023     Concerns to be Addressed:       Patient plan of care discussed at interdisciplinary rounds: Yes    Anticipated Discharge Disposition:       Anticipated Discharge Services:    Anticipated Discharge DME:      Patient/family educated on Medicare website which has current facility and service quality ratings:    Education Provided on the Discharge Plan:    Patient/Family in Agreement with the Plan:      Referrals Placed by CM/SW:    Private pay costs discussed: Not applicable    Additional Information:    SW consult acknowledged. Noted that palliative/PT/OT are consulted, will await recommendations for discharge planning. SW following.     CYRUS Ojeda, JAIMIE  Inpatient Care Coordination  Ortho/Spine Unit  [phone]  Kinga Ogden, JAIMIE

## 2023-02-06 NOTE — PLAN OF CARE
OT: Orders received. Chart reviewed and discussed with care team.  Per chart review, despite home care and home therapy, she has remained bed bound since August of 2021 requiring total assist from family for ADLS/IADLS. Defer discharge recommendations to medical team.  Will complete orders.

## 2023-02-06 NOTE — CONSULTS
[facility] Nurse Inpatient Assessment     Consulted for: ARM however H&P states she has multiple wounds on hips and arms     Patient History (according to provider note(s):      Marino Prajapati is a 87 year old female with a longstanding history of COPD, prior PE on maintenance DOAC, GERD, DJD, hypertension, anemia, osteoarthritis, prior DVT, GERD and reportedly lives at home with her  and has a presents in the emergency room today due to increasing mental status changes being disoriented, lethargic and eventually becoming more unresponsive at home.    Upon initial presentation with emergency personnel they report that patient appears to be bedbound state and showing irregular breathing for 45 minutes and then became unresponsive.  She was not following simple verbal instructions but initial evaluation showed blood pressure at hypertensive side, glucose level of 88 and notable multiple sores on hips, arms.     Areas Assessed:      Pressure Injury Location: left hip    Last photo: 2/6  Wound type: Pressure Injury, Friction and Incontinence Associated Dermatitis (IAD)     Pressure Injury Stage: 2, present on admission   Wound history/plan of care:   Found on admission H&P states she has been bed bound x 2 years, unresponsive   Left leg is contracted at the knee and rle is straight and we have not been able to safely bend.  Wound base: 100 % epidermis     Palpation of the wound bed: normal      Drainage: none     Description of drainage: none     Measurements (length x width x depth, in cm) 1.5  x 0.4  x  0.1 cm      Tunneling N/A     Undermining N/A  Periwound skin: Erythema- blanchable and Rash      Color: pink      Temperature: warm  Odor: none  Pain: unable to assess due to  unresponsiveness , none  Pain intervention prior to dressing change: slow and gentle cares   Treatment goal: Infection control/prevention, Decrease moisture and Protection  STATUS: initial  assessment  Supplies ordered: ordered vashe solution , supplies stored on unit and discussed with RN    My PI Risk Assessment     Sensory Perception: 1 - Completely Limited     Moisture: 1 - Constantly moist     Activity: 1 - Bedfast      Mobility: 1 - Completely immobile      Nutrition: 1 - Very poor     Friction/Shear: 1 - Problem     TOTAL: 6    Wound Location:  Left neck/left arm and under breast, right medial breast fold left upper back   Date of last photo none taken   Wound History: bed bound x 2 years, contractures making taking care of patient difficult   Wound Base: 100 % epidermis     Palpation of the wound bed: normal      Drainage: moderate to heavy on arrival      Description of drainage: creamy also bypassing her catheter      Measurements (length x width x depth, in cm)  Affected area very large and covers 50% of left side of torso       Tunneling N/A     Undermining N/A  Periwound skin: erythema- blanchable, irritant dermatitis, rash and superficial erosion      Color: pink and red      Temperature: warm  Odor: mild and urine and fungal   Pain: unable to assess due to  confusion, none  Pain intervention prior to dressing change: gentle cares and talking to her before we are doing turns     Bilateral heels are soft and squishy and sluggishly blanchable  Pictures 2/6     Right heel                                                         Left heel     Treatment Plan:     ble heels: prevalon boots while in bed #[id number]   Pillow between knees  Side to side turns q2hrs   Air mattress - already ordered and delivered  No brief in bed      Left hip wound(s): Every 3 days   Cleanse wound with wound cleanser and pat dry   Apply 4x4 mepliex w/ date    Left neck, bilateral breast folds, left medial arm and torso, perineal skin, abdominal folds qshift    Apply Vashe #[id number] moistened kerlix to affected areas as listed above x 15 minutes   Apply perineal cleansing lotion to perineal skin using joaquin dry wipes      Perform head to toe hygiene daily with bath wipes - do not forget feet and between toes     Orders: Written    RECOMMEND PRIMARY TEAM ORDER: None, at this time  Education provided: importance of repositioning, plan of care, Moisture management, Hygiene and Off-loading pressure  Discussed plan of care with: Nurse  WOC nurse follow-up plan: weekly  Notify WOC if wound(s) deteriorate.  Nursing to notify the Provider(s) and re-consult the WOC Nurse if new skin concern.    DATA:     Current support surface: Standard  Standard gel/foam mattress (IsoFlex, Atmos air, etc)  Containment of urine/stool: Indwelling catheter and however she appears to be bypassing it per RN, also appears to be impacted with stool   BMI: Body mass index is 26.49 kg/m .   Active diet order: Orders Placed This Encounter      Advance Diet as Tolerated: Clear Liquid Diet; Regular Diet Adult     Output: I/O last 3 completed shifts:  In: 753 [I.V.:753]  Out: 550 [Urine:550]     Labs: Recent Labs   Lab 02/06/23  0636   ALBUMIN 3.3*   HGB 11.9   WBC 9.8     Pressure injury risk assessment:   Sensory Perception: 3-->slightly limited  Moisture: 2-->very moist  Activity: 1-->bedfast  Mobility: 2-->very limited  Nutrition: 2-->probably inadequate  Friction and Shear: 2-->potential problem  Dusty Score: 12    Kirsten Pickett, RN BS CWOCN

## 2023-02-06 NOTE — ED NOTES
[facility]  ED Nurse Handoff Report    Marino Prajapati is a 87 year old female   ED Chief complaint: Altered Mental Status  . ED Diagnosis:   Final diagnoses:   New onset seizure (H)   Encephalopathy   Pressure injury of skin, unspecified injury stage, unspecified location   Hypothermia, initial encounter     Allergies:   Allergies   Allergen Reactions     Atorvastatin Muscle Pain (Myalgia)     PN: LW Reaction: myalgias  PN: LW Reaction: myalgias       Lisinopril Cough     PN: LW Reaction: cough  PN: LW Reaction: cough       Other Environmental Allergy      PN: LW FI1: none       Code Status: DNR / DNI  Activity level - Baseline/Home:  Total Care. Activity Level - Current:   Total Care. Lift room needed: No. Bariatric: No   Needed: No   Isolation: No. Infection: Not Applicable.     Vital Signs:   Vitals:    02/05/23 1900 02/05/23 1901 02/05/23 1915 02/05/23 1930   BP: 129/63  123/59    Pulse: 80 80 77    Resp:  18 18    Temp:  (!) 95.9  F (35.5  C) (!) 96.1  F (35.6  C) (!) 95.5  F (35.3  C)   TempSrc:       SpO2:  98% 97% 99%       Cardiac Rhythm:  ,      Pain level:    Patient confused: Yes. Patient Falls Risk: Yes.   Elimination Status: Urethral catheter (hill) in place; refer to orders to discontinue as per protocol    Patient Report - Initial Complaint: Unresponsive. Focused Assessment:  Pt arrives vis EMS for concern of an episode of unresponsiveness. EMS reports via the pt's son that he heard the patient have irregular breathing followed by a period of unresponsiveness. Son called EMS and they report finding the patient responsive to physical stimuli, but increasingly responsive. Pt arrives soiled with significant skin break down on the left side. Responsive to light physical stimuli and able to follow some commands, but pt speak is incomprehensible. ABC intact.     Following initial triage patient log rolled and cleaned of soiled bedding. During this process the patient became  unresponsive with rigid muscle tone.  Tests Performed: Labs, Cultures, CXR, CT, Urine. Abnormal Results: Abnormal Labs.   Treatments provided: IV meds, abx, IVF, Weston hugger.   Family Comments: Pt's daughter went home  OBS brochure/video discussed/provided to patient:  N/A  ED Medications:   Medications   LORazepam (ATIVAN) injection 2 mg (has no administration in time range)   0.9% sodium chloride BOLUS (0 mLs Intravenous Stopped 2/5/23 1854)   levETIRAcetam (KEPPRA) intermittent infusion 2,000 mg (0 mg Intravenous Stopped 2/5/23 1715)   LORazepam (ATIVAN) injection 2 mg (2 mg Intravenous Given 2/5/23 1653)   piperacillin-tazobactam (ZOSYN) intermittent infusion 4.5 g (0 g Intravenous Stopped 2/5/23 1802)   vancomycin (VANCOCIN) 1,750 mg in 0.9% NaCl 500 mL intermittent infusion (1,750 mg Intravenous Given 2/5/23 1724)   sodium chloride for CT scan flush use (73 mLs Intravenous Given 2/5/23 1934)   iopamidol (ISOVUE-370) solution 500 mL (70 mLs Intravenous Given 2/5/23 1934)     Drips infusing:  No  For the majority of the shift, the patient's behavior Yellow. Interventions performed: Pt unresponsive. Pt has multiple skin breakdown, multiple stages on left side. Pt will need to see wound care and .    Sepsis treatment initiated: Yes    Per the ED Provider, Time Zero for severe sepsis or septic shock is:  1637    3 Hour Severe Sepsis Bundle Completion:  1. Initial Lactic Acid Result:   Recent Labs   Lab Test 02/05/23  1838 02/05/23  1701 02/05/23  1659   LACT 0.7 3.4* 3.2*     2. Blood Cultures before Antibiotics: Yes  3. Broad Spectrum Antibiotics Administered:     Anti-infectives (From now, onward)      None          4. 1000 ml of IV fluids have been given so far      6 Hour Severe Sepsis Bundle Completion:    1. Repeat Lactic Acid Level: Last result   Lab Results   Component Value Date    LACT 0.7 02/05/2023     2. Patient currently on Vasopressors =  No     Patient tested for COVID 19 prior to  admission: NO    ED Nurse Name/Phone Number: Rayne Sinha RN,   7:12 PM    RECEIVING UNIT ED HANDOFF REVIEW    Above ED Nurse Handoff Report was reviewed: Yes  Reviewed by: Nitza Torres RN on February 5, 2023 at 7:56 PM

## 2023-02-06 NOTE — CONSULTS
"[facility]    Palliative Care Consultation   Text Page    Date of Admission:  2/5/2023    Assessment & Plan   Marino Prajapati is a 87 year old female who was admitted on 2/5/2023. I was asked to see the patient for goals of care.    Recommendations:  1.Decisional Capacity -  Unreliable. Patient has an advance directive dated 1-9-2020.  Consents and decision making should be done by  Josue \"Ghassan\"Chevy, the primary Health Care Agent.  Alternate is Dominick Reece.   **Updated version of directive noted on file at [facility] and requested.  Attempt to obtain and validate and new documents if possible before turning to above.     2. Pain- Chronic in back and generalized s/p compression fractures of lumbar and Thoracic Spine in 2021.  Tramadol 1 tab 2-3x/day PTA.  -Tylenol Supp PRN   -I do not think patient will be in withdrawal from stopping tramadol, agree with avoiding opioid pain medications for now due to mental status change work-up.  I do think however, she should be able to tolerate low dose IV dilaudid if pain is uncontrolled with above.    3. Spiritual Care- Oriented to Spiritual Health and Social Work Services as part of Palliative Care team.        Goal of Care:Restorative. Family is hopeful for diagnosis to explain current mental status change and return home near baseline (physically disabled and bedbound, S.O. supporting all ADL's, but reported no cognitive deficit).   -Requested retrieval of updated HCD and POLST from [facility].  -S.O. plans on coming in tomorrow.  -Will continue to follow in support of HCA as decision maker as diagnosis/prognosis more clear.     Disease Process/es & Symptoms:  Marino Prajapati is a 87 year old patient  who presents on 2/5 with increasing mental status changes, disoriented, lethargic and eventually becoming more unresponsive at home.  She is being treated for possible underlying sepsis of unclear source as she is found with lactic acidosis, " "tachycardia, hypothermia.  She also is reported to have had an unwitnessed seizure in emergency room.    This is in the setting of longstanding history of COPD, prior PE on maintenance DOAC, GERD, DJD, hypertension, anemia, osteoarthritis, prior DVT, GERD and debility.  She has been bed bound requiting assist with all ADL's for almost 2 years. She has had significant weight loss documented since that time, 30lbs (15%).  Per family report there is no cognitive deficit at baseline. Recent gerontology notes indicate \"Psychiatric Exam: Alert and oriented x2, appropriate affect. Behavior: normal Speech: normal, answer questions appropriately.\"        Findings & plan of care discussed with: Dr. Gonzalez  Thank you for involving us in the patient's care.     Annabel Mondragon APRN, CNS, CNP  [facility], Palliative Care  Securely message with the Vocera Web Console (learn more here)  Or please use Palliative Team Pager         Time Spent on this Encounter   85 MINUTES SPENT BY ME on the date of service doing chart review, history, exam, documentation & further activities per the note.   time consisted of the following, examination of the patient, reviewing the record and completing documentation. Time spend counseling with family consisted of the following topics, goals of care, medical decision making regarding current acute mental status change. and symptom management.  Time spent in coordination of care with Bedside Nurse Anyi NGUYỄN  and Hospitalist Clarisse Gonzalez MD.     Reason for Consult   Reason for consult: I was asked by Dr. Ornelas to evaluate this patient for Goals of care.    Primary Care Physician   Vivian Blue    Chief Complaint   Unable, pain?    History is obtained from the electronic health record, patient's daughter and patient's significant other    History of Present Illness   Marino Prajapati is a 87 year old female who presents on 2/5 with increasing mental status changes, disoriented, " "lethargic and eventually becoming more unresponsive at home.     Family reports at baseline she is alert and communicative, she enjoys [employer] and [employer]. Changes to mental status happened gradually over the past week, \"she came in an out of reality, was saying nonsensical things.\"  Then prior to coming in she is described as having a new odd rapid breathing pattern with \"lip flapping.\"    Recent gerontology notes indicate  \"Psychiatric Exam: Alert and oriented x2, appropriate affect. Behavior: normal Speech: normal, answer questions appropriately.\"     She is being treated for possible underlying sepsis of unclear source as she is found with lactic acidosis, tachycardia, hypothermia.  She also is reported to have had an unwitnessed seizure in emergency room.    Marino was last hospitalized in 8/2021 with PE and ankle fracture.  While she was at TCU she developed new back pain and was found to have compression fractures of lumbar and thoracic spine.  At that time family decided to take her home and care for her.  Despite home care and home therapy, she has remained bed bound since.  She is now managed through [facility] home services.     She has had significant weight loss documented since that time, 30lbs (15%)  She is also seen by [facility] nurse with multiple wounds on hips and arms.     She is evaluated by speech therapy and found with severe dysphagia likely related to AMS and weakness, not ready for any PO at this time.    The following symptoms are noted by patient as concerning to her quality of life.  Pain - Per family is chronic in back , but they also note patient has a very poor pain tolerance, both Ghassan and Miryam noting even a mosquito bite is painful for her.  She is on Tramadol 2-3x/day chronically.  Diarrhea - about once a week, S.O. Manages with lomotil.     Decision-Making & Goals of Care:  Discussed on February 6, 2023 with Annabel Mondragon, CINTHIA CNP APRN, CNP:   Spoke with both " "patient's dtr, Miryam in room and S.ODaquan Ferrell via phone.  They both understand Jeans current goals to be restorative, hopeful to recover and return home. We discussed her vulnerable condition at baseline but at best hopeful for a new baseline if this is reversible.  Miryam knows Marino has limits of DNR/DNI and would not want \"heroic measures if it was her time.\"  Currently hoping for more information about her condition.      Patient has decision-making capacity Unreliable  Patient has a Health Care Agent named in a legal Advance Directive document dated 1/20/20. See name(s) and contact information in Code/ACP/Advance Care Planning Activity Tab. Noted more recent copy and POLST on file at [facility] \"Healthcare Directive 12/20/22 [facility] J[facility] LUIS SQUARE, 12/20/2022   POLST 12/12/22 DNR/Selective Treatment Signed 12/12/2022 \"    Code Status: Do not resusitate / Do not intubate     Past Medical History   I have reviewed this patient's medical history and updated it with pertinent information if needed.   Past Medical History:   Diagnosis Date     Anemia      CARDIOVASCULAR SCREENING; LDL GOAL LESS THAN 160      Colon polyps 2010    needs colonoscopy 2013     DVT (deep venous thrombosis) (H) 2007    remote history of DVT while she was on BCP ,coumadin stopped after retroperitoneal/buttock hematoma in 10/11 . On ASA only     Gastro-oesophageal reflux disease      HTN, goal below 140/90      Hyperlipidemia LDL goal <130 1/22/2016     Kidney stone      Osteoarthritis     knees and hip     Osteoporosis      Postnasal drip      S/P total knee arthroplasty      Thrombosis of leg    history of COPD, prior PE on maintenance DOAC, GERD, DJD, hypertension, anemia, osteoarthritis, prior DVT, GERD     Past Surgical History   I have reviewed this patient's surgical history and updated it with pertinent information if needed.  Past Surgical History:   Procedure Laterality Date     ARTHROPLASTY HIP  8/1/2013    Procedure: ARTHROPLASTY HIP;  RIGHT " TOTAL HIP ARTHROPLASTY;  Surgeon: Rufino Tong MD;  Location: [facility] OR     ARTHROPLASTY HIP Left 12/12/2014    Procedure: ARTHROPLASTY HIP;  Surgeon: Rufino Tong MD;  Location: [facility] OR     ARTHROPLASTY KNEE  10/22/2012    Procedure: ARTHROPLASTY KNEE;  Right Total knee Arthroplasty  ;  Surgeon: Jagdeep Virgen MD;  Location: [facility] OR     ARTHROPLASTY KNEE  5/23/2013    Procedure: ARTHROPLASTY KNEE;  LEFT TOTAL KNEE ARTHROPLASTY (SMITH & NEPHEW)^;  Surgeon: Rufino Tong MD;  Location: [facility] OR     CHOLECYSTECTOMY       CYSTOSCOPY, RETROGRADES, INSERT STENT URETER(S), COMBINED  7/16/2012    Procedure: COMBINED CYSTOSCOPY, RETROGRADES, INSERT STENT URETER(S);  Cystoscopy, Right retrogrades, Right Stent Placement;  Surgeon: Mauricio Velazquez MD;  Location: [facility] OR     LASER HOLMIUM LITHOTRIPSY URETER(S), INSERT STENT, COMBINED  8/8/2012    Procedure: COMBINED CYSTOSCOPY, URETEROSCOPY, LASER HOLMIUM LITHOTRIPSY URETER(S), INSERT STENT;  Cystoscopy, Stent Removal, Right Ureteroscopy with Holmium Laser, Stone removal ;  Surgeon: Mauricio Velazquez MD;  Location: [facility] OR     LIPOSUCTION (LOCATION)      tummy     STRIP VEIN       ZZC PREP FACE/ORAL PROST PALATAL LIFT         Prior to Admission Medications   Prior to Admission Medications   Prescriptions Last Dose Informant Patient Reported? Taking?   acetaminophen (TYLENOL) 500 MG tablet 2/5/2023 at 0200  Yes Yes   Sig: Take 1,000 mg by mouth 2 times daily Takes with tramadol.   albuterol (PROAIR HFA/PROVENTIL HFA/VENTOLIN HFA) 108 (90 Base) MCG/ACT inhaler Past Week  No Yes   Sig: Inhale 2 puffs into the lungs every 6 hours as needed for shortness of breath / dyspnea or wheezing   aspirin (ASA) 81 MG chewable tablet 2/5/2023 at 0200  No Yes   Sig: Take 1 tablet (81 mg) by mouth daily   budesonide-formoterol (SYMBICORT) 160-4.5 MCG/ACT Inhaler Unknown  No Yes   Sig: Inhale 2 puffs into the lungs 2 times daily   Patient taking differently: Inhale 2 puffs  into the lungs 2 times daily as needed   cholecalciferol (VITAMIN D) 1000 UNIT tablet 2/5/2023 at 0200  Yes Yes   Sig: Take 1,000 Units by mouth daily    furosemide (LASIX) 40 MG tablet   No No   Sig: TAKE 1 TABLET DAILY   ipratropium - albuterol 0.5 mg/2.5 mg/3 mL (DUONEB) 0.5-2.5 (3) MG/3ML neb solution Unknown  No Yes   Sig: INHALE CONTENTS OF 1 VIAL (3 MLS) VIA NEBULIZER EVERY 6 HOURS AS NEEDED FOR SHORTNESS OF BREATH, DYSPNEA, OR WHEEZING   nystatin (MYCOSTATIN) 698829 UNIT/GM external powder 2/4/2023 at PM  No Yes   Sig: Apply topically 2 times daily as needed (skin rash)   nystatin (MYCOSTATIN) 079919 UNIT/GM external powder   No No   Sig: Apply topically 2 times daily   nystatin (MYCOSTATIN) 903575 UNIT/GM external powder   No No   Sig: Apply topically 2 times daily   pravastatin (PRAVACHOL) 20 MG tablet 2/5/2023 at 0200  No Yes   Sig: TAKE 1 TABLET DAILY   rivaroxaban ANTICOAGULANT (XARELTO ANTICOAGULANT) 20 MG TABS tablet 2/5/2023 at 0200  No Yes   Sig: Take 1 tablet (20 mg) by mouth daily (with dinner)   terazosin (HYTRIN) 2 MG capsule 2/5/2023 at 0200  No Yes   Sig: TAKE 1 CAPSULE AT BEDTIME   tiotropium (SPIRIVA) 18 MCG inhaled capsule Unknown  No Yes   Sig: Inhale 1 capsule (18 mcg) into the lungs daily   traMADol (ULTRAM) 50 MG tablet 2/5/2023 at 0200  No Yes   Sig: TAKE 1 TABLET THREE TIMES A DAY AS NEEDED FOR SEVERE PAIN      Facility-Administered Medications: None     Allergies   Allergies   Allergen Reactions     Atorvastatin Muscle Pain (Myalgia)     PN: LW Reaction: myalgias  PN: LW Reaction: myalgias       Lisinopril Cough     PN: LW Reaction: cough  PN: LW Reaction: cough       Other Environmental Allergy      PN: LW FI1: none       Social History   I have updated and reviewed the following Social History Narrative:   Social History     Social History Narrative     Not on file      Living situation: Lives at home with S.O.  Family system: S.ALFONSO Ferrell is main caregiver.  Daughter Miryam and Son  "Dominick.  Functional status (needs help with ADLs or IADLs): Bedbound at baseline, needs assist with all ADL's  Use of community resources: [facility] complex care home service.  Activities/interests: Crosswords and People magazine  P[employer]alism affiliation: \"follows the old testament\"  Per HCD      Family History   I have reviewed this patient's family history and updated it with pertinent information if needed.   Family History   Problem Relation Age of Onset     Breast Cancer Mother      Circulatory Father         Blood clots       Review of Systems   Unable, patient does apopear to answer yes when asked about pain, in arm?    Physical Exam   Temp:  [94.8  F (34.9  C)-99.6  F (37.6  C)] 99.6  F (37.6  C)  Pulse:  [] 100  Resp:  [0-22] 22  BP: (107-212)/() 161/67  Cuff Mean (mmHg):  [109] 109  SpO2:  [96 %-100 %] 98 %  164 lbs 1.6 oz  GEN:  Somnolent, does answer some yes/no questions, mumbles. Keeps eyes closed. Appears chronically ill, uncomfortable when awakened.  HEENT:  Normocephalic/atraumatic, no scleral icterus, no nasal discharge, mouth dry.  CV:   +3 DP/PT pulses bilatererally; no edema BLE.  RESP:  Symmetric chest rise on inhalation noted.  Normal respiratory effort.  EXT:  Edema & pulses as noted above.  CMS intact x 4.     M/S:   Sarcopenia, all limbs with some contracture   SKIN:  See WOC notes        Data   Results for orders placed or performed during the hospital encounter of 02/05/23 (from the past 24 hour(s))   Glucose by meter   Result Value Ref Range    GLUCOSE BY METER POCT 118 (H) 70 - 99 mg/dL   iStat Gases (lactate) venous, POCT   Result Value Ref Range    Lactic Acid POCT 3.2 (H) <=2.0 mmol/L    Bicarbonate Venous POCT 34 (H) 21 - 28 mmol/L    O2 Sat, Venous POCT 92 (L) 94 - 100 %    pCO2V Venous POCT 62 (H) 40 - 50 mm Hg    pH Venous POCT 7.34 7.32 - 7.43    pO2 Venous POCT 68 (H) 25 - 47 mm Hg   CBC with platelets differential    Narrative    The following orders were created for " panel order CBC with platelets differential.  Procedure                               Abnormality         Status                     ---------                               -----------         ------                     CBC with platelets and d...[[id number]]                      Final result                 Please view results for these tests on the individual orders.   Comprehensive metabolic panel   Result Value Ref Range    Sodium 142 136 - 145 mmol/L    Potassium 3.5 3.4 - 5.3 mmol/L    Chloride 101 98 - 107 mmol/L    Carbon Dioxide (CO2) 30 (H) 22 - 29 mmol/L    Anion Gap 11 7 - 15 mmol/L    Urea Nitrogen 14.9 8.0 - 23.0 mg/dL    Creatinine 0.36 (L) 0.51 - 0.95 mg/dL    Calcium 9.3 8.8 - 10.2 mg/dL    Glucose 109 (H) 70 - 99 mg/dL    Alkaline Phosphatase 103 35 - 104 U/L    AST 29 10 - 35 U/L    ALT 19 10 - 35 U/L    Protein Total 6.6 6.4 - 8.3 g/dL    Albumin 3.7 3.5 - 5.2 g/dL    Bilirubin Total 0.6 <=1.2 mg/dL    GFR Estimate >90 >60 mL/min/1.73m2   Lactic acid whole blood   Result Value Ref Range    Lactic Acid 3.4 (H) 0.7 - 2.0 mmol/L   Troponin T, High Sensitivity   Result Value Ref Range    Troponin T, High Sensitivity 14 <=14 ng/L   Ammonia   Result Value Ref Range    Ammonia 35 11 - 51 umol/L   TSH with free T4 reflex   Result Value Ref Range    TSH 19.77 (H) 0.30 - 4.20 uIU/mL   Blood gas venous   Result Value Ref Range    pH Venous 7.32 7.32 - 7.43    pCO2 Venous 62 (H) 40 - 50 mm Hg    pO2 Venous 59 (H) 25 - 47 mm Hg    Bicarbonate Venous 32 (H) 21 - 28 mmol/L    Base Excess/Deficit (+/-) 4.0 (H) -7.7 - 1.9 mmol/L    FIO2 6    Ethyl Alcohol Level   Result Value Ref Range    Alcohol ethyl <0.01 <=0.01 g/dL   Blood Culture Peripheral Blood    Specimen: Peripheral Blood   Result Value Ref Range    Culture No growth after 12 hours    CBC with platelets and differential   Result Value Ref Range    WBC Count 10.5 4.0 - 11.0 10e3/uL    RBC Count 4.27 3.80 - 5.20 10e6/uL    Hemoglobin 13.4 11.7 - 15.7 g/dL     Hematocrit 40.7 35.0 - 47.0 %    MCV 95 78 - 100 fL    MCH 31.4 26.5 - 33.0 pg    MCHC 32.9 31.5 - 36.5 g/dL    RDW 14.7 10.0 - 15.0 %    Platelet Count 213 150 - 450 10e3/uL    % Neutrophils 55 %    % Lymphocytes 34 %    % Monocytes 9 %    % Eosinophils 1 %    % Basophils 0 %    % Immature Granulocytes 1 %    NRBCs per 100 WBC 0 <1 /100    Absolute Neutrophils 5.8 1.6 - 8.3 10e3/uL    Absolute Lymphocytes 3.6 0.8 - 5.3 10e3/uL    Absolute Monocytes 1.0 0.0 - 1.3 10e3/uL    Absolute Eosinophils 0.2 0.0 - 0.7 10e3/uL    Absolute Basophils 0.0 0.0 - 0.2 10e3/uL    Absolute Immature Granulocytes 0.1 <=0.4 10e3/uL    Absolute NRBCs 0.0 10e3/uL   iStat Basic Chem ICA Hematocrit, POCT   Result Value Ref Range    Chloride POCT 101 94 - 109 mmol/L    Potassium POCT 3.7 3.4 - 5.3 mmol/L    Sodium POCT 142 133 - 144 mmol/L    UREA NITROGEN POCT 17 7 - 30 mg/dL    Calcium, Ionized Whole Blood POCT 4.8 4.4 - 5.2 mg/dL    Glucose Whole Blood POCT 108 (H) 70 - 99 mg/dL    Anion Gap POCT 13.0 3.0 - 14.0 mmol/L    Hemoglobin POCT 13.6 11.7 - 15.7 g/dL    Hematocrit POCT 40 35 - 47 %    Creatinine POCT 0.4 (L) 0.5 - 1.0 mg/dL    TOTAL CO2 POCT 32 20 - 32 mmol/L   T4 free   Result Value Ref Range    Free T4 1.29 0.90 - 1.70 ng/dL   CK total   Result Value Ref Range    CK 81 26 - 192 U/L   Procalcitonin   Result Value Ref Range    Procalcitonin 0.08 (H) <0.05 ng/mL   [facility] Draw    Narrative    The following orders were created for panel order [facility] Draw.  Procedure                               Abnormality         Status                     ---------                               -----------         ------                     Extra Blue Top Tube[[id number]]                              Final result               Extra Red Top Tube[[id number]]                               Final result                 Please view results for these tests on the individual orders.   Extra Blue Top Tube   Result Value Ref Range    Hold Specimen [facility]     Extra Red Top Tube   Result Value Ref Range    Hold Specimen [facility]    Blood Culture Peripheral Blood    Specimen: Peripheral Blood   Result Value Ref Range    Culture No growth after 12 hours    Head CT w/o contrast    Narrative    EXAM: CT HEAD W/O CONTRAST  LOCATION: [facility]  DATE/TIME: 2/5/2023 5:32 PM    INDICATION: Seizure.  COMPARISON: CT head 08/14/2021.  TECHNIQUE: Routine CT Head without IV contrast. Multiplanar reformats. Dose reduction techniques were used.    FINDINGS: Difficult patient positioning.  INTRACRANIAL CONTENTS: Gray-white matter differentiation is maintained. No hemorrhage or extraaxial collection. No mass effect. Subarachnoid cisterns are patent. Patchy and confluent white matter hypodensities are, while nonspecific, most compatible with   chronic microvascular ischemic changes. Unchanged proportional prominence of the ventricles and sulci is compatible with diffuse cerebral volume loss.    VISUALIZED ORBITS/SINUSES/MASTOIDS: No visible intraorbital abnormality. Partially visualized mucosal thickening and aerated secretions in the sphenoid sinuses. Large left mastoid and middle ear effusion. No middle ear or mastoid effusion.    BONES/SOFT TISSUES: No acute abnormality.      Impression    IMPRESSION:  1.  No convincing findings of acute transcortical infarct. Underlying microvascular ischemic changes can limit this assessment by CT. If sufficient clinical concern warrants further imaging and there is no contraindication, consider MRI.  2.  No acute intracranial hemorrhage or mass effect.   3.  Aerated secretions in the sphenoid sinuses can be seen with acute sinusitis in the appropriate cortical context.  4.  Large new left mastoid and middle ear effusions may be inflammatory. Correlate clinically.       UA with Microscopic reflex to Culture    Specimen: Urine, Kinney Catheter   Result Value Ref Range    Color Urine Yellow Colorless, Straw, Light Yellow, Yellow     Appearance Urine Slightly Cloudy (A) Clear    Glucose Urine Negative Negative mg/dL    Bilirubin Urine Negative Negative    Ketones Urine Trace (A) Negative mg/dL    Specific Gravity Urine 1.020 1.003 - 1.035    Blood Urine Trace (A) Negative    pH Urine 7.0 5.0 - 7.0    Protein Albumin Urine 10 (A) Negative mg/dL    Urobilinogen Urine 2.0 Normal, 2.0 mg/dL    Nitrite Urine Negative Negative    Leukocyte Esterase Urine Negative Negative    Mucus Urine Present (A) None Seen /LPF    Amorphous Crystals Urine Few (A) None Seen /HPF    RBC Urine 4 (H) <=2 /HPF    WBC Urine 3 <=5 /HPF    Squamous Epithelials Urine 1 <=1 /HPF    Narrative    Urine Culture not indicated   Urine Culture    Specimen: Urine, Kinney Catheter   Result Value Ref Range    Culture No growth, less than 1 day    EKG 12-lead, tracing only   Result Value Ref Range    Systolic Blood Pressure  mmHg    Diastolic Blood Pressure  mmHg    Ventricular Rate 84 BPM    Atrial Rate 84 BPM    VA Interval 160 ms    QRS Duration 76 ms     ms    QTc 430 ms    P Axis 91 degrees    R AXIS 48 degrees    T Axis 11 degrees    Interpretation ECG       Sinus rhythm  Low voltage QRS  Borderline ECG  When compared with ECG of 14-AUG-2021 10:14,  Nonspecific T wave abnormality now evident in Anterolateral leads  QT has shortened     Lactic acid whole blood   Result Value Ref Range    Lactic Acid 0.7 0.7 - 2.0 mmol/L   XR Chest Port 1 View    Narrative    EXAM: XR CHEST PORT 1 VIEW  LOCATION: [facility]  DATE/TIME: 2/5/2023 6:56 PM    INDICATION: AMS  COMPARISON: 08/14/2021 and CT chest 08/14/2021      Impression    IMPRESSION: Cardiomegaly. Opacities have developed throughout the left lung and may represent a combination of pleural fluid, pneumonia and/or atelectasis. This would be better assessed with CT. Linear scarring or atelectasis right lower lung field.   Surgical clips right upper quadrant. Scoliosis and degenerative changes of the spine and  both shoulders.   CT Chest w Contrast    Narrative    EXAM: CT CHEST W CONTRAST  LOCATION: [facility]  DATE/TIME: 2/5/2023 7:43 PM    INDICATION: AMS, hypoxia  COMPARISON: 03/06/2020.  TECHNIQUE: CT chest with IV contrast. Multiplanar reformats were obtained. Dose reduction techniques were used.    CONTRAST: 70mL Isovue 370    FINDINGS:   LUNGS AND PLEURA: New severe mucous plugging within the left mainstem, upper, and lower lobe bronchi with complete atelectasis of the left lung. There is mild subsegmental atelectasis in the right lower lobe and right middle lobe. No pneumothorax. No   significant pleural effusion.    MEDIASTINUM/AXILLAE: Shifted to the left. Great vessels normal in caliber. No pericardial effusion. Cardiac chambers unremarkable. No abnormally enlarged lymph nodes.    CORONARY ARTERY CALCIFICATION: Severe.    UPPER ABDOMEN: Prior cholecystectomy. Small right renal cysts, no follow up needed.    MUSCULOSKELETAL: Severe upper thoracic kyphosis. Severe vertebral compression deformity at the approximate T8 and L1, stable. Severe degenerative change in the shoulders bilaterally.      Impression    IMPRESSION:   1.  Severe mucous plugging within the left mainstem and upper and lower lobe bronchi with complete atelectasis of the left lung.   Asymptomatic Influenza A/B & SARS-CoV2 (COVID-19) Virus PCR Multiplex Nose    Specimen: Nose; Swab   Result Value Ref Range    Influenza A PCR Negative Negative    Influenza B PCR Negative Negative    RSV PCR Negative Negative    SARS CoV2 PCR Negative Negative    Narrative    Testing was performed using the Xpert Xpress CoV2/Flu/RSV Assay on the [employer] GeneXpert Instrument. This test should be ordered for the detection of SARS-CoV-2 and influenza viruses in individuals who meet clinical and/or epidemiological criteria. Test performance is unknown in asymptomatic patients. This test is for in vitro diagnostic use under the FDA EUA for  laboratories certified under CLIA to perform high or moderate complexity testing. This test has not been FDA cleared or approved. A negative result does not rule out the presence of PCR inhibitors in the specimen or target RNA in concentration below the limit of detection for the assay. If only one viral target is positive but coinfection with multiple targets is suspected, the sample should be re-tested with another FDA cleared, approved, or authorized test, if coinfection would change clinical management. This test was validated by the [facility]. These laboratories are certified under the Clinical Laboratory Improvement Amendments of 1988 (CLIA-88) as qualified to perform high complexity laboratory testing.   Lactic acid whole blood   Result Value Ref Range    Lactic Acid 0.7 0.7 - 2.0 mmol/L   CBC with platelets differential    Narrative    The following orders were created for panel order CBC with platelets differential.  Procedure                               Abnormality         Status                     ---------                               -----------         ------                     CBC with platelets and d...[[id number]]  Abnormal            Final result                 Please view results for these tests on the individual orders.   Comprehensive metabolic panel   Result Value Ref Range    Sodium 143 136 - 145 mmol/L    Potassium 3.3 (L) 3.4 - 5.3 mmol/L    Chloride 105 98 - 107 mmol/L    Carbon Dioxide (CO2) 26 22 - 29 mmol/L    Anion Gap 12 7 - 15 mmol/L    Urea Nitrogen 12.2 8.0 - 23.0 mg/dL    Creatinine 0.42 (L) 0.51 - 0.95 mg/dL    Calcium 8.5 (L) 8.8 - 10.2 mg/dL    Glucose 93 70 - 99 mg/dL    Alkaline Phosphatase 89 35 - 104 U/L    AST 27 10 - 35 U/L    ALT 16 10 - 35 U/L    Protein Total 5.8 (L) 6.4 - 8.3 g/dL    Albumin 3.3 (L) 3.5 - 5.2 g/dL    Bilirubin Total 0.7 <=1.2 mg/dL    GFR Estimate >90 >60 mL/min/1.73m2   Magnesium   Result Value Ref Range    Magnesium 1.6 (L)  1.7 - 2.3 mg/dL   CBC with platelets and differential   Result Value Ref Range    WBC Count 9.8 4.0 - 11.0 10e3/uL    RBC Count 3.69 (L) 3.80 - 5.20 10e6/uL    Hemoglobin 11.9 11.7 - 15.7 g/dL    Hematocrit 35.2 35.0 - 47.0 %    MCV 95 78 - 100 fL    MCH 32.2 26.5 - 33.0 pg    MCHC 33.8 31.5 - 36.5 g/dL    RDW 15.1 (H) 10.0 - 15.0 %    Platelet Count 198 150 - 450 10e3/uL    % Neutrophils 72 %    % Lymphocytes 17 %    % Monocytes 10 %    % Eosinophils 0 %    % Basophils 0 %    % Immature Granulocytes 1 %    NRBCs per 100 WBC 0 <1 /100    Absolute Neutrophils 7.0 1.6 - 8.3 10e3/uL    Absolute Lymphocytes 1.7 0.8 - 5.3 10e3/uL    Absolute Monocytes 1.0 0.0 - 1.3 10e3/uL    Absolute Eosinophils 0.0 0.0 - 0.7 10e3/uL    Absolute Basophils 0.0 0.0 - 0.2 10e3/uL    Absolute Immature Granulocytes 0.1 <=0.4 10e3/uL    Absolute NRBCs 0.0 10e3/uL   Potassium   Result Value Ref Range    Potassium 3.7 3.4 - 5.3 mmol/L

## 2023-02-06 NOTE — H&P
[facility]    History and Physical - Hospitalist Service       Date of Admission:  2/5/2023    Assessment & Plan      Marino Prajapati is a 87 year old female with a longstanding history of COPD, prior PE on maintenance DOAC, GERD, DJD, hypertension, anemia, osteoarthritis, prior DVT, GERD and reportedly lives at home with her  and has a presents in the emergency room today due to increasing mental status changes being disoriented, lethargic and eventually becoming more unresponsive at home.    Upon initial presentation with emergency personnel they report that patient appears to be bedbound state and showing irregular breathing for 45 minutes and then became unresponsive.  She was not following simple verbal instructions but initial evaluation showed blood pressure at hypertensive side, glucose level of 88 and notable multiple sores on hips, arms.    Upon evaluation in the emergency room she was noted to be hypothermic, still hypertensive, tachycardic and initial EKG did reveal normal sinus rhythm at 86 bpm, electrolytes showed reassuring levels, normal kidney function, no significant acidosis, normoglycemic but did reveal lactic acidosis but subsequently improved.  She also had a witnessed seizure during her initial evaluation in the emergency room    Problem list:    #1 alteration in mental state, unresponsiveness  #2 metabolic encephalopathy with accompanying hypothermia  #3 high suspicion for underlying sepsis with lactic acidosis, tachycardia, hypothermia with unclear source, possible soft tissue infection  #4 apparent witnessed seizure seen in the emergency room    -Admit as inpatient.  She is critically ill and will be requiring close monitoring care under inpatient service  -Initially contemplated for intubation given her current clinical status with hypothermia, seizure event, being unresponsive.  However she has a advanced wishes of DNR/DNI that was also confirmed by  emergency room service to family  -Currently being provided with warming blanket  -Supportive care  -Seizure precautions  -Received Keppra loading dose and will continued with Keppra IV  -Fall precautions  -Standby benzodiazepine for any recurrent seizure event  -Formal neurology evaluation requested  -CT of the head showed no obvious hemorrhage or major infarct  -We will await further input from neurology if pursuing further imaging and EEG  -Keep her n.p.o. until fully awake and please do dysphagia screen  -Check CK  -Chest x-ray showing hypoinflated left lung, ?  Mucous plugging  -Pending official reading, plans for CT of the chest in the ED  -Scheduled breathing treatments    Addendum: 8:40PM  CT of chest showed mucous plugging left lung with concomitant atelectasis  -on duoneb  -add mucolytics once more awake    #5 high suspicion for underlying infectious process  -Ruled out for UTI  -Multiple wound, can be ongoing etiology  -Multiple cultures from blood and urine sent earlier  -Received Zosyn and vancomycin  -CT of the head showed possible sinusitis  -Current antibiotics will provide coverage  -We will continue Zosyn and hold further vancomycin for now  -Check procalcitonin    #6 elevated TSH at 19, normal free T4  -Unlikely patient has underlying myxedema coma with normal free T4  -We will provide her with her levothyroxine at higher dosing    #7 history of VTE, PE  -On DOAC  -Resume once can tolerate oral diet  -Holding heparinization for now    #8 physical deconditioning  -We will need PT, OT and social service evaluation    #9 notable multiple wounds, poor skin care  -Reportedly on a bedbound state at home  -Wound care evaluation  -Dietary service evaluation    #10 goals of care  -We will request palliative care input for goals of care discussion         Diet:  N.p.o. until fully awake  DVT Prophylaxis: DOAC  Kinney Catheter: PRESENT, indication:    Lines: None     Cardiac Monitoring: None  Code Status:   DNR/DNI    Clinically Significant Risk Factors Present on Admission               # Drug Induced Coagulation Defect: home medication list includes an anticoagulant medication    # Hypertension: home medication list includes antihypertensive(s)              Disposition Plan      Expected Discharge Date: 02/07/2023                I attempted to talk to patient's listed family and left a voicemail  Patricio Ornelas MD, MD  Hospitalist Service  [facility]  Securely message with [employer] (more info)  Text page via [employer] Paging/Directory     ______________________________________________________________________    Chief Complaint   Alteration in mental state  Unresponsive    History obtained from review of electronic medical records, discussion with emergency room physician  Attempted to communicate and the patient's family but left a voicemail    History of Present Illness   Marino Prajapati is a 87 year old female with a longstanding history of COPD, prior PE on maintenance DOAC, GERD, DJD, hypertension, anemia, osteoarthritis, prior DVT, GERD and reportedly lives at home with her  and has a presents in the emergency room today due to increasing mental status changes being disoriented, lethargic and eventually becoming more unresponsive at home.    Upon initial presentation with emergency personnel they report that patient appears to be bedbound state and showing irregular breathing for 45 minutes and then became unresponsive.  She was not following simple verbal instructions but initial evaluation showed blood pressure at hypertensive side, glucose level of 88 and notable multiple sores on hips, arms.    Upon evaluation in the emergency room she was noted to be hypothermic, still hypertensive, tachycardic and initial EKG did reveal normal sinus rhythm at 86 bpm, electrolytes showed reassuring levels, normal kidney function, no significant acidosis, normoglycemic but did reveal lactic  acidosis but subsequently improved from 3.4-0.7.  Troponin levels was negative.  TSH was elevated but has a free T4.  Notable TSH at 19.77.  Urine analysis showed no significant pyuria, negative leukocyte esterase, chest x-ray has a pending official reading but left lung appears to be collapsed and noted significant aeration noted.  CT of the head showed no convincing findings of acute transcortical infarct but showing evidence of likely acute sinusitis.    She was confirmed to have an advance wishes and directives of DNR/DNI and was not pursuing any intubation in the emergency room.  It was also reported that she has a witnessed seizure event in the ED lasting for few minutes and was terminated with initiation of Keppra and also receive benzodiazepine intravenously.   Her case was referred to us for further evaluation and care hence his hospitalization.        Past Medical History    Past Medical History:   Diagnosis Date     Anemia      CARDIOVASCULAR SCREENING; LDL GOAL LESS THAN 160      Colon polyps 2010    needs colonoscopy 2013     DVT (deep venous thrombosis) (H) 2007    remote history of DVT while she was on BCP ,coumadin stopped after retroperitoneal/buttock hematoma in 10/11 . On ASA only     Gastro-oesophageal reflux disease      HTN, goal below 140/90      Hyperlipidemia LDL goal <130 1/22/2016     Kidney stone      Osteoarthritis     knees and hip     Osteoporosis      Postnasal drip      S/P total knee arthroplasty      Thrombosis of leg        Past Surgical History   Past Surgical History:   Procedure Laterality Date     ARTHROPLASTY HIP  8/1/2013    Procedure: ARTHROPLASTY HIP;  RIGHT TOTAL HIP ARTHROPLASTY;  Surgeon: Rufino Tong MD;  Location: [facility] OR     ARTHROPLASTY HIP Left 12/12/2014    Procedure: ARTHROPLASTY HIP;  Surgeon: Rufino Tong MD;  Location: [facility] OR     ARTHROPLASTY KNEE  10/22/2012    Procedure: ARTHROPLASTY KNEE;  Right Total knee Arthroplasty  ;  Surgeon: Jagdeep Virgen  MD ADYNA;  Location: [facility] OR     ARTHROPLASTY KNEE  5/23/2013    Procedure: ARTHROPLASTY KNEE;  LEFT TOTAL KNEE ARTHROPLASTY (SMITH & NEPHEW)^;  Surgeon: Rufino Tong MD;  Location: [facility] OR     CHOLECYSTECTOMY       CYSTOSCOPY, RETROGRADES, INSERT STENT URETER(S), COMBINED  7/16/2012    Procedure: COMBINED CYSTOSCOPY, RETROGRADES, INSERT STENT URETER(S);  Cystoscopy, Right retrogrades, Right Stent Placement;  Surgeon: Mauricio Velazquez MD;  Location: [facility] OR     LASER HOLMIUM LITHOTRIPSY URETER(S), INSERT STENT, COMBINED  8/8/2012    Procedure: COMBINED CYSTOSCOPY, URETEROSCOPY, LASER HOLMIUM LITHOTRIPSY URETER(S), INSERT STENT;  Cystoscopy, Stent Removal, Right Ureteroscopy with Holmium Laser, Stone removal ;  Surgeon: Mauricio Velazquez MD;  Location: [facility] OR     LIPOSUCTION (LOCATION)      tummy     STRIP VEIN       ZZC PREP FACE/ORAL PROST PALATAL LIFT         Prior to Admission Medications   Prior to Admission Medications   Prescriptions Last Dose Informant Patient Reported? Taking?   acetaminophen (TYLENOL) 500 MG tablet   Yes No   Sig: Take 1,000 mg by mouth 2 times daily Takes with tramadol.   albuterol (PROAIR HFA/PROVENTIL HFA/VENTOLIN HFA) 108 (90 Base) MCG/ACT inhaler   No No   Sig: Inhale 2 puffs into the lungs every 6 hours as needed for shortness of breath / dyspnea or wheezing   aspirin (ASA) 81 MG chewable tablet   No No   Sig: Take 1 tablet (81 mg) by mouth daily   budesonide-formoterol (SYMBICORT) 160-4.5 MCG/ACT Inhaler   No No   Sig: Inhale 2 puffs into the lungs 2 times daily   Patient taking differently: Inhale 2 puffs into the lungs 2 times daily as needed    cholecalciferol (VITAMIN D) 1000 UNIT tablet   Yes No   Sig: Take 1,000 Units by mouth daily    furosemide (LASIX) 40 MG tablet   No No   Sig: TAKE 1 TABLET DAILY   ipratropium - albuterol 0.5 mg/2.5 mg/3 mL (DUONEB) 0.5-2.5 (3) MG/3ML neb solution   No No   Sig: INHALE CONTENTS OF 1 VIAL (3 MLS) VIA NEBULIZER EVERY 6 HOURS AS  NEEDED FOR SHORTNESS OF BREATH, DYSPNEA, OR WHEEZING   nystatin (MYCOSTATIN) 672113 UNIT/GM external powder   No No   Sig: Apply topically 2 times daily as needed (skin rash)   nystatin (MYCOSTATIN) 240357 UNIT/GM external powder   No No   Sig: Apply topically 2 times daily   nystatin (MYCOSTATIN) 503868 UNIT/GM external powder   No No   Sig: Apply topically 2 times daily   omeprazole (PRILOSEC) 20 MG DR capsule   Yes No   Sig: Take 20 mg by mouth daily   pravastatin (PRAVACHOL) 20 MG tablet   No No   Sig: TAKE 1 TABLET DAILY   rivaroxaban ANTICOAGULANT (XARELTO ANTICOAGULANT) 20 MG TABS tablet   No No   Sig: Take 1 tablet (20 mg) by mouth daily (with dinner)   terazosin (HYTRIN) 2 MG capsule   No No   Sig: TAKE 1 CAPSULE AT BEDTIME   tiotropium (SPIRIVA) 18 MCG inhaled capsule   No No   Sig: Inhale 1 capsule (18 mcg) into the lungs daily   traMADol (ULTRAM) 50 MG tablet   No No   Sig: TAKE 1 TABLET THREE TIMES A DAY AS NEEDED FOR SEVERE PAIN      Facility-Administered Medications: None        Review of Systems    The 10 point Review of Systems is negative other than noted in the HPI or here.     Social History   I have reviewed this patient's social history and updated it with pertinent information if needed.  Social History     Tobacco Use     Smoking status: Former     Packs/day: 1.00     Years: 29.00     Pack years: 29.00     Types: Cigarettes     Quit date: 10/17/1966     Years since quittin.3     Smokeless tobacco: Never   Vaping Use     Vaping Use: Never used   Substance Use Topics     Alcohol use: Yes     Comment: 4 drinks yearly     Drug use: No       Allergies   Allergies   Allergen Reactions     Atorvastatin Muscle Pain (Myalgia)     PN: LW Reaction: myalgias  PN: LW Reaction: myalgias       Lisinopril Cough     PN: LW Reaction: cough  PN: LW Reaction: cough       Other Environmental Allergy      PN: LW FI1: none        Physical Exam   Vital Signs: Temp: (!) 95.9  F (35.5  C) Temp src: Rectal BP:  110/62 Pulse: 80   Resp: 18 SpO2: 98 %      Weight: 0 lbs 0 oz    Sick appearing, frail, sedated and not responsive to verbal and physical stimuli  HEENT; Atraumatic, normocephalic, pinkish conjuctiva, pupils sluggishly reactive  Skin: Very dry skin, numerous skin excoriations, numerous ulcers and wounds  Lungs: Diminished breath sounds left more than the right, no obvious wheezes   heart: normal rate, normal rhythm, no rubs or gallops.   Abdomen: normal bowel sounds, no tenderness, no peritoneal signs, no guarding  Extremities: no deformities, bilateral nonpitting edema lower extremities  Neuro; limited exam, patient is sedated, nonresponsive  Psych; limited exam          Medical Decision Making       60 MINUTES SPENT BY ME on the date of service doing chart review, history, exam, documentation & further activities per the note.  MANAGEMENT DISCUSSED with the following over the past 24 hours: yes   NOTE(S)/MEDICAL RECORDS REVIEWED over the past 24 hours: yes  Tests ORDERED & REVIEWED in the past 24 hours:      Data     I have personally reviewed the following data over the past 24 hrs:    10.5  \   13.4; 13.6   / 213     142; 142 101; 101 14.9; 17 /  109 (H); 108 (H)   3.5; 3.7 30 (H) 0.36 (L); 0.4 (L) \       ALT: 19 AST: 29 AP: 103 TBILI: 0.6   ALB: 3.7 TOT PROTEIN: 6.6 LIPASE: N/A       Trop: 14 BNP: N/A       TSH: 19.77 (H) T4: 1.29 A1C: N/A       Procal: N/A CRP: N/A Lactic Acid: 0.7         Imaging results reviewed over the past 24 hrs:   Recent Results (from the past 24 hour(s))   Head CT w/o contrast    Narrative    EXAM: CT HEAD W/O CONTRAST  LOCATION: [facility]  DATE/TIME: 2/5/2023 5:32 PM    INDICATION: Seizure.  COMPARISON: CT head 08/14/2021.  TECHNIQUE: Routine CT Head without IV contrast. Multiplanar reformats. Dose reduction techniques were used.    FINDINGS: Difficult patient positioning.  INTRACRANIAL CONTENTS: Gray-white matter differentiation is maintained. No hemorrhage  or extraaxial collection. No mass effect. Subarachnoid cisterns are patent. Patchy and confluent white matter hypodensities are, while nonspecific, most compatible with   chronic microvascular ischemic changes. Unchanged proportional prominence of the ventricles and sulci is compatible with diffuse cerebral volume loss.    VISUALIZED ORBITS/SINUSES/MASTOIDS: No visible intraorbital abnormality. Partially visualized mucosal thickening and aerated secretions in the sphenoid sinuses. Large left mastoid and middle ear effusion. No middle ear or mastoid effusion.    BONES/SOFT TISSUES: No acute abnormality.      Impression    IMPRESSION:  1.  No convincing findings of acute transcortical infarct. Underlying microvascular ischemic changes can limit this assessment by CT. If sufficient clinical concern warrants further imaging and there is no contraindication, consider MRI.  2.  No acute intracranial hemorrhage or mass effect.   3.  Aerated secretions in the sphenoid sinuses can be seen with acute sinusitis in the appropriate cortical context.  4.  Large new left mastoid and middle ear effusions may be inflammatory. Correlate clinically.

## 2023-02-06 NOTE — CONSULTS
"CLINICAL NUTRITION SERVICES  -  ASSESSMENT NOTE      Recommendations:   - Diet per MD/SLP.  - When diet advances to at least fulls and patient is more alert, will plan for 4 oz Ensure offerings w/ meals TID.    - When more alert, will plan to add daily MVI/M given documented PI.  - Seems a bit too soon for nutrition services to be involved in POC.  Will watch for diet advancement/palliative care note to further guide nutrition recommendations.     Malnutrition Diagnosis: Unable to determine due to insufficient information, lack of nutrition and wt hx, patient does have muscle loss but unclear if any acute changes vs what is chronic/age-related          REASON FOR ASSESSMENT  Marino Prajapati is a 87 year old female seen by Registered Dietitian for Provider Order - \"multiple wounds, pressure injury? At risk for malnutrition\".      PMH of: COPD, prior PE, GERD, osteoarthritis.    Admit 2/2: AMS, concern for sepsis, concern for seizure.    NUTRITION HISTORY  - Information obtained from chart as patient herself is confused and lethargic.  No family currently in room.  - Allergies: NKFA.      CURRENT NUTRITION ORDERS  Diet Order:     Clears, ADAT    Current Intake/Tolerance:  Remains on clears and noted to be lethargic.  SLP trying to wake for eval while in room, difficulty following commands.       Obtained from Chart/Interdisciplinary Team:  - WOCN documented stage 2 PI to L hip   - Requiring 7 L via oxymask  - Noted SLP consulted  - Noted Palliative care consulted  - Stooling patterns reviewed    ANTHROPOMETRICS  Height: 5' 6'  Weight: 164 lbs 1.6 oz  Body mass index is 26.5 kg/m .  Weight Status:  Overweight BMI 25-29.9  Weight History:  Wt Readings from Last 10 Encounters:   02/06/23 74.4 kg (164 lb 1.6 oz)   08/15/21 88 kg (194 lb)   04/09/20 104.8 kg (231 lb)   04/06/20 104.8 kg (231 lb)   04/01/20 104.3 kg (230 lb)   03/16/20 103.7 kg (228 lb 9.6 oz)   03/11/20 104.3 kg (230 lb)   03/09/20 104.5 kg (230 lb 6.4 " oz)   05/13/19 98.2 kg (216 lb 8 oz)   04/30/19 98 kg (216 lb)     - No current documentation of edema.  No recent wt trends available w/in the past year (care everywhere reviewed).      LABS  Labs reviewed.      MEDICATIONS  Medications reviewed.      ASSESSED NUTRITION NEEDS PER APPROVED PRACTICE GUIDELINES:    Dosing Weight 74 kg   Estimated Energy Needs: 25-30+ Kcal/Kg  Justification: maintenance, wound healing consideration  Estimated Protein Needs: >/=1.2 g pro/Kg  Justification: preservation of lean body mass, documentation of stage 2 PI  Estimated Fluid Needs: per MD      NUTRITION DIAGNOSIS:  Predicted inadequate nutrient intake (energy/protein) related to potential to remain on restricted diet pending mentation, weakness/dysphagia.    NUTRITION INTERVENTIONS  Recommendations / Nutrition Prescription  See above.      Implementation  Nutrition education: Not appropriate at this time due to patient condition.    Collaboration and Referral of Nutrition care: Discussed POC briefly with RN and SLP.    Nutrition Goals  Diet advancement past clears vs nutrition-related GOC discussions w/in 2-4 days.       MONITORING AND EVALUATION:  Progress towards goals will be monitored and evaluated per protocol and Practice Guidelines          Kathryn Ho RDN, LD  Clinical Dietitian  3rd floor/ICU: [phone]  All other floors: [phone]  Weekend/holiday: [phone]  Office: [phone]

## 2023-02-06 NOTE — PHARMACY-ADMISSION MEDICATION HISTORY
Admission medication history interview status for this patient is complete. See [facility] admission navigator for allergy information, prior to admission medications and immunization status.     Medication history interview done, indicate source(s): Family  Medication history resources (including written lists, pill bottles, clinic record):None  Pharmacy: unknown    Changes made to PTA medication list:  Added: none  Changed: none  Reported as Not Taking: furosemide (discontinued, flagged for removal)  Removed: omeprazole (discontinued)    Actions taken by pharmacist (provider contacted, etc): I called Marino's , Josue, who confirmed all medications, changes, and last doses.     Additional medication history information: Marino uses the nystatin topical powder (sometimes the cream) on her left arm above the elbow, breast, and stomach due to laying on that side. She hasn't been using most of her inhalers due to not needing them. Josue noted she doesn't use the nebulizer much because it makes her cough.    Medication reconciliation/reorder completed by provider prior to medication history?  Y    Prior to Admission medications    Medication Sig Last Dose Taking? Auth Provider Long Term End Date   acetaminophen (TYLENOL) 500 MG tablet Take 1,000 mg by mouth 2 times daily Takes with tramadol. 2/5/2023 at 0200 Yes Unknown, Entered By History     albuterol (PROAIR HFA/PROVENTIL HFA/VENTOLIN HFA) 108 (90 Base) MCG/ACT inhaler Inhale 2 puffs into the lungs every 6 hours as needed for shortness of breath / dyspnea or wheezing Past Week Yes Vivian Blue MD Yes    aspirin (ASA) 81 MG chewable tablet Take 1 tablet (81 mg) by mouth daily 2/5/2023 at 0200 Yes Patricio Ornelas MD No    budesonide-formoterol (SYMBICORT) 160-4.5 MCG/ACT Inhaler Inhale 2 puffs into the lungs 2 times daily  Patient taking differently: Inhale 2 puffs into the lungs 2 times daily as needed Unknown Yes Vivian Blue  MD [place] Yes    cholecalciferol (VITAMIN D) 1000 UNIT tablet Take 1,000 Units by mouth daily  2/5/2023 at 0200 Yes Vivian Blue MD     ipratropium - albuterol 0.5 mg/2.5 mg/3 mL (DUONEB) 0.5-2.5 (3) MG/3ML neb solution INHALE CONTENTS OF 1 VIAL (3 MLS) VIA NEBULIZER EVERY 6 HOURS AS NEEDED FOR SHORTNESS OF BREATH, DYSPNEA, OR WHEEZING Unknown Yes Vivian Blue MD Yes    nystatin (MYCOSTATIN) 013393 UNIT/GM external powder Apply topically 2 times daily as needed (skin rash) 2/4/2023 at PM Yes Vivian Blue MD No    pravastatin (PRAVACHOL) 20 MG tablet TAKE 1 TABLET DAILY 2/5/2023 at 0200 Yes Vivian Blue MD Yes    rivaroxaban ANTICOAGULANT (XARELTO ANTICOAGULANT) 20 MG TABS tablet Take 1 tablet (20 mg) by mouth daily (with dinner) 2/5/2023 at 0200 Yes Vivian Blue MD Yes    terazosin (HYTRIN) 2 MG capsule TAKE 1 CAPSULE AT BEDTIME 2/5/2023 at 0200 Yes Vivian Blue MD Yes    tiotropium (SPIRIVA) 18 MCG inhaled capsule Inhale 1 capsule (18 mcg) into the lungs daily Unknown Yes Vivian Blue MD Yes    traMADol (ULTRAM) 50 MG tablet TAKE 1 TABLET THREE TIMES A DAY AS NEEDED FOR SEVERE PAIN 2/5/2023 at 0200 Yes Vivian Blue MD     furosemide (LASIX) 40 MG tablet TAKE 1 TABLET DAILY   Vivian Blue MD Yes    nystatin (MYCOSTATIN) 097634 UNIT/GM external powder Apply topically 2 times daily   Vivian Blue MD No    nystatin (MYCOSTATIN) 248267 UNIT/GM external powder Apply topically 2 times daily   Vivian Blue MD No

## 2023-02-07 NOTE — PROGRESS NOTES
"[facility]  Palliative Care Progress Note  Text Page     Assessment & Plan   Marino Prajapati is a 87 year old female who was admitted on 2/5/2023. I was asked to see the patient for goals of care.     Recommendations:  1.Decisional Capacity -  Unreliable. Patient has an advance directive dated 1-9-2020.  Consents and decision making should be done by  Josue \"Ghassan\" Chevy, the primary Health Care Agent.  Alternate is Dominick Reece.   **Updated version of directive obtained but invalid due to missing pages.     2. Pain- Chronic in back and generalized s/p compression fractures of lumbar and Thoracic Spine in 2021.    -Tramadol 1 tab 2-3x/day PRN  as PTA.  -S.O. asked about stronger pain medications, would not recommend escalating opioid dose in setting of change in mental status, nor for chronic pain.   -Consider pain management consult  -Tylenol Supp PRN        3. Spiritual Care- Oriented to Spiritual Health and Social Work Services as part of Palliative Care team.  Spiritual support declined at this time.     Goal of Care:Restorative. Family is hopeful for  return home near baseline (physically disabled and bedbound, S.O. supporting all ADL's, but reported no cognitive deficit).         Disease Process/es & Symptoms:  Marino Prajapati is a 87 year old patient  who presents on 2/5 with increasing mental status changes, disoriented, lethargic and eventually becoming more unresponsive at home.  She is being treated for possible underlying sepsis of unclear source as she is found with lactic acidosis, tachycardia, hypothermia.  She also is reported to have had an unwitnessed seizure in emergency room.    This is in the setting of longstanding history of COPD, prior PE on maintenance DOAC, GERD, DJD, hypertension, anemia, osteoarthritis, prior DVT, GERD and debility.  She has been bed bound requiting assist with all ADL's for almost 2 years. She has had significant weight loss documented " "since that time, 30lbs (15%).  Per family report there is no cognitive deficit at baseline. Recent gerontology notes indicate \"Psychiatric Exam: Alert and oriented x2, appropriate affect. Behavior: normal Speech: normal, answer questions appropriately.\"     Palliative Care was consulted for goals of care. At this time the patient does not have any needs we are actively addressing, and we are signing off.  However we know their condition may change -- please re-consult us if we can be helpful at any time in the future.   Thank you for the opportunity to be involved in the care of this patient.    Annabel Mondragon APRN, CNS, CNP  [facility], Palliative Care  Securely message with the Vocera Web Console (learn more here)  Or please use Palliative Team Pager       Time Spent on this Encounter   35 MINUTES SPENT BY ME on the date of service doing chart review, history, exam, documentation & further activities per the note.   Time spend counseling with family consisted of the following topics, goals of care, medical decision making regarding pain management and symptom management.      Interval History   Pos blood cultures gram pos cocci in clusters.  More alert today.    Course of Hospitalization Discussions Data   Discussed on February 6, 2023 with Annabel Mondragon, CINTHIA CNP APRN, CNP:   Spoke with both patient's dtr, Miryam in room and S.ODauqan Ferrell via phone.  They both understand Jeans current goals to be restorative, hopeful to recover and return home. We discussed her vulnerable condition at baseline but at best hopeful for a new baseline if this is reversible.  Miryam knows Marino has limits of DNR/DNI and would not want \"heroic measures if it was her time.\"          Review of Systems   unable      Physical Exam   Temp:  [97.7  F (36.5  C)-99.3  F (37.4  C)] 97.7  F (36.5  C)  Pulse:  [72-87] 75  Resp:  [13-28] 18  BP: (141-193)/(53-87) 162/80  SpO2:  [93 %-99 %] 94 %  148 lbs 8 oz  GEN:  awake, does answer some yes/no " questions,, does not follow conversation. Appears chronically ill, uncomfortable during exam.  HEENT:  Normocephalic/atraumatic, no scleral icterus, no nasal discharge, mouth dry.  CV:   +3 DP/PT pulses bilatererally; no edema BLE.  RESP:  Symmetric chest rise on inhalation noted.  Normal respiratory effort.  EXT:  Edema & pulses as noted above.  CMS intact x 4.     M/S:   Sarcopenia, all limbs with some contracture   SKIN:  See WOC notes     Medications     0.45% sodium chloride + KCl 20 mEq/L 75 mL/hr at 02/07/23 1711     - MEDICATION INSTRUCTIONS -         acetylcysteine  2 mL Nebulization Q4H     [START ON 2/8/2023] aspirin  81 mg Oral Daily     bisacodyl  10 mg Rectal Once     dextromethorphan-guaiFENesin  1 tablet Oral BID     [START ON 2/8/2023] fluconazole  150 mg Oral Weekly     fluticasone-vilanterol  1 puff Inhalation Daily     ipratropium - albuterol 0.5 mg/2.5 mg/3 mL  3 mL Nebulization 4x daily     levETIRAcetam  500 mg Intravenous Q12H     [START ON 2/8/2023] multivitamin w/minerals  1 tablet Oral Daily     piperacillin-tazobactam  3.375 g Intravenous Q6H     pravastatin  20 mg Oral Daily     rivaroxaban ANTICOAGULANT  20 mg Oral Daily with supper     sodium chloride (PF)  3 mL Intracatheter Q8H     terazosin  2 mg Oral At Bedtime     umeclidinium  1 puff Inhalation Daily       Data   Recent Results (from the past 24 hour(s))   XR Chest Port 1 View    Narrative    CHEST ONE VIEW  2/7/2023 4:19 PM     HISTORY: Previous mucus plug and atelectasis on left. Evaluate for  improvement.    COMPARISON: February 5, 2023      Impression    IMPRESSION: Probably some improvement on the left although there is  extensive consolidation persisting. Evaluation limited by position.  Right lung clear.

## 2023-02-07 NOTE — PLAN OF CARE
Pt is alert, oriented to self only. Pleasant, Calm. VSS. On 5-7 LPM with oxymask. RT involved. Lungs clear, diminished at bases. Hypertensive. NPO d/t severe dysphagia. X2 IV infusing; 1- TKO. Chronic Kinney in. On tele. On seizure precautions. On pulsate mattress. Dry, flaky skin. Potassium and magnesium protocols. Sleeps between cares.

## 2023-02-07 NOTE — PLAN OF CARE
Goal Outcome Evaluation:      Plan of Care Reviewed With: patient, spouse    Overall Patient Progress: improving    Pt alert-disorientated to place, time, and situation. VS stable; low-grade temp. Weaned to baseline 3L O2. K replaced x2. Denies pain. CMS intact. Turned and repositioned w/ Ax2, lift. Wounds addressed. Kinney removed per MD order @ 1400, DTV. Having incontinent BMs. Advanced to easy to chew diet-tolerating well. Plan is TBD-awaiting neurology consult.

## 2023-02-07 NOTE — PHARMACY-VANCOMYCIN DOSING SERVICE
"Pharmacy Vancomycin Initial Note  Date of Service 2023  Patient's  1935  87 year old, female    Indication: Bacteremia - likely contaminant?    Current estimated CrCl = Estimated Creatinine Clearance: 101 mL/min (A) (based on SCr of 0.42 mg/dL (L)).    Creatinine for last 3 days  2023:  5:01 PM Creatinine 0.36 mg/dL;  5:01 PM Creatinine POCT 0.4 mg/dL  2023:  6:36 AM Creatinine 0.42 mg/dL    Recent Vancomycin Level(s) for last 3 days  No results found for requested labs within last 72 hours.      Vancomycin IV Administrations (past 72 hours)                   vancomycin (VANCOCIN) 1,750 mg in 0.9% NaCl 500 mL intermittent infusion (mg) 1,750 mg Given 23 172                Nephrotoxins and other renal medications (From now, onward)    Start     Dose/Rate Route Frequency Ordered Stop    23 0000  piperacillin-tazobactam (ZOSYN) infusion 3.375 g        Note to Pharmacy: For [facility], [facility] and [facility]: For Zosyn-naive patients, use the \"Zosyn initial dose + extended infusion\" order panel.    3.375 g  100 mL/hr over 30 Minutes Intravenous EVERY 6 HOURS 23            Contrast Orders - past 72 hours (72h ago, onward)    Start     Dose/Rate Route Frequency Stop    23 193  iopamidol (ISOVUE-370) solution 500 mL         500 mL Intravenous ONCE 23 193          Insight[employer] Prediction of Planned Initial Vancomycin Regimen  Regimen: 1250 mg IV every 12 hours.  Start time: 22:11 on 2023  Exposure target: AUC24 (range)400-600 mg/L.hr   AUC24,ss: 569 mg/L.hr  Probability of AUC24 > 400: 83 %  Ctrough,ss: 17.3 mg/L  Probability of Ctrough,ss > 20: 38 %  Probability of nephrotoxicity (Lodise JOSE ): 13 %          Plan:  1. Start vancomycin  1250 mg IV q12h.   2. Vancomycin monitoring method: AUC  3. Vancomycin therapeutic monitoring goal: 400-600 mg*h/L  4. Pharmacy will check vancomycin levels as appropriate in 1-3 Days.  Probably a contaminant - hopefully discontinue " prior to need for any levels  5. Serum creatinine levels will be ordered a minimum of twice weekly.        Daniel Greer, Pharm.D., BCPS

## 2023-02-07 NOTE — PROVIDER NOTIFICATION
Web paged MD pt's blood culture taken 2/5/23 @ 5:01 pm from peripheral blood collected, positive for Gram positive cocci in clusters.Per micro  [place].  
lab just called. the pt is positive for Coagulase-negative staphylococci in her blood. thx  
pt's bp 193/87  
update: pt's bp 173/70. will recheck in 1 1/2 hr. thx  
0

## 2023-02-07 NOTE — PROGRESS NOTES
[facility]    Hospitalist Progress Note      Assessment & Plan   Marino Prajapati is a 87 year old woman who was admitted on 2/5/2023. PMH significant for COPD, prior PE on maintenance DOAC, GERD, DJD, hypertension, anemia, osteoarthritis, prior DVT, and GERD. Patient lives at home with her . She is confined to bed and under the care of [facility] services including home physician visits. Patient presented to the ER on 2/5/23 due to increased disorientation with lethargy and ultimately being unresponsive at home. Blood pressure was elevated, glucose 88, and patient with multiple sores noted to hips, arms, under breast. T 95 in the ER. Lactic acidosis was noted and subsequently improved. Patient did have a witnessed seizure in the ER. Patient admitted to hospitalist service for further evaluation and treatment.      Encephalopathy  Metabolic encephalopathy with accompanying hypothermia  High suspicion for underlying sepsis with lactic acidosis, tachycardia, hypothermia with unclear source, possible soft tissue infection  Witnessed seizure in the emergency room  - Admitted as inpatient. Intubation was considered, however DNR/DNI was confirmed with family. Goals are restorative with hopes for return to home with family.   - Continue supportive care.  - Initially treated with vancomycin and Zosyn. Zosyn was continued. Did have what appears to be contaminant in 1/2 cultures and received additional dose of vancomycin night of 2/6, but given coag neg staph and likely contaminant, will not continue. Continue to follow blood cultures.   - CT of the head showed no obvious hemorrhage or major infarct  - Seizure precautions. Continue Keppra IV. Awaiting formal neurology consultation with recommendations. Suspect may need some combination of EEG, lumbar puncture, MRI brain; however, maybe less of the former given degree of improvement today.   - Patient more alert 2/7 and diet advanced  per SLP.  - No evidence of UTI. Kinney placed on admission, but requesting trial for removal 2/7.  - Continue IV fluids with 1/2 NS + KCl 20 mEq/L at 75 ml/h.   - Marked improvement in mentation 2/7 compared to 2/6. Continue antibiotics, fluids, and Keppra at this time. Continue to monitor.    Candidiasis  - Patient with evidence of likely candidiasis rash to skin with areas of ulceration. Had been being treated with weekly diflucan as outpatient. As unable to take PO and with concern for sepsis on admission, did treat as systemic candidiasis initially with fluconazole 800 mg IV x 1 followed by 400 mg daily. However, by 2/7, patient able to take PO and has improved mentation significantly. Resuming outpatient fluconazole 150 mg PO weekly at this time  - Wound care following with inter-dry applied to wettest area.     History of COPD  Mucus-plugging and left-sided atelectasis  - Repeat CXR 2/7 continues to show some consolidation vs atelectasis, but there is improved aeration on the left and may be due to degree of patient's positioning on that side.   - Appreciate RT following with chest PT, suctioning if needed, and mucomyst nebs as able.   - Resuming home therapies.     Abnormal thyroid lab  Elevated TSH at 19, normal free T4  -Unlikely patient has underlying myxedema coma with normal free T4  - Started on increased dose of PTA levothyroxine at admission.     History of VTE, PE  - On DOAC at home. Can determine timeline of previous VTE. While NPO, will give Lovenox 40 mg subQ daily rather than Xarelto.     Physical deconditioning  - Has been confined to bed at home.   - Once medically improved, can discuss further with SW regarding home services, but family hoping patient will return home.     Multiple wounds, poor skin care  -Reportedly on a bedbound state at home  -Wound care evaluation  -Dietary service evaluation      DVT Prophylaxis: DOAC  Code Status: No CPR- Do NOT Intubate. Goals are currently restorative.    Expected discharge: Anticipate hospitalization at least 2-4 more days pending clinical improvement. Family's goals are for patient to return home with , if able.    Clarisse Lane MD FACP  Hospitalist Service  [facility]      Interval History   Patient's mental status has significantly improved. She is alert, able to eat, communicative. Patient denies any complaints other than being hungry. Continuing above care. Awaiting neurology recommendations. Anticipate hospitalization another 2-4 more days pending clinical improvements.    -Data reviewed today: I reviewed all new labs and imaging results over the last 24 hours.     Physical Exam   Temp: 99.3  F (37.4  C) Temp src: Temporal BP: (!) 141/64 Pulse: 72   Resp: 18 SpO2: 96 % O2 Device: Nasal cannula Oxygen Delivery: 3 LPM  Vitals:    02/06/23 0222 02/07/23 0553   Weight: 74.4 kg (164 lb 1.6 oz) 67.4 kg (148 lb 8 oz)     Vital Signs with Ranges  Temp:  [97  F (36.1  C)-99.3  F (37.4  C)] 99.3  F (37.4  C)  Pulse:  [72-87] 72  Resp:  [13-28] 18  BP: (141-193)/(53-87) 141/64  SpO2:  [93 %-99 %] 96 %  I/O last 3 completed shifts:  In: 3154.25 [P.O.:240; I.V.:2914.25]  Out: 1500 [Urine:1500]    Constitutional: Older woman seen resting in bed. Patient alert, answering questions appropriately. Oriented to person and that she is in the hospital. Does not appear toxic. No diaphoresis. No acute distress.   HEENT: NCAT. Making eye contact today, EOMI. Moist oral mucosa.  Respiratory: Decreased chest rise on left compared to right though aeration appreciated, though still diminished. Lungs clear on the right. No crackles appreciated. No increased work of breathing though minimal effort noted. On 3L O2 by NC which is her home O2.   Cardiovascular: Regular rate and rhythm. No murmur.  GI: Soft, nontender, nondistended. Normoactive bowel sounds.   Genitourinary: Clear, yellow urine output noted in Kinney catheter.  Musculoskeletal: No gross deformities.    Neurologic: Alert. Answering questions appropriately. Oriented to person and that she is in the hospital. Chronic left-sided weakness reported. No acute focal neurologic deficit. Normal speech.    Medications     0.45% sodium chloride + KCl 20 mEq/L 75 mL/hr at 02/07/23 1208     - MEDICATION INSTRUCTIONS -         acetylcysteine  2 mL Nebulization Q4H     [START ON 2/8/2023] aspirin  81 mg Oral Daily     bisacodyl  10 mg Rectal Once     dextromethorphan-guaiFENesin  1 tablet Oral BID     fluconazole  400 mg Intravenous Q24H     fluticasone-vilanterol  1 puff Inhalation Daily     ipratropium - albuterol 0.5 mg/2.5 mg/3 mL  3 mL Nebulization 4x daily     levETIRAcetam  500 mg Intravenous Q12H     [START ON 2/8/2023] multivitamin w/minerals  1 tablet Oral Daily     piperacillin-tazobactam  3.375 g Intravenous Q6H     pravastatin  20 mg Oral Daily     rivaroxaban ANTICOAGULANT  20 mg Oral Daily with supper     sodium chloride (PF)  3 mL Intracatheter Q8H     terazosin  2 mg Oral At Bedtime     umeclidinium  1 puff Inhalation Daily       Data   Recent Labs   Lab 02/07/23  1322 02/07/23  0620 02/06/23  1333 02/06/23  0636 02/05/23  1701   WBC  --  9.1  --  9.8 10.5   HGB  --  10.5*  --  11.9 13.4  13.6   MCV  --  96  --  95 95   PLT  --  187  --  198 213   NA  --  142  --  143 142  142   POTASSIUM 3.4 3.1* 3.7 3.3* 3.7  3.5   CHLORIDE  --  106  --  105 101  101   CO2  --  26  --  26 30*   BUN  --  10.9  --  12.2 17  14.9   CR  --  0.42*  --  0.42* 0.36*  0.4*   ANIONGAP  --  10  --  12 11   SANDRA  --  8.5*  --  8.5* 9.3   GLC  --  88  --  93 108*  109*   ALBUMIN  --  3.0*  --  3.3* 3.7   PROTTOTAL  --  5.6*  --  5.8* 6.6   BILITOTAL  --  0.8  --  0.7 0.6   ALKPHOS  --  79  --  89 103   ALT  --  16  --  16 19   AST  --  27  --  27 29       No results found for this or any previous visit (from the past 24 hour(s)).

## 2023-02-07 NOTE — PROGRESS NOTES
Cross Cover    Called for 1/2 BCx + GPC now identified as a staph species  rec'd single dose of vanco last night    -stop vanco, await further ID

## 2023-02-07 NOTE — PLAN OF CARE
Goal Outcome Evaluation: ongoing  Pt was kept NPO, per SLP order; pt is not alert enough for po intake.  She does follow commands at times, but required gentle touch and loud voice to answer questions.  Pt seemed to respond well to turns and repositioning, with CPOT score better after that intervention.  This AM, pt appeared more restless and uncomfortable.  Skin cares, partial bed bath performed with help of WOCN.  Diflucan ordered to treat apparent yeast that appears to be on left side of body, especially under left breast.  This area was cleansed gently. Discussed with provider that stool was present and partially removed at base of rectum, a supp was ordered.  Pre-existing hill with adequate urine output.  2 new IVs were obtained and K, Mag, zosyn, keppra IVF were given. See chart for rechecks.  Maintained seizure precautions.  Daughter updated at bedside.  Palliative consult, Await neuro consult, PT signed off.  SW consult pending.

## 2023-02-07 NOTE — PLAN OF CARE
Goal Outcome Evaluation:  Pt lethargic, but arouses to voice, mumbles in response.  Vital signs stable, on remote telemetry. O2 @7 lpm via oxy mask with continuous monitoring.Triggered sepsis protocol, lactic acid level 0.9.  On iv Diflucan, zosyn.  Skin fragile,  Redness beneath left breast, inter dry applied between skin folds that is moist.bruising, flaking and decubitus ulcer to left buttock, dressing intact to same.  Pulsate mattress in use.  Turned repositioned q 2 hrly.  Oral , skin and tami cares done.  Potassium and magnesium recheck in am.    Plan of Care Reviewed With: patient

## 2023-02-07 NOTE — PROGRESS NOTES
Care Management Initial Consult    General Information  Assessment completed with: Spouse or significant other,    Type of CM/SW Visit: Initial Assessment    Primary Care Provider verified and updated as needed: Yes   Readmission within the last 30 days:        Reason for Consult: discharge planning  Advance Care Planning:            Communication Assessment  Patient's communication style: spoken language (English or Bilingual)             Cognitive  Cognitive/Neuro/Behavioral: .WDL except  Level of Consciousness: confused, lethargic  Arousal Level: arouses to voice, arouses to touch/gentle shaking  Orientation: disoriented to, place, time, situation (lethargic)  Mood/Behavior: calm  Best Language: 0 - No aphasia  Speech: garbled    Living Environment:   People in home: spouse     Current living Arrangements: house      Able to return to prior arrangements: yes       Family/Social Support:  Care provided by: spouse/significant other  Provides care for: no one, unable/limited ability to care for self  Marital Status:             Description of Support System: Supportive, Involved    Support Assessment: Adequate family and caregiver support    Current Resources:   Patient receiving home care services: Yes     Community Resources: Home Care  Equipment currently used at home: commode chair, hospital bed  Supplies currently used at home: Wipes, Gloves    Employment/Financial:  Employment Status:          Financial Concerns:             Lifestyle & Psychosocial Needs:  Social Determinants of Health     Tobacco Use: Not on file   Alcohol Use: Not on file   Financial Resource Strain: Not on file   Food Insecurity: Not on file   Transportation Needs: Not on file   Physical Activity: Not on file   Stress: Not on file   Social Connections: Not on file   Intimate Partner Violence: Not on file   Depression: Not at risk     PHQ-2 Score: 0   Housing Stability: Not on file       Functional Status:  Prior to admission  patient needed assistance:   Dependent ADLs:: Bathing, Dressing, Eating, Grooming, Incontinence, Positioning, Transfers  Dependent IADLs:: Cleaning, Cooking, Laundry, Shopping, Meal Preparation, Medication Management, Money Management, Incontinence  Assesssment of Functional Status: Not at baseline with ADL Functioning    Mental Health Status:          Chemical Dependency Status:                Values/Beliefs:  Spiritual, Cultural Beliefs, Cheondoism Practices, Values that affect care:                 Additional Information:    Spoke with pt spouse Jas over the phone to introduce SW role. Pt spouse explained that pt is total cares and Jas provides cares for pt 24/7. Pt has a hospital bed and is apart of the complex care for seniors program with [facility] where an RN or MD comes to pt house once per month and is on call for pt as needed. Pt spouse explains that they will need a stretcher transport upon discharge and is aware of the potential cost associated. SW following.     CYRUS Ojeda, DEBBIE  Inpatient Care Coordination  Ortho/Spine Unit  [phone]  Kinga Ogden, [facility]

## 2023-02-08 NOTE — PROGRESS NOTES
A & Ox2, alert to self/place. Answering questions appropriately this shift. Forgetful at times. Tolerating soft, bite sized diet. IV ABX given. Pills whole with thin liquids. IV fluids discontinued this shift. Q2 turns. Incontinent B&B. Purewick in place, draining adequate urine. Good, productive cough. Frequent suctioning of phlegm. On 3L O2 via nasal cannula to keep sats >90%. One large loose BM this shift.

## 2023-02-08 NOTE — PLAN OF CARE
Pt is alert to self. Pt denies pain or discomfort. VSS. Pt is on 02 3L NC. pt has a pure wick in place. Pt was suctioned and given her nebs during the shift. Pt is on Tele monitoring SR @ 76. Pt was given her antibiotics Zosyn during the shift. Pt has Mepilex on her coccyx. Pt is incontinent of bowel and bladder. Pt has a pure wick in place. Pt is on IVF 0.45% and KCL 20 mEq @ 75ml/hr. Pt is on K+ and recheck in the morning. Pt is sleeping in bed, bed alarm in place.

## 2023-02-08 NOTE — PROGRESS NOTES
[facility]    Medicine Progress Note - Hospitalist Service    Date of Admission:  2/5/2023    Assessment & Plan   Marino Prajapati is a 87 year old woman who was admitted on 2/5/2023. PMH significant for COPD, prior PE on maintenance DOAC, GERD, DJD, hypertension, anemia, osteoarthritis, prior DVT, and GERD. Patient lives at home with her . She is confined to bed and under the care of [facility] services including home physician visits. Patient presented to the ER on 2/5/23 due to increased disorientation with lethargy and ultimately being unresponsive at home. Blood pressure was elevated, glucose 88, and patient with multiple sores noted to hips, arms, under breast. T 95 in the ER. Lactic acidosis was noted and subsequently improved. Patient did have a witnessed seizure in the ER. Patient admitted to hospitalist service for further evaluation and treatment.      Encephalopathy  Metabolic encephalopathy with accompanying hypothermia  High suspicion for underlying sepsis with lactic acidosis, tachycardia, hypothermia with unclear source, possible soft tissue infection  Witnessed seizure in the emergency room  - Admitted as inpatient. Intubation was considered, however DNR/DNI was confirmed with family. Goals are restorative with hopes for return to home with family.   - Continue supportive care.  - Initially treated with vancomycin and Zosyn. Zosyn was continued. Did have what appears to be contaminant in 1/2 cultures and received additional dose of vancomycin night of 2/6, but given coag neg staph and likely contaminant, will not continue. Continue to follow blood cultures.   - CT of the head showed no obvious hemorrhage or major infarct  - Seizure precautions.  Switch Keppra to oral route  -Appreciate input from general neurology   - Patient more alert 2/7 and diet advanced per SLP.  - No evidence of UTI. Kinney removed earlier  - Marked improvement in mentation 2/7  compared to 2/6. Continue antibiotics, fluids, and Keppra at this time. Continue to monitor.    Candidiasis  - Patient with evidence of likely candidiasis rash to skin with areas of ulceration. Had been being treated with weekly diflucan as outpatient. As unable to take PO and with concern for sepsis on admission, did treat as systemic candidiasis initially with fluconazole 800 mg IV x 1 followed by 400 mg daily. However, by 2/7, patient able to take PO and has improved mentation significantly. Resuming outpatient fluconazole 150 mg PO weekly at this time  - Wound care following with inter-dry applied to wettest area.     History of COPD  Mucus-plugging and left-sided atelectasis  - Repeat CXR 2/7 continues to show some consolidation vs atelectasis, but there is improved aeration on the left and may be due to degree of patient's positioning on that side.   - Appreciate RT following with chest PT, suctioning if needed, and mucomyst nebs as able.   - Resuming home therapies.     Abnormal thyroid lab  Elevated TSH at 19, normal free T4  -Unlikely patient has underlying myxedema coma with normal free T4  - Started on increased dose of PTA levothyroxine at admission.     History of VTE, PE  - On DOAC at home. Can determine timeline of previous VTE.      Physical deconditioning  - Has been confined to bed at home.   - Once medically improved, can discuss further with SW regarding home services, but family hoping patient will return home.     Multiple wounds, poor skin care  -Reportedly on a bedbound state at home  -Wound care evaluation  -Dietary service evaluation      DVT Prophylaxis: DOAC  Code Status: No CPR- Do NOT Intubate. Goals are currently restorative.              Diet: Combination Diet Easy to Chew (level 7); Thin Liquids (level 0)  Snacks/Supplements Adult: Ensure Enlive; With Meals    DVT Prophylaxis: DOAC  Kinney Catheter: Not present  Lines: None     Cardiac Monitoring: ACTIVE order. Indication: Electrolyte  Imbalance (24 hours)- Magnesium <1.3 mg/ml; Potassium < =2.8 or > 5.5 mg/ml  Code Status: No CPR- Do NOT Intubate      Clinically Significant Risk Factors        # Hypokalemia: Lowest K = 3.1 mmol/L in last 2 days, will replace as needed     # Hypomagnesemia: Lowest Mg = 1.6 mg/dL in last 2 days, will replace as needed   # Hypoalbuminemia: Lowest albumin = 3 g/dL at 2/7/2023  6:20 AM, will monitor as appropriate                   Disposition Plan      Expected Discharge Date: anticipate 2-3 days  Destination: home;home with family;home with help/services  Discharge Comments: comes from home with son          Al Alejandromary MD Ceci, MD  Hospitalist Service  [facility]  Securely message with [employer] (more info)  Text page via [employer] Paging/Directory   ______________________________________________________________________    Interval History   I assumed service care today. Seen and examined  Case discussed with nursing service. Family updated over the phone.  She is clinically improved. No further hypothermia  Currently at baseline O2 support.  No nausea/vomiting. Awake, conversant and following some simple verbal instructions.      Physical Exam   Vital Signs: Temp: 99.2  F (37.3  C) Temp src: Temporal BP: (!) 134/110 Pulse: 78   Resp: 16 SpO2: (!) 84 % (couldnt get a good reading) O2 Device: Nasal cannula Oxygen Delivery: 3 LPM  Weight: 148 lbs 0 oz    Constitutional: Older woman seen resting in bed. Patient alert, answering questions appropriately. Oriented to person and that she is in the hospital. Does not appear toxic. No diaphoresis. No acute distress.   HEENT: NCAT. Making eye contact today, EOMI. Moist oral mucosa.  Respiratory: Decreased chest rise on left compared to right though aeration appreciated, though still diminished. Lungs clear on the right. No crackles appreciated. No increased work of breathing though minimal effort noted. On 3L O2 by NC which is her home O2.    Cardiovascular: Regular rate and rhythm. No murmur.  GI: Soft, nontender, nondistended. Normoactive bowel sounds.   Genitourinary: Clear, yellow urine output noted in Kinney catheter.  Musculoskeletal: No gross deformities.   Neurologic: Alert. Answering questions appropriately. Oriented to person and that she is in the hospital. Chronic left-sided weakness reported. No acute focal neurologic deficit. Normal speech          Medical Decision Making       50 MINUTES SPENT BY ME on the date of service doing chart review, history, exam, documentation & further activities per the note.  MANAGEMENT DISCUSSED with the following over the past 24 hours: yes   NOTE(S)/MEDICAL RECORDS REVIEWED over the past 24 hours: yes  Tests ORDERED & REVIEWED in the past 24 hours:  Tests ORDERED in the past 24 hours:       Data     I have personally reviewed the following data over the past 24 hrs:    7.9  \   10.5 (L)   / 162     141 107 9.1 /  100 (H)   3.7 27 0.43 (L) \       Imaging results reviewed over the past 24 hrs:   Recent Results (from the past 24 hour(s))   XR Chest Port 1 View    Narrative    CHEST ONE VIEW  2/7/2023 4:19 PM     HISTORY: Previous mucus plug and atelectasis on left. Evaluate for  improvement.    COMPARISON: February 5, 2023      Impression    IMPRESSION: Probably some improvement on the left although there is  extensive consolidation persisting. Evaluation limited by position.  Right lung clear.    CLEMENCIA GAMBLE MD         SYSTEM ID:  [id number]

## 2023-02-08 NOTE — CONSULTS
Neurology Consult Note  [facility]       [2023]                                                                                       Admission Date: 2023  Hospital Day: 3      Patient: Marino Prajapati      : 1935  MRN:  [id number]     CC:      Chief Complaint   Patient presents with     Altered Mental Status       Consult Request:  Referring Provider:  Patricio Ornelas MD  Primary Care Provider:  [facility] Lainey YE        HPI:  Marino Prajapati is a 87 year old yo female admitted for an episode of alteration of mental status. Her caregiver reports that she had shallow irregular breathing and uncontrolled jerking of right upper extremity at home.She had decreased responsiveness and blood on her lips in the ER. She had a witnessed seizure in the ER. Patient was started on Keppra. She is alert and responsive today. No h/o seizures in the past.  She had 1/2 positive blood culture.            A complete review of symptoms was performed including vascular, infectious, cardiovascular, pulmonary, gastrointestinal, endocrinological, hematologic, dermatologic, musculoskeletal, and neurological. All were normal except as above.    PAST MEDICAL HISTORY:  ALLERGIES:   Allergies   Allergen Reactions     Atorvastatin Muscle Pain (Myalgia)     PN: LW Reaction: myalgias  PN: LW Reaction: myalgias       Lisinopril Cough     PN: LW Reaction: cough  PN: LW Reaction: cough       Other Environmental Allergy      PN: LW FI1: none     Tobacco:    History   Smoking Status     Former     Packs/day: 1.00     Years: 29.00     Types: Cigarettes     Quit date: 10/17/1966   Smokeless Tobacco     Never     Alcohol:  Social History    Substance and Sexual Activity      Alcohol use: Yes        Comment: 4 drinks yearly    MEDICATIONS:       CURRENTLY SCHEDULED MEDICATIONS     acetylcysteine  2 mL Nebulization Q4H     [START ON 2023] aspirin  81 mg Oral Daily     bisacodyl   10 mg Rectal Once     dextromethorphan-guaiFENesin  1 tablet Oral BID     [START ON 2/8/2023] fluconazole  150 mg Oral Weekly     fluticasone-vilanterol  1 puff Inhalation Daily     ipratropium - albuterol 0.5 mg/2.5 mg/3 mL  3 mL Nebulization 4x daily     levETIRAcetam  500 mg Intravenous Q12H     [START ON 2/8/2023] multivitamin w/minerals  1 tablet Oral Daily     piperacillin-tazobactam  3.375 g Intravenous Q6H     pravastatin  20 mg Oral Daily     rivaroxaban ANTICOAGULANT  20 mg Oral Daily with supper     sodium chloride (PF)  3 mL Intracatheter Q8H     terazosin  2 mg Oral At Bedtime     umeclidinium  1 puff Inhalation Daily          HOME MEDICATIONS  Medications Prior to Admission   Medication Sig Dispense Refill Last Dose     acetaminophen (TYLENOL) 500 MG tablet Take 1,000 mg by mouth 2 times daily Takes with tramadol.   2/5/2023 at 0200     albuterol (PROAIR HFA/PROVENTIL HFA/VENTOLIN HFA) 108 (90 Base) MCG/ACT inhaler Inhale 2 puffs into the lungs every 6 hours as needed for shortness of breath / dyspnea or wheezing 3 Inhaler 1 Past Week     aspirin (ASA) 81 MG chewable tablet Take 1 tablet (81 mg) by mouth daily   2/5/2023 at 0200     budesonide-formoterol (SYMBICORT) 160-4.5 MCG/ACT Inhaler Inhale 2 puffs into the lungs 2 times daily (Patient taking differently: Inhale 2 puffs into the lungs 2 times daily as needed) 30.6 g 3 Unknown     cholecalciferol (VITAMIN D) 1000 UNIT tablet Take 1,000 Units by mouth daily  100 tablet 3 2/5/2023 at 0200     ipratropium - albuterol 0.5 mg/2.5 mg/3 mL (DUONEB) 0.5-2.5 (3) MG/3ML neb solution INHALE CONTENTS OF 1 VIAL (3 MLS) VIA NEBULIZER EVERY 6 HOURS AS NEEDED FOR SHORTNESS OF BREATH, DYSPNEA, OR WHEEZING 1000 mL 1 Unknown     nystatin (MYCOSTATIN) 535223 UNIT/GM external powder Apply topically 2 times daily as needed (skin rash) 60 g 3 2/4/2023 at PM     pravastatin (PRAVACHOL) 20 MG tablet TAKE 1 TABLET DAILY 90 tablet 0 2/5/2023 at 0200     rivaroxaban  ANTICOAGULANT (XARELTO ANTICOAGULANT) 20 MG TABS tablet Take 1 tablet (20 mg) by mouth daily (with dinner) 90 tablet 1 2/5/2023 at 0200     terazosin (HYTRIN) 2 MG capsule TAKE 1 CAPSULE AT BEDTIME 90 capsule 0 2/5/2023 at 0200     tiotropium (SPIRIVA) 18 MCG inhaled capsule Inhale 1 capsule (18 mcg) into the lungs daily 90 capsule 1 Unknown     traMADol (ULTRAM) 50 MG tablet TAKE 1 TABLET THREE TIMES A DAY AS NEEDED FOR SEVERE PAIN 90 tablet 0 2/5/2023 at 0200     nystatin (MYCOSTATIN) 883421 UNIT/GM external powder Apply topically 2 times daily 60 g 1      nystatin (MYCOSTATIN) 433021 UNIT/GM external powder Apply topically 2 times daily 60 g 1      MEDICAL HISTORY  Past Medical History:   Diagnosis Date     Anemia      CARDIOVASCULAR SCREENING; LDL GOAL LESS THAN 160      Colon polyps 2010    needs colonoscopy 2013     DVT (deep venous thrombosis) (H) 2007    remote history of DVT while she was on BCP ,coumadin stopped after retroperitoneal/buttock hematoma in 10/11 . On ASA only     Gastro-oesophageal reflux disease      HTN, goal below 140/90      Hyperlipidemia LDL goal <130 1/22/2016     Kidney stone      Osteoarthritis     knees and hip     Osteoporosis      Postnasal drip      S/P total knee arthroplasty      Thrombosis of leg      SURGICAL HISTORY  Past Surgical History:   Procedure Laterality Date     ARTHROPLASTY HIP  8/1/2013    Procedure: ARTHROPLASTY HIP;  RIGHT TOTAL HIP ARTHROPLASTY;  Surgeon: Rufino Tong MD;  Location:  OR     ARTHROPLASTY HIP Left 12/12/2014    Procedure: ARTHROPLASTY HIP;  Surgeon: Rufino Tong MD;  Location:  OR     ARTHROPLASTY KNEE  10/22/2012    Procedure: ARTHROPLASTY KNEE;  Right Total knee Arthroplasty  ;  Surgeon: Jagdeep Virgen MD;  Location: [facility] OR     ARTHROPLASTY KNEE  5/23/2013    Procedure: ARTHROPLASTY KNEE;  LEFT TOTAL KNEE ARTHROPLASTY (SMITH & NEPHEW)^;  Surgeon: Rufino Tong MD;  Location:  OR     CHOLECYSTECTOMY       CYSTOSCOPY,  "RETROGRADES, INSERT STENT URETER(S), COMBINED  2012    Procedure: COMBINED CYSTOSCOPY, RETROGRADES, INSERT STENT URETER(S);  Cystoscopy, Right retrogrades, Right Stent Placement;  Surgeon: Mauricio Velazquez MD;  Location: [facility] OR     LASER HOLMIUM LITHOTRIPSY URETER(S), INSERT STENT, COMBINED  2012    Procedure: COMBINED CYSTOSCOPY, URETEROSCOPY, LASER HOLMIUM LITHOTRIPSY URETER(S), INSERT STENT;  Cystoscopy, Stent Removal, Right Ureteroscopy with Holmium Laser, Stone removal ;  Surgeon: Mauricio Velazquez MD;  Location: [facility] OR     LIPOSUCTION (LOCATION)      tummy     STRIP VEIN       ZZC PREP FACE/ORAL PROST PALATAL LIFT       FAMILY HISTORY    Family History   Problem Relation Age of Onset     Breast Cancer Mother      Circulatory Father         Blood clots     SOCIAL HISTORY  Social History     Socioeconomic History     Marital status:    Tobacco Use     Smoking status: Former     Packs/day: 1.00     Years: 29.00     Pack years: 29.00     Types: Cigarettes     Quit date: 10/17/1966     Years since quittin.3     Smokeless tobacco: Never   Vaping Use     Vaping Use: Never used   Substance and Sexual Activity     Alcohol use: Yes     Comment: 4 drinks yearly     Drug use: No     Sexual activity: Not Currently     Partners: Male            Height: 167.6 cm (5' 5.98\") (from last documented height)     Temp: 97.7  F (36.5  C)   Weight: 67.4 kg (148 lb 8 oz)    Temp src: Temporal         BP: (!) 162/80         Estimated body mass index is 23.98 kg/m  as calculated from the following:    Height as of this encounter: 1.676 m (5' 5.98\").    Weight as of this encounter: 67.4 kg (148 lb 8 oz).    Resp: 18   SpO2: 94 %   O2 Device: Nasal cannula     Blood Pressure:   BP Readings from Last 3 Encounters:   23 (!) 162/80   21 130/83   21 131/88     T24 : Temp (24hrs), Av.3  F (36.8  C), Min:97.7  F (36.5  C), Max:99.3  F (37.4  C)       GENERAL EXAMINATION:  HEENT: NAMRATA, " EOMI.  Neck: Supple.      NEUROLOGICAL EXAMINATION  Mental Status:  Patient is awake, alert, and oriented X3. Speech and language functions are normal.       Cranial Nerves: Pupils are equal and reactive to light.Visual fields are full to confrontation. Extraocular movements are intact. There is no nystagmus in the horizontal or vertical planes. No facial weakness. The tongue is midline.     Motor: She can move her upper extremities.       .  LABORATORY RESULTS      Recent Labs   Lab 02/07/23  0620 02/06/23  0636 02/05/23  1701   WBC 9.1 9.8 10.5   HGB 10.5* 11.9 13.4  13.6   HCT 32.0* 35.2 40.7  40   MCV 96 95 95    198 213     Recent Labs   Lab 02/07/23  1322 02/07/23  0620 02/06/23  1333 02/06/23  0636 02/05/23  1701   NA  --  142  --  143 142  142   POTASSIUM 3.4 3.1* 3.7 3.3* 3.7  3.5   CHLORIDE  --  106  --  105 101  101   CO2  --  26  --  26 30*   ANIONGAP  --  10  --  12 11   GLC  --  88  --  93 108*  109*   BUN  --  10.9  --  12.2 17  14.9   CR  --  0.42*  --  0.42* 0.36*  0.4*   GFRESTIMATED  --  >90  --  >90 >90   SANDRA  --  8.5*  --  8.5* 9.3   MAG  --  1.8  --  1.6*  --    PROTTOTAL  --  5.6*  --  5.8* 6.6   ALBUMIN  --  3.0*  --  3.3* 3.7   BILITOTAL  --  0.8  --  0.7 0.6   ALKPHOS  --  79  --  89 103   AST  --  27  --  27 29   ALT  --  16  --  16 19     Recent Labs   Lab 02/05/23  1733   COLOR Yellow   APPEARANCE Slightly Cloudy*   URINEGLC Negative   URINEBILI Negative   URINEKETONE Trace*   SG 1.020   UBLD Trace*   URINEPH 7.0   PROTEIN 10*   NITRITE Negative   LEUKEST Negative   RBCU 4*   WBCU 3       IMAGING RESULTS     Recent Results (from the past 24 hour(s))   XR Chest Port 1 View    Narrative    CHEST ONE VIEW  2/7/2023 4:19 PM     HISTORY: Previous mucus plug and atelectasis on left. Evaluate for  improvement.    COMPARISON: February 5, 2023      Impression    IMPRESSION: Probably some improvement on the left although there is  extensive consolidation persisting. Evaluation  limited by position.  Right lung clear.       _____________________________________________________________________________      _____________________________________________________________________________          ASSESSMENT     1. Witnessed seizure in the ER- No acute events on head CT. Patient had small vessel disease. Her seizure could be related to SVID vs neurodegeneration.  2. Mental status- improved        RECOMMENDATIONS   1. Continue Keppra.

## 2023-02-08 NOTE — PLAN OF CARE
Goal Outcome Evaluation:      Plan of Care Reviewed With: patient, spouse      Pt alert and confused. VSS, hypertensive and using 3.0L O2 via nasal cannula. Pt coughing and suction used to clear secretions. Not OOB on this shift. Total cares. Significant other, Ghassan, was at the bedside. Tolerating easy to chew diet, requires feeding. Purewick in place. Tele in place. IV keppra and Zosyn. Denies pain. Prevalon boots in place. Inter-dry under left breast, under left arm and in left groin area. Potassium and Mg protocols.

## 2023-02-09 NOTE — PLAN OF CARE
Pt is alert to self. Pt denies pain or discomfort. VSS. Pt is on 02 3L NC. pt has a pure wick in place. Pt was suctioned and given her nebs during the shift.  Pt was given her antibiotics Zosyn during the shift. Pt has Mepilex on her coccyx. Pt is incontinent of bowel and bladder. Pt has a pure wick in place.  Pt is on K+ and recheck in the morning. Pt is sleeping in bed, bed alarm in place.

## 2023-02-09 NOTE — PROGRESS NOTES
Care Management Follow Up    Length of Stay (days): 4    Expected Discharge Date: 02/13/2023     Concerns to be Addressed:       Patient plan of care discussed at interdisciplinary rounds: Yes    Anticipated Discharge Disposition:       Anticipated Discharge Services:    Anticipated Discharge DME:      Patient/family educated on Medicare website which has current facility and service quality ratings:    Education Provided on the Discharge Plan:    Patient/Family in Agreement with the Plan:      Referrals Placed by CM/SW:    Private pay costs discussed: Not applicable    Additional Information:    Spoke with pt spouse over the phone to discuss discharge planning. Pt explains that he still plans to bring pt home to care for her. Pt explains that he would like notice as soon as possible once pt is ready for discharge with stretcher transport time as he hopes to have family at home to assist in pt getting situated at home. Inquired about homecare for pt in which pt spouse explains that he is not sure that they would want homecare since they are apart of the complex care for seniors program through [facility] where an MD and RN checks on pt.     CYRUS Ojeda, DEBBIE  Inpatient Care Coordination  Ortho/Spine Unit  [phone]  Kinga Ogden, DEBBIE       Your home care referral was sent to [facility].  If you haven't heard from them within the next 24-48 hours,  Please call them at [phone]        Return to clinic in10-14 days. Call [phone] to schedule or if you experience any problems before your scheduled appointment.      1. Do exercises as instructed by therapist.  2. Observe your hip precautions.  3. Walk daily increasing distance and time each day by a little.  4. Ice hip for swelling and discomfort.  5. May shower, inspect dressing daily for problems listed below   6.Notify your dentist or physician of your implant so you can get antibiotics before any dental work or any other invasive procedure (ie: colonoscopy).      Aquacel dressing:  DO NOT CHANGE DRESSING DAILY.  Leave this dressing on until follow-up appointment with Orthopedic Surgeon.  Dressing is waterproof, can shower with it on, pat dry when done.  No soaking such as in tub baths, pools or hot tubs        While on narcotic pain medication, to prevent constipation:  1. Drink plenty of water to keep well hydrated   2. May take over the counter Colace or Senna (follow instructions on label)       Call your physician if: ([phone])   1. Persistent fever greater than 101 degrees with body chills or excessive sweating.  2. Increased/persistent redness, localized warmth, tenderness, drainage or swelling at dressing site. Greater than 50% drainage on dressing.   3. Pain not controlled with oral pain medications, ice and rest.   4. No bowel movement in 3 days (may use Milk of Magnesia or other over the counter remedy).  5. Chest pain, shortness of breath, and/or calf pain with excessive swelling.  6. Generalized feeling of illness such as nausea/vomiting and/or lightheaded/dizziness.  7. Any other questions or concerns related to your surgery/recovery.        Thank you for allowing [facility] to participate in your cares!!

## 2023-02-09 NOTE — PROGRESS NOTES
A & Ox3, forgetful, but more alert/aware this shift. Purewick in place, changed this shift. 2x incontinent BM, loose. On 3L NC, baseline for pt. IV ABX given. Productive cough, infrequent suctioning of phlegm.

## 2023-02-09 NOTE — PLAN OF CARE
Goal Outcome Evaluation:      Plan of Care Reviewed With: patient, family    Overall Patient Progress: improvingOverall Patient Progress: improving     Pt c/o pain under rt breast/rib. Fair control with tylenol, ultram & ice. Bed mobility with 2 assist. Good appetite- heidi easy chew diet. Good urine output per purewik. BLE edema, productive cough at times- oral suction with yanker. Left hip drsg CD&I. Sacral mepelex reapplied. Wound care done under breasts and abd folds

## 2023-02-09 NOTE — PROGRESS NOTES
[facility]    Medicine Progress Note - Hospitalist Service    Date of Admission:  2/5/2023    Assessment & Plan   Marino Prajapati is a 87 year old woman who was admitted on 2/5/2023. PMH significant for COPD, prior PE on maintenance DOAC, GERD, DJD, hypertension, anemia, osteoarthritis, prior DVT, and GERD. Patient lives at home with her . She is confined to bed and under the care of [facility] services including home physician visits. Patient presented to the ER on 2/5/23 due to increased disorientation with lethargy and ultimately being unresponsive at home. Blood pressure was elevated, glucose 88, and patient with multiple sores noted to hips, arms, under breast. T 95 in the ER. Lactic acidosis was noted and subsequently improved. Patient did have a witnessed seizure in the ER. Patient admitted to hospitalist service for further evaluation and treatment.      Encephalopathy  Metabolic encephalopathy with accompanying hypothermia  High suspicion for underlying sepsis with lactic acidosis, tachycardia, hypothermia with unclear source, possible soft tissue infection  Witnessed seizure in the emergency room  - Admitted as inpatient. Intubation was considered, however DNR/DNI was confirmed with family. Goals are restorative with hopes for return to home with family.   - Continue supportive care.  - Initially treated with vancomycin and Zosyn. Zosyn was continued. Did have what appears to be contaminant in 1/2 cultures and received additional dose of vancomycin night of 2/6, but given coag neg staph and likely contaminant, will not continue. Continue to follow blood cultures.   -Continues to demonstrate no leukocytosis, no fever spikes  -May be able to stop antibiotics in the next 24 hours if no further growth from cultures and continues to show significant improvement.  - CT of the head showed no obvious hemorrhage or major infarct  - Seizure precautions.  Switch Keppra to  oral route  -Appreciate input from general neurology   - Patient more alert 2/7 and diet advanced per SLP.  - No evidence of UTI. Kinney removed earlier  - Marked improvement in mentation 2/7 compared to 2/6. Continue antibiotics, fluids, and Keppra at this time. Continue to monitor.    Candidiasis  - Patient with evidence of likely candidiasis rash to skin with areas of ulceration. Had been being treated with weekly diflucan as outpatient. As unable to take PO and with concern for sepsis on admission, did treat as systemic candidiasis initially with fluconazole 800 mg IV x 1 followed by 400 mg daily. However, by 2/7, patient able to take PO and has improved mentation significantly. Resuming outpatient fluconazole 150 mg PO weekly at this time  - Wound care following with inter-dry applied to wettest area.     History of COPD  Mucus-plugging and left-sided atelectasis  - Repeat CXR 2/7 continues to show some consolidation vs atelectasis, but there is improved aeration on the left and may be due to degree of patient's positioning on that side.   - Appreciate RT following with chest PT, suctioning if needed, and mucomyst nebs as able.   - Resuming home therapies.   -Receiving Mucomyst  -Repeat chest x-ray in the morning      Abnormal thyroid lab  Elevated TSH at 19, normal free T4  -Unlikely patient has underlying myxedema coma with normal free T4  -    -Can be followed up as outpatient.  May be related to her acute illness  -Not on thyroid hormone replacement prior to admission  History of VTE, PE  - On DOAC at home. Can determine timeline of previous VTE.      Physical deconditioning  - Has been confined to bed at home.   - Once medically improved, can discuss further with  regarding home services, but family hoping patient will return home.     Multiple wounds, poor skin care  -Reportedly on a bedbound state at home  -Wound care evaluation  -Dietary service evaluation                 Diet: Snacks/Supplements  Adult: Ensure Enlive; With Meals  Combination Diet Regular Diet    DVT Prophylaxis: DOAC  Kinney Catheter: Not present  Lines: None     Cardiac Monitoring: None  Code Status: No CPR- Do NOT Intubate      Clinically Significant Risk Factors            # Hypomagnesemia: Lowest Mg = 1.6 mg/dL in last 2 days, will replace as needed   # Hypoalbuminemia: Lowest albumin = 3 g/dL at 2/9/2023  7:09 AM, will monitor as appropriate                   Disposition Plan      Expected Discharge Date: anticipate 1-2 more days  Destination: home;home with family;home with help/services  Discharge Comments: comes from home with son          Patricio Schultz MD Ceci, MD  Hospitalist Service  [facility]  Securely message with [employer] (more info)  Text page via [employer] Paging/Directory   ______________________________________________________________________    Interval History   Continuing service care today. Seen and examined  Case discussed with nursing service.  Ms. Pichardo continues to demonstrate improvement as she is more conversant, participatory and following simple verbal instructions.  Hard of hearing but can provide a decent conversation.  She is clinically improved. No further hypothermia  Currently at baseline O2 support.  Reportedly tolerating oral diet.      Physical Exam   Vital Signs: Temp: 98.6  F (37  C) Temp src: Temporal BP: (!) 188/76 Pulse: 89   Resp: 16 SpO2: 90 % O2 Device: Nasal cannula with humidification Oxygen Delivery: 3 LPM  Weight: 148 lbs 0 oz    Constitutional: Older woman seen resting in bed. Patient alert, answering questions appropriately. Oriented to person and that she is in the hospital. Does not appear toxic. No diaphoresis. No acute distress.   HEENT: NCAT. Making eye contact today, EOMI. Moist oral mucosa.  Respiratory:  Decreased breath sounds left greater than right but no wheezing, no stridor  Cardiovascular: Regular rate and rhythm. No murmur.  GI: Soft, nontender,  nondistended. Normoactive bowel sounds.   Genitourinary: Clear, yellow urine output noted in Kinney catheter.  Musculoskeletal: No gross deformities.   Neurologic: Alert. Answering questions appropriately. Oriented to person and that she is in the hospital. Chronic left-sided weakness reported. No acute focal neurologic deficit. Normal speech          Medical Decision Making       50 MINUTES SPENT BY ME on the date of service doing chart review, history, exam, documentation & further activities per the note.  MANAGEMENT DISCUSSED with the following over the past 24 hours: yes   NOTE(S)/MEDICAL RECORDS REVIEWED over the past 24 hours: yes  Tests ORDERED & REVIEWED in the past 24 hours:  Tests ORDERED in the past 24 hours:       Data     I have personally reviewed the following data over the past 24 hrs:    9.2  \   10.6 (L)   / 163     139 104 7.0 (L) /  104 (H)   3.4 29 0.41 (L) \       ALT: 14 AST: 18 AP: 67 TBILI: 0.6   ALB: 3.0 (L) TOT PROTEIN: 5.4 (L) LIPASE: N/A       Imaging results reviewed over the past 24 hrs:   No results found for this or any previous visit (from the past 24 hour(s)).

## 2023-02-10 NOTE — PROGRESS NOTES
[facility]    Medicine Progress Note - Hospitalist Service    Date of Admission:  2/5/2023    Assessment & Plan   Marino Prajapati is a 87 year old woman who was admitted on 2/5/2023. PMH significant for COPD, prior PE on maintenance DOAC, GERD, DJD, hypertension, anemia, osteoarthritis, prior DVT, and GERD. Patient lives at home with her . She is confined to bed and under the care of [facility] services including home physician visits. Patient presented to the ER on 2/5/23 due to increased disorientation with lethargy and ultimately being unresponsive at home. Blood pressure was elevated, glucose 88, and patient with multiple sores noted to hips, arms, under breast. T 95 in the ER. Lactic acidosis was noted and subsequently improved. Patient did have a witnessed seizure in the ER. Patient admitted to hospitalist service for further evaluation and treatment.      Encephalopathy  Metabolic encephalopathy with accompanying hypothermia  High suspicion for underlying sepsis with lactic acidosis, tachycardia, hypothermia with unclear source, possible soft tissue infection  Witnessed seizure in the emergency room  - Admitted as inpatient. Intubation was considered, however DNR/DNI was confirmed with family. Goals are restorative with hopes for return to home with family.   - Continue supportive care.  - Initially treated with vancomycin and Zosyn. Zosyn was continued. Did have what appears to be contaminant in 1/2 cultures and received additional dose of vancomycin night of 2/6, but given coag neg staph and likely contaminant, will not continue. Continue to follow blood cultures.   -Continues to demonstrate no leukocytosis, no fever spikes  -We will stop Zosyn after today.  She is clinically improved, no significant leukocytosis, remained afebrile  -Procalcitonin reassuring    - CT of the head showed no obvious hemorrhage or major infarct  - Seizure precautions.  Switch Keppra  to oral route  -Earlier seizures can be secondary to underlying metabolic issues with hypothermia, hypoxia  -Appreciate input from general neurology   - Patient more alert 2/7 and diet advanced per SLP.  - No evidence of UTI. Kinney removed earlier  - Marked improvement in mentation 2/7 compared to 2/6. Continue antibiotics, fluids, and Keppra at this time. Continue to monitor.    Candidiasis  - Patient with evidence of likely candidiasis rash to skin with areas of ulceration. Had been being treated with weekly diflucan as outpatient. As unable to take PO and with concern for sepsis on admission, did treat as systemic candidiasis initially with fluconazole 800 mg IV x 1 followed by 400 mg daily. However, by 2/7, patient able to take PO and has improved mentation significantly. Resuming outpatient fluconazole 150 mg PO weekly at this time  - Wound care following with inter-dry applied to wettest area.     History of COPD  Mucus-plugging and left-sided atelectasis  - Repeat CXR 2/7 continues to show some consolidation vs atelectasis, but there is improved aeration on the left and may be due to degree of patient's positioning on that side.   - Appreciate RT following with chest PT, suctioning if needed, and mucomyst nebs as able.   - Resuming home therapies.   -Receiving Mucomyst  -Repeat chest x-ray this morning showing much improved left lung aeration and atelectasis  -Can decrease breathing treatments frequency  -Remain on mucolytic's      Abnormal thyroid lab  Elevated TSH at 19, normal free T4  -Unlikely patient has underlying myxedema coma with normal free T4  -Can be followed up as outpatient.  May be related to her acute illness  -Not on thyroid hormone replacement prior to admission     History of VTE, PE  - On DOAC at home. Can determine timeline of previous VTE.      Physical deconditioning  - Has been confined to bed at home.   - Once medically improved, can discuss further with SW regarding home services,  but family hoping patient will return home.     Multiple wounds, poor skin care  -Reportedly on a bedbound state at home  -Wound care evaluation  -Dietary service evaluation                 Diet: Snacks/Supplements Adult: Ensure Enlive; With Meals  Combination Diet Regular Diet    DVT Prophylaxis: DOAC  Kinney Catheter: Not present  Lines: None     Cardiac Monitoring: None  Code Status: No CPR- Do NOT Intubate      Clinically Significant Risk Factors            # Hypomagnesemia: Lowest Mg = 1.6 mg/dL in last 2 days, will replace as needed   # Hypoalbuminemia: Lowest albumin = 3 g/dL at 2/9/2023  7:09 AM, will monitor as appropriate                   Disposition Plan      Expected Discharge Date: anticipate 1-2 more days  Destination: home;home with family;home with help/services  Discharge Comments: comes from home with partner of 26 years helping her          Al Antoine Ornelas MD, MD  Hospitalist Service  [facility]  Securely message with [employer] (more info)  Text page via [employer] Paging/Directory   ______________________________________________________________________    Interval History   Continuing service care today. Seen and examined  Case discussed with nursing service.    Ms. Pichardo continues to demonstrate improvement with no significant reported events overnight.  Currently at baseline oxygen support.   Afebrile.  Reportedly able to tolerate oral diet with assistance  Hard of hearing but can provide a decent conversation.  She is clinically improved. No further hypothermia        Physical Exam   Vital Signs: Temp: 97.9  F (36.6  C) Temp src: Temporal BP: (!) 143/57 Pulse: 78   Resp: 20 SpO2: 93 % O2 Device: Nasal cannula Oxygen Delivery: 4 LPM  Weight: 148 lbs 0 oz    Constitutional: Older woman seen resting in bed. Patient alert, answering questions appropriately. Oriented to person and that she is in the hospital. Does not appear toxic. No diaphoresis. No acute distress.   HEENT:  NCAT. Making eye contact today, EOMI. Moist oral mucosa.  Respiratory:  Decreased breath sounds left greater than right but no wheezing, no stridor  Cardiovascular: Regular rate and rhythm. No murmur.  GI: Soft, nontender, nondistended. Normoactive bowel sounds.   Genitourinary: Clear, yellow urine output noted in Kinney catheter.  Musculoskeletal: No gross deformities.   Neurologic: Alert. Answering questions appropriately.  Following simple verbal instructions  Chronic left-sided weakness reported. No acute focal neurologic deficit. Normal speech          Medical Decision Making       35 MINUTES SPENT BY ME on the date of service doing chart review, history, exam, documentation & further activities per the note.  MANAGEMENT DISCUSSED with the following over the past 24 hours: yes   NOTE(S)/MEDICAL RECORDS REVIEWED over the past 24 hours: yes  Tests ORDERED & REVIEWED in the past 24 hours:  Tests ORDERED in the past 24 hours:       Data     I have personally reviewed the following data over the past 24 hrs:    N/A  \   N/A   / N/A     N/A N/A N/A /  N/A   3.5 N/A N/A \       Imaging results reviewed over the past 24 hrs:   No results found for this or any previous visit (from the past 24 hour(s)).

## 2023-02-10 NOTE — PROGRESS NOTES
Goal Outcome Evaluation:       Plan of Care Reviewed With: patient     Overall Patient Progress: improvingOverall      Patient is A/O, forgetful. pain controlled with Tylenol and Tramadol. Amarilis regular diet- requires feeding. Good urine output per purewik. Productive cough. O2 at 4L per NC for sats >90-92%. IV zosyn. On K and Mg protocol rechecks in AM. Possible discharge home tomorrow.

## 2023-02-10 NOTE — PLAN OF CARE
Goal Outcome Evaluation:      Plan of Care Reviewed With: patient    Overall Patient Progress: improvingOverall Patient Progress: improving     Better pain control, pt able to rest between cares. Increased alert & more oriented. Amarilis regular diet- requires feeding. Good urine output per purewik. BM. Productive cough. See flowsheet for wound cares. O2 at 3L per NC for sats >90-92%

## 2023-02-10 NOTE — PLAN OF CARE
Goal Outcome Evaluation:      Pt is alert and oriented, forgetful. IV Zosyn for antibiotics, awaiting final blood cultures. External cath in place. Tramadol and tylenol for pain. Home with family when medically stable.

## 2023-02-11 NOTE — PROGRESS NOTES
[facility]    Medicine Progress Note - Hospitalist Service    Date of Admission:  2/5/2023    Assessment & Plan   Marino Prajapati is a 87 year old woman who was admitted on 2/5/2023. PMH significant for COPD, prior PE on maintenance DOAC, GERD, DJD, hypertension, anemia, osteoarthritis, prior DVT, and GERD. Patient lives at home with her . She is confined to bed and under the care of [facility] services including home physician visits. Patient presented to the ER on 2/5/23 due to increased disorientation with lethargy and ultimately being unresponsive at home. Blood pressure was elevated, glucose 88, and patient with multiple sores noted to hips, arms, under breast. T 95 in the ER. Lactic acidosis was noted and subsequently improved. Patient did have a witnessed seizure in the ER. Patient admitted to hospitalist service for further evaluation and treatment.      Encephalopathy  Metabolic encephalopathy with accompanying hypothermia  High suspicion for underlying sepsis with lactic acidosis, tachycardia, hypothermia with unclear source, possible soft tissue infection  Witnessed seizure in the emergency room  - Admitted as inpatient. Intubation was considered, however DNR/DNI was confirmed with family. Goals were restorative with hopes for return to home with family.   - Continue supportive care.  - Initially treated with vancomycin and Zosyn. Zosyn was continued. Did have what appears to be contaminant in 1/2 cultures and received additional dose of vancomycin night of 2/6, but given coag neg staph and likely contaminant, will not continue. Continue to follow blood cultures.  All cultures are negative till February 11  -Leukocytosis has resolved  -Off all antibiotics    Seizure disorder:  - CT of the head showed no obvious hemorrhage or major infarct  - Seizure precautions.  Switch Keppra to oral route  -Earlier seizures can be secondary to underlying metabolic issues with  hypothermia, hypoxia  -Appreciate input from general neurology   - Patient more alert 2/7 and diet advanced per SLP.  - No evidence of UTI. Kinney removed earlier  - Marked improvement in mentation 2/7 compared to 2/6.   However is fairly drowsy and sleeps a lot    Candidiasis  - Patient with evidence of likely candidiasis rash to skin with areas of ulceration. Had been being treated with weekly diflucan as outpatient. As unable to take PO and with concern for sepsis on admission, did treat as systemic candidiasis initially with fluconazole 800 mg IV x 1 followed by 400 mg daily. However, by 2/7, patient able to take PO and has improved mentation significantly. Resuming outpatient fluconazole 150 mg PO weekly at this time  - Wound care following with inter-dry applied to wettest area.     History of COPD  Mucus-plugging and left-sided atelectasis  - Repeat CXR 2/7 continues to show some consolidation vs atelectasis, but there is improved aeration on the left and may be due to degree of patient's positioning on that side.   - Appreciate RT following with chest PT, suctioning if needed, and mucomyst nebs as able.   - Resuming home therapies.   -Receiving Mucomyst  Chest x-ray shows significant improvement, last x-ray done February 11, oxygen needs unchanged and typically uses 4 L at home and that is what she is needing at this time  -Can decrease breathing treatments frequency  -Remain on mucolytic's      Abnormal thyroid lab  Elevated TSH at 19, normal free T4  -Unlikely patient has underlying myxedema coma with normal free T4  -Can be followed up as outpatient.  May be related to her acute illness  -Not on thyroid hormone replacement prior to admission     History of VTE, PE  - On DOAC at home. Can determine timeline of previous VTE.      Physical deconditioning  - Has been confined to bed at home.   - Once medically improved, can discuss further with SW regarding home services, but family hoping patient will return  home.     Multiple wounds, poor skin care  -Reportedly on a bedbound state at home  -Wound care evaluation  -Dietary service evaluation                 Diet: Snacks/Supplements Adult: Ensure Enlive; With Meals  Combination Diet Regular Diet    DVT Prophylaxis: DOAC  Kinney Catheter: Not present  Lines: None     Cardiac Monitoring: None  Code Status: No CPR- Do NOT Intubate      Clinically Significant Risk Factors              # Hypoalbuminemia: Lowest albumin = 3 g/dL at 2/9/2023  7:09 AM, will monitor as appropriate                   Disposition Plan      Expected Discharge Date: Monday to home significant other will consider enrolling in hospice with [facility]  Destination: home;home with family;home with help/services  Discharge Comments: comes from home with partner of 26 years helping her          Peggy Flynn MD  Hospitalist Service  [facility]  Securely message with [employer] (more info)  Text page via [employer] Paging/Directory   ______________________________________________________________________    Interval History   Patient seen and examined, she was fairly drowsy but opened her eyes and responded to her name and declined any discomfort or pain  Had an extremely long conversation with patient's significant other via telephone.  He understands that Marino would like to be home and he would like to provide her with dignity.  They are enrolled in the [facility] home services program with physician support.  We talked about hospice enrollment amongst others and the support that it would provide him additionally he is open to the idea but does not want to see hospice while in the hospital  He is willing to take her home on Monday when he would need more help we will need a heads up about the time of arrival so that he can have additional help available to ensure smooth transfer happens        Physical Exam   Vital Signs: Temp: 98  F (36.7  C) Temp src: Temporal BP: (!) 150/60 Pulse: 78   Resp: 18  SpO2: 90 % O2 Device: Nasal cannula Oxygen Delivery: 4 LPM  Weight: 148 lbs 0 oz    Constitutional: Older woman seen resting in bed. Patient alert, answering questions appropriately. Oriented to person and that she is in the hospital. Does not appear toxic. No diaphoresis. No acute distress.   HEENT: NCAT. Making eye contact today, EOMI. Moist oral mucosa.  Respiratory:  Decreased breath sounds left greater than right but no wheezing, no stridor  Cardiovascular: Regular rate and rhythm. No murmur.  GI: Soft, nontender, nondistended. Normoactive bowel sounds.   Genitourinary: Clear, yellow urine output noted in Kinney catheter.  Musculoskeletal: No gross deformities.   Neurologic: Alert. Answering questions appropriately.  Following simple verbal instructions  Chronic left-sided weakness reported. No acute focal neurologic deficit. Normal speech          Medical Decision Making       50 MINUTES SPENT BY ME on the date of service doing chart review, history, exam, documentation & further activities per the note.  Care plan was discussed with the patient, bedside nurse as well as patient's significant other    Tests ORDERED in the past 24 hours:       Data        acetylcysteine  2 mL Nebulization Q4H     amLODIPine  5 mg Oral Daily     aspirin  81 mg Oral Daily     bisacodyl  10 mg Rectal Once     dextromethorphan-guaiFENesin  1 tablet Oral BID     fluconazole  150 mg Oral Weekly     fluticasone-vilanterol  1 puff Inhalation Daily     ipratropium - albuterol 0.5 mg/2.5 mg/3 mL  3 mL Nebulization 4x daily     levETIRAcetam  500 mg Oral BID     multivitamin w/minerals  1 tablet Oral Daily     pravastatin  20 mg Oral Daily     rivaroxaban ANTICOAGULANT  20 mg Oral Daily with supper     sodium chloride (PF)  3 mL Intracatheter Q8H     terazosin  2 mg Oral At Bedtime     umeclidinium  1 puff Inhalation Daily         I have personally reviewed the following data over the past 24 hrs:    N/A  \   N/A   / N/A     N/A N/A N/A /   N/A   3.6 N/A N/A \       Imaging results reviewed over the past 24 hrs:   No results found for this or any previous visit (from the past 24 hour(s)).

## 2023-02-11 NOTE — PLAN OF CARE
Goal Outcome Evaluation:  Vital signs stable.  Lungs coarse, diminished in bases, occasional loose cough.  On Mucinex.  Alert to self, disoriented to place, situation.  Incontinent of urine and stool , bed bath and skin care done.  Mepilex applied to reddened coccyx.  Purewick changed.  Pt fed at supper.  Turned , repositioned q 2 hrly.  On iv Zosyn.  Seizure precautions.    Plan of Care Reviewed With: patient

## 2023-02-11 NOTE — PLAN OF CARE
Speech Language Therapy Discharge Summary    Reason for therapy discharge:    All goals and outcomes met, no further needs identified.    Progress towards therapy goal(s). See goals on Care Plan in [facility] electronic health record for goal details.  Goals met    Therapy recommendation(s):    No further therapy is recommended. Continue to recommend regular diet and thin liquids.  Please ensure pt is sitting upright and is alert for PO. Provide liquid washes (alternate solids and liquids) to assist in pt clearing her oral cavity. Pt's swallow function appears at baseline.

## 2023-02-11 NOTE — PLAN OF CARE
Pt alert today, disoriented to time and situation.  Denies pain.  Vitals monitored on 4L O2. Frequent loose, productive cough with use of suction. Purewick in place.  Wound care, pt bath completed per orders.  Large BM today, all linen changed.  Repositioned.  Pulsate mattress with off loading boots.  Pt has no IV access at this time.  Speech following, pt is feeder with good appetite.  Likely discharge home with spouse on Monday.     No heavy lifting/straining

## 2023-02-11 NOTE — PLAN OF CARE
Goal Outcome Evaluation:      Plan of Care Reviewed With: patient    Overall Patient Progress: no change    All cares provided. Dependent on staff. Incontinent of stool and bladder. Skin cares completed from head to toe. Shampoo hair completed and combed hair for 30 minutes. Oxygen 2L per NC. Congested loose cough. Lungs fairly clear. Limited range of motion to all extremities. Air mattress. Skin is peeling from shoulders to toes and lotion applied. Intermittent confusion but able to answer easy questions and make conversation.

## 2023-02-11 NOTE — PLAN OF CARE
"Care from [id number]    Blood pressure 138/54, pulse 77, temperature 98.7  F (37.1  C), temperature source Temporal, resp. rate 18, height 1.676 m (5' 5.98\"), weight 67.1 kg (148 lb), SpO2 92 %, not currently breastfeeding.    Admit Date: 2/5/2023  Admitting Diagnosis: AMS. Pressure injury. Encephalopathy.  Neuro: Disorientated to, Time and Situation  Activity: are 2 assist with Lift  Telemetry Monitoring: No  Pain: complaining of 6/10 pain in their Lower back.  Tylenol and Ultram given for pain.  Labs / Tests: Mag and K  GI: incontinent. Incontinent cares provided  : incontinent. Incontinent cares provided. Pt has purewick. Was replaced on this shift  Fluids: has no IV access.  Diet: Regular  Consults:   Treatment:  Discharge Disposition:    Redness under L side of stomach, Interdry in place. Mepilex in place on L buttocks and coccyx, both are CDI  "

## 2023-02-12 NOTE — PROGRESS NOTES
[facility]    Medicine Progress Note - Hospitalist Service    Date of Admission:  2/5/2023    Assessment & Plan   Marino Prajapati is a 87 year old woman who was admitted on 2/5/2023. PMH significant for COPD, prior PE on maintenance DOAC, GERD, DJD, hypertension, anemia, osteoarthritis, prior DVT, and GERD. Patient lives at home with her . She is confined to bed and under the care of [facility] services including home physician visits. Patient presented to the ER on 2/5/23 due to increased disorientation with lethargy and ultimately being unresponsive at home. Blood pressure was elevated, glucose 88, and patient with multiple sores noted to hips, arms, under breast. T 95 in the ER. Lactic acidosis was noted and subsequently improved. Patient did have a witnessed seizure in the ER. Patient admitted to hospitalist service for further evaluation and treatment.      Encephalopathy  Metabolic encephalopathy with accompanying hypothermia  High suspicion for underlying sepsis with lactic acidosis, tachycardia, hypothermia with unclear source, possible soft tissue infection  Witnessed seizure in the emergency room  - Admitted as inpatient. Intubation was considered, however DNR/DNI was confirmed with family. Goals were restorative with hopes for return to home with family.   - Continue supportive care.  - Initially treated with vancomycin and Zosyn. Zosyn was continued. Did have what appears to be contaminant in 1/2 cultures and received additional dose of vancomycin night of 2/6, but given coag neg staph and likely contaminant, will not continue. Continue to follow blood cultures.  All cultures are negative till February 11  -Leukocytosis has resolved  -Off all antibiotics    Seizure disorder:  - CT of the head showed no obvious hemorrhage or major infarct  - Seizure precautions.  Switch Keppra to oral route  -Earlier seizures can be secondary to underlying metabolic issues with  hypothermia, hypoxia  -Appreciate input from general neurology   - Patient more alert 2/7 and diet advanced per SLP.  - No evidence of UTI. Kinney removed earlier  - Marked improvement in mentation 2/7 compared to 2/6.   However is fairly drowsy and sleeps a lot    Candidiasis  - Patient with evidence of likely candidiasis rash to skin with areas of ulceration. Had been being treated with weekly diflucan as outpatient. As unable to take PO and with concern for sepsis on admission, did treat as systemic candidiasis initially with fluconazole 800 mg IV x 1 followed by 400 mg daily. However, by 2/7, patient able to take PO and has improved mentation significantly. Resuming outpatient fluconazole 150 mg PO weekly at this time  - Wound care following with inter-dry applied to wettest area.     History of COPD  Mucus-plugging and left-sided atelectasis  - Repeat CXR 2/7 continues to show some consolidation vs atelectasis, but there is improved aeration on the left and may be due to degree of patient's positioning on that side.   - Appreciate RT following with chest PT, suctioning if needed, and mucomyst nebs as able.   - Resuming home therapies.   -Receiving Mucomyst  Chest x-ray shows significant improvement, last x-ray done February 11, oxygen needs unchanged and typically uses 4 L at home and that is what she is needing at this time  -Can decrease breathing treatments frequency  -Remain on mucolytic's      Abnormal thyroid lab  Elevated TSH at 19, normal free T4  -Unlikely patient has underlying myxedema coma with normal free T4  -Can be followed up as outpatient.  May be related to her acute illness  -Not on thyroid hormone replacement prior to admission     History of VTE, PE  - On DOAC at home. Can determine timeline of previous VTE.      Physical deconditioning  - Has been confined to bed at home.   - Once medically improved, can discuss further with SW regarding home services, but family hoping patient will return  "home.     Multiple wounds, poor skin care  -Reportedly on a bedbound state at home  -Wound care evaluation  -Dietary service evaluation        Goals of care:    Earlier my colleague had an extensive discussion with patient's  as listed below regarding goals of care discussion, discharge planning disposition:    Had an extremely long conversation with patient's significant other via telephone.  He understands that Marino would like to be home and he would like to provide her with dignity.  They are enrolled in the [facility] home [facility] program with physician support.  We talked about hospice enrollment amongst others and the support that it would provide him additionally he is open to the idea but does not want to see hospice while in the hospital  He is willing to take her home on Monday when he would need more help we will need a heads up about the time of arrival so that he can have additional help available to ensure smooth transfer happens         Diet: Snacks/Supplements Adult: Ensure Enlive; With Meals  Combination Diet Regular Diet    DVT Prophylaxis: DOAC  Kinney Catheter: Not present  Lines: None     Cardiac Monitoring: None  Code Status: No CPR- Do NOT Intubate      Clinically Significant Risk Factors              # Hypoalbuminemia: Lowest albumin = 3 g/dL at 2/9/2023  7:09 AM, will monitor as appropriate           # Overweight: Estimated body mass index is 25.39 kg/m  as calculated from the following:    Height as of this encounter: 1.676 m (5' 5.98\").    Weight as of this encounter: 71.3 kg (157 lb 3.2 oz).          Disposition Plan      Expected Discharge Date: Monday to home significant other will consider enrolling in hospice with [facility]  Destination: home;home with family;home with help/services  Discharge Comments: comes from home with partner of 26 years helping her          Al Antoine Ornelas MD, MD  Hospitalist Service  [facility]  Securely message with [employer] (more " info)  Text page via [facility] Paging/Directory   ______________________________________________________________________    Interval History   Patient seen and examined.  Case discussed with nursing service.  I met Ms. Pichardo this morning while she is laying comfortably in bed.  She appears to be significantly clinically improved as she is more conversant, interactive.  She is only requiring 2 L of oxygen by nasal cannula.  She appears to be in good spirits, very pleasant, conversational but hard of hearing.  No significant reported events overnight.  No reported nausea, or vomiting.          Physical Exam   Vital Signs: Temp: 98.5  F (36.9  C) Temp src: Temporal BP: 132/54 Pulse: 75   Resp: 20 SpO2: 91 % O2 Device: Nasal cannula Oxygen Delivery: 2 LPM  Weight: 157 lbs 3.2 oz    Constitutional: Older woman seen resting in bed. Patient alert, answering questions appropriately. Oriented to person and that she is in the hospital. Does not appear toxic. No diaphoresis. No acute distress.   HEENT: NCAT. Making eye contact today, EOMI. Moist oral mucosa.  Respiratory:  Decreased breath sounds left greater than right but no wheezing, no stridor  Cardiovascular: Regular rate and rhythm. No murmur.  GI: Soft, nontender, nondistended. Normoactive bowel sounds.   Genitourinary: Clear, yellow urine output noted in Kinney catheter.  Musculoskeletal: No gross deformities.   Neurologic: Alert. Answering questions appropriately.  Following simple verbal instructions  Chronic left-sided weakness reported. No acute focal neurologic deficit. Normal speech          Medical Decision Making       50 MINUTES SPENT BY ME on the date of service doing chart review, history, exam, documentation & further activities per the note.  Care plan was discussed with the patient, bedside nurse as well as patient's significant other    Tests ORDERED in the past 24 hours:       Data        acetylcysteine  2 mL Nebulization Q4H     amLODIPine  5 mg Oral Daily      aspirin  81 mg Oral Daily     bisacodyl  10 mg Rectal Once     COVID-19 mRNA BIVALENT vaccine (PFIZER BOOSTER)  30 mcg Intramuscular Once     dextromethorphan-guaiFENesin  1 tablet Oral BID     fluconazole  150 mg Oral Weekly     fluticasone-vilanterol  1 puff Inhalation Daily     ipratropium - albuterol 0.5 mg/2.5 mg/3 mL  3 mL Nebulization 4x daily     levETIRAcetam  500 mg Oral BID     multivitamin w/minerals  1 tablet Oral Daily     pravastatin  20 mg Oral Daily     rivaroxaban ANTICOAGULANT  20 mg Oral Daily with supper     sodium chloride (PF)  3 mL Intracatheter Q8H     terazosin  2 mg Oral At Bedtime     umeclidinium  1 puff Inhalation Daily             Imaging results reviewed over the past 24 hrs:   No results found for this or any previous visit (from the past 24 hour(s)).

## 2023-02-12 NOTE — PLAN OF CARE
Goal Outcome Evaluation:      Plan of Care Reviewed With: patient    Overall Patient Progress: no changeOverall Patient Progress: no change     Pt alert and cooperative with cares. Repositioned throughout the night. LS clear but diminished. BS present, passing flatus. LBM 2/12/23. Sween cream applied to lower extremities d/t peeling and dryness along with upper back. Purewick in place for urination. Waffle boots on lower extremities. Pt had eaten ice cream and drank an ensure last evening. Tolerated well.

## 2023-02-12 NOTE — PLAN OF CARE
Goal Outcome Evaluation:      Plan of Care Reviewed With: patient, significant other    Overall Patient Progress: no change     All cares provided. No changes this shift. Incontinent of bowel and bladder. Dependent on staff. Plan is home tomorrow with SO per stretcher to her home. Incontinent of stool and bladder. Skin cares completed from head to toe.  Oxygen baseline 2L-4 L per NC. Congested loose cough. Lungs fairly clear. Limited range of motion to all extremities. Air mattress. Skin is peeling from shoulders to toes and lotion applied. Intermittent confusion but able to answer easy questions and make conversation.   Patient requested Covid vaccine. Marino recommended I talk to Ghassan and see what he says. Ghassan noted that the doctor from [facility] that comes out to the house has recommended against getting the shot. After speaking to both Marino and Ghassan, she is declining the covid vaccine.

## 2023-02-13 NOTE — PLAN OF CARE
Goal Outcome Evaluation:      Plan of Care Reviewed With: patient    Overall Patient Progress: no changeOverall Patient Progress: no change    Outcome Evaluation: Plan is to possibly discharge to home with home care services today or tomorrow.     Pt alert and cooperative with cares. Repositioned throughout the night. Tylenol and Tramadol given for pain medication. Melatonin and aromatherapy given last evening as well for a helpful sleep.LS clear but diminished. O2 was increased from 2LPM to 4LPM. BS present, passing flatus. LBM 2/12/23. Sween cream applied to lower extremities d/t peeling and dryness along with upper back. Purewick in place for urination. Waffle boots on lower extremities. Possible discharge back to home with home services.

## 2023-02-13 NOTE — PLAN OF CARE
Goal Outcome Evaluation:    heidi PO well when fed, up with assist of 2 with lift, PO pain meds managing pain, voiding in good amts via purewick, small BM, on a pulsate mattress, plans to dc to home at 1530 via [facility]

## 2023-02-13 NOTE — PROGRESS NOTES
[facility] Nurse Inpatient Assessment     Consulted for: ARM however H&P states she has multiple wounds on hips and arms     Patient History (according to provider note(s):      Marino Prajapati is a 87 year old female with a longstanding history of COPD, prior PE on maintenance DOAC, GERD, DJD, hypertension, anemia, osteoarthritis, prior DVT, GERD and reportedly lives at home with her  and has a presents in the emergency room today due to increasing mental status changes being disoriented, lethargic and eventually becoming more unresponsive at home.    Upon initial presentation with emergency personnel they report that patient appears to be bedbound state and showing irregular breathing for 45 minutes and then became unresponsive.  She was not following simple verbal instructions but initial evaluation showed blood pressure at hypertensive side, glucose level of 88 and notable multiple sores on hips, arms.     Areas Assessed:      Pressure Injury Location: left hip    Last photo: 2/13  Wound type: Pressure Injury, Friction and Incontinence Associated Dermatitis (IAD)     Pressure Injury Stage: 2, present on admission   Wound history/plan of care:   Found on admission H&P states she has been bed bound x 2 years, unresponsive   Left leg is contracted at the knee and rle is straight and we have not been able to safely bend.  Wound base: 100 % epidermis     Palpation of the wound bed: normal      Drainage: none     Description of drainage: none   Tunneling N/A     Undermining N/A  Periwound skin: Dry/scaly      Color: pink      Temperature: warm  Odor: none  Pain: unable to assess due to  unresponsiveness , none  Pain intervention prior to dressing change: slow and gentle cares   STATUS: healed  Supplies ordered: discussed with RN    My PI Risk Assessment     Sensory Perception: 1 - Completely Limited     Moisture: 1 - Constantly moist     Activity: 1 - Bedfast      Mobility: 1 -  Completely immobile      Nutrition: 1 - Very poor     Friction/Shear: 1 - Problem     TOTAL: 6    Wound Location:  Left neck/left arm and under breast, right medial breast fold right upper back   Date of last photo none taken   Wound History: bed bound x 2 years, contractures making taking care of patient difficult   Wound Base: 100 % epidermis     Palpation of the wound bed: normal      Drainage: moderate to heavy on arrival      Description of drainage: creamy also bypassing her catheter      Measurements (length x width x depth, in cm)  Affected area small scabbing to Right upper back in a 5cm x 1cm area     Tunneling N/A     Undermining N/A  Periwound skin: erythema- blanchable and irritant dermatitis      Color: pink and red      Temperature: warm  Odor: none  Pain: unable to assess due to  confusion, none  Pain intervention prior to dressing change: gentle cares and talking to her before we are doing turns     Bilateral heels are soft and squishy and sluggishly blanchable  Pictures 2/6     Right heel                                                         Left heel     Treatment Plan:     Preventative treatment   ble heels: prevalon boots while in bed #[id number]   Pillow between knees  Side to side turns q2hrs   Air mattress - already ordered and delivered  No brief in bed     Left neck, bilateral breast folds, left medial arm and torso, perineal skin, abdominal folds qshift    Apply perineal cleansing lotion to perineal skin using joaquin dry wipes     Perform head to toe hygiene daily with bath wipes - do not forget feet and between toes     Orders: Updated    RECOMMEND PRIMARY TEAM ORDER: None, at this time  Education provided: importance of repositioning, plan of care, Moisture management, Hygiene and Off-loading pressure  Discussed plan of care with: Nurse  WOC nurse follow-up plan: signing off  Notify WOC if wound(s) deteriorate.  Nursing to notify the Provider(s) and re-consult the WOC Nurse if new skin  concern.    DATA:     Current support surface: Standard  Low air loss (LISA pump, Isolibrium, Pulsate, skin guard, etc)  Containment of urine/stool: Incontinent pad in bed and Purewick external catheter   BMI: Body mass index is 25.18 kg/m .   Active diet order: Orders Placed This Encounter      Combination Diet Regular Diet      Diet     Output: I/O last 3 completed shifts:  In: 1800 [P.O.:1800]  Out: 1450 [Urine:1450]     Labs:   Recent Labs   Lab 02/09/23  0709   ALBUMIN 3.0*   HGB 10.6*   WBC 9.2     Pressure injury risk assessment:   Sensory Perception: 3-->slightly limited  Moisture: 3-->occasionally moist  Activity: 1-->bedfast  Mobility: 2-->very limited  Nutrition: 3-->adequate  Friction and Shear: 2-->potential problem  Dusty Score: 14    Kristal Alcocer RN CWOCN  Contact Via [facility] Nurse (Samantha)  Dept. Office Number: [phone]

## 2023-02-13 NOTE — DISCHARGE SUMMARY
Reviewed discharge instructions with patient. Patient discharged to home via [facility] with medications, discharge instructions, and belongings.

## 2023-02-13 NOTE — DISCHARGE SUMMARY
[facility]  Hospitalist Discharge Summary      Date of Admission:  2/5/2023  Date of Discharge:  2/13/2023  Discharging Provider: Patricio Ornelas MD, MD  Discharge Service: Hospitalist Service    Discharge Diagnoses     Metabolic encephalopathy with accompanying hypothermia  High suspicion for underlying sepsis with lactic acidosis, tachycardia, hypothermia with unclear source, possible soft tissue infection  Witnessed seizure in the emergency room  Acute on chronic hypoxic respiratory failure secondary to underlying mucous plugging, atelectasis with history of COPD  History of VTE, 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           Follow-up thyroid studies as outpatient    Unresulted Labs Ordered in the Past 30 Days of this Admission     No orders found from 1/6/2023 to 2/6/2023.          Discharge Disposition   Discharged to home  Condition at discharge: Stable      Hospital Course      Ms. Pichardo is doing well in the last 2 to 3 days and has no significant reported events overnight.  No further issues of recurrent hypothermia, remained afebrile.  Back on her baseline oxygen support.  Mental state appears to be at baseline.  She is hard of hearing but easily aroused with minimal verbal stimuli, conversational, participatory, tolerating assisted feeding.  No reported nausea, vomiting or diarrhea.  No further seizure-like activity seen here.  Started on Keppra and will be continued upon discharge.  Will need to follow-up with her PCP and general neurology as outpatient.  Antibiotics was subsequently discontinued.  She was started on broad-spectrum antibiotics  during her initial presentation with signs and symptomatology suggestive of severe sepsis.  Cultures remain no growth up-to-date.  No further antibiotics upon discharge.  Subsequent imaging of the chest showed improvement of aeration with her earlier significant left lung atelectasis, white out likely from severe mucous plugging.  She was treated with Mucomyst, mucolytic's and continued on breathing treatments.  Added Norvasc for her elevated blood pressure levels.  Will need further evaluation of her TSH as outpatient as she presented here with elevated TSH but normal free T4 likely related to her acute illness  -I was informed that patient has weekly Diflucan for candidiasis.  This is not listed on her PTA medication list.  No new prescriptions being provided for her but if she is on this Diflucan maintenance regimen can continue this medication at home.    I will refer to excerpts of earlier progress notes for other details of her complicated stay as listed below:      Marino Prajapati is a 87 year old woman who was admitted on 2/5/2023. PMH significant for COPD, prior PE on maintenance DOAC, GERD, DJD, hypertension, anemia, osteoarthritis, prior DVT, and GERD. Patient lives at home with her . She is confined to bed and under the care of [facility] services including home physician visits. Patient presented to the ER on 2/5/23 due to increased disorientation with lethargy and ultimately being unresponsive at home. Blood pressure was elevated, glucose 88, and patient with multiple sores noted to hips, arms, under breast. T 95 in the ER. Lactic acidosis was noted and subsequently improved. Patient did have a witnessed seizure in the ER. Patient admitted to hospitalist service for further evaluation and treatment.      Encephalopathy  Metabolic encephalopathy with accompanying hypothermia  High suspicion for underlying sepsis with lactic acidosis, tachycardia, hypothermia with unclear source,  possible soft tissue infection  Witnessed seizure in the emergency room  - Admitted as inpatient. Intubation was considered, however DNR/DNI was confirmed with family. Goals were restorative with hopes for return to home with family.   - Continue supportive care.  - Initially treated with vancomycin and Zosyn. Zosyn was continued. Did have what appears to be contaminant in 1/2 cultures and received additional dose of vancomycin night of 2/6, but given coag neg staph and likely contaminant, will not continue. Continue to follow blood cultures.  All cultures are negative till February 11  -Leukocytosis has resolved  -Off all antibiotics    Seizure disorder:  - CT of the head showed no obvious hemorrhage or major infarct  - Seizure precautions.  Switch Keppra to oral route  -Earlier seizures can be secondary to underlying metabolic issues with hypothermia, hypoxia  -Appreciate input from general neurology   - Patient more alert 2/7 and diet advanced per SLP.  - No evidence of UTI. Kinney removed earlier  - Marked improvement in mentation 2/7 compared to 2/6.   However is fairly drowsy and sleeps a lot    Candidiasis  - Patient with evidence of likely candidiasis rash to skin with areas of ulceration. Had been being treated with weekly diflucan as outpatient. As unable to take PO and with concern for sepsis on admission, did treat as systemic candidiasis initially with fluconazole 800 mg IV x 1 followed by 400 mg daily. However, by 2/7, patient able to take PO and has improved mentation significantly. Resuming outpatient fluconazole 150 mg PO weekly at this time  - Wound care following with inter-dry applied to wettest area.     History of COPD  Mucus-plugging and left-sided atelectasis  - Repeat CXR 2/7 continues to show some consolidation vs atelectasis, but there is improved aeration on the left and may be due to degree of patient's positioning on that side.   - Appreciate RT following with chest PT, suctioning if  needed, and mucomyst nebs as able.   - Resuming home therapies.   -Receiving Mucomyst  Chest x-ray shows significant improvement, last x-ray done February 11, oxygen needs unchanged and typically uses 4 L at home and that is what she is needing at this time  -Can decrease breathing treatments frequency  -Remain on mucolytic's      Abnormal thyroid lab  Elevated TSH at 19, normal free T4  -Unlikely patient has underlying myxedema coma with normal free T4  -Can be followed up as outpatient.  May be related to her acute illness  -Not on thyroid hormone replacement prior to admission     History of VTE, PE  - On DOAC at home. Can determine timeline of previous VTE.      Physical deconditioning  - Has been confined to bed at home.   - Once medically improved, can discuss further with SW regarding home services, but family hoping patient will return home.     Multiple wounds, poor skin care  -Reportedly on a bedbound state at home  -Wound care evaluation  -Dietary service evaluation        Goals of care:    Earlier my colleague had an extensive discussion with patient's  as listed below regarding goals of care discussion, discharge planning disposition:    Had an extremely long conversation with patient's significant other via telephone.  He understands that Marino would like to be home and he would like to provide her with dignity.  They are enrolled in the [facility] home services program with physician support.  We talked about hospice enrollment amongst others and the support that it would provide him additionally he is open to the idea but does not want to see hospice while in the hospital  He is willing to take her home on Monday when he would need more help we will need a heads up about the time of arrival so that he can have additional help available to ensure smooth transfer happens        Consultations This Hospital Stay   NEUROLOGY IP CONSULT  CARE MANAGEMENT / SOCIAL WORK IP CONSULT  PHYSICAL THERAPY ADULT IP  CONSULT  OCCUPATIONAL THERAPY ADULT IP CONSULT  WOUND OSTOMY CONTINENCE NURSE  IP CONSULT  NUTRITION SERVICES ADULT IP CONSULT  PALLIATIVE CARE ADULT IP CONSULT  SPEECH LANGUAGE PATH ADULT IP CONSULT  RESPIRATORY CARE IP CONSULT  PHARMACY TO DOSE VANCO    Code Status   No CPR- Do NOT Intubate    Time Spent on this Encounter   I, Patricio Ornelas MD, MD, personally saw the patient today and spent greater than 30 minutes discharging this patient.       Patricio Ornelas MD, MD  [facility] ORTHO SPINE  [address] [address]  Phone: [phone]  Fax: [phone]  ______________________________________________________________________    Physical Exam   Vital Signs: Temp: 99.1  F (37.3  C) Temp src: Temporal BP: (!) 145/46 Pulse: 83   Resp: 20 SpO2: 93 % O2 Device: Nasal cannula Oxygen Delivery: 4 LPM  Weight: 155 lbs 14.4 oz  HEENT; Atraumatic, normocephalic, pinkish conjuctiva, pupils bilateral reactive   Skin: warm and moist, no rashes  Lungs: equal chest expansion, clear to auscultation, no wheezes, no stridor, no crackles,   Heart: normal rate, normal rhythm, no rubs or gallops.   Abdomen: normal bowel sounds, no tenderness, no peritoneal signs, no guarding  Extremities: no deformities, numerous skin excoriations, bilateral lower extremity edema, appears to be a bedbound state  Neuro; follow commands, alert and oriented x3, spontaneous speech, coherent, moves all extremities spontaneously  Hard of hearing, following simple verbal instructions  Psych; no hallucination, euthymic mood, not agitated           Primary Care Physician   [facility]    Discharge Orders      Reason for your hospital stay    Ms. Pichardo was admitted in the hospital earlier due to mental status changes, found with hypothermia, possible severe sepsis with no clear source of infection, complicated with witnessed seizure in the emergency room, acute hypoxic respiratory failure initially  requiring oxygen supplementation more than her baseline 4 L by nasal cannula with accompanying left lung atelectasis likely with severe mucous plugging.  Currently she is significantly improved, mental status at baseline levels, oxygen support currently at baseline as well.     Follow-up and recommended labs and tests     Follow up with primary care provider, [facility], within 7 days to evaluate medication change, to evaluate treatment change, and for hospital follow- up.     Activity    Your activity upon discharge: activity as tolerated     Resume Home Care Services    Resume home oxygen, home services     No CPR- Do NOT Intubate     Oxygen Adult/Peds    Oxygen Documentation  I certify that this patient, Marino Prajapati has been under my care (or a nurse practitioner or physican's assistant working with me). This is the face-to-face encounter for oxygen medical necessity.      At the time of this encounter supplemental oxygen is reasonable and necessary and is expected to improve the patient's condition in a home setting.       Patient has continued oxygen desaturation due to COPD J44.9.    If portability is ordered, is the patient mobile within the home? yes     Diet    Follow this diet upon discharge: Orders Placed This Encounter      Snacks/Supplements Adult: Ensure Enlive; With Meals      Combination Diet Regular Diet       Significant Results and Procedures   Most Recent 3 CBC's:Recent Labs   Lab Test 02/09/23  0709 02/08/23  0803 02/07/23  0620   WBC 9.2 7.9 9.1   HGB 10.6* 10.5* 10.5*   MCV 94 96 96    162 187     Most Recent 3 BMP's:Recent Labs   Lab Test 02/11/23  0648 02/10/23  0700 02/09/23  1345 02/09/23  0709 02/08/23  0655 02/07/23  1322 02/07/23  0620   NA  --   --   --  139 141  --  142   POTASSIUM 3.6 3.5 3.5 3.4 3.7   < > 3.1*   CHLORIDE  --   --   --  104 107  --  106   CO2  --   --   --  29 27  --  26   BUN  --   --   --  7.0* 9.1  --  10.9   CR  --   --   --  0.41*  0.43*  --  0.42*   ANIONGAP  --   --   --  6* 7  --  10   SANDRA  --   --   --  8.4* 8.6*  --  8.5*   GLC  --   --   --  104* 100*  --  88    < > = values in this interval not displayed.     7-Day Micro Results     No results found for the last 168 hours.        Most Recent Hemoglobin A1c:No lab results found.  Most Recent 6 glucoses:Recent Labs   Lab Test 02/09/23  0709 02/08/23  0655 02/07/23  0620 02/06/23  0636 02/05/23  1701   * 100* 88 93 108*  109*     Most Recent Urinalysis:Recent Labs   Lab Test 02/05/23  1733   COLOR Yellow   APPEARANCE Slightly Cloudy*   URINEGLC Negative   URINEBILI Negative   URINEKETONE Trace*   SG 1.020   UBLD Trace*   URINEPH 7.0   PROTEIN 10*   NITRITE Negative   LEUKEST Negative   RBCU 4*   WBCU 3     Most Recent ABG:Recent Labs   Lab Test 02/05/23  1659   PH 7.34   ,   Results for orders placed or performed during the hospital encounter of 02/05/23   Head CT w/o contrast    Narrative    EXAM: CT HEAD W/O CONTRAST  LOCATION: [facility]  DATE/TIME: 2/5/2023 5:32 PM    INDICATION: Seizure.  COMPARISON: CT head 08/14/2021.  TECHNIQUE: Routine CT Head without IV contrast. Multiplanar reformats. Dose reduction techniques were used.    FINDINGS: Difficult patient positioning.  INTRACRANIAL CONTENTS: Gray-white matter differentiation is maintained. No hemorrhage or extraaxial collection. No mass effect. Subarachnoid cisterns are patent. Patchy and confluent white matter hypodensities are, while nonspecific, most compatible with   chronic microvascular ischemic changes. Unchanged proportional prominence of the ventricles and sulci is compatible with diffuse cerebral volume loss.    VISUALIZED ORBITS/SINUSES/MASTOIDS: No visible intraorbital abnormality. Partially visualized mucosal thickening and aerated secretions in the sphenoid sinuses. Large left mastoid and middle ear effusion. No middle ear or mastoid effusion.    BONES/SOFT TISSUES: No acute  abnormality.      Impression    IMPRESSION:  1.  No convincing findings of acute transcortical infarct. Underlying microvascular ischemic changes can limit this assessment by CT. If sufficient clinical concern warrants further imaging and there is no contraindication, consider MRI.  2.  No acute intracranial hemorrhage or mass effect.   3.  Aerated secretions in the sphenoid sinuses can be seen with acute sinusitis in the appropriate cortical context.  4.  Large new left mastoid and middle ear effusions may be inflammatory. Correlate clinically.       XR Chest Port 1 View    Narrative    EXAM: XR CHEST PORT 1 VIEW  LOCATION: [facility]  DATE/TIME: 2/5/2023 6:56 PM    INDICATION: AMS  COMPARISON: 08/14/2021 and CT chest 08/14/2021      Impression    IMPRESSION: Cardiomegaly. Opacities have developed throughout the left lung and may represent a combination of pleural fluid, pneumonia and/or atelectasis. This would be better assessed with CT. Linear scarring or atelectasis right lower lung field.   Surgical clips right upper quadrant. Scoliosis and degenerative changes of the spine and both shoulders.   CT Chest w Contrast    Narrative    EXAM: CT CHEST W CONTRAST  LOCATION: [facility]  DATE/TIME: 2/5/2023 7:43 PM    INDICATION: AMS, hypoxia  COMPARISON: 03/06/2020.  TECHNIQUE: CT chest with IV contrast. Multiplanar reformats were obtained. Dose reduction techniques were used.    CONTRAST: 70mL Isovue 370    FINDINGS:   LUNGS AND PLEURA: New severe mucous plugging within the left mainstem, upper, and lower lobe bronchi with complete atelectasis of the left lung. There is mild subsegmental atelectasis in the right lower lobe and right middle lobe. No pneumothorax. No   significant pleural effusion.    MEDIASTINUM/AXILLAE: Shifted to the left. Great vessels normal in caliber. No pericardial effusion. Cardiac chambers unremarkable. No abnormally enlarged lymph  nodes.    CORONARY ARTERY CALCIFICATION: Severe.    UPPER ABDOMEN: Prior cholecystectomy. Small right renal cysts, no follow up needed.    MUSCULOSKELETAL: Severe upper thoracic kyphosis. Severe vertebral compression deformity at the approximate T8 and L1, stable. Severe degenerative change in the shoulders bilaterally.      Impression    IMPRESSION:   1.  Severe mucous plugging within the left mainstem and upper and lower lobe bronchi with complete atelectasis of the left lung.   XR Chest Port 1 View    Narrative    CHEST ONE VIEW  2/7/2023 4:19 PM     HISTORY: Previous mucus plug and atelectasis on left. Evaluate for  improvement.    COMPARISON: February 5, 2023      Impression    IMPRESSION: Probably some improvement on the left although there is  extensive consolidation persisting. Evaluation limited by position.  Right lung clear.    CLEMENCIA GAMBLE MD         SYSTEM ID:  [id number]   XR Chest Port 1 View    Narrative    XR CHEST PORT 1 VIEW 2/10/2023 11:58 AM    HISTORY: Hypoxia, follow-up study    COMPARISON: 2/7/2023      Impression    IMPRESSION: Worsening mild linear interstitial opacities in the lung  bases, which may be due to pulmonary edema or atypical pneumonia.  Stable bibasilar dense atelectasis/consolidation and small left  pleural effusion. No pneumothorax. Stable widening of the upper  mediastinum and cardiomegaly.    CAM GOLDBERG MD         SYSTEM ID:  [id number]       Discharge Medications   Current Discharge Medication List      START taking these medications    Details   amLODIPine (NORVASC) 5 MG tablet Take 1 tablet (5 mg) by mouth daily  Qty: 30 tablet, Refills: 0    Associated Diagnoses: HTN, goal below 140/90      dextromethorphan-guaiFENesin (MUCINEX DM)  MG 12 hr tablet Take 1 tablet by mouth 2 times daily for 3 days  Qty: 6 tablet, Refills: 0    Associated Diagnoses: Chronic obstructive pulmonary disease, unspecified COPD type (H)      levETIRAcetam (KEPPRA) 500 MG tablet Take 1  tablet (500 mg) by mouth 2 times daily  Qty: 60 tablet, Refills: 0    Associated Diagnoses: New onset seizure (H)         CONTINUE these medications which have NOT CHANGED    Details   acetaminophen (TYLENOL) 500 MG tablet Take 1,000 mg by mouth 2 times daily Takes with tramadol.      albuterol (PROAIR HFA/PROVENTIL HFA/VENTOLIN HFA) 108 (90 Base) MCG/ACT inhaler Inhale 2 puffs into the lungs every 6 hours as needed for shortness of breath / dyspnea or wheezing  Qty: 3 Inhaler, Refills: 1    Comments: Pharmacy may dispense brand covered by insurance (Proair, or proventil or ventolin or generic albuterol inhaler)  Associated Diagnoses: Mild persistent asthma without complication; Chronic obstructive pulmonary disease, unspecified COPD type (H)      aspirin (ASA) 81 MG chewable tablet Take 1 tablet (81 mg) by mouth daily    Associated Diagnoses: NSTEMI (non-ST elevated myocardial infarction) (H)      budesonide-formoterol (SYMBICORT) 160-4.5 MCG/ACT Inhaler Inhale 2 puffs into the lungs 2 times daily  Qty: 30.6 g, Refills: 3    Comments: Profile Rx: patient will contact pharmacy when needed  Associated Diagnoses: Chronic obstructive pulmonary disease, unspecified COPD type (H); Mild persistent asthma without complication      cholecalciferol (VITAMIN D) 1000 UNIT tablet Take 1,000 Units by mouth daily   Qty: 100 tablet, Refills: 3    Associated Diagnoses: Cough; Mild persistent asthma without complication      ipratropium - albuterol 0.5 mg/2.5 mg/3 mL (DUONEB) 0.5-2.5 (3) MG/3ML neb solution INHALE CONTENTS OF 1 VIAL (3 MLS) VIA NEBULIZER EVERY 6 HOURS AS NEEDED FOR SHORTNESS OF BREATH, DYSPNEA, OR WHEEZING  Qty: 1000 mL, Refills: 1    Associated Diagnoses: Mild persistent asthma without complication; Chronic obstructive pulmonary disease, unspecified COPD type (H)      !! nystatin (MYCOSTATIN) 385936 UNIT/GM external powder Apply topically 2 times daily as needed (skin rash)  Qty: 60 g, Refills: 3    Associated  Diagnoses: Rash and nonspecific skin eruption      pravastatin (PRAVACHOL) 20 MG tablet TAKE 1 TABLET DAILY  Qty: 90 tablet, Refills: 0    Associated Diagnoses: Hyperlipidemia LDL goal <130; HTN, goal below 140/90; Bilateral leg edema      rivaroxaban ANTICOAGULANT (XARELTO ANTICOAGULANT) 20 MG TABS tablet Take 1 tablet (20 mg) by mouth daily (with dinner)  Qty: 90 tablet, Refills: 1    Associated Diagnoses: Acute pulmonary embolism without acute cor pulmonale, unspecified pulmonary embolism type (H)      terazosin (HYTRIN) 2 MG capsule TAKE 1 CAPSULE AT BEDTIME  Qty: 90 capsule, Refills: 0    Associated Diagnoses: Hyperlipidemia LDL goal <130; HTN, goal below 140/90; Bilateral leg edema      tiotropium (SPIRIVA) 18 MCG inhaled capsule Inhale 1 capsule (18 mcg) into the lungs daily  Qty: 90 capsule, Refills: 1    Comments: Profile Rx: patient will contact pharmacy when needed  Associated Diagnoses: Mild persistent asthma without complication; Chronic obstructive pulmonary disease, unspecified COPD type (H)      traMADol (ULTRAM) 50 MG tablet TAKE 1 TABLET THREE TIMES A DAY AS NEEDED FOR SEVERE PAIN  Qty: 90 tablet, Refills: 0    Associated Diagnoses: Bilateral chronic knee pain; Compression fracture of L1 vertebra, initial encounter (H); Compression fracture of T8 vertebra, initial encounter (H)      !! nystatin (MYCOSTATIN) 442590 UNIT/GM external powder Apply topically 2 times daily  Qty: 60 g, Refills: 1    Associated Diagnoses: Yeast infection of the skin      !! nystatin (MYCOSTATIN) 189559 UNIT/GM external powder Apply topically 2 times daily  Qty: 60 g, Refills: 1    Associated Diagnoses: Rash and nonspecific skin eruption       !! - Potential duplicate medications found. Please discuss with provider.        Allergies   Allergies   Allergen Reactions     Atorvastatin Muscle Pain (Myalgia)     PN: LW Reaction: myalgias  PN: LW Reaction: myalgias       Lisinopril Cough     PN: LW Reaction: cough  PN: LW  Reaction: cough       Other Environmental Allergy      PN: LW FI1: none      Speaking Coherently

## 2023-02-13 NOTE — PROGRESS NOTES
Care Management Discharge Note    Discharge Date: 02/13/2023       Discharge Disposition:  Home    Discharge Services:      Discharge DME:      Discharge Transportation: agency    Private pay costs discussed: transportation costs  Reviewed out of pocket cost for [facility] [place] stretcher transport, $1117.00 for base rate and $26.06 per mile to the destination. Pt/family expressed understanding and are agreeable to this.      Patient requires stretcher transportation due to bedbound    Stretcher transportation has been arranged for 1500. Patient and bedside nurse notified of transportation time.    Ambulance PCS form completed and placed in patient chart. Ambulance PCS form should be given to transportation team.    PAS Confirmation Code:    Patient/family educated on Medicare website which has current facility and service quality ratings:      Education Provided on the Discharge Plan:    Persons Notified of Discharge Plans:   Patient/Family in Agreement with the Plan: yes    Handoff Referral Completed: No    Additional Information:    Pt is set for discharge home with pt spouse at 1500. Spoke with pt spouse over the phone who explained that a 1500 ride time would be best for him as his family would be available in assisting get pt situated. Ride is set for 1500. Pt spouse explained that he plans to follow up with the [facility] group for homecare or hospice as he is unsure at this time what he would like for pt.     CYRUS Ojeda, SW  Inpatient Care Coordination  Ortho/Spine Unit  [phone]  Kinga Ogden, SW

## 2023-02-14 NOTE — PROGRESS NOTES
[facility]    Background: Transitional Care Management program identified per system criteria and reviewed by [facility] team for possible outreach.    Assessment: Upon chart review, [facility] Team member will not proceed with patient outreach related to this episode of Transitional Care Management program due to reason because:    Patient is enrolled with [facility] home services program with physician support with potential future discussions of hospice care.    Approved by leader to close encounter due to above    Plan: Transitional Care Management episode addressed appropriately per reason noted above.      Shannon Carmen RN  [facility] Resource [place], [facility]    *Connected Care Resource Team does NOT follow patient ongoing. Referrals are identified based on internal discharge reports and the outreach is to ensure patient has an understanding of their discharge instructions.

## 2023-02-16 NOTE — TELEPHONE ENCOUNTER
Patient in room [facility]. I have received report from Britt MORSE and had the opportunity to ask 
questions and assume patient care. Routing refill request to provider for review/approval because:  Drug not on the [facility] refill protocol

## 2023-05-16 NOTE — TELEPHONE ENCOUNTER
Form with providers message ( she is no longer Patients PCP ) unsigned  faxed back to [facility] .KELVIN Herring LPN

## 2023-10-04 NOTE — PROGRESS NOTES
Dr Mendes's note      Patient's instructions / PLAN:                                                        Plan:  1. Mucinex 600 mg twice a day   2. Boil water and breath the warm vapors 2-3 times a day to try to open up the sinuses.   3. Continue same meds, same doses for now   4. Please make a lab appointment for fasting labs in November 5. Please make an appointment few days after the labs to discuss about the results.   6.The following vaccines are recommended for you.   Medicare covers better if you have these vaccines at the pharmacy:  -- Prevnar 13 ( pneumonia vaccine)  -- Tetanus vaccine   -- Shingerix vaccine - the newest vaccine for shingles       ASSESSMENT & PLAN:                                                      81 y/o woman with PMHx: Asthma, HTN, HLipidemia, HPTH, LE lyphedema, bilat knees and hips replacements, osteoporosis     (J45.30) Asthma,   (primary encounter diagnosis)  Comment: Not controlled, most likely secondary recent URI  Plan: albuterol (PROAIR HFA/PROVENTIL HFA/VENTOLIN         HFA) 108 (90 Base) MCG/ACT Inhaler, guaiFENesin        (MUCINEX) 600 MG 12 hr tablet            (I10) HTN, goal below 140/90  Comment: Controlled for her age  Plan: Albumin Random Urine Quantitative with Creat         Ratio, Lipid panel reflex to direct LDL         Fasting, Comprehensive metabolic panel, TSH         with free T4 reflex, CBC with platelets         differential, atenolol (TENORMIN) 100 MG         tablet, pravastatin (PRAVACHOL) 20 MG tablet            (E78.5) Hyperlipidemia LDL goal <130  Comment: Controlled   LDL Cholesterol Calculated   Date Value Ref Range Status   11/28/2017 88 <100 mg/dL Final     Comment:     Desirable:       <100 mg/dl      Plan: Albumin Random Urine Quantitative with Creat         Ratio, Lipid panel reflex to direct LDL         Fasting, Comprehensive metabolic panel, TSH         with free T4 reflex, CBC with platelets         differential, pravastatin (PRAVACHOL) 20  MG         tablet            (Z98.890) bilat Knees and Hips arthroplasty  Comment:   Plan: She walks with a walker    (I89.0) Lymphedema of both lower extremities  Comment:   Plan: Did not tolerate lymphedema treatment    (E21.3) Hyperparathyroidism (H)  Comment: PTH improved with vitamin D treatment  Plan: We will follow-up       Chief complaint:                                                      Follow up chronic medical problems    Asthma     SUBJECTIVE:   Marino Prajapati is a 82 year old female who presents to clinic today for the following health issues:      Hyperlipidemia Follow-Up      Rate your low fat/cholesterol diet?: good    Taking statin?  Yes, no muscle aches from statin    Other lipid medications/supplements?:  none    Hypertension Follow-up      Outpatient blood pressures are being checked at home.  Results are 130/70.    Low Salt Diet: not monitoring salt    Asthma Follow-Up      Feels SOB because a lot of mucus in the nose and throat     Takes Flonase nasal spray the nebs, Symbicort     URI few days ago,  with the same symptoms     Was ACT completed today?    Yes    ACT Total Scores 7/19/2018   ACT TOTAL SCORE -   ASTHMA ER VISITS -   ASTHMA HOSPITALIZATIONS -   ACT TOTAL SCORE (Goal Greater than or Equal to 20) 15   In the past 12 months, how many times did you visit the emergency room for your asthma without being admitted to the hospital? 0   In the past 12 months, how many times were you hospitalized overnight because of your asthma? 0       Recent asthma triggers that patient is dealing with: None        Amount of exercise or physical activity: None    Problems taking medications regularly: No    Medication side effects: none    Diet: regular (no restrictions)        Stopped taking Omeprazole, because she has no stomach prob no gerd.     Chronic lymphedema  -- Still leg edema, tries to keep legs up   --  went for tx once, but unable to put the socks because of the back. She will not  "go back to lymphedema tx    Review of Systems:                                                      ROS: negative for fever, chills, cough, wheezes, chest pain, shortness of breath, vomiting, abdominal pain, leg swelling positive for cough, as above         OBJECTIVE:             Physical exam:  Blood pressure 140/71, pulse 73, temperature 99  F (37.2  C), temperature source Oral, height 5' 6\" (1.676 m), weight 242 lb 6.4 oz (110 kg), SpO2 93 %, not currently breastfeeding.   NAD, appears comfortable   Skin: no rashes   HEENT: PERRLA, EOMI, pink conjunctiva, anicteric sclerae, bilateral tympanic membranes are clinically normal, oropharynx is normal color, she sounds with upper respiratory congestion  Neck: supple, no JVD,  No thyroidmegaly. Lymph nodes nonpalpable cervical and supraclavicular.  Chest: clear to auscultation bilaterally, good respiratory effort  Heart: S1 S2, RRR, no mgr appreciated  Abdomen: soft, not tender,   Extremities: Chronic lymphedema with indurated skin around the ankles  Neurologic: A, Ox3, no focal signs appreciated    PMHx: reviewed  Past Medical History:   Diagnosis Date     Anemia      CARDIOVASCULAR SCREENING; LDL GOAL LESS THAN 160      Colon polyps 2010    needs colonoscopy 2013     DVT (deep venous thrombosis) (H) 2007    remote history of DVT while she was on BCP ,coumadin stopped after retroperitoneal/buttock hematoma in 10/11 . On ASA only     Gastro-oesophageal reflux disease      HTN, goal below 140/90      Hyperlipidemia LDL goal <130 1/22/2016     Kidney stone      Osteoarthritis     knees and hip     Osteoporosis      Postnasal drip      S/P total knee arthroplasty      Thrombosis of leg       PSHx: reviewed  Past Surgical History:   Procedure Laterality Date     ARTHROPLASTY HIP  8/1/2013    Procedure: ARTHROPLASTY HIP;  RIGHT TOTAL HIP ARTHROPLASTY;  Surgeon: Rufino Tong MD;  Location: [facility] OR     ARTHROPLASTY HIP Left 12/12/2014    Procedure: ARTHROPLASTY HIP;  " Surgeon: Rufino Tong MD;  Location: [facility] OR     ARTHROPLASTY KNEE  10/22/2012    Procedure: ARTHROPLASTY KNEE;  Right Total knee Arthroplasty  ;  Surgeon: Jagdeep Virgen MD;  Location: [facility] OR     ARTHROPLASTY KNEE  5/23/2013    Procedure: ARTHROPLASTY KNEE;  LEFT TOTAL KNEE ARTHROPLASTY (SMITH & NEPHEW)^;  Surgeon: Rufino Tong MD;  Location: [facility] OR     C PREP FACE/ORAL PROST PALATAL LIFT       CHOLECYSTECTOMY       CYSTOSCOPY, RETROGRADES, INSERT STENT URETER(S), COMBINED  7/16/2012    Procedure: COMBINED CYSTOSCOPY, RETROGRADES, INSERT STENT URETER(S);  Cystoscopy, Right retrogrades, Right Stent Placement;  Surgeon: Mauricio Velazquez MD;  Location: [facility] OR     LASER HOLMIUM LITHOTRIPSY URETER(S), INSERT STENT, COMBINED  8/8/2012    Procedure: COMBINED CYSTOSCOPY, URETEROSCOPY, LASER HOLMIUM LITHOTRIPSY URETER(S), INSERT STENT;  Cystoscopy, Stent Removal, Right Ureteroscopy with Holmium Laser, Stone removal ;  Surgeon: Mauricio Velazquez MD;  Location: [facility] OR     LIPOSUCTION (LOCATION)      tummy     STRIP VEIN          Meds: reviewed  Current Outpatient Prescriptions   Medication Sig Dispense Refill     acetaminophen (TYLENOL) 325 MG tablet Take 1-2 tablets (325-650 mg) by mouth every 6 hours as needed for mild pain 250 tablet 11     albuterol (PROAIR HFA, PROVENTIL HFA, VENTOLIN HFA) 108 (90 BASE) MCG/ACT inhaler Inhale 2 puffs into the lungs every 6 hours as needed for shortness of breath / dyspnea or wheezing 1 Inhaler 1     amLODIPine (NORVASC) 10 MG tablet Take 0.5 tablets (5 mg) by mouth daily 90 tablet 1     ascorbic acid (VITAMIN C) 500 MG tablet Take 500 mg by mouth daily  100 tablet 12     aspirin 81 MG tablet Take 81 mg by mouth daily  30 tablet      atenolol (TENORMIN) 100 MG tablet Take 1 tablet (100 mg) by mouth daily 90 tablet 1     budesonide-formoterol (SYMBICORT) 160-4.5 MCG/ACT Inhaler Inhale 2 puffs into the lungs 2 times daily 3 Inhaler 1     cholecalciferol (VITAMIN D)  1000 UNIT tablet Take 2 tablets (2,000 Units) by mouth daily 100 tablet 3     clobetasol (TEMOVATE) 0.05 % cream Apply sparingly to affected area.  Do not apply to face. 30 g 1     Coenzyme Q10 (COQ10) 30 MG CAPS  30 capsule      Cranberry Extract 250 MG TABS        Cyanocobalamin (B-12) 1000 MCG TBCR Take 1,000 mcg by mouth daily 100 tablet 1     desonide (DESOWEN) 0.05 % cream Apply  topically 2 times daily. 30 g 0     fluticasone (FLONASE) 50 MCG/ACT spray Spray 1-2 sprays into both nostrils daily 1 Bottle 11     furosemide (LASIX) 40 MG tablet 0.5 - 1 tab daily as instructed 90 tablet 0     Garlic (GARLIC 1500) 1500 MG CAPS        ipratropium - albuterol 0.5 mg/2.5 mg/3 mL (DUONEB) 0.5-2.5 (3) MG/3ML neb solution Take 1 vial (3 mLs) by nebulization every 6 hours as needed for shortness of breath / dyspnea or wheezing 100 vial 1     losartan (COZAAR) 100 MG tablet Take 1 tablet (100 mg) by mouth daily 90 tablet 2     metoprolol succinate (TOPROL-XL) 100 MG 24 hr tablet Take 1 tablet (100 mg) by mouth daily 90 tablet 1     Multiple Minerals (CALCIUM-MAGNESIUM-ZINC) TABS        Multiple Vitamins-Minerals (MULTIVITAMIN & MINERAL PO) Take 1 tablet by mouth daily.       Nutritional Supplements (SALMON OIL) CAPS        omeprazole 20 MG tablet Take 1 tablet (20 mg) by mouth daily Take 30-60 minutes before a meal. 90 tablet 0     order for DME Equipment being ordered: 4 wheeled walker with hand brakes and seat 1 each 0     order for DME Equipment being ordered: 1: Custom/alternative BLE full leg velco/buckling compression garment 1 each 0     order for DME Equipment being ordered: 1: Gradient Compression Wraps; 2: BLE knee high 20-30 mm Hg compression stockings; 3: BLE thigh high 20-30 mm Hg compression stockings; 4: Cast Boots 1 each 0     order for DME Equipment being ordered: Nebulizer and neb supplies (tubing, etc) 1 Device 0     pravastatin (PRAVACHOL) 20 MG tablet Take 1 tablet (20 mg) by mouth daily 90 tablet 0      terazosin (HYTRIN) 2 MG capsule Take 1 capsule (2 mg) by mouth At Bedtime 90 capsule 0     tiotropium (SPIRIVA HANDIHALER) 18 MCG capsule Inhale contents of one capsule daily. 90 capsule 0     traMADol (ULTRAM) 50 MG tablet TAKE 1 TABLET BY MOUTH TWICE DAILY AS NEEDED FOR PAIN 120 tablet 0     vitamin  B complex with vitamin C (VITAMIN  B COMPLEX) TABS Take 1 tablet by mouth daily  0       Soc Hx: reviewed  Fam Hx: reviewed          Vivian Mendes MD  Internal Medicine      1

## 2025-04-22 NOTE — TELEPHONE ENCOUNTER
Prior to immunization administration, verified patients identity using patient s name and date of birth. Please see Immunization Activity for additional information.     Screening Questionnaire for Adult Immunization    Are you sick today?   No   Do you have allergies to medications, food, a vaccine component or latex?   No   Have you ever had a serious reaction after receiving a vaccination?   No   Do you have a long-term health problem with heart, lung, kidney, or metabolic disease (e.g., diabetes), asthma, a blood disorder, no spleen, complement component deficiency, a cochlear implant, or a spinal fluid leak?  Are you on long-term aspirin therapy?   No   Do you have cancer, leukemia, HIV/AIDS, or any other immune system problem?   No   Do you have a parent, brother, or sister with an immune system problem?   No   In the past 3 months, have you taken medications that affect  your immune system, such as prednisone, other steroids, or anticancer drugs; drugs for the treatment of rheumatoid arthritis, Crohn s disease, or psoriasis; or have you had radiation treatments?   No   Have you had a seizure, or a brain or other nervous system problem?   No   During the past year, have you received a transfusion of blood or blood    products, or been given immune (gamma) globulin or antiviral drug?   No   For women: Are you pregnant or is there a chance you could become       pregnant during the next month?   No   Have you received any vaccinations in the past 4 weeks?   No     Immunization questionnaire answers were all negative.      Patient instructed to remain in clinic for 15 minutes afterwards, and to report any adverse reactions.     Screening performed by Immanuel Barroso MA on 4/22/2025 at 8:30 AM.         [facility] * intervensions received via fax     Forms in Dr. mail box for review and signature.

## 2025-07-26 NOTE — TELEPHONE ENCOUNTER
Amlodipine 5 mg last prescribed 6/17/21 for #90 with no refills. BRANDEE Chowdary R.N.    
[facility] to clarify if patient should be taking amlodipine 5mg. The patient's spouse said she has not been taking this.  Lexis reports the systolic has been 120-130 and diastolic 60-80 over the last couple of months    Lexis [phone].  
Lexis Mendiola advised patient is to be off Amlodipine. BRANDEE Chowdary R.N.    
No need for amlodipine since her blood pressure is doing well without it  I took amlodipine off the list  
Resulted